# Patient Record
Sex: MALE | Race: WHITE | NOT HISPANIC OR LATINO | Employment: OTHER | URBAN - METROPOLITAN AREA
[De-identification: names, ages, dates, MRNs, and addresses within clinical notes are randomized per-mention and may not be internally consistent; named-entity substitution may affect disease eponyms.]

---

## 2018-08-25 ENCOUNTER — APPOINTMENT (OUTPATIENT)
Dept: GENERAL RADIOLOGY | Facility: CLINIC | Age: 83
DRG: 469 | End: 2018-08-25
Attending: EMERGENCY MEDICINE
Payer: MEDICARE

## 2018-08-25 ENCOUNTER — HOSPITAL ENCOUNTER (INPATIENT)
Facility: CLINIC | Age: 83
LOS: 9 days | Discharge: NURSING, REHAB OR LONG-TERM CARE | DRG: 469 | End: 2018-09-03
Attending: EMERGENCY MEDICINE | Admitting: ORTHOPAEDIC SURGERY
Payer: MEDICARE

## 2018-08-25 DIAGNOSIS — I25.10 CORONARY ARTERY DISEASE INVOLVING NATIVE HEART WITHOUT ANGINA PECTORIS, UNSPECIFIED VESSEL OR LESION TYPE: ICD-10-CM

## 2018-08-25 DIAGNOSIS — K59.03 DRUG-INDUCED CONSTIPATION: ICD-10-CM

## 2018-08-25 DIAGNOSIS — Z79.4 TYPE 2 DIABETES MELLITUS WITH COMPLICATION, WITH LONG-TERM CURRENT USE OF INSULIN (H): ICD-10-CM

## 2018-08-25 DIAGNOSIS — E11.8 TYPE 2 DIABETES MELLITUS WITH COMPLICATION, WITH LONG-TERM CURRENT USE OF INSULIN (H): ICD-10-CM

## 2018-08-25 DIAGNOSIS — S72.001A CLOSED FRACTURE OF NECK OF RIGHT FEMUR, INITIAL ENCOUNTER (H): ICD-10-CM

## 2018-08-25 DIAGNOSIS — J18.9 PNEUMONIA OF RIGHT LOWER LOBE DUE TO INFECTIOUS ORGANISM: ICD-10-CM

## 2018-08-25 DIAGNOSIS — I47.10 PAROXYSMAL SUPRAVENTRICULAR TACHYCARDIA (H): Primary | ICD-10-CM

## 2018-08-25 LAB
ALBUMIN UR-MCNC: 10 MG/DL
ANION GAP SERPL CALCULATED.3IONS-SCNC: 6 MMOL/L (ref 3–14)
APPEARANCE UR: CLEAR
BASOPHILS # BLD AUTO: 0 10E9/L (ref 0–0.2)
BASOPHILS NFR BLD AUTO: 0.1 %
BILIRUB UR QL STRIP: NEGATIVE
BUN SERPL-MCNC: 31 MG/DL (ref 7–30)
CALCIUM SERPL-MCNC: 8.4 MG/DL (ref 8.5–10.1)
CHLORIDE SERPL-SCNC: 111 MMOL/L (ref 94–109)
CO2 SERPL-SCNC: 24 MMOL/L (ref 20–32)
COLOR UR AUTO: ABNORMAL
CREAT SERPL-MCNC: 1.65 MG/DL (ref 0.66–1.25)
DIFFERENTIAL METHOD BLD: ABNORMAL
EOSINOPHIL # BLD AUTO: 0.2 10E9/L (ref 0–0.7)
EOSINOPHIL NFR BLD AUTO: 1.2 %
ERYTHROCYTE [DISTWIDTH] IN BLOOD BY AUTOMATED COUNT: 14.9 % (ref 10–15)
GFR SERPL CREATININE-BSD FRML MDRD: 39 ML/MIN/1.7M2
GLUCOSE BLDC GLUCOMTR-MCNC: 98 MG/DL (ref 70–99)
GLUCOSE SERPL-MCNC: 119 MG/DL (ref 70–99)
GLUCOSE UR STRIP-MCNC: 30 MG/DL
HBA1C MFR BLD: 8.4 % (ref 0–5.6)
HCT VFR BLD AUTO: 35.2 % (ref 40–53)
HGB BLD-MCNC: 11.5 G/DL (ref 13.3–17.7)
HGB UR QL STRIP: NEGATIVE
IMM GRANULOCYTES # BLD: 0.1 10E9/L (ref 0–0.4)
IMM GRANULOCYTES NFR BLD: 0.5 %
INTERPRETATION ECG - MUSE: NORMAL
KETONES UR STRIP-MCNC: NEGATIVE MG/DL
LEUKOCYTE ESTERASE UR QL STRIP: NEGATIVE
LYMPHOCYTES # BLD AUTO: 1.9 10E9/L (ref 0.8–5.3)
LYMPHOCYTES NFR BLD AUTO: 12.3 %
MCH RBC QN AUTO: 29.7 PG (ref 26.5–33)
MCHC RBC AUTO-ENTMCNC: 32.7 G/DL (ref 31.5–36.5)
MCV RBC AUTO: 91 FL (ref 78–100)
MONOCYTES # BLD AUTO: 1.3 10E9/L (ref 0–1.3)
MONOCYTES NFR BLD AUTO: 8.2 %
NEUTROPHILS # BLD AUTO: 12.1 10E9/L (ref 1.6–8.3)
NEUTROPHILS NFR BLD AUTO: 77.7 %
NITRATE UR QL: NEGATIVE
NRBC # BLD AUTO: 0 10*3/UL
NRBC BLD AUTO-RTO: 0 /100
PH UR STRIP: 6.5 PH (ref 5–7)
PLATELET # BLD AUTO: 184 10E9/L (ref 150–450)
POTASSIUM SERPL-SCNC: 4.6 MMOL/L (ref 3.4–5.3)
RBC # BLD AUTO: 3.87 10E12/L (ref 4.4–5.9)
RBC #/AREA URNS AUTO: 1 /HPF (ref 0–2)
SODIUM SERPL-SCNC: 141 MMOL/L (ref 133–144)
SOURCE: ABNORMAL
SP GR UR STRIP: 1.01 (ref 1–1.03)
UROBILINOGEN UR STRIP-MCNC: NORMAL MG/DL (ref 0–2)
WBC # BLD AUTO: 15.6 10E9/L (ref 4–11)
WBC #/AREA URNS AUTO: 0 /HPF (ref 0–5)

## 2018-08-25 PROCEDURE — A9270 NON-COVERED ITEM OR SERVICE: HCPCS | Mod: GY | Performed by: INTERNAL MEDICINE

## 2018-08-25 PROCEDURE — 83036 HEMOGLOBIN GLYCOSYLATED A1C: CPT | Performed by: EMERGENCY MEDICINE

## 2018-08-25 PROCEDURE — 73502 X-RAY EXAM HIP UNI 2-3 VIEWS: CPT

## 2018-08-25 PROCEDURE — 25000128 H RX IP 250 OP 636: Performed by: INTERNAL MEDICINE

## 2018-08-25 PROCEDURE — 00000146 ZZHCL STATISTIC GLUCOSE BY METER IP

## 2018-08-25 PROCEDURE — 80048 BASIC METABOLIC PNL TOTAL CA: CPT | Performed by: EMERGENCY MEDICINE

## 2018-08-25 PROCEDURE — 81001 URINALYSIS AUTO W/SCOPE: CPT | Performed by: INTERNAL MEDICINE

## 2018-08-25 PROCEDURE — 12000007 ZZH R&B INTERMEDIATE

## 2018-08-25 PROCEDURE — 96374 THER/PROPH/DIAG INJ IV PUSH: CPT

## 2018-08-25 PROCEDURE — 0HQ0XZZ REPAIR SCALP SKIN, EXTERNAL APPROACH: ICD-10-PCS | Performed by: INTERNAL MEDICINE

## 2018-08-25 PROCEDURE — 99285 EMERGENCY DEPT VISIT HI MDM: CPT | Mod: 25

## 2018-08-25 PROCEDURE — 25000132 ZZH RX MED GY IP 250 OP 250 PS 637: Mod: GY | Performed by: INTERNAL MEDICINE

## 2018-08-25 PROCEDURE — 71045 X-RAY EXAM CHEST 1 VIEW: CPT

## 2018-08-25 PROCEDURE — 99223 1ST HOSP IP/OBS HIGH 75: CPT | Mod: AI | Performed by: INTERNAL MEDICINE

## 2018-08-25 PROCEDURE — 25000128 H RX IP 250 OP 636: Performed by: EMERGENCY MEDICINE

## 2018-08-25 PROCEDURE — 93005 ELECTROCARDIOGRAM TRACING: CPT

## 2018-08-25 PROCEDURE — 12013 RPR F/E/E/N/L/M 2.6-5.0 CM: CPT

## 2018-08-25 PROCEDURE — 85025 COMPLETE CBC W/AUTO DIFF WBC: CPT | Performed by: EMERGENCY MEDICINE

## 2018-08-25 RX ORDER — HYDROMORPHONE HYDROCHLORIDE 1 MG/ML
.3-.5 INJECTION, SOLUTION INTRAMUSCULAR; INTRAVENOUS; SUBCUTANEOUS
Status: DISCONTINUED | OUTPATIENT
Start: 2018-08-25 | End: 2018-08-26

## 2018-08-25 RX ORDER — LIDOCAINE 40 MG/G
CREAM TOPICAL
Status: DISCONTINUED | OUTPATIENT
Start: 2018-08-25 | End: 2018-08-26

## 2018-08-25 RX ORDER — HYDROMORPHONE HYDROCHLORIDE 1 MG/ML
0.2 INJECTION, SOLUTION INTRAMUSCULAR; INTRAVENOUS; SUBCUTANEOUS ONCE
Status: COMPLETED | OUTPATIENT
Start: 2018-08-25 | End: 2018-08-25

## 2018-08-25 RX ORDER — ACETAMINOPHEN 325 MG/1
650 TABLET ORAL EVERY 4 HOURS PRN
Status: DISCONTINUED | OUTPATIENT
Start: 2018-08-25 | End: 2018-08-26

## 2018-08-25 RX ORDER — AMOXICILLIN 250 MG
2 CAPSULE ORAL 2 TIMES DAILY PRN
Status: DISCONTINUED | OUTPATIENT
Start: 2018-08-25 | End: 2018-08-26

## 2018-08-25 RX ORDER — ACETAMINOPHEN 650 MG/1
650 SUPPOSITORY RECTAL EVERY 4 HOURS PRN
Status: DISCONTINUED | OUTPATIENT
Start: 2018-08-25 | End: 2018-08-27

## 2018-08-25 RX ORDER — SIMVASTATIN 20 MG
20 TABLET ORAL AT BEDTIME
Status: DISCONTINUED | OUTPATIENT
Start: 2018-08-26 | End: 2018-08-30

## 2018-08-25 RX ORDER — ONDANSETRON 4 MG/1
4 TABLET, ORALLY DISINTEGRATING ORAL EVERY 6 HOURS PRN
Status: DISCONTINUED | OUTPATIENT
Start: 2018-08-25 | End: 2018-08-26

## 2018-08-25 RX ORDER — NALOXONE HYDROCHLORIDE 0.4 MG/ML
.1-.4 INJECTION, SOLUTION INTRAMUSCULAR; INTRAVENOUS; SUBCUTANEOUS
Status: DISCONTINUED | OUTPATIENT
Start: 2018-08-25 | End: 2018-08-26

## 2018-08-25 RX ORDER — HYDRALAZINE HYDROCHLORIDE 20 MG/ML
10 INJECTION INTRAMUSCULAR; INTRAVENOUS EVERY 4 HOURS PRN
Status: DISCONTINUED | OUTPATIENT
Start: 2018-08-25 | End: 2018-09-03 | Stop reason: HOSPADM

## 2018-08-25 RX ORDER — GLIPIZIDE 5 MG/1
5 TABLET, FILM COATED, EXTENDED RELEASE ORAL 2 TIMES DAILY
Status: ON HOLD | COMMUNITY
End: 2018-09-03

## 2018-08-25 RX ORDER — SIMVASTATIN 40 MG
40 TABLET ORAL AT BEDTIME
Status: DISCONTINUED | OUTPATIENT
Start: 2018-08-25 | End: 2018-08-25

## 2018-08-25 RX ORDER — NICOTINE POLACRILEX 4 MG
15-30 LOZENGE BUCCAL
Status: DISCONTINUED | OUTPATIENT
Start: 2018-08-25 | End: 2018-08-26

## 2018-08-25 RX ORDER — ATENOLOL 25 MG/1
25 TABLET ORAL DAILY
Status: DISCONTINUED | OUTPATIENT
Start: 2018-08-26 | End: 2018-08-25

## 2018-08-25 RX ORDER — ATENOLOL 50 MG/1
50 TABLET ORAL DAILY
Status: DISCONTINUED | OUTPATIENT
Start: 2018-08-26 | End: 2018-08-27

## 2018-08-25 RX ORDER — SIMVASTATIN 40 MG
TABLET ORAL AT BEDTIME
Status: ON HOLD | COMMUNITY
End: 2018-08-25

## 2018-08-25 RX ORDER — ONDANSETRON 2 MG/ML
4 INJECTION INTRAMUSCULAR; INTRAVENOUS EVERY 6 HOURS PRN
Status: DISCONTINUED | OUTPATIENT
Start: 2018-08-25 | End: 2018-08-26

## 2018-08-25 RX ORDER — AMOXICILLIN 250 MG
1 CAPSULE ORAL 2 TIMES DAILY PRN
Status: DISCONTINUED | OUTPATIENT
Start: 2018-08-25 | End: 2018-08-26

## 2018-08-25 RX ORDER — SIMVASTATIN 20 MG
20 TABLET ORAL AT BEDTIME
Status: ON HOLD | COMMUNITY
End: 2018-09-03

## 2018-08-25 RX ORDER — ATENOLOL 50 MG/1
50 TABLET ORAL DAILY
Status: ON HOLD | COMMUNITY
End: 2018-09-03

## 2018-08-25 RX ORDER — SODIUM CHLORIDE 9 MG/ML
INJECTION, SOLUTION INTRAVENOUS CONTINUOUS
Status: DISCONTINUED | OUTPATIENT
Start: 2018-08-25 | End: 2018-08-27

## 2018-08-25 RX ORDER — GLIPIZIDE 5 MG/1
5 TABLET ORAL
Status: ON HOLD | COMMUNITY
End: 2018-08-25

## 2018-08-25 RX ORDER — OXYCODONE HYDROCHLORIDE 5 MG/1
5-10 TABLET ORAL
Status: DISCONTINUED | OUTPATIENT
Start: 2018-08-25 | End: 2018-08-26

## 2018-08-25 RX ORDER — ATENOLOL 25 MG/1
TABLET ORAL DAILY
Status: ON HOLD | COMMUNITY
End: 2018-08-25

## 2018-08-25 RX ORDER — DEXTROSE MONOHYDRATE 25 G/50ML
25-50 INJECTION, SOLUTION INTRAVENOUS
Status: DISCONTINUED | OUTPATIENT
Start: 2018-08-25 | End: 2018-08-26

## 2018-08-25 RX ADMIN — SODIUM CHLORIDE: 9 INJECTION, SOLUTION INTRAVENOUS at 22:01

## 2018-08-25 RX ADMIN — Medication 0.2 MG: at 21:06

## 2018-08-25 RX ADMIN — Medication 1 MG: at 22:21

## 2018-08-25 RX ADMIN — ACETAMINOPHEN 650 MG: 325 TABLET, FILM COATED ORAL at 22:20

## 2018-08-25 NOTE — IP AVS SNAPSHOT
"` `     [facility]: [phone]                                              INTERAGENCY TRANSFER FORM - NURSING   2018                    Hospital Admission Date: 2018  CHERYL HOLLOWAY   : 1924  Sex: Male        Attending Provider: Bill Sanders MD     Allergies:  No Known Allergies    Infection:  None   Service:  CARDIOLOGY    Ht:  1.727 m (5' 8\")   Wt:  72.6 kg (160 lb)   Admission Wt:  75.8 kg (167 lb 3.2 oz)    BMI:  24.33 kg/m 2   BSA:  1.87 m 2            Patient PCP Information     Provider PCP Type    RAISSA  [facility]      Current Code Status     Date Active Code Status Order ID Comments User Context       2018  9:30 PM DNR/DNI [id number]  Mamie Eubanks MD Inpatient       Code Status History     Date Active Date Inactive Code Status Order ID Comments User Context    2014 12:05 PM 2018  9:30 PM Full Code [id number]  Ryne Justin, PAAnnaC Outpatient      Advance Directives        Scanned docmt in ACP Activity?           No scanned doc        Hospital Problems as of 9/3/2018              Priority Class Noted POA    Closed right hip fracture (H) Medium  2018 Yes    S/P total hip arthroplasty Medium  2018 Yes      Non-Hospital Problems as of 9/3/2018              Priority Class Noted    Injury of globe of eye Medium  2014    Recent retinal detachment, total or subtotal Medium  2014      Immunizations     None         END      ASSESSMENT     Discharge Profile Flowsheet     EXPECTED DISCHARGE     SKIN      Expected Discharge Date  18 (or  TCU) 18 1246   Inspection of bony prominences  Full 18 0753    DISCHARGE NEEDS ASSESSMENT     Full except areas not inspected   -- 18 2249    Equipment Currently Used at Home  none 18 1006   Procedural focused assessment (identify areas inspected)   Cheek, left;Nose;Cheek, right;Hip, left;Knee, left;Knee, right;Ankle, left;Ankle, right;Heel, " "left;Heel, right;Foot, left;Foot, right (forehead, RUE) 08/26/18 1646    Equipment Used at Home  none 04/22/14 0848   Inspection under devices  Full 09/01/18 0331    GASTROINTESTINAL (ADULT,PEDIATRIC,OB)     Skin WDL  ex 09/03/18 0753    GI WDL  WDL 09/03/18 1039   Skin Color/Characteristics  bruised (ecchymotic) 09/03/18 0753    All Quadrants Bowel Sounds  audible and normoactive 09/03/18 0637   Skin Integrity  abrasion(s);bruise(s);incision(s) 09/03/18 0753    Last Bowel Movement  08/31/18 09/02/18 1426   Focused inspection of bony prominences  Hip, right 08/28/18 0958    GI Signs/Symptoms  constipation (refused intervention ) 08/31/18 0351   SAFETY      Passing flatus  yes 09/02/18 1816   Safety WDL  WDL 09/03/18 1039    COMMUNICATION ASSESSMENT     All Alarms  alarm(s) activated and audible 09/03/18 1039    Patient's communication style  spoken language (English or Bilingual) 08/25/18 2140   Safety Equipment  oxygen flowmeter;manual resuscitator/PEEP valve in room;suction regulator;suction equipment 09/03/18 0637                 Assessment WDL (Within Defined Limits) Definitions           Safety WDL     Effective: 09/28/15    Row Information: <b>WDL Definition:</b> Bed in low position, wheels locked; call light in reach; upper side rails up x 2; ID band on<br> <font color=\"gray\"><i>Item=AS safety wdl>>List=AS safety wdl>>Version=F14</i></font>      Skin WDL     Effective: 09/28/15    Row Information: <b>WDL Definition:</b> Warm; dry; intact; elastic; without discoloration; pressure points without redness<br> <font color=\"gray\"><i>Item=AS skin wdl>>List=AS skin wdl>>Version=F14</i></font>      Vitals     Vital Signs Flowsheet     VITAL SIGNS     ANALGESIA SIDE EFFECTS MONITORING      Temp  98.5  F (36.9  C) 09/03/18 1135   Side Effects Monitoring: Respiratory Quality  R 09/03/18 1231    Temp src  Oral 09/03/18 1135   Side Effects Monitoring: Respiratory Depth  N 09/03/18 1231    Resp  20 09/03/18 1230   Side " "Effects Monitoring: Sedation Level  1 09/03/18 1231    Pulse  75 09/01/18 1228   HEIGHT AND WEIGHT      Heart Rate  72 09/03/18 1230   Height  1.727 m (5' 8\") 08/25/18 2138    Pulse/Heart Rate Source  Monitor 09/03/18 1135   Height Method  Stated 08/25/18 2138    BP  141/57 09/03/18 1135   Weight  72.6 kg (160 lb) 09/03/18 0614    BP Location  Right arm 09/03/18 1135   Weight Method  Standing scale 09/03/18 0614    Patient Position  Lying 08/26/18 1526   Bed Scale  Standard (fitted sheet, draw sheet/ pad, cover/flat sheet, blanket, two pillows) 09/01/18 0636    OXYGEN THERAPY     BSA (Calculated - sq m)  1.91 08/25/18 2212    SpO2  96 % 09/03/18 0939   BMI (Calculated)  25.48 08/25/18 2212    O2 Device  None (Room air) 09/03/18 1230   POSITIONING      Oxygen Delivery  2 LPM 08/31/18 0755   Body Position  independently positioning 09/03/18 0920    RESPIRATORY MONITORING     Head of Bed (HOB)  HOB at 90 degrees 09/03/18 1230    Respiratory Monitoring (EtCO2)  19 mmHg 08/27/18 0737   Chair  Shifted weight 09/02/18 1426    Integrated Pulmonary Index (IPI)  5-6 08/27/18 0737   Positioning/Transfer Devices  pillows;in use 09/03/18 0920    PAIN/COMFORT     DAILY CARE      Patient Currently in Pain  denies 09/03/18 1230   Activity Management  up to bedside commode 09/03/18 0936    Preferred Pain Scale  number (Numeric Rating Pain Scale) 09/02/18 1608   Activity Assistance Provided  assistance, 2 people 09/03/18 0936    Patient's Stated Pain Goal  No pain 08/31/18 0639   Assistive Device Utilized  walker 09/03/18 0936    0-10 Pain Scale  6 09/03/18 0839   ECG      Word Pain Scale  2 08/26/18 1600   ECG Rhythm  Atrial fibrillation 09/02/18 1739    Pain Location  Back 09/03/18 0842   Lead Monitored  Lead II 08/26/18 1526    Pain Orientation  Lower 09/03/18 0842   Equipment  electrodes changed;telemetry batteries changed 09/02/18 1022    Pain Descriptors  Aching 09/03/18 0842   POINT OF CARE TESTING      Pain Management " Interventions  analgesia administered 09/03/18 0842   Puncture Site  fingertip 09/03/18 1231    Pain Intervention(s)  Medication (See eMAR) 09/02/18 1739   Bedside Glucose (mg/dl )   235 mg/dl 09/03/18 1231    Response to Interventions  Decrease in pain 09/03/18 0926                 Patient Lines/Drains/Airways Status    Active LINES/DRAINS/AIRWAYS     Name: Placement date: Placement time: Site: Days: Last dressing change:    Peripheral IV 08/28/18 Right Lower forearm 08/28/18   1846   Lower forearm   5     Incision/Surgical Site 08/26/18 Right Hip 08/26/18   1441    7             Patient Lines/Drains/Airways Status    Active PICC/CVC     None            Intake/Output Detail Report     Date Intake     Output   Net    Shift P.O. I.V. IV Piggyback Total Urine Blood Total       Noc 09/01/18 2300 - 09/02/18 0659 240 -- -- 240 225 -- 225 15    Day 09/02/18 0700 - 09/02/18 1459 -- 3 -- 3 275 -- 275 -272    Victoria 09/02/18 1500 - 09/02/18 2259 -- -- -- -- 225 -- 225 -225    Noc 09/02/18 2300 - 09/03/18 0659 400 -- -- 400 400 -- 400 0    Day 09/03/18 0700 - 09/03/18 1459 240 -- -- 240 100 -- 100 140      Last Void/BM       Most Recent Value    Urine Occurrence 1 at 09/02/2018 1301    Stool Occurrence 1 at 09/01/2018 1700      Case Management/Discharge Planning     Case Management/Discharge Planning Flowsheet     REFERRAL INFORMATION     EXPECTED DISCHARGE      Did the Initial Social Work Assessment result in a Social Work Case?  Yes 08/28/18 1222   Expected Discharge Date  09/01/18 (or 9/2 TCU) 08/31/18 1246    Referral Source  physician 08/28/18 1222   DISCHARGE PLANNING      Reason For Consult  discharge planning 08/28/18 1222   Equipment Used at Home  none 04/22/14 0848    Record Reviewed  medical record 08/28/18 1222   FINAL RESOURCES      CTS Assigned to Case  -- (Jabari Yang) 08/28/18 1222   Equipment Currently Used at Home  none 08/27/18 1006    LIVING ENVIRONMENT     ABUSE RISK SCREEN      Lives With  spouse  08/28/18 1222   QUESTION TO PATIENT:  Has a member of your family or a partner(now or in the past) intimidated, hurt, manipulated, or controlled you in any way?  no 08/25/18 1855    Living Arrangements  assisted living 08/28/18 1222   QUESTION TO PATIENT: Do you feel safe going back to the place where you are living?  yes 08/25/18 1855    Quality Of Family Relationships  supportive;involved 08/28/18 1222   OBSERVATION: Is there reason to believe there has been maltreatment of a vulnerable adult (ie. Physical/Sexual/Emotional abuse, self neglect, lack of adequate food, shelter, medical care, or financial exploitation)?  no 08/25/18 1855    ASSESSMENT OF FAMILY/SOCIAL SUPPORT     OTHER      Marital Status   08/28/18 1222   Are you depressed or being treated for depression?  No 08/25/18 2144    Who is your support system?  Children 08/28/18 1222   HOMICIDE RISK      COPING/STRESS     Feels Like Hurting Others  no 08/25/18 1855    Major Change/Loss/Stressor  hospitalization 08/25/18 2144

## 2018-08-25 NOTE — IP AVS SNAPSHOT
[facility]    [address] [place] [address]., [address]    [place] [address]    Phone:  [phone]                                       After Visit Summary   8/25/2018    Abimael Flores    MRN: [id number]           After Visit Summary Signature Page     I have received my discharge instructions, and my questions have been answered. I have discussed any challenges I see with this plan with the nurse or doctor.    ..........................................................................................................................................  Patient/Patient Representative Signature      ..........................................................................................................................................  Patient Representative Print Name and Relationship to Patient    ..................................................               ................................................  Date                                            Time    ..........................................................................................................................................  Reviewed by Signature/Title    ...................................................              ..............................................  Date                                                            Time          22EPIC Rev 08/18

## 2018-08-25 NOTE — IP AVS SNAPSHOT
MRN:[id number]                      After Visit Summary   8/25/2018    Abimael Flores    MRN: [id number]           Thank you!     Thank you for choosing [facility] for your care. Our goal is always to provide you with excellent care. Hearing back from our patients is one way we can continue to improve our services. Please take a few minutes to complete the written survey that you may receive in the mail after you visit with us. Thank you!        Patient Information     Date Of Birth          1/23/1924        Designated Caregiver       Most Recent Value    Caregiver    Will someone help with your care after discharge? yes    Name of designated caregiver raman wife and son    Phone number of caregiver [phone]    Caregiver address [address] [place] [address]      About your hospital stay     You were admitted on:  August 25, 2018 You last received care in the:  [facility]    You were discharged on:  September 3, 2018        Reason for your hospital stay       Following right hip bipolar hemiarthroplasty after displaced femoral neck fracture. You were also seen by Cardiology for episodes of atrial fibrillation/SVT                  Who to Call     For medical emergencies, please call 911.  For non-urgent questions about your medical care, please call your primary care provider or clinic, [phone]  For questions related to your surgery, please call your surgery clinic        Attending Provider     Provider Specialty    Netta Trivedi MD Emergency Medicine    [facility], Mamie Calero MD Internal Medicine    [facility], Bill Ogden MD Orthopaedic Surgery       Primary Care Provider Office Phone # Fax #    Carlitos Bee [phone]      After Care Instructions     Activity - Up with assistive device           Activity - Up with nursing assistance           Additional Discharge Instructions       Please follow these sliding scale instructions for  insulin dosing:    Correction Scale - MEDIUM INSULIN RESISTANCE DOSING     Pre-meal dosing   Do Not give Correction Insulin if Pre-Meal BG less than 140.   For Pre-Meal  - 189 give 1 unit.   For Pre-Meal  - 239 give 2 units.   For Pre-Meal  - 289 give 3 units.   For Pre-Meal  - 339 give 4 units.   For Pre-Meal - 399 give 5 units.   For Pre-Meal -449 give 6 units  For Pre-Meal BG greater than or equal to 450 give 7 units.   To be given with prandial insulin, and based on pre-meal blood glucose.    Notify provider if glucose greater than or equal to 350 mg/dL after administration of correction dose.    MEDIUM INSULIN RESISTANCE DOSING    Bedtime dosing   Do Not give Bedtime Correction Insulin if BG less than  200.   For  - 249 give 1 units.   For  - 299 give 2 units.   For  - 349 give 3 units.   For  -399 give 4 units.   For BG greater than or equal to 400 give 5 units.  Notify provider if glucose greater than or equal to 350 mg/dL after administration of correction dose.            Advance Diet as Tolerated       Follow this diet upon discharge: regular            Advance Diet as Tolerated       Follow this diet upon discharge: Orders Placed This Encounter      Room Service      Advance Diet as Tolerated: Regular Diet Adult      Advance Diet as Tolerated            Encourage PO fluids           Fall precautions           General info for SNF       Length of Stay Estimate: Short Term Care: Estimated # of Days <30  Condition at Discharge: Improving  Level of care:skilled   Rehabilitation Potential: Excellent  Admission H&P remains valid and up-to-date: Yes  Recent Chemotherapy: N/A  Use Nursing Home Standing Orders: Yes            General info for SNF       Length of Stay Estimate: Short Term Care: Estimated # of Days <30  Condition at Discharge: Improving  Level of care:skilled   Rehabilitation Potential: Excellent  Admission H&P remains valid and up-to-date:  Yes  Recent Chemotherapy: N/A  Use Nursing Home Standing Orders: Yes            Glucose monitor nursing POCT       Before meals and at bedtime            Mantoux instructions       Give two-step Mantoux (PPD) Per Facility Policy Yes            Mantoux instructions       Give two-step Mantoux (PPD) Per Facility Policy Yes            Weight bearing status       WBAT            Wound care       Site:   R hip  Instructions:  Leave aquacel dressing in place until post-op follow-up.  If it becomes loose or soiled then remove and replace with daily dry dressings.            Wound care       Daily dry dressing changes.  Staples out 12 days post-op and apply steri-strips.                  Follow-up Appointments     Follow Up and recommended labs and tests       Follow up with Sharri Arellano PA-C or Dr. Sanders within 12-16 days from surgery.  If you do not already have a follow up appointment scheduled, please call our [place] office: [phone].  No follow up labs or test are needed.            Follow Up and recommended labs and tests       Follow-up with dR. Rocky Sanders in ~14 days post-op. [phone]            Follow Up and recommended labs and tests       Follow up with nursing home provider. Repeat BMP and check hemoglobin 9/5                  Additional Services     Occupational Therapy Adult Consult       Evaluate and treat as clinically indicated.    Reason:  S/p R hip bipolar hemiarthroplasty.  No hip precautions.  WBAT.            Occupational Therapy Adult Consult       Evaluate and treat as clinically indicated.    Reason:  Right bipolar hemiarthroplasty for femoral neck fracture.            Physical Therapy Adult Consult       Evaluate and treat as clinically indicated.    Reason:  S/p R hip bipolar hemiarthroplasty.  No hip precautions.  WBAT.            Physical Therapy Adult Consult       bipolar hemiarthroplasty for femoral neck fracture.  rIGHT            Follow-Up with Cardiac [facility]  "Provider                 Pending Results     Date and Time Order Name Status Description    2018 1454 EKG 12-lead, tracing only Preliminary             Statement of Approval     Ordered          18 1250  I have reviewed and agree with all the recommendations and orders detailed in this document.  EFFECTIVE NOW     Approved and electronically signed by:  Meli Arndt PA-C           18 0901  I have reviewed and agree with all the recommendations and orders detailed in this document.  EFFECTIVE NOW     Approved and electronically signed by:  Edgardo Godfrey PA-C             Admission Information     Date & Time Provider Department Dept. Phone    2018 Bill Sanders MD [facility] [phone]      Your Vitals Were     Blood Pressure Pulse Temperature Respirations Height Weight    141/57 (BP Location: Right arm) 75 98.5  F (36.9  C) (Oral) 20 1.727 m (5' 8\") 72.6 kg (160 lb)    Pulse Oximetry BMI (Body Mass Index)                96% 24.33 kg/m2          [employer] Information     [employer] lets you send messages to your doctor, view your test results, renew your prescriptions, schedule appointments and more. To sign up, go to [email or web address] . Click on \"Log in\" on the left side of the screen, which will take you to the Welcome page. Then click on \"Sign up Now\" on the right side of the page.     You will be asked to enter the access code listed below, as well as some personal information. Please follow the directions to create your username and password.     Your access code is: [email or web address]  Expires: 2018 12:59 PM     Your access code will  in 90 days. If you need help or a new code, please call your [facility] clinic or [phone].        Care EveryWhere ID     This is your Care EveryWhere ID. This could be used by other organizations to access your [facility] medical records  [id number]        Equal Access to Services     NICOL HANNON " AH: Hadii haydee bernardo Sororyali, waaxda luqadaha, qaybta kaalmada monalisa, waxminor priya moisészoie plazarobbykerwin pantoja. So [facility] [phone].    ATENCIÓN: Si habla español, tiene a funes disposición servicios gratuitos de asistencia lingüística. Llame al [phone].    We comply with applicable federal civil rights laws and [place] laws. We do not discriminate on the basis of race, color, national origin, age, disability, sex, sexual orientation, or gender identity.               Review of your medicines      START taking        Dose / Directions    acetaminophen 325 MG tablet   Commonly known as:  TYLENOL   Used for:  Closed fracture of neck of right femur, initial encounter (H)        Dose:  650 mg   Take 2 tablets (650 mg) by mouth every 8 hours   Quantity:  100 tablet   Refills:  0       amoxicillin-clavulanate 875-125 MG per tablet   Commonly known as:  AUGMENTIN   Indication:  Pneumonia caused by Inhaling a Substance Into the Lungs   Used for:  Pneumonia of right lower lobe due to infectious organism (H)        Dose:  1 tablet   Start taking on:  9/4/2018   Take 1 tablet by mouth every 24 hours for 1 day   Refills:  0       * aspirin 325 MG EC tablet   Used for:  Closed fracture of neck of right femur, initial encounter (H)   Replaces:  aspirin 81 MG tablet        Take one Aspirin tab twice daily for 5 weeks.   Quantity:  70 tablet   Refills:  0       * aspirin 325 MG EC tablet   Used for:  Closed fracture of neck of right femur, initial encounter (H)        Dose:  325 mg   Take 1 tablet (325 mg) by mouth daily   Quantity:  30 tablet   Refills:  0       atorvastatin 10 MG tablet   Commonly known as:  LIPITOR   Used for:  Coronary artery disease involving native heart without angina pectoris, unspecified vessel or lesion type        Dose:  10 mg   Take 1 tablet (10 mg) by mouth every evening   Refills:  0       diltiazem 180 MG 24 hr capsule   Commonly known as:  DILACOR XR   Used for:  Paroxysmal  supraventricular tachycardia (H)        Dose:  180 mg   Start taking on:  9/4/2018   Take 1 capsule (180 mg) by mouth daily   Refills:  0       * insulin aspart 100 UNIT/ML injection   Commonly known as:  NovoLOG PEN   Used for:  Type 2 diabetes mellitus with complication, with long-term current use of insulin (H)        Dose:  1-7 Units   Inject 1-7 Units Subcutaneous 3 times daily (before meals)   Refills:  0       * insulin aspart 100 UNIT/ML injection   Commonly known as:  NovoLOG PEN   Used for:  Type 2 diabetes mellitus with complication, with long-term current use of insulin (H)        Dose:  1-5 Units   Inject 1-5 Units Subcutaneous At Bedtime   Refills:  0       metoprolol succinate 50 MG 24 hr tablet   Commonly known as:  TOPROL-XL   Used for:  Paroxysmal supraventricular tachycardia (H)        Dose:  50 mg   Start taking on:  9/4/2018   Take 1 tablet (50 mg) by mouth daily   Quantity:  30 tablet   Refills:  0       oxyCODONE IR 5 MG tablet   Commonly known as:  ROXICODONE   Used for:  Closed fracture of neck of right femur, initial encounter (H)        Dose:  5 mg   Take 1 tablet (5 mg) by mouth every 4 hours as needed for pain   Quantity:  60 tablet   Refills:  0       polyethylene glycol Packet   Commonly known as:  MIRALAX/GLYCOLAX   Used for:  Drug-induced constipation        Dose:  17 g   Take 17 g by mouth daily as needed for constipation   Quantity:  7 packet   Refills:  0       senna-docusate 8.6-50 MG per tablet   Commonly known as:  SENOKOT-S;PERICOLACE   Used for:  Closed fracture of neck of right femur, initial encounter (H)        Dose:  1 tablet   Take 1 tablet by mouth 2 times daily   Quantity:  60 tablet   Refills:  0       * Notice:  This list has 4 medication(s) that are the same as other medications prescribed for you. Read the directions carefully, and ask your doctor or other care provider to review them with you.      CONTINUE these medicines which may have CHANGED, or have new  prescriptions. If we are uncertain of the size of tablets/capsules you have at home, strength may be listed as something that might have changed.        Dose / Directions    insulin glargine 100 UNIT/ML injection   Commonly known as:  LANTUS   This may have changed:  how much to take   Used for:  Type 2 diabetes mellitus with complication, with long-term current use of insulin (H)        Dose:  5 Units   Inject 5 Units Subcutaneous At Bedtime   Refills:  0       insulin lispro 100 UNIT/ML injection   Commonly known as:  HumaLOG KWIKpen   This may have changed:  how much to take   Used for:  Type 2 diabetes mellitus with complication, with long-term current use of insulin (H)        Dose:  2 Units   Inject 2 Units Subcutaneous 3 times daily (before meals)   Refills:  0         CONTINUE these medicines which have NOT CHANGED        Dose / Directions    loratadine 10 MG tablet   Commonly known as:  CLARITIN        Dose:  10 mg   Take 10 mg by mouth daily as needed   Refills:  0         STOP taking     aspirin 81 MG tablet   Replaced by:  aspirin 325 MG EC tablet           atenolol 50 MG tablet   Commonly known as:  TENORMIN           glipiZIDE 5 MG 24 hr tablet   Commonly known as:  GLUCOTROL XL           simvastatin 20 MG tablet   Commonly known as:  ZOCOR                Where to get your medicines      These medications were sent to [facility] - [address]  [address] [place] [address], [place] [address]     Phone:  [phone]     acetaminophen 325 MG tablet    aspirin 325 MG EC tablet         Some of these will need a paper prescription and others can be bought over the counter. Ask your nurse if you have questions.     Bring a paper prescription for each of these medications     oxyCODONE IR 5 MG tablet       You don't need a prescription for these medications     amoxicillin-clavulanate 875-125 MG per tablet    aspirin 325 MG EC tablet    atorvastatin 10 MG tablet    diltiazem 180  MG 24 hr capsule    insulin aspart 100 UNIT/ML injection    insulin aspart 100 UNIT/ML injection    insulin glargine 100 UNIT/ML injection    insulin lispro 100 UNIT/ML injection    metoprolol succinate 50 MG 24 hr tablet    polyethylene glycol Packet    senna-docusate 8.6-50 MG per tablet                Protect others around you: Learn how to safely use, store and throw away your medicines at www.disposemymeds.org.        ANTIBIOTIC INSTRUCTION     You've Been Prescribed an Antibiotic - Now What?  Your healthcare team thinks that you or your loved one might have an infection. Some infections can be treated with antibiotics, which are powerful, life-saving drugs. Like all medications, antibiotics have side effects and should only be used when necessary. There are some important things you should know about your antibiotic treatment.      Your healthcare team may run tests before you start taking an antibiotic.    Your team may take samples (e.g., from your blood, urine or other areas) to run tests to look for bacteria. These test can be important to determine if you need an antibiotic at all and, if you do, which antibiotic will work best.      Within a few days, your healthcare team might change or even stop your antibiotic.    Your team may start you on an antibiotic while they are working to find out what is making you sick.    Your team might change your antibiotic because test results show that a different antibiotic would be better to treat your infection.    In some cases, once your team has more information, they learn that you do not need an antibiotic at all. They may find out that you don't have an infection, or that the antibiotic you're taking won't work against your infection. For example, an infection caused by a virus can't be treated with antibiotics. Staying on an antibiotic when you don't need it is more likely to be harmful than helpful.      You may experience side effects from your  antibiotic.    Like all medications, antibiotics have side effects. Some of these can be serious.    Let you healthcare team know if you have any known allergies when you are admitted to the hospital.    One significant side effect of nearly all antibiotics is the risk of severe and sometimes deadly diarrhea caused by Clostridium difficile (C. Difficile). This occurs when a person takes antibiotics because some good germs are destroyed. Antibiotic use allows C. diificile to take over, putting patients at high risk for this serious infection.    As a patient or caregiver, it is important to understand your or your loved one's antibiotic treatment. It is especially important for caregivers to speak up when patients can't speak for themselves. Here are some important questions to ask your healthcare team.    What infection is this antibiotic treating and how do you know I have that infection?    What side effects might occur from this antibiotic?    How long will I need to take this antibiotic?    Is it safe to take this antibiotic with other medications or supplements (e.g., vitamins) that I am taking?     Are there any special directions I need to know about taking this antibiotic? For example, should I take it with food?    How will I be monitored to know whether my infection is responding to the antibiotic?    What tests may help to make sure the right antibiotic is prescribed for me?      Information provided by:  www.cdc.gov/getsmart  U.S. Department of Health and Human Services  Centers for disease Control and Prevention  National Center for Emerging and Zoonotic Infectious Diseases  Division of Healthcare Quality Promotion        Information about OPIOIDS     PRESCRIPTION OPIOIDS: WHAT YOU NEED TO KNOW   We gave you an opioid (narcotic) pain medicine. It is important to manage your pain, but opioids are not always the best choice. You should first try all the other options your care team gave you. Take this  medicine for as short a time (and as few doses) as possible.    Some activities can increase your pain, such as bandage changes or therapy sessions. It may help to take your pain medicine 30 to 60 minutes before these activities. Reduce your stress by getting enough sleep, working on hobbies you enjoy and practicing relaxation or meditation. Talk to your care team about ways to manage your pain beyond prescription opioids.    These medicines have risks:    DO NOT drive when on new or higher doses of pain medicine. These medicines can affect your alertness and reaction times, and you could be arrested for driving under the influence (DUI). If you need to use opioids long-term, talk to your care team about driving.    DO NOT operate heavy machinery    DO NOT do any other dangerous activities while taking these medicines.    DO NOT drink any alcohol while taking these medicines.     If the opioid prescribed includes acetaminophen, DO NOT take with any other medicines that contain acetaminophen. Read all labels carefully. Look for the word  acetaminophen  or  Tylenol.  Ask your pharmacist if you have questions or are unsure.    You can get addicted to pain medicines, especially if you have a history of addiction (chemical, alcohol or substance dependence). Talk to your care team about ways to reduce this risk.    All opioids tend to cause constipation. Drink plenty of water and eat foods that have a lot of fiber, such as fruits, vegetables, prune juice, apple juice and high-fiber cereal. Take a laxative (Miralax, milk of magnesia, Colace, Senna) if you don t move your bowels at least every other day. Other side effects include upset stomach, sleepiness, dizziness, throwing up, tolerance (needing more of the medicine to have the same effect), physical dependence and slowed breathing.    Store your pills in a secure place, locked if possible. We will not replace any lost or stolen medicine. If you don t finish your  medicine, please throw away (dispose) as directed by your pharmacist. The [place] Pollution Control Agency has more information about safe disposal: https://www.pca.state.[place].us/living-green/managing-unwanted-medications             Medication List: This is a list of all your medications and when to take them. Check marks below indicate your daily home schedule. Keep this list as a reference.      Medications           Morning Afternoon Evening Bedtime As Needed    acetaminophen 325 MG tablet   Commonly known as:  TYLENOL   Take 2 tablets (650 mg) by mouth every 8 hours   Last time this was given:  650 mg on 9/3/2018 12:47 AM                                amoxicillin-clavulanate 875-125 MG per tablet   Commonly known as:  AUGMENTIN   Take 1 tablet by mouth every 24 hours for 1 day   Start taking on:  9/4/2018   Last time this was given:  1 tablet on 9/3/2018  8:39 AM   Next Dose Due:  9/4                                     * aspirin 325 MG EC tablet   Take one Aspirin tab twice daily for 5 weeks.   Next Dose Due:  9/4                                        * aspirin 325 MG EC tablet   Take 1 tablet (325 mg) by mouth daily                                atorvastatin 10 MG tablet   Commonly known as:  LIPITOR   Take 1 tablet (10 mg) by mouth every evening   Last time this was given:  10 mg on 9/2/2018 10:00 PM   Next Dose Due:  9/4                                     diltiazem 180 MG 24 hr capsule   Commonly known as:  DILACOR XR   Take 1 capsule (180 mg) by mouth daily   Start taking on:  9/4/2018   Last time this was given:  180 mg on 9/3/2018  8:39 AM   Next Dose Due:  9/4                                     * insulin aspart 100 UNIT/ML injection   Commonly known as:  NovoLOG PEN   Inject 1-7 Units Subcutaneous 3 times daily (before meals)   Last time this was given:  4 Units on 9/3/2018 12:32 PM   Next Dose Due:  Supper                                           * insulin aspart 100 UNIT/ML injection   Commonly  known as:  NovoLOG PEN   Inject 1-5 Units Subcutaneous At Bedtime   Last time this was given:  4 Units on 9/3/2018 12:32 PM   Next Dose Due:  Tonight                                     insulin glargine 100 UNIT/ML injection   Commonly known as:  LANTUS   Inject 5 Units Subcutaneous At Bedtime   Last time this was given:  5 Units on 9/2/2018 10:00 PM   Next Dose Due:  Tonight                                   insulin lispro 100 UNIT/ML injection   Commonly known as:  HumaLOG KWIKpen   Inject 2 Units Subcutaneous 3 times daily (before meals)   Next Dose Due:  Supper                                           loratadine 10 MG tablet   Commonly known as:  CLARITIN   Take 10 mg by mouth daily as needed                                   metoprolol succinate 50 MG 24 hr tablet   Commonly known as:  TOPROL-XL   Take 1 tablet (50 mg) by mouth daily   Start taking on:  9/4/2018   Last time this was given:  50 mg on 9/3/2018  8:39 AM   Next Dose Due:  9/4                                     oxyCODONE IR 5 MG tablet   Commonly known as:  ROXICODONE   Take 1 tablet (5 mg) by mouth every 4 hours as needed for pain   Last time this was given:  5 mg on 9/3/2018  8:39 AM                                   polyethylene glycol Packet   Commonly known as:  MIRALAX/GLYCOLAX   Take 17 g by mouth daily as needed for constipation   Last time this was given:  17 g on 9/3/2018  8:39 AM                                   senna-docusate 8.6-50 MG per tablet   Commonly known as:  SENOKOT-S;PERICOLACE   Take 1 tablet by mouth 2 times daily   Last time this was given:  2 tablets on 9/3/2018  8:39 AM   Next Dose Due:  tonight                                      * Notice:  This list has 4 medication(s) that are the same as other medications prescribed for you. Read the directions carefully, and ask your doctor or other care provider to review them with you.              More Information        Diltiazem extended-release capsules or tablets  Brand  Names: Cardizem CD, Cardizem LA, Cardizem SR, Cartia XT, Dilacor XR, Dilt-CD, Diltia XT, Diltzac, Matzim LA, Taztia XT, Tiazac  What is this medicine?  DILTIAZEM (dil JOSE ANGEL a zem) is a calcium-channel blocker. It affects the amount of calcium found in your heart and muscle cells. This relaxes your blood vessels, which can reduce the amount of work the heart has to do. This medicine is used to treat high blood pressure and chest pain caused by angina.  How should I use this medicine?  Take this medicine by mouth with a glass of water. Follow the directions on the prescription label. Swallow whole, do not crush or chew. Ask your doctor or pharmacist if your should take this medicine with food. Take your doses at regular intervals. Do not take your medicine more often then directed. Do not stop taking except on the advice of your doctor or health care professional. Ask your doctor or health care professional how to gradually reduce the dose.  Talk to your pediatrician regarding the use of this medicine in children. Special care may be needed.  What side effects may I notice from receiving this medicine?  Side effects that you should report to your doctor or health care professional as soon as possible:    allergic reactions like skin rash, itching or hives, swelling of the face, lips, or tongue    confusion, mental depression    feeling faint or lightheaded, falls    redness, blistering, peeling or loosening of the skin, including inside the mouth    slow, irregular heartbeat    swelling of the feet and ankles    unusual bleeding or bruising, pinpoint red spots on the skin  Side effects that usually do not require medical attention (report to your doctor or health care professional if they continue or are bothersome):    constipation or diarrhea    difficulty sleeping    facial flushing    headache    nausea, vomiting    sexual dysfunction    weak or tired  What may interact with this medicine?  Do not take this medicine  with any of the following medications:    cisapride    hawthorn    pimozide    ranolazine    red yeast rice  This medicine may also interact with the following medications:    buspirone    carbamazepine    cimetidine    cyclosporine    digoxin    local anesthetics or general anesthetics    lovastatin    medicines for anxiety or difficulty sleeping like midazolam and triazolam    medicines for high blood pressure or heart problems    quinidine    rifampin, rifabutin, or rifapentine  What if I miss a dose?  If you miss a dose, take it as soon as you can. If it is almost time for your next dose, take only that dose. Do not take double or extra doses.  Where should I keep my medicine?  Keep out of the reach of children.  Store at room temperature between 15 and 30 degrees C (59 and 86 degrees F). Protect from humidity. Throw away any unused medicine after the expiration date.  What should I tell my health care provider before I take this medicine?  They need to know if you have any of these conditions:    heart problems, low blood pressure, irregular heartbeat    liver disease    previous heart attack    an unusual or allergic reaction to diltiazem, other medicines, foods, dyes, or preservatives    pregnant or trying to get pregnant    breast-feeding  What should I watch for while using this medicine?  Check your blood pressure and pulse rate regularly. Ask your doctor or health care professional what your blood pressure and pulse rate should be and when you should contact him or her.  You may feel dizzy or lightheaded. Do not drive, use machinery, or do anything that needs mental alertness until you know how this medicine affects you. To reduce the risk of dizzy or fainting spells, do not sit or stand up quickly, especially if you are an older patient. Alcohol can make you more dizzy or increase flushing and rapid heartbeats. Avoid alcoholic drinks.  NOTE:This sheet is a summary. It may not cover all possible  information. If you have questions about this medicine, talk to your doctor, pharmacist, or health care provider. Copyright  2018 [employer]                Understanding Femur Fracture Open Reduction and Internal Fixation (ORIF)  Open reduction and internal fixation (ORIF) puts the pieces of a broken bone back together so they can heal. Open reduction means the bones are put back in place during a surgery. Internal fixation means that special hardware is used to hold the bone pieces together. This helps the bone heals correctly. The procedure is done by an orthopedic surgeon. This is a doctor with special training in treating bone, joint, and muscle problems.  How does a femur fracture happen?  The femur is the long, thick bone in the upper part of your leg (thigh). Different kinds of injury such as a car accident, sports injury, or fall can cause the femur to break (fracture) into 2 or more pieces. In some cases, the bone may break but the pieces are still lined up correctly. Or, the pieces may not line up correctly. This is called a displaced fracture.   Why is femur fracture ORIF done?  This type of injury needs ORIF to repair. Without ORIF, your broken femur may not heal normally. You are likely to need ORIF if:    You have a displaced fracture    Part of your femur broke through the skin    Your femur broke into several pieces  How is a femur fracture ORIF done?  The surgery is done by an orthopedic surgeon. This is a surgeon who specializes in treating bone, muscle, joint, and tendon problems. The surgery can be done in several ways. The surgeon will make a cut (incision) through the skin and muscles of your thigh. The surgeon puts the pieces of your femur back in place. This is the reduction. Then special screws, plates, wires, or nails are used to hold the bone pieces together. This is the fixation.  What are the risks of femur fracture ORIF?  All surgery has risks. The risks of femur fracture ORIF  include:    Infection    Bleeding    Nerve damage    Bone healing out of line    Blood clots    Fat tissue passes into the bloodstream and blocks a blood vessel (fat embolism)    Skin irritation from the hardware    Problems from anesthesia    Need for more surgery  Your risks vary based on your age and general health. For example, if you are a smoker or if your bones are weak (low bone density), you may have a higher risk of certain problems. People with poorly controlled diabetes may also have a higher risk of problems. Talk with your healthcare provider about which risks apply most to you.  Date Last Reviewed: 4/1/2017 2000-2017 [employer]. [address] [place] [address]. All rights reserved. This information is not intended as a substitute for professional medical care. Always follow your healthcare professional's instructions.

## 2018-08-25 NOTE — IP AVS SNAPSHOT
` `     [facility] CARDIAC SPECIALTY CARE: [phone]            Medication Administration Report for Abimael Flores as of 09/03/18 1350   Legend:    Given Hold Not Given Due Canceled Entry Other Actions    Time Time (Time) Time  Time-Action       Inactive    Active    Linked        Medications 08/28/18 08/29/18 08/30/18 08/31/18 09/01/18 09/02/18 09/03/18    acetaminophen (TYLENOL) tablet 650 mg  Dose: 650 mg  Freq: EVERY 4 HOURS PRN Route: PO  PRN Reason: other  PRN Comment: multimodal surgical pain management along with NSAIDS and opioid medication as indicated based on pain control and physical function.  Start: 08/29/18 1800   Admin Instructions: May give first dose 4 hours after last scheduled dose of acetaminophen.  Maximum acetaminophen dose from all sources = 75 mg/kg/day not to exceed 4 grams/day.    Admin. Amount: 2 tablet (2 × 325 mg tablet)  Last Admin: 09/03/18 0047  Dispense Loc: [facility]  POC: Post-procedure           0047 (650 mg)-Given           amoxicillin-clavulanate (AUGMENTIN) 875-125 MG per tablet 1 tablet  Dose: 1 tablet  Freq: EVERY 24 HOURS SCHEDULED Route: PO  Indications of Use: ASPIRATION PNEUMONIA  Start: 08/31/18 1000   Admin. Amount: 1 tablet  Last Admin: 09/03/18 0839  Dispense Loc: [facility]        1157 (1 tablet)-Given        0835 (1 tablet)-Given        0811 (1 tablet)-Given        0839 (1 tablet)-Given           atorvastatin (LIPITOR) tablet 10 mg  Dose: 10 mg  Freq: EVERY EVENING Route: PO  Start: 08/30/18 2000   Admin. Amount: 1 tablet (1 × 10 mg tablet)  Last Admin: 09/02/18 2200  Dispense Loc: [facility] A        0221 (10 mg)-Given       2158 (10 mg)-Given        2108 (10 mg)-Given        2200 (10 mg)-Given        [ ] 2000           benzocaine-menthol (CHLORASEPTIC) 6-10 MG lozenge 1 lozenge  Dose: 1 lozenge  Freq: EVERY 1 HOUR PRN Route: BU  PRN Reason: sore throat  PRN Comment: dry/sore throat without fever  Start: 08/26/18 1734   Admin. Amount: 1  lozenge  Last Admin: 08/26/18 1742  Dispense Loc: [facility] A               diltiazem (DILACOR XR) 24 hr capsule 180 mg  Dose: 180 mg  Freq: DAILY Route: PO  Start: 08/30/18 1015   Admin Instructions: DO NOT CRUSH.    Admin. Amount: 1 capsule (1 × 180 mg capsule)  Last Admin: 09/03/18 0839  Dispense Loc: [facility] A       1057 (180 mg)-Given        0513 (180 mg)-Given               0835 (180 mg)-Given        0811 (180 mg)-Given        0839 (180 mg)-Given           enoxaparin (LOVENOX) injection 30 mg  Dose: 30 mg  Freq: EVERY 24 HOURS Route: SC  Start: 08/28/18 1200   Admin Instructions: Check to make sure start date/time is 12-24 hours post op unless documented complication, AND no sooner than 22 hours post op if spinal anesthesia used.   Continue until discharge to home. HOLD if platelet count falls below 50% of baseline or less than 100,000/ L and notify provider.    Admin. Amount: 30 mg = 0.3 mL Conc: 30 mg/0.3 mL  Last Admin: 09/03/18 1232  Dispense Loc: [facility]  Volume: 0.3 mL  POC: Post-procedure     1403 (30 mg)-Given        1405 (30 mg)-Given        1226 (30 mg)-Given        1317 (30 mg)-Given        1214 (30 mg)-Given        1259 (30 mg)-Given        1232 (30 mg)-Given           glucose gel 15-30 g  Dose: 15-30 g  Freq: EVERY 15 MIN PRN Route: PO  PRN Reason: low blood sugar  Start: 08/26/18 2143   Admin Instructions: Give 15 g for BG 51 to 69 mg/dL IF patient is conscious and able to swallow. Give 30 g for BG less than or equal to 50 mg/dL IF patient is conscious and able to swallow. Do NOT give glucose gel via enteral tube.  IF patient has enteral tube: give apple juice 120 mL (4 oz or 15 g of CHO) via enteral tube for BG 51 to 69 mg/dL.  Give apple juice 240 mL (8 oz or 30 g of CHO) via enteral tube for BG less than or equal to 50 mg/dL.    ~Oral gel is preferable for conscious and able to swallow patient.   ~IF gel unavailable or patient refuses may provide apple juice 120 mL (4 oz or 15 g of  CHO). Document juice on I and O flowsheet.    Admin. Amount: 15-30 g  Dispense Loc: [facility]  Volume: 93.75 mL              Or  dextrose 50 % injection 25-50 mL  Dose: 25-50 mL  Freq: EVERY 15 MIN PRN Route: IV  PRN Reason: low blood sugar  Start: 08/26/18 2143   Admin Instructions: Use if have IV access, BG less than 70 mg/dL and meet dose criteria below:  Dose if conscious and alert (or disorientated) and NPO = 25 mL  Dose if unconscious / not alert = 50 mL  Vesicant. For ordered doses up to 25 g, give IV Push undiluted. Give each 5g over 1 minute.    Admin. Amount: 25-50 mL  Dispense Loc: [facility]  Infused Over: 1-5 Minutes  Volume: 50 mL              Or  glucagon injection 1 mg  Dose: 1 mg  Freq: EVERY 15 MIN PRN Route: SC  PRN Reason: low blood sugar  PRN Comment: May repeat x 1 only  Start: 08/26/18 2143   Admin Instructions: May give SQ or IM. ONLY use glucagon IF patient has NO IV access AND is UNABLE to swallow AND blood glucose is LESS than or EQUAL to 50 mg/dL.  If ordered IV, give IV Push over 1 minute. Reconstitute with 1mL sterile water.    Admin. Amount: 1 mg  Dispense Loc: [facility]               hydrALAZINE (APRESOLINE) injection 10 mg  Dose: 10 mg  Freq: EVERY 4 HOURS PRN Route: IV  PRN Reason: high blood pressure  PRN Comment: give for SBP > 180  Start: 08/25/18 2129   Admin Instructions: For ordered doses up to 40 mg, give IV Push undiluted over 1 minute.    Admin. Amount: 10 mg = 0.5 mL Conc: 20 mg/mL  Dispense Loc: [facility]  Volume: 0.5 mL               hydrOXYzine (ATARAX) tablet 25 mg  Dose: 25 mg  Freq: 3 TIMES DAILY PRN Route: PO  PRN Reason: other  PRN Comment: pain  Start: 08/26/18 1726   Admin. Amount: 1 tablet (1 × 25 mg tablet)  Dispense Loc: [facility]  POC: Post-procedure               hypromellose-dextran (ARTIFICAL TEARS) 0.1-0.3 % ophthalmic solution 1 drop  Dose: 1 drop  Freq: 4 TIMES DAILY PRN Route: Both Eyes  PRN Reason: dry eyes  Start: 08/29/18 1418   Admin.  Amount: 1 drop  Last Admin: 08/29/18 1458  Dispense Loc:  Main Pharmacy  Volume: 15 mL      1458 (1 drop)-Given                insulin aspart (NovoLOG) inj (RAPID ACTING)  Dose: 2 Units  Freq: 3 TIMES DAILY WITH MEALS Route: SC  Start: 09/02/18 0900   Admin Instructions: Formulary alternate for<br>Humalog  If given at mealtime, must be administered 5 min before meal or immediately after.    Admin. Amount: 2 Units  Last Admin: 09/03/18 1232  Dispense Loc: Contact Rx for dose  Volume: 3 mL          1000 (2 Units)-Given       1414 (2 Units)-Given       1801 (2 Units)-Given [C]        0834 (2 Units)-Given       1232 (2 Units)-Given       [ ] 1800           insulin aspart (NovoLOG) inj (RAPID ACTING)  Dose: 1-5 Units  Freq: AT BEDTIME Route: SC  Start: 08/26/18 2200   Admin Instructions: MEDIUM INSULIN RESISTANCE DOSING    Do Not give Bedtime Correction Insulin if BG less than  200.   For  - 249 give 1 units.   For  - 299 give 2 units.   For  - 349 give 3 units.   For  -399 give 4 units.   For BG greater than or equal to 400 give 5 units.  Notify provider if glucose greater than or equal to 350 mg/dL after administration of correction dose.  If given at mealtime, must be administered 5 min before meal or immediately after.    Admin. Amount: 1-5 Units  Last Admin: 08/27/18 2211  Dispense Loc: Contact Rx for dose  Volume: 3 mL      (0204)-Not Given       (2118)-Not Given        (2239)-Not Given [C]        (2155)-Not Given [C]        (2353)-Not Given        (2206)-Not Given [C]        [ ] 2200           insulin aspart (NovoLOG) inj (RAPID ACTING)  Dose: 1-7 Units  Freq: 3 TIMES DAILY BEFORE MEALS Route: SC  Start: 08/27/18 0730   Admin Instructions: Correction Scale - MEDIUM INSULIN RESISTANCE DOSING     Do Not give Correction Insulin if Pre-Meal BG less than 140.   For Pre-Meal  - 189 give 1 unit.   For Pre-Meal  - 239 give 2 units.   For Pre-Meal  - 289 give 3 units.   For  "Pre-Meal  - 339 give 4 units.   For Pre-Meal - 399 give 5 units.   For Pre-Meal -449 give 6 units  For Pre-Meal BG greater than or equal to 450 give 7 units.   To be given with prandial insulin, and based on pre-meal blood glucose.    Notify provider if glucose greater than or equal to 350 mg/dL after administration of correction dose.  If given at mealtime, must be administered 5 min before meal or immediately after.    Admin. Amount: 1-7 Units  Last Admin: 09/03/18 1232  Dispense Loc: Contact Rx for dose  Volume: 3 mL     (0750)-Not Given       1246 (1 Units)-Given       1756 (1 Units)-Given        (1036)-Not Given       (1418)-Not Given [C]       1859 (1 Units)-Given        (0817)-Not Given       (1208)-Not Given [C]       (1726)-Not Given [C]        (0814)-Not Given       1317 (1 Units)-Given       (1740)-Not Given        (0750)-Not Given       (1215)-Not Given       1734 (1 Units)-Given        0816 (1 Units)-Given       1414 (2 Units)-Given       1802 (3 Units)-Given [C]        (0833)-Not Given [C]       1232 (2 Units)-Given       [ ] 1700           insulin glargine (LANTUS) injection 5 Units  Dose: 5 Units  Freq: AT BEDTIME Route: SC  Start: 08/25/18 2200   Admin. Amount: 5 Units  Last Admin: 09/02/18 2200  Dispense Loc:  Main Pharmacy  Volume: 0.05 mL      (0204)-Not Given       2119 (5 Units)-Given        2243 (5 Units)-Given        2158 (5 Units)-Given        2357 (5 Units)-Given        2200 (5 Units)-Given        [ ] 2200           lidocaine (LMX4) cream  Freq: EVERY 1 HOUR PRN Route: Top  PRN Reason: pain  PRN Comment: with VAD insertion or accessing implanted port.  Start: 08/26/18 1726   Admin Instructions: Do NOT give if patient has a history of allergy to any local anesthetic or any \"forrest\" product.   Apply 30 minutes prior to VAD insertion or port access.  MAX Dose:  2.5 g (  of 5 g tube)    Dispense Loc: Contact Rx for dose  POC: Post-procedure               lidocaine 1 % 1 " "mL  Dose: 1 mL  Freq: EVERY 1 HOUR PRN Route: OTHER  PRN Comment: mild pain with VAD insertion or accessing implanted port  Start: 08/26/18 1726   Admin Instructions: Do NOT give if patient has a history of allergy to any local anesthetic or any \"forrest\" product. MAX dose 1 mL subcutaneous OR intradermal in divided doses.    Admin. Amount: 1 mL  Dispense Loc:  [facility]  Volume: 20 mL  POC: Post-procedure               magnesium hydroxide (MILK OF MAGNESIA) suspension 30 mL  Dose: 30 mL  Freq: DAILY PRN Route: PO  PRN Reason: constipation  Start: 08/25/18 2129   Admin Instructions: Hold for loose stools.  This is the second step of a three step constipation treatment.    Admin. Amount: 30 mL  Last Admin: 08/31/18 0519  Dispense Loc:  [facility]  Volume: 30 mL        0519 (30 mL)-Given              melatonin tablet 1 mg  Dose: 1 mg  Freq: AT BEDTIME PRN Route: PO  PRN Reason: sleep  Start: 08/25/18 2129   Admin Instructions: Do not give unless at least 6 hours of uninterrupted sleep is expected. If the patient has multiple insomnia agents ordered PRN, offer in the following order per policy.  Move to the next available step if the earlier step is ineffective.    Step 1 - melatonin; Step 2 - diphenhydramine; Step 3 - temazepam; Step 4 - zolpidem; Step 5 - trazodone.    Admin. Amount: 1 tablet (1 × 1 mg tablet)  Last Admin: 08/25/18 2221  Dispense Loc: [facility]               metoprolol (LOPRESSOR) injection 2.5-5 mg  Dose: 2.5-5 mg  Freq: EVERY 4 HOURS PRN Route: IV  PRN Reason: other  PRN Comment: HR > 120, hold for SBP < 90  Start: 08/26/18 1408   Admin Instructions: Please check BP before administering.   Hold for HR <60 &/or SBP </=105  For ordered doses up to 15 mg, give IV Push undiluted over 5-10 minutes.    Admin. Amount: 2.5-5 mg = 2.5-5 mL Conc: 1 mg/mL  Last Admin: 08/30/18 0606  Dispense Loc:  [facility]  Volume: 5 mL   Current Line: Peripheral IV 08/28/18 Right Lower forearm     1519 (2.5 mg)-Given " [C]       1955 (2.5 mg)-Given [C]       2153 (5 mg)-Given [C]        0207 (5 mg)-Given [C]       0606 (5 mg)-Given               metoprolol succinate (TOPROL-XL) 24 hr tablet 50 mg  Dose: 50 mg  Freq: DAILY Route: PO  Start: 09/01/18 0900   Admin Instructions: Please check BP before administration.  Hold for HR <60 &/or SBP </=105  DO NOT CRUSH. Tablet may be split in half along score line.    Admin. Amount: 1 tablet (1 × 50 mg tablet)  Last Admin: 09/03/18 0839  Dispense Loc: [facility] A         0835 (50 mg)-Given        0811 (50 mg)-Given        0839 (50 mg)-Given           naloxone (NARCAN) injection 0.1-0.4 mg  Dose: 0.1-0.4 mg  Freq: EVERY 2 MIN PRN Route: IV  PRN Reason: opioid reversal  Start: 08/26/18 1726   Admin Instructions: For respiratory rate LESS than or EQUAL to 8.  Partial reversal dose:  0.1 mg titrated q 2 minutes for Analgesia Side Effects Monitoring Sedation Level of 3 (frequently drowsy, arousable, drifts to sleep during conversation).Full reversal dose:  0.4 mg bolus for Analgesia Side Effects Monitoring Sedation Level of 4 (somnolent, minimal or no response to stimulation).  For ordered doses up to 2mg give IVP. Give each 0.4mg over 15 seconds in emergency situations. For non-emergent situations further dilute in 9mL of NS to facilitate titration of response.    Admin. Amount: 0.1-0.4 mg = 0.25-1 mL Conc: 0.4 mg/mL  Dispense Loc: [facility] A  Volume: 1 mL  POC: Post-procedure               ondansetron (ZOFRAN-ODT) ODT tab 4 mg  Dose: 4 mg  Freq: EVERY 6 HOURS PRN Route: PO  PRN Reasons: nausea,vomiting  Start: 08/26/18 1726   Admin Instructions: This is Step 1 of nausea and vomiting management.  If nausea not resolved in 15 minutes, go to Step 2 prochlorperazine (COMPAZINE). Do not push through foil backing. Peel back foil and gently remove. Place on tongue immediately. Administration with liquid unnecessary  With dry hands, peel back foil backing and gently remove tablet; do not push oral  disintegrating tablet through foil backing; administer immediately on tongue and oral disintegrating tablet dissolves in seconds; then swallow with saliva; liquid not required.    Admin. Amount: 1 tablet (1 × 4 mg tablet)  Dispense Loc:  [facility]  POC: Post-procedure              Or  ondansetron (ZOFRAN) injection 4 mg  Dose: 4 mg  Freq: EVERY 6 HOURS PRN Route: IV  PRN Reasons: nausea,vomiting  Start: 08/26/18 1726   Admin Instructions: This is Step 1 of nausea and vomiting management.  If nausea not resolved in 15 minutes, go to Step 2 prochlorperazine (COMPAZINE).  Irritant. For ordered doses up to 4 mg, give IV Push undiluted over 2-5 minutes.    Admin. Amount: 4 mg = 2 mL Conc: 4 mg/2 mL  Dispense Loc: [facility]  Infused Over: 2-5 Minutes  Volume: 2 mL  POC: Post-procedure               oxyCODONE IR (ROXICODONE) tablet 5 mg  Dose: 5 mg  Freq: EVERY 3 HOURS PRN Route: PO  PRN Reason: other  PRN Comment: pain control or improvement in physical function. Hold dose for analgesic side effects.  Start: 08/26/18 1726   Admin Instructions: Notify provider to assess for uncontrolled pain or analgesic side effects. Hold while on PCA or with regular IV opioid dosing.  Maximum total  is 40 mg in 24 hours.    Admin. Amount: 1 tablet (1 × 5 mg tablet)  Last Admin: 09/03/18 0839  Dispense Loc: [facility] A  POC: Post-procedure     0845 (5 mg)-Given          1037 (5 mg)-Given        0150 (5 mg)-Given       0838 (5 mg)-Given       1706 (5 mg)-Given        0809 (5 mg)-Given       1619 (5 mg)-Given        0047 (5 mg)-Given       0839 (5 mg)-Given           polyethylene glycol (MIRALAX/GLYCOLAX) Packet 17 g  Dose: 17 g  Freq: 2 TIMES DAILY Route: PO  Start: 08/31/18 1000   Admin Instructions: 1 Packet = 17 grams. Mixed prescribed dose in 8 ounces of water. Follow with 8 oz. of water.    Admin. Amount: 17 g  Last Admin: 09/03/18 0839  Dispense Loc: [facility]        1037 (17 g)-Given       (2155)-Not Given        0835 (17  g)-Given       2110 (17 g)-Given        0812 (17 g)-Given       2200 (17 g)-Given        0839 (17 g)-Given       [ ] 2100           ranitidine (ZANTAC) tablet 150 mg  Dose: 150 mg  Freq: AT BEDTIME Route: PO  Start: 08/27/18 2200   Admin Instructions: Dose adjusted per renal dosing policy.  Estimated CrCl = 30-50 mL/min.    Admin. Amount: 1 tablet (1 × 150 mg tablet)  Last Admin: 09/02/18 2200  Dispense Loc: [facility]  POC: Post-procedure     (2115)-Not Given        2116 (150 mg)-Given         0221 (150 mg)-Given       2158 (150 mg)-Given        2356 (150 mg)-Given        2200 (150 mg)-Given        [ ] 2200           senna-docusate (SENOKOT-S;PERICOLACE) 8.6-50 MG per tablet 1 tablet  Dose: 1 tablet  Freq: 2 TIMES DAILY Route: PO  Start: 08/26/18 1726   Admin Instructions: If no bowel movement in 24 hours, increase to 2 tablets PO.  Hold for loose stools.    Admin. Amount: 1 tablet  Last Admin: 09/01/18 0835  Dispense Loc: [facility]  POC: Post-procedure                                  (2059)-Not Given        (0837)-Not Given       (2249)-Not Given        (0815)-Not Given       (2155)-Not Given        0835 (1 tablet)-Given               (1030)-Not Given                      [ ] 2100          Or  senna-docusate (SENOKOT-S;PERICOLACE) 8.6-50 MG per tablet 2 tablet  Dose: 2 tablet  Freq: 2 TIMES DAILY Route: PO  Start: 08/26/18 1726   Admin Instructions: Hold for loose stools.    Admin. Amount: 2 tablet  Last Admin: 09/03/18 0839  Dispense Loc: [facility]  POC: Post-procedure     0845 (2 tablet)-Given       (2116)-Not Given               1101 (2 tablet)-Given                                                    2108 (2 tablet)-Given               2159 (2 tablet)-Given        0839 (2 tablet)-Given       [ ] 2100           sodium chloride (PF) 0.9% PF flush 3 mL  Dose: 3 mL  Freq: EVERY 8 HOURS Route: IK  Start: 08/26/18 1730   Admin Instructions: And Q1H PRN, to lock peripheral IV dormant line.    Admin. Amount: 3  mL  Last Admin: 18 0840  [facility]  Volume: 3 mL  POC: Post-procedure   Current Line: Peripheral IV 18 Right Lower forearm    (0455)-Not Given       0851 (3 mL)-Given       (1844)-Not Given        (0434)-Not Given [C]       1048 (3 mL)-Given       (1657)-Not Given        0041 (3 mL)-Given              (1930)-Not Given        (0234)-Not Given       (0830)-Not Given               (0328)-Not Given       1215 (3 mL)-Given       1858 (3 mL)-Given        0007 (3 mL)-Given              1002 (3 mL)-Given       (1803)-Not Given               0840 (3 mL)-Given       [ ] 1730           sodium chloride (PF) 0.9% PF flush 3 mL  Dose: 3 mL  Freq: EVERY 1 HOUR PRN Route: IK  PRN Reason: line flush  PRN Comment: for peripheral IV flush post IV meds  Start: 18   Admin. Amount: 3 mL  [facility]pense Loc: [facility]  Volume: 3 mL  POC: Post-procedure              Completed Medications  Medications 18         Dose: 25 mg  Freq: ONCE AT 6PM Route: PO  Start: 18 1800   End: 18   Admin Instructions: DO NOT CRUSH. Tablet may be split in half along score line.    Hold for HR<55 or SBP<95    Admin. Amount: 1 tablet (1 × 25 mg tablet)  Last Admin: 18  Dispense Loc: [facility]  Administrations Remainin        173 (25 mg)-Given             Discontinued Medications  Medications 18         Dose: 5 mg  Freq: DAILY WITH BREAKFAST Route: PO  Start: 18 0900   End: 18 0845   Admin Instructions: DO NOT CRUSH. Take before or with meals.    Admin. Amount: 1 tablet (1 × 5 mg tablet)  Dispense Loc: [facility]          0845-Med Discontinued          Dose: 5 mg  Freq: 2 TIMES DAILY BEFORE MEALS Route: PO  Start: 18 1630   End: 18 0735   Admin Instructions: DO NOT CRUSH. Take before or with meals.    Admin. Amount: 1 tablet (1 × 5 mg  tablet)  Last Admin: 09/01/18 1706  Dispense Loc:  [facility]     0649 (5 mg)-Given       1755 (5 mg)-Given        0632 (5 mg)-Given       1718 (5 mg)-Given        0837 (5 mg)-Given       1706 (5 mg)-Given        0818 (5 mg)-Given       1606 (5 mg)-Given        0835 (5 mg)-Given       1706 (5 mg)-Given        0735-Med Discontinued  (0743)-Not Given              Dose: 0.2 mg  Freq: EVERY 2 HOURS PRN Route: IV  PRN Reason: other  PRN Comment: pain control or improvement in physical function.  Hold dose for analgesic side effects.  Start: 08/26/18 1726   End: 09/03/18 0947   Admin Instructions: Notify provider to assess for uncontrolled pain or analgesic side effects. Hold while on IV PCA or with regular IV opioid dosing.  For ordered doses up to 4 mg give IV Push undiluted. Administer each 2mg over 2-5 minutes.    Admin. Amount: 0.2 mg  Last Admin: 09/03/18 0047  Dispense Loc: [facility]  POC: Post-procedure   Current Line: Peripheral IV 08/28/18 Right Lower forearm      0845 (0.2 mg)-Given        (0522)-Not Given [C]          0047 (0.2 mg)-Given       0947-Med Discontinued         Dose: 4 Units  Freq: 3 TIMES DAILY WITH MEALS Route: SC  Start: 08/27/18 1800   End: 09/02/18 0846   Admin Instructions: Formulary alternate for<br>Humalog  If given at mealtime, must be administered 5 min before meal or immediately after.    Admin. Amount: 4 Units  Last Admin: 09/01/18 1734  Dispense Loc: Contact Rx for dose  Volume: 3 mL     0851 (4 Units)-Given       1246 (4 Units)-Given       1756 (4 Units)-Given        (1117)-Not Given [C]       1406 (4 Units)-Given       1859 (4 Units)-Given        (1029)-Not Given [C]       1225 (4 Units)-Given       1725 (4 Units)-Given        0819 (4 Units)-Given       1317 (4 Units)-Given       1739 (4 Units)-Given        0900 (4 Units)-Given [C]       1214 (4 Units)-Given       1734 (4 Units)-Given        0846-Med Discontinued          Rate: 50 mL/hr   Freq: CONTINUOUS Route: IV  Last Dose:  Stopped (09/01/18 0155)  Start: 08/31/18 0945   End: 09/01/18 0100   Last Admin: 08/31/18 1743  Dispense Loc: [facility] Floor Stock  Volume: 1,000 mL   Current Line: Peripheral IV 08/28/18 Right Lower forearm       1016 ( )-New Bag       1743 ( )-Rate/Dose Verify        0100-Med Discontinued  0155-Stopped               Dose: 2.5 mg  Freq: AT BEDTIME PRN Route: PO  PRN Reason: sleep  Start: 08/27/18 2000   End: 09/03/18 0932   Admin Instructions: POD 1.  Do not give unless at least 6 hours of uninterrupted sleep is expected.         Pharmacist change per scope of practice: Dose per Age Adjusted Medications policy If the patient has multiple insomnia agents ordered PRN, offer in the following order per policy.  Move to the next available step if the earlier step is ineffective.    Step 1 - melatonin; Step 2 - diphenhydramine; Step 3 - temazepam; Step 4 - zolpidem; Step 5 - trazodone.    Admin. Amount: 1 half-tab (1 × 2.5 mg half-tab)  Dispense Loc: [facility]  POC: Post-procedure           0932-Med Discontinued    Medications 08/28/18 08/29/18 08/30/18 08/31/18 09/01/18 09/02/18 09/03/18

## 2018-08-25 NOTE — IP AVS SNAPSHOT
` `     [facility] CARDIAC SPECIALTY CARE: [phone]                 INTERAGENCY TRANSFER FORM - NOTES (H&P, Discharge Summary, Consults, Procedures, Therapies)   2018                    Hospital Admission Date: 2018  CHERYL HOLLOWAY   : 1924  Sex: Male        Patient PCP Information     Provider PCP Type    RAISSA BEE General      History & Physicals     No notes of this type exist for this encounter.         Discharge Summaries      Discharge Summaries by Edgardo Godfrey PA-C at 2018  9:02 AM     Author:  Edgardo Godfrey PA-C Service:  Orthopedics Author Type:  Physician Assistant    Filed:  2018  9:05 AM Date of Service:  2018  9:02 AM Creation Time:  2018  9:02 AM    Status:  Cosign Needed :  Edgardo Godfrey PA-C (Physician Assistant)    Cosign Required:  Yes             Discharge Summary    Cheryl Holloway MRN# [id number]   YOB: 1924 Age: 89 year old     Date of Admission:  2018  Date of Discharge:  18  Admitting Physician:  Bill Sanders MD  Discharge Physician:  Edgardo Streeter MD     Primary Provider: Marcell Bee          Admission Diagnoses:    right femoral neck fracture          Discharge Diagnosis:   Same           Surgical Procedure:   Total Hip arthroplasty           Discharge Disposition:   Discharged to rehabilitation facility           Medications Prior to Admission:     Prescriptions Prior to Admission   Medication Sig Dispense Refill Last Dose     atenolol (TENORMIN) 50 MG tablet Take 50 mg by mouth daily   2018 at Unknown time     glipiZIDE (GLUCOTROL XL) 5 MG 24 hr tablet Take 5 mg by mouth 2 times daily   2018 at am     insulin glargine (LANTUS SOLOSTAR) 100 UNIT/ML pen Inject 10 Units Subcutaneous At Bedtime   2018 at Unknown time     insulin lispro (HUMALOG KWIKPEN) 100 UNIT/ML injection Inject 4 Units Subcutaneous 3 times daily (before meals)   2018 at pm      loratadine (CLARITIN) 10 MG tablet Take 10 mg by mouth daily as needed         simvastatin (ZOCOR) 20 MG tablet Take 20 mg by mouth At Bedtime   8/24/2018 at Unknown time     [DISCONTINUED] aspirin 81 MG tablet Take 81 mg by mouth take 81 mg by mouth daily.   8/25/2018 at Unknown time             Discharge Medications:     Current Discharge Medication List      START taking these medications    Details   acetaminophen (TYLENOL) 325 MG tablet Take 2 tablets (650 mg) by mouth every 8 hours  Qty: 100 tablet, Refills: 0    Associated Diagnoses: Closed fracture of neck of right femur, initial encounter (H)      !! aspirin 325 MG EC tablet Take 1 tablet (325 mg) by mouth daily  Qty: 30 tablet    Associated Diagnoses: Closed fracture of neck of right femur, initial encounter (H)      !! aspirin 325 MG EC tablet Take one Aspirin tab twice daily for 5 weeks.  Qty: 70 tablet, Refills: 0    Associated Diagnoses: Closed fracture of neck of right femur, initial encounter (H)      oxyCODONE IR (ROXICODONE) 5 MG tablet Take 1 tablet (5 mg) by mouth every 4 hours as needed for pain  Qty: 60 tablet, Refills: 0    Associated Diagnoses: Closed fracture of neck of right femur, initial encounter (H)      senna-docusate (SENOKOT-S;PERICOLACE) 8.6-50 MG per tablet Take 1 tablet by mouth 2 times daily  Qty: 60 tablet, Refills: 0    Associated Diagnoses: Closed fracture of neck of right femur, initial encounter (H)       !! - Potential duplicate medications found. Please discuss with provider.      CONTINUE these medications which have NOT CHANGED    Details   atenolol (TENORMIN) 50 MG tablet Take 50 mg by mouth daily      glipiZIDE (GLUCOTROL XL) 5 MG 24 hr tablet Take 5 mg by mouth 2 times daily      insulin glargine (LANTUS SOLOSTAR) 100 UNIT/ML pen Inject 10 Units Subcutaneous At Bedtime      insulin lispro (HUMALOG KWIKPEN) 100 UNIT/ML injection Inject 4 Units Subcutaneous 3 times daily (before meals)      loratadine (CLARITIN) 10 MG  tablet Take 10 mg by mouth daily as needed       simvastatin (ZOCOR) 20 MG tablet Take 20 mg by mouth At Bedtime         STOP taking these medications       aspirin 81 MG tablet Comments:   Reason for Stopping:                     Consultations:   No consultations were requested during this admission           Hospital Course:   The patient was admitted from the emergency room.The patient underwent an uneventful Total hip arthroplasty. Postoperatively, anticoagulation  with Lovenox was started nad transitioned to ASA at discharge. Home medications have been reconciled.[DW1.1] Oxycodone[DW1.2] was prescribed for pain[DW1.1], please limit to minimize delirium[DW1.2].             Discharge Instructions and Follow-Up:        Discharge activity: WBAT with a walker   Discharge follow-up: Follow-up with Pedro Sanders in ~14 days post-op. [phone]   Outpatient therapy: Should already be arranged by office.  If not, patient should call to schedule 2x/week x 3 weeks.   Home Care agency: None   Supplies and equipment: None        Wound care: Daily dry dressing changes.  May use adaptic/xeroform directly over incision if available.  Staples out 12 days post-op and apply steri-strips.    Other instructions: Posterior hip precautions.  No hip flexion up past 90deg.  No ADduction of the surgical leg across the midline.     Edgardo Godfrey PA-C[DW1.1]       Revision History        User Key Date/Time User Provider Type Action    > DW1.2 8/29/2018  9:05 AM Edgardo Godfrey PA-C Physician Assistant Sign     DW1.1 8/29/2018  9:03 AM Edgardo Godfrey PA-C Physician Assistant Sign                     Consult Notes      Consults by Jabari Yang at 8/28/2018 12:27 PM     Author:  Jabari Yang Service:  Social Work Author Type:      Filed:  8/28/2018 12:36 PM Date of Service:  8/28/2018 12:27 PM Creation Time:  8/28/2018 12:26 PM    Status:  Signed :  Jabari Yang ()     Consult Orders:    1. Social Work  IP Consult [[id number]] ordered by Bill Sanders MD at 08/28/18 0811                Care Transition Initial Assessment - SW  Reason For Consult: discharge planning  Met with: Patient and called sonAleksandar per patient's agreement[RH1.1]  Active Problems:    Closed right hip fracture (H)    S/P total hip arthroplasty[RH1.2]       DATA  Lives With: spouse  Living Arrangements: assisted living     Who is your support system?: Children  Identified issues/concerns regarding health management: SW consult noted for DC planning. Patient is a 89 year old male anticipated to be ready for DC in 1-2 days. PT recommends a TCU stay. Previously, patient was providing care to his spouse, in their apartment at the [facility]. Meals were provided. Spoke with patient about a TCU stay. He noted his son Aleksandar is involved in caring for his spouse and agreed he should be called to discuss the plan. Reached Aleksandar who states he has placed his mother in memory care. We reviewed the Medicare guidelines for coverage of a TCU stay for patient. His preference is for [facility], in a private room. If another option is needed, he would request [facility]. A referral was sent to [facility] via [facility] [employer].       Quality Of Family Relationships: supportive, involved     ASSESSMENT  Cognitive Status: Oriented with forgetfulness  Concerns to be addressed: SW will follow to coordinate the DC plan.   PLAN  Financial costs for the patient includes: Private room cost reviewed.  Patient given options and choices for discharge: Yes  Patient/family is agreeable to the plan? Yes  Patient Goals and Preferences: TCU  Patient anticipates discharging to: TCU[RH1.1]           Revision History        User Key Date/Time User Provider Type Action    > RH1.2 8/28/2018 12:36 PM Jabari Yang  Sign     RH1.1 8/28/2018 12:26 PM Jabari Yang              Consults by George Laurent MD at 8/26/2018  4:27 PM     Author:  Anastasiia  George Heard MD Service:  Cardiology Author Type:  Physician    Filed:  8/26/2018  4:27 PM Date of Service:  8/26/2018  4:27 PM Creation Time:  8/26/2018  4:21 PM    Status:  Signed :  George Laurent MD (Physician)     Consult Orders:    1. Cardiology IP Consult: Patient to be seen: Routine - within 24 hours; clearance for hip surgery, new Afib; Consultant may enter orders: Yes [[id number]] ordered by Mamie Eubanks MD at 08/25/18 2117                [facility]    Cardiac Electrophysiology Consultation     Date of Admission:  8/25/2018  Date of Consult (When I saw the patient): 08/26/18    Assessment & Plan   Abimael Flores is a 89 year old male who was admitted on 8/25/2018. I was asked to see the patient for ?AF. 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 ECG shows ?AF.  Healthy otherwise on atenolol for htn along with asa but not much else. Was informed by cardiologist several years ago for known irregular rhythm was informed that it was not afib.  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 No sxs     No evidence of AF at this point in time. Sinus exit block common in elderly pt and reflection of sinus node dysfunction. Pt did not report lightheadedness but I would hold atenolol for now and have him on telemetry for now. If no significant bradycardia noted no further test required.    George Hernandez    Code Status    DNR/DNI    Primary Care Physician   RAISSA HILL    History is obtained from the patient    Past Medical History   I have reviewed this patient's medical history and updated it with pertinent information if needed.   Past Medical History:   Diagnosis Date     Coronary artery disease      Nonsenile cataract      Recent retinal detachment, total or subtotal 8/5/2014     Type 2 diabetes mellitus without complications (H)         Past Surgical History   I have reviewed this patient's surgical history and updated it with pertinent information if needed.  Past Surgical History:   Procedure Laterality Date     CARDIAC SURGERY       CATARACT IOL, RT/LT Bilateral      lacrimal caruncle removed BE Bilateral ~1989     REPAIR RUPTURED GLOBE  4/21/2014    Procedure: Exploration and Repair of Ruptured Globe ;  Surgeon: Mercedes Rivera MD;  Location: [facility] OR       Prior to Admission Medications   Prior to Admission Medications   Prescriptions Last Dose Informant Patient Reported? Taking?   aspirin 81 MG tablet 8/25/2018 at Unknown time Self Yes Yes   Sig: Take 81 mg by mouth take 81 mg by mouth daily.   atenolol (TENORMIN) 50 MG tablet 8/25/2018 at Unknown time Son Yes Yes   Sig: Take 50 mg by mouth daily   glipiZIDE (GLUCOTROL XL) 5 MG 24 hr tablet 8/25/2018 at am Son Yes Yes   Sig: Take 5 mg by mouth 2 times daily   insulin glargine (LANTUS SOLOSTAR) 100 UNIT/ML pen 8/24/2018 at Unknown time Self Yes Yes   Sig: Inject 10 Units Subcutaneous At Bedtime   insulin lispro (HUMALOG KWIKPEN) 100 UNIT/ML injection 8/24/2018 at pm Self Yes Yes   Sig: Inject 4 Units Subcutaneous 3 times daily (before meals)   loratadine (CLARITIN) 10 MG tablet  Self Yes Yes   Sig: Take 10 mg by mouth daily as needed    simvastatin (ZOCOR) 20 MG tablet 8/24/2018 at Unknown time Son Yes Yes   Sig: Take 20 mg by mouth At Bedtime      Facility-Administered Medications: None     Allergies   No Known Allergies    Social History   I have reviewed this patient's social history and updated it with pertinent information if needed. Abimael Mark  reports that he has quit smoking. He has never used smokeless tobacco.    Family History   I have reviewed this patient's family history and updated it with pertinent information if needed.   Family History   Problem Relation Age of Onset     Diabetes Mother      Diabetes Father      Cancer No family hx of      Glaucoma No family hx of       Macular Degeneration No family hx of        Review of Systems   Comprehensive review of systems was performed with pertinent positives and negatives listed in assessment and plan section.    Physical Exam   Temp: 97.2  F (36.2  C) Temp src: Temporal BP: 113/49 Pulse: 93 Heart Rate: 73 Resp: 16 SpO2: 100 % O2 Device: Nasal cannula Oxygen Delivery: 2 LPM  Vital Signs with Ranges  Temp:  [96.6  F (35.9  C)-99.2  F (37.3  C)] 97.2  F (36.2  C)  Pulse:  [68-93] 93  Heart Rate:  [70-86] 73  Resp:  [12-21] 16  BP: (112-178)/(49-94) 113/49  SpO2:  [91 %-100 %] 100 %  162 lbs 12.8 oz    Constitutional: awake, alert, cooperative, no apparent distress, and appears stated age  Eyes: Lids and lashes normal, pupils equal, round and reactive to light, extra ocular muscles intact, sclera clear, conjunctiva normal  ENT: Normocephalic, without obvious abnormality, atraumatic, sinuses nontender on palpation, external ears without lesions, oral pharynx with moist mucous membranes, tonsils without erythema or exudates, gums normal and good dentition.  Hematologic / Lymphatic: no cervical lymphadenopathy  Respiratory: No increased work of breathing, good air exchange, clear to auscultation bilaterally, no crackles or wheezing  Cardiovascular: Normal apical impulse, regular rate and rhythm, normal S1 and S2, no S3 or S4, and no murmur noted  GI: No scars, normal bowel sounds, soft, non-distended, non-tender, no masses palpated, no hepatosplenomegally  Skin: no bruising or bleeding  Musculoskeletal: There is no redness, warmth, or swelling of the joints.  Full range of motion noted except RLE  Neurologic: Awake, alert, oriented to name, place and time.    Neuropsychiatric: General: normal, calm and normal eye contact    Data   I personally reviewed all recent ECGs and images.  Results for orders placed or performed during the hospital encounter of 08/25/18 (from the past 24 hour(s))   XR Pelvis w Hip Right 1 View    Narrative    PELVIS  WITH UNILATERAL HIP ONE VIEW RIGHT  8/25/2018 7:16 PM     HISTORY: Pain, fall, can not bear weight.     COMPARISON: None.      Impression    IMPRESSION: Femoral neck fracture.    MODESTO SUTHERLAND MD   XR Chest 1 View    Narrative    CHEST ONE VIEW SUPINE 8/25/2018 7:17 PM     HISTORY: Preop.     COMPARISON: None.      Impression    IMPRESSION: Scattered atelectasis and/or fibrosis bilaterally.    MODESTO SUTHERLAND MD   CBC with platelets differential   Result Value Ref Range    WBC 15.6 (H) 4.0 - 11.0 10e9/L    RBC Count 3.87 (L) 4.4 - 5.9 10e12/L    Hemoglobin 11.5 (L) 13.3 - 17.7 g/dL    Hematocrit 35.2 (L) 40.0 - 53.0 %    MCV 91 78 - 100 fl    MCH 29.7 26.5 - 33.0 pg    MCHC 32.7 31.5 - 36.5 g/dL    RDW 14.9 10.0 - 15.0 %    Platelet Count 184 150 - 450 10e9/L    Diff Method Automated Method     % Neutrophils 77.7 %    % Lymphocytes 12.3 %    % Monocytes 8.2 %    % Eosinophils 1.2 %    % Basophils 0.1 %    % Immature Granulocytes 0.5 %    Nucleated RBCs 0 0 /100    Absolute Neutrophil 12.1 (H) 1.6 - 8.3 10e9/L    Absolute Lymphocytes 1.9 0.8 - 5.3 10e9/L    Absolute Monocytes 1.3 0.0 - 1.3 10e9/L    Absolute Eosinophils 0.2 0.0 - 0.7 10e9/L    Absolute Basophils 0.0 0.0 - 0.2 10e9/L    Abs Immature Granulocytes 0.1 0 - 0.4 10e9/L    Absolute Nucleated RBC 0.0    Basic metabolic panel   Result Value Ref Range    Sodium 141 133 - 144 mmol/L    Potassium 4.6 3.4 - 5.3 mmol/L    Chloride 111 (H) 94 - 109 mmol/L    Carbon Dioxide 24 20 - 32 mmol/L    Anion Gap 6 3 - 14 mmol/L    Glucose 119 (H) 70 - 99 mg/dL    Urea Nitrogen 31 (H) 7 - 30 mg/dL    Creatinine 1.65 (H) 0.66 - 1.25 mg/dL    GFR Estimate 39 (L) >60 mL/min/1.7m2    GFR Estimate If Black 47 (L) >60 mL/min/1.7m2    Calcium 8.4 (L) 8.5 - 10.1 mg/dL   Hemoglobin A1c   Result Value Ref Range    Hemoglobin A1C 8.4 (H) 0 - 5.6 %   EKG 12-lead, tracing only   Result Value Ref Range    Interpretation ECG Click View Image link to view waveform and result    Glucose by  meter   Result Value Ref Range    Glucose 98 70 - 99 mg/dL   UA with Microscopic reflex to Culture   Result Value Ref Range    Color Urine Light Yellow     Appearance Urine Clear     Glucose Urine 30 (A) NEG^Negative mg/dL    Bilirubin Urine Negative NEG^Negative    Ketones Urine Negative NEG^Negative mg/dL    Specific Gravity Urine 1.012 1.003 - 1.035    Blood Urine Negative NEG^Negative    pH Urine 6.5 5.0 - 7.0 pH    Protein Albumin Urine 10 (A) NEG^Negative mg/dL    Urobilinogen mg/dL Normal 0.0 - 2.0 mg/dL    Nitrite Urine Negative NEG^Negative    Leukocyte Esterase Urine Negative NEG^Negative    Source Midstream Urine     WBC Urine 0 0 - 5 /HPF    RBC Urine 1 0 - 2 /HPF   Glucose by meter   Result Value Ref Range    Glucose 98 70 - 99 mg/dL   CBC with platelets   Result Value Ref Range    WBC 12.8 (H) 4.0 - 11.0 10e9/L    RBC Count 3.65 (L) 4.4 - 5.9 10e12/L    Hemoglobin 11.0 (L) 13.3 - 17.7 g/dL    Hematocrit 33.2 (L) 40.0 - 53.0 %    MCV 91 78 - 100 fl    MCH 30.1 26.5 - 33.0 pg    MCHC 33.1 31.5 - 36.5 g/dL    RDW 14.9 10.0 - 15.0 %    Platelet Count 150 150 - 450 10e9/L   Basic metabolic panel   Result Value Ref Range    Sodium 141 133 - 144 mmol/L    Potassium 4.4 3.4 - 5.3 mmol/L    Chloride 111 (H) 94 - 109 mmol/L    Carbon Dioxide 23 20 - 32 mmol/L    Anion Gap 7 3 - 14 mmol/L    Glucose 102 (H) 70 - 99 mg/dL    Urea Nitrogen 28 7 - 30 mg/dL    Creatinine 1.43 (H) 0.66 - 1.25 mg/dL    GFR Estimate 46 (L) >60 mL/min/1.7m2    GFR Estimate If Black 56 (L) >60 mL/min/1.7m2    Calcium 8.1 (L) 8.5 - 10.1 mg/dL   Glucose by meter   Result Value Ref Range    Glucose 157 (H) 70 - 99 mg/dL[QP1.1]              Revision History        User Key Date/Time User Provider Type Action    > QP1.1 8/26/2018  4:27 PM George Laurent MD Physician Sign                     Progress Notes - Physician (Notes from 08/31/18 through 09/03/18)      Progress Notes by Kourtney Shea PA-C at 8/30/2018  9:26 AM     Author:   Kourtney Shea PA-C Service:  Orthopedics Author Type:  Physician Assistant - C    Filed:  8/30/2018  9:34 AM Date of Service:  8/30/2018  9:26 AM Creation Time:  8/30/2018  9:26 AM    Status:  Attested :  Kourtney Shea PA-C (Physician Assistant - ALANNA)    Cosigner:  Prashanth Coronado MD at 9/2/2018 10:44 AM        Attestation signed by Prashanth Coronado MD at 9/2/2018 10:44 AM        Physician Attestation   I agree with the information in this note.    Prashanth Coronado                               [facility]    Orthopedics  Daily Post-Op Note    Assessment & Plan   Procedure(s):  OPEN REDUCTION INTERNAL FIXATION HIP BIPOLAR   -4 Days Post-Op     Doing well from ortho standpoint. Was planning for discharge 8/29/18 but had new onset of atrial tachyarrhythmia for which he was transferred to CCU.  Pain well-controlled.  Tolerating physical therapy and rehabilitation well.    Plan:    -Continue supportive and symptomatic treatment  -Continue physical therapy while inpatient status, WBAT  -Pain control measures- use lowest dosage possible, h/o delirium  -Advance diet as tolerated  -Ok for discharge to TCU when ready from ortho standpoint. Continue Lovenox, transition to ASA upon discharge. Medical management and further discharge per Hospitalist/IM    Kourtney Shea PA-C    Interval History   Stable.  Doing well- no complaints in the right hip, mild discomfort noted at the SI joint, alleviated with change in positioning.  Improving slowly. No fevers, chills, tingling, numbness, n/v, calf pain.    Physical Exam   Temp: 100.4  F (38  C) Temp src: Oral BP: 119/67 Pulse: 90 Heart Rate: 92 Resp: 25 SpO2: 96 % O2 Device: None (Room air) Oxygen Delivery: 2 LPM  Vitals:    08/25/18 2131 08/26/18 0500 08/30/18 0500   Weight: 75.8 kg (167 lb 3.2 oz) 73.8 kg (162 lb 12.8 oz) 77.1 kg (170 lb)     Vital Signs with Ranges  Temp:  [98.1  F (36.7  C)-100.4  F (38  C)]  100.4  F (38  C)  Pulse:  [90] 90  Heart Rate:  [] 92  Resp:  [11-30] 25  BP: ()/(45-92) 119/67  SpO2:  [93 %-100 %] 96 %  I/O last 3 completed shifts:  In: 100 [P.O.:100]  Out: 825 [Urine:825]    Constitutional: NAD, A&O. Appears comfortable sitting up in bed  ENT: NCAT  Respiratory: unlabored  Musculoskeletal: Aquacel dressing is C/D/I to right hip. Good motion bilateral lower extremities, moves toes freely. Leg lengths appear symmetric. Capillary refill brisk, bilateral dorsalis pedis pulses are palpable and symmetric. Bilateral calves soft and nontender  Neurologic: Distal sensation intact to light touch      Medications        enoxaparin  30 mg Subcutaneous Q24H     glipiZIDE  5 mg Oral BID AC     insulin aspart  4 Units Subcutaneous TID w/meals     insulin aspart  1-7 Units Subcutaneous TID AC     insulin aspart  1-5 Units Subcutaneous At Bedtime     insulin glargine  5 Units Subcutaneous At Bedtime     metoprolol succinate  25 mg Oral Daily     piperacillin-tazobactam  3.375 g Intravenous Q6H     ranitidine  150 mg Oral At Bedtime     senna-docusate  1 tablet Oral BID    Or     senna-docusate  2 tablet Oral BID     simvastatin  20 mg Oral At Bedtime     sodium chloride (PF)  3 mL Intracatheter Q8H       Data[KW1.1]   Results for orders placed or performed during the hospital encounter of 08/25/18 (from the past 24 hour(s))   Glucose by meter   Result Value Ref Range    Glucose 212 (H) 70 - 99 mg/dL   Lactic acid level STAT for sepsis protocol   Result Value Ref Range    Lactate for Sepsis Protocol 1.1 0.7 - 2.0 mmol/L   EKG 12-lead, tracing only   Result Value Ref Range    Interpretation ECG Click View Image link to view waveform and result    Glucose by meter   Result Value Ref Range    Glucose 167 (H) 70 - 99 mg/dL   CBC with platelets   Result Value Ref Range    WBC 11.5 (H) 4.0 - 11.0 10e9/L    RBC Count 2.94 (L) 4.4 - 5.9 10e12/L    Hemoglobin 8.6 (L) 13.3 - 17.7 g/dL    Hematocrit 26.2 (L)  40.0 - 53.0 %    MCV 89 78 - 100 fl    MCH 29.3 26.5 - 33.0 pg    MCHC 32.8 31.5 - 36.5 g/dL    RDW 14.4 10.0 - 15.0 %    Platelet Count 109 (L) 150 - 450 10e9/L   NT proBNP inpatient and ED   Result Value Ref Range    N-Terminal Pro BNP Inpatient 8601 (H) 0 - 1800 pg/mL   Troponin I   Result Value Ref Range    Troponin I ES 0.039 0.000 - 0.045 ug/L   XR Chest Port 1 View    Narrative    CHEST PORTABLE ONE VIEW   8/29/2018 4:56 PM     HISTORY: Question of aspiration.     COMPARISON: 8/25/2018.    FINDINGS: Supine portable chest. Sternal wires and mediastinal clips.  No pneumothorax. The heart size is normal. There is probable fibrosis  in the mid and lower lungs bilaterally which is similar to the  previous exam. There is new infiltrate at the right lung base  laterally.      Impression    IMPRESSION: New small right lung base infiltrate.    SHARI HERNANDEZ MD   Basic metabolic panel   Result Value Ref Range    Sodium 141 133 - 144 mmol/L    Potassium 3.8 3.4 - 5.3 mmol/L    Chloride 109 94 - 109 mmol/L    Carbon Dioxide 23 20 - 32 mmol/L    Anion Gap 9 3 - 14 mmol/L    Glucose 183 (H) 70 - 99 mg/dL    Urea Nitrogen 38 (H) 7 - 30 mg/dL    Creatinine 1.87 (H) 0.66 - 1.25 mg/dL    GFR Estimate 34 (L) >60 mL/min/1.7m2    GFR Estimate If Black 41 (L) >60 mL/min/1.7m2    Calcium 7.2 (L) 8.5 - 10.1 mg/dL   Magnesium   Result Value Ref Range    Magnesium 2.6 (H) 1.6 - 2.3 mg/dL   Glucose by meter   Result Value Ref Range    Glucose 190 (H) 70 - 99 mg/dL   EKG 12-lead, tracing only   Result Value Ref Range    Interpretation ECG Click View Image link to view waveform and result    Glucose by meter   Result Value Ref Range    Glucose 145 (H) 70 - 99 mg/dL   Basic metabolic panel   Result Value Ref Range    Sodium 141 133 - 144 mmol/L    Potassium 3.8 3.4 - 5.3 mmol/L    Chloride 111 (H) 94 - 109 mmol/L    Carbon Dioxide 22 20 - 32 mmol/L    Anion Gap 8 3 - 14 mmol/L    Glucose 115 (H) 70 - 99 mg/dL    Urea Nitrogen 38 (H) 7 -  30 mg/dL    Creatinine 1.82 (H) 0.66 - 1.25 mg/dL    GFR Estimate 35 (L) >60 mL/min/1.7m2    GFR Estimate If Black 42 (L) >60 mL/min/1.7m2    Calcium 7.3 (L) 8.5 - 10.1 mg/dL   Glucose by meter   Result Value Ref Range    Glucose 126 (H) 70 - 99 mg/dL   EKG 12-lead, tracing only   Result Value Ref Range    Interpretation ECG Click View Image link to view waveform and result[KW1.2]         Revision History        User Key Date/Time User Provider Type Action    > KW1.2 8/30/2018  9:34 AM Kourtney Shea PA-C Physician Assistant - ALANNA Sign     KW1.1 8/30/2018  9:26 AM Kourtney Shea PA-C Physician Assistant - C             Progress Notes by Yvrose Stack LICSW at 9/2/2018 10:06 AM     Author:  Yvrose Stack LICSW Service:  Social Work Author Type:      Filed:  9/2/2018 10:08 AM Date of Service:  9/2/2018 10:06 AM Creation Time:  9/2/2018 10:06 AM    Status:  Signed :  Yvrose Stack LICSW ()         SW:  D:  Spoke with patient's MD who states that patient is not ready to discharge today.  He is asking if [facility] will accept patient tomorrow.  Call placed to [facility] to inquire as to whether they would accept patient tomorrow.  Per Blanche, they can accept patient tomorrow.  They phone number to the nurses station is [phone] and the fax number to fax the discharge orders is [phone].  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  He was found to have a right femoral neck fracture.  He was found to have fibrillation on his EKG done in the ER.  He has history of sinus arrhythmia, no documented history of atrial fibrillation.       Right femoral neck fracture  Mechanical fall  The patient describes the fall as being mechanical.  He has some balance problems recently, but no frequent falls.  He denies any preceding symptoms, no dizziness, no shortness of breath, no chest pains, no loss of consciousness.  He did hit his head slightly and had a skin laceration that was sutured as the ER.  Ortho on-call was contacted.  The patient seems to have been in a good state of health.  He does have a history of coronary artery disease with coronary artery bypass grafting, but as per the patient and his son, both physicians, he did not have any problems since his surgery.     - Post op management per Orthopedics.    - Pain control - Oxycodone, Tylenol   - PT/OT  - add miralax to the bowel regimen, patient declines further change today     New diagnosis of Atrial fibrillation  Supraventricular tachycardia  Apparently, he was told that he had a sinus arrhythmia in the past.  He does not have formal diagnosis of A-Fib although his son states he is not surprised of this as the patient's pulse had been irregular in the past.  The patient is on a baby aspirin at home, which will be held at this time.  His heart rate seems to be a rate controlled on his prior to admission atenolol, which will be continued.    echocardiogram results noted   Cardiology consulted. His CHADS-VASc score is 4 for age, hypertension, and  diabetes.  This puts him in the high risk group for a stroke.  Anticoagulation management for atrial fibrillation was discussed with them briefly on admission. The patient states he is not interested in starting anticoagulation.  His son does states that he seems to be at high risk of falls.   Cardiology input 8/28 as patient  Had a run of wide complex tachyarrhythmia , atenolol was on hold sicne yesterday ,as per EP recommendation due to bradycardia .  plan to continue on low dose metoprolol , appreciate cardiology input .  Resume PTA Asa once cleared from Ortho.   improving  Plan  - aspirin on discharge  - continues on both metoprolol and diltiazem  - monitoring for bradycardia  - cardiology has signed off   - only 7 beat SVT overnight, unclear significant improvement       Hypertension  His blood pressure was slightly elevated in ER.  He did have a lot of pain. Improved presently.     - Hydralazine IV p.r.n.     Leukocytosis  Possible urinary tract infection    Possible aspiration pneumonia right from episode of dizziness  White blood cells of 15.6 with right lung base infiltrate  Started on ceftriaxone initially for UTI concerns but culture negative  plan  -change to augmentin course     Diabetes mellitus type 2  Hemoglobin A1c 8.4.  At home he had maintained on Lantus 10 units at bedtime, glipizide-XL 5 mg p.o. twice daily and Humalog 4 units subcutaneously 3 times daily with meals.    - stop the glipizide given the hypoglycemia     Acute on Chronic kidney disease, stage III  Baseline creatinine seems to be between 1.5-1.8.  Creatinine on admission is 1.65.    Up to 2.25 post surgery  Urine sodium 55, suggesting not prerenal, did receive some fluids   - creatinine  improving   - encourage oral hydration, recheck bmp in the AM      # Pain Assessment:  As per orthopedics    Called and updated son   DVT Prophylaxis: as per orthopedics    Code Status: DNR/DNI     Disposition: Expected discharge in 1- days. I  discussed with the patient and son in length. Had some hypoglycemia overnight. Minimal tachycardia. Feeling very weak overall and only agreeing for supine exercises. Creatinine improving and not yet at baseline. Would monitor another day, consider discharge to TCU in the AM if creatinine is stable and no other issues with hypoglycemia occur    Lazaro Suggs DO    Interval History   Another small asxs run of SVT, appears regular. BS of 50 this AM. Feels much worse and weaker overall.    -Data reviewed today: I reviewed all new labs and imaging results over the last 24 hours.     Physical Exam   Temp: 99.1  F (37.3  C) Temp src: Oral BP: 130/67 Pulse: 75 Heart Rate: 77 Resp: 16 SpO2: 94 % O2 Device: None (Room air)    Vitals:    08/31/18 0340 09/01/18 0636 09/02/18 0500   Weight: 78.7 kg (173 lb 6.4 oz) 74.5 kg (164 lb 3.2 oz) 73.4 kg (161 lb 14.4 oz)     Vital Signs with Ranges  Temp:  [97.7  F (36.5  C)-99.1  F (37.3  C)] 99.1  F (37.3  C)  Pulse:  [69-75] 75  Heart Rate:  [63-78] 77  Resp:  [14-18] 16  BP: (129-138)/(60-79) 130/67  SpO2:  [94 %-99 %] 94 %  I/O last 3 completed shifts:  In: 840 [P.O.:840]  Out: 1100 [Urine:1100]    Constitutional:alert oriented x 3   Respiratory: Clear to auscultation bilaterally, no crackles or wheezing  Cardiovascular: in svt , normal S1 and S2, and no murmur noted  GI: Normal bowel sounds, soft, non-distended, non-tender  Skin/Integumen:right hip status post surgery   Neuro : moving all 4 extremities, no focal deficit noted     Medications       amoxicillin-clavulanate  1 tablet Oral Q24H EFRA     atorvastatin  10 mg Oral QPM     diltiazem  180 mg Oral Daily     enoxaparin  30 mg Subcutaneous Q24H     insulin aspart  2 Units Subcutaneous TID w/meals     insulin aspart  1-7 Units Subcutaneous TID AC     insulin aspart  1-5 Units Subcutaneous At Bedtime     insulin glargine  5 Units Subcutaneous At Bedtime     metoprolol succinate  50 mg Oral Daily     polyethylene glycol  17  g Oral BID     ranitidine  150 mg Oral At Bedtime     senna-docusate  1 tablet Oral BID    Or     senna-docusate  2 tablet Oral BID     sodium chloride (PF)  3 mL Intracatheter Q8H       Data     Recent Labs  Lab 09/02/18  0530 09/01/18  0535 08/31/18  1546  08/31/18  0535  08/29/18  1625   WBC 10.3 9.0  --   --   --   --  11.5*   HGB 9.0* 8.2*  --   --  9.4*  --  8.6*   MCV 90 90  --   --   --   --  89    201  --   --   --   --  109*    140 140  < > 142  < >  --    POTASSIUM 4.4 4.0 4.0  < > 3.8  < >  --    CHLORIDE 110* 111* 108  < > 110*  < >  --    CO2 21 22 24  < > 21  < >  --    BUN 38* 42* 44*  < > 38*  < >  --    CR 1.93* 2.13* 2.24*  < > 2.15*  < >  --    ANIONGAP 9 7 8  < > 11  < >  --    JOSELINE 7.4* 7.3* 7.3*  < > 7.9*  < >  --    GLC 55* 100* 167*  < > 122*  < >  --    TROPI  --   --   --   --   --   --  0.039   < > = values in this interval not displayed.    Recent Labs  Lab 09/02/18  0838 09/02/18  0708 09/02/18  0530 09/02/18  0140 09/01/18  2112 09/01/18  1702  09/01/18  0535  08/31/18  1546  08/31/18  1000  08/31/18  0535   GLC  --   --  55*  --   --   --   --  100*  --  167*  --  188*  --  122*   * 111*  --  80 112* 140*  < >  --   < >  --   < >  --   < >  --    < > = values in this interval not displayed.    Imaging:   No results found for this or any previous visit (from the past 24 hour(s)).[ES1.1]     Revision History        User Key Date/Time User Provider Type Action    > ES1.1 9/2/2018 10:01 AM Lazaro Suggs,  Physician Sign            Progress Notes by Racheal Cole RN at 9/1/2018  6:59 PM     Author:  Racheal Cole RN Service:  (none) Author Type:  Registered Nurse    Filed:  9/1/2018  7:03 PM Date of Service:  9/1/2018  6:59 PM Creation Time:  9/1/2018  1:44 PM    Status:  Addendum :  Racheal Cole, RN (Registered Nurse)         VSS.  Pt c/o of right hip pain 5/10, given oxycodone[KZ1.1] x2[KZ1.2] with relief of pain.  Tele is SR w/ pacs and 1AVB/BBB.   "Up with assist of 2 to chair for meals.  Right hip dressing is c/d/i.  Skin bruised with multiple skin tears and scabs.  Mepilex on coccyx, blanchable, with frequent repositioning.  Per Hospitalist, ok to remove stiches on forehead abrasion, sutures removed.  BG 93[KZ1.1]/139/140[KZ1.2] today. Encourage PO[KZ1.1] intake[KZ1.2], Cr 2.13 BMP ordered for am.  Plan to continue to monitor, TCU at discharge.[KZ1.1]       Revision History        User Key Date/Time User Provider Type Action    > KZ1.2 9/1/2018  7:03 PM Racheal Cole RN Registered Nurse Addend     KZ1.1 9/1/2018  1:47 PM Racheal Cole RN Registered Nurse Sign            Progress Notes by Cuca Stephenson RD, LD at 9/1/2018  3:43 PM     Author:  Cuca Stephenson RD, LD Service:  Nutrition Author Type:  Registered Dietitian    Filed:  9/1/2018  3:48 PM Date of Service:  9/1/2018  3:43 PM Creation Time:  9/1/2018  3:43 PM    Status:  Signed :  Cuca Stephenson RD, LD (Registered Dietitian)         BRIEF NUTRITION ASSESSMENT      REASON FOR ASSESSMENT:  LOS    NUTRITION HISTORY:  Pt states that he consumes a regular diet, consuming 2 main meals daily. Breakfast at 10am and Dinner at dinning lang at living facility    CURRENT DIET AND INTAKE:  Diet: Regular diet     Intake:  -Pt states appetite is improving over this admission however, he is not happy with a lot of the menu items. He also reports that menu items are missing such as condiments   -Per RN flowsheet, pt consuming 50%-75% of meals ordered   -Wt fairly stable over this admission     ANTHROPOMETRICS:  Height: 5'8\"  Weight: 74.5 kg  BMI: 24.97 kg/m2  IBW: 70kg  Weight Status: Normal BMI  %IBW: 106%  Weight History:[AP1.1]   Wt Readings from Last 10 Encounters:   09/01/18 74.5 kg (164 lb 3.2 oz)   04/21/14 73.5 kg (162 lb)   04/21/14 74.8 kg (165 lb)[AP1.2]       LABS:  Labs noted    MALNUTRITION:  Patient does not meet two of the following criteria necessary for diagnosing malnutrition: " significant weight loss, reduced intake, subcutaneous fat loss, muscle loss or fluid retention    NUTRITION INTERVENTION:  Nutrition Diagnosis:  No nutrition diagnosis at this time.    Implementation:  Nutrition Education: oral nutrition supplements and need for increasing protein. Of note Pt daughter in law is an RD      FOLLOW UP/MONITORING:   Will re-evaluate in 7 - 10 days, or sooner, if re-consulted.      Cuca Stephenson RD, LD[AP1.1]     Revision History        User Key Date/Time User Provider Type Action    > AP1.2 9/1/2018  3:48 PM Cuca Stephenson RD, LD Registered Dietitian Sign     AP1.1 9/1/2018  3:43 PM Cuca Stephenson RD, LD Registered Dietitian             Progress Notes by Yvrose Stack LICSW at 9/1/2018  2:01 PM     Author:  Yvrose Stack LICSW Service:  Social Work Author Type:      Filed:  9/1/2018  2:02 PM Date of Service:  9/1/2018  2:01 PM Creation Time:  9/1/2018  2:01 PM    Status:  Signed :  Yvrose Stack LICSW ()         SW:  D:  Spoke with patient's MD who states that patient is not ready to discharge today.  Call placed to update [facility] as to patient's status.  Per Blanche, they can accept patient tomorrow.  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  He was found to have a right femoral neck fracture.  He was found to have fibrillation on his EKG done in the ER.  He has history of sinus arrhythmia, no documented history of atrial fibrillation.       Right femoral neck fracture  Mechanical fall  The patient describes the fall as being mechanical.  He has some balance problems recently, but no frequent falls.  He denies any preceding symptoms, no dizziness, no shortness of breath, no chest pains, no loss of consciousness.  He did hit his head slightly and had a skin laceration that was sutured as the ER.  Ortho on-call was contacted.  The patient seems to have been in a good state of health.  He does have a history of coronary artery disease with coronary artery bypass grafting, but as per the patient and his son, both physicians, he did not have any problems since his surgery.     - Post op management per Orthopedics.    - Pain control - Oxycodone, Tylenol   - PT/OT  - add miralax to the bowel regimen, patient declines further change today     New diagnosis of Atrial fibrillation  Supraventricular tachycardia  Apparently, he was told that he had a sinus arrhythmia in the past.  He does not have formal diagnosis of A-Fib although his son states he is not surprised of this as the patient's pulse had been irregular in the past.  The patient is on a baby aspirin at home, which will be held at this time.  His heart rate seems to be a rate controlled on his prior to admission atenolol, which will be continued.    echocardiogram results noted   Cardiology consulted. His CHADS-VASc score is 4 for age, hypertension, and diabetes.  This puts him in the high risk group for a stroke.  Anticoagulation management for atrial fibrillation was discussed with them briefly on admission. The patient  states he is not interested in starting anticoagulation.  His son does states that he seems to be at high risk of falls.   Cardiology input 8/28 as patient  Had a run of wide complex tachyarrhythmia , atenolol was on hold sicne yesterday ,as per EP recommendation due to bradycardia .  plan to continue on low dose metoprolol , appreciate cardiology input .  Resume PTA Asa once cleared from Ortho.[ES1.1]   improving[ES1.2]  Plan  - aspirin on discharge  - continues on both metoprolol and diltiazem  -[ES1.1] monitoring for bradycardia[ES1.2]  -[ES1.1] cardiology has signed off   - only 7 beat SVT overnight, unclear significant improvement[ES1.2]       Hypertension  His blood pressure was slightly elevated in ER.  He did have a lot of pain. Improved presently.     - Hydralazine IV p.r.n.     Leukocytosis  Possible urinary tract infection    Possible aspiration pneumonia right from episode of dizziness  White blood cells of 15.6 with right lung base infiltrate  Started on ceftriaxone initially for UTI concerns but culture negative  plan  -change to augmentin course     Diabetes mellitus type 2  Hemoglobin A1c 8.4.  At home he had maintained on Lantus 10 units at bedtime, glipizide-XL 5 mg p.o. twice daily and Humalog 4 units subcutaneously 3 times daily with meals.    - restarted  home medications since his oral intake improved.   [ES1.1]  Acute on[ES1.2] Chronic kidney disease, stage III  Baseline creatinine seems to be between 1.5-1.8.  Creatinine on admission is 1.65.  [ES1.1]  Up to 2.25 post surgery  Urine sodium 55, suggesting not prerenal, did receive some fluids[ES1.2]   - creatinine[ES1.1] stabilized[ES1.2]   -[ES1.1] encourage oral hydration, recheck bmp in the AM  - if stable can likely discharge[ES1.2]    # Pain Assessment:  As per orthopedics    Called and updated son   DVT Prophylaxis: as per orthopedics    Code Status: DNR/DNI     Disposition: Expected discharge in 1-2 days depending on rhythm control or  treatment of his atrial tachyarrhythmia .    Lazaro DIORPreston Suggs DO    Interval History[ES1.1]   Only 7 beat run of SVT while urinating. No other concerns. Pain better controlled. He reports he had a bowel movement yesterday. Discussed with his son over the phone[ES1.2]    -Data reviewed today: I reviewed all new labs and imaging results over the last 24 hours.     Physical Exam   Temp: 99.1  F (37.3  C) Temp src: Oral BP: 134/62 Pulse: 89 Heart Rate: 89 Resp: 18 SpO2: 95 % O2 Device: None (Room air)    Vitals:    08/30/18 0500 08/31/18 0340 09/01/18 0636   Weight: 77.1 kg (170 lb) 78.7 kg (173 lb 6.4 oz) 74.5 kg (164 lb 3.2 oz)     Vital Signs with Ranges  Temp:  [98  F (36.7  C)-99.1  F (37.3  C)] 99.1  F (37.3  C)  Pulse:  [89] 89  Heart Rate:  [73-89] 89  Resp:  [16-18] 18  BP: (114-144)/(31-69) 134/62  SpO2:  [94 %-97 %] 95 %  I/O last 3 completed shifts:  In: 606.67 [P.O.:320; I.V.:286.67]  Out: 700 [Urine:700]    Constitutional:alert oriented x 3   Respiratory: Clear to auscultation bilaterally, no crackles or wheezing  Cardiovascular: in svt , normal S1 and S2, and no murmur noted  GI: Normal bowel sounds, soft, non-distended, non-tender  Skin/Integumen:right hip status post surgery   Neuro : moving all 4 extremities, no focal deficit noted     Medications       amoxicillin-clavulanate  1 tablet Oral Q24H EFRA     atorvastatin  10 mg Oral QPM     diltiazem  180 mg Oral Daily     enoxaparin  30 mg Subcutaneous Q24H     glipiZIDE  5 mg Oral BID AC     insulin aspart  4 Units Subcutaneous TID w/meals     insulin aspart  1-7 Units Subcutaneous TID AC     insulin aspart  1-5 Units Subcutaneous At Bedtime     insulin glargine  5 Units Subcutaneous At Bedtime     metoprolol succinate  50 mg Oral Daily     polyethylene glycol  17 g Oral BID     ranitidine  150 mg Oral At Bedtime     senna-docusate  1 tablet Oral BID    Or     senna-docusate  2 tablet Oral BID     sodium chloride (PF)  3 mL Intracatheter Q8H        Data     Recent Labs  Lab 09/01/18  0535 08/31/18  1546 08/31/18  1000 08/31/18  0535  08/29/18  1625 08/29/18  0650  08/27/18  0715   WBC 9.0  --   --   --   --  11.5*  --   --  10.0   HGB 8.2*  --   --  9.4*  --  8.6*  --   < > 9.2*   MCV 90  --   --   --   --  89  --   --  91     --   --   --   --  109* 118*  < > 108*    140 138 142  < >  --  140  < > 138   POTASSIUM 4.0 4.0 3.9 3.8  < >  --  4.5  < > 4.5   CHLORIDE 111* 108 107 110*  < >  --  109  < > 107   CO2 22 24 22 21  < >  --  23  < > 23   BUN 42* 44* 40* 38*  < >  --  34*  < > 26   CR 2.13* 2.24* 2.01* 2.15*  < >  --  1.86*  < > 1.57*   ANIONGAP 7 8 9 11  < >  --  8  < > 8   JOSELINE 7.3* 7.3* 7.6* 7.9*  < >  --  7.9*  < > 7.0*   * 167* 188* 122*  < >  --  105*  < > 146*   TROPI  --   --   --   --   --  0.039  --   --   --    < > = values in this interval not displayed.    Recent Labs  Lab 09/01/18  0752 09/01/18  0535 09/01/18  0203 08/31/18  2114 08/31/18  1738 08/31/18  1546 08/31/18  1201 08/31/18  1000  08/31/18  0535  08/30/18  0517   GLC  --  100*  --   --   --  167*  --  188*  --  122*  --  115*   BGM 93  --  141* 155* 139*  --  147*  --   < >  --   < >  --    < > = values in this interval not displayed.    Imaging:   No results found for this or any previous visit (from the past 24 hour(s)).[ES1.1]     Revision History        User Key Date/Time User Provider Type Action    > ES1.2 9/1/2018 12:35 PM Lazaro Suggs, DO Physician Sign     ES1.1 9/1/2018 11:14 AM Lazaro Suggs, DO Physician             Progress Notes by Demarcus Tabares MD at 9/1/2018  5:51 AM     Author:  Demarcus Tabares MD Service:  Cardiology Author Type:  Physician    Filed:  9/1/2018 11:43 AM Date of Service:  9/1/2018  5:51 AM Creation Time:  9/1/2018  5:51 AM    Status:  Signed :  Demarcus Tabares MD (Physician)         [facility]    Cardiology Progress Note     Assessment & Plan    Abimael Flores is a 89 year old male who was admitted on 8/25/2018 for symptomatic SVT episodes    1.[AR1.1] As[AR1.2]ymptomatic SVT episodes  2. Atrial fibrillation[AR1.1] with intermittent rate-related bundle.[AR1.2]  3. Normal ejection fraction, EF 55-60%  4. CAD s/p CABG  5. Moderate to severe pulmonary hypertension with moderate TR  6. T2DM  7. CKD III  --Fewer runs of tachycardia; none overnight  >>Continue dilt  mg QD  >> Continue metoprolol XL 50 mg daily; watch for bradycardia  >>If medical rx fails[AR1.1], can[AR1.3] consider[AR1.1] EP study with potential ablation vs antiarrhythmic therapy as an outpatient.[AR1.3]  >>Deferred discussion of anticoagulation[AR1.1] (patient preference)    Medical therapy is appropriately titrated.  We will sign off but do not hesitate to call for questions.[AR1.3]    Active Problems:    Closed right hip fracture (H)    S/P total hip arthroplasty      # Pain Assessment:  Current Pain Score 9/1/2018   Patient currently in pain? yes   Pain score (0-10) 6   Pain location Hip   Pain descriptors Aching       Demarcus Tabares MD    Text Page     Interval History   No further tachycardia overnight.[AR1.1] No issues.  Up in the chair briefly (2 hours at a time yesterday).[AR1.4]    Physical Exam   Temp: 98.4  F (36.9  C) Temp src: Oral BP: 144/69   Heart Rate: 83 Resp: 16 SpO2: 94 % O2 Device: None (Room air) Oxygen Delivery: 2 LPM  Vitals:    08/26/18 0500 08/30/18 0500 08/31/18 0340   Weight: 73.8 kg (162 lb 12.8 oz) 77.1 kg (170 lb) 78.7 kg (173 lb 6.4 oz)     Vital Signs with Ranges  Temp:  [98  F (36.7  C)-98.4  F (36.9  C)] 98.4  F (36.9  C)  Heart Rate:  [] 83  Resp:  [16-18] 16  BP: ()/(43-69) 144/69  SpO2:  [93 %-100 %] 94 %  I/O last 3 completed shifts:  In: 486.67 [P.O.:200; I.V.:286.67]  Out: 1150 [Urine:1150]  Patient Active Problem List   Diagnosis     Injury of globe of eye     Recent retinal detachment, total or subtotal     Closed right hip  fracture (H)     S/P total hip arthroplasty       Constitutional: No apparent distress.   Eyes: No xanthelasma or conjunctivitis  Respiratory: Clear to auscultation bilaterally. No crackles or wheezes.  Cardiovascular:[AR1.1] Irregularly irregular. 2/6 systolic murmur.[AR1.4]  Extremities: No peripheral edema.  Neurologic: Moving all extremities. No facial assymmetry.  Psychiatric: Alert and oriented. Answers questions appropriately.     Medications       amoxicillin-clavulanate  1 tablet Oral Q24H EFRA     atorvastatin  10 mg Oral QPM     diltiazem  180 mg Oral Daily     enoxaparin  30 mg Subcutaneous Q24H     glipiZIDE  5 mg Oral BID AC     insulin aspart  4 Units Subcutaneous TID w/meals     insulin aspart  1-7 Units Subcutaneous TID AC     insulin aspart  1-5 Units Subcutaneous At Bedtime     insulin glargine  5 Units Subcutaneous At Bedtime     metoprolol succinate  50 mg Oral Daily     polyethylene glycol  17 g Oral BID     ranitidine  150 mg Oral At Bedtime     senna-docusate  1 tablet Oral BID    Or     senna-docusate  2 tablet Oral BID     sodium chloride (PF)  3 mL Intracatheter Q8H       Data[AR1.1]   Results for orders placed or performed during the hospital encounter of 08/25/18 (from the past 24 hour(s))   Glucose by meter   Result Value Ref Range    Glucose 123 (H) 70 - 99 mg/dL   Basic metabolic panel   Result Value Ref Range    Sodium 138 133 - 144 mmol/L    Potassium 3.9 3.4 - 5.3 mmol/L    Chloride 107 94 - 109 mmol/L    Carbon Dioxide 22 20 - 32 mmol/L    Anion Gap 9 3 - 14 mmol/L    Glucose 188 (H) 70 - 99 mg/dL    Urea Nitrogen 40 (H) 7 - 30 mg/dL    Creatinine 2.01 (H) 0.66 - 1.25 mg/dL    GFR Estimate 31 (L) >60 mL/min/1.7m2    GFR Estimate If Black 38 (L) >60 mL/min/1.7m2    Calcium 7.6 (L) 8.5 - 10.1 mg/dL   Glucose by meter   Result Value Ref Range    Glucose 147 (H) 70 - 99 mg/dL   Basic metabolic panel   Result Value Ref Range    Sodium 140 133 - 144 mmol/L    Potassium 4.0 3.4 - 5.3  mmol/L    Chloride 108 94 - 109 mmol/L    Carbon Dioxide 24 20 - 32 mmol/L    Anion Gap 8 3 - 14 mmol/L    Glucose 167 (H) 70 - 99 mg/dL    Urea Nitrogen 44 (H) 7 - 30 mg/dL    Creatinine 2.24 (H) 0.66 - 1.25 mg/dL    GFR Estimate 27 (L) >60 mL/min/1.7m2    GFR Estimate If Black 33 (L) >60 mL/min/1.7m2    Calcium 7.3 (L) 8.5 - 10.1 mg/dL   Glucose by meter   Result Value Ref Range    Glucose 139 (H) 70 - 99 mg/dL   Glucose by meter   Result Value Ref Range    Glucose 155 (H) 70 - 99 mg/dL   Sodium random urine   Result Value Ref Range    Sodium Urine mmol/L 55 mmol/L   Creatinine random urine   Result Value Ref Range    Creatinine Urine Random 89 mg/dL   Glucose by meter   Result Value Ref Range    Glucose 141 (H) 70 - 99 mg/dL[AR1.5]          Revision History        User Key Date/Time User Provider Type Action    > AR1.2 9/1/2018 11:43 AM Demarcus Tabares MD Physician Sign     AR1.3 9/1/2018 10:56 AM Demarcus Tabares MD Physician      AR1.4 9/1/2018  6:49 AM Demarcus Tabares MD Physician      AR1.5 9/1/2018  5:54 AM Demarcus Tabares MD Physician      AR1.1 9/1/2018  5:51 AM Demarcus Tabares MD Physician             Progress Notes by Lazaro Suggs DO at 8/31/2018  7:42 AM     Author:  Lazaro Suggs DO Service:  Hospitalist Author Type:  Physician    Filed:  8/31/2018  3:09 PM Date of Service:  8/31/2018  7:42 AM Creation Time:  8/31/2018  7:43 AM    Status:  Signed :  Lazaro Suggs DO (Physician)         [facility]    Hospitalist Progress Note    Assessment & Plan   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  He was found to have a right  femoral neck fracture.  He was found to have fibrillation on his EKG done in the ER.  He has history of sinus arrhythmia, no documented history of atrial fibrillation.       Right femoral neck fracture  Mechanical fall  The patient describes the fall as being mechanical.  He has some balance problems recently, but no frequent falls.  He denies any preceding symptoms, no dizziness, no shortness of breath, no chest pains, no loss of consciousness.  He did hit his head slightly and had a skin laceration that was sutured as the ER.  Ortho on-call was contacted.  The patient seems to have been in a good state of health.  He does have a history of coronary artery disease with coronary artery bypass grafting, but as per the patient and his son, both physicians, he did not have any problems since his surgery.     - Post op management per Orthopedics.    - Pain control - Oxycodone, Tylenol   - PT/OT[ES1.1]  - add miralax to the bowel regimen[ES1.2]     New diagnosis of Atrial fibrillation  Apparently, he was told that he had a sinus arrhythmia in the past.  He does not have formal diagnosis of A-Fib although his son states he is not surprised of this as the patient's pulse had been irregular in the past.  The patient is on a baby aspirin at home, which will be held at this time.  His heart rate seems to be a rate controlled on his prior to admission atenolol, which will be continued.    echocardiogram results noted   Cardiology consulted. His CHADS-VASc score is 4 for age, hypertension, and diabetes.  This puts him in the high risk group for a stroke.  Anticoagulation management for atrial fibrillation was discussed with them briefly on admission. The patient states he is not interested in starting anticoagulation.  His son does states that he seems to be at high risk of falls.   Cardiology input 8/28 as patient  Had a run of wide complex tachyarrhythmia , atenolol was on hold sicne yesterday ,as per EP recommendation due to  bradycardia .  plan to continue on low dose metoprolol , appreciate cardiology input .  Resume PTA Asa once cleared from Ortho.   Still having episodic short runs of svt  Plan  - aspirin on discharge  - continues on both metoprolol and diltiazem  - still having epidsodic SVT with mild hypotension (80 SBP) but reported asymptomatic during the last occurrence early AM on 8/31  - EP following       Hypertension  His blood pressure was slightly elevated in ER.  He did have a lot of pain. Improved presently.     - Hydralazine IV p.r.n.     Leukocytosis  Possible urinary tract infection    Possible aspiration pneumonia right from episode of dizziness  White blood cells of 15.6 with right lung base infiltrate  Started on ceftriaxone initially for UTI concerns but culture negative  plan  -[ES1.1]change to augmentin course[ES1.3]     Diabetes mellitus type 2  Hemoglobin A1c 8.4.  At home he had maintained on Lantus 10 units at bedtime, glipizide-XL 5 mg p.o. twice daily and Humalog 4 units subcutaneously 3 times daily with meals.    - restarted  home medications since his oral intake improved.     Chronic kidney disease, stage III  Baseline creatinine seems to be between 1.5-1.8.  Creatinine on admission is 1.65.     - creatinine up t[ES1.1]bolivar, he reports poor oral intake[ES1.3]   - Follow BMP.[ES1.1]   - fluid challenge[ES1.3]    # Pain Assessment:  As per orthopedics    Called and updated son   DVT Prophylaxis: as per orthopedics    Code Status: DNR/DNI     Disposition: Expected discharge in 1-2 days depending on rhythm control or treatment of his atrial tachyarrhythmia .    Lazaro Suggs DO    Interval History   Another SVT event this AM, self terminated and associated with HR of 140-160s and systolic BP of 70-80 without symptoms per the nursing report    -Data reviewed today: I reviewed all new labs and imaging results over the last 24 hours.     Physical Exam   Temp: 98.3  F (36.8  C) Temp src: Oral BP: 120/59    Heart Rate: 78 Resp: 16 SpO2: 98 % O2 Device: Nasal cannula Oxygen Delivery: 2 LPM  Vitals:    08/26/18 0500 08/30/18 0500 08/31/18 0340   Weight: 73.8 kg (162 lb 12.8 oz) 77.1 kg (170 lb) 78.7 kg (173 lb 6.4 oz)     Vital Signs with Ranges  Temp:  [97.4  F (36.3  C)-99.4  F (37.4  C)] 98.3  F (36.8  C)  Heart Rate:  [] 78  Resp:  [12-25] 16  BP: ()/() 120/59  SpO2:  [92 %-99 %] 98 %  I/O last 3 completed shifts:  In: 220 [P.O.:120; I.V.:100]  Out: 925 [Urine:925]    Constitutional:alert oriented x 3   Respiratory: Clear to auscultation bilaterally, no crackles or wheezing  Cardiovascular: in svt , normal S1 and S2, and no murmur noted  GI: Normal bowel sounds, soft, non-distended, non-tender  Skin/Integumen:right hip status post surgery   Neuro : moving all 4 extremities, no focal deficit noted     Medications       atorvastatin  10 mg Oral QPM     diltiazem  180 mg Oral Daily     enoxaparin  30 mg Subcutaneous Q24H     glipiZIDE  5 mg Oral BID AC     insulin aspart  4 Units Subcutaneous TID w/meals     insulin aspart  1-7 Units Subcutaneous TID AC     insulin aspart  1-5 Units Subcutaneous At Bedtime     insulin glargine  5 Units Subcutaneous At Bedtime     metoprolol succinate  25 mg Oral Daily     piperacillin-tazobactam  3.375 g Intravenous Q6H     ranitidine  150 mg Oral At Bedtime     senna-docusate  1 tablet Oral BID    Or     senna-docusate  2 tablet Oral BID     sodium chloride (PF)  3 mL Intracatheter Q8H       Data     Recent Labs  Lab 08/31/18  0535 08/30/18  0517 08/29/18  2030 08/29/18  1625 08/29/18  0650 08/28/18  0615 08/27/18  0715 08/26/18  0657   WBC  --   --   --  11.5*  --   --  10.0 12.8*   HGB 9.4*  --   --  8.6*  --  9.0* 9.2* 11.0*   MCV  --   --   --  89  --   --  91 91   PLT  --   --   --  109* 118* 111* 108* 150    141 141  --  140 137 138 141   POTASSIUM 3.8 3.8 3.8  --  4.5 4.1 4.5 4.4   CHLORIDE 110* 111* 109  --  109 107 107 111*   CO2 21 22 23  --  23 22 23  23   BUN 38* 38* 38*  --  34* 33* 26 28   CR 2.15* 1.82* 1.87*  --  1.86* 1.90* 1.57* 1.43*   ANIONGAP 11 8 9  --  8 8 8 7   JOSELINE 7.9* 7.3* 7.2*  --  7.9* 7.3* 7.0* 8.1*   * 115* 183*  --  105* 115* 146* 102*   TROPI  --   --   --  0.039  --   --   --   --        Recent Labs  Lab 08/31/18  0535 08/30/18  2057 08/30/18  1709 08/30/18  1258 08/30/18  0734 08/30/18  0517 08/30/18  0201  08/29/18  2030  08/29/18  0650  08/28/18  0615   *  --   --   --   --  115*  --   --  183*  --  105*  --  115*   BGM  --  148* 113* 249* 126*  --  145*  < >  --   < >  --   < >  --    < > = values in this interval not displayed.    Imaging:   No results found for this or any previous visit (from the past 24 hour(s)).[ES1.1]     Revision History        User Key Date/Time User Provider Type Action    > ES1.3 8/31/2018  3:09 PM Lazaro Suggs, DO Physician Sign     ES1.2 8/31/2018  9:48 AM Lazaro Suggs, DO Physician      ES1.1 8/31/2018  7:42 AM Lazaro Suggs, DO Physician             Progress Notes by Yvrose Stack LICSW at 8/31/2018  3:01 PM     Author:  Yvrose Stack LICSW Service:  Social Work Author Type:      Filed:  8/31/2018  3:02 PM Date of Service:  8/31/2018  3:01 PM Creation Time:  8/31/2018  3:01 PM    Status:  Signed :  Yvrose Stack LICSW ()         SW:  D:  Spoke with patient's MD who states that patient is not ready to discharge today, but likely over the weekend.  Call placed to update [facility].  Per Lorie, they can accept patient either day.  [facility] is unable to accept patient over the weekend.  P:  Will continue to follow.[SJ1.1]     Revision History        User Key Date/Time User Provider Type Action    > SJ1.1 8/31/2018  3:02 PM Yvrose Stack, [facility]  Sign            Progress Notes by Lacey You MD at 8/31/2018  9:21 AM     Author:  Lacey You MD Service:   "Electrophysiology Author Type:  Physician    Filed:  8/31/2018  9:32 AM Date of Service:  8/31/2018  9:21 AM Creation Time:  8/31/2018  9:21 AM    Status:  Signed :  Lacey You MD (Physician)         EP Cardiology Progress Note  Rosemarie You MD         Assessment and Plan:      1.  SVT with LBBB.  Dilt XR started yesterday.  Apparently the patient took this PTA.  Pt had another episode of SVT around 5 am today.  It lasted almost 1 hour.  Pt unaware of SVT, but SBP reportedly dropped into the 80s.  There is concern that SVT may have contributed to the recent fall.       Plan:  - continue dilt XR   - gently increase metoprolol XL to 50 mg daily; watch for bradycardia  - do not send to TCU yet  - if medical rx fails consider EPS/ablation (early next week) or a trial of low-dose amiodarone.                     Interval History:   no new complaints          Review of Systems:   CONSTITUTIONAL: NEGATIVE for fever, chills, change in weight  ENT/MOUTH: NEGATIVE for ear, mouth and throat problems  RESP: NEGATIVE for significant cough or SOB  CV: NEGATIVE for chest pain, palpitations or peripheral edema          Physical Exam:[DI1.1]        Blood pressure 121/65, pulse 90, temperature 98.1  F (36.7  C), temperature source Oral, resp. rate 16, height 1.727 m (5' 8\"), weight 78.7 kg (173 lb 6.4 oz), SpO2 100 %.  Vitals:    08/26/18 0500 08/30/18 0500 08/31/18 0340   Weight: 73.8 kg (162 lb 12.8 oz) 77.1 kg (170 lb) 78.7 kg (173 lb 6.4 oz)[DI1.2]     Vital Signs with Ranges[DI1.1]  Temp:  [97.4  F (36.3  C)-99.4  F (37.4  C)] 98.1  F (36.7  C)  Heart Rate:  [] 81  Resp:  [12-20] 16  BP: ()/() 121/65  SpO2:  [92 %-100 %] 100 %[DI1.2]  I/O's Last 24 hours[DI1.1]  I/O last 3 completed shifts:  In: 220 [P.O.:120; I.V.:100]  Out: 925 [Urine:925][DI1.2]    EXAM:  A+O x 3  Lungs: few base crackles  CV: RRR, no S3  ABD: soft, NT  EXTR: no edema         Medications:[DI1.1]          atorvastatin  " 10 mg Oral QPM     diltiazem  180 mg Oral Daily     enoxaparin  30 mg Subcutaneous Q24H     glipiZIDE  5 mg Oral BID AC     insulin aspart  4 Units Subcutaneous TID w/meals     insulin aspart  1-7 Units Subcutaneous TID AC     insulin aspart  1-5 Units Subcutaneous At Bedtime     insulin glargine  5 Units Subcutaneous At Bedtime     metoprolol succinate  25 mg Oral Daily     piperacillin-tazobactam  3.375 g Intravenous Q6H     ranitidine  150 mg Oral At Bedtime     senna-docusate  1 tablet Oral BID    Or     senna-docusate  2 tablet Oral BID     sodium chloride (PF)  3 mL Intracatheter Q8H[DI1.2]            Data:      All new lab and imaging data was reviewed.[DI1.1]   Recent Labs   Lab Test  18   0535  18   1625  18   0650  18   0615  18   0715  18   0657   14   0405   WBC   --   11.5*   --    --   10.0  12.8*   < >  14.7*   HGB  9.4*  8.6*   --   9.0*  9.2*  11.0*   < >  11.2*   MCV   --   89   --    --   91  91   < >  91   PLT   --   109*  118*  111*  108*  150   < >  169   INR   --    --    --    --    --    --    --   0.91    < > = values in this interval not displayed.      Recent Labs   Lab Test  18   0535  18   0517  18   2030   NA  142  141  141   POTASSIUM  3.8  3.8  3.8   CHLORIDE  110*  111*  109   CO2  21  22  23   BUN  38*  38*  38*   CR  2.15*  1.82*  1.87*   ANIONGAP  11  8  9   JOSELINE  7.9*  7.3*  7.2*   GLC  122*  115*  183*     Recent Labs   Lab Test  18   1625  14   1656   TROPI  0.039   --    TROPONIN   --   0.03[DI1.2]         EKG results:  Reviewed      Echo Results:  Recent Results (from the past 4320 hour(s))   Echocardiogram Complete    Narrative    [id number]  ECH19  [id number]  [id number]^YANNA^KARON^[facility]  Echocardiography Laboratory  [address] [place] [address]        Name: CHERYL HOLLOWAY  MRN: [id number]  : 1924  Study Date: 2018 08:55 AM  Age: 89  yrs  Gender: Male  Patient Location: [facility]  Reason For Study: Afib  Ordering Physician: KARON RAINES  Referring Physician: RAISSA HILL  Performed By: Lise Hill RDCS     BSA: 1.9 m2  Height: 68 in  Weight: 162 lb  HR: 84  BP: 178/94 mmHg  _____________________________________________________________________________  __        Procedure  Complete Echo Adult.  _____________________________________________________________________________  __        Interpretation Summary     Sinus rhythm was noted.  Left ventricular systolic function is normal. The visual ejection fraction is  estimated at 55-60%. There is mild concentric left ventricular hypertrophy.  There is trace to mild mitral regurgitation.  Thickened mitral valve anterior leaflet is noted and there is a  prominenet/sclerotic chordal attachment (giving the appearance of an  associated mass). The thickening is most likely degenerative.  There is moderate (2+) tricuspid regurgitation.  Right ventricular systolic pressure is elevated, consistent with moderate to  severe pulmonary hypertension.  There is no comparison study available. The study was technically limited.  _____________________________________________________________________________  __        Left Ventricle  The left ventricle is normal in size. There is mild concentric left  ventricular hypertrophy. Left ventricular systolic function is normal. The  visual ejection fraction is estimated at 55-60%. Grade I or early diastolic  dysfunction. Diastolic Doppler findings (E/E' ratio and/or other parameters)  suggest left ventricular filling pressures are indeterminate. No regional wall  motion abnormalities noted.     Right Ventricle  The right ventricle is normal size. The right ventricular systolic function is  normal. The right ventricle is not well visualized.     Atria  Normal left atrial size. Right atrial size is normal. There is no atrial shunt  seen.     Mitral Valve  Thickened mitral valve  anterior leaflet. There is trace to mild mitral  regurgitation.        Tricuspid Valve  Normal IVC (1.5-2.5cm) with >50% respiratory collapse; right atrial pressure  is estimated at 5-10mmHg. There is moderate (2+) tricuspid regurgitation. The  right ventricular systolic pressure is approximated at 58mmHg plus the right  atrial pressure. Right ventricular systolic pressure is elevated, consistent  with moderate to severe pulmonary hypertension.     Aortic Valve  Thickened aortic valve leaflets. No aortic regurgitation is present. No  hemodynamically significant valvular aortic stenosis.     Pulmonic Valve  There is trace to mild pulmonic valvular regurgitation. There is no pulmonic  valvular stenosis.     Vessels  Normal size aorta. The IVC is normal in size and reactivity with respiration,  suggesting normal central venous pressure.     Pericardium  The pericardium appears normal.        Rhythm  Sinus rhythm was noted.  _____________________________________________________________________________  __  MMode/2D Measurements & Calculations  IVSd: 1.1 cm     LVIDd: 3.6 cm  LVIDs: 2.6 cm  LVPWd: 1.2 cm  FS: 26.9 %  LV mass(C)d: 137.0 grams  LV mass(C)dI: 73.3 grams/m2  Ao root diam: 3.6 cm  LA dimension: 2.7 cm  asc Aorta Diam: 3.3 cm  LA/Ao: 0.75  LVOT diam: 2.2 cm  LVOT area: 3.8 cm2  LA Volume (BP): 31.0 ml  LA Volume Index (BP): 16.6 ml/m2  RWT: 0.68           Doppler Measurements & Calculations  MV E max minnie: 61.6 cm/sec  MV A max minnie: 83.9 cm/sec  MV E/A: 0.73  MV dec time: 0.37 sec  PA acc time: 0.10 sec  TR max minnie: 380.1 cm/sec  TR max P.8 mmHg  E/E' av.5  Lateral E/e': 8.6  Medial E/e': 14.4           _____________________________________________________________________________  __           Report approved by: Yanet Ceron 2018 11:01 AM[DI1.1]                 Revision History        User Key Date/Time User Provider Type Action    > DI1.2 2018  9:32 AM Lacey You,  MD Physician Sign     DI1.1 8/31/2018  9:21 AM Lacey You MD Physician                   Procedure Notes     No notes of this type exist for this encounter.      Progress Notes - Therapies (Notes from 08/31/18 through 09/03/18)     No notes of this type exist for this encounter.

## 2018-08-25 NOTE — IP AVS SNAPSHOT
"    [facility] CARDIAC SPECIALTY CARE: [phone]                                              INTERAGENCY TRANSFER FORM - LAB / IMAGING / EKG / EMG RESULTS   2018                    Hospital Admission Date: 2018  CHERYL HOLLOWAY   : 1924  Sex: Male        Attending Provider: Bill Sanders MD     Allergies:  No Known Allergies    Infection:  None   Service:  CARDIOLOGY    Ht:  1.727 m (5' 8\")   Wt:  72.6 kg (160 lb)   Admission Wt:  75.8 kg (167 lb 3.2 oz)    BMI:  24.33 kg/m 2   BSA:  1.87 m 2            Patient PCP Information     Provider PCP Type    [facility]         Lab Results - 3 Days      Glucose by meter [[id number]] (Abnormal)  Resulted: 18 1144, Result status: Final result    Ordering provider: Bill Sanders MD  18 1132 Resulting lab: POINT OF CARE TEST, GLUCOSE    Specimen Information    Type Source Collected On     18 1132          Components       Value Reference Range Flag Lab   Glucose 235 70 - 99 mg/dL H 170            Glucose by meter [[id number]] (Abnormal)  Resulted: 18 0811, Result status: Final result    Ordering provider: Bill Sanders MD  18 0759 Resulting lab: POINT OF CARE TEST, GLUCOSE    Specimen Information    Type Source Collected On     18 0759          Components       Value Reference Range Flag Lab   Glucose 110 70 - 99 mg/dL H 170            Basic metabolic panel [[id number]] (Abnormal)  Resulted: 18 0613, Result status: Final result    Ordering provider: Lazaro Suggs DO  18 0000 Resulting lab: [facility]    Specimen Information    Type Source Collected On   Blood  18 0545          Components       Value Reference Range Flag Lab   Sodium 141 133 - 144 mmol/L  FrStHsLb   Potassium 4.2 3.4 - 5.3 mmol/L  FrStHsLb   Chloride 110 94 - 109 mmol/L H FrStHsLb   Carbon Dioxide 24 20 - 32 mmol/L  FrStHsLb   Anion Gap 7 3 - 14 mmol/L  " FrStHsLb   Glucose 119 70 - 99 mg/dL H FrStHsLb   Urea Nitrogen 35 7 - 30 mg/dL H FrStHsLb   Creatinine 1.94 0.66 - 1.25 mg/dL H FrStHsLb   GFR Estimate 32 >60 mL/min/1.7m2 L FrStHsLb   Comment:  Non  GFR Calc   GFR Estimate If Black 39 >60 mL/min/1.7m2 L FrStHsLb   Comment:  African American GFR Calc   Calcium 7.3 8.5 - 10.1 mg/dL L FrStHsLb            CBC with platelets [[id number]] (Abnormal)  Resulted: 09/03/18 0601, Result status: Final result    Ordering provider: Lazaro Suggs DO  09/03/18 0000 Resulting lab: [facility]    Specimen Information    Type Source Collected On   Blood  09/03/18 0545          Components       Value Reference Range Flag Lab   WBC 9.6 4.0 - 11.0 10e9/L  FrStHsLb   RBC Count 2.81 4.4 - 5.9 10e12/L L FrStHsLb   Hemoglobin 8.2 13.3 - 17.7 g/dL L FrStHsLb   Hematocrit 25.5 40.0 - 53.0 % L FrStHsLb   MCV 91 78 - 100 fl  FrStHsLb   MCH 29.2 26.5 - 33.0 pg  FrStHsLb   MCHC 32.2 31.5 - 36.5 g/dL  FrStHsLb   RDW 14.8 10.0 - 15.0 %  FrStHsLb   Platelet Count 260 150 - 450 10e9/L  FrStHsLb            Glucose by meter [[id number]] (Abnormal)  Resulted: 09/03/18 0539, Result status: Final result    Ordering provider: Bill Sanders MD  09/03/18 0525 Resulting lab: POINT OF CARE TEST, GLUCOSE    Specimen Information    Type Source Collected On     09/03/18 0526          Components       Value Reference Range Flag Lab   Glucose 118 70 - 99 mg/dL H 170            Glucose by meter [[id number]] (Abnormal)  Resulted: 09/03/18 0209, Result status: Final result    Ordering provider: Bill Sanders MD  09/03/18 0148 Resulting lab: POINT OF CARE TEST, GLUCOSE    Specimen Information    Type Source Collected On     09/03/18 0148          Components       Value Reference Range Flag Lab   Glucose 132 70 - 99 mg/dL H 170            Glucose by meter [[id number]] (Abnormal)  Resulted: 09/02/18 2225, Result status: Final result    Ordering provider:  Bill Sanders MD  09/02/18 2204 Resulting lab: POINT OF CARE TEST, GLUCOSE    Specimen Information    Type Source Collected On     09/02/18 2204          Components       Value Reference Range Flag Lab   Glucose 157 70 - 99 mg/dL H 170            Glucose by meter [[id number]] (Abnormal)  Resulted: 09/02/18 2225, Result status: Final result    Ordering provider: Bill Sanders MD  09/02/18 2135 Resulting lab: POINT OF CARE TEST, GLUCOSE    Specimen Information    Type Source Collected On     09/02/18 2135          Components       Value Reference Range Flag Lab   Glucose 159 70 - 99 mg/dL H 170            Glucose by meter [[id number]] (Abnormal)  Resulted: 09/02/18 1802, Result status: Final result    Ordering provider: Bill Sanders MD  09/02/18 1748 Resulting lab: POINT OF CARE TEST, GLUCOSE    Specimen Information    Type Source Collected On     09/02/18 1748          Components       Value Reference Range Flag Lab   Glucose 266 70 - 99 mg/dL H 170            Glucose by meter [[id number]] (Abnormal)  Resulted: 09/02/18 1329, Result status: Final result    Ordering provider: Bill Sanders MD  09/02/18 1316 Resulting lab: POINT OF CARE TEST, GLUCOSE    Specimen Information    Type Source Collected On     09/02/18 1316          Components       Value Reference Range Flag Lab   Glucose 210 70 - 99 mg/dL H 170            Glucose by meter [[id number]] (Abnormal)  Resulted: 09/02/18 1218, Result status: Final result    Ordering provider: Bill Sanders MD  09/02/18 1204 Resulting lab: POINT OF CARE TEST, GLUCOSE    Specimen Information    Type Source Collected On     09/02/18 1204          Components       Value Reference Range Flag Lab   Glucose 223 70 - 99 mg/dL H 170            Glucose by meter [[id number]] (Abnormal)  Resulted: 09/02/18 0850, Result status: Final result    Ordering provider: Bill Sanders MD  09/02/18 0838 Resulting lab: POINT OF CARE  TEST, GLUCOSE    Specimen Information    Type Source Collected On     09/02/18 0838          Components       Value Reference Range Flag Lab   Glucose 147 70 - 99 mg/dL H 170            Glucose by meter [[id number]] (Abnormal)  Resulted: 09/02/18 0720, Result status: Final result    Ordering provider: Bill Sanders MD  09/02/18 0708 Resulting lab: POINT OF CARE TEST, GLUCOSE    Specimen Information    Type Source Collected On     09/02/18 0708          Components       Value Reference Range Flag Lab   Glucose 111 70 - 99 mg/dL H 170            Basic metabolic panel [[id number]] (Abnormal)  Resulted: 09/02/18 0642, Result status: Final result    Ordering provider: Lazaro Suggs,   09/02/18 0000 Resulting lab: [facility]    Specimen Information    Type Source Collected On   Blood  09/02/18 0530          Components       Value Reference Range Flag Lab   Sodium 140 133 - 144 mmol/L  FrStHsLb   Potassium 4.4 3.4 - 5.3 mmol/L  FrStHsLb   Comment:  Specimen slightly hemolyzed, potassium may be falsely elevated   Chloride 110 94 - 109 mmol/L H FrStHsLb   Carbon Dioxide 21 20 - 32 mmol/L  FrStHsLb   Anion Gap 9 3 - 14 mmol/L  FrStHsLb   Glucose 55 70 - 99 mg/dL L FrStHsLb   Urea Nitrogen 38 7 - 30 mg/dL H FrStHsLb   Creatinine 1.93 0.66 - 1.25 mg/dL H FrStHsLb   GFR Estimate 33 >60 mL/min/1.7m2 L FrStHsLb   Comment:  Non  GFR Calc   GFR Estimate If Black 39 >60 mL/min/1.7m2 L FrStHsLb   Comment:  African American GFR Calc   Calcium 7.4 8.5 - 10.1 mg/dL L FrStHsLb            CBC with platelets [[id number]] (Abnormal)  Resulted: 09/02/18 0617, Result status: Final result    Ordering provider: Lazaro Suggs,   09/02/18 0000 Resulting lab: [facility]    Specimen Information    Type Source Collected On   Blood  09/02/18 0530          Components       Value Reference Range Flag Lab   WBC 10.3 4.0 - 11.0 10e9/L  FrStHsLb   RBC Count 3.08 4.4 - 5.9  10e12/L L FrStHsLb   Hemoglobin 9.0 13.3 - 17.7 g/dL L FrStHsLb   Hematocrit 27.8 40.0 - 53.0 % L FrStHsLb   MCV 90 78 - 100 fl  FrStHsLb   MCH 29.2 26.5 - 33.0 pg  FrStHsLb   MCHC 32.4 31.5 - 36.5 g/dL  FrStHsLb   RDW 15.0 10.0 - 15.0 %  FrStHsLb   Platelet Count 247 150 - 450 10e9/L  FrStHsLb            Glucose by meter [[id number]]  Resulted: 09/02/18 0158, Result status: Final result    Ordering provider: Bill Sanders MD  09/02/18 0140 Resulting lab: POINT OF CARE TEST, GLUCOSE    Specimen Information    Type Source Collected On     09/02/18 0140          Components       Value Reference Range Flag Lab   Glucose 80 70 - 99 mg/dL  170            Glucose by meter [[id number]] (Abnormal)  Resulted: 09/01/18 2130, Result status: Final result    Ordering provider: Blil Sanders MD  09/01/18 2112 Resulting lab: POINT OF CARE TEST, GLUCOSE    Specimen Information    Type Source Collected On     09/01/18 2112          Components       Value Reference Range Flag Lab   Glucose 112 70 - 99 mg/dL H 170            Glucose by meter [[id number]] (Abnormal)  Resulted: 09/01/18 1717, Result status: Final result    Ordering provider: Bill Sanders MD  09/01/18 1702 Resulting lab: POINT OF CARE TEST, GLUCOSE    Specimen Information    Type Source Collected On     09/01/18 1702          Components       Value Reference Range Flag Lab   Glucose 140 70 - 99 mg/dL H 170            Glucose by meter [[id number]] (Abnormal)  Resulted: 09/01/18 1205, Result status: Final result    Ordering provider: Bill Sanders MD  09/01/18 1153 Resulting lab: POINT OF CARE TEST, GLUCOSE    Specimen Information    Type Source Collected On     09/01/18 1153          Components       Value Reference Range Flag Lab   Glucose 139 70 - 99 mg/dL H 170            Glucose by meter [[id number]]  Resulted: 09/01/18 0805, Result status: Final result    Ordering provider: Bill Sanders MD  09/01/18 4373  Resulting lab: POINT OF CARE TEST, GLUCOSE    Specimen Information    Type Source Collected On     09/01/18 0752          Components       Value Reference Range Flag Lab   Glucose 93 70 - 99 mg/dL  170            Basic metabolic panel [[id number]] (Abnormal)  Resulted: 09/01/18 0627, Result status: Final result    Ordering provider: Lazaro Suggs, DO  09/01/18 0000 Resulting lab: [facility]    Specimen Information    Type Source Collected On   Blood  09/01/18 0535          Components       Value Reference Range Flag Lab   Sodium 140 133 - 144 mmol/L  FrStHsLb   Potassium 4.0 3.4 - 5.3 mmol/L  FrStHsLb   Chloride 111 94 - 109 mmol/L H FrStHsLb   Carbon Dioxide 22 20 - 32 mmol/L  FrStHsLb   Anion Gap 7 3 - 14 mmol/L  FrStHsLb   Glucose 100 70 - 99 mg/dL H FrStHsLb   Urea Nitrogen 42 7 - 30 mg/dL H FrStHsLb   Creatinine 2.13 0.66 - 1.25 mg/dL H FrStHsLb   GFR Estimate 29 >60 mL/min/1.7m2 L FrStHsLb   Comment:  Non  GFR Calc   GFR Estimate If Black 35 >60 mL/min/1.7m2 L FrStHsLb   Comment:  African American GFR Calc   Calcium 7.3 8.5 - 10.1 mg/dL L FrStHsLb            CBC with platelets [[id number]] (Abnormal)  Resulted: 09/01/18 0556, Result status: Final result    Ordering provider: Lazaro Suggs, DO  09/01/18 0000 Resulting lab: [facility]    Specimen Information    Type Source Collected On   Blood  09/01/18 0535          Components       Value Reference Range Flag Lab   WBC 9.0 4.0 - 11.0 10e9/L  FrStHsLb   RBC Count 2.78 4.4 - 5.9 10e12/L L FrStHsLb   Hemoglobin 8.2 13.3 - 17.7 g/dL L FrStHsLb   Hematocrit 24.9 40.0 - 53.0 % L FrStHsLb   MCV 90 78 - 100 fl  FrStHsLb   MCH 29.5 26.5 - 33.0 pg  FrStHsLb   MCHC 32.9 31.5 - 36.5 g/dL  FrStHsLb   RDW 14.5 10.0 - 15.0 %  FrStHsLb   Platelet Count 201 150 - 450 10e9/L  FrStHsLb            Glucose by meter [[id number]] (Abnormal)  Resulted: 09/01/18 0224, Result status: Final result    Ordering provider:  Bill Sanders MD  09/01/18 0203 Resulting lab: POINT OF CARE TEST, GLUCOSE    Specimen Information    Type Source Collected On     09/01/18 0203          Components       Value Reference Range Flag Lab   Glucose 141 70 - 99 mg/dL H 170            Sodium random urine [[id number]]  Resulted: 09/01/18 0125, Result status: Final result    Ordering provider: Lazaro Suggs,   08/31/18 1758 Resulting lab: [facility]    Specimen Information    Type Source Collected On   Urine Urine clean catch 09/01/18 0030          Components       Value Reference Range Flag Lab   Sodium Urine mmol/L 55 mmol/L  FrStHsLb            Creatinine random urine [[id number]]  Resulted: 09/01/18 0125, Result status: Final result    Ordering provider: Lazaro Suggs,   08/31/18 1758 Resulting lab: [facility]    Specimen Information    Type Source Collected On   Urine Urine clean catch 09/01/18 0030          Components       Value Reference Range Flag Lab   Creatinine Urine Random 89 mg/dL  FrStHsLb            Glucose by meter [[id number]] (Abnormal)  Resulted: 08/31/18 2133, Result status: Final result    Ordering provider: Bill Sanders MD  08/31/18 2114 Resulting lab: POINT OF CARE TEST, GLUCOSE    Specimen Information    Type Source Collected On     08/31/18 2114          Components       Value Reference Range Flag Lab   Glucose 155 70 - 99 mg/dL H 170            Glucose by meter [[id number]] (Abnormal)  Resulted: 08/31/18 1750, Result status: Final result    Ordering provider: Bill Sanders MD  08/31/18 1738 Resulting lab: POINT OF CARE TEST, GLUCOSE    Specimen Information    Type Source Collected On     08/31/18 1738          Components       Value Reference Range Flag Lab   Glucose 139 70 - 99 mg/dL H 170            Basic metabolic panel [[id number]] (Abnormal)  Resulted: 08/31/18 1620, Result status: Final result    Ordering provider: Lazaro Suggs  DO Prashanth  08/31/18 1000 Resulting lab: [facility]    Specimen Information    Type Source Collected On   Blood  08/31/18 1546          Components       Value Reference Range Flag Lab   Sodium 140 133 - 144 mmol/L  FrStHsLb   Potassium 4.0 3.4 - 5.3 mmol/L  FrStHsLb   Chloride 108 94 - 109 mmol/L  FrStHsLb   Carbon Dioxide 24 20 - 32 mmol/L  FrStHsLb   Anion Gap 8 3 - 14 mmol/L  FrStHsLb   Glucose 167 70 - 99 mg/dL H FrStHsLb   Urea Nitrogen 44 7 - 30 mg/dL H FrStHsLb   Creatinine 2.24 0.66 - 1.25 mg/dL H FrStHsLb   GFR Estimate 27 >60 mL/min/1.7m2 L FrStHsLb   Comment:  Non  GFR Calc   GFR Estimate If Black 33 >60 mL/min/1.7m2 L FrStHsLb   Comment:  African American GFR Calc   Calcium 7.3 8.5 - 10.1 mg/dL L FrStHsLb            Glucose by meter [[id number]] (Abnormal)  Resulted: 08/31/18 1213, Result status: Final result    Ordering provider: Bill Sanders MD  08/31/18 1201 Resulting lab: POINT OF CARE TEST, GLUCOSE    Specimen Information    Type Source Collected On     08/31/18 1201          Components       Value Reference Range Flag Lab   Glucose 147 70 - 99 mg/dL H 170            Basic metabolic panel [[id number]] (Abnormal)  Resulted: 08/31/18 1031, Result status: Final result    Ordering provider: Lazaro Suggs DO  08/31/18 1000 Resulting lab: [facility]    Specimen Information    Type Source Collected On     08/31/18 1000          Components       Value Reference Range Flag Lab   Sodium 138 133 - 144 mmol/L  FrStHsLb   Potassium 3.9 3.4 - 5.3 mmol/L  FrStHsLb   Chloride 107 94 - 109 mmol/L  FrStHsLb   Carbon Dioxide 22 20 - 32 mmol/L  FrStHsLb   Anion Gap 9 3 - 14 mmol/L  FrStHsLb   Glucose 188 70 - 99 mg/dL H FrStHsLb   Urea Nitrogen 40 7 - 30 mg/dL H FrStHsLb   Creatinine 2.01 0.66 - 1.25 mg/dL H FrStHsLb   GFR Estimate 31 >60 mL/min/1.7m2 L FrStHsLb   Comment:  Non  GFR Calc   GFR Estimate If Black 38 >60 mL/min/1.7m2  L FrStHsLb   Comment:  African American GFR Calc   Calcium 7.6 8.5 - 10.1 mg/dL L FrStHsLb            Glucose by meter [[id number]] (Abnormal)  Resulted: 08/31/18 0814, Result status: Final result    Ordering provider: Bill Sanders MD  08/31/18 0802 Resulting lab: POINT OF CARE TEST, GLUCOSE    Specimen Information    Type Source Collected On     08/31/18 0802          Components       Value Reference Range Flag Lab   Glucose 123 70 - 99 mg/dL H 170            Basic metabolic panel [[id number]] (Abnormal)  Resulted: 08/31/18 0607, Result status: Final result    Ordering provider: Betty Arzola MD  08/31/18 0000 Resulting lab: [facility]    Specimen Information    Type Source Collected On   Blood  08/31/18 0535          Components       Value Reference Range Flag Lab   Sodium 142 133 - 144 mmol/L  FrStHsLb   Potassium 3.8 3.4 - 5.3 mmol/L  FrStHsLb   Chloride 110 94 - 109 mmol/L H FrStHsLb   Carbon Dioxide 21 20 - 32 mmol/L  FrStHsLb   Anion Gap 11 3 - 14 mmol/L  FrStHsLb   Glucose 122 70 - 99 mg/dL H FrStHsLb   Urea Nitrogen 38 7 - 30 mg/dL H FrStHsLb   Creatinine 2.15 0.66 - 1.25 mg/dL H FrStHsLb   GFR Estimate 29 >60 mL/min/1.7m2 L FrStHsLb   Comment:  Non  GFR Calc   GFR Estimate If Black 35 >60 mL/min/1.7m2 L FrStHsLb   Comment:  African American GFR Calc   Calcium 7.9 8.5 - 10.1 mg/dL L FrStHsLb            Hemoglobin [[id number]] (Abnormal)  Resulted: 08/31/18 0551, Result status: Final result    Ordering provider: Betty Arzola MD  08/31/18 0000 Resulting lab: [facility]    Specimen Information    Type Source Collected On   Blood  08/31/18 0535          Components       Value Reference Range Flag Lab   Hemoglobin 9.4 13.3 - 17.7 g/dL L FrStHsLb            Glucose by meter [[id number]] (Abnormal)  Resulted: 08/31/18 0142, Result status: Final result    Ordering provider: Bill Sanders MD  08/30/18 9603 Resulting lab: POINT OF CARE  TEST, GLUCOSE    Specimen Information    Type Source Collected On     18 1258          Components       Value Reference Range Flag Lab   Glucose 249 70 - 99 mg/dL H 170            Testing Performed By     Lab - Abbreviation Name Director Address Valid Date Range    14 -  [facility] Unknown [address] [place] [address] 05/08/15 1057 - Present    170 - Unknown POINT OF CARE TEST, GLUCOSE Unknown Unknown 10/31/11 1114 - Present            Unresulted Labs (24h ago through future)    Start       Ordered    18 0600  CBC with platelets  AM DRAW,   Routine     Comments:  Last Lab Result: Hemoglobin (g/dL)       Date                     Value                 2018               9.4 (L)          ----------    18 0923    18 0600  Basic metabolic panel  AM DRAW,   Routine      18 0923    18 0600  Platelet count  (enoxaparin (LOVENOX) (Weight  kg with CrCl greater than 30 mL/min is prechecked))  EVERY THREE DAYS,   Routine     Comments:  Repeat every 3 days while on VTE prophylaxis. If no result is listed, this lab has not been done the past 365 days. LATEST LAB RESULT: Platelet Count (10e9/L)       Date                     Value                 2018               150              ----------      18 1726         ECG/EMG Results      Echocardiogram Complete [[id number]]  Resulted: 18, Result status: Edited Result - FINAL    Ordering provider: Mamie Eubanks MD  18 Resulted by: Oriana Madden MD    Performed: 18 - 18 Resulting lab: RADIOLOGY RESULTS    Narrative:       [id number]  [id number]  [id number]  [id number]^YANNA^KARON^ISIS           [facility]  Echocardiography Laboratory  [address] [place] [address]        Name: CHERYL HOLLOWAY  MRN: [id number]  : 1924  Study Date: 2018 08:55 AM  Age: 89 yrs  Gender: Male  Patient Location: [facility]  Reason  For Study: Afib  Ordering Physician: KARON RAINES  Referring Physician: RAISSA HILL  Performed By: Lise Hill JULES     BSA: 1.9 m2  Height: 68 in  Weight: 162 lb  HR: 84  BP: 178/94 mmHg  _____________________________________________________________________________  __        Procedure  Complete Echo Adult.  _____________________________________________________________________________  __        Interpretation Summary     Sinus rhythm was noted.  Left ventricular systolic function is normal. The visual ejection fraction is  estimated at 55-60%. There is mild concentric left ventricular hypertrophy.  There is trace to mild mitral regurgitation.  Thickened mitral valve anterior leaflet is noted and there is a  prominenet/sclerotic chordal attachment (giving the appearance of an  associated mass). The thickening is most likely degenerative.  There is moderate (2+) tricuspid regurgitation.  Right ventricular systolic pressure is elevated, consistent with moderate to  severe pulmonary hypertension.  There is no comparison study available. The study was technically limited.  _____________________________________________________________________________  __        Left Ventricle  The left ventricle is normal in size. There is mild concentric left  ventricular hypertrophy. Left ventricular systolic function is normal. The  visual ejection fraction is estimated at 55-60%. Grade I or early diastolic  dysfunction. Diastolic Doppler findings (E/E' ratio and/or other parameters)  suggest left ventricular filling pressures are indeterminate. No regional wall  motion abnormalities noted.     Right Ventricle  The right ventricle is normal size. The right ventricular systolic function is  normal. The right ventricle is not well visualized.     Atria  Normal left atrial size. Right atrial size is normal. There is no atrial shunt  seen.     Mitral Valve  Thickened mitral valve anterior leaflet. There is trace to mild  mitral  regurgitation.        Tricuspid Valve  Normal IVC (1.5-2.5cm) with >50% respiratory collapse; right atrial pressure  is estimated at 5-10mmHg. There is moderate (2+) tricuspid regurgitation. The  right ventricular systolic pressure is approximated at 58mmHg plus the right  atrial pressure. Right ventricular systolic pressure is elevated, consistent  with moderate to severe pulmonary hypertension.     Aortic Valve  Thickened aortic valve leaflets. No aortic regurgitation is present. No  hemodynamically significant valvular aortic stenosis.     Pulmonic Valve  There is trace to mild pulmonic valvular regurgitation. There is no pulmonic  valvular stenosis.     Vessels  Normal size aorta. The IVC is normal in size and reactivity with respiration,  suggesting normal central venous pressure.     Pericardium  The pericardium appears normal.        Rhythm  Sinus rhythm was noted.  _____________________________________________________________________________  __  MMode/2D Measurements & Calculations  IVSd: 1.1 cm     LVIDd: 3.6 cm  LVIDs: 2.6 cm  LVPWd: 1.2 cm  FS: 26.9 %  LV mass(C)d: 137.0 grams  LV mass(C)dI: 73.3 grams/m2  Ao root diam: 3.6 cm  LA dimension: 2.7 cm  asc Aorta Diam: 3.3 cm  LA/Ao: 0.75  LVOT diam: 2.2 cm  LVOT area: 3.8 cm2  LA Volume (BP): 31.0 ml  LA Volume Index (BP): 16.6 ml/m2  RWT: 0.68           Doppler Measurements & Calculations  MV E max minnie: 61.6 cm/sec  MV A max minnie: 83.9 cm/sec  MV E/A: 0.73  MV dec time: 0.37 sec  PA acc time: 0.10 sec  TR max minnie: 380.1 cm/sec  TR max P.8 mmHg  E/E' av.5  Lateral E/e': 8.6  Medial E/e': 14.4           _____________________________________________________________________________  __           Report approved by: Yanet Ceron 2018 11:01 AM       1    Type Source Collected On     18 0855          View Image (below)              Encounter-Level Documents:     There are no encounter-level documents.      Order-Level Documents:      There are no order-level documents.

## 2018-08-25 NOTE — IP AVS SNAPSHOT
"[facility] CARDIAC SPECIALTY CARE: [phone]                                              INTERAGENCY TRANSFER FORM - PHYSICIAN ORDERS   2018                    Hospital Admission Date: 2018  CHERYL HOLLOWAY   : 1924  Sex: Male        Attending Provider: Bill Sanders MD     Allergies:  No Known Allergies    Infection:  None   Service:  CARDIOLOGY    Ht:  1.727 m (5' 8\")   Wt:  72.6 kg (160 lb)   Admission Wt:  75.8 kg (167 lb 3.2 oz)    BMI:  24.33 kg/m 2   BSA:  1.87 m 2            Patient PCP Information     Provider PCP Type    [facility]      ED Clinical Impression     Diagnosis Description Comment Added By Time Added    Closed fracture of neck of right femur, initial encounter (H) [S72.001A] Closed fracture of neck of right femur, initial encounter (H) [S72.001A]  Netta Trivedi MD 2018  7:55 PM      Hospital Problems as of 9/3/2018              Priority Class Noted POA    Closed right hip fracture (H) Medium  2018 Yes    S/P total hip arthroplasty Medium  2018 Yes      Non-Hospital Problems as of 9/3/2018              Priority Class Noted    Injury of globe of eye Medium  2014    Recent retinal detachment, total or subtotal Medium  2014      Code Status History     Date Active Date Inactive Code Status Order ID Comments User Context    2014 12:05 PM 2018  9:30 PM Full Code [id number]  Ryne Justin PA-C Outpatient         Medication Review      START taking        Dose / Directions Comments    acetaminophen 325 MG tablet   Commonly known as:  TYLENOL   Used for:  Closed fracture of neck of right femur, initial encounter (H)        Dose:  650 mg   Take 2 tablets (650 mg) by mouth every 8 hours   Quantity:  100 tablet   Refills:  0        amoxicillin-clavulanate 875-125 MG per tablet   Commonly known as:  AUGMENTIN   Indication:  Pneumonia caused by Inhaling a Substance Into the Lungs   Used for:  Pneumonia of right " lower lobe due to infectious organism (H)        Dose:  1 tablet   Start taking on:  9/4/2018   Take 1 tablet by mouth every 24 hours for 1 day   Refills:  0        * aspirin 325 MG EC tablet   Used for:  Closed fracture of neck of right femur, initial encounter (H)   Replaces:  aspirin 81 MG tablet        Take one Aspirin tab twice daily for 5 weeks.   Quantity:  70 tablet   Refills:  0        * aspirin 325 MG EC tablet   Used for:  Closed fracture of neck of right femur, initial encounter (H)        Dose:  325 mg   Take 1 tablet (325 mg) by mouth daily   Quantity:  30 tablet   Refills:  0        atorvastatin 10 MG tablet   Commonly known as:  LIPITOR   Used for:  Coronary artery disease involving native heart without angina pectoris, unspecified vessel or lesion type        Dose:  10 mg   Take 1 tablet (10 mg) by mouth every evening   Refills:  0        diltiazem 180 MG 24 hr capsule   Commonly known as:  DILACOR XR   Used for:  Paroxysmal supraventricular tachycardia (H)        Dose:  180 mg   Start taking on:  9/4/2018   Take 1 capsule (180 mg) by mouth daily   Refills:  0        * insulin aspart 100 UNIT/ML injection   Commonly known as:  NovoLOG PEN   Used for:  Type 2 diabetes mellitus with complication, with long-term current use of insulin (H)        Dose:  1-7 Units   Inject 1-7 Units Subcutaneous 3 times daily (before meals)   Refills:  0        * insulin aspart 100 UNIT/ML injection   Commonly known as:  NovoLOG PEN   Used for:  Type 2 diabetes mellitus with complication, with long-term current use of insulin (H)        Dose:  1-5 Units   Inject 1-5 Units Subcutaneous At Bedtime   Refills:  0        metoprolol succinate 50 MG 24 hr tablet   Commonly known as:  TOPROL-XL   Used for:  Paroxysmal supraventricular tachycardia (H)        Dose:  50 mg   Start taking on:  9/4/2018   Take 1 tablet (50 mg) by mouth daily   Quantity:  30 tablet   Refills:  0        oxyCODONE IR 5 MG tablet   Commonly known as:   ROXICODONE   Used for:  Closed fracture of neck of right femur, initial encounter (H)        Dose:  5 mg   Take 1 tablet (5 mg) by mouth every 4 hours as needed for pain   Quantity:  60 tablet   Refills:  0        polyethylene glycol Packet   Commonly known as:  MIRALAX/GLYCOLAX   Used for:  Drug-induced constipation        Dose:  17 g   Take 17 g by mouth daily as needed for constipation   Quantity:  7 packet   Refills:  0        senna-docusate 8.6-50 MG per tablet   Commonly known as:  SENOKOT-S;PERICOLACE   Used for:  Closed fracture of neck of right femur, initial encounter (H)        Dose:  1 tablet   Take 1 tablet by mouth 2 times daily   Quantity:  60 tablet   Refills:  0        * Notice:  This list has 4 medication(s) that are the same as other medications prescribed for you. Read the directions carefully, and ask your doctor or other care provider to review them with you.      CONTINUE these medications which may have CHANGED, or have new prescriptions. If we are uncertain of the size of tablets/capsules you have at home, strength may be listed as something that might have changed.        Dose / Directions Comments    insulin glargine 100 UNIT/ML injection   Commonly known as:  LANTUS   This may have changed:  how much to take   Used for:  Type 2 diabetes mellitus with complication, with long-term current use of insulin (H)        Dose:  5 Units   Inject 5 Units Subcutaneous At Bedtime   Refills:  0        insulin lispro 100 UNIT/ML injection   Commonly known as:  HumaLOG KWIKpen   This may have changed:  how much to take   Used for:  Type 2 diabetes mellitus with complication, with long-term current use of insulin (H)        Dose:  2 Units   Inject 2 Units Subcutaneous 3 times daily (before meals)   Refills:  0          CONTINUE these medications which have NOT CHANGED        Dose / Directions Comments    loratadine 10 MG tablet   Commonly known as:  CLARITIN        Dose:  10 mg   Take 10 mg by mouth daily  as needed   Refills:  0          STOP taking     aspirin 81 MG tablet   Replaced by:  aspirin 325 MG EC tablet           atenolol 50 MG tablet   Commonly known as:  TENORMIN           glipiZIDE 5 MG 24 hr tablet   Commonly known as:  GLUCOTROL XL           simvastatin 20 MG tablet   Commonly known as:  ZOCOR                   Summary of Visit     Reason for your hospital stay       Following right hip bipolar hemiarthroplasty after displaced femoral neck fracture. You were also seen by Cardiology for episodes of atrial fibrillation/SVT             After Care     Activity - Up with assistive device           Activity - Up with nursing assistance           Additional Discharge Instructions       Please follow these sliding scale instructions for insulin dosing:    Correction Scale - MEDIUM INSULIN RESISTANCE DOSING     Pre-meal dosing   Do Not give Correction Insulin if Pre-Meal BG less than 140.   For Pre-Meal  - 189 give 1 unit.   For Pre-Meal  - 239 give 2 units.   For Pre-Meal  - 289 give 3 units.   For Pre-Meal  - 339 give 4 units.   For Pre-Meal - 399 give 5 units.   For Pre-Meal -449 give 6 units  For Pre-Meal BG greater than or equal to 450 give 7 units.   To be given with prandial insulin, and based on pre-meal blood glucose.    Notify provider if glucose greater than or equal to 350 mg/dL after administration of correction dose.    MEDIUM INSULIN RESISTANCE DOSING    Bedtime dosing   Do Not give Bedtime Correction Insulin if BG less than  200.   For  - 249 give 1 units.   For  - 299 give 2 units.   For  - 349 give 3 units.   For  -399 give 4 units.   For BG greater than or equal to 400 give 5 units.  Notify provider if glucose greater than or equal to 350 mg/dL after administration of correction dose.       Advance Diet as Tolerated       Follow this diet upon discharge: regular       Advance Diet as Tolerated       Follow this diet upon discharge:  Orders Placed This Encounter      Room Service      Advance Diet as Tolerated: Regular Diet Adult      Advance Diet as Tolerated       Encourage PO fluids           Fall precautions           General info for SNF       Length of Stay Estimate: Short Term Care: Estimated # of Days <30  Condition at Discharge: Improving  Level of care:skilled   Rehabilitation Potential: Excellent  Admission H&P remains valid and up-to-date: Yes  Recent Chemotherapy: N/A  Use Nursing Home Standing Orders: Yes       General info for SNF       Length of Stay Estimate: Short Term Care: Estimated # of Days <30  Condition at Discharge: Improving  Level of care:skilled   Rehabilitation Potential: Excellent  Admission H&P remains valid and up-to-date: Yes  Recent Chemotherapy: N/A  Use Nursing Home Standing Orders: Yes       Glucose monitor nursing POCT       Before meals and at bedtime       Mantoux instructions       Give two-step Mantoux (PPD) Per Facility Policy Yes       Mantoux instructions       Give two-step Mantoux (PPD) Per Facility Policy Yes       Weight bearing status       WBAT       Wound care       Site:   R hip  Instructions:  Leave aquacel dressing in place until post-op follow-up.  If it becomes loose or soiled then remove and replace with daily dry dressings.       Wound care       Daily dry dressing changes.  Staples out 12 days post-op and apply steri-strips.             Referrals     Occupational Therapy Adult Consult       Evaluate and treat as clinically indicated.    Reason:  S/p R hip bipolar hemiarthroplasty.  No hip precautions.  WBAT.       Occupational Therapy Adult Consult       Evaluate and treat as clinically indicated.    Reason:  Right bipolar hemiarthroplasty for femoral neck fracture.       Physical Therapy Adult Consult       Evaluate and treat as clinically indicated.    Reason:  S/p R hip bipolar hemiarthroplasty.  No hip precautions.  WBAT.       Physical Therapy Adult Consult       bipolar  hemiarthroplasty for femoral neck fracture.  rIGHT       Follow-Up with Cardiac Advanced Practice Provider                 Follow-Up Appointment Instructions     Future Labs/Procedures    Follow Up and recommended labs and tests     Comments:    Follow up with Sharri Arellano PA-C or Dr. Pollack within 12-16 days from surgery.  If you do not already have a follow up appointment scheduled, please call our [place] office: [phone].  No follow up labs or test are needed.    Follow Up and recommended labs and tests     Comments:    Follow-up with dR. Rocky Pollack in ~14 days post-op. [phone]    Follow Up and recommended labs and tests     Comments:    Follow up with nursing home provider. Repeat BMP and check hemoglobin 9/5      Follow-Up Appointment Instructions     Follow Up and recommended labs and tests       Follow up with Sharri Arellano PA-C or Dr. Pollack within 12-16 days from surgery.  If you do not already have a follow up appointment scheduled, please call our [place] office: [phone].  No follow up labs or test are needed.       Follow Up and recommended labs and tests       Follow-up with dR. Rocky Pollack in ~14 days post-op. [phone]       Follow Up and recommended labs and tests       Follow up with nursing home provider. Repeat BMP and check hemoglobin 9/5             Statement of Approval     Ordered          09/03/18 1250  I have reviewed and agree with all the recommendations and orders detailed in this document.  EFFECTIVE NOW     Approved and electronically signed by:  Meil Arndt PA-C           08/29/18 0901  I have reviewed and agree with all the recommendations and orders detailed in this document.  EFFECTIVE NOW     Approved and electronically signed by:  Edgardo Godfrey PA-C

## 2018-08-26 ENCOUNTER — APPOINTMENT (OUTPATIENT)
Dept: GENERAL RADIOLOGY | Facility: CLINIC | Age: 83
DRG: 469 | End: 2018-08-26
Attending: ORTHOPAEDIC SURGERY
Payer: MEDICARE

## 2018-08-26 ENCOUNTER — ANESTHESIA EVENT (OUTPATIENT)
Dept: SURGERY | Facility: CLINIC | Age: 83
DRG: 469 | End: 2018-08-26
Payer: MEDICARE

## 2018-08-26 ENCOUNTER — ANESTHESIA (OUTPATIENT)
Dept: SURGERY | Facility: CLINIC | Age: 83
DRG: 469 | End: 2018-08-26
Payer: MEDICARE

## 2018-08-26 PROBLEM — Z96.649 S/P TOTAL HIP ARTHROPLASTY: Status: ACTIVE | Noted: 2018-08-26

## 2018-08-26 LAB
ANION GAP SERPL CALCULATED.3IONS-SCNC: 7 MMOL/L (ref 3–14)
BUN SERPL-MCNC: 28 MG/DL (ref 7–30)
CALCIUM SERPL-MCNC: 8.1 MG/DL (ref 8.5–10.1)
CHLORIDE SERPL-SCNC: 111 MMOL/L (ref 94–109)
CO2 SERPL-SCNC: 23 MMOL/L (ref 20–32)
CREAT SERPL-MCNC: 1.43 MG/DL (ref 0.66–1.25)
ERYTHROCYTE [DISTWIDTH] IN BLOOD BY AUTOMATED COUNT: 14.9 % (ref 10–15)
GFR SERPL CREATININE-BSD FRML MDRD: 46 ML/MIN/1.7M2
GLUCOSE BLDC GLUCOMTR-MCNC: 157 MG/DL (ref 70–99)
GLUCOSE BLDC GLUCOMTR-MCNC: 157 MG/DL (ref 70–99)
GLUCOSE BLDC GLUCOMTR-MCNC: 167 MG/DL (ref 70–99)
GLUCOSE BLDC GLUCOMTR-MCNC: 98 MG/DL (ref 70–99)
GLUCOSE SERPL-MCNC: 102 MG/DL (ref 70–99)
HCT VFR BLD AUTO: 33.2 % (ref 40–53)
HGB BLD-MCNC: 11 G/DL (ref 13.3–17.7)
MCH RBC QN AUTO: 30.1 PG (ref 26.5–33)
MCHC RBC AUTO-ENTMCNC: 33.1 G/DL (ref 31.5–36.5)
MCV RBC AUTO: 91 FL (ref 78–100)
PLATELET # BLD AUTO: 150 10E9/L (ref 150–450)
POTASSIUM SERPL-SCNC: 4.4 MMOL/L (ref 3.4–5.3)
RBC # BLD AUTO: 3.65 10E12/L (ref 4.4–5.9)
SODIUM SERPL-SCNC: 141 MMOL/L (ref 133–144)
WBC # BLD AUTO: 12.8 10E9/L (ref 4–11)

## 2018-08-26 PROCEDURE — 25000131 ZZH RX MED GY IP 250 OP 636 PS 637: Mod: GY | Performed by: INTERNAL MEDICINE

## 2018-08-26 PROCEDURE — 80048 BASIC METABOLIC PNL TOTAL CA: CPT | Performed by: INTERNAL MEDICINE

## 2018-08-26 PROCEDURE — 36000093 ZZH SURGERY LEVEL 4 1ST 30 MIN: Performed by: ORTHOPAEDIC SURGERY

## 2018-08-26 PROCEDURE — 27210794 ZZH OR GENERAL SUPPLY STERILE: Performed by: ORTHOPAEDIC SURGERY

## 2018-08-26 PROCEDURE — 25000128 H RX IP 250 OP 636: Performed by: ORTHOPAEDIC SURGERY

## 2018-08-26 PROCEDURE — 40000169 ZZH STATISTIC PRE-PROCEDURE ASSESSMENT I: Performed by: ORTHOPAEDIC SURGERY

## 2018-08-26 PROCEDURE — 85027 COMPLETE CBC AUTOMATED: CPT | Performed by: INTERNAL MEDICINE

## 2018-08-26 PROCEDURE — 12000007 ZZH R&B INTERMEDIATE

## 2018-08-26 PROCEDURE — 27810169 ZZH OR IMPLANT GENERAL: Performed by: ORTHOPAEDIC SURGERY

## 2018-08-26 PROCEDURE — 37000009 ZZH ANESTHESIA TECHNICAL FEE, EACH ADDTL 15 MIN: Performed by: ORTHOPAEDIC SURGERY

## 2018-08-26 PROCEDURE — 25000125 ZZHC RX 250: Performed by: ORTHOPAEDIC SURGERY

## 2018-08-26 PROCEDURE — 0SRR019 REPLACEMENT OF RIGHT HIP JOINT, FEMORAL SURFACE WITH METAL SYNTHETIC SUBSTITUTE, CEMENTED, OPEN APPROACH: ICD-10-PCS | Performed by: ORTHOPAEDIC SURGERY

## 2018-08-26 PROCEDURE — 82947 ASSAY GLUCOSE BLOOD QUANT: CPT | Performed by: ANESTHESIOLOGY

## 2018-08-26 PROCEDURE — 71000013 ZZH RECOVERY PHASE 1 LEVEL 1 EA ADDTL HR: Performed by: ORTHOPAEDIC SURGERY

## 2018-08-26 PROCEDURE — 25000566 ZZH SEVOFLURANE, EA 15 MIN: Performed by: ORTHOPAEDIC SURGERY

## 2018-08-26 PROCEDURE — 25000128 H RX IP 250 OP 636: Performed by: INTERNAL MEDICINE

## 2018-08-26 PROCEDURE — A9270 NON-COVERED ITEM OR SERVICE: HCPCS | Mod: GY | Performed by: INTERNAL MEDICINE

## 2018-08-26 PROCEDURE — 25000128 H RX IP 250 OP 636: Performed by: ANESTHESIOLOGY

## 2018-08-26 PROCEDURE — 25000132 ZZH RX MED GY IP 250 OP 250 PS 637: Mod: GY | Performed by: ORTHOPAEDIC SURGERY

## 2018-08-26 PROCEDURE — 25000128 H RX IP 250 OP 636: Performed by: NURSE ANESTHETIST, CERTIFIED REGISTERED

## 2018-08-26 PROCEDURE — 37000008 ZZH ANESTHESIA TECHNICAL FEE, 1ST 30 MIN: Performed by: ORTHOPAEDIC SURGERY

## 2018-08-26 PROCEDURE — 25800025 ZZH RX 258: Performed by: ORTHOPAEDIC SURGERY

## 2018-08-26 PROCEDURE — 27210995 ZZH RX 272: Performed by: ORTHOPAEDIC SURGERY

## 2018-08-26 PROCEDURE — 99207 ZZC NON-BILLABLE SERV PER CHARTING: CPT | Performed by: INTERNAL MEDICINE

## 2018-08-26 PROCEDURE — 40000986 XR PELVIS AD HIP PORTABLE RIGHT 1 VIEW

## 2018-08-26 PROCEDURE — 36000063 ZZH SURGERY LEVEL 4 EA 15 ADDTL MIN: Performed by: ORTHOPAEDIC SURGERY

## 2018-08-26 PROCEDURE — 36415 COLL VENOUS BLD VENIPUNCTURE: CPT | Performed by: INTERNAL MEDICINE

## 2018-08-26 PROCEDURE — 00000146 ZZHCL STATISTIC GLUCOSE BY METER IP

## 2018-08-26 PROCEDURE — 99222 1ST HOSP IP/OBS MODERATE 55: CPT | Performed by: INTERNAL MEDICINE

## 2018-08-26 PROCEDURE — 25000125 ZZHC RX 250: Performed by: NURSE ANESTHETIST, CERTIFIED REGISTERED

## 2018-08-26 PROCEDURE — 71000012 ZZH RECOVERY PHASE 1 LEVEL 1 FIRST HR: Performed by: ORTHOPAEDIC SURGERY

## 2018-08-26 PROCEDURE — 25000132 ZZH RX MED GY IP 250 OP 250 PS 637: Mod: GY | Performed by: INTERNAL MEDICINE

## 2018-08-26 PROCEDURE — C1776 JOINT DEVICE (IMPLANTABLE): HCPCS | Performed by: ORTHOPAEDIC SURGERY

## 2018-08-26 DEVICE — IMPLANTABLE DEVICE: Type: IMPLANTABLE DEVICE | Site: HIP | Status: FUNCTIONAL

## 2018-08-26 DEVICE — BONE CEMENT SIMPLEX FULL DOSE 6191-1-001: Type: IMPLANTABLE DEVICE | Site: HIP | Status: FUNCTIONAL

## 2018-08-26 DEVICE — BONE CEMENT RESTRICTOR BUCK FEMORAL 18.5MM 129418: Type: IMPLANTABLE DEVICE | Site: HIP | Status: FUNCTIONAL

## 2018-08-26 DEVICE — BONE CEMENT SIMPLEX W/TOBRAMYCIN 6197-9-001: Type: IMPLANTABLE DEVICE | Site: HIP | Status: FUNCTIONAL

## 2018-08-26 DEVICE — IMP STEM FEMORAL BIOM ECHO FX 11MM STD 12-151311: Type: IMPLANTABLE DEVICE | Site: HIP | Status: FUNCTIONAL

## 2018-08-26 RX ORDER — ONDANSETRON 2 MG/ML
INJECTION INTRAMUSCULAR; INTRAVENOUS PRN
Status: DISCONTINUED | OUTPATIENT
Start: 2018-08-26 | End: 2018-08-26

## 2018-08-26 RX ORDER — CEFAZOLIN SODIUM 1 G/3ML
1 INJECTION, POWDER, FOR SOLUTION INTRAMUSCULAR; INTRAVENOUS SEE ADMIN INSTRUCTIONS
Status: DISCONTINUED | OUTPATIENT
Start: 2018-08-26 | End: 2018-08-26 | Stop reason: HOSPADM

## 2018-08-26 RX ORDER — HYDROMORPHONE HYDROCHLORIDE 1 MG/ML
0.2 INJECTION, SOLUTION INTRAMUSCULAR; INTRAVENOUS; SUBCUTANEOUS
Status: DISCONTINUED | OUTPATIENT
Start: 2018-08-26 | End: 2018-09-03

## 2018-08-26 RX ORDER — NALOXONE HYDROCHLORIDE 0.4 MG/ML
.1-.4 INJECTION, SOLUTION INTRAMUSCULAR; INTRAVENOUS; SUBCUTANEOUS
Status: DISCONTINUED | OUTPATIENT
Start: 2018-08-26 | End: 2018-09-03 | Stop reason: HOSPADM

## 2018-08-26 RX ORDER — NICOTINE POLACRILEX 4 MG
15-30 LOZENGE BUCCAL
Status: DISCONTINUED | OUTPATIENT
Start: 2018-08-26 | End: 2018-09-03 | Stop reason: HOSPADM

## 2018-08-26 RX ORDER — CEFAZOLIN SODIUM 2 G/100ML
2 INJECTION, SOLUTION INTRAVENOUS
Status: DISCONTINUED | OUTPATIENT
Start: 2018-08-26 | End: 2018-08-26 | Stop reason: HOSPADM

## 2018-08-26 RX ORDER — NALOXONE HYDROCHLORIDE 0.4 MG/ML
.1-.4 INJECTION, SOLUTION INTRAMUSCULAR; INTRAVENOUS; SUBCUTANEOUS
Status: DISCONTINUED | OUTPATIENT
Start: 2018-08-26 | End: 2018-08-27

## 2018-08-26 RX ORDER — ONDANSETRON 4 MG/1
4 TABLET, ORALLY DISINTEGRATING ORAL EVERY 30 MIN PRN
Status: DISCONTINUED | OUTPATIENT
Start: 2018-08-26 | End: 2018-08-26 | Stop reason: HOSPADM

## 2018-08-26 RX ORDER — NEOSTIGMINE METHYLSULFATE 1 MG/ML
VIAL (ML) INJECTION PRN
Status: DISCONTINUED | OUTPATIENT
Start: 2018-08-26 | End: 2018-08-26

## 2018-08-26 RX ORDER — SODIUM CHLORIDE, SODIUM LACTATE, POTASSIUM CHLORIDE, CALCIUM CHLORIDE 600; 310; 30; 20 MG/100ML; MG/100ML; MG/100ML; MG/100ML
INJECTION, SOLUTION INTRAVENOUS CONTINUOUS
Status: DISCONTINUED | OUTPATIENT
Start: 2018-08-26 | End: 2018-08-26 | Stop reason: HOSPADM

## 2018-08-26 RX ORDER — EPHEDRINE SULFATE 50 MG/ML
INJECTION, SOLUTION INTRAMUSCULAR; INTRAVENOUS; SUBCUTANEOUS PRN
Status: DISCONTINUED | OUTPATIENT
Start: 2018-08-26 | End: 2018-08-26

## 2018-08-26 RX ORDER — ONDANSETRON 2 MG/ML
4 INJECTION INTRAMUSCULAR; INTRAVENOUS EVERY 6 HOURS PRN
Status: DISCONTINUED | OUTPATIENT
Start: 2018-08-26 | End: 2018-09-03 | Stop reason: HOSPADM

## 2018-08-26 RX ORDER — MAGNESIUM HYDROXIDE 1200 MG/15ML
LIQUID ORAL PRN
Status: DISCONTINUED | OUTPATIENT
Start: 2018-08-26 | End: 2018-08-26 | Stop reason: HOSPADM

## 2018-08-26 RX ORDER — AMOXICILLIN 250 MG
1 CAPSULE ORAL 2 TIMES DAILY
Status: DISCONTINUED | OUTPATIENT
Start: 2018-08-26 | End: 2018-09-03 | Stop reason: HOSPADM

## 2018-08-26 RX ORDER — ACETAMINOPHEN 325 MG/1
650 TABLET ORAL EVERY 4 HOURS PRN
Status: DISCONTINUED | OUTPATIENT
Start: 2018-08-29 | End: 2018-09-03 | Stop reason: HOSPADM

## 2018-08-26 RX ORDER — LIDOCAINE 40 MG/G
CREAM TOPICAL
Status: DISCONTINUED | OUTPATIENT
Start: 2018-08-26 | End: 2018-08-26 | Stop reason: HOSPADM

## 2018-08-26 RX ORDER — HYDROXYZINE HYDROCHLORIDE 25 MG/1
25 TABLET, FILM COATED ORAL 3 TIMES DAILY PRN
Status: DISCONTINUED | OUTPATIENT
Start: 2018-08-26 | End: 2018-09-03 | Stop reason: HOSPADM

## 2018-08-26 RX ORDER — FENTANYL CITRATE 50 UG/ML
INJECTION, SOLUTION INTRAMUSCULAR; INTRAVENOUS PRN
Status: DISCONTINUED | OUTPATIENT
Start: 2018-08-26 | End: 2018-08-26

## 2018-08-26 RX ORDER — FENTANYL CITRATE 50 UG/ML
25-50 INJECTION, SOLUTION INTRAMUSCULAR; INTRAVENOUS
Status: DISCONTINUED | OUTPATIENT
Start: 2018-08-26 | End: 2018-08-26 | Stop reason: HOSPADM

## 2018-08-26 RX ORDER — AMOXICILLIN 250 MG
2 CAPSULE ORAL 2 TIMES DAILY
Status: DISCONTINUED | OUTPATIENT
Start: 2018-08-26 | End: 2018-09-03 | Stop reason: HOSPADM

## 2018-08-26 RX ORDER — CEFAZOLIN SODIUM 2 G/100ML
2 INJECTION, SOLUTION INTRAVENOUS
Status: COMPLETED | OUTPATIENT
Start: 2018-08-26 | End: 2018-08-26

## 2018-08-26 RX ORDER — ONDANSETRON 2 MG/ML
4 INJECTION INTRAMUSCULAR; INTRAVENOUS EVERY 30 MIN PRN
Status: DISCONTINUED | OUTPATIENT
Start: 2018-08-26 | End: 2018-08-26 | Stop reason: HOSPADM

## 2018-08-26 RX ORDER — DEXTROSE MONOHYDRATE 25 G/50ML
25-50 INJECTION, SOLUTION INTRAVENOUS
Status: DISCONTINUED | OUTPATIENT
Start: 2018-08-26 | End: 2018-09-03 | Stop reason: HOSPADM

## 2018-08-26 RX ORDER — ONDANSETRON 4 MG/1
4 TABLET, ORALLY DISINTEGRATING ORAL EVERY 6 HOURS PRN
Status: DISCONTINUED | OUTPATIENT
Start: 2018-08-26 | End: 2018-09-03 | Stop reason: HOSPADM

## 2018-08-26 RX ORDER — LIDOCAINE HYDROCHLORIDE 20 MG/ML
INJECTION, SOLUTION INFILTRATION; PERINEURAL PRN
Status: DISCONTINUED | OUTPATIENT
Start: 2018-08-26 | End: 2018-08-26

## 2018-08-26 RX ORDER — ACETAMINOPHEN 325 MG/1
975 TABLET ORAL EVERY 8 HOURS
Status: DISPENSED | OUTPATIENT
Start: 2018-08-26 | End: 2018-08-29

## 2018-08-26 RX ORDER — GLYCOPYRROLATE 0.2 MG/ML
INJECTION, SOLUTION INTRAMUSCULAR; INTRAVENOUS PRN
Status: DISCONTINUED | OUTPATIENT
Start: 2018-08-26 | End: 2018-08-26

## 2018-08-26 RX ORDER — HYDROMORPHONE HYDROCHLORIDE 1 MG/ML
.3-.5 INJECTION, SOLUTION INTRAMUSCULAR; INTRAVENOUS; SUBCUTANEOUS EVERY 5 MIN PRN
Status: DISCONTINUED | OUTPATIENT
Start: 2018-08-26 | End: 2018-08-26 | Stop reason: HOSPADM

## 2018-08-26 RX ORDER — ONDANSETRON 2 MG/ML
4 INJECTION INTRAMUSCULAR; INTRAVENOUS EVERY 6 HOURS
Status: DISCONTINUED | OUTPATIENT
Start: 2018-08-26 | End: 2018-08-26

## 2018-08-26 RX ORDER — SODIUM CHLORIDE 9 MG/ML
INJECTION, SOLUTION INTRAVENOUS CONTINUOUS
Status: DISCONTINUED | OUTPATIENT
Start: 2018-08-26 | End: 2018-08-28

## 2018-08-26 RX ORDER — CEFAZOLIN SODIUM 1 G/3ML
1 INJECTION, POWDER, FOR SOLUTION INTRAMUSCULAR; INTRAVENOUS EVERY 8 HOURS
Status: COMPLETED | OUTPATIENT
Start: 2018-08-26 | End: 2018-08-27

## 2018-08-26 RX ORDER — LIDOCAINE 40 MG/G
CREAM TOPICAL
Status: DISCONTINUED | OUTPATIENT
Start: 2018-08-26 | End: 2018-09-03 | Stop reason: HOSPADM

## 2018-08-26 RX ORDER — OXYCODONE HYDROCHLORIDE 5 MG/1
5 TABLET ORAL
Status: DISCONTINUED | OUTPATIENT
Start: 2018-08-26 | End: 2018-09-03 | Stop reason: HOSPADM

## 2018-08-26 RX ORDER — PROPOFOL 10 MG/ML
INJECTION, EMULSION INTRAVENOUS PRN
Status: DISCONTINUED | OUTPATIENT
Start: 2018-08-26 | End: 2018-08-26

## 2018-08-26 RX ORDER — METOPROLOL TARTRATE 1 MG/ML
2.5-5 INJECTION, SOLUTION INTRAVENOUS EVERY 4 HOURS PRN
Status: DISCONTINUED | OUTPATIENT
Start: 2018-08-26 | End: 2018-09-03 | Stop reason: HOSPADM

## 2018-08-26 RX ADMIN — CEFAZOLIN SODIUM 2 G: 2 INJECTION, SOLUTION INTRAVENOUS at 13:15

## 2018-08-26 RX ADMIN — PHENYLEPHRINE HYDROCHLORIDE 100 MCG: 10 INJECTION, SOLUTION INTRAMUSCULAR; INTRAVENOUS; SUBCUTANEOUS at 13:11

## 2018-08-26 RX ADMIN — ROCURONIUM BROMIDE 10 MG: 10 INJECTION INTRAVENOUS at 14:31

## 2018-08-26 RX ADMIN — ONDANSETRON 4 MG: 2 INJECTION INTRAMUSCULAR; INTRAVENOUS at 14:09

## 2018-08-26 RX ADMIN — Medication 10 MG: at 13:14

## 2018-08-26 RX ADMIN — PROPOFOL 150 MG: 10 INJECTION, EMULSION INTRAVENOUS at 13:07

## 2018-08-26 RX ADMIN — SODIUM CHLORIDE, POTASSIUM CHLORIDE, SODIUM LACTATE AND CALCIUM CHLORIDE: 600; 310; 30; 20 INJECTION, SOLUTION INTRAVENOUS at 10:32

## 2018-08-26 RX ADMIN — SODIUM CHLORIDE: 9 INJECTION, SOLUTION INTRAVENOUS at 19:18

## 2018-08-26 RX ADMIN — HYDROMORPHONE HYDROCHLORIDE 0.2 MG: 1 INJECTION, SOLUTION INTRAMUSCULAR; INTRAVENOUS; SUBCUTANEOUS at 20:32

## 2018-08-26 RX ADMIN — PHENYLEPHRINE HYDROCHLORIDE 50 MCG: 10 INJECTION, SOLUTION INTRAMUSCULAR; INTRAVENOUS; SUBCUTANEOUS at 14:42

## 2018-08-26 RX ADMIN — FENTANYL CITRATE 50 MCG: 50 INJECTION, SOLUTION INTRAMUSCULAR; INTRAVENOUS at 13:50

## 2018-08-26 RX ADMIN — GLYCOPYRROLATE 0.6 MG: 0.2 INJECTION, SOLUTION INTRAMUSCULAR; INTRAVENOUS at 14:57

## 2018-08-26 RX ADMIN — PHENYLEPHRINE HYDROCHLORIDE 100 MCG: 10 INJECTION, SOLUTION INTRAMUSCULAR; INTRAVENOUS; SUBCUTANEOUS at 13:14

## 2018-08-26 RX ADMIN — HYDROMORPHONE HYDROCHLORIDE 0.2 MG: 1 INJECTION, SOLUTION INTRAMUSCULAR; INTRAVENOUS; SUBCUTANEOUS at 14:05

## 2018-08-26 RX ADMIN — LIDOCAINE HYDROCHLORIDE 100 MG: 20 INJECTION, SOLUTION INFILTRATION; PERINEURAL at 13:07

## 2018-08-26 RX ADMIN — CEFAZOLIN SODIUM 1 G: 1 INJECTION, POWDER, FOR SOLUTION INTRAMUSCULAR; INTRAVENOUS at 20:12

## 2018-08-26 RX ADMIN — Medication 0.3 MG: at 01:08

## 2018-08-26 RX ADMIN — SODIUM CHLORIDE, POTASSIUM CHLORIDE, SODIUM LACTATE AND CALCIUM CHLORIDE: 600; 310; 30; 20 INJECTION, SOLUTION INTRAVENOUS at 14:09

## 2018-08-26 RX ADMIN — ATENOLOL 50 MG: 50 TABLET ORAL at 08:09

## 2018-08-26 RX ADMIN — PHENYLEPHRINE HYDROCHLORIDE 50 MCG: 10 INJECTION, SOLUTION INTRAMUSCULAR; INTRAVENOUS; SUBCUTANEOUS at 14:26

## 2018-08-26 RX ADMIN — Medication 1 LOZENGE: at 17:42

## 2018-08-26 RX ADMIN — ROCURONIUM BROMIDE 10 MG: 10 INJECTION INTRAVENOUS at 14:06

## 2018-08-26 RX ADMIN — PROPOFOL 20 MG: 10 INJECTION, EMULSION INTRAVENOUS at 14:59

## 2018-08-26 RX ADMIN — HYDROMORPHONE HYDROCHLORIDE 0.3 MG: 1 INJECTION, SOLUTION INTRAMUSCULAR; INTRAVENOUS; SUBCUTANEOUS at 15:01

## 2018-08-26 RX ADMIN — FENTANYL CITRATE 50 MCG: 50 INJECTION, SOLUTION INTRAMUSCULAR; INTRAVENOUS at 13:01

## 2018-08-26 RX ADMIN — SODIUM CHLORIDE 1 G: 9 INJECTION, SOLUTION INTRAVENOUS at 13:26

## 2018-08-26 RX ADMIN — NEOSTIGMINE METHYLSULFATE 4 MG: 1 INJECTION, SOLUTION INTRAVENOUS at 14:57

## 2018-08-26 RX ADMIN — ROCURONIUM BROMIDE 50 MG: 10 INJECTION INTRAVENOUS at 13:07

## 2018-08-26 RX ADMIN — INSULIN GLARGINE 5 UNITS: 100 INJECTION, SOLUTION SUBCUTANEOUS at 22:52

## 2018-08-26 ASSESSMENT — LIFESTYLE VARIABLES: TOBACCO_USE: 1

## 2018-08-26 ASSESSMENT — ACTIVITIES OF DAILY LIVING (ADL)
ADLS_ACUITY_SCORE: 9

## 2018-08-26 NOTE — ANESTHESIA POSTPROCEDURE EVALUATION
Patient: Abimael Flores    Procedure(s):  Right Hip Bipolar Hemiarthroplasty (Bi[facility]) - Wound Class: I-Clean    Diagnosis:unknown  Diagnosis Additional Information: No value filed.    Anesthesia Type:  General, LMA    Note:  Anesthesia Post Evaluation    Patient location during evaluation: PACU  Patient participation: Able to fully participate in evaluation  Level of consciousness: awake and alert  Pain management: adequate  Airway patency: patent  Cardiovascular status: acceptable  Respiratory status: acceptable  Hydration status: acceptable  PONV: none and controlled     Anesthetic complications: None          Last vitals:  Vitals:    08/26/18 1516 08/26/18 1520 08/26/18 1530   BP: 141/73 134/87 133/71   Pulse:      Resp: 12 18 21   Temp: 36  C (96.8  F) 35.9  C (96.6  F) 36.1  C (97  F)   SpO2: 100% 100% 100%         Electronically Signed By: Cheo Davison MD  August 26, 2018  3:49 PM

## 2018-08-26 NOTE — CONSULTS
[facility]    Cardiac Electrophysiology Consultation     Date of Admission:  8/25/2018  Date of Consult (When I saw the patient): 08/26/18    Assessment & Plan   Abimael Flores is a 89 year old male who was admitted on 8/25/2018. I was asked to see the patient for ?AF. 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 ECG shows ?AF.  Healthy otherwise on atenolol for htn along with asa but not much else. Was informed by cardiologist several years ago for known irregular rhythm was informed that it was not afib.  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 No sxs     No evidence of AF at this point in time. Sinus exit block common in elderly pt and reflection of sinus node dysfunction. Pt did not report lightheadedness but I would hold atenolol for now and have him on telemetry for now. If no significant bradycardia noted no further test required.    George Félix Laurent    Code Status    DNR/DNI    Primary Care Physician   RAISSA HILL    History is obtained from the patient    Past Medical History   I have reviewed this patient's medical history and updated it with pertinent information if needed.   Past Medical History:   Diagnosis Date     Coronary artery disease      Nonsenile cataract      Recent retinal detachment, total or subtotal 8/5/2014     Type 2 diabetes mellitus without complications (H)        Past Surgical History   I have reviewed this patient's surgical history and updated it with pertinent information if needed.  Past Surgical History:   Procedure Laterality Date     CARDIAC SURGERY       CATARACT IOL, RT/LT Bilateral      lacrimal caruncle removed BE Bilateral ~1989     REPAIR RUPTURED GLOBE  4/21/2014    Procedure: Exploration and Repair of Ruptured Globe ;  Surgeon: Mercedes Rivera MD;  Location: [facility] OR       Prior to Admission Medications   Prior  to Admission Medications   Prescriptions Last Dose Informant Patient Reported? Taking?   aspirin 81 MG tablet 8/25/2018 at Unknown time Self Yes Yes   Sig: Take 81 mg by mouth take 81 mg by mouth daily.   atenolol (TENORMIN) 50 MG tablet 8/25/2018 at Unknown time Son Yes Yes   Sig: Take 50 mg by mouth daily   glipiZIDE (GLUCOTROL XL) 5 MG 24 hr tablet 8/25/2018 at am Son Yes Yes   Sig: Take 5 mg by mouth 2 times daily   insulin glargine (LANTUS SOLOSTAR) 100 UNIT/ML pen 8/24/2018 at Unknown time Self Yes Yes   Sig: Inject 10 Units Subcutaneous At Bedtime   insulin lispro (HUMALOG KWIKPEN) 100 UNIT/ML injection 8/24/2018 at pm Self Yes Yes   Sig: Inject 4 Units Subcutaneous 3 times daily (before meals)   loratadine (CLARITIN) 10 MG tablet  Self Yes Yes   Sig: Take 10 mg by mouth daily as needed    simvastatin (ZOCOR) 20 MG tablet 8/24/2018 at Unknown time Son Yes Yes   Sig: Take 20 mg by mouth At Bedtime      Facility-Administered Medications: None     Allergies   No Known Allergies    Social History   I have reviewed this patient's social history and updated it with pertinent information if needed. Abimael Flores  reports that he has quit smoking. He has never used smokeless tobacco.    Family History   I have reviewed this patient's family history and updated it with pertinent information if needed.   Family History   Problem Relation Age of Onset     Diabetes Mother      Diabetes Father      Cancer No family hx of      Glaucoma No family hx of      Macular Degeneration No family hx of        Review of Systems   Comprehensive review of systems was performed with pertinent positives and negatives listed in assessment and plan section.    Physical Exam   Temp: 97.2  F (36.2  C) Temp src: Temporal BP: 113/49 Pulse: 93 Heart Rate: 73 Resp: 16 SpO2: 100 % O2 Device: Nasal cannula Oxygen Delivery: 2 LPM  Vital Signs with Ranges  Temp:  [96.6  F (35.9  C)-99.2  F (37.3  C)] 97.2  F (36.2  C)  Pulse:  [68-93] 93  Heart  Rate:  [70-86] 73  Resp:  [12-21] 16  BP: (112-178)/(49-94) 113/49  SpO2:  [91 %-100 %] 100 %  162 lbs 12.8 oz    Constitutional: awake, alert, cooperative, no apparent distress, and appears stated age  Eyes: Lids and lashes normal, pupils equal, round and reactive to light, extra ocular muscles intact, sclera clear, conjunctiva normal  ENT: Normocephalic, without obvious abnormality, atraumatic, sinuses nontender on palpation, external ears without lesions, oral pharynx with moist mucous membranes, tonsils without erythema or exudates, gums normal and good dentition.  Hematologic / Lymphatic: no cervical lymphadenopathy  Respiratory: No increased work of breathing, good air exchange, clear to auscultation bilaterally, no crackles or wheezing  Cardiovascular: Normal apical impulse, regular rate and rhythm, normal S1 and S2, no S3 or S4, and no murmur noted  GI: No scars, normal bowel sounds, soft, non-distended, non-tender, no masses palpated, no hepatosplenomegally  Skin: no bruising or bleeding  Musculoskeletal: There is no redness, warmth, or swelling of the joints.  Full range of motion noted except RLE  Neurologic: Awake, alert, oriented to name, place and time.    Neuropsychiatric: General: normal, calm and normal eye contact    Data   I personally reviewed all recent ECGs and images.  Results for orders placed or performed during the hospital encounter of 08/25/18 (from the past 24 hour(s))   XR Pelvis w Hip Right 1 View    Narrative    PELVIS WITH UNILATERAL HIP ONE VIEW RIGHT  8/25/2018 7:16 PM     HISTORY: Pain, fall, can not bear weight.     COMPARISON: None.      Impression    IMPRESSION: Femoral neck fracture.    MODESTO SUTHERLAND MD   XR Chest 1 View    Narrative    CHEST ONE VIEW SUPINE 8/25/2018 7:17 PM     HISTORY: Preop.     COMPARISON: None.      Impression    IMPRESSION: Scattered atelectasis and/or fibrosis bilaterally.    MODESTO SUTHERLAND MD   CBC with platelets differential   Result Value Ref Range     WBC 15.6 (H) 4.0 - 11.0 10e9/L    RBC Count 3.87 (L) 4.4 - 5.9 10e12/L    Hemoglobin 11.5 (L) 13.3 - 17.7 g/dL    Hematocrit 35.2 (L) 40.0 - 53.0 %    MCV 91 78 - 100 fl    MCH 29.7 26.5 - 33.0 pg    MCHC 32.7 31.5 - 36.5 g/dL    RDW 14.9 10.0 - 15.0 %    Platelet Count 184 150 - 450 10e9/L    Diff Method Automated Method     % Neutrophils 77.7 %    % Lymphocytes 12.3 %    % Monocytes 8.2 %    % Eosinophils 1.2 %    % Basophils 0.1 %    % Immature Granulocytes 0.5 %    Nucleated RBCs 0 0 /100    Absolute Neutrophil 12.1 (H) 1.6 - 8.3 10e9/L    Absolute Lymphocytes 1.9 0.8 - 5.3 10e9/L    Absolute Monocytes 1.3 0.0 - 1.3 10e9/L    Absolute Eosinophils 0.2 0.0 - 0.7 10e9/L    Absolute Basophils 0.0 0.0 - 0.2 10e9/L    Abs Immature Granulocytes 0.1 0 - 0.4 10e9/L    Absolute Nucleated RBC 0.0    Basic metabolic panel   Result Value Ref Range    Sodium 141 133 - 144 mmol/L    Potassium 4.6 3.4 - 5.3 mmol/L    Chloride 111 (H) 94 - 109 mmol/L    Carbon Dioxide 24 20 - 32 mmol/L    Anion Gap 6 3 - 14 mmol/L    Glucose 119 (H) 70 - 99 mg/dL    Urea Nitrogen 31 (H) 7 - 30 mg/dL    Creatinine 1.65 (H) 0.66 - 1.25 mg/dL    GFR Estimate 39 (L) >60 mL/min/1.7m2    GFR Estimate If Black 47 (L) >60 mL/min/1.7m2    Calcium 8.4 (L) 8.5 - 10.1 mg/dL   Hemoglobin A1c   Result Value Ref Range    Hemoglobin A1C 8.4 (H) 0 - 5.6 %   EKG 12-lead, tracing only   Result Value Ref Range    Interpretation ECG Click View Image link to view waveform and result    Glucose by meter   Result Value Ref Range    Glucose 98 70 - 99 mg/dL   UA with Microscopic reflex to Culture   Result Value Ref Range    Color Urine Light Yellow     Appearance Urine Clear     Glucose Urine 30 (A) NEG^Negative mg/dL    Bilirubin Urine Negative NEG^Negative    Ketones Urine Negative NEG^Negative mg/dL    Specific Gravity Urine 1.012 1.003 - 1.035    Blood Urine Negative NEG^Negative    pH Urine 6.5 5.0 - 7.0 pH    Protein Albumin Urine 10 (A) NEG^Negative mg/dL     Urobilinogen mg/dL Normal 0.0 - 2.0 mg/dL    Nitrite Urine Negative NEG^Negative    Leukocyte Esterase Urine Negative NEG^Negative    Source Midstream Urine     WBC Urine 0 0 - 5 /HPF    RBC Urine 1 0 - 2 /HPF   Glucose by meter   Result Value Ref Range    Glucose 98 70 - 99 mg/dL   CBC with platelets   Result Value Ref Range    WBC 12.8 (H) 4.0 - 11.0 10e9/L    RBC Count 3.65 (L) 4.4 - 5.9 10e12/L    Hemoglobin 11.0 (L) 13.3 - 17.7 g/dL    Hematocrit 33.2 (L) 40.0 - 53.0 %    MCV 91 78 - 100 fl    MCH 30.1 26.5 - 33.0 pg    MCHC 33.1 31.5 - 36.5 g/dL    RDW 14.9 10.0 - 15.0 %    Platelet Count 150 150 - 450 10e9/L   Basic metabolic panel   Result Value Ref Range    Sodium 141 133 - 144 mmol/L    Potassium 4.4 3.4 - 5.3 mmol/L    Chloride 111 (H) 94 - 109 mmol/L    Carbon Dioxide 23 20 - 32 mmol/L    Anion Gap 7 3 - 14 mmol/L    Glucose 102 (H) 70 - 99 mg/dL    Urea Nitrogen 28 7 - 30 mg/dL    Creatinine 1.43 (H) 0.66 - 1.25 mg/dL    GFR Estimate 46 (L) >60 mL/min/1.7m2    GFR Estimate If Black 56 (L) >60 mL/min/1.7m2    Calcium 8.1 (L) 8.5 - 10.1 mg/dL   Glucose by meter   Result Value Ref Range    Glucose 157 (H) 70 - 99 mg/dL

## 2018-08-26 NOTE — PROGRESS NOTES
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  He was found to have a right femoral neck fracture.  He was found to have fibrillation on his EKG done in the ER.  He has history of sinus arrhythmia, no documented history of atrial fibrillation.       Right femoral neck fracture  Mechanical fall  The patient describes the fall as being mechanical.  He has some balance problems recently, but no frequent falls.  He denies any preceding symptoms, no dizziness, no shortness of breath, no chest pains, no loss of consciousness.  He did hit his head slightly and had a skin laceration that was sutured as the ER.  Ortho on-call was contacted.  The patient seems to have been in a good state of health.  He does have a history of coronary artery disease with coronary artery bypass grafting, but as per the patient and his son, both physicians, he did not have any problems since his surgery.     - Post op management per Orthopedics.    - IVF, Pain control - Oxycodone, Tylenol   - PT/OT    New diagnosis of Atrial fibrillation  Apparently, he was told that he had a sinus arrhythmia in the past.  He does not have formal diagnosis of A-Fib although his son states he is not surprised of this as the patient's pulse had been irregular in the past.  The patient is on a baby aspirin at home, which will be held at this time.  His heart rate seems to be a rate controlled on his prior to admission atenolol, which will be continued.     - An echocardiogram is pending.     - Cardiology consult was requested. His CHADS-VASc score is 4 for  age, hypertension, and diabetes.  This puts him in the high risk group for a stroke.  Anticoagulation management for atrial fibrillation was discussed with them briefly on admission. The patient states he is not interested in starting anticoagulation.  His son does states that he seems to be at high risk of falls.    - Will defer further discussion regarding anticoagulation for long-term cva risk management of A-fib to Cardiology and his primary care physician.    - Resume PTA Asa once cleared from Ortho.    - Prn Lopressor available.    - Tele.     Hypertension  His blood pressure was slightly elevated in ER.  He did have a lot of pain. Improved presently.     - Resume his prior to admission atenolol    - Hydralazine IV p.r.n.    Leukocytosis  White blood cells of 15.6, likely related to stress.  His chest x-ray does not show any infiltrates.  UA is negative.  He does not have fevers.     - Wbc down trending.     - Monitor for fever or worsening leukocytosis. No suspicion for infection at this point.     Diabetes mellitus type 2  Hemoglobin A1c 8.4.  At home he had maintained on Lantus 10 units at bedtime, glipizide-XL 5 mg p.o. twice daily and Humalog 4 units subcutaneously 3 times daily with meals.    - Hold his prior to admission glipizide and lispro with meals while npo.    - Continue with a lower dose of Lantus 5 units at bedtime until po intake resumed.     - Insulin sliding scale every 4 hours has been ordered for now.      Chronic kidney disease, stage III  Baseline creatinine seems to be between 1.5-1.8.  Creatinine on admission is 1.65.     - C/w IVF. Cr has already improved to 1.4.     - We will avoid nephrotoxic drugs.     - Follow BMP.      DVT Prophylaxis: Pneumatic Compression Devices  Code Status: DNR/DNI    # Pain Assessment:  Current Pain Score 8/26/2018   Patient currently in pain? denies   Pain score (0-10) -   Pain location -   Pain descriptors -   As above.       Disposition: Echo and  Cardiology consult pending. Post operative management per Ortho.  Will follow in am.       Nas Varner       Interval History   No acute overnight events. Vitals stable. Rates controlled. Has been down in the OR most of the afternoon and I have been unable to see him. Chart reviewed, am labs ordered and prn lopressor made available.     -Data reviewed today: I reviewed all new labs and imaging results over the last 24 hours. I personally reviewed no images or EKG's today.    Physical Exam   Temp: 99.2  F (37.3  C) Temp src: Oral BP: 148/67 Pulse: 93 Heart Rate: 84 Resp: 18 SpO2: 100 % O2 Device: Nasal cannula Oxygen Delivery: 2 LPM  Vitals:    08/25/18 2131 08/26/18 0500   Weight: 75.8 kg (167 lb 3.2 oz) 73.8 kg (162 lb 12.8 oz)     Vital Signs with Ranges  Temp:  [98.3  F (36.8  C)-99.2  F (37.3  C)] 99.2  F (37.3  C)  Pulse:  [68-93] 93  Heart Rate:  [82-84] 84  Resp:  [16-18] 18  BP: (128-178)/(65-94) 148/67  SpO2:  [91 %-100 %] 100 %  I/O last 3 completed shifts:  In: 120 [P.O.:120]  Out: 600 [Urine:600]      Medications     lactated ringers 25 mL/hr at 08/26/18 1032     sodium chloride 75 mL/hr at 08/25/18 2201       [Auto Hold] atenolol  50 mg Oral Daily     ceFAZolin  1 g Intravenous See Admin Instructions     ceFAZolin  1 g Intravenous See Admin Instructions     ceFAZolin  2 g Intravenous Pre-Op/Pre-procedure x 1 dose     ceFAZolin  2 g Intravenous Pre-Op/Pre-procedure x 1 dose     [Auto Hold] insulin aspart  1-6 Units Subcutaneous Q4H     [Auto Hold] insulin glargine  5 Units Subcutaneous At Bedtime     [Auto Hold] simvastatin  20 mg Oral At Bedtime     [Auto Hold] sodium chloride (PF)  3 mL Intracatheter Q8H     Tranexamic Acid  1 g Intravenous Once     Tranexamic Acid  1 g Intravenous Once       Data     Recent Labs  Lab 08/26/18  0657 08/25/18 1954   WBC 12.8* 15.6*   HGB 11.0* 11.5*   MCV 91 91    184    141   POTASSIUM 4.4 4.6   CHLORIDE 111* 111*   CO2 23 24   BUN 28 31*   CR  1.43* 1.65*   ANIONGAP 7 6   JOSELINE 8.1* 8.4*   * 119*       Recent Results (from the past 24 hour(s))   XR Pelvis w Hip Right 1 View    Narrative    PELVIS WITH UNILATERAL HIP ONE VIEW RIGHT  8/25/2018 7:16 PM     HISTORY: Pain, fall, can not bear weight.     COMPARISON: None.      Impression    IMPRESSION: Femoral neck fracture.    MODESTO SUTHERLAND MD   XR Chest 1 View    Narrative    CHEST ONE VIEW SUPINE 8/25/2018 7:17 PM     HISTORY: Preop.     COMPARISON: None.      Impression    IMPRESSION: Scattered atelectasis and/or fibrosis bilaterally.    MODESTO SUTHERLAND MD

## 2018-08-26 NOTE — CONSULTS
Consult Date:  08/26/2018      REQUESTING PHYSICIAN:  Mamie Eubanks MD      DIAGNOSIS:  Right displaced femoral neck fracture.      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  He was walking into a restaurant and lost his balance and he fell on his right side but was unable to stand up immediately.  He was unable to bear weight on it and was taken to [facility] where he was admitted where he was found to have a right femoral neck fracture and was admitted for definitive care.      PAST MEDICAL HISTORY:  Significant for coronary disease, nonsenile cataracts, recent retinal detachment, type 2 diabetes without complications.      PAST SURGICAL HISTORY:  Significant for cardiac surgery, cataract and a ruptured globe repair.      MEDICATIONS ON ADMISSION:  Aspirin, Glucotrol, metformin, Claritin and Zocor.      ALLERGIES:  HE HAS NO KNOWN DRUG ALLERGIES.      SOCIAL HISTORY:  He is , lives with his wife.  His family history in an assisted living.      FAMILY HISTORY:  Noncontributory.      REVIEW OF SYSTEMS:  A 10-point review of systems is positive for some skin tearing and head abrasion and then the right hip pain.      PHYSICAL EXAMINATION:   GENERAL:  He is very alert, oriented and conversive for his age.  He has an abrasion on his scalp, but otherwise his cranial nerve exam is nonfocal.   EXTREMITIES:  Right and left upper extremities are within normal limits with the exception of some abrasions on his right upper extremity.  He has full function of the radial, ulnar and median nerves bilaterally.  Radial pulses intact bilaterally.  No tenderness to palpation or crepitus.  Right and left lower extremities:  Right lower extremity is shortened and externally rotated.  He has full function of EHL, FHL, tibialis anterior and gastroc soleus.  He has  palpable dorsalis pedis pulses.  He has full sensation in superficial, deep peroneus, saphenous, tibial and sural nerves.  He has tenderness around his right hip.  He has no tenderness about his left lower extremity.      IMAGING:  X-rays, AP pelvis and right lateral hip shows a displaced fracture of the right femoral neck.      LABORATORY DATA:  Sodium is 141, potassium 4.4, creatinine 1.43, white blood cell count 12.8, hemoglobin 11.0, UA is negative.      IMPRESSION, REPORT AND PLAN:  I had a long discussion with Dr. Flores regarding his right femoral neck fracture.  Given this is a displaced femoral neck fracture, it would best be treated with a bipolar hemiarthroplasty. He understands the risks, benefits, and alternatives and wishes to proceed with surgery.  We will make arrangements for surgery later today.         KARON RAINES MD             D: 2018   T: 2018   MT: [id number]      Name:     AMIEAGNESLUNA   MRN:      [id number]        Account:       [id number]   :      1924           Consult Date:  2018      Document: [id number]

## 2018-08-26 NOTE — PHARMACY-ADMISSION MEDICATION HISTORY
Admission medication history interview status for the 8/25/2018  admission is complete. See EPIC admission navigator for prior to admission medications     Medication history source reliability:Good    Actions taken by pharmacist (provider contacted, etc): Had pt's son call from home     Additional medication history information not noted on PTA med list :None    Medication reconciliation/reorder completed by provider prior to medication history? Yes    Time spent in this activity: 15 min    Prior to Admission medications    Medication Sig Last Dose Taking? Auth Provider   aspirin 81 MG tablet Take 81 mg by mouth take 81 mg by mouth daily. 8/25/2018 at Unknown time Yes Reported, Patient   atenolol (TENORMIN) 50 MG tablet Take 50 mg by mouth daily 8/25/2018 at Unknown time Yes Unknown, Entered By History   glipiZIDE (GLUCOTROL XL) 5 MG 24 hr tablet Take 5 mg by mouth 2 times daily 8/25/2018 at am Yes Unknown, Entered By History   insulin glargine (LANTUS SOLOSTAR) 100 UNIT/ML pen Inject 10 Units Subcutaneous At Bedtime 8/24/2018 at Unknown time Yes Unknown, Entered By History   insulin lispro (HUMALOG KWIKPEN) 100 UNIT/ML injection Inject 4 Units Subcutaneous 3 times daily (before meals) 8/24/2018 at pm Yes Unknown, Entered By History   loratadine (CLARITIN) 10 MG tablet Take 10 mg by mouth daily as needed   Yes Reported, Patient   simvastatin (ZOCOR) 20 MG tablet Take 20 mg by mouth At Bedtime 8/24/2018 at Unknown time Yes Unknown, Entered By History

## 2018-08-26 NOTE — PLAN OF CARE
Problem: Patient Care Overview  Goal: Plan of Care/Patient Progress Review  Outcome: No Change  Pt arrived from ED @ 2130. A&O. Bedrest. On tele. Denies chest pain or SOB. Refused to turn, provided education and weight shift assistance this shift to prevent pressure injury. Voiding adequately in urinal. NPO since midnight. Pain managed w/ IV dilaudid and tylenol. IVF infusing. Possibly surgery today.

## 2018-08-26 NOTE — ED NOTES
[facility]  ED Nurse Handoff Report    ED Chief complaint: Fall (pt lost balance. c/o right groin pain, skin tears to right arm and right forehead)      ED Diagnosis:   Final diagnoses:   Closed fracture of neck of right femur, initial encounter (H)       Code Status: Full Code    Allergies: No Known Allergies    Activity level - Baseline/Home:  Independent    Activity Level - Current:   Total Care     Needed?: No    Isolation: No  Infection: Not Applicable  Bariatric?: No    Vital Signs:   Vitals:    08/25/18 1855 08/25/18 1859   BP: (!) 178/94    Pulse: 68    Resp: 18    Temp:  98.4  F (36.9  C)   TempSrc:  Oral   SpO2: 96%        Cardiac Rhythm: ,        Pain level:      Is this patient confused?: No   [place] - Suicide Severity Rating Scale Completed?  Yes  If yes, what color did the patient score?  White    Patient Report: Initial Complaint: fall  Focused Assessment: pt tripped and fell at [facility]; pt lost balance and fell landing on the curb hitting right side of head, arm and hip. Pt has femoral head Fx. 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   Tests Performed: XRs  Abnormal Results:   Results for orders placed or performed during the hospital encounter of 08/25/18   XR Pelvis w Hip Right 1 View    Narrative    PELVIS WITH UNILATERAL HIP ONE VIEW RIGHT  8/25/2018 7:16 PM     HISTORY: Pain, fall, can not bear weight.     COMPARISON: None.      Impression    IMPRESSION: Femoral neck fracture.    MODESTO SUTHERLAND MD   XR Chest 1 View    Narrative    CHEST ONE VIEW SUPINE 8/25/2018 7:17 PM     HISTORY: Preop.     COMPARISON: None.      Impression    IMPRESSION: Scattered atelectasis and/or fibrosis bilaterally.    MODESTO SUTHERLAND MD       Treatments provided: wound cleaning of head lac and skin tears    Family Comments: son at bedside    OBS brochure/video discussed/provided to patient: N/A    ED Medications: Medications - No data to display    Drips  infusing?:  No    For the majority of the shift this patient was Green.   Interventions performed were none.    Severe Sepsis OR Septic Shock Diagnosis Present: No      ED NURSE PHONE NUMBER: [phone]

## 2018-08-26 NOTE — OR NURSING
"Dr. Laurent at bedside for cardiac consult.    1620  Assessed patient and reviewed his chart.  States \"I don't think he is in A-fib.  Looks like sinus with exit block.\"  1625  Patient's son Aleksandar updated on recovery progress.  1630  Per Dr. Laurent's note he recommended tele post procedure but order not entered.  Called Dr. Diaz ([facility]) and updated.  He states he will put the order in.  1645  Left hearing aid put back in the patient's ear.  Patient states \"it needs a new battery\"  Son said he would go to the gift shop to see if they have any batteries for sale there.  Sign out given.  Waiting for NA from 55 to help transport the patient back to the 5th floor.  "

## 2018-08-26 NOTE — ANESTHESIA CARE TRANSFER NOTE
Patient: Abimael Flores    Procedure(s):  Right Hip Bipolar Hemiarthroplasty ([employer]) - Wound Class: I-Clean    Diagnosis: unknown  Diagnosis Additional Information: No value filed.    Anesthesia Type:   General, LMA     Note:  Airway :Face Mask  Patient transferred to:PACU  Comments: Extubation / Transfer of Care Note:    Abimael Flores    Spontaneous respirations. Strong and purposeful movement. Suctioned. Extubated awake. O2 via FM.    In PACU. Monitors on. VSS. Report to RN.    8/26/2018    Sue Rajput CRNA    Handoff Report: Identifed the Patient, Identified the Reponsible Provider, Reviewed the pertinent medical history, Discussed the surgical course, Reviewed Intra-OP anesthesia mangement and issues during anesthesia, Set expectations for post-procedure period and Allowed opportunity for questions and acknowledgement of understanding      Vitals: (Last set prior to Anesthesia Care Transfer)    CRNA VITALS  8/26/2018 1443 - 8/26/2018 1516      8/26/2018             NIBP: 129/66    NIBP Mean: 80    Resp Rate (set): 10                Electronically Signed By: WILLIAM Wall CRNA  August 26, 2018  3:16 PM

## 2018-08-26 NOTE — ANESTHESIA PREPROCEDURE EVALUATION
Procedure: Procedure(s):  OPEN REDUCTION INTERNAL FIXATION HIP BIPOLAR  Preop diagnosis: unknown    No Known Allergies  Past Medical History:   Diagnosis Date     Coronary artery disease      Nonsenile cataract      Recent retinal detachment, total or subtotal 8/5/2014     Type 2 diabetes mellitus without complications (H)      Past Surgical History:   Procedure Laterality Date     CARDIAC SURGERY       CATARACT IOL, RT/LT Bilateral      lacrimal caruncle removed BE Bilateral ~1989     REPAIR RUPTURED GLOBE  4/21/2014    Procedure: Exploration and Repair of Ruptured Globe ;  Surgeon: Mercedes Rivera MD;  Location:  OR     Social History   Substance Use Topics     Smoking status: Former Smoker     Smokeless tobacco: Never Used     Alcohol use Not on file     Prior to Admission medications    Medication Sig Start Date End Date Taking? Authorizing Provider   aspirin 81 MG tablet Take 81 mg by mouth take 81 mg by mouth daily.   Yes Reported, Patient   atenolol (TENORMIN) 50 MG tablet Take 50 mg by mouth daily   Yes Unknown, Entered By History   glipiZIDE (GLUCOTROL XL) 5 MG 24 hr tablet Take 5 mg by mouth 2 times daily   Yes Unknown, Entered By History   insulin glargine (LANTUS SOLOSTAR) 100 UNIT/ML pen Inject 10 Units Subcutaneous At Bedtime   Yes Unknown, Entered By History   insulin lispro (HUMALOG KWIKPEN) 100 UNIT/ML injection Inject 4 Units Subcutaneous 3 times daily (before meals)   Yes Unknown, Entered By History   loratadine (CLARITIN) 10 MG tablet Take 10 mg by mouth daily as needed  6/9/11  Yes Reported, Patient   simvastatin (ZOCOR) 20 MG tablet Take 20 mg by mouth At Bedtime   Yes Unknown, Entered By History     Current Facility-Administered Medications Ordered in Epic   Medication Dose Route Frequency Last Rate Last Dose     [Auto Hold] acetaminophen (TYLENOL) Suppository 650 mg  650 mg Rectal Q4H PRN         [Auto Hold] acetaminophen (TYLENOL) tablet 650 mg  650 mg Oral Q4H PRN   650 mg at 08/25/18 2220      [Auto Hold] atenolol (TENORMIN) tablet 50 mg  50 mg Oral Daily   50 mg at 08/26/18 0809     ceFAZolin (ANCEF) 1 g vial to attach to  ml bag for ADULT or 50 ml bag for PEDS  1 g Intravenous See Admin Instructions         ceFAZolin (ANCEF) 1 g vial to attach to  ml bag for ADULT or 50 ml bag for PEDS  1 g Intravenous See Admin Instructions         ceFAZolin (ANCEF) intermittent infusion 2 g in 100 mL dextrose PRE-MIX  2 g Intravenous Pre-Op/Pre-procedure x 1 dose         ceFAZolin (ANCEF) intermittent infusion 2 g in 100 mL dextrose PRE-MIX  2 g Intravenous Pre-Op/Pre-procedure x 1 dose         [Auto Hold] glucose gel 15-30 g  15-30 g Oral Q15 Min PRN        Or     [Auto Hold] dextrose 50 % injection 25-50 mL  25-50 mL Intravenous Q15 Min PRN        Or     [Auto Hold] glucagon injection 1 mg  1 mg Subcutaneous Q15 Min PRN         [Auto Hold] hydrALAZINE (APRESOLINE) injection 10 mg  10 mg Intravenous Q4H PRN         [Auto Hold] HYDROmorphone (PF) (DILAUDID) injection 0.3-0.5 mg  0.3-0.5 mg Intravenous Q2H PRN   0.3 mg at 08/26/18 0108     [Auto Hold] insulin aspart (NovoLOG) inj (RAPID ACTING)  1-6 Units Subcutaneous Q4H         [Auto Hold] insulin glargine (LANTUS) injection 5 Units  5 Units Subcutaneous At Bedtime         lactated ringers infusion   Intravenous Continuous 25 mL/hr at 08/26/18 1032       [Auto Hold] lidocaine (LMX4) cream   Topical Q1H PRN         lidocaine 1 % 1 mL  1 mL Other Q1H PRN         [Auto Hold] lidocaine 1 % 1 mL  1 mL Other Q1H PRN         [Auto Hold] magnesium hydroxide (MILK OF MAGNESIA) suspension 30 mL  30 mL Oral Daily PRN         [Auto Hold] melatonin tablet 1 mg  1 mg Oral At Bedtime PRN   1 mg at 08/25/18 2221     [Auto Hold] naloxone (NARCAN) injection 0.1-0.4 mg  0.1-0.4 mg Intravenous Q2 Min PRN         [Auto Hold] ondansetron (ZOFRAN-ODT) ODT tab 4 mg  4 mg Oral Q6H PRN        Or     [Auto Hold] ondansetron (ZOFRAN) injection 4 mg  4 mg Intravenous Q6H PRN          [Auto Hold] oxyCODONE IR (ROXICODONE) tablet 5-10 mg  5-10 mg Oral Q3H PRN         [Auto Hold] senna-docusate (SENOKOT-S;PERICOLACE) 8.6-50 MG per tablet 1 tablet  1 tablet Oral BID PRN        Or     [Auto Hold] senna-docusate (SENOKOT-S;PERICOLACE) 8.6-50 MG per tablet 2 tablet  2 tablet Oral BID PRN         [Auto Hold] simvastatin (ZOCOR) tablet 20 mg  20 mg Oral At Bedtime         [Auto Hold] sodium chloride (PF) 0.9% PF flush 3 mL  3 mL Intracatheter Q1H PRN         [Auto Hold] sodium chloride (PF) 0.9% PF flush 3 mL  3 mL Intracatheter Q8H   3 mL at 08/25/18 2201     sodium chloride 0.9% infusion   Intravenous Continuous 75 mL/hr at 08/25/18 2201       tranexamic acid (CYKLOKAPRON) 1 g in sodium chloride 0.9 % 60 mL bolus  1 g Intravenous Once         tranexamic acid (CYKLOKAPRON) 1 g in sodium chloride 0.9 % 60 mL bolus  1 g Intravenous Once         No current [facility]-ordered outpatient prescriptions on file.       lactated ringers 25 mL/hr at 08/26/18 1032     sodium chloride 75 mL/hr at 08/25/18 2201     Wt Readings from Last 1 Encounters:   08/26/18 73.8 kg (162 lb 12.8 oz)     Temp Readings from Last 1 Encounters:   08/26/18 37.3  C (99.2  F) (Oral)     BP Readings from Last 6 Encounters:   08/26/18 136/82   04/22/14 140/82   04/21/14 (!) 195/102     Pulse Readings from Last 4 Encounters:   08/26/18 93   04/21/14 80     Resp Readings from Last 1 Encounters:   08/26/18 16     SpO2 Readings from Last 1 Encounters:   08/26/18 91%     Recent Labs   Lab Test  08/26/18   0657  08/25/18   1954   NA  141  141   POTASSIUM  4.4  4.6   CHLORIDE  111*  111*   CO2  23  24   ANIONGAP  7  6   GLC  102*  119*   BUN  28  31*   CR  1.43*  1.65*   JOSELINE  8.1*  8.4*     No results for input(s): AST, ALT, ALKPHOS, BILITOTAL, LIPASE in the last 86507 hours.  Recent Labs   Lab Test  08/26/18   0657  08/25/18 1954   WBC  12.8*  15.6*   HGB  11.0*  11.5*   PLT  150  184     No results for input(s): ABO, RH in the last 65975  hours.  Recent Labs   Lab Test  04/21/14   0405   INR  0.91      No results for input(s): TROPI in the last 58128 hours.  No results for input(s): PH, PCO2, PO2, HCO3 in the last 31018 hours.  No results for input(s): HCG in the last 89129 hours.  Recent Results (from the past 744 hour(s))   XR Pelvis w Hip Right 1 View    Narrative    PELVIS WITH UNILATERAL HIP ONE VIEW RIGHT  8/25/2018 7:16 PM     HISTORY: Pain, fall, can not bear weight.     COMPARISON: None.      Impression    IMPRESSION: Femoral neck fracture.    MODESTO SUTHERLAND MD   XR Chest 1 View    Narrative    CHEST ONE VIEW SUPINE 8/25/2018 7:17 PM     HISTORY: Preop.     COMPARISON: None.      Impression    IMPRESSION: Scattered atelectasis and/or fibrosis bilaterally.    MODESTO SUTHERLAND MD       RECENT LABS:   ECG:   ECHO:     Anesthesia Evaluation     . Pt has had prior anesthetic.     No history of anesthetic complications          ROS/MED HX    ENT/Pulmonary:     (+)tobacco use, Past use , . .    Neurologic:       Cardiovascular:     (+) --CAD, --. : . . . :. a-fib, .       METS/Exercise Tolerance:     Hematologic:     (+) Anemia, History of Transfusion Other Hematologic Disorder-Myelodysplastic syndrome      Musculoskeletal:         GI/Hepatic:        (-) GERD   Renal/Genitourinary:         Endo:     (+) type II DM .      Psychiatric:         Infectious Disease:         Malignancy:         Other:                     Physical Exam  Normal systems: cardiovascular and pulmonary    Airway   Mallampati: I  Neck ROM: full    Dental   (+) caps and missing    Cardiovascular       Pulmonary                     Anesthesia Plan      History & Physical Review  History and physical reviewed and following examination; no interval change.    ASA Status:  3 .    NPO Status:  > 8 hours    Plan for General and LMA with Intravenous induction. Maintenance will be Balanced.    PONV prophylaxis:  Ondansetron (or other 5HT-3)       Postoperative Care  Postoperative pain  management:  IV analgesics.      Consents  Anesthetic plan, risks, benefits and alternatives discussed with:  Patient..                          .

## 2018-08-26 NOTE — PROVIDER NOTIFICATION
RECEIVING UNIT ED HANDOFF REVIEW    ED Nurse Handoff Report was reviewed by: Anni Corcoran on August 25, 2018 at 8:51 PM

## 2018-08-26 NOTE — H&P
Admitted:     08/25/2018      PRIMARY CARE PHYSICIAN:  Carlitos Bee M.D.      CHIEF COMPLAINT:  Right hip pain, status post fall.      HISTORY OF PRESENT ILLNESS:  Had been obtained from the patient who is a good historian.  He is a retired surgeon, his son who is also a physician, was present at the time of my examination.  I did discuss with Dr. Trivedi, ER attending as well.      Mr. Abimael Florse is an extremely pleasant 89 years old gentleman with past medical history of hypertension, dyslipidemia, coronary artery disease, status post coronary artery bypass grafting of 4 vessels; diabetes mellitus type 2, insulin-dependent; chronic kidney disease, stage III, and history of sinus arrhythmia who was brought in for evaluation of right hip pain.  The patient lives at home with his wife.  He reports having some balance problems recently.  He occasionally uses a cane for ambulation.  It seems that earlier today around, 7:00 p.m., while he was outside the going to a restaurant he might have turned his head and he tripped on the concrete and fell to right side.  He states that he had hit his head slightly, but he denies any loss of consciousness.  Prior to the fall, the patient denies having any chest pain, no palpitation, no shortness of breath.  He denies any headaches, no numbness, weakness in his arms or legs.  He denies any recent fevers, no cold symptoms.  He denies any exertional chest pain or shortness of breath.  He denies any abdominal pain, no diarrhea, no constipation, no dysuria, and no leg swelling.      Because of right hip pain, 911 was called and the patient was brought to the ER.  In the ER, he was seen by Dr. Trivedi.  His initial vitals showed a blood pressure of 178/94, later on blood pressure was 144/78, respiratory rate 18, oxygen saturation 96% on room air and heart rate 68 and temperature 98.4.  He was noted to have a laceration over his right parietal side of the head that was sutured in  the ER.  He also had a few skin lacerations over his right arm.      In the ER, he had basic blood work, which showed a BMP with creatinine 1.65.  BUN was 31, normal electrolytes.  Calcium is 8.6.  Glucose was 119.  CBC with white blood cells of 15.6, hemoglobin 11.5, hematocrit 35.2, normal platelet count.  His chest x-ray shows some scattered atelectasis and/or fibrosis, bilateral.  X-ray of the hip showed a femoral neck fracture.  EKG done in ER showed an atrial fibrillation at the rate of 75 beats per minute with left bundle branch block, both A-fib and a left bundle branch block seems to be new.  As mentioned above, the patient states that he was told he had a sinus arrhythmia in the past.  His son, who is a physician, states that he noticed his father to have irregular rate and he thought that he might have A-fib, although never documented.      In ER, the patient received 1 dose of Dilaudid 0.2 mg IV.  Hospitalist services regarding the admission.  Dr. Sadners of [facility] also was informed about this patient.      PAST MEDICAL HISTORY:   1.  Hypertension.   2.  Dyslipidemia.   3.  History of coronary artery disease, status post coronary artery bypass grafting of 4 vessels in 1996.   4.  Diabetes mellitus type 2, insulin-dependent, his last hemoglobin A1c was around 8, as per the patient.   5.  Chronic kidney disease, stage III.      PAST SURGICAL HISTORY:    Past Surgical History:   Procedure Laterality Date     CARDIAC SURGERY       CATARACT IOL, RT/LT Bilateral      lacrimal caruncle removed BE Bilateral ~1989     OPEN REDUCTION INTERNAL FIXATION HIP BIPOLAR Right 8/26/2018    Procedure: OPEN REDUCTION INTERNAL FIXATION HIP BIPOLAR;  Right Hip Bipolar Hemiarthroplasty (Biomet);  Surgeon: Bill Sanders MD;  Location:  OR     REPAIR RUPTURED GLOBE  4/21/2014    Procedure: Exploration and Repair of Ruptured Globe ;  Surgeon: Mercedes Rivera MD;  Location:  OR         SOCIAL HISTORY:   The patient used to smoke cigars, but he did not smoke for many years.  He denies alcohol drinking.  He denies illicit drug abuse.      FAMILY HISTORY:  Family History     Problem (# of Occurrences) Relation (Name,Age of Onset)    Diabetes (2) Mother, Father       Negative family history of: Cancer, Glaucoma, Macular Degeneration            PRIOR TO ADMISSION MEDICATIONS:   1.  Aspirin 81 mg p.o. daily.   2.  Atenolol 50 mg p.o. daily.   3.  Glipizide 5 mg p.o. twice daily.   4.  Lantus 10 units subcutaneously at bedtime.   5.  Insulin lispro 4 units subcutaneously 3 times daily with meals.   6.  Zocor 20 mg p.o. at bedtime.   7.  Claritin 10 mg p.o. daily p.r.n.      ALLERGIES:  NO KNOWN DRUG ALLERGIES.      REVIEW OF SYSTEMS:  A 10-point review of systems was conducted and it was negative except for pertinent positives mentioned in history of present illness.      PHYSICAL EXAMINATION:   VITAL SIGNS:  Blood pressure 144/78, heart rate 70, respiratory rate 18, oxygen saturation 92-96% on room air, temperature 98.6.   GENERAL:  The patient is awake, alert, no acute distress at the time of my examination.   HEENT:  Normocephalic.  He had a skin laceration over his right parietal area that was hot sutured in ER.  Pupils are equally round and reactive to light.  Oral mucosa is moist.   NECK:  Supple.  No cervical lymphadenopathy, no thyromegaly.   CHEST:  There is bilateral air entry, no wheezing, no rales, no crackles.   CARDIOVASCULAR:  There is irregularly irregular heart rate, no murmurs, no rubs.   ABDOMEN:  Soft, nontender, nondistended.  Bowel sounds are present.   EXTREMITIES:  There is no leg swelling.  His right lower extremity is slightly shorter and externally rotated.  There is no calf tenderness, 2+ peripheral pulses are palpable.   SKIN:  He has diffuse skin laceration over his right arm and above-mentioned scalp of laceration, otherwise no rashes, no cyanosis.   NEUROLOGIC:  The patient is awake, alert,  oriented to self, place, and time.  There are no focal neurological deficits.   PSYCHIATRIC:  Normal mood, normal affect.   MUSCULOSKELETAL:  His right lower extremity is slightly shortened and externally rotated.  There is pain with range of motion of the right hip.      LABORATORY DATA:  BMP with sodium 141, potassium 4.6, chloride 111, bicarbonate 24, BUN 31, creatinine 1.65, calcium is 8.4, anion gap of 6, hemoglobin A1c 8.4, glucose 115.  White blood cells 15.6, hemoglobin 11.5, hematocrit 35.2, platelet count 184.  UA is negative.      IMAGING:  Chest x-ray with scattered atelectasis or fibrosis bilaterally.  His x-ray of the pelvis and right hip showed a femoral neck fracture.  His EKG showed atrial fibrillation at the rate of 75 beats per minute with left bundle branch block.      ASSESSMENT:  Mr. 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  He was found to have a right femoral neck fracture.  He was found to have fibrillation on his EKG done in the ER.  He has history of sinus arrhythmia, no documented history of atrial fibrillation.      1. [facility] femoral neck fracture status post mechanical fall.   The patient describes the fall as being mechanical.  He has some balance problems recently, but no frequent falls.  He denies any preceding symptoms, no dizziness, no shortness of breath, no chest pains, no loss of consciousness.  He did hit his head slightly and had a skin laceration that was sutured as the ER.  Ortho on-call was contacted.  The patient seems to have been in a good state of health.  He does have a history of coronary artery disease with coronary artery bypass grafting, but as per the patient and his son, both physicians,  he did not have any problems since his surgery.  He apparently was diagnosed with a sinus arrhythmia in the past, but now EKG shows atrial fibrillation.  We are going to ask Cardiology to assess the patient before going for surgery.  Meanwhile, we will hold his prior to admission aspirin will keep him n.p.o.  A Rosales catheter order has been placed.  We will keep him on bed rest.  He will receive mild IV hydration while n.p.o.  Pain management and antiemetics p.r.n. had been ordered.  A formal ortho consult was requested.   2.  New diagnosis of atrial fibrillation.  Apparently, he was told that he had a sinus arrhythmia in the past.  He does not have formal diagnosis of A-Fib although his son states he is not surprised of this as the patient's pulse had been irregular in the past.  The patient is on a baby aspirin at home, which will be held at this time.  His heart rate seems to be a rate controlled on his prior to admission atenolol, which will be continued.  He will be monitored on telemetry.  An echocardiogram had been ordered for the morning.  Cardiology consult was requested to see if there is any other workup to be done prior to orthopedic surgery.  His CHADS-VASc score is 4 for age, hypertension, and diabetes.  This puts him in the high risk group for a stroke.  I did discuss briefly anticoagulation management for atrial fibrillation.  The patient states he is not interested in starting anticoagulation.  His son does states that he seems to be at high risk of falls.  Because he is supposed to have surgery tomorrow, I am holding his prior to admission aspirin anyway and we will defer further discussion regarding anticoagulation for a long-term management of A-fib to Cardiology and his primary care physician.   3.  Hypertension.  His blood pressure was slightly elevated in ER.  He does have a lot of pain.  The plan is to resume his prior to admission atenolol and hydralazine IV p.r.n. was ordered for elevated  blood pressures.   4.  Dyslipidemia.  I am going to resume his prior to admission statin.   5.  Leukocytosis.  White blood cells of 15.6, likely related to stress.  His chest x-ray does not show any infiltrates.  UA is negative.  He does not have fevers.  Plan to repeat a CBC in the morning.   6.  Diabetes mellitus type 2.  Hemoglobin A1c 8.4.  At home he had maintained on Lantus 10 units at bedtime, glipizide 5 mg p.o. twice daily and Lispro 4 units subcutaneously 3 times daily with meals; I am planning to hold his prior to admission glipizide and lispro with meals.  We will continue with a lower dose of Lantus 5 units at bedtime.  Insulin sliding scale every 4 hours has been ordered for now.  We will adjust his insulin doses as needed.   7.  Chronic kidney disease, stage III.  Baseline creatinine seems to be between 1.5-1.8.  Creatinine today is 1.65.  He will receive mild IV hydration.  We will avoid nephrotoxic drugs.  We will repeat a BMP in the morning.   8.  CODE STATUS:  Discussed with the patient, his son was present, at the time of my examination in ER, the patient states he wants to be DNR/DNI.      DISPOSITION:  Admit inpatient.  Anticipate at least 3 days of hospitalization pending postop course.         PRIMO BURT MD             D: 2018   T: 2018   MT: KONG      Name:     CHERYL HOLLOWAY   MRN:      [id number]        Account:      [id number]   :      1924        Admitted:     2018                   Document: [id number]

## 2018-08-26 NOTE — PROGRESS NOTES
Hospitalist Progress/Cross-cover Note:    Received Hospitalist consultation for periop orders. Noted Full Hospitalist progress note completed earlier today by Dr. Varner.     - Change Q 4 glucose checks to 4x/day - TID with meals and HS.   - Continue lower dose of Lantus 5 units tonight and sliding scale insulin medium dose.  - Atenolol in AM with hold parameters  - Hospitalist rounder to see tomorrow  - D/w RN, no further concerns/questions.    Gema Martinez PA-C  Hospitalist Physician Assistant  Pager: [phone]

## 2018-08-27 ENCOUNTER — APPOINTMENT (OUTPATIENT)
Dept: PHYSICAL THERAPY | Facility: CLINIC | Age: 83
DRG: 469 | End: 2018-08-27
Attending: ORTHOPAEDIC SURGERY
Payer: MEDICARE

## 2018-08-27 ENCOUNTER — APPOINTMENT (OUTPATIENT)
Dept: CARDIOLOGY | Facility: CLINIC | Age: 83
DRG: 469 | End: 2018-08-27
Attending: ORTHOPAEDIC SURGERY
Payer: MEDICARE

## 2018-08-27 LAB
ANION GAP SERPL CALCULATED.3IONS-SCNC: 8 MMOL/L (ref 3–14)
BUN SERPL-MCNC: 26 MG/DL (ref 7–30)
CALCIUM SERPL-MCNC: 7 MG/DL (ref 8.5–10.1)
CHLORIDE SERPL-SCNC: 107 MMOL/L (ref 94–109)
CO2 SERPL-SCNC: 23 MMOL/L (ref 20–32)
CREAT SERPL-MCNC: 1.57 MG/DL (ref 0.66–1.25)
ERYTHROCYTE [DISTWIDTH] IN BLOOD BY AUTOMATED COUNT: 15 % (ref 10–15)
GFR SERPL CREATININE-BSD FRML MDRD: 41 ML/MIN/1.7M2
GLUCOSE BLDC GLUCOMTR-MCNC: 145 MG/DL (ref 70–99)
GLUCOSE BLDC GLUCOMTR-MCNC: 165 MG/DL (ref 70–99)
GLUCOSE BLDC GLUCOMTR-MCNC: 172 MG/DL (ref 70–99)
GLUCOSE BLDC GLUCOMTR-MCNC: 198 MG/DL (ref 70–99)
GLUCOSE BLDC GLUCOMTR-MCNC: 238 MG/DL (ref 70–99)
GLUCOSE SERPL-MCNC: 146 MG/DL (ref 70–99)
HCT VFR BLD AUTO: 28.1 % (ref 40–53)
HGB BLD-MCNC: 9.2 G/DL (ref 13.3–17.7)
MCH RBC QN AUTO: 29.9 PG (ref 26.5–33)
MCHC RBC AUTO-ENTMCNC: 32.7 G/DL (ref 31.5–36.5)
MCV RBC AUTO: 91 FL (ref 78–100)
PLATELET # BLD AUTO: 108 10E9/L (ref 150–450)
POTASSIUM SERPL-SCNC: 4.5 MMOL/L (ref 3.4–5.3)
RBC # BLD AUTO: 3.08 10E12/L (ref 4.4–5.9)
SODIUM SERPL-SCNC: 138 MMOL/L (ref 133–144)
WBC # BLD AUTO: 10 10E9/L (ref 4–11)

## 2018-08-27 PROCEDURE — 81001 URINALYSIS AUTO W/SCOPE: CPT | Performed by: ORTHOPAEDIC SURGERY

## 2018-08-27 PROCEDURE — 93306 TTE W/DOPPLER COMPLETE: CPT | Mod: 26 | Performed by: INTERNAL MEDICINE

## 2018-08-27 PROCEDURE — 36415 COLL VENOUS BLD VENIPUNCTURE: CPT | Performed by: INTERNAL MEDICINE

## 2018-08-27 PROCEDURE — 25000131 ZZH RX MED GY IP 250 OP 636 PS 637: Mod: GY | Performed by: INTERNAL MEDICINE

## 2018-08-27 PROCEDURE — 85018 HEMOGLOBIN: CPT | Performed by: INTERNAL MEDICINE

## 2018-08-27 PROCEDURE — 99232 SBSQ HOSP IP/OBS MODERATE 35: CPT | Performed by: INTERNAL MEDICINE

## 2018-08-27 PROCEDURE — 40000193 ZZH STATISTIC PT WARD VISIT

## 2018-08-27 PROCEDURE — A9270 NON-COVERED ITEM OR SERVICE: HCPCS | Mod: GY | Performed by: ORTHOPAEDIC SURGERY

## 2018-08-27 PROCEDURE — 00000146 ZZHCL STATISTIC GLUCOSE BY METER IP

## 2018-08-27 PROCEDURE — 25000132 ZZH RX MED GY IP 250 OP 250 PS 637: Mod: GY | Performed by: INTERNAL MEDICINE

## 2018-08-27 PROCEDURE — A9270 NON-COVERED ITEM OR SERVICE: HCPCS | Mod: GY | Performed by: INTERNAL MEDICINE

## 2018-08-27 PROCEDURE — 93306 TTE W/DOPPLER COMPLETE: CPT

## 2018-08-27 PROCEDURE — 97110 THERAPEUTIC EXERCISES: CPT | Mod: GP

## 2018-08-27 PROCEDURE — 97161 PT EVAL LOW COMPLEX 20 MIN: CPT | Mod: GP

## 2018-08-27 PROCEDURE — 25000132 ZZH RX MED GY IP 250 OP 250 PS 637: Mod: GY | Performed by: ORTHOPAEDIC SURGERY

## 2018-08-27 PROCEDURE — 12000007 ZZH R&B INTERMEDIATE

## 2018-08-27 PROCEDURE — 97530 THERAPEUTIC ACTIVITIES: CPT | Mod: GP

## 2018-08-27 PROCEDURE — 87086 URINE CULTURE/COLONY COUNT: CPT | Performed by: HOSPITALIST

## 2018-08-27 PROCEDURE — 85027 COMPLETE CBC AUTOMATED: CPT | Performed by: INTERNAL MEDICINE

## 2018-08-27 PROCEDURE — 80048 BASIC METABOLIC PNL TOTAL CA: CPT | Performed by: INTERNAL MEDICINE

## 2018-08-27 PROCEDURE — 25000128 H RX IP 250 OP 636: Performed by: ORTHOPAEDIC SURGERY

## 2018-08-27 RX ORDER — GLIPIZIDE 5 MG/1
5 TABLET, FILM COATED, EXTENDED RELEASE ORAL
Status: DISCONTINUED | OUTPATIENT
Start: 2018-08-27 | End: 2018-09-02

## 2018-08-27 RX ORDER — OXYCODONE HYDROCHLORIDE 5 MG/1
5 TABLET ORAL EVERY 4 HOURS PRN
Qty: 60 TABLET | Refills: 0 | Status: ON HOLD | OUTPATIENT
Start: 2018-08-27 | End: 2018-10-08

## 2018-08-27 RX ORDER — ACETAMINOPHEN 325 MG/1
650 TABLET ORAL EVERY 8 HOURS
Qty: 100 TABLET | Refills: 0 | Status: SHIPPED | OUTPATIENT
Start: 2018-08-27 | End: 2018-09-12

## 2018-08-27 RX ORDER — AMOXICILLIN 250 MG
1 CAPSULE ORAL 2 TIMES DAILY
Qty: 60 TABLET | Refills: 0 | Status: SHIPPED | OUTPATIENT
Start: 2018-08-27 | End: 2018-09-03

## 2018-08-27 RX ADMIN — SENNOSIDES AND DOCUSATE SODIUM 1 TABLET: 8.6; 5 TABLET ORAL at 22:10

## 2018-08-27 RX ADMIN — INSULIN ASPART 4 UNITS: 100 INJECTION, SOLUTION INTRAVENOUS; SUBCUTANEOUS at 17:45

## 2018-08-27 RX ADMIN — INSULIN GLARGINE 5 UNITS: 100 INJECTION, SOLUTION SUBCUTANEOUS at 22:10

## 2018-08-27 RX ADMIN — OXYCODONE HYDROCHLORIDE 5 MG: 5 TABLET ORAL at 08:07

## 2018-08-27 RX ADMIN — GLIPIZIDE 5 MG: 5 TABLET, FILM COATED, EXTENDED RELEASE ORAL at 17:45

## 2018-08-27 RX ADMIN — OXYCODONE HYDROCHLORIDE 5 MG: 5 TABLET ORAL at 13:50

## 2018-08-27 RX ADMIN — CEFAZOLIN SODIUM 1 G: 1 INJECTION, POWDER, FOR SOLUTION INTRAMUSCULAR; INTRAVENOUS at 04:38

## 2018-08-27 RX ADMIN — RANITIDINE 150 MG: 150 TABLET ORAL at 08:08

## 2018-08-27 RX ADMIN — ACETAMINOPHEN 975 MG: 325 TABLET, FILM COATED ORAL at 06:08

## 2018-08-27 RX ADMIN — SIMVASTATIN 20 MG: 20 TABLET, FILM COATED ORAL at 22:10

## 2018-08-27 RX ADMIN — ACETAMINOPHEN 975 MG: 325 TABLET, FILM COATED ORAL at 22:10

## 2018-08-27 RX ADMIN — OXYCODONE HYDROCHLORIDE 5 MG: 5 TABLET ORAL at 04:50

## 2018-08-27 RX ADMIN — RANITIDINE 150 MG: 150 TABLET ORAL at 22:10

## 2018-08-27 RX ADMIN — SENNOSIDES AND DOCUSATE SODIUM 2 TABLET: 8.6; 5 TABLET ORAL at 08:07

## 2018-08-27 RX ADMIN — ENOXAPARIN SODIUM 40 MG: 40 INJECTION SUBCUTANEOUS at 13:50

## 2018-08-27 RX ADMIN — ACETAMINOPHEN 975 MG: 325 TABLET, FILM COATED ORAL at 13:50

## 2018-08-27 ASSESSMENT — ACTIVITIES OF DAILY LIVING (ADL)
ADLS_ACUITY_SCORE: 9

## 2018-08-27 NOTE — OP NOTE
Procedure Date: 08/26/2018      PREOPERATIVE DIAGNOSIS:  Right displaced femoral neck fracture.      POSTOPERATIVE DIAGNOSIS:  Right displaced femoral neck fracture.      PROCEDURE:  Right bipolar hemiarthroplasty using a Biomet Echo fracture stem.      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  He was unable to ambulate.  He was taken to [facility] where he was admitted for definitive care.  We had a long discussion of treatment options.  Given his age, activity level and nature of this injury, I think he would benefit from bipolar hemiarthroplasty.  He understands the risks, benefits, alternatives and wished to proceed with surgery.      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  After appropriate surgical pause to confirm the patient's extremity to be operated, 30 minutes after patient received 2 grams of Ancef, his right hip was approached through a lateral incision centered over the greater trochanter.  The skin and soft tissue sharply dissected down to the tensor fascia.  The fascia was then split in line with the fibers in line with the femur, taken down to the trochanteric bursa.  The bursal tissue was removed.  The anterior one-third gluteus medius removed off the greater trochanter in a modified Hardinge fashion.  This took us down to the joint capsule which was T'd.  Femoral neck was identified as was the fracture.  We then made a femoral neck osteotomy 12 mm proximal to the lesser trochanter just distal to the femoral  neck fracture.  The proximal bone was removed.  We then removed the femoral head from the acetabulum.  We then exposed the acetabulum and sized it, sized to fit a size 51 Biomet bipolar component.  We then paid attention to the femur.  Here we removed the bone from the piriformis fossa using the entry reamer lateral and sequentially broached up to 13 to fit a size 11 Biomet Echo femoral stem  we followed with a -3 neck length and a 51 mm bipolar.  This gave us good stability, full range of motion, appropriate soft tissue tension and leg length.  So at this point, we prepared cement in our final components, a size 11 Biomet Echo femoral stem.  Using 2 batches of Simplex cement, one with antibiotics and using second generation cement techniques, we cemented in a size 11 Biomet Echo femoral stem with a 13 mm distal centralizer.  Once the cement had cured, we removed the excess cement from the hip.  We retrialed the femoral heads, found that a -3 neck length on a 28 mm femoral head with a 51 bipolar gave us good stability, full range of motion, so we impacted a 28 mm femoral head with a -3 neck length down to our trunnion and placed a 51 bipolar component on this.  We thoroughly irrigated the hip wound with both saline and dilute Betadine solution.  We reduced the hip.  We closed the joint capsule with interrupted 0 Vicryl sutures.  We closed the abductors with #5 Ethibond sutures to bone tunnels, closed the tensor fascia with interrupted and running 0 Vicryl suture, and closed the skin and soft tissue with absorbable sutures and staples in the skin.  The patient was placed in a well-padded postoperative dressing, taken to the recovery room in stable condition.  He tolerated the procedure without difficulty.         KARON RAINES MD             D: 2018   T: 2018   MT: KONG      Name:     CHERYL HOLLOWAY   MRN:      [id number]        Account:        [id number]   :      1924            Procedure Date: 08/26/2018      Document: [id number]

## 2018-08-27 NOTE — PLAN OF CARE
Problem: Patient Care Overview  Goal: Plan of Care/Patient Progress Review  PT:  Patient admitted on 8/25/18 for evaluation of R hip pain following a fall. Patient found to have right displaced femoral neck fracture and is now POD-1 from right bipolar hemiarthroplasty using a Biomet Echo fracture stem. Patient is WBAT on R LE with no hip precautions. Patient previously resided in an assisted living apartment with his wife; no steps to contend with and patient states he does not use any type of AD. 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     Discharge Planner PT   Patient plan for discharge: Not stated  Current status: Patient educated and instructed in supine DELL exercises. Patient with increased pain completing heel slides due to increased pain with any hip flexion. Supine<>sit completed with mod A x 2 and assistance guiding R LE off EOB and boosting trunk into full sitting. Patient with increased posterior and L lean noted upon sitting. Patient states that sitting is painful due to hip flexion on R, noted to lean back. Patient able to sit at EOB for approximately 4 minutes with Min to Mod A x 2 for balance. No further mobility OOB completed this date secondary to decreased sitting balance and increased pain with movement/sitting. Patient supine in bed at end of session with all needs in reach. Bed alarm not on secondary to alarm going off every time it is set. Nursing aware.   Barriers to return to prior living situation: pain, weakness, current level of assistance   Recommendations for discharge: TCU  Rationale for recommendations: Patient would benefit from continued skilled therapy to further improve strength and independence with mobility and ambulation prior to safe discharge back to prior living situation.        Entered by: Ana Campbell 08/27/2018 10:51 AM

## 2018-08-27 NOTE — PLAN OF CARE
Problem: Patient Care Overview  Goal: Plan of Care/Patient Progress Review  VSS, on 2 L oxygen. Dressing cdi, cms intact. Alert and oriented x 4. Dangled on the edge of the bed. IV dilaudid for pain. Rosales with adequate output. Tele SD with 1st degree block and BBB. LS clear.

## 2018-08-27 NOTE — PROGRESS NOTES
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  He was found to have a right femoral neck fracture.  He was found to have fibrillation on his EKG done in the ER.  He has history of sinus arrhythmia, no documented history of atrial fibrillation.       Right femoral neck fracture  Mechanical fall  The patient describes the fall as being mechanical.  He has some balance problems recently, but no frequent falls.  He denies any preceding symptoms, no dizziness, no shortness of breath, no chest pains, no loss of consciousness.  He did hit his head slightly and had a skin laceration that was sutured as the ER.  Ortho on-call was contacted.  The patient seems to have been in a good state of health.  He does have a history of coronary artery disease with coronary artery bypass grafting, but as per the patient and his son, both physicians, he did not have any problems since his surgery.     - Post op management per Orthopedics.    - IVF, Pain control - Oxycodone, Tylenol   - PT/OT     New diagnosis of Atrial fibrillation  Apparently, he was told that he had a sinus arrhythmia in the past.  He does not have formal diagnosis of A-Fib although his son states he is not surprised of this as the patient's pulse had been irregular in the past.  The patient is on a baby aspirin at home, which will be held at this time.  His heart rate seems to be a rate controlled on his prior to admission atenolol, which will be continued.     - echocardiogram results pending     - Cardiology consulted. His CHADS-VASc score is 4 for age, hypertension, and diabetes.  This puts him in the high risk  group for a stroke.  Anticoagulation management for atrial fibrillation was discussed with them briefly on admission. The patient states he is not interested in starting anticoagulation.  His son does states that he seems to be at high risk of falls.    - Will defer further discussion regarding anticoagulation for long-term cva risk management of A-fib to Cardiology and his primary care physician.    - Resume PTA Asa once cleared from Ortho.    - Prn Lopressor available.    - atenolol stopped to avoid bradycardia as per EP recommendation      Hypertension  His blood pressure was slightly elevated in ER.  He did have a lot of pain. Improved presently.     - Hydralazine IV p.r.n.     Leukocytosis  White blood cells of 15.6, likely related to stress.  His chest x-ray does not show any infiltrates.  UA is negative.  He does not have fevers.     - Wbc back to normal      Diabetes mellitus type 2  Hemoglobin A1c 8.4.  At home he had maintained on Lantus 10 units at bedtime, glipizide-XL 5 mg p.o. twice daily and Humalog 4 units subcutaneously 3 times daily with meals.    - restart home medications since his oral intake improved.     Chronic kidney disease, stage III  Baseline creatinine seems to be between 1.5-1.8.  Creatinine on admission is 1.65.     - C/w IVF. Cr 1.57    - We will avoid nephrotoxic drugs.     - Follow BMP.   # Pain Assessment:  As per orthopedics      DVT Prophylaxis: as per orthopedics    Code Status: DNR/DNI     Disposition: Expected discharge in 2-3 days     Betty Arzola MD  Text Page   (7am to 6pm)    Interval History   He is feeling at his best today morning, needs hearing aid, pain well controlled, getting echocardiogram done.    -Data reviewed today: I reviewed all new labs and imaging results over the last 24 hours.     Physical Exam   Temp: 99.9  F (37.7  C) Temp src: Oral BP: 92/44   Heart Rate: 83 Resp: 16 SpO2: 97 % O2 Device: Nasal cannula Oxygen Delivery: 3 LPM  Vitals:    08/25/18  2131 08/26/18 0500   Weight: 75.8 kg (167 lb 3.2 oz) 73.8 kg (162 lb 12.8 oz)     Vital Signs with Ranges  Temp:  [96.6  F (35.9  C)-99.9  F (37.7  C)] 99.9  F (37.7  C)  Heart Rate:  [64-86] 83  Resp:  [11-21] 16  BP: ()/(44-87) 92/44  SpO2:  [92 %-100 %] 97 %  I/O last 3 completed shifts:  In: 2725 [I.V.:2725]  Out: 1505 [Urine:1355; Blood:150]    Constitutional:Awake, alert, cooperative, no apparent distress  Respiratory: Clear to auscultation bilaterally, no crackles or wheezing  Cardiovascular: Regular rate and rhythm, normal S1 and S2, and no murmur noted  GI: Normal bowel sounds, soft, non-distended, non-tender  Skin/Integumen:right hip status post surgery   Neuro : moving all 4 extremities, no focal deficit noted     Medications     sodium chloride 100 mL/hr at 08/26/18 1918     sodium chloride 75 mL/hr at 08/25/18 2201       acetaminophen  975 mg Oral Q8H     enoxaparin  40 mg Subcutaneous Q24H     insulin aspart  1-7 Units Subcutaneous TID AC     insulin aspart  1-5 Units Subcutaneous At Bedtime     insulin glargine  5 Units Subcutaneous At Bedtime     ranitidine  150 mg Oral BID     senna-docusate  1 tablet Oral BID    Or     senna-docusate  2 tablet Oral BID     simvastatin  20 mg Oral At Bedtime     sodium chloride (PF)  3 mL Intracatheter Q8H       Data     Recent Labs  Lab 08/27/18  0715 08/26/18  0657 08/25/18 1954   WBC 10.0 12.8* 15.6*   HGB 9.2* 11.0* 11.5*   MCV 91 91 91   * 150 184    141 141   POTASSIUM 4.5 4.4 4.6   CHLORIDE 107 111* 111*   CO2 23 23 24   BUN 26 28 31*   CR 1.57* 1.43* 1.65*   ANIONGAP 8 7 6   JOSELINE 7.0* 8.1* 8.4*   * 102* 119*       Recent Labs  Lab 08/27/18  0715 08/27/18  0649 08/27/18  0100 08/26/18  2251 08/26/18  1823 08/26/18  1533 08/26/18  0657  08/25/18  1954   *  --   --   --   --   --  102*  --  119*   BGM  --  145* 165* 157* 167* 157*  --   < >  --    < > = values in this interval not displayed.    Imaging:   Recent Results (from  the past 24 hour(s))   XR Pelvis w Hip Port Right 1 View    Narrative    PELVIS WITH UNILATERAL HIP ONE VIEW RIGHT  8/26/2018 4:36 PM     HISTORY: Hip arthroplasty    COMPARISON: August 25, 2018     FINDINGS:    There is a hip arthroplasty in anatomic alignment with  well-seated components.      Impression    IMPRESSION:   Hip arthroplasty in anatomic alignment.    MODESTO SUTHERLAND MD

## 2018-08-27 NOTE — PROGRESS NOTES
"Orthopedic Surgery  8/27/2018  POD#: 1    S: Patient voices no complaints today.     O: Blood pressure 115/55, pulse 93, temperature 98.5  F (36.9  C), temperature source Oral, resp. rate 18, height 1.727 m (5' 8\"), weight 73.8 kg (162 lb 12.8 oz), SpO2 94 %.  Lab Results   Component Value Date    HGB 9.2 08/27/2018     Lab Results   Component Value Date    INR 0.91 04/21/2014        I/O last 3 completed shifts:  In: 2725 [I.V.:2725]  Out: 1505 [Urine:1355; Blood:150]    Neurovascularly intact.  Calves are negative bilaterally, both soft and nontender.  The wound is C/D/I.  The wound looks good with minimal erythema of the surrounding skin.    A: Mr. Flores is doing well status post Procedure(s):  R hip bipolar hemiarthroplasty    P:   1. Mobilize and continue physical therapy. WBAT and no hip precautions.  2. Anticoagulation - dc on ASA  3. Pain management - controlled  4. Anticipate discharge to TCU when medically cleared and mobile, likely 2 days.      Sharri Arellano PA-C  O: [phone]  "

## 2018-08-27 NOTE — PLAN OF CARE
Problem: Goal/Outcome  Goal: Goal Outcome Summary  Outcome: Improving  PT A&O VSS, on 2 L oxygen capno  CMS intact tolerated clears. Dressing CDI cms intact. Dangled on the edge of the bed at on previous shift collins out due to voide. Gave 1 oxy for pain. BS hypo active LS clear /hr . Tele SD with 1st degree block and BBB. Skin has multiple abrasion caused by mechanical fall. Nursing continue to monitor

## 2018-08-27 NOTE — PROGRESS NOTES
08/27/18 1000   Quick Adds   Type of Visit Initial PT Evaluation   Living Environment   Lives With spouse   Living Arrangements assisted living   Home Accessibility no concerns   Number of Stairs to Enter Home 0   Number of Stairs Within Home 0   Stair Railings at Home none   Self-Care   Usual Activity Tolerance good   Current Activity Tolerance fair   Equipment Currently Used at Home none   Functional Level Prior   Ambulation 0-->independent   Transferring 0-->independent   Toileting 0-->independent   Bathing 0-->independent   Dressing 0-->independent   Eating 0-->independent   Communication 0-->understands/communicates without difficulty   Swallowing 0-->swallows foods/liquids without difficulty   Cognition 0 - no cognition issues reported   Fall history within last six months yes   Number of times patient has fallen within last six months 1   Which of the above functional risks had a recent onset or change? ambulation;transferring;toileting;bathing;dressing   General Information   Onset of Illness/Injury or Date of Surgery - Date 08/25/18   Referring Physician Bill Sanders MD   Patient/Family Goals Statement Not stated this session   Pertinent History of Current Problem (include personal factors and/or comorbidities that impact the POC) Patient admitted on 8/25/18 for R hip pain after a fall. Patient found to have Right displaced femoral neck fracture. Patient is now POD-1 from R bipolar hemiarthroplasty using Biomet Echo stem. Patient with PMH of HTN, dyslipidemia, CAD s/p CABG of 4 vessels, DM II insulin dependent, and CKD III   Precautions/Limitations fall precautions   Weight-Bearing Status - LUE full weight-bearing   Weight-Bearing Status - RUE full weight-bearing   Weight-Bearing Status - LLE weight-bearing as tolerated   Weight-Bearing Status - RLE full weight-bearing   General Observations Patient supine in bed, sleepy, but answers to therapist voice; agreeable to working with therapy.   "  Cognitive Status Examination   Orientation orientation to person, place and time   Level of Consciousness alert   Follows Commands and Answers Questions 100% of the time;able to follow multistep instructions   Pain Assessment   Patient Currently in Pain Yes, see Vital Sign flowsheet  (states \"yes when I move\", does not rate on scale of 0-10. )   Integumentary/Edema   Integumentary/Edema Comments Multiple abrasions from mechanical fall, dressing on R hip CDI   Posture    Posture Forward head position;Kyphosis   Range of Motion (ROM)   ROM Comment R LE ROM WNL, L LE ROM limited secondary to pain   Strength   Strength Comments Not formally tested; not functionally tested this session - patient only able to dangle at EOB   Bed Mobility   Bed Mobility Comments Supine>sit completed with mod A x 2, patient with posterior lean secondary to increased pain with any hip flexion   Transfer Skills   Transfer Comments Not assessed, only able to sit EOB   Gait   Gait Comments Not assessed, only able to sit EOB   Balance   Balance Comments Sitting balance at EOB requiring Min to Mod A x 2 for sitting balance due to increased posterior and L lean. Patient leaning posterior secondary to increase in hip pain with any flexion. Noted to shift weight in sitting towards L to offload R hip.    General Therapy Interventions   Planned Therapy Interventions balance training;bed mobility training;gait training;strengthening;transfer training   Clinical Impression   Criteria for Skilled Therapeutic Intervention yes, treatment indicated   PT Diagnosis Impaired mobility and gait   Influenced by the following impairments pain, decreased ROM, weakness   Functional limitations due to impairments bed mobility, transfers, ambulation   Clinical Presentation Stable/Uncomplicated   Clinical Presentation Rationale Based on PMH, current presentation, and social support   Clinical Decision Making (Complexity) Low complexity   Therapy Frequency` daily " "  Predicted Duration of Therapy Intervention (days/wks) 4 days   Anticipated Equipment Needs at Discharge front wheeled walker   Anticipated Discharge Disposition Transitional Care Facility   Risk & Benefits of therapy have been explained Yes   Patient, Family & other staff in agreement with plan of care Yes   [facility] TM \"6 Clicks\"   2016, Trustees of [facility], under license to [employer].  All rights reserved.   6 Clicks Short Forms Basic Mobility Inpatient Short Form   [facility]  \"6 Clicks\" V.2 Basic Mobility Inpatient Short Form   1. Turning from your back to your side while in a flat bed without using bedrails? 3 - A Little   2. Moving from lying on your back to sitting on the side of a flat bed without using bedrails? 2 - A Lot   3. Moving to and from a bed to a chair (including a wheelchair)? 2 - A Lot   4. Standing up from a chair using your arms (e.g., wheelchair, or bedside chair)? 2 - A Lot   5. To walk in hospital room? 1 - Total   6. Climbing 3-5 steps with a railing? 1 - Total   Basic Mobility Raw Score (Score out of 24.Lower scores equate to lower levels of function) 11   Total Evaluation Time   Total Evaluation Time (Minutes) 8     "

## 2018-08-27 NOTE — PLAN OF CARE
Problem: Fractured Hip (Adult)  Goal: Signs and Symptoms of Listed Potential Problems Will be Absent, Minimized or Managed (Fractured Hip)  Signs and symptoms of listed potential problems will be absent, minimized or managed by discharge/transition of care (reference Fractured Hip (Adult) CPG).   Outcome: No Change  Pt A/Ox4. VSS, O2 2L. Up 2 assist w/ walker. Reports pain 5/10 using oxycodone with relief. Regular diet tolerated, , 172 and 198. Bladder scanned at 1530 for 242cc, continue to monitor. Abrasion to right forehead, FLORINA.

## 2018-08-28 ENCOUNTER — APPOINTMENT (OUTPATIENT)
Dept: PHYSICAL THERAPY | Facility: CLINIC | Age: 83
DRG: 469 | End: 2018-08-28
Payer: MEDICARE

## 2018-08-28 LAB
ALBUMIN UR-MCNC: 30 MG/DL
ANION GAP SERPL CALCULATED.3IONS-SCNC: 8 MMOL/L (ref 3–14)
APPEARANCE UR: ABNORMAL
BILIRUB UR QL STRIP: NEGATIVE
BUN SERPL-MCNC: 33 MG/DL (ref 7–30)
CALCIUM SERPL-MCNC: 7.3 MG/DL (ref 8.5–10.1)
CHLORIDE SERPL-SCNC: 107 MMOL/L (ref 94–109)
CO2 SERPL-SCNC: 22 MMOL/L (ref 20–32)
COLOR UR AUTO: YELLOW
CREAT SERPL-MCNC: 1.9 MG/DL (ref 0.66–1.25)
GFR SERPL CREATININE-BSD FRML MDRD: 33 ML/MIN/1.7M2
GLUCOSE BLDC GLUCOMTR-MCNC: 154 MG/DL (ref 70–99)
GLUCOSE BLDC GLUCOMTR-MCNC: 174 MG/DL (ref 70–99)
GLUCOSE BLDC GLUCOMTR-MCNC: 184 MG/DL (ref 70–99)
GLUCOSE SERPL-MCNC: 115 MG/DL (ref 70–99)
GLUCOSE UR STRIP-MCNC: 30 MG/DL
HGB BLD-MCNC: 9 G/DL (ref 13.3–17.7)
HGB UR QL STRIP: ABNORMAL
KETONES UR STRIP-MCNC: 5 MG/DL
LEUKOCYTE ESTERASE UR QL STRIP: ABNORMAL
MUCOUS THREADS #/AREA URNS LPF: PRESENT /LPF
NITRATE UR QL: NEGATIVE
PH UR STRIP: 5 PH (ref 5–7)
PLATELET # BLD AUTO: 111 10E9/L (ref 150–450)
POTASSIUM SERPL-SCNC: 4.1 MMOL/L (ref 3.4–5.3)
RBC #/AREA URNS AUTO: 9 /HPF (ref 0–2)
SODIUM SERPL-SCNC: 137 MMOL/L (ref 133–144)
SOURCE: ABNORMAL
SP GR UR STRIP: 1.02 (ref 1–1.03)
UROBILINOGEN UR STRIP-MCNC: NORMAL MG/DL (ref 0–2)
WBC #/AREA URNS AUTO: 12 /HPF (ref 0–5)

## 2018-08-28 PROCEDURE — 85049 AUTOMATED PLATELET COUNT: CPT | Performed by: ORTHOPAEDIC SURGERY

## 2018-08-28 PROCEDURE — 36415 COLL VENOUS BLD VENIPUNCTURE: CPT | Performed by: ORTHOPAEDIC SURGERY

## 2018-08-28 PROCEDURE — 25000128 H RX IP 250 OP 636: Performed by: HOSPITALIST

## 2018-08-28 PROCEDURE — 25000132 ZZH RX MED GY IP 250 OP 250 PS 637: Mod: GY | Performed by: ORTHOPAEDIC SURGERY

## 2018-08-28 PROCEDURE — 97530 THERAPEUTIC ACTIVITIES: CPT | Mod: GP

## 2018-08-28 PROCEDURE — 12000007 ZZH R&B INTERMEDIATE

## 2018-08-28 PROCEDURE — A9270 NON-COVERED ITEM OR SERVICE: HCPCS | Mod: GY | Performed by: ORTHOPAEDIC SURGERY

## 2018-08-28 PROCEDURE — A9270 NON-COVERED ITEM OR SERVICE: HCPCS | Mod: GY | Performed by: INTERNAL MEDICINE

## 2018-08-28 PROCEDURE — 00000146 ZZHCL STATISTIC GLUCOSE BY METER IP

## 2018-08-28 PROCEDURE — 25000132 ZZH RX MED GY IP 250 OP 250 PS 637: Mod: GY | Performed by: INTERNAL MEDICINE

## 2018-08-28 PROCEDURE — 93005 ELECTROCARDIOGRAM TRACING: CPT

## 2018-08-28 PROCEDURE — 93010 ELECTROCARDIOGRAM REPORT: CPT | Performed by: INTERNAL MEDICINE

## 2018-08-28 PROCEDURE — 25000128 H RX IP 250 OP 636: Performed by: INTERNAL MEDICINE

## 2018-08-28 PROCEDURE — 99233 SBSQ HOSP IP/OBS HIGH 50: CPT | Performed by: INTERNAL MEDICINE

## 2018-08-28 PROCEDURE — 85018 HEMOGLOBIN: CPT | Performed by: ORTHOPAEDIC SURGERY

## 2018-08-28 PROCEDURE — 40000193 ZZH STATISTIC PT WARD VISIT

## 2018-08-28 PROCEDURE — 97110 THERAPEUTIC EXERCISES: CPT | Mod: GP

## 2018-08-28 PROCEDURE — 25000128 H RX IP 250 OP 636: Performed by: ORTHOPAEDIC SURGERY

## 2018-08-28 PROCEDURE — 99232 SBSQ HOSP IP/OBS MODERATE 35: CPT | Performed by: INTERNAL MEDICINE

## 2018-08-28 PROCEDURE — 80048 BASIC METABOLIC PNL TOTAL CA: CPT | Performed by: ORTHOPAEDIC SURGERY

## 2018-08-28 RX ORDER — METOPROLOL SUCCINATE 25 MG/1
25 TABLET, EXTENDED RELEASE ORAL DAILY
Status: DISCONTINUED | OUTPATIENT
Start: 2018-08-28 | End: 2018-08-31

## 2018-08-28 RX ORDER — CEFTRIAXONE 1 G/1
1 INJECTION, POWDER, FOR SOLUTION INTRAMUSCULAR; INTRAVENOUS EVERY 24 HOURS
Status: DISCONTINUED | OUTPATIENT
Start: 2018-08-28 | End: 2018-08-29

## 2018-08-28 RX ORDER — SODIUM CHLORIDE 9 MG/ML
INJECTION, SOLUTION INTRAVENOUS CONTINUOUS
Status: ACTIVE | OUTPATIENT
Start: 2018-08-28 | End: 2018-08-28

## 2018-08-28 RX ADMIN — ACETAMINOPHEN 975 MG: 325 TABLET, FILM COATED ORAL at 06:49

## 2018-08-28 RX ADMIN — INSULIN ASPART 4 UNITS: 100 INJECTION, SOLUTION INTRAVENOUS; SUBCUTANEOUS at 17:56

## 2018-08-28 RX ADMIN — GLIPIZIDE 5 MG: 5 TABLET, FILM COATED, EXTENDED RELEASE ORAL at 17:55

## 2018-08-28 RX ADMIN — INSULIN ASPART 4 UNITS: 100 INJECTION, SOLUTION INTRAVENOUS; SUBCUTANEOUS at 12:46

## 2018-08-28 RX ADMIN — SODIUM CHLORIDE: 9 INJECTION, SOLUTION INTRAVENOUS at 08:51

## 2018-08-28 RX ADMIN — OXYCODONE HYDROCHLORIDE 5 MG: 5 TABLET ORAL at 08:45

## 2018-08-28 RX ADMIN — GLIPIZIDE 5 MG: 5 TABLET, FILM COATED, EXTENDED RELEASE ORAL at 06:49

## 2018-08-28 RX ADMIN — INSULIN ASPART 4 UNITS: 100 INJECTION, SOLUTION INTRAVENOUS; SUBCUTANEOUS at 08:51

## 2018-08-28 RX ADMIN — CEFTRIAXONE SODIUM 1 G: 1 INJECTION, POWDER, FOR SOLUTION INTRAMUSCULAR; INTRAVENOUS at 06:49

## 2018-08-28 RX ADMIN — ENOXAPARIN SODIUM 30 MG: 30 INJECTION SUBCUTANEOUS at 14:03

## 2018-08-28 RX ADMIN — SENNOSIDES AND DOCUSATE SODIUM 2 TABLET: 8.6; 5 TABLET ORAL at 08:45

## 2018-08-28 RX ADMIN — METOPROLOL SUCCINATE 25 MG: 25 TABLET, EXTENDED RELEASE ORAL at 11:05

## 2018-08-28 ASSESSMENT — ACTIVITIES OF DAILY LIVING (ADL)
ADLS_ACUITY_SCORE: 8
ADLS_ACUITY_SCORE: 9
ADLS_ACUITY_SCORE: 8
ADLS_ACUITY_SCORE: 9

## 2018-08-28 NOTE — PLAN OF CARE
Problem: Patient Care Overview  Goal: Plan of Care/Patient Progress Review  Outcome: No Change  Pt alert to self and time. Up with strong assist of 2, needs cues and reassurance when ambulating. Denies pain, poor appetite. Cardiology consult this am for high heart rate and new A-fib, pt is on Tele. Pt Calls out often for someone to take him home, wife and son here this evening, attentive and supportive to pt. Plan to discharge to TCU on Wed or Thurs.

## 2018-08-28 NOTE — PLAN OF CARE
Problem: Patient Care Overview  Goal: Plan of Care/Patient Progress Review  Outcome: Improving  Pt is A&Ox4, VSS on 2L of oxygen NC, afebrile. Up with assist of 2, repo every 2 hours. CMS intact and Aquacel drsg C/D/I. Voiding adequately per urinal. Pain managed by oxycodone and scheduled tylenol. Progressing well per POC.

## 2018-08-28 NOTE — PROVIDER NOTIFICATION
MD Notification    Notified Person: MD Dr. Mueller    Notified Person Name:    Notification Date/Time:8/28/18, 8:46 am    Notification Interaction:     Purpose of Notification: tele reading SVT.    Orders Received: No new order.     Comments:

## 2018-08-28 NOTE — PLAN OF CARE
Problem: Surgery Nonspecified (Adult)  Goal: Signs and Symptoms of Listed Potential Problems Will be Absent, Minimized or Managed (Surgery Nonspecified)  Signs and symptoms of listed potential problems will be absent, minimized or managed by discharge/transition of care (reference Surgery Nonspecified (Adult) CPG).   Outcome: No Change  Pt A/OX4, forgetful at times. VSS, O2 2L. Up 2 assist w/ lift. Reports pain 2/10 using oxycodone with relief. Regular diet tolerated. CMS intact. Dressing c/d/i, aquacel in place. Voiding adequately, frequency Dr. Arzola notified. Episode of VT at 0717 and 0740, Dr. Arzola and cardiologist Dr. Wen notified.

## 2018-08-28 NOTE — PROGRESS NOTES
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  He was found to have a right femoral neck fracture.  He was found to have fibrillation on his EKG done in the ER.  He has history of sinus arrhythmia, no documented history of atrial fibrillation.       Right femoral neck fracture  Mechanical fall  The patient describes the fall as being mechanical.  He has some balance problems recently, but no frequent falls.  He denies any preceding symptoms, no dizziness, no shortness of breath, no chest pains, no loss of consciousness.  He did hit his head slightly and had a skin laceration that was sutured as the ER.  Ortho on-call was contacted.  The patient seems to have been in a good state of health.  He does have a history of coronary artery disease with coronary artery bypass grafting, but as per the patient and his son, both physicians, he did not have any problems since his surgery.     - Post op management per Orthopedics.    - IVF, Pain control - Oxycodone, Tylenol   - PT/OT     New diagnosis of Atrial fibrillation  Apparently, he was told that he had a sinus arrhythmia in the past.  He does not have formal diagnosis of A-Fib although his son states he is not surprised of this as the patient's pulse had been irregular in the past.  The patient is on a baby aspirin at home, which will be held at this time.  His heart rate seems to be a rate controlled on his prior to admission atenolol, which will be continued.     - echocardiogram results noted    - Cardiology consulted. His CHADS-VASc score is 4 for age, hypertension, and diabetes.  This puts him in the high risk group  for a stroke.  Anticoagulation management for atrial fibrillation was discussed with them briefly on admission. The patient states he is not interested in starting anticoagulation.  His son does states that he seems to be at high risk of falls.    - requested cardiology input today as patient  Had a run of wide complex tachyarrhythmia , atenolol was on hold sicne yesterday ,as per EP recommendation due to bradycardia .   - Resume PTA Asa once cleared from Ortho.        Hypertension  His blood pressure was slightly elevated in ER.  He did have a lot of pain. Improved presently.     - Hydralazine IV p.r.n.     Leukocytosis  White blood cells of 15.6, likely related to stress.  His chest x-ray does not show any infiltrates.  UA is negative.  He does not have fevers.     - Wbc back to normal      Diabetes mellitus type 2  Hemoglobin A1c 8.4.  At home he had maintained on Lantus 10 units at bedtime, glipizide-XL 5 mg p.o. twice daily and Humalog 4 units subcutaneously 3 times daily with meals.    - restarted  home medications since his oral intake improved.     Chronic kidney disease, stage III  Baseline creatinine seems to be between 1.5-1.8.  Creatinine on admission is 1.65.     - creatinine up to 1.9, family updated    -start on iv fluids    - We will avoid nephrotoxic drugs.     - Follow BMP.   # Pain Assessment:  As per orthopedics    Called and updated son   DVT Prophylaxis: as per orthopedics    Code Status: DNR/DNI     Disposition: Expected discharge in 1-2 days     Betty Arzola MD  Text Page   (7am to 6pm)    Interval History   Not feeling well, had a run of tachyarrhythmia today am, appears wide complex , cardiology input requested     -Data reviewed today: I reviewed all new labs and imaging results over the last 24 hours.     Physical Exam   Temp: 98.3  F (36.8  C) Temp src: Oral BP: 138/68 Pulse: 100 Heart Rate: 100 Resp: 16 SpO2: 92 % O2 Device: Nasal cannula Oxygen Delivery: 2 LPM  Vitals:    08/25/18  2131 08/26/18 0500   Weight: 75.8 kg (167 lb 3.2 oz) 73.8 kg (162 lb 12.8 oz)     Vital Signs with Ranges  Temp:  [98.3  F (36.8  C)-100.9  F (38.3  C)] 98.3  F (36.8  C)  Pulse:  [] 100  Heart Rate:  [] 100  Resp:  [16-18] 16  BP: ()/(38-68) 138/68  SpO2:  [86 %-98 %] 92 %  I/O last 3 completed shifts:  In: 120 [P.O.:120]  Out: 400 [Urine:400]    Constitutional:Awake, alert, cooperative, no apparent distress  Respiratory: Clear to auscultation bilaterally, no crackles or wheezing  Cardiovascular: Regular rate and rhythm, normal S1 and S2, and no murmur noted  GI: Normal bowel sounds, soft, non-distended, non-tender  Skin/Integumen:right hip status post surgery   Neuro : moving all 4 extremities, no focal deficit noted     Medications     sodium chloride 75 mL/hr at 08/28/18 0851       acetaminophen  975 mg Oral Q8H     cefTRIAXone  1 g Intravenous Q24H     enoxaparin  30 mg Subcutaneous Q24H     glipiZIDE  5 mg Oral BID AC     insulin aspart  4 Units Subcutaneous TID w/meals     insulin aspart  1-7 Units Subcutaneous TID AC     insulin aspart  1-5 Units Subcutaneous At Bedtime     insulin glargine  5 Units Subcutaneous At Bedtime     metoprolol succinate  25 mg Oral Daily     ranitidine  150 mg Oral At Bedtime     senna-docusate  1 tablet Oral BID    Or     senna-docusate  2 tablet Oral BID     simvastatin  20 mg Oral At Bedtime     sodium chloride (PF)  3 mL Intracatheter Q8H       Data     Recent Labs  Lab 08/28/18  0615 08/27/18  0715 08/26/18  0657 08/25/18  1954   WBC  --  10.0 12.8* 15.6*   HGB 9.0* 9.2* 11.0* 11.5*   MCV  --  91 91 91   * 108* 150 184    138 141 141   POTASSIUM 4.1 4.5 4.4 4.6   CHLORIDE 107 107 111* 111*   CO2 22 23 23 24   BUN 33* 26 28 31*   CR 1.90* 1.57* 1.43* 1.65*   ANIONGAP 8 8 7 6   JOSELINE 7.3* 7.0* 8.1* 8.4*   * 146* 102* 119*       Recent Labs  Lab 08/28/18  1117 08/28/18  0615 08/28/18  0207 08/27/18  2115 08/27/18  1737 08/27/18  1353  08/27/18  0715  08/26/18  0657  08/25/18  1954   GLC  --  115*  --   --   --   --  146*  --  102*  --  119*   *  --  154* 238* 198* 172*  --   < >  --   < >  --    < > = values in this interval not displayed.    Imaging:   No results found for this or any previous visit (from the past 24 hour(s)).

## 2018-08-28 NOTE — CONSULTS
Care Transition Initial Assessment - SW  Reason For Consult: discharge planning  Met with: Patient and called son, Aleksandar per patient's agreement  Active Problems:    Closed right hip fracture (H)    S/P total hip arthroplasty       DATA  Lives With: spouse  Living Arrangements: assisted living     Who is your support system?: Children  Identified issues/concerns regarding health management: SW consult noted for DC planning. Patient is a 89 year old male anticipated to be ready for DC in 1-2 days. PT recommends a TCU stay. Previously, patient was providing care to his spouse, in their apartment at the [facility]. Meals were provided. Spoke with patient about a TCU stay. He noted his son Aleksandar is involved in caring for his spouse and agreed he should be called to discuss the plan. Reached Aleksandar who states he has placed his mother in memory care. We reviewed the Medicare guidelines for coverage of a TCU stay for patient. His preference is for [facility], in a private room. If another option is needed, he would request [facility]. A referral was sent to [facility] via [facility].       Quality Of Family Relationships: supportive, involved     ASSESSMENT  Cognitive Status: Oriented with forgetfulness  Concerns to be addressed: SW will follow to coordinate the DC plan.   PLAN  Financial costs for the patient includes: Private room cost reviewed.  Patient given options and choices for discharge: Yes  Patient/family is agreeable to the plan? Yes  Patient Goals and Preferences: TCU  Patient anticipates discharging to: TCU

## 2018-08-28 NOTE — PROGRESS NOTES
"Abimael Reynosoharvey  2018  Patient s/p Right hip hemiarthroplasty  Admit Date:  2018      Doing well.  Objective: no complaint today in regards to the hip. Patient states he hasn't eaten this morning and is upset about that but nursing says his breakfast has been ordered it just hasn't been brought to him yet. Patient claims that the UTI he believes he had pre operatively because he had some \"urgency\" before he fractured his hip. He is willing to work with therapy but is doing so very slowly. Nursing also reports that patient is going in and out of SVT periodically and that they have notified medical team about this.   Blood pressure 116/57, pulse 86, temperature 99.6  F (37.6  C), temperature source Oral, resp. rate 16, height 1.727 m (5' 8\"), weight 73.8 kg (162 lb 12.8 oz), SpO2 97 %.    Temperatures:  Current - Temp: 99.6  F (37.6  C); Max - Temp  Av.3  F (37.4  C)  Min: 98.5  F (36.9  C)  Max: 100.9  F (38.3  C)  Pulse range: Pulse  Av.5  Min: 85  Max: 86  Blood pressure range: Systolic (24hrs), Av , Min:95 , Max:116   ; Diastolic (24hrs), Av, Min:38, Max:57    Exam:  CMS: intact  alert, stable, wound ok  aquacil dressing c/d/i  Calf s/nt  DF/PF   Patient communicates clearly with examiner    Labs:  Recent Labs   Lab Test  18   0615  18   0715  18   0657   POTASSIUM  4.1  4.5  4.4     Recent Labs   Lab Test  18   0615  18   0715  18   0657   HGB  9.0*  9.2*  11.0*     Recent Labs   Lab Test  14   0405   INR  0.91     Recent Labs   Lab Test  18   0615  18   0715  18   0657   PLT  111*  108*  150       PLAN: continue current therapy regimen. Medical team managing medical issues.   Dressing intact. Does not need to be changed at this time. WBAT in the RLE with no hip precautions.   Use ambulatory assistance at all times.   Lovenox for DVT ppx now, but will discharge on aspirin.   "

## 2018-08-28 NOTE — PROGRESS NOTES
"  Cardiology Progress Note          Assessment and Plan:   Admitted for right hip fracture fall accidental fall. Post surgical repair.  There was a report of atrial fib in the past but there is no ECG documentation.  Dr. Laurent was consulted over the weekend for evaluation of atrial fib. No evidence.  Pt had 2 runs of relatively wide QRS tachycardia this morning. Tracings were reviewed. They look more like nonsustained VT at rate of 150 bpm. No apparent symptoms.  Normal EF.   LBBB.  \"CAD\", no angiography record.  Type II diabetes, retinal detachment.  DNR/DNI.    Will try low dose Toprol XL 25 mg po daily. Monitor for possible side effects. May increase dose to 50 mg daily if he continues to have tachycardia.  Jacob Stockton MD  Cardiology   [phone]                Diagnosis:     Patient Active Problem List   Diagnosis     Injury of globe of eye     Recent retinal detachment, total or subtotal     Closed right hip fracture (H)     S/P total hip arthroplasty                Physical Exam:   Blood pressure 116/57, pulse 86, temperature 99.6  F (37.6  C), temperature source Oral, resp. rate 16, height 1.727 m (5' 8\"), weight 73.8 kg (162 lb 12.8 oz), SpO2 97 %.  Wt Readings from Last 4 Encounters:   08/26/18 73.8 kg (162 lb 12.8 oz)   04/21/14 73.5 kg (162 lb)   04/21/14 74.8 kg (165 lb)      I/O last 3 completed shifts:  In: 120 [P.O.:120]  Out: 400 [Urine:400]    Constitutional: Alert, no apparent distress,    Lungs: No crackles or wheezing,    Cardiovascular: Regular rate and rhythm, normal S1 and S2, and no murmur,    Lymphm node  Neck  ENT  Neurologic  Abdomen: No enlargement  No jugular vein extension or carotid bruit  No apparent abnormality  No focal deficit  Normal bowel sounds, soft, no distension, no tender   Skin: No rashes, no cyanosis   Extremity: No edema          Medications:     Current Facility-Administered Medications:      [START ON 8/29/2018] acetaminophen (TYLENOL) tablet 650 mg, 650 mg, Oral, Q4H " PRN, Bill Sanders MD     acetaminophen (TYLENOL) tablet 975 mg, 975 mg, Oral, Q8H, Bill Sanders MD, 975 mg at 08/28/18 0649     benzocaine-menthol (CHLORASEPTIC) 6-10 MG lozenge 1 lozenge, 1 lozenge, Buccal, Q1H PRN, Bill Sanders MD, 1 lozenge at 08/26/18 1742     cefTRIAXone (ROCEPHIN) 1 g vial to attach to  mL bag for ADULTS or NS 50 mL bag for PEDS, 1 g, Intravenous, Q24H, Cj Calle MD, 1 g at 08/28/18 0649     glucose gel 15-30 g, 15-30 g, Oral, Q15 Min PRN **OR** dextrose 50 % injection 25-50 mL, 25-50 mL, Intravenous, Q15 Min PRN **OR** glucagon injection 1 mg, 1 mg, Subcutaneous, Q15 Min PRN, eGma Martinez PA-C     enoxaparin (LOVENOX) injection 30 mg, 30 mg, Subcutaneous, Q24H, Bill Sanders MD     glipiZIDE (GLUCOTROL XL) 24 hr tablet 5 mg, 5 mg, Oral, BID Dariusz LAWRENCE Claudia, MD, 5 mg at 08/28/18 0649     hydrALAZINE (APRESOLINE) injection 10 mg, 10 mg, Intravenous, Q4H PRN, Mamie Eubanks MD     HYDROmorphone (PF) (DILAUDID) injection 0.2 mg, 0.2 mg, Intravenous, Q2H PRN, Bill Sanders MD, 0.2 mg at 08/26/18 2032     hydrOXYzine (ATARAX) tablet 25 mg, 25 mg, Oral, TID PRN, Bill Sanders MD     insulin aspart (NovoLOG) inj (RAPID ACTING), 4 Units, Subcutaneous, TID w/mealsDariusz Claudia, MD, 4 Units at 08/28/18 0851     insulin aspart (NovoLOG) inj (RAPID ACTING), 1-7 Units, Subcutaneous, TID Juan LAWRENCE Stephanie Linnea, PA-C, 2 Units at 08/27/18 1745     insulin aspart (NovoLOG) inj (RAPID ACTING), 1-5 Units, Subcutaneous, At Bedtime, Gema Martinez PA-C, 1 Units at 08/27/18 2211     insulin glargine (LANTUS) injection 5 Units, 5 Units, Subcutaneous, At Bedtime, Mamie Eubanks MD, 5 Units at 08/27/18 2210     lidocaine (LMX4) cream, , Topical, Q1H PRN, Bill Sanders MD     lidocaine 1 % 1 mL, 1 mL, Other, Q1H PRN, Bill Sanders MD     magnesium hydroxide  (MILK OF MAGNESIA) suspension 30 mL, 30 mL, Oral, Daily PRN, Mamie Eubanks MD     melatonin tablet 1 mg, 1 mg, Oral, At Bedtime PRN, Mamie Eubanks MD, 1 mg at 08/25/18 2221     metoprolol (LOPRESSOR) injection 2.5-5 mg, 2.5-5 mg, Intravenous, Q4H PRN, Nas Varner MD     metoprolol succinate (TOPROL-XL) 24 hr tablet 25 mg, 25 mg, Oral, Daily, Jacob Stockton MD     naloxone (NARCAN) injection 0.1-0.4 mg, 0.1-0.4 mg, Intravenous, Q2 Min PRN, Bill Sanders MD     ondansetron (ZOFRAN-ODT) ODT tab 4 mg, 4 mg, Oral, Q6H PRN **OR** ondansetron (ZOFRAN) injection 4 mg, 4 mg, Intravenous, Q6H PRN, Bill Sanders MD     oxyCODONE IR (ROXICODONE) tablet 5 mg, 5 mg, Oral, Q3H PRN, Bill Sanders MD, 5 mg at 08/28/18 0845     ranitidine (ZANTAC) tablet 150 mg, 150 mg, Oral, At Bedtime, Bill Sanders MD, 150 mg at 08/27/18 2210     senna-docusate (SENOKOT-S;PERICOLACE) 8.6-50 MG per tablet 1 tablet, 1 tablet, Oral, BID, 1 tablet at 08/27/18 2210 **OR** senna-docusate (SENOKOT-S;PERICOLACE) 8.6-50 MG per tablet 2 tablet, 2 tablet, Oral, BID, Bill Sanders MD, 2 tablet at 08/28/18 0845     simvastatin (ZOCOR) tablet 20 mg, 20 mg, Oral, At Bedtime, Mmaie Eubanks MD, 20 mg at 08/27/18 2210     sodium chloride (PF) 0.9% PF flush 3 mL, 3 mL, Intracatheter, Q1H PRN, Bill Sanders MD     sodium chloride (PF) 0.9% PF flush 3 mL, 3 mL, Intracatheter, Q8H, Bill Sanders MD, 3 mL at 08/28/18 0851     sodium chloride 0.9% infusion, , Intravenous, Continuous, Betty Arzola MD, Last Rate: 75 mL/hr at 08/28/18 0851     zolpidem (AMBIEN) half-tab 2.5 mg, 2.5 mg, Oral, At Bedtime PRN, Bill Sanders MD           Lab results:        Recent Labs   Lab Test  08/28/18   0615  08/27/18   0715  08/26/18   0657   NA  137  138  141   POTASSIUM  4.1  4.5  4.4   CHLORIDE  107  107  111*   JOSELINE  7.3*  7.0*  8.1*   CO2  22  23  23    BUN  33*  26  28   CR  1.90*  1.57*  1.43*   GLC  115*  146*  102*     Recent Labs   Lab Test  08/28/18   0615  08/27/18   0715  08/26/18   0657  08/25/18 1954   HGB  9.0*  9.2*  11.0*  11.5*   WBC   --   10.0  12.8*  15.6*   PLT  111*  108*  150  184     Recent Labs   Lab Test  04/21/14   0405   INR  0.91     Recent Labs   Lab Test  04/21/14   1656   TROPONIN  0.03

## 2018-08-28 NOTE — PLAN OF CARE
Problem: Patient Care Overview  Goal: Plan of Care/Patient Progress Review  Discharge Planner OT   Patient plan for discharge: TCU  Current status: OT orders received, chart reviewed and noted dc plan is TCU. Therefore, skilled OT intervention will be deferred to that next level of care when pt stronger and better able to tolerate out of bed ADL retraining and PT will continue to following during inpatient stay to advance mobility. OT to sign off and complete order.   Barriers to return to prior living situation: see PT note  Recommendations for discharge: see PT note  Rationale for recommendations: see PT note       Entered by: Power Howard 08/28/2018 12:59 PM

## 2018-08-28 NOTE — PLAN OF CARE
Problem: Patient Care Overview  Goal: Plan of Care/Patient Progress Review  PT:  Discharge Planner PT   Patient plan for discharge: Not stated  Current status: Patient completed supine DELL exercises x 10 reps with AAROM needed for heel slides and SAQs. Supine<>sit completed with mod A x 2. Varied assistance needed for sitting balance at EOB, patient intermittently needing Mod A to min A for sitting balance but then able to correct with cues and sit at EOB with CGA. Patient completed sit<>stand x 3 from EOB with FWW and Mod A x 2. Patient needing assistance and cues to correct posterior lean. Attempted to complete steps to chair during second standing attempt, but patient sitting down abruptly. Patient able to take a few steps from bed to chair with use of FWW and max A x 2; cues needed for step progression. W/c positioned on R side of patient for transfer due to greater ease turning to R compared to L. Patient up in chair at end of session with chair alarm on and all needs within reach.   Barriers to return to prior living situation: current level of assistance, weakness, pain  Recommendations for discharge: TCU  Rationale for recommendations: Patient would benefit from continued skilled therapy to further improve strength and independence with mobility and ambulation.        Entered by: Ana Campbell 08/28/2018 11:41 AM

## 2018-08-28 NOTE — PROVIDER NOTIFICATION
Called hospitalist in regards to patient's complaint of urgency and painful urination. New orders given.

## 2018-08-28 NOTE — PLAN OF CARE
Problem: Patient Care Overview  Goal: Plan of Care/Patient Progress Review  Outcome: No Change  Patient refused scheduled 1400 tylenol, denies pain. He had 10 mg oxycodone earlier and now is experiencing some confusion, pulled IV out and stripped off gown, c/o he wants to leave.  CMS+, dressing C/D/I.  VSS on 2 L O2.

## 2018-08-29 ENCOUNTER — APPOINTMENT (OUTPATIENT)
Dept: GENERAL RADIOLOGY | Facility: CLINIC | Age: 83
DRG: 469 | End: 2018-08-29
Attending: NURSE PRACTITIONER
Payer: MEDICARE

## 2018-08-29 ENCOUNTER — APPOINTMENT (OUTPATIENT)
Dept: PHYSICAL THERAPY | Facility: CLINIC | Age: 83
DRG: 469 | End: 2018-08-29
Payer: MEDICARE

## 2018-08-29 LAB
ANION GAP SERPL CALCULATED.3IONS-SCNC: 8 MMOL/L (ref 3–14)
ANION GAP SERPL CALCULATED.3IONS-SCNC: 9 MMOL/L (ref 3–14)
BACTERIA SPEC CULT: NO GROWTH
BUN SERPL-MCNC: 34 MG/DL (ref 7–30)
BUN SERPL-MCNC: 38 MG/DL (ref 7–30)
CALCIUM SERPL-MCNC: 7.2 MG/DL (ref 8.5–10.1)
CALCIUM SERPL-MCNC: 7.9 MG/DL (ref 8.5–10.1)
CHLORIDE SERPL-SCNC: 109 MMOL/L (ref 94–109)
CHLORIDE SERPL-SCNC: 109 MMOL/L (ref 94–109)
CO2 SERPL-SCNC: 23 MMOL/L (ref 20–32)
CO2 SERPL-SCNC: 23 MMOL/L (ref 20–32)
CREAT SERPL-MCNC: 1.86 MG/DL (ref 0.66–1.25)
CREAT SERPL-MCNC: 1.87 MG/DL (ref 0.66–1.25)
ERYTHROCYTE [DISTWIDTH] IN BLOOD BY AUTOMATED COUNT: 14.4 % (ref 10–15)
GFR SERPL CREATININE-BSD FRML MDRD: 34 ML/MIN/1.7M2
GFR SERPL CREATININE-BSD FRML MDRD: 34 ML/MIN/1.7M2
GLUCOSE BLDC GLUCOMTR-MCNC: 161 MG/DL (ref 70–99)
GLUCOSE BLDC GLUCOMTR-MCNC: 167 MG/DL (ref 70–99)
GLUCOSE BLDC GLUCOMTR-MCNC: 190 MG/DL (ref 70–99)
GLUCOSE BLDC GLUCOMTR-MCNC: 212 MG/DL (ref 70–99)
GLUCOSE BLDC GLUCOMTR-MCNC: 98 MG/DL (ref 70–99)
GLUCOSE SERPL-MCNC: 105 MG/DL (ref 70–99)
GLUCOSE SERPL-MCNC: 183 MG/DL (ref 70–99)
HCT VFR BLD AUTO: 26.2 % (ref 40–53)
HGB BLD-MCNC: 8.6 G/DL (ref 13.3–17.7)
INTERPRETATION ECG - MUSE: NORMAL
LACTATE BLD-SCNC: 1.1 MMOL/L (ref 0.7–2)
Lab: NORMAL
MAGNESIUM SERPL-MCNC: 2.6 MG/DL (ref 1.6–2.3)
MCH RBC QN AUTO: 29.3 PG (ref 26.5–33)
MCHC RBC AUTO-ENTMCNC: 32.8 G/DL (ref 31.5–36.5)
MCV RBC AUTO: 89 FL (ref 78–100)
NT-PROBNP SERPL-MCNC: 8601 PG/ML (ref 0–1800)
PLATELET # BLD AUTO: 109 10E9/L (ref 150–450)
PLATELET # BLD AUTO: 118 10E9/L (ref 150–450)
POTASSIUM SERPL-SCNC: 3.8 MMOL/L (ref 3.4–5.3)
POTASSIUM SERPL-SCNC: 4.5 MMOL/L (ref 3.4–5.3)
RBC # BLD AUTO: 2.94 10E12/L (ref 4.4–5.9)
SODIUM SERPL-SCNC: 140 MMOL/L (ref 133–144)
SODIUM SERPL-SCNC: 141 MMOL/L (ref 133–144)
SPECIMEN SOURCE: NORMAL
TROPONIN I SERPL-MCNC: 0.04 UG/L (ref 0–0.04)
WBC # BLD AUTO: 11.5 10E9/L (ref 4–11)

## 2018-08-29 PROCEDURE — 80048 BASIC METABOLIC PNL TOTAL CA: CPT | Performed by: ORTHOPAEDIC SURGERY

## 2018-08-29 PROCEDURE — 93010 ELECTROCARDIOGRAM REPORT: CPT | Mod: 76 | Performed by: INTERNAL MEDICINE

## 2018-08-29 PROCEDURE — 25000128 H RX IP 250 OP 636: Performed by: ORTHOPAEDIC SURGERY

## 2018-08-29 PROCEDURE — 97110 THERAPEUTIC EXERCISES: CPT | Mod: GP

## 2018-08-29 PROCEDURE — 25000132 ZZH RX MED GY IP 250 OP 250 PS 637: Mod: GY | Performed by: INTERNAL MEDICINE

## 2018-08-29 PROCEDURE — 36415 COLL VENOUS BLD VENIPUNCTURE: CPT | Performed by: NURSE PRACTITIONER

## 2018-08-29 PROCEDURE — A9270 NON-COVERED ITEM OR SERVICE: HCPCS | Mod: GY | Performed by: INTERNAL MEDICINE

## 2018-08-29 PROCEDURE — 99207 ZZC APP CREDIT; MD BILLING SHARED VISIT: CPT | Performed by: NURSE PRACTITIONER

## 2018-08-29 PROCEDURE — 36415 COLL VENOUS BLD VENIPUNCTURE: CPT | Performed by: ORTHOPAEDIC SURGERY

## 2018-08-29 PROCEDURE — 84484 ASSAY OF TROPONIN QUANT: CPT | Performed by: NURSE PRACTITIONER

## 2018-08-29 PROCEDURE — 40000193 ZZH STATISTIC PT WARD VISIT

## 2018-08-29 PROCEDURE — 85049 AUTOMATED PLATELET COUNT: CPT | Performed by: ORTHOPAEDIC SURGERY

## 2018-08-29 PROCEDURE — 99232 SBSQ HOSP IP/OBS MODERATE 35: CPT | Performed by: INTERNAL MEDICINE

## 2018-08-29 PROCEDURE — 21000000 ZZH R&B IMCU HEART CARE

## 2018-08-29 PROCEDURE — 25000128 H RX IP 250 OP 636: Performed by: INTERNAL MEDICINE

## 2018-08-29 PROCEDURE — 36415 COLL VENOUS BLD VENIPUNCTURE: CPT | Performed by: INTERNAL MEDICINE

## 2018-08-29 PROCEDURE — 25000128 H RX IP 250 OP 636: Performed by: NURSE PRACTITIONER

## 2018-08-29 PROCEDURE — 25000125 ZZHC RX 250: Performed by: INTERNAL MEDICINE

## 2018-08-29 PROCEDURE — 83605 ASSAY OF LACTIC ACID: CPT | Performed by: INTERNAL MEDICINE

## 2018-08-29 PROCEDURE — 25000132 ZZH RX MED GY IP 250 OP 250 PS 637: Mod: GY | Performed by: ORTHOPAEDIC SURGERY

## 2018-08-29 PROCEDURE — 25000125 ZZHC RX 250: Performed by: NURSE PRACTITIONER

## 2018-08-29 PROCEDURE — 25000128 H RX IP 250 OP 636: Performed by: HOSPITALIST

## 2018-08-29 PROCEDURE — 80048 BASIC METABOLIC PNL TOTAL CA: CPT | Performed by: NURSE PRACTITIONER

## 2018-08-29 PROCEDURE — 83735 ASSAY OF MAGNESIUM: CPT | Performed by: NURSE PRACTITIONER

## 2018-08-29 PROCEDURE — 83880 ASSAY OF NATRIURETIC PEPTIDE: CPT | Performed by: NURSE PRACTITIONER

## 2018-08-29 PROCEDURE — 25000131 ZZH RX MED GY IP 250 OP 636 PS 637: Mod: GY | Performed by: INTERNAL MEDICINE

## 2018-08-29 PROCEDURE — 93005 ELECTROCARDIOGRAM TRACING: CPT

## 2018-08-29 PROCEDURE — 40000275 ZZH STATISTIC RCP TIME EA 10 MIN

## 2018-08-29 PROCEDURE — A9270 NON-COVERED ITEM OR SERVICE: HCPCS | Mod: GY | Performed by: ORTHOPAEDIC SURGERY

## 2018-08-29 PROCEDURE — 71045 X-RAY EXAM CHEST 1 VIEW: CPT

## 2018-08-29 PROCEDURE — 00000146 ZZHCL STATISTIC GLUCOSE BY METER IP

## 2018-08-29 PROCEDURE — 85027 COMPLETE CBC AUTOMATED: CPT | Performed by: INTERNAL MEDICINE

## 2018-08-29 RX ORDER — SODIUM CHLORIDE 9 MG/ML
INJECTION, SOLUTION INTRAVENOUS CONTINUOUS
Status: DISCONTINUED | OUTPATIENT
Start: 2018-08-29 | End: 2018-08-29

## 2018-08-29 RX ORDER — ASPIRIN 325 MG
325 TABLET, DELAYED RELEASE (ENTERIC COATED) ORAL DAILY
Qty: 30 TABLET | DISCHARGE
Start: 2018-08-29 | End: 2018-09-04

## 2018-08-29 RX ORDER — PIPERACILLIN SODIUM, TAZOBACTAM SODIUM 3; .375 G/15ML; G/15ML
3.38 INJECTION, POWDER, LYOPHILIZED, FOR SOLUTION INTRAVENOUS EVERY 6 HOURS
Status: DISCONTINUED | OUTPATIENT
Start: 2018-08-29 | End: 2018-08-31

## 2018-08-29 RX ADMIN — SENNOSIDES AND DOCUSATE SODIUM 2 TABLET: 8.6; 5 TABLET ORAL at 11:01

## 2018-08-29 RX ADMIN — CEFTRIAXONE SODIUM 1 G: 1 INJECTION, POWDER, FOR SOLUTION INTRAMUSCULAR; INTRAVENOUS at 06:32

## 2018-08-29 RX ADMIN — METOPROLOL TARTRATE 2.5 MG: 5 INJECTION, SOLUTION INTRAVENOUS at 19:55

## 2018-08-29 RX ADMIN — GLIPIZIDE 5 MG: 5 TABLET, FILM COATED, EXTENDED RELEASE ORAL at 17:18

## 2018-08-29 RX ADMIN — ACETAMINOPHEN 975 MG: 325 TABLET, FILM COATED ORAL at 06:32

## 2018-08-29 RX ADMIN — PIPERACILLIN SODIUM,TAZOBACTAM SODIUM 3.38 G: 3; .375 INJECTION, POWDER, FOR SOLUTION INTRAVENOUS at 21:15

## 2018-08-29 RX ADMIN — ENOXAPARIN SODIUM 30 MG: 30 INJECTION SUBCUTANEOUS at 14:05

## 2018-08-29 RX ADMIN — SODIUM CHLORIDE: 9 INJECTION, SOLUTION INTRAVENOUS at 14:05

## 2018-08-29 RX ADMIN — METOPROLOL SUCCINATE 25 MG: 25 TABLET, EXTENDED RELEASE ORAL at 10:43

## 2018-08-29 RX ADMIN — ACETAMINOPHEN 975 MG: 325 TABLET, FILM COATED ORAL at 14:05

## 2018-08-29 RX ADMIN — SODIUM CHLORIDE, PRESERVATIVE FREE 500 ML: 5 INJECTION INTRAVENOUS at 16:15

## 2018-08-29 RX ADMIN — SIMVASTATIN 20 MG: 20 TABLET, FILM COATED ORAL at 21:16

## 2018-08-29 RX ADMIN — METOPROLOL TARTRATE 2.5 MG: 5 INJECTION, SOLUTION INTRAVENOUS at 15:19

## 2018-08-29 RX ADMIN — DEXTRAN 70 AND HYPROMELLOSE 2910 1 DROP: 1; 3 SOLUTION/ DROPS OPHTHALMIC at 14:58

## 2018-08-29 RX ADMIN — METOPROLOL TARTRATE 5 MG: 5 INJECTION, SOLUTION INTRAVENOUS at 21:53

## 2018-08-29 RX ADMIN — INSULIN ASPART 4 UNITS: 100 INJECTION, SOLUTION INTRAVENOUS; SUBCUTANEOUS at 14:06

## 2018-08-29 RX ADMIN — RANITIDINE 150 MG: 150 TABLET ORAL at 21:16

## 2018-08-29 RX ADMIN — INSULIN GLARGINE 5 UNITS: 100 INJECTION, SOLUTION SUBCUTANEOUS at 21:19

## 2018-08-29 RX ADMIN — GLIPIZIDE 5 MG: 5 TABLET, FILM COATED, EXTENDED RELEASE ORAL at 06:32

## 2018-08-29 RX ADMIN — INSULIN ASPART 4 UNITS: 100 INJECTION, SOLUTION INTRAVENOUS; SUBCUTANEOUS at 18:59

## 2018-08-29 RX ADMIN — MAGNESIUM SULFATE HEPTAHYDRATE 1 G: 500 INJECTION, SOLUTION INTRAMUSCULAR; INTRAVENOUS at 16:42

## 2018-08-29 ASSESSMENT — ACTIVITIES OF DAILY LIVING (ADL)
ADLS_ACUITY_SCORE: 13
ADLS_ACUITY_SCORE: 9
ADLS_ACUITY_SCORE: 9
ADLS_ACUITY_SCORE: 13
ADLS_ACUITY_SCORE: 9
ADLS_ACUITY_SCORE: 9

## 2018-08-29 NOTE — PROGRESS NOTES
Respiratory responded to RRT call, pt found on room air, placed on 2 lpm n/c, SpO2 93%, RT dismissed by MD.  Christ Chamberlain

## 2018-08-29 NOTE — CODE/RAPID RESPONSE
"[facility]    RRT Note  8/29/2018   Time Called: 1557    RRT called for: tachyarrhythmia 140s, hypotension 76/53.  ~40 minutes prior to RRT 2.5mg metoprolol was given, BP at time of that dose is not known.     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  Consideration was given to DCCV but not pursued out of uncertainty re: duration pt in rhythm (nebulous history +/- afib) and rate controlled noted above  -vagal maneuver pt to slower rate in 90s, likely afib wavy isoelectric line, but P wave visibile  -1g empiric mag sulfate  -stat p CXR to r/o aspiration  -check hgb, troponin and proBNP  -discontinue IVF w/ mod-severe pulm HTN and adequate po intake  -at end of RRT, controlled HR is sustained in 90s afib and BP is WNL (back to baseline 110s),   -pt may remain on current unit but would transfer to [facility] if tachyarrhythmia recurs  -pt does not desire anticoagulation  -continue same dose of metoprolol succinate  -hold parameters added to po & IV metoprolol    Discussed with and defer further cares to rounding hospitalist and cardiologist who will see him again tomorrow  Voice mail left for pt's son    Interval History     Abimael Flores is a 89 year old male who was admitted on 8/25/2018 for fall, femoral neck fracture, c/b UTI, delirium    Medical history significant for:   Past Medical History:   Diagnosis Date     Coronary artery disease      Nonsenile cataract      Recent retinal detachment, total or subtotal 8/5/2014     Type 2 diabetes mellitus without complications (H)      Code Status: DNR/DNI    Allergies   No Known Allergies    Physical Exam "   Vital Signs with Ranges:  Temp:  [98.6  F (37  C)-99.5  F (37.5  C)] 98.6  F (37  C)  Pulse:  [] 98  Heart Rate:  [] 138  Resp:  [16-18] 18  BP: ()/(50-68) 83/50  SpO2:  [90 %-93 %] 93 %  I/O last 3 completed shifts:  In: 160 [P.O.:160]  Out: 725 [Urine:725]      Constitutional:no acute distress  ENT: tacky oral mucosa  Neck: supple  Pulmonary: CTAB upper & lower lobes   Cardiovascular: tachycardic, narrow complex rhythm on tele, hypotensive  GI: NABS/s/NT/ND  Skin/Integumen: no edema, no mottling  Neuro: +follows commands wiggle toes bilat, using walk man  Psych:  Oriented x3  Extremities: see above, R hip dressing w/o any obvious bleeding    Data     EKG:  Interpreted by Kelly Rodriguez  Time reviewed: 1617  Symptoms at time of EKG: hypotension   Rhythm: atrial fibrillation - rapid and atrial flutter  Rate: 140-150  Axis: Left Axis Deviation  Ectopy: none  Conduction: normal  ST Segments/ T Waves: T wave inversion I and aVL  Q Waves: v1 and v6  Comparison to prior: narrow complex tachyarrhythmia is new compared to prior, different morphology if V6      Troponin:    Recent Labs   Lab Test  04/21/14   1656   TROPONIN  0.03       IMAGING: (X-ray/CT/MRI)   No results found for this or any previous visit (from the past 24 hour(s)).    CBC with Diff:  Recent Labs   Lab Test  08/29/18   1625   04/21/14   0405   WBC  11.5*   < >  14.7*   HGB  8.6*   < >  11.2*   MCV  89   < >  91   PLT  109*   < >  169   INR   --    --   0.91    < > = values in this interval not displayed.        Comprehensive Metabolic Panel:    Recent Labs  Lab 08/29/18  0650      POTASSIUM 4.5   CHLORIDE 109   CO2 23   ANIONGAP 8   *   BUN 34*   CR 1.86*   GFRESTIMATED 34*   GFRESTBLACK 41*   JOSELINE 7.9*       INR:    Recent Labs   Lab Test  04/21/14   0405   INR  0.91       BNP:  No results found for: BNP    UA:    Recent Labs  Lab 08/27/18  2330   COLOR Yellow   APPEARANCE Slightly Cloudy   URINEGLC 30*   URINEBILI  Negative   URINEKETONE 5*   SG 1.019   UBLD Moderate*   URINEPH 5.0   PROTEIN 30*   NITRITE Negative   LEUKEST Trace*   RBCU 9*   WBCU 12*       Time Spent on this Encounter   I spent 45 minutes critical care time 415-5pm on the unit/floor managing the care of Abimael Flores. Over 50% of my time was spent counseling the patient and/or coordinating care regarding services listed in this note.

## 2018-08-29 NOTE — PLAN OF CARE
Problem: Patient Care Overview  Goal: Plan of Care/Patient Progress Review  PT:  Discharge Planner PT   Patient plan for discharge: TCU  Current status: Pt just moved bed to chair prior to PT entering room. Pt completed seated exercises with assist for R LE with marching and LAQ.   Barriers to return to prior living situation: falls risk, level of assist  Recommendations for discharge: TCU  Rationale for recommendations: Pt will continue to benefit from skilled therapy to increase his strength, activity tolerance, and independence with transfers and ambulation.        Entered by: Alana Rogers 08/29/2018 11:20 AM

## 2018-08-29 NOTE — PROGRESS NOTES
"  Cardiology Progress Note          Assessment and Plan:   No arrhythmia over night. No complaints. Vitals stable.    Admitted for right hip fracture fall accidental fall. Post surgical repair.  There was a report of atrial fib in the past but there is no ECG documentation.  Dr. Laurent was consulted over the weekend for evaluation of atrial fib. No evidence.  Pt had 2 runs of relatively wide QRS tachycardia this morning with rate around 150 bpm. Tracings were reviewed. They look more like nonsustained VT at rate of 150 bpm. No apparent symptoms.  Normal EF.   LBBB.  \"CAD\", no angiography record.  Type II diabetes, retinal detachment.  DNR/DNI.     He seems to tolerate low dose metoprolol well.  No other intervention is needed.  Ok for discharge.  Sign off  Jacob Stockton MD  Cardiology   [phone]                Diagnosis:     Patient Active Problem List   Diagnosis     Injury of globe of eye     Recent retinal detachment, total or subtotal     Closed right hip fracture (H)     S/P total hip arthroplasty                Physical Exam:   Blood pressure 108/57, pulse 98, temperature 98.6  F (37  C), resp. rate 18, height 1.727 m (5' 8\"), weight 73.8 kg (162 lb 12.8 oz), SpO2 90 %.  Wt Readings from Last 4 Encounters:   08/26/18 73.8 kg (162 lb 12.8 oz)   04/21/14 73.5 kg (162 lb)   04/21/14 74.8 kg (165 lb)      I/O last 3 completed shifts:  In: 480 [P.O.:480]  Out: 685 [Urine:685]    Constitutional: Alert, no apparent distress,    Lungs: No crackles or wheezing,    Cardiovascular: Regular rate and rhythm, normal S1 and S2, and no murmur,    Lymphm node  Neck  ENT  Neurologic  Abdomen: No enlargement  No jugular vein extension or carotid bruit  No apparent abnormality  No focal deficit  Normal bowel sounds, soft, no distension, no tender   Skin: No rashes, no cyanosis   Extremity: No edema          Medications:     Current Facility-Administered Medications:      acetaminophen (TYLENOL) tablet 650 mg, 650 mg, Oral, Q4H PRN, " Bill Sanders MD     acetaminophen (TYLENOL) tablet 975 mg, 975 mg, Oral, Q8H, Bill Sanders MD, 975 mg at 08/29/18 0632     benzocaine-menthol (CHLORASEPTIC) 6-10 MG lozenge 1 lozenge, 1 lozenge, Buccal, Q1H PRN, Bill Sanders MD, 1 lozenge at 08/26/18 1742     cefTRIAXone (ROCEPHIN) 1 g vial to attach to  mL bag for ADULTS or NS 50 mL bag for PEDS, 1 g, Intravenous, Q24H, Cj Calle MD, 1 g at 08/29/18 0632     glucose gel 15-30 g, 15-30 g, Oral, Q15 Min PRN **OR** dextrose 50 % injection 25-50 mL, 25-50 mL, Intravenous, Q15 Min PRN **OR** glucagon injection 1 mg, 1 mg, Subcutaneous, Q15 Min PRN, Gema Martinez PA-C     enoxaparin (LOVENOX) injection 30 mg, 30 mg, Subcutaneous, Q24H, Bill Sanders MD, 30 mg at 08/28/18 1403     glipiZIDE (GLUCOTROL XL) 24 hr tablet 5 mg, 5 mg, Oral, BID Dariusz LAWRENCE Claudia, MD, 5 mg at 08/29/18 0632     hydrALAZINE (APRESOLINE) injection 10 mg, 10 mg, Intravenous, Q4H PRN, Mamie Eubanks MD     HYDROmorphone (PF) (DILAUDID) injection 0.2 mg, 0.2 mg, Intravenous, Q2H PRN, Bill Sanders MD, 0.2 mg at 08/26/18 2032     hydrOXYzine (ATARAX) tablet 25 mg, 25 mg, Oral, TID PRN, Bill Sanders MD     insulin aspart (NovoLOG) inj (RAPID ACTING), 4 Units, Subcutaneous, TID w/meals, Betty Arzola MD, 4 Units at 08/28/18 1756     insulin aspart (NovoLOG) inj (RAPID ACTING), 1-7 Units, Subcutaneous, TID AC, Gema Martinez PA-C, 1 Units at 08/28/18 1756     insulin aspart (NovoLOG) inj (RAPID ACTING), 1-5 Units, Subcutaneous, At Bedtime, Gema Martinez PA-C, 1 Units at 08/27/18 2211     insulin glargine (LANTUS) injection 5 Units, 5 Units, Subcutaneous, At Bedtime, Mamie Eubanks MD, 5 Units at 08/27/18 2210     lidocaine (LMX4) cream, , Topical, Q1H PRN, Bill Sanders MD     lidocaine 1 % 1 mL, 1 mL, Other, Q1H PRN, Bill Sanders MD      magnesium hydroxide (MILK OF MAGNESIA) suspension 30 mL, 30 mL, Oral, Daily PRN, Mamie Eubanks MD     melatonin tablet 1 mg, 1 mg, Oral, At Bedtime PRN, Mamie Eubanks MD, 1 mg at 08/25/18 2221     metoprolol (LOPRESSOR) injection 2.5-5 mg, 2.5-5 mg, Intravenous, Q4H PRN, Nas Varner MD     metoprolol succinate (TOPROL-XL) 24 hr tablet 25 mg, 25 mg, Oral, Daily, Jacob Stockton MD, 25 mg at 08/28/18 1105     naloxone (NARCAN) injection 0.1-0.4 mg, 0.1-0.4 mg, Intravenous, Q2 Min PRN, Bill Sanders MD     ondansetron (ZOFRAN-ODT) ODT tab 4 mg, 4 mg, Oral, Q6H PRN **OR** ondansetron (ZOFRAN) injection 4 mg, 4 mg, Intravenous, Q6H PRN, Bill Sanders MD     oxyCODONE IR (ROXICODONE) tablet 5 mg, 5 mg, Oral, Q3H PRN, Bill Sanders MD, 5 mg at 08/28/18 0845     ranitidine (ZANTAC) tablet 150 mg, 150 mg, Oral, At Bedtime, Bill Sanders MD, 150 mg at 08/27/18 2210     senna-docusate (SENOKOT-S;PERICOLACE) 8.6-50 MG per tablet 1 tablet, 1 tablet, Oral, BID, 1 tablet at 08/27/18 2210 **OR** senna-docusate (SENOKOT-S;PERICOLACE) 8.6-50 MG per tablet 2 tablet, 2 tablet, Oral, BID, Bill Sanders MD, 2 tablet at 08/28/18 0845     simvastatin (ZOCOR) tablet 20 mg, 20 mg, Oral, At Bedtime, Mamie Eubanks MD, 20 mg at 08/27/18 2210     sodium chloride (PF) 0.9% PF flush 3 mL, 3 mL, Intracatheter, Q1H PRN, Bill Sanders MD     sodium chloride (PF) 0.9% PF flush 3 mL, 3 mL, Intracatheter, Q8H, Bill Sanders MD, 3 mL at 08/28/18 0851     zolpidem (AMBIEN) half-tab 2.5 mg, 2.5 mg, Oral, At Bedtime PRN, Bill Sanders MD           Lab results:        Recent Labs   Lab Test  08/29/18   0650  08/28/18   0615  08/27/18   0715   NA  140  137  138   POTASSIUM  4.5  4.1  4.5   CHLORIDE  109  107  107   JOSELINE  7.9*  7.3*  7.0*   CO2  23  22  23   BUN  34*  33*  26   CR  1.86*  1.90*  1.57*   GLC  105*  115*  146*      Recent Labs   Lab Test  08/29/18   0650  08/28/18   0615  08/27/18   0715  08/26/18   0657  08/25/18   1954   HGB   --   9.0*  9.2*  11.0*  11.5*   WBC   --    --   10.0  12.8*  15.6*   PLT  118*  111*  108*  150  184     Recent Labs   Lab Test  04/21/14   0405   INR  0.91     Recent Labs   Lab Test  04/21/14   1656   TROPONIN  0.03

## 2018-08-29 NOTE — PROGRESS NOTES
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  He was found to have a right femoral neck fracture.  He was found to have fibrillation on his EKG done in the ER.  He has history of sinus arrhythmia, no documented history of atrial fibrillation.       Right femoral neck fracture  Mechanical fall  The patient describes the fall as being mechanical.  He has some balance problems recently, but no frequent falls.  He denies any preceding symptoms, no dizziness, no shortness of breath, no chest pains, no loss of consciousness.  He did hit his head slightly and had a skin laceration that was sutured as the ER.  Ortho on-call was contacted.  The patient seems to have been in a good state of health.  He does have a history of coronary artery disease with coronary artery bypass grafting, but as per the patient and his son, both physicians, he did not have any problems since his surgery.     - Post op management per Orthopedics.    - IVF, Pain control - Oxycodone, Tylenol   - PT/OT     New diagnosis of Atrial fibrillation  Apparently, he was told that he had a sinus arrhythmia in the past.  He does not have formal diagnosis of A-Fib although his son states he is not surprised of this as the patient's pulse had been irregular in the past.  The patient is on a baby aspirin at home, which will be held at this time.  His heart rate seems to be a rate controlled on his prior to admission atenolol, which will be continued.     - echocardiogram results noted    - Cardiology consulted. His CHADS-VASc score is 4 for age, hypertension, and diabetes.  This puts him in the high risk group  for a stroke.  Anticoagulation management for atrial fibrillation was discussed with them briefly on admission. The patient states he is not interested in starting anticoagulation.  His son does states that he seems to be at high risk of falls.    - requested cardiology input 8/28 as patient  Had a run of wide complex tachyarrhythmia , atenolol was on hold sicne yesterday ,as per EP recommendation due to bradycardia .  -plan to continue on low dose metoprolol , appreciate cardiology input .   - Resume PTA Asa once cleared from Ortho.        Hypertension  His blood pressure was slightly elevated in ER.  He did have a lot of pain. Improved presently.     - Hydralazine IV p.r.n.     Leukocytosis  Possible urinary tract infection    White blood cells of 15.6, likely related to stress.  His chest x-ray does not show any infiltrates. .  He does not have fevers.     - Wbc back to normal   -continue rocephin till discharge, stop on discharge to tcu, urine culture negative.     Diabetes mellitus type 2  Hemoglobin A1c 8.4.  At home he had maintained on Lantus 10 units at bedtime, glipizide-XL 5 mg p.o. twice daily and Humalog 4 units subcutaneously 3 times daily with meals.    - restarted  home medications since his oral intake improved.     Chronic kidney disease, stage III  Baseline creatinine seems to be between 1.5-1.8.  Creatinine on admission is 1.65.     - creatinine up to 1.9-->1.8 after fluids    -will try more fluids today    - Follow BMP.   # Pain Assessment:  As per orthopedics    Called and updated son   DVT Prophylaxis: as per orthopedics    Code Status: DNR/DNI     Disposition: Expected discharge 8/30 to tcu     Betty Arzola MD  Text Page   (7am to 6pm)    Interval History   No svt/nsvt  Noted on tele, on metoprolol scheduled, he was confused yesterday , sleeping now , urine culture negative .    -Data reviewed today: I reviewed all new labs and imaging results over the last 24 hours.     Physical Exam    Temp: 98.6  F (37  C) Temp src: Oral BP: 112/58 Pulse: 98 Heart Rate: 93 Resp: 18 SpO2: 90 % O2 Device: None (Room air) Oxygen Delivery: 2 LPM  Vitals:    08/25/18 2131 08/26/18 0500   Weight: 75.8 kg (167 lb 3.2 oz) 73.8 kg (162 lb 12.8 oz)     Vital Signs with Ranges  Temp:  [98.6  F (37  C)-99.5  F (37.5  C)] 98.6  F (37  C)  Pulse:  [] 98  Heart Rate:  [] 93  Resp:  [16-18] 18  BP: (108-139)/(57-69) 112/58  SpO2:  [86 %-92 %] 90 %  I/O last 3 completed shifts:  In: 480 [P.O.:480]  Out: 685 [Urine:685]    Constitutional:sleepy was confused overnight   Respiratory: Clear to auscultation bilaterally, no crackles or wheezing  Cardiovascular: Regular rate and rhythm, normal S1 and S2, and no murmur noted  GI: Normal bowel sounds, soft, non-distended, non-tender  Skin/Integumen:right hip status post surgery   Neuro : moving all 4 extremities, no focal deficit noted     Medications     sodium chloride         acetaminophen  975 mg Oral Q8H     cefTRIAXone  1 g Intravenous Q24H     enoxaparin  30 mg Subcutaneous Q24H     glipiZIDE  5 mg Oral BID AC     insulin aspart  4 Units Subcutaneous TID w/meals     insulin aspart  1-7 Units Subcutaneous TID AC     insulin aspart  1-5 Units Subcutaneous At Bedtime     insulin glargine  5 Units Subcutaneous At Bedtime     metoprolol succinate  25 mg Oral Daily     ranitidine  150 mg Oral At Bedtime     senna-docusate  1 tablet Oral BID    Or     senna-docusate  2 tablet Oral BID     simvastatin  20 mg Oral At Bedtime     sodium chloride (PF)  3 mL Intracatheter Q8H       Data     Recent Labs  Lab 08/29/18  0650 08/28/18  0615 08/27/18  0715 08/26/18  0657 08/25/18  1954   WBC  --   --  10.0 12.8* 15.6*   HGB  --  9.0* 9.2* 11.0* 11.5*   MCV  --   --  91 91 91   * 111* 108* 150 184    137 138 141 141   POTASSIUM 4.5 4.1 4.5 4.4 4.6   CHLORIDE 109 107 107 111* 111*   CO2 23 22 23 23 24   BUN 34* 33* 26 28 31*   CR 1.86* 1.90* 1.57* 1.43* 1.65*   ANIONGAP 8 8  8 7 6   JOSELINE 7.9* 7.3* 7.0* 8.1* 8.4*   * 115* 146* 102* 119*       Recent Labs  Lab 08/29/18  0807 08/29/18  0650 08/29/18  0200 08/28/18  1717 08/28/18  1117 08/28/18  0615 08/28/18  0207  08/27/18  0715  08/26/18  0657  08/25/18  1954   GLC  --  105*  --   --   --  115*  --   --  146*  --  102*  --  119*   BGM 98  --  161* 174* 184*  --  154*  < >  --   < >  --   < >  --    < > = values in this interval not displayed.    Imaging:   No results found for this or any previous visit (from the past 24 hour(s)).

## 2018-08-29 NOTE — PLAN OF CARE
Problem: Patient Care Overview  Goal: Plan of Care/Patient Progress Review  Outcome: No Change  Pt is alert to self and to situation at times. Up with strong assist of 2 using a lift. Pt uncooperative and combative during this shift. He declined to take evening medications and vital signs taken. He denies pain. He pulled out and declined to have the tele on. IV SL, incontinent of urine incontinency products in place. Son was here earlier on during the shift. Plan to discharge to TCU in 1-2 days. Will continue to monitor

## 2018-08-29 NOTE — PROGRESS NOTES
[facility] Orthopedic Progress Note  Abimael Flores is a 89 year old male    Today's Date:8/29/2018  Admission Date: 8/25/2018  Closed fracture of neck of right femur, initial encounter (H) [S72.001A]  POD # 3 Right bipolar hemiarthroplasty for femoral neck fracture.     Patient Seen.  Doing well.  Delirium resolved.  Dressings are clean, dry, and intact.  Circulation, motor and sensory function, intact distally.        PLAN:  Deep venous thrombosis prophylaxis - DC on ASA 325mg daily.  WBAT on RLE.  Pain control medication: Limit narcotics to minimize delirium.  Discharge plan: TCU Today      Temp:  [98.3  F (36.8  C)-99.5  F (37.5  C)] 98.6  F (37  C)  Pulse:  [] 98  Heart Rate:  [] 109  Resp:  [16-18] 18  BP: (106-139)/(53-69) 108/57  SpO2:  [86 %-92 %] 90 %      Results for orders placed or performed during the hospital encounter of 08/25/18 (from the past 24 hour(s))   Glucose by meter   Result Value Ref Range    Glucose 184 (H) 70 - 99 mg/dL   Glucose by meter   Result Value Ref Range    Glucose 174 (H) 70 - 99 mg/dL   Glucose by meter   Result Value Ref Range    Glucose 161 (H) 70 - 99 mg/dL   Platelet count   Result Value Ref Range    Platelet Count 118 (L) 150 - 450 10e9/L   Basic metabolic panel   Result Value Ref Range    Sodium 140 133 - 144 mmol/L    Potassium 4.5 3.4 - 5.3 mmol/L    Chloride 109 94 - 109 mmol/L    Carbon Dioxide 23 20 - 32 mmol/L    Anion Gap 8 3 - 14 mmol/L    Glucose 105 (H) 70 - 99 mg/dL    Urea Nitrogen 34 (H) 7 - 30 mg/dL    Creatinine 1.86 (H) 0.66 - 1.25 mg/dL    GFR Estimate 34 (L) >60 mL/min/1.7m2    GFR Estimate If Black 41 (L) >60 mL/min/1.7m2    Calcium 7.9 (L) 8.5 - 10.1 mg/dL         Edgardo Godfrey

## 2018-08-29 NOTE — PLAN OF CARE
Problem: Patient Care Overview  Goal: Plan of Care/Patient Progress Review  Outcome: Declining  RRT running at this time due to high hr and low bp, chest pressure.

## 2018-08-29 NOTE — PROGRESS NOTES
SW  Patient is anticipated to be ready for DC to TCU today.    Spoke with Lorie at [facility] [address]. She is declining patient at this time. She noted last evening patient was uncooperative and attempted to pull out his IV. Lorie states that before she could accept him, patient would need to be more clear cognitively for 24 hours.  Spoke with Lorie again and asked her to reconsider, as patient is not exhibiting these behaviors today. Lorie asked their Director of Nursing to assess, but she then said their decision stands.  Sent a referral to [facility]. They have no beds available    Addendum  Spoke with son Aleksandar to update him, noting new referrals could be made.     Aleksandar states that at baseline patient is oriented, so this confusion is new for him. Aleksandar states that since patient has been determined to be ineligible for TCU placement due to his current confusion, the discharge should not occur today.     Asked Lorie to keep the referral and reassess for Thursday.

## 2018-08-29 NOTE — DISCHARGE SUMMARY
Discharge Summary    Abimael Flores MRN# [id number]   YOB: 1924 Age: 89 year old     Date of Admission:  8/25/2018  Date of Discharge:  8/30/18  Admitting Physician:  Bill Sanders MD  Discharge Physician:  Edgardo Streeter MD     Primary Provider: Marcell Bee          Admission Diagnoses:    right femoral neck fracture          Discharge Diagnosis:   Same           Surgical Procedure:   Total Hip arthroplasty           Discharge Disposition:   Discharged to rehabilitation facility           Medications Prior to Admission:     Prescriptions Prior to Admission   Medication Sig Dispense Refill Last Dose     atenolol (TENORMIN) 50 MG tablet Take 50 mg by mouth daily   8/25/2018 at Unknown time     glipiZIDE (GLUCOTROL XL) 5 MG 24 hr tablet Take 5 mg by mouth 2 times daily   8/25/2018 at am     insulin glargine (LANTUS SOLOSTAR) 100 UNIT/ML pen Inject 10 Units Subcutaneous At Bedtime   8/24/2018 at Unknown time     insulin lispro (HUMALOG KWIKPEN) 100 UNIT/ML injection Inject 4 Units Subcutaneous 3 times daily (before meals)   8/24/2018 at pm     loratadine (CLARITIN) 10 MG tablet Take 10 mg by mouth daily as needed         simvastatin (ZOCOR) 20 MG tablet Take 20 mg by mouth At Bedtime   8/24/2018 at Unknown time     [DISCONTINUED] aspirin 81 MG tablet Take 81 mg by mouth take 81 mg by mouth daily.   8/25/2018 at Unknown time             Discharge Medications:     Current Discharge Medication List      START taking these medications    Details   acetaminophen (TYLENOL) 325 MG tablet Take 2 tablets (650 mg) by mouth every 8 hours  Qty: 100 tablet, Refills: 0    Associated Diagnoses: Closed fracture of neck of right femur, initial encounter (H)      !! aspirin 325 MG EC tablet Take 1 tablet (325 mg) by mouth daily  Qty: 30 tablet    Associated Diagnoses: Closed fracture of neck of right femur, initial encounter (H)      !! aspirin 325 MG EC tablet Take one Aspirin tab twice daily for 5  weeks.  Qty: 70 tablet, Refills: 0    Associated Diagnoses: Closed fracture of neck of right femur, initial encounter (H)      oxyCODONE IR (ROXICODONE) 5 MG tablet Take 1 tablet (5 mg) by mouth every 4 hours as needed for pain  Qty: 60 tablet, Refills: 0    Associated Diagnoses: Closed fracture of neck of right femur, initial encounter (H)      senna-docusate (SENOKOT-S;PERICOLACE) 8.6-50 MG per tablet Take 1 tablet by mouth 2 times daily  Qty: 60 tablet, Refills: 0    Associated Diagnoses: Closed fracture of neck of right femur, initial encounter (H)       !! - Potential duplicate medications found. Please discuss with provider.      CONTINUE these medications which have NOT CHANGED    Details   atenolol (TENORMIN) 50 MG tablet Take 50 mg by mouth daily      glipiZIDE (GLUCOTROL XL) 5 MG 24 hr tablet Take 5 mg by mouth 2 times daily      insulin glargine (LANTUS SOLOSTAR) 100 UNIT/ML pen Inject 10 Units Subcutaneous At Bedtime      insulin lispro (HUMALOG KWIKPEN) 100 UNIT/ML injection Inject 4 Units Subcutaneous 3 times daily (before meals)      loratadine (CLARITIN) 10 MG tablet Take 10 mg by mouth daily as needed       simvastatin (ZOCOR) 20 MG tablet Take 20 mg by mouth At Bedtime         STOP taking these medications       aspirin 81 MG tablet Comments:   Reason for Stopping:                     Consultations:   No consultations were requested during this admission           Hospital Course:   The patient was admitted from the emergency room.The patient underwent an uneventful Total hip arthroplasty. Postoperatively, anticoagulation  with Lovenox was started nad transitioned to ASA at discharge. Home medications have been reconciled. Oxycodone was prescribed for pain, please limit to minimize delirium.             Discharge Instructions and Follow-Up:        Discharge activity: WBAT with a walker   Discharge follow-up: Follow-up with Pedro Sanders in ~14 days post-op. [phone]   Outpatient therapy: Should  already be arranged by office.  If not, patient should call to schedule 2x/week x 3 weeks.   Home Care agency: None   Supplies and equipment: None        Wound care: Daily dry dressing changes.  May use adaptic/xeroform directly over incision if available.  Staples out 12 days post-op and apply steri-strips.    Other instructions: Posterior hip precautions.  No hip flexion up past 90deg.  No ADduction of the surgical leg across the midline.     Edgardo Godfrey PA-C

## 2018-08-30 ENCOUNTER — APPOINTMENT (OUTPATIENT)
Dept: PHYSICAL THERAPY | Facility: CLINIC | Age: 83
DRG: 469 | End: 2018-08-30
Payer: MEDICARE

## 2018-08-30 LAB
ANION GAP SERPL CALCULATED.3IONS-SCNC: 8 MMOL/L (ref 3–14)
BUN SERPL-MCNC: 38 MG/DL (ref 7–30)
CALCIUM SERPL-MCNC: 7.3 MG/DL (ref 8.5–10.1)
CHLORIDE SERPL-SCNC: 111 MMOL/L (ref 94–109)
CO2 SERPL-SCNC: 22 MMOL/L (ref 20–32)
CREAT SERPL-MCNC: 1.82 MG/DL (ref 0.66–1.25)
GFR SERPL CREATININE-BSD FRML MDRD: 35 ML/MIN/1.7M2
GLUCOSE BLDC GLUCOMTR-MCNC: 113 MG/DL (ref 70–99)
GLUCOSE BLDC GLUCOMTR-MCNC: 126 MG/DL (ref 70–99)
GLUCOSE BLDC GLUCOMTR-MCNC: 145 MG/DL (ref 70–99)
GLUCOSE BLDC GLUCOMTR-MCNC: 148 MG/DL (ref 70–99)
GLUCOSE SERPL-MCNC: 115 MG/DL (ref 70–99)
POTASSIUM SERPL-SCNC: 3.8 MMOL/L (ref 3.4–5.3)
SODIUM SERPL-SCNC: 141 MMOL/L (ref 133–144)

## 2018-08-30 PROCEDURE — 40000193 ZZH STATISTIC PT WARD VISIT

## 2018-08-30 PROCEDURE — 97110 THERAPEUTIC EXERCISES: CPT | Mod: GP

## 2018-08-30 PROCEDURE — 99233 SBSQ HOSP IP/OBS HIGH 50: CPT | Performed by: INTERNAL MEDICINE

## 2018-08-30 PROCEDURE — 80048 BASIC METABOLIC PNL TOTAL CA: CPT | Performed by: INTERNAL MEDICINE

## 2018-08-30 PROCEDURE — 93005 ELECTROCARDIOGRAM TRACING: CPT

## 2018-08-30 PROCEDURE — 93010 ELECTROCARDIOGRAM REPORT: CPT | Performed by: INTERNAL MEDICINE

## 2018-08-30 PROCEDURE — A9270 NON-COVERED ITEM OR SERVICE: HCPCS | Mod: GY | Performed by: INTERNAL MEDICINE

## 2018-08-30 PROCEDURE — 25000125 ZZHC RX 250: Performed by: NURSE PRACTITIONER

## 2018-08-30 PROCEDURE — 25000132 ZZH RX MED GY IP 250 OP 250 PS 637: Mod: GY | Performed by: INTERNAL MEDICINE

## 2018-08-30 PROCEDURE — 97530 THERAPEUTIC ACTIVITIES: CPT | Mod: GP

## 2018-08-30 PROCEDURE — 36415 COLL VENOUS BLD VENIPUNCTURE: CPT | Performed by: INTERNAL MEDICINE

## 2018-08-30 PROCEDURE — 25000131 ZZH RX MED GY IP 250 OP 636 PS 637: Mod: GY | Performed by: INTERNAL MEDICINE

## 2018-08-30 PROCEDURE — A9270 NON-COVERED ITEM OR SERVICE: HCPCS | Mod: GY | Performed by: NURSE PRACTITIONER

## 2018-08-30 PROCEDURE — 00000146 ZZHCL STATISTIC GLUCOSE BY METER IP

## 2018-08-30 PROCEDURE — 21000001 ZZH R&B HEART CARE

## 2018-08-30 PROCEDURE — 25000128 H RX IP 250 OP 636: Performed by: ORTHOPAEDIC SURGERY

## 2018-08-30 PROCEDURE — 25000128 H RX IP 250 OP 636: Performed by: NURSE PRACTITIONER

## 2018-08-30 PROCEDURE — 25000125 ZZHC RX 250: Performed by: INTERNAL MEDICINE

## 2018-08-30 PROCEDURE — 25000132 ZZH RX MED GY IP 250 OP 250 PS 637: Mod: GY | Performed by: NURSE PRACTITIONER

## 2018-08-30 RX ORDER — DILTIAZEM HYDROCHLORIDE 5 MG/ML
10 INJECTION INTRAVENOUS ONCE
Status: COMPLETED | OUTPATIENT
Start: 2018-08-30 | End: 2018-08-30

## 2018-08-30 RX ORDER — DILTIAZEM HYDROCHLORIDE 180 MG/1
180 CAPSULE, EXTENDED RELEASE ORAL DAILY
Status: DISCONTINUED | OUTPATIENT
Start: 2018-08-30 | End: 2018-09-03 | Stop reason: HOSPADM

## 2018-08-30 RX ORDER — ATORVASTATIN CALCIUM 10 MG/1
10 TABLET, FILM COATED ORAL EVERY EVENING
Status: DISCONTINUED | OUTPATIENT
Start: 2018-08-30 | End: 2018-09-03 | Stop reason: HOSPADM

## 2018-08-30 RX ADMIN — PIPERACILLIN SODIUM,TAZOBACTAM SODIUM 3.38 G: 3; .375 INJECTION, POWDER, FOR SOLUTION INTRAVENOUS at 14:38

## 2018-08-30 RX ADMIN — PIPERACILLIN SODIUM,TAZOBACTAM SODIUM 3.38 G: 3; .375 INJECTION, POWDER, FOR SOLUTION INTRAVENOUS at 21:31

## 2018-08-30 RX ADMIN — DILTIAZEM HYDROCHLORIDE 180 MG: 180 CAPSULE, EXTENDED RELEASE ORAL at 10:57

## 2018-08-30 RX ADMIN — METOPROLOL SUCCINATE 25 MG: 25 TABLET, EXTENDED RELEASE ORAL at 08:37

## 2018-08-30 RX ADMIN — GLIPIZIDE 5 MG: 5 TABLET, FILM COATED, EXTENDED RELEASE ORAL at 17:06

## 2018-08-30 RX ADMIN — PIPERACILLIN SODIUM,TAZOBACTAM SODIUM 3.38 G: 3; .375 INJECTION, POWDER, FOR SOLUTION INTRAVENOUS at 08:31

## 2018-08-30 RX ADMIN — METOPROLOL TARTRATE 5 MG: 5 INJECTION, SOLUTION INTRAVENOUS at 06:06

## 2018-08-30 RX ADMIN — CALCIUM GLUCONATE 1 G: 98 INJECTION, SOLUTION INTRAVENOUS at 06:45

## 2018-08-30 RX ADMIN — INSULIN ASPART 4 UNITS: 100 INJECTION, SOLUTION INTRAVENOUS; SUBCUTANEOUS at 17:25

## 2018-08-30 RX ADMIN — PIPERACILLIN SODIUM,TAZOBACTAM SODIUM 3.38 G: 3; .375 INJECTION, POWDER, FOR SOLUTION INTRAVENOUS at 02:12

## 2018-08-30 RX ADMIN — INSULIN GLARGINE 5 UNITS: 100 INJECTION, SOLUTION SUBCUTANEOUS at 22:43

## 2018-08-30 RX ADMIN — INSULIN ASPART 4 UNITS: 100 INJECTION, SOLUTION INTRAVENOUS; SUBCUTANEOUS at 12:25

## 2018-08-30 RX ADMIN — GLIPIZIDE 5 MG: 5 TABLET, FILM COATED, EXTENDED RELEASE ORAL at 08:37

## 2018-08-30 RX ADMIN — ENOXAPARIN SODIUM 30 MG: 30 INJECTION SUBCUTANEOUS at 12:26

## 2018-08-30 RX ADMIN — HYDROMORPHONE HYDROCHLORIDE 0.2 MG: 1 INJECTION, SOLUTION INTRAMUSCULAR; INTRAVENOUS; SUBCUTANEOUS at 08:45

## 2018-08-30 RX ADMIN — DILTIAZEM HYDROCHLORIDE 10 MG: 5 INJECTION INTRAVENOUS at 08:55

## 2018-08-30 RX ADMIN — METOPROLOL TARTRATE 5 MG: 5 INJECTION, SOLUTION INTRAVENOUS at 02:07

## 2018-08-30 ASSESSMENT — ACTIVITIES OF DAILY LIVING (ADL)
ADLS_ACUITY_SCORE: 8
ADLS_ACUITY_SCORE: 9

## 2018-08-30 ASSESSMENT — PAIN DESCRIPTION - DESCRIPTORS: DESCRIPTORS: SHARP

## 2018-08-30 NOTE — PLAN OF CARE
Problem: Patient Care Overview  Goal: Plan of Care/Patient Progress Review  PT:  Discharge Planner PT   Patient plan for discharge: TCU  Current status: Pt completed some supine exercises and seated exercises. Pt required modA for bed mobility moving from supine <> EOB. Pt required Joshua of 2 x2, and modA x1 for sit <> stand transfers. Pt able to stand for a couple minutes, with cuing for FWW use and cues for standing posture, and pt reports increased pain.   Barriers to return to prior living situation: falls risk, level of assist  Recommendations for discharge: TCU  Rationale for recommendations: Pt will continue to benefit from skilled physical therapy services to increase his strength and activity tolerance, and for increased independence with transfers and ambulation.        Entered by: Alana Rogers 08/30/2018 12:04 PM

## 2018-08-30 NOTE — PLAN OF CARE
Problem: Patient Care Overview  Goal: Plan of Care/Patient Progress Review  Outcome: No Change  VSS. Tele: SD 1 AVB and BBB. One episode of SVT, one time dose of 10 mg IV diltiazem given with no change in HR. Pt converted on his own. PO Diltiazem started per EP. Continue to monitor HR for one more day and then plan to discharge to TCU. Report given to Yuliana RODRIGUEZ and pt transferred to [facility] with all of his belongings. Family updated on plan of care.

## 2018-08-30 NOTE — PROGRESS NOTES
[facility]    Orthopedics  Daily Post-Op Note    Assessment & Plan   Procedure(s):  OPEN REDUCTION INTERNAL FIXATION HIP BIPOLAR   -4 Days Post-Op     Doing well from ortho standpoint. Was planning for discharge 8/29/18 but had new onset of atrial tachyarrhythmia for which he was transferred to CCU.  Pain well-controlled.  Tolerating physical therapy and rehabilitation well.    Plan:    -Continue supportive and symptomatic treatment  -Continue physical therapy while inpatient status, WBAT  -Pain control measures- use lowest dosage possible, h/o delirium  -Advance diet as tolerated  -Ok for discharge to TCU when ready from ortho standpoint. Continue Lovenox, transition to ASA upon discharge. Medical management and further discharge per Hospitalist/IM    Kourtney Shea PA-C    Interval History   Stable.  Doing well- no complaints in the right hip, mild discomfort noted at the SI joint, alleviated with change in positioning.  Improving slowly. No fevers, chills, tingling, numbness, n/v, calf pain.    Physical Exam   Temp: 100.4  F (38  C) Temp src: Oral BP: 119/67 Pulse: 90 Heart Rate: 92 Resp: 25 SpO2: 96 % O2 Device: None (Room air) Oxygen Delivery: 2 LPM  Vitals:    08/25/18 2131 08/26/18 0500 08/30/18 0500   Weight: 75.8 kg (167 lb 3.2 oz) 73.8 kg (162 lb 12.8 oz) 77.1 kg (170 lb)     Vital Signs with Ranges  Temp:  [98.1  F (36.7  C)-100.4  F (38  C)] 100.4  F (38  C)  Pulse:  [90] 90  Heart Rate:  [] 92  Resp:  [11-30] 25  BP: ()/(45-92) 119/67  SpO2:  [93 %-100 %] 96 %  I/O last 3 completed shifts:  In: 100 [P.O.:100]  Out: 825 [Urine:825]    Constitutional: NAD, A&O. Appears comfortable sitting up in bed  ENT: NCAT  Respiratory: unlabored  Musculoskeletal: Aquacel dressing is C/D/I to right hip. Good motion bilateral lower extremities, moves toes freely. Leg lengths appear symmetric. Capillary refill brisk, bilateral dorsalis pedis pulses are palpable and symmetric. Bilateral  calves soft and nontender  Neurologic: Distal sensation intact to light touch      Medications        enoxaparin  30 mg Subcutaneous Q24H     glipiZIDE  5 mg Oral BID AC     insulin aspart  4 Units Subcutaneous TID w/meals     insulin aspart  1-7 Units Subcutaneous TID AC     insulin aspart  1-5 Units Subcutaneous At Bedtime     insulin glargine  5 Units Subcutaneous At Bedtime     metoprolol succinate  25 mg Oral Daily     piperacillin-tazobactam  3.375 g Intravenous Q6H     ranitidine  150 mg Oral At Bedtime     senna-docusate  1 tablet Oral BID    Or     senna-docusate  2 tablet Oral BID     simvastatin  20 mg Oral At Bedtime     sodium chloride (PF)  3 mL Intracatheter Q8H       Data   Results for orders placed or performed during the hospital encounter of 08/25/18 (from the past 24 hour(s))   Glucose by meter   Result Value Ref Range    Glucose 212 (H) 70 - 99 mg/dL   Lactic acid level STAT for sepsis protocol   Result Value Ref Range    Lactate for Sepsis Protocol 1.1 0.7 - 2.0 mmol/L   EKG 12-lead, tracing only   Result Value Ref Range    Interpretation ECG Click View Image link to view waveform and result    Glucose by meter   Result Value Ref Range    Glucose 167 (H) 70 - 99 mg/dL   CBC with platelets   Result Value Ref Range    WBC 11.5 (H) 4.0 - 11.0 10e9/L    RBC Count 2.94 (L) 4.4 - 5.9 10e12/L    Hemoglobin 8.6 (L) 13.3 - 17.7 g/dL    Hematocrit 26.2 (L) 40.0 - 53.0 %    MCV 89 78 - 100 fl    MCH 29.3 26.5 - 33.0 pg    MCHC 32.8 31.5 - 36.5 g/dL    RDW 14.4 10.0 - 15.0 %    Platelet Count 109 (L) 150 - 450 10e9/L   NT proBNP inpatient and ED   Result Value Ref Range    N-Terminal Pro BNP Inpatient 8601 (H) 0 - 1800 pg/mL   Troponin I   Result Value Ref Range    Troponin I ES 0.039 0.000 - 0.045 ug/L   XR Chest Port 1 View    Narrative    CHEST PORTABLE ONE VIEW   8/29/2018 4:56 PM     HISTORY: Question of aspiration.     COMPARISON: 8/25/2018.    FINDINGS: Supine portable chest. Sternal wires and  mediastinal clips.  No pneumothorax. The heart size is normal. There is probable fibrosis  in the mid and lower lungs bilaterally which is similar to the  previous exam. There is new infiltrate at the right lung base  laterally.      Impression    IMPRESSION: New small right lung base infiltrate.    SHARI HERNANDEZ MD   Basic metabolic panel   Result Value Ref Range    Sodium 141 133 - 144 mmol/L    Potassium 3.8 3.4 - 5.3 mmol/L    Chloride 109 94 - 109 mmol/L    Carbon Dioxide 23 20 - 32 mmol/L    Anion Gap 9 3 - 14 mmol/L    Glucose 183 (H) 70 - 99 mg/dL    Urea Nitrogen 38 (H) 7 - 30 mg/dL    Creatinine 1.87 (H) 0.66 - 1.25 mg/dL    GFR Estimate 34 (L) >60 mL/min/1.7m2    GFR Estimate If Black 41 (L) >60 mL/min/1.7m2    Calcium 7.2 (L) 8.5 - 10.1 mg/dL   Magnesium   Result Value Ref Range    Magnesium 2.6 (H) 1.6 - 2.3 mg/dL   Glucose by meter   Result Value Ref Range    Glucose 190 (H) 70 - 99 mg/dL   EKG 12-lead, tracing only   Result Value Ref Range    Interpretation ECG Click View Image link to view waveform and result    Glucose by meter   Result Value Ref Range    Glucose 145 (H) 70 - 99 mg/dL   Basic metabolic panel   Result Value Ref Range    Sodium 141 133 - 144 mmol/L    Potassium 3.8 3.4 - 5.3 mmol/L    Chloride 111 (H) 94 - 109 mmol/L    Carbon Dioxide 22 20 - 32 mmol/L    Anion Gap 8 3 - 14 mmol/L    Glucose 115 (H) 70 - 99 mg/dL    Urea Nitrogen 38 (H) 7 - 30 mg/dL    Creatinine 1.82 (H) 0.66 - 1.25 mg/dL    GFR Estimate 35 (L) >60 mL/min/1.7m2    GFR Estimate If Black 42 (L) >60 mL/min/1.7m2    Calcium 7.3 (L) 8.5 - 10.1 mg/dL   Glucose by meter   Result Value Ref Range    Glucose 126 (H) 70 - 99 mg/dL   EKG 12-lead, tracing only   Result Value Ref Range    Interpretation ECG Click View Image link to view waveform and result

## 2018-08-30 NOTE — PLAN OF CARE
Problem: Patient Care Overview  Goal: Plan of Care/Patient Progress Review  Outcome: No Change  VSS. Tele: SD 1 AVB and BBB. One episode of SVT this am, one time dose of 10 mg IV diltiazem given with no change in HR. Pt converted on his own. PO Diltiazem started per EP. Continue to monitor HR for one more day and then plan to discharge to TCU. Up with 2 to stand - lift due to pain. BM last 8/25 passing gas declined stool softeners/intervention.

## 2018-08-30 NOTE — PROGRESS NOTES
SP[facility] Progress Note  [facility] CCU    Initial visit per LOS. Pt declined offer of a SH visit and states that he doesn't expect God can/will do anything to help him. Pt said his two sons are very helpful, but pt adds that his spouse has advancing dementia and his current infirmity limits his ability to provide care for her. Pt voices hope that his health can improve so he can help care for or provide emotional support for his spouse (who is now in an AL facility). SH affirmed pt's stated goals and commitment to his spouse.                                                                                                                                           Edgardo Neely M.Div., [facility]  Staff   Pager [phone]

## 2018-08-30 NOTE — PROGRESS NOTES
"EP Cardiology Progress Note  Rosemarie You MD         Assessment and Plan:      1.  SVT.  EP has been asked to reevaluate Dr. Flores d/t sustained episode of SVT around 4 pm yesterday.  Well documented on a 12-lead ECG at 4:11 pm.  LBBB morphology, identical to baseline QRS.  .  Reported SBP low 80s mmHg.  RRT called.  Spontaneous termination.      Plan:  - pharmacologic rx to prevent SVT recurrence.    I see 2 reasonable drug options: (a) add diltiazem XR (the patient said he took 180 mg PTA but I do not see this on his PTA med list), the other (b) is to add low-dose amiodarone to metoprolol XL.    The pt wanted me to speak to his son Aleksandar, a physician, but I have been unable to reach him so far.    - will add dilt  mg daily and watch for bradycardia  - switch simva- to atorva- (d/t simva/dilt interaction)       Total Time: 35 min;   20 minutes spent in direct communication with patient / family and care coordination.               Interval History:   Lower back/hip discomfort.          Review of Systems:   CONSTITUTIONAL: NEGATIVE for fever, chills, change in weight  ENT/MOUTH: NEGATIVE for ear, mouth and throat problems  RESP: NEGATIVE for significant cough or SOB  CV: NEGATIVE for chest pain, palpitations or peripheral edema          Physical Exam:        Blood pressure 129/68, pulse 90, temperature 100.4  F (38  C), temperature source Oral, resp. rate 19, height 1.727 m (5' 8\"), weight 77.1 kg (170 lb), SpO2 97 %.  Vitals:    08/25/18 2131 08/26/18 0500 08/30/18 0500   Weight: 75.8 kg (167 lb 3.2 oz) 73.8 kg (162 lb 12.8 oz) 77.1 kg (170 lb)     Vital Signs with Ranges  Temp:  [98.1  F (36.7  C)-100.4  F (38  C)] 100.4  F (38  C)  Pulse:  [90] 90  Heart Rate:  [] 89  Resp:  [11-30] 19  BP: ()/(45-92) 129/68  SpO2:  [93 %-100 %] 97 %  I/O's Last 24 hours  I/O last 3 completed shifts:  In: 100 [P.O.:100]  Out: 825 [Urine:825]    EXAM:  A+O x 3  JVP nl  2+ carotids  RRR, no g/m/r  ABD: " soft  EXTR: no edema         Medications:          enoxaparin  30 mg Subcutaneous Q24H     glipiZIDE  5 mg Oral BID AC     insulin aspart  4 Units Subcutaneous TID w/meals     insulin aspart  1-7 Units Subcutaneous TID AC     insulin aspart  1-5 Units Subcutaneous At Bedtime     insulin glargine  5 Units Subcutaneous At Bedtime     metoprolol succinate  25 mg Oral Daily     piperacillin-tazobactam  3.375 g Intravenous Q6H     ranitidine  150 mg Oral At Bedtime     senna-docusate  1 tablet Oral BID    Or     senna-docusate  2 tablet Oral BID     simvastatin  20 mg Oral At Bedtime     sodium chloride (PF)  3 mL Intracatheter Q8H            Data:      All new lab and imaging data was reviewed.   Recent Labs   Lab Test  18   1625  18   0650  18   0615  18   0715  18   0657   14   0405   WBC  11.5*   --    --   10.0  12.8*   < >  14.7*   HGB  8.6*   --   9.0*  9.2*  11.0*   < >  11.2*   MCV  89   --    --   91  91   < >  91   PLT  109*  118*  111*  108*  150   < >  169   INR   --    --    --    --    --    --   0.91    < > = values in this interval not displayed.      Recent Labs   Lab Test  18   0517  18   2030  18   0650   NA  141  141  140   POTASSIUM  3.8  3.8  4.5   CHLORIDE  111*  109  109   CO2  22  23  23   BUN  38*  38*  34*   CR  1.82*  1.87*  1.86*   ANIONGAP  8  9  8   JOSELINE  7.3*  7.2*  7.9*   GLC  115*  183*  105*     Recent Labs   Lab Test  18   1625  14   1656   TROPI  0.039   --    TROPONIN   --   0.03         EKG results:  Reviewed      Echo Results:  Recent Results (from the past 4320 hour(s))   Echocardiogram Complete    Narrative    [id number]  ECH19  [id number]  [id number]^YANNA^KARON^ISIS           [facility]  Echocardiography Laboratory  [address] [place] [address]        Name: CHERYL HOLLOWAY  MRN: [id number]  : 1924  Study Date: 2018 08:55 AM  Age: 89 yrs  Gender: Male  Patient  Location: [facility]  Reason For Study: Afib  Ordering Physician: KARON RAINES  Referring Physician: RAISSA HILL  Performed By: Lise Hill RDCS     BSA: 1.9 m2  Height: 68 in  Weight: 162 lb  HR: 84  BP: 178/94 mmHg  _____________________________________________________________________________  __        Procedure  Complete Echo Adult.  _____________________________________________________________________________  __        Interpretation Summary     Sinus rhythm was noted.  Left ventricular systolic function is normal. The visual ejection fraction is  estimated at 55-60%. There is mild concentric left ventricular hypertrophy.  There is trace to mild mitral regurgitation.  Thickened mitral valve anterior leaflet is noted and there is a  prominenet/sclerotic chordal attachment (giving the appearance of an  associated mass). The thickening is most likely degenerative.  There is moderate (2+) tricuspid regurgitation.  Right ventricular systolic pressure is elevated, consistent with moderate to  severe pulmonary hypertension.  There is no comparison study available. The study was technically limited.  _____________________________________________________________________________  __        Left Ventricle  The left ventricle is normal in size. There is mild concentric left  ventricular hypertrophy. Left ventricular systolic function is normal. The  visual ejection fraction is estimated at 55-60%. Grade I or early diastolic  dysfunction. Diastolic Doppler findings (E/E' ratio and/or other parameters)  suggest left ventricular filling pressures are indeterminate. No regional wall  motion abnormalities noted.     Right Ventricle  The right ventricle is normal size. The right ventricular systolic function is  normal. The right ventricle is not well visualized.     Atria  Normal left atrial size. Right atrial size is normal. There is no atrial shunt  seen.     Mitral Valve  Thickened mitral valve anterior leaflet. There is  trace to mild mitral  regurgitation.        Tricuspid Valve  Normal IVC (1.5-2.5cm) with >50% respiratory collapse; right atrial pressure  is estimated at 5-10mmHg. There is moderate (2+) tricuspid regurgitation. The  right ventricular systolic pressure is approximated at 58mmHg plus the right  atrial pressure. Right ventricular systolic pressure is elevated, consistent  with moderate to severe pulmonary hypertension.     Aortic Valve  Thickened aortic valve leaflets. No aortic regurgitation is present. No  hemodynamically significant valvular aortic stenosis.     Pulmonic Valve  There is trace to mild pulmonic valvular regurgitation. There is no pulmonic  valvular stenosis.     Vessels  Normal size aorta. The IVC is normal in size and reactivity with respiration,  suggesting normal central venous pressure.     Pericardium  The pericardium appears normal.        Rhythm  Sinus rhythm was noted.  _____________________________________________________________________________  __  MMode/2D Measurements & Calculations  IVSd: 1.1 cm     LVIDd: 3.6 cm  LVIDs: 2.6 cm  LVPWd: 1.2 cm  FS: 26.9 %  LV mass(C)d: 137.0 grams  LV mass(C)dI: 73.3 grams/m2  Ao root diam: 3.6 cm  LA dimension: 2.7 cm  asc Aorta Diam: 3.3 cm  LA/Ao: 0.75  LVOT diam: 2.2 cm  LVOT area: 3.8 cm2  LA Volume (BP): 31.0 ml  LA Volume Index (BP): 16.6 ml/m2  RWT: 0.68           Doppler Measurements & Calculations  MV E max minnie: 61.6 cm/sec  MV A max minnie: 83.9 cm/sec  MV E/A: 0.73  MV dec time: 0.37 sec  PA acc time: 0.10 sec  TR max minnie: 380.1 cm/sec  TR max P.8 mmHg  E/E' av.5  Lateral E/e': 8.6  Medial E/e': 14.4           _____________________________________________________________________________  __           Report approved by: Yanet Ceron 2018 11:01 AM          Imaging:   Recent Results (from the past 24 hour(s))   XR Chest Port 1 View    Narrative    CHEST PORTABLE ONE VIEW   2018 4:56 PM     HISTORY: Question of  aspiration.     COMPARISON: 8/25/2018.    FINDINGS: Supine portable chest. Sternal wires and mediastinal clips.  No pneumothorax. The heart size is normal. There is probable fibrosis  in the mid and lower lungs bilaterally which is similar to the  previous exam. There is new infiltrate at the right lung base  laterally.      Impression    IMPRESSION: New small right lung base infiltrate.    SHARI HERNANDEZ MD

## 2018-08-30 NOTE — PROGRESS NOTES
([id number]) Pt was tachy, paged hospitalist by Day RN. Given PRN metoprolol per NP. Pt is A&O, BP soft and HR WNL.  Handed off report to RN. Transferred to heart center.

## 2018-08-30 NOTE — PROGRESS NOTES
IM cross cover.    HR into 140s and blood pressure low 80s. Occurred earlier in day and improved with vagal maneuver. Vagal maneuver not effective this time, but while discussing with nursing staff HR returned low 100s. Repeat blood pressure pending.    ADDENDUM:  Asymptomatic during episode above. -108 after using incentive spirometer and recheck BP 110s. No LE edema, but fine crackles present in bilat lower lobes. No further intervention at this time.    Appreciate house officer assistance.

## 2018-08-30 NOTE — PROGRESS NOTES
SW:  D:  Calls placed to follow up on the referrals to [facility] and [facility].  Both can accept patient tomorrow.   P:  Will continue to follow.

## 2018-08-30 NOTE — CODE/RAPID RESPONSE
Brief house [facility] note:    Contacted by hospitalist regarding elevated HR and low BP. Appears to be a repeat of what happened earlier in the day; my colleague Kelly Rodriguez managed this patient, refer to her note. Patient was asymptomatic on my arrival. He appears grossly euvolemic on exam with the exception of crackles on lung exam. Upon reviewing prior ECG, I feel that the patient may have been in SVT rather than a-flutter (or VT, precordial leads have a biphasic pattern) and subsequent conversion with vagal maneuvers. Given tenuous hemodynamics, we will move Mr. Abimael Flores to CCU for further hemodynamic monitoring and potential interventions. I had the bedside nurse hold crystalloid bolus and give 2.5mg IV metoprolol. An additional ECG was ordered. Cardiology is following along for evaluation of potential afib and signed off today, may need to be re-consulted if he continues to have symptomatic tachycardic episodes.     Interventions:  -- 12 lead  -- 2.5 mg IV metop  -- Transfer to CCU  -- Son and patient were updated on plan, all are in agreement  -- EP consult placed by Dr. Reyes  -- Rocephin d/c'd, zosyn started for pneumonia on cxr  -- will consider blood products given drop in hemoglobin + arrhythmias     Addendum   2137 - RRT paged for hemodynamically stable wide complex tachycardia. Patient was stable and in sinus when I arrived. 12 lead ECG performed which showed a first degree AVB along with LBBB. We gave an additional dose of metoprolol. Will initiate a cardizem infusion if patient persists with tachycardic episodes. Per the patient's request I ordered a collins to be inserted. Patient has complaints of urinary retention, frequency and urgency. Patient endorses that flomax has not helped in the past.     Physical Exam   Constitutional: He is oriented to person, place, and time and well-developed, well-nourished, and in no distress.   HENT:   Head: Normocephalic and atraumatic.   Eyes: Pupils are equal,  round, and reactive to light.   Neck: Normal range of motion.   Cardiovascular: Normal rate and intact distal pulses.  An irregularly irregular rhythm present. Exam reveals no gallop and no friction rub.    No murmur heard.  Pulmonary/Chest: Effort normal and breath sounds normal.   Abdominal: Soft. Bowel sounds are normal. He exhibits no distension.   Musculoskeletal: Normal range of motion.   Neurological: He is alert and oriented to person, place, and time. GCS score is 15.   Skin: Skin is warm and dry.   Psychiatric: Mood normal.          EKG Interpretation:      Interpreted by Milton Hammer  Time reviewed: 2157   Symptoms at time of EKG: None   Rhythm: Normal sinus   Rate: Normal  Axis: Normal  Ectopy: Premature ventricular contractions (unifocal)  Conduction: Left bundle branch block (complete) and 1st degree AV block  ST Segments/ T Waves: No ST-T wave changes and No acute ischemic changes  Q Waves: None  Comparison to prior: sinus replaces tachycardia (svt?)  Clinical Impression: no acute changes      Time Spent on this Encounter   I spent 60 minutes (2000 - 2030; 2137 - 2207) of critical care time on the unit/floor managing the care of Abimael Flores. Upon evaluation, this patient had a high probability of imminent or life-threatening deterioration due to arrythmias, which required my direct attention, intervention, and personal management. 100% of my time was spent at the bedside counseling the patient and/or coordinating care regarding services listed in this note.    WILLIAM Taylor, CNP  Hospitalist - House [facility]  Text Page  [phone])

## 2018-08-30 NOTE — PROGRESS NOTES
"Orthopedic Surgery  8/30/2018    S: Patient voices no complaints today.     O: Blood pressure (!) 148/105, pulse 90, temperature 99.4  F (37.4  C), temperature source Oral, resp. rate 12, height 1.727 m (5' 8\"), weight 77.1 kg (170 lb), SpO2 97 %.  Lab Results   Component Value Date    HGB 8.6 08/29/2018     Lab Results   Component Value Date    INR 0.91 04/21/2014        Neurovascularly intact.  Calves are negative bilaterally, both soft and nontender.  The wound is C/D/I.  The wound looks good with minimal erythema of the surrounding skin.    A: Mr. Flores is doing well status post Procedure(s):  OPEN REDUCTION INTERNAL FIXATION HIP BIPOLAR.    P: Continue physical therapy.  WBAT   Anticoagulation, discharge on ASA  Pain management  Discharge planning, when medically stable    Bill Sanders  [phone]    "

## 2018-08-30 NOTE — PROGRESS NOTES
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  He was found to have a right femoral neck fracture.  He was found to have fibrillation on his EKG done in the ER.  He has history of sinus arrhythmia, no documented history of atrial fibrillation.       Right femoral neck fracture  Mechanical fall  The patient describes the fall as being mechanical.  He has some balance problems recently, but no frequent falls.  He denies any preceding symptoms, no dizziness, no shortness of breath, no chest pains, no loss of consciousness.  He did hit his head slightly and had a skin laceration that was sutured as the ER.  Ortho on-call was contacted.  The patient seems to have been in a good state of health.  He does have a history of coronary artery disease with coronary artery bypass grafting, but as per the patient and his son, both physicians, he did not have any problems since his surgery.     - Post op management per Orthopedics.    - IVF, Pain control - Oxycodone, Tylenol   - PT/OT     New diagnosis of Atrial fibrillation  Apparently, he was told that he had a sinus arrhythmia in the past.  He does not have formal diagnosis of A-Fib although his son states he is not surprised of this as the patient's pulse had been irregular in the past.  The patient is on a baby aspirin at home, which will be held at this time.  His heart rate seems to be a rate controlled on his prior to admission atenolol, which will be continued.     - echocardiogram results noted    - Cardiology consulted. His CHADS-VASc score is 4 for age, hypertension, and diabetes.  This puts him in the high risk group  for a stroke.  Anticoagulation management for atrial fibrillation was discussed with them briefly on admission. The patient states he is not interested in starting anticoagulation.  His son does states that he seems to be at high risk of falls.    - requested cardiology input 8/28 as patient  Had a run of wide complex tachyarrhythmia , atenolol was on hold sicne yesterday ,as per EP recommendation due to bradycardia .  -plan to continue on low dose metoprolol , appreciate cardiology input .   - Resume PTA Asa once cleared from Ortho.   -on 8/29 patient  Went into symptomatic svt, improved with vagal maneuvers and again at night was in svt , currently on metoprolol prn, cardiology notified on 8/29.  -beto try a one time diltiazem iv  dose and monitor         Hypertension  His blood pressure was slightly elevated in ER.  He did have a lot of pain. Improved presently.     - Hydralazine IV p.r.n.     Leukocytosis  Possible urinary tract infection    Possible aspiration pneumonia right from episode of dizziness  White blood cells of 15.6, likely related to stress.  His chest x-ray does not show any infiltrates. .  He does not have fevers.     - Wbc slightly high   -on zosyn now, rocephin stopped 8/29  -rocephin was for presumed urinary tract infection  But culture negative on 8/29       Diabetes mellitus type 2  Hemoglobin A1c 8.4.  At home he had maintained on Lantus 10 units at bedtime, glipizide-XL 5 mg p.o. twice daily and Humalog 4 units subcutaneously 3 times daily with meals.    - restarted  home medications since his oral intake improved.     Chronic kidney disease, stage III  Baseline creatinine seems to be between 1.5-1.8.  Creatinine on admission is 1.65.     - creatinine up to 1.9-->1.8 after fluids    - Follow BMP.     # Pain Assessment:  As per orthopedics    Called and updated son   DVT Prophylaxis: as per orthopedics    Code Status: DNR/DNI     Disposition: Expected discharge in 1-2 days depending on  rhythm control or treatment of his atrial tachyarrhythmia .    Betty Arzola MD  Text Page   (7am to 6pm)    Interval History   Overnight events noted, he had episodes of svt and occasionally wide complex tachyarrhythmia overnight, currently admitted to cardiac icu , he had refused anticoagulation in the past and family is on board with that decision, his son is a family practise physician . Patient  Was symptomatic with hypotension yesterday but currently off and on in atrial tachyarrhythmia with no symptoms, he is worried about his pulse and heart rhythm but pain is only on his right hip which is controlled.    -Data reviewed today: I reviewed all new labs and imaging results over the last 24 hours.     Physical Exam   Temp: 100.4  F (38  C) Temp src: Oral BP: 119/67 Pulse: 90 Heart Rate: 92 Resp: 25 SpO2: 96 % O2 Device: None (Room air) Oxygen Delivery: 2 LPM  Vitals:    08/25/18 2131 08/26/18 0500 08/30/18 0500   Weight: 75.8 kg (167 lb 3.2 oz) 73.8 kg (162 lb 12.8 oz) 77.1 kg (170 lb)     Vital Signs with Ranges  Temp:  [98.1  F (36.7  C)-100.4  F (38  C)] 100.4  F (38  C)  Pulse:  [90] 90  Heart Rate:  [] 92  Resp:  [11-30] 25  BP: ()/(45-92) 119/67  SpO2:  [93 %-100 %] 96 %  I/O last 3 completed shifts:  In: 100 [P.O.:100]  Out: 825 [Urine:825]    Constitutional:alert oriented x 3   Respiratory: Clear to auscultation bilaterally, no crackles or wheezing  Cardiovascular: in svt , normal S1 and S2, and no murmur noted  GI: Normal bowel sounds, soft, non-distended, non-tender  Skin/Integumen:right hip status post surgery   Neuro : moving all 4 extremities, no focal deficit noted     Medications       diltiazem  10 mg Intravenous Once     enoxaparin  30 mg Subcutaneous Q24H     glipiZIDE  5 mg Oral BID AC     insulin aspart  4 Units Subcutaneous TID w/meals     insulin aspart  1-7 Units Subcutaneous TID AC     insulin aspart  1-5 Units Subcutaneous At Bedtime     insulin glargine  5 Units  Subcutaneous At Bedtime     metoprolol succinate  25 mg Oral Daily     piperacillin-tazobactam  3.375 g Intravenous Q6H     ranitidine  150 mg Oral At Bedtime     senna-docusate  1 tablet Oral BID    Or     senna-docusate  2 tablet Oral BID     simvastatin  20 mg Oral At Bedtime     sodium chloride (PF)  3 mL Intracatheter Q8H       Data     Recent Labs  Lab 08/30/18  0517 08/29/18  2030 08/29/18  1625 08/29/18  0650 08/28/18  0615 08/27/18  0715 08/26/18  0657   WBC  --   --  11.5*  --   --  10.0 12.8*   HGB  --   --  8.6*  --  9.0* 9.2* 11.0*   MCV  --   --  89  --   --  91 91   PLT  --   --  109* 118* 111* 108* 150    141  --  140 137 138 141   POTASSIUM 3.8 3.8  --  4.5 4.1 4.5 4.4   CHLORIDE 111* 109  --  109 107 107 111*   CO2 22 23  --  23 22 23 23   BUN 38* 38*  --  34* 33* 26 28   CR 1.82* 1.87*  --  1.86* 1.90* 1.57* 1.43*   ANIONGAP 8 9  --  8 8 8 7   JOSELINE 7.3* 7.2*  --  7.9* 7.3* 7.0* 8.1*   * 183*  --  105* 115* 146* 102*   TROPI  --   --  0.039  --   --   --   --        Recent Labs  Lab 08/30/18  0734 08/30/18  0517 08/30/18  0201 08/29/18  2108 08/29/18  2030 08/29/18  1618 08/29/18  1403  08/29/18  0650  08/28/18  0615  08/27/18  0715   GLC  --  115*  --   --  183*  --   --   --  105*  --  115*  --  146*   *  --  145* 190*  --  167* 212*  < >  --   < >  --   < >  --    < > = values in this interval not displayed.    Imaging:   Recent Results (from the past 24 hour(s))   XR Chest Port 1 View    Narrative    CHEST PORTABLE ONE VIEW   8/29/2018 4:56 PM     HISTORY: Question of aspiration.     COMPARISON: 8/25/2018.    FINDINGS: Supine portable chest. Sternal wires and mediastinal clips.  No pneumothorax. The heart size is normal. There is probable fibrosis  in the mid and lower lungs bilaterally which is similar to the  previous exam. There is new infiltrate at the right lung base  laterally.      Impression    IMPRESSION: New small right lung base infiltrate.    SHARI HERNANDEZ MD

## 2018-08-31 ENCOUNTER — APPOINTMENT (OUTPATIENT)
Dept: PHYSICAL THERAPY | Facility: CLINIC | Age: 83
DRG: 469 | End: 2018-08-31
Payer: MEDICARE

## 2018-08-31 LAB
ANION GAP SERPL CALCULATED.3IONS-SCNC: 11 MMOL/L (ref 3–14)
ANION GAP SERPL CALCULATED.3IONS-SCNC: 8 MMOL/L (ref 3–14)
ANION GAP SERPL CALCULATED.3IONS-SCNC: 9 MMOL/L (ref 3–14)
BUN SERPL-MCNC: 38 MG/DL (ref 7–30)
BUN SERPL-MCNC: 40 MG/DL (ref 7–30)
BUN SERPL-MCNC: 44 MG/DL (ref 7–30)
CALCIUM SERPL-MCNC: 7.3 MG/DL (ref 8.5–10.1)
CALCIUM SERPL-MCNC: 7.6 MG/DL (ref 8.5–10.1)
CALCIUM SERPL-MCNC: 7.9 MG/DL (ref 8.5–10.1)
CHLORIDE SERPL-SCNC: 107 MMOL/L (ref 94–109)
CHLORIDE SERPL-SCNC: 108 MMOL/L (ref 94–109)
CHLORIDE SERPL-SCNC: 110 MMOL/L (ref 94–109)
CO2 SERPL-SCNC: 21 MMOL/L (ref 20–32)
CO2 SERPL-SCNC: 22 MMOL/L (ref 20–32)
CO2 SERPL-SCNC: 24 MMOL/L (ref 20–32)
CREAT SERPL-MCNC: 2.01 MG/DL (ref 0.66–1.25)
CREAT SERPL-MCNC: 2.15 MG/DL (ref 0.66–1.25)
CREAT SERPL-MCNC: 2.24 MG/DL (ref 0.66–1.25)
GFR SERPL CREATININE-BSD FRML MDRD: 27 ML/MIN/1.7M2
GFR SERPL CREATININE-BSD FRML MDRD: 29 ML/MIN/1.7M2
GFR SERPL CREATININE-BSD FRML MDRD: 31 ML/MIN/1.7M2
GLUCOSE BLDC GLUCOMTR-MCNC: 123 MG/DL (ref 70–99)
GLUCOSE BLDC GLUCOMTR-MCNC: 139 MG/DL (ref 70–99)
GLUCOSE BLDC GLUCOMTR-MCNC: 147 MG/DL (ref 70–99)
GLUCOSE BLDC GLUCOMTR-MCNC: 155 MG/DL (ref 70–99)
GLUCOSE BLDC GLUCOMTR-MCNC: 249 MG/DL (ref 70–99)
GLUCOSE SERPL-MCNC: 122 MG/DL (ref 70–99)
GLUCOSE SERPL-MCNC: 167 MG/DL (ref 70–99)
GLUCOSE SERPL-MCNC: 188 MG/DL (ref 70–99)
HGB BLD-MCNC: 9.4 G/DL (ref 13.3–17.7)
INTERPRETATION ECG - MUSE: NORMAL
POTASSIUM SERPL-SCNC: 3.8 MMOL/L (ref 3.4–5.3)
POTASSIUM SERPL-SCNC: 3.9 MMOL/L (ref 3.4–5.3)
POTASSIUM SERPL-SCNC: 4 MMOL/L (ref 3.4–5.3)
SODIUM SERPL-SCNC: 138 MMOL/L (ref 133–144)
SODIUM SERPL-SCNC: 140 MMOL/L (ref 133–144)
SODIUM SERPL-SCNC: 142 MMOL/L (ref 133–144)

## 2018-08-31 PROCEDURE — A9270 NON-COVERED ITEM OR SERVICE: HCPCS | Mod: GY | Performed by: ORTHOPAEDIC SURGERY

## 2018-08-31 PROCEDURE — 25000132 ZZH RX MED GY IP 250 OP 250 PS 637: Mod: GY | Performed by: INTERNAL MEDICINE

## 2018-08-31 PROCEDURE — A9270 NON-COVERED ITEM OR SERVICE: HCPCS | Mod: GY | Performed by: HOSPITALIST

## 2018-08-31 PROCEDURE — 25000132 ZZH RX MED GY IP 250 OP 250 PS 637: Mod: GY | Performed by: ORTHOPAEDIC SURGERY

## 2018-08-31 PROCEDURE — 25000128 H RX IP 250 OP 636: Performed by: ORTHOPAEDIC SURGERY

## 2018-08-31 PROCEDURE — 00000146 ZZHCL STATISTIC GLUCOSE BY METER IP

## 2018-08-31 PROCEDURE — 25000128 H RX IP 250 OP 636: Performed by: HOSPITALIST

## 2018-08-31 PROCEDURE — 36415 COLL VENOUS BLD VENIPUNCTURE: CPT | Performed by: HOSPITALIST

## 2018-08-31 PROCEDURE — 25000132 ZZH RX MED GY IP 250 OP 250 PS 637: Mod: GY | Performed by: HOSPITALIST

## 2018-08-31 PROCEDURE — 97530 THERAPEUTIC ACTIVITIES: CPT | Mod: GP

## 2018-08-31 PROCEDURE — A9270 NON-COVERED ITEM OR SERVICE: HCPCS | Mod: GY | Performed by: INTERNAL MEDICINE

## 2018-08-31 PROCEDURE — A9270 NON-COVERED ITEM OR SERVICE: HCPCS | Mod: GY | Performed by: NURSE PRACTITIONER

## 2018-08-31 PROCEDURE — 97110 THERAPEUTIC EXERCISES: CPT | Mod: GP

## 2018-08-31 PROCEDURE — 85018 HEMOGLOBIN: CPT | Performed by: INTERNAL MEDICINE

## 2018-08-31 PROCEDURE — 80048 BASIC METABOLIC PNL TOTAL CA: CPT | Performed by: INTERNAL MEDICINE

## 2018-08-31 PROCEDURE — 25000128 H RX IP 250 OP 636: Performed by: NURSE PRACTITIONER

## 2018-08-31 PROCEDURE — 40000193 ZZH STATISTIC PT WARD VISIT

## 2018-08-31 PROCEDURE — 99233 SBSQ HOSP IP/OBS HIGH 50: CPT | Performed by: HOSPITALIST

## 2018-08-31 PROCEDURE — 25000131 ZZH RX MED GY IP 250 OP 636 PS 637: Mod: GY | Performed by: INTERNAL MEDICINE

## 2018-08-31 PROCEDURE — 25000132 ZZH RX MED GY IP 250 OP 250 PS 637: Mod: GY | Performed by: NURSE PRACTITIONER

## 2018-08-31 PROCEDURE — 99232 SBSQ HOSP IP/OBS MODERATE 35: CPT | Performed by: INTERNAL MEDICINE

## 2018-08-31 PROCEDURE — 80048 BASIC METABOLIC PNL TOTAL CA: CPT | Performed by: HOSPITALIST

## 2018-08-31 PROCEDURE — 21000001 ZZH R&B HEART CARE

## 2018-08-31 PROCEDURE — 36415 COLL VENOUS BLD VENIPUNCTURE: CPT | Performed by: INTERNAL MEDICINE

## 2018-08-31 RX ORDER — METOPROLOL SUCCINATE 25 MG/1
25 TABLET, EXTENDED RELEASE ORAL
Status: COMPLETED | OUTPATIENT
Start: 2018-08-31 | End: 2018-08-31

## 2018-08-31 RX ORDER — POLYETHYLENE GLYCOL 3350 17 G/17G
17 POWDER, FOR SOLUTION ORAL 2 TIMES DAILY
Status: DISCONTINUED | OUTPATIENT
Start: 2018-08-31 | End: 2018-09-03 | Stop reason: HOSPADM

## 2018-08-31 RX ORDER — METOPROLOL SUCCINATE 50 MG/1
50 TABLET, EXTENDED RELEASE ORAL DAILY
Status: DISCONTINUED | OUTPATIENT
Start: 2018-09-01 | End: 2018-09-03 | Stop reason: HOSPADM

## 2018-08-31 RX ORDER — SODIUM CHLORIDE 9 MG/ML
INJECTION, SOLUTION INTRAVENOUS CONTINUOUS
Status: ACTIVE | OUTPATIENT
Start: 2018-08-31 | End: 2018-09-01

## 2018-08-31 RX ADMIN — POLYETHYLENE GLYCOL 3350 17 G: 17 POWDER, FOR SOLUTION ORAL at 10:37

## 2018-08-31 RX ADMIN — PIPERACILLIN SODIUM,TAZOBACTAM SODIUM 3.38 G: 3; .375 INJECTION, POWDER, FOR SOLUTION INTRAVENOUS at 02:21

## 2018-08-31 RX ADMIN — INSULIN GLARGINE 5 UNITS: 100 INJECTION, SOLUTION SUBCUTANEOUS at 21:58

## 2018-08-31 RX ADMIN — AMOXICILLIN AND CLAVULANATE POTASSIUM 1 TABLET: 875; 125 TABLET, FILM COATED ORAL at 11:57

## 2018-08-31 RX ADMIN — PIPERACILLIN SODIUM,TAZOBACTAM SODIUM 3.38 G: 3; .375 INJECTION, POWDER, FOR SOLUTION INTRAVENOUS at 08:19

## 2018-08-31 RX ADMIN — METOPROLOL SUCCINATE 25 MG: 25 TABLET, EXTENDED RELEASE ORAL at 08:18

## 2018-08-31 RX ADMIN — RANITIDINE 150 MG: 150 TABLET ORAL at 21:58

## 2018-08-31 RX ADMIN — SODIUM CHLORIDE: 9 INJECTION, SOLUTION INTRAVENOUS at 10:16

## 2018-08-31 RX ADMIN — INSULIN ASPART 4 UNITS: 100 INJECTION, SOLUTION INTRAVENOUS; SUBCUTANEOUS at 08:19

## 2018-08-31 RX ADMIN — ATORVASTATIN CALCIUM 10 MG: 10 TABLET, FILM COATED ORAL at 21:58

## 2018-08-31 RX ADMIN — RANITIDINE 150 MG: 150 TABLET ORAL at 02:21

## 2018-08-31 RX ADMIN — GLIPIZIDE 5 MG: 5 TABLET, FILM COATED, EXTENDED RELEASE ORAL at 16:06

## 2018-08-31 RX ADMIN — ATORVASTATIN CALCIUM 10 MG: 10 TABLET, FILM COATED ORAL at 02:21

## 2018-08-31 RX ADMIN — METOPROLOL SUCCINATE 25 MG: 25 TABLET, EXTENDED RELEASE ORAL at 17:39

## 2018-08-31 RX ADMIN — MAGNESIUM HYDROXIDE 30 ML: 400 SUSPENSION ORAL at 05:19

## 2018-08-31 RX ADMIN — OXYCODONE HYDROCHLORIDE 5 MG: 5 TABLET ORAL at 10:37

## 2018-08-31 RX ADMIN — INSULIN ASPART 4 UNITS: 100 INJECTION, SOLUTION INTRAVENOUS; SUBCUTANEOUS at 13:17

## 2018-08-31 RX ADMIN — DILTIAZEM HYDROCHLORIDE 180 MG: 180 CAPSULE, EXTENDED RELEASE ORAL at 05:13

## 2018-08-31 RX ADMIN — ENOXAPARIN SODIUM 30 MG: 30 INJECTION SUBCUTANEOUS at 13:17

## 2018-08-31 RX ADMIN — INSULIN ASPART 4 UNITS: 100 INJECTION, SOLUTION INTRAVENOUS; SUBCUTANEOUS at 17:39

## 2018-08-31 RX ADMIN — GLIPIZIDE 5 MG: 5 TABLET, FILM COATED, EXTENDED RELEASE ORAL at 08:18

## 2018-08-31 ASSESSMENT — ACTIVITIES OF DAILY LIVING (ADL)
ADLS_ACUITY_SCORE: 15
ADLS_ACUITY_SCORE: 9

## 2018-08-31 NOTE — PLAN OF CARE
Problem: Arrhythmia/Dysrhythmia (Symptomatic) (Adult)  Goal: Signs and Symptoms of Listed Potential Problems Will be Absent, Minimized or Managed (Arrhythmia/Dysrhythmia)  Signs and symptoms of listed potential problems will be absent, minimized or managed by discharge/transition of care (reference Arrhythmia/Dysrhythmia (Symptomatic) (Adult) CPG).   Outcome: No Change  Pt reported hip pain with transfer to [facility], denied PRN pain med. 's-160's systolic BP 70-80, pt asymptomatic. Once converted BP stabilized now in 100-110's. Up w/ assist of 2 and walker. SD w/ 1 degree AVB. Discharge to TCU today pending stable HR and BP, Family requested vinny. Continue to monitor.

## 2018-08-31 NOTE — PROGRESS NOTES
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  He was found to have a right femoral neck fracture.  He was found to have fibrillation on his EKG done in the ER.  He has history of sinus arrhythmia, no documented history of atrial fibrillation.       Right femoral neck fracture  Mechanical fall  The patient describes the fall as being mechanical.  He has some balance problems recently, but no frequent falls.  He denies any preceding symptoms, no dizziness, no shortness of breath, no chest pains, no loss of consciousness.  He did hit his head slightly and had a skin laceration that was sutured as the ER.  Ortho on-call was contacted.  The patient seems to have been in a good state of health.  He does have a history of coronary artery disease with coronary artery bypass grafting, but as per the patient and his son, both physicians, he did not have any problems since his surgery.     - Post op management per Orthopedics.    - Pain control - Oxycodone, Tylenol   - PT/OT  - add miralax to the bowel regimen     New diagnosis of Atrial fibrillation  Apparently, he was told that he had a sinus arrhythmia in the past.  He does not have formal diagnosis of A-Fib although his son states he is not surprised of this as the patient's pulse had been irregular in the past.  The patient is on a baby aspirin at home, which will be held at this time.  His heart rate seems to be a rate controlled on his prior to admission atenolol, which will be continued.    echocardiogram results noted   Cardiology consulted. His CHADS-VASc score is 4 for age, hypertension, and diabetes.  This puts  him in the high risk group for a stroke.  Anticoagulation management for atrial fibrillation was discussed with them briefly on admission. The patient states he is not interested in starting anticoagulation.  His son does states that he seems to be at high risk of falls.   Cardiology input 8/28 as patient  Had a run of wide complex tachyarrhythmia , atenolol was on hold sicne yesterday ,as per EP recommendation due to bradycardia .  plan to continue on low dose metoprolol , appreciate cardiology input .  Resume PTA Asa once cleared from Ortho.   Still having episodic short runs of svt  Plan  - aspirin on discharge  - continues on both metoprolol and diltiazem  - still having epidsodic SVT with mild hypotension (80 SBP) but reported asymptomatic during the last occurrence early AM on 8/31  - EP following       Hypertension  His blood pressure was slightly elevated in ER.  He did have a lot of pain. Improved presently.     - Hydralazine IV p.r.n.     Leukocytosis  Possible urinary tract infection    Possible aspiration pneumonia right from episode of dizziness  White blood cells of 15.6 with right lung base infiltrate  Started on ceftriaxone initially for UTI concerns but culture negative  plan  -change to augmentin course     Diabetes mellitus type 2  Hemoglobin A1c 8.4.  At home he had maintained on Lantus 10 units at bedtime, glipizide-XL 5 mg p.o. twice daily and Humalog 4 units subcutaneously 3 times daily with meals.    - restarted  home medications since his oral intake improved.     Chronic kidney disease, stage III  Baseline creatinine seems to be between 1.5-1.8.  Creatinine on admission is 1.65.     - creatinine up today, he reports poor oral intake   - Follow BMP.   - fluid challenge    # Pain Assessment:  As per orthopedics    Called and updated son   DVT Prophylaxis: as per orthopedics    Code Status: DNR/DNI     Disposition: Expected discharge in 1-2 days depending on rhythm control or treatment of his  atrial tachyarrhythmia .    Lazaro Suggs DO    Interval History   Another SVT event this AM, self terminated and associated with HR of 140-160s and systolic BP of 70-80 without symptoms per the nursing report    -Data reviewed today: I reviewed all new labs and imaging results over the last 24 hours.     Physical Exam   Temp: 98.3  F (36.8  C) Temp src: Oral BP: 120/59   Heart Rate: 78 Resp: 16 SpO2: 98 % O2 Device: Nasal cannula Oxygen Delivery: 2 LPM  Vitals:    08/26/18 0500 08/30/18 0500 08/31/18 0340   Weight: 73.8 kg (162 lb 12.8 oz) 77.1 kg (170 lb) 78.7 kg (173 lb 6.4 oz)     Vital Signs with Ranges  Temp:  [97.4  F (36.3  C)-99.4  F (37.4  C)] 98.3  F (36.8  C)  Heart Rate:  [] 78  Resp:  [12-25] 16  BP: ()/() 120/59  SpO2:  [92 %-99 %] 98 %  I/O last 3 completed shifts:  In: 220 [P.O.:120; I.V.:100]  Out: 925 [Urine:925]    Constitutional:alert oriented x 3   Respiratory: Clear to auscultation bilaterally, no crackles or wheezing  Cardiovascular: in svt , normal S1 and S2, and no murmur noted  GI: Normal bowel sounds, soft, non-distended, non-tender  Skin/Integumen:right hip status post surgery   Neuro : moving all 4 extremities, no focal deficit noted     Medications       atorvastatin  10 mg Oral QPM     diltiazem  180 mg Oral Daily     enoxaparin  30 mg Subcutaneous Q24H     glipiZIDE  5 mg Oral BID AC     insulin aspart  4 Units Subcutaneous TID w/meals     insulin aspart  1-7 Units Subcutaneous TID AC     insulin aspart  1-5 Units Subcutaneous At Bedtime     insulin glargine  5 Units Subcutaneous At Bedtime     metoprolol succinate  25 mg Oral Daily     piperacillin-tazobactam  3.375 g Intravenous Q6H     ranitidine  150 mg Oral At Bedtime     senna-docusate  1 tablet Oral BID    Or     senna-docusate  2 tablet Oral BID     sodium chloride (PF)  3 mL Intracatheter Q8H       Data     Recent Labs  Lab 08/31/18  0535 08/30/18  0517 08/29/18  2030 08/29/18  1625 08/29/18  0650  08/28/18  0615 08/27/18  0715 08/26/18  0657   WBC  --   --   --  11.5*  --   --  10.0 12.8*   HGB 9.4*  --   --  8.6*  --  9.0* 9.2* 11.0*   MCV  --   --   --  89  --   --  91 91   PLT  --   --   --  109* 118* 111* 108* 150    141 141  --  140 137 138 141   POTASSIUM 3.8 3.8 3.8  --  4.5 4.1 4.5 4.4   CHLORIDE 110* 111* 109  --  109 107 107 111*   CO2 21 22 23  --  23 22 23 23   BUN 38* 38* 38*  --  34* 33* 26 28   CR 2.15* 1.82* 1.87*  --  1.86* 1.90* 1.57* 1.43*   ANIONGAP 11 8 9  --  8 8 8 7   JOSELINE 7.9* 7.3* 7.2*  --  7.9* 7.3* 7.0* 8.1*   * 115* 183*  --  105* 115* 146* 102*   TROPI  --   --   --  0.039  --   --   --   --        Recent Labs  Lab 08/31/18  0535 08/30/18  2057 08/30/18  1709 08/30/18  1258 08/30/18  0734 08/30/18  0517 08/30/18  0201  08/29/18  2030  08/29/18  0650  08/28/18  0615   *  --   --   --   --  115*  --   --  183*  --  105*  --  115*   BGM  --  148* 113* 249* 126*  --  145*  < >  --   < >  --   < >  --    < > = values in this interval not displayed.    Imaging:   No results found for this or any previous visit (from the past 24 hour(s)).

## 2018-08-31 NOTE — SIGNIFICANT EVENT
Slightly increased creatinine this afternoon  Noted period of some hypotension while in SVT this AM  Will extend fluids very cautiously given age for another few hours, obtain urine creatinine and urine sodium, assess with bladder scan for retention.  Encourage oral hydration  Follow-up BMP in the AM

## 2018-08-31 NOTE — PLAN OF CARE
Problem: Arrhythmia/Dysrhythmia (Symptomatic) (Adult)  Goal: Signs and Symptoms of Listed Potential Problems Will be Absent, Minimized or Managed (Arrhythmia/Dysrhythmia)  Signs and symptoms of listed potential problems will be absent, minimized or managed by discharge/transition of care (reference Arrhythmia/Dysrhythmia (Symptomatic) (Adult) CPG).   Outcome: Improving  No arrhythmias/SVT noted, stable vitals. Right hip dressing CDI, CMS intact. Possible dc to TCU if no rhythm issues.

## 2018-08-31 NOTE — PLAN OF CARE
Problem: Patient Care Overview  Goal: Plan of Care/Patient Progress Review  VSS.  Pt denies any c/o of pain.  Up with assist of 2 and walker to chair for dinner.  Right hip dressing is c/d/i.  Tele is SR w/ 1AVB/BBB and occ pac's.  LS dim, with few crackles on RLL, gentle hydration until 9-1 0100 for elevated Cr 2.24 today.  Metoprolol additional 25mg given tonight, will increase to 50mg daily tomorrow. UA ordered, will bladder scan as needed.  1 episode of incontinence this evening, scanned x1 for 230ml at 1830, no urge to void, will continue to monitor for retention.  Plan for TCU at discharge will continue to monitor.

## 2018-08-31 NOTE — PROGRESS NOTES
SW:  D:  Spoke with patient's MD who states that patient is not ready to discharge today, but likely over the weekend.  Call placed to update Natalee.  Per Lorie, they can accept patient either day.  [facility] is unable to accept patient over the weekend.  P:  Will continue to follow.

## 2018-08-31 NOTE — PLAN OF CARE
Problem: Patient Care Overview  Goal: Plan of Care/Patient Progress Review  PT:  Discharge Planner PT   Patient plan for discharge: TCU  Current status: Pt completed seated exercises with assist for terminal extension on LAQ for R LE. Pt completed 3 sit <> stand trials, with modA of 2 x1, and Joshua of 2 x2. Pt required cuing for scooting in chair, hand placement on walker, standing posture, and leaning forward in standing.   Barriers to return to prior living situation: falls risk, level of assist  Recommendations for discharge: TCU  Rationale for recommendations: Pt will continue to benefit from skilled therapy services to increase his strength, activity tolerance, and independence with bed mobility, transfers, and gait.        Entered by: Alana Rogers 08/31/2018 10:15 AM            Awake/Patient baseline mental status

## 2018-08-31 NOTE — PROGRESS NOTES
"EP Cardiology Progress Note  Rosemarie You MD         Assessment and Plan:      1.  SVT with LBBB.  Dilt XR started yesterday.  Apparently the patient took this PTA.  Pt had another episode of SVT around 5 am today.  It lasted almost 1 hour.  Pt unaware of SVT, but SBP reportedly dropped into the 80s.  There is concern that SVT may have contributed to the recent fall.       Plan:  - continue dilt XR   - gently increase metoprolol XL to 50 mg daily; watch for bradycardia  - do not send to TCU yet  - if medical rx fails consider EPS/ablation (early next week) or a trial of low-dose amiodarone.                     Interval History:   no new complaints          Review of Systems:   CONSTITUTIONAL: NEGATIVE for fever, chills, change in weight  ENT/MOUTH: NEGATIVE for ear, mouth and throat problems  RESP: NEGATIVE for significant cough or SOB  CV: NEGATIVE for chest pain, palpitations or peripheral edema          Physical Exam:        Blood pressure 121/65, pulse 90, temperature 98.1  F (36.7  C), temperature source Oral, resp. rate 16, height 1.727 m (5' 8\"), weight 78.7 kg (173 lb 6.4 oz), SpO2 100 %.  Vitals:    08/26/18 0500 08/30/18 0500 08/31/18 0340   Weight: 73.8 kg (162 lb 12.8 oz) 77.1 kg (170 lb) 78.7 kg (173 lb 6.4 oz)     Vital Signs with Ranges  Temp:  [97.4  F (36.3  C)-99.4  F (37.4  C)] 98.1  F (36.7  C)  Heart Rate:  [] 81  Resp:  [12-20] 16  BP: ()/() 121/65  SpO2:  [92 %-100 %] 100 %  I/O's Last 24 hours  I/O last 3 completed shifts:  In: 220 [P.O.:120; I.V.:100]  Out: 925 [Urine:925]    EXAM:  A+O x 3  Lungs: few base crackles  CV: RRR, no S3  ABD: soft, NT  EXTR: no edema         Medications:          atorvastatin  10 mg Oral QPM     diltiazem  180 mg Oral Daily     enoxaparin  30 mg Subcutaneous Q24H     glipiZIDE  5 mg Oral BID AC     insulin aspart  4 Units Subcutaneous TID w/meals     insulin aspart  1-7 Units Subcutaneous TID AC     insulin aspart  1-5 Units Subcutaneous At " Bedtime     insulin glargine  5 Units Subcutaneous At Bedtime     metoprolol succinate  25 mg Oral Daily     piperacillin-tazobactam  3.375 g Intravenous Q6H     ranitidine  150 mg Oral At Bedtime     senna-docusate  1 tablet Oral BID    Or     senna-docusate  2 tablet Oral BID     sodium chloride (PF)  3 mL Intracatheter Q8H            Data:      All new lab and imaging data was reviewed.   Recent Labs   Lab Test  18   0535  18   1625  18   0650  18   0615  18   0715  18   0657   14   0405   WBC   --   11.5*   --    --   10.0  12.8*   < >  14.7*   HGB  9.4*  8.6*   --   9.0*  9.2*  11.0*   < >  11.2*   MCV   --   89   --    --   91  91   < >  91   PLT   --   109*  118*  111*  108*  150   < >  169   INR   --    --    --    --    --    --    --   0.91    < > = values in this interval not displayed.      Recent Labs   Lab Test  18   0535  18   0517  18   2030   NA  142  141  141   POTASSIUM  3.8  3.8  3.8   CHLORIDE  110*  111*  109   CO2  21  22  23   BUN  38*  38*  38*   CR  2.15*  1.82*  1.87*   ANIONGAP  11  8  9   JOSELINE  7.9*  7.3*  7.2*   GLC  122*  115*  183*     Recent Labs   Lab Test  18   1625  14   1656   TROPI  0.039   --    TROPONIN   --   0.03         EKG results:  Reviewed      Echo Results:  Recent Results (from the past 4320 hour(s))   Echocardiogram Complete    Narrative    [id number]  ECH19  [id number]  [id number]^YANNA^KARON^[facility]  Echocardiography Laboratory  [address] [place] [address]        Name: CHERYL HOLLOWAY  MRN: [id number]  : 1924  Study Date: 2018 08:55 AM  Age: 89 yrs  Gender: Male  Patient Location: [facility]  Reason For Study: Afib  Ordering Physician: KARON RAINES  Referring Physician: RAISSA HILL  Performed By: Lise Hill RDCS     BSA: 1.9 m2  Height: 68 in  Weight: 162 lb  HR: 84  BP: 178/94  mmHg  _____________________________________________________________________________  __        Procedure  Complete Echo Adult.  _____________________________________________________________________________  __        Interpretation Summary     Sinus rhythm was noted.  Left ventricular systolic function is normal. The visual ejection fraction is  estimated at 55-60%. There is mild concentric left ventricular hypertrophy.  There is trace to mild mitral regurgitation.  Thickened mitral valve anterior leaflet is noted and there is a  prominenet/sclerotic chordal attachment (giving the appearance of an  associated mass). The thickening is most likely degenerative.  There is moderate (2+) tricuspid regurgitation.  Right ventricular systolic pressure is elevated, consistent with moderate to  severe pulmonary hypertension.  There is no comparison study available. The study was technically limited.  _____________________________________________________________________________  __        Left Ventricle  The left ventricle is normal in size. There is mild concentric left  ventricular hypertrophy. Left ventricular systolic function is normal. The  visual ejection fraction is estimated at 55-60%. Grade I or early diastolic  dysfunction. Diastolic Doppler findings (E/E' ratio and/or other parameters)  suggest left ventricular filling pressures are indeterminate. No regional wall  motion abnormalities noted.     Right Ventricle  The right ventricle is normal size. The right ventricular systolic function is  normal. The right ventricle is not well visualized.     Atria  Normal left atrial size. Right atrial size is normal. There is no atrial shunt  seen.     Mitral Valve  Thickened mitral valve anterior leaflet. There is trace to mild mitral  regurgitation.        Tricuspid Valve  Normal IVC (1.5-2.5cm) with >50% respiratory collapse; right atrial pressure  is estimated at 5-10mmHg. There is moderate (2+) tricuspid regurgitation.  The  right ventricular systolic pressure is approximated at 58mmHg plus the right  atrial pressure. Right ventricular systolic pressure is elevated, consistent  with moderate to severe pulmonary hypertension.     Aortic Valve  Thickened aortic valve leaflets. No aortic regurgitation is present. No  hemodynamically significant valvular aortic stenosis.     Pulmonic Valve  There is trace to mild pulmonic valvular regurgitation. There is no pulmonic  valvular stenosis.     Vessels  Normal size aorta. The IVC is normal in size and reactivity with respiration,  suggesting normal central venous pressure.     Pericardium  The pericardium appears normal.        Rhythm  Sinus rhythm was noted.  _____________________________________________________________________________  __  MMode/2D Measurements & Calculations  IVSd: 1.1 cm     LVIDd: 3.6 cm  LVIDs: 2.6 cm  LVPWd: 1.2 cm  FS: 26.9 %  LV mass(C)d: 137.0 grams  LV mass(C)dI: 73.3 grams/m2  Ao root diam: 3.6 cm  LA dimension: 2.7 cm  asc Aorta Diam: 3.3 cm  LA/Ao: 0.75  LVOT diam: 2.2 cm  LVOT area: 3.8 cm2  LA Volume (BP): 31.0 ml  LA Volume Index (BP): 16.6 ml/m2  RWT: 0.68           Doppler Measurements & Calculations  MV E max minnie: 61.6 cm/sec  MV A max minnie: 83.9 cm/sec  MV E/A: 0.73  MV dec time: 0.37 sec  PA acc time: 0.10 sec  TR max minnie: 380.1 cm/sec  TR max P.8 mmHg  E/E' av.5  Lateral E/e': 8.6  Medial E/e': 14.4           _____________________________________________________________________________  __           Report approved by: Yanet Ceron 2018 11:01 AM

## 2018-09-01 ENCOUNTER — APPOINTMENT (OUTPATIENT)
Dept: PHYSICAL THERAPY | Facility: CLINIC | Age: 83
DRG: 469 | End: 2018-09-01
Payer: MEDICARE

## 2018-09-01 LAB
ANION GAP SERPL CALCULATED.3IONS-SCNC: 7 MMOL/L (ref 3–14)
BUN SERPL-MCNC: 42 MG/DL (ref 7–30)
CALCIUM SERPL-MCNC: 7.3 MG/DL (ref 8.5–10.1)
CHLORIDE SERPL-SCNC: 111 MMOL/L (ref 94–109)
CO2 SERPL-SCNC: 22 MMOL/L (ref 20–32)
CREAT SERPL-MCNC: 2.13 MG/DL (ref 0.66–1.25)
CREAT UR-MCNC: 89 MG/DL
ERYTHROCYTE [DISTWIDTH] IN BLOOD BY AUTOMATED COUNT: 14.5 % (ref 10–15)
GFR SERPL CREATININE-BSD FRML MDRD: 29 ML/MIN/1.7M2
GLUCOSE BLDC GLUCOMTR-MCNC: 112 MG/DL (ref 70–99)
GLUCOSE BLDC GLUCOMTR-MCNC: 139 MG/DL (ref 70–99)
GLUCOSE BLDC GLUCOMTR-MCNC: 140 MG/DL (ref 70–99)
GLUCOSE BLDC GLUCOMTR-MCNC: 141 MG/DL (ref 70–99)
GLUCOSE BLDC GLUCOMTR-MCNC: 93 MG/DL (ref 70–99)
GLUCOSE SERPL-MCNC: 100 MG/DL (ref 70–99)
HCT VFR BLD AUTO: 24.9 % (ref 40–53)
HGB BLD-MCNC: 8.2 G/DL (ref 13.3–17.7)
MCH RBC QN AUTO: 29.5 PG (ref 26.5–33)
MCHC RBC AUTO-ENTMCNC: 32.9 G/DL (ref 31.5–36.5)
MCV RBC AUTO: 90 FL (ref 78–100)
PLATELET # BLD AUTO: 201 10E9/L (ref 150–450)
POTASSIUM SERPL-SCNC: 4 MMOL/L (ref 3.4–5.3)
RBC # BLD AUTO: 2.78 10E12/L (ref 4.4–5.9)
SODIUM SERPL-SCNC: 140 MMOL/L (ref 133–144)
SODIUM UR-SCNC: 55 MMOL/L
WBC # BLD AUTO: 9 10E9/L (ref 4–11)

## 2018-09-01 PROCEDURE — 93005 ELECTROCARDIOGRAM TRACING: CPT

## 2018-09-01 PROCEDURE — 21000001 ZZH R&B HEART CARE

## 2018-09-01 PROCEDURE — 97110 THERAPEUTIC EXERCISES: CPT | Mod: GP

## 2018-09-01 PROCEDURE — 99232 SBSQ HOSP IP/OBS MODERATE 35: CPT | Performed by: HOSPITALIST

## 2018-09-01 PROCEDURE — 25000132 ZZH RX MED GY IP 250 OP 250 PS 637: Mod: GY | Performed by: HOSPITALIST

## 2018-09-01 PROCEDURE — A9270 NON-COVERED ITEM OR SERVICE: HCPCS | Mod: GY | Performed by: INTERNAL MEDICINE

## 2018-09-01 PROCEDURE — 99232 SBSQ HOSP IP/OBS MODERATE 35: CPT | Performed by: INTERNAL MEDICINE

## 2018-09-01 PROCEDURE — 25000132 ZZH RX MED GY IP 250 OP 250 PS 637: Mod: GY | Performed by: INTERNAL MEDICINE

## 2018-09-01 PROCEDURE — A9270 NON-COVERED ITEM OR SERVICE: HCPCS | Mod: GY | Performed by: HOSPITALIST

## 2018-09-01 PROCEDURE — 84300 ASSAY OF URINE SODIUM: CPT | Performed by: HOSPITALIST

## 2018-09-01 PROCEDURE — 25000132 ZZH RX MED GY IP 250 OP 250 PS 637: Mod: GY | Performed by: ORTHOPAEDIC SURGERY

## 2018-09-01 PROCEDURE — 82570 ASSAY OF URINE CREATININE: CPT | Performed by: HOSPITALIST

## 2018-09-01 PROCEDURE — 36415 COLL VENOUS BLD VENIPUNCTURE: CPT | Performed by: HOSPITALIST

## 2018-09-01 PROCEDURE — 00000146 ZZHCL STATISTIC GLUCOSE BY METER IP

## 2018-09-01 PROCEDURE — 25000128 H RX IP 250 OP 636: Performed by: ORTHOPAEDIC SURGERY

## 2018-09-01 PROCEDURE — 85027 COMPLETE CBC AUTOMATED: CPT | Performed by: HOSPITALIST

## 2018-09-01 PROCEDURE — 80048 BASIC METABOLIC PNL TOTAL CA: CPT | Performed by: HOSPITALIST

## 2018-09-01 PROCEDURE — A9270 NON-COVERED ITEM OR SERVICE: HCPCS | Mod: GY | Performed by: ORTHOPAEDIC SURGERY

## 2018-09-01 PROCEDURE — 25000131 ZZH RX MED GY IP 250 OP 636 PS 637: Mod: GY | Performed by: INTERNAL MEDICINE

## 2018-09-01 PROCEDURE — 40000193 ZZH STATISTIC PT WARD VISIT

## 2018-09-01 PROCEDURE — 93010 ELECTROCARDIOGRAM REPORT: CPT | Performed by: INTERNAL MEDICINE

## 2018-09-01 RX ADMIN — DILTIAZEM HYDROCHLORIDE 180 MG: 180 CAPSULE, EXTENDED RELEASE ORAL at 08:35

## 2018-09-01 RX ADMIN — ENOXAPARIN SODIUM 30 MG: 30 INJECTION SUBCUTANEOUS at 12:14

## 2018-09-01 RX ADMIN — SENNOSIDES AND DOCUSATE SODIUM 1 TABLET: 8.6; 5 TABLET ORAL at 08:35

## 2018-09-01 RX ADMIN — METOPROLOL SUCCINATE 50 MG: 50 TABLET, EXTENDED RELEASE ORAL at 08:35

## 2018-09-01 RX ADMIN — AMOXICILLIN AND CLAVULANATE POTASSIUM 1 TABLET: 875; 125 TABLET, FILM COATED ORAL at 08:35

## 2018-09-01 RX ADMIN — OXYCODONE HYDROCHLORIDE 5 MG: 5 TABLET ORAL at 08:38

## 2018-09-01 RX ADMIN — ATORVASTATIN CALCIUM 10 MG: 10 TABLET, FILM COATED ORAL at 21:08

## 2018-09-01 RX ADMIN — OXYCODONE HYDROCHLORIDE 5 MG: 5 TABLET ORAL at 01:50

## 2018-09-01 RX ADMIN — GLIPIZIDE 5 MG: 5 TABLET, FILM COATED, EXTENDED RELEASE ORAL at 17:06

## 2018-09-01 RX ADMIN — POLYETHYLENE GLYCOL 3350 17 G: 17 POWDER, FOR SOLUTION ORAL at 21:10

## 2018-09-01 RX ADMIN — INSULIN ASPART 4 UNITS: 100 INJECTION, SOLUTION INTRAVENOUS; SUBCUTANEOUS at 17:34

## 2018-09-01 RX ADMIN — OXYCODONE HYDROCHLORIDE 5 MG: 5 TABLET ORAL at 17:06

## 2018-09-01 RX ADMIN — SENNOSIDES AND DOCUSATE SODIUM 2 TABLET: 8.6; 5 TABLET ORAL at 21:08

## 2018-09-01 RX ADMIN — RANITIDINE 150 MG: 150 TABLET ORAL at 23:56

## 2018-09-01 RX ADMIN — INSULIN ASPART 4 UNITS: 100 INJECTION, SOLUTION INTRAVENOUS; SUBCUTANEOUS at 12:14

## 2018-09-01 RX ADMIN — POLYETHYLENE GLYCOL 3350 17 G: 17 POWDER, FOR SOLUTION ORAL at 08:35

## 2018-09-01 RX ADMIN — INSULIN ASPART 4 UNITS: 100 INJECTION, SOLUTION INTRAVENOUS; SUBCUTANEOUS at 09:00

## 2018-09-01 RX ADMIN — GLIPIZIDE 5 MG: 5 TABLET, FILM COATED, EXTENDED RELEASE ORAL at 08:35

## 2018-09-01 RX ADMIN — INSULIN GLARGINE 5 UNITS: 100 INJECTION, SOLUTION SUBCUTANEOUS at 23:57

## 2018-09-01 ASSESSMENT — PAIN DESCRIPTION - DESCRIPTORS
DESCRIPTORS: ACHING
DESCRIPTORS: ACHING

## 2018-09-01 ASSESSMENT — ACTIVITIES OF DAILY LIVING (ADL)
ADLS_ACUITY_SCORE: 11
ADLS_ACUITY_SCORE: 11
ADLS_ACUITY_SCORE: 15
ADLS_ACUITY_SCORE: 11

## 2018-09-01 NOTE — PLAN OF CARE
Problem: Patient Care Overview  Goal: Plan of Care/Patient Progress Review  Outcome: Improving  Alert and oriented, forgetful. VSS on RA. Tele SR with BBB. [place].  and 141. Pt was bladder scanned for 253 at 0015, then was able to void twice for 75mL - incontinent/missed the urinal twice. UA sent down to the lab. Up with assist of 2. Pt refused repositioning. Coccyx redness blanchable, mepilex applied. Plan for TCU at discharge. Will continue to monitor.

## 2018-09-01 NOTE — PROGRESS NOTES
SW:  D:  Spoke with patient's MD who states that patient is not ready to discharge today.  Call placed to update Natalee as to patient's status.  Per Blanche, they can accept patient tomorrow.  P:  Will continue to follow.

## 2018-09-01 NOTE — PROGRESS NOTES
[facility]    Cardiology Progress Note     Assessment & Plan   Abimael Flores is a 89 year old male who was admitted on 8/25/2018 for symptomatic SVT episodes    1. Asymptomatic SVT episodes  2. Atrial fibrillation with intermittent rate-related bundle.  3. Normal ejection fraction, EF 55-60%  4. CAD s/p CABG  5. Moderate to severe pulmonary hypertension with moderate TR  6. T2DM  7. CKD III  --Fewer runs of tachycardia; none overnight  >>Continue dilt  mg QD  >> Continue metoprolol XL 50 mg daily; watch for bradycardia  >>If medical rx fails, can consider EP study with potential ablation vs antiarrhythmic therapy as an outpatient.  >>Deferred discussion of anticoagulation (patient preference)    Medical therapy is appropriately titrated.  We will sign off but do not hesitate to call for questions.    Active Problems:    Closed right hip fracture (H)    S/P total hip arthroplasty      # Pain Assessment:  Current Pain Score 9/1/2018   Patient currently in pain? yes   Pain score (0-10) 6   Pain location Hip   Pain descriptors Aching       Demarcus Tabares MD    Text Page     Interval History   No further tachycardia overnight. No issues.  Up in the chair briefly (2 hours at a time yesterday).    Physical Exam   Temp: 98.4  F (36.9  C) Temp src: Oral BP: 144/69   Heart Rate: 83 Resp: 16 SpO2: 94 % O2 Device: None (Room air) Oxygen Delivery: 2 LPM  Vitals:    08/26/18 0500 08/30/18 0500 08/31/18 0340   Weight: 73.8 kg (162 lb 12.8 oz) 77.1 kg (170 lb) 78.7 kg (173 lb 6.4 oz)     Vital Signs with Ranges  Temp:  [98  F (36.7  C)-98.4  F (36.9  C)] 98.4  F (36.9  C)  Heart Rate:  [] 83  Resp:  [16-18] 16  BP: ()/(43-69) 144/69  SpO2:  [93 %-100 %] 94 %  I/O last 3 completed shifts:  In: 486.67 [P.O.:200; I.V.:286.67]  Out: 1150 [Urine:1150]  Patient Active Problem List   Diagnosis     Injury of globe of eye     Recent retinal detachment, total or subtotal     Closed right hip  fracture (H)     S/P total hip arthroplasty       Constitutional: No apparent distress.   Eyes: No xanthelasma or conjunctivitis  Respiratory: Clear to auscultation bilaterally. No crackles or wheezes.  Cardiovascular: Irregularly irregular. 2/6 systolic murmur.  Extremities: No peripheral edema.  Neurologic: Moving all extremities. No facial assymmetry.  Psychiatric: Alert and oriented. Answers questions appropriately.     Medications       amoxicillin-clavulanate  1 tablet Oral Q24H EFRA     atorvastatin  10 mg Oral QPM     diltiazem  180 mg Oral Daily     enoxaparin  30 mg Subcutaneous Q24H     glipiZIDE  5 mg Oral BID AC     insulin aspart  4 Units Subcutaneous TID w/meals     insulin aspart  1-7 Units Subcutaneous TID AC     insulin aspart  1-5 Units Subcutaneous At Bedtime     insulin glargine  5 Units Subcutaneous At Bedtime     metoprolol succinate  50 mg Oral Daily     polyethylene glycol  17 g Oral BID     ranitidine  150 mg Oral At Bedtime     senna-docusate  1 tablet Oral BID    Or     senna-docusate  2 tablet Oral BID     sodium chloride (PF)  3 mL Intracatheter Q8H       Data   Results for orders placed or performed during the hospital encounter of 08/25/18 (from the past 24 hour(s))   Glucose by meter   Result Value Ref Range    Glucose 123 (H) 70 - 99 mg/dL   Basic metabolic panel   Result Value Ref Range    Sodium 138 133 - 144 mmol/L    Potassium 3.9 3.4 - 5.3 mmol/L    Chloride 107 94 - 109 mmol/L    Carbon Dioxide 22 20 - 32 mmol/L    Anion Gap 9 3 - 14 mmol/L    Glucose 188 (H) 70 - 99 mg/dL    Urea Nitrogen 40 (H) 7 - 30 mg/dL    Creatinine 2.01 (H) 0.66 - 1.25 mg/dL    GFR Estimate 31 (L) >60 mL/min/1.7m2    GFR Estimate If Black 38 (L) >60 mL/min/1.7m2    Calcium 7.6 (L) 8.5 - 10.1 mg/dL   Glucose by meter   Result Value Ref Range    Glucose 147 (H) 70 - 99 mg/dL   Basic metabolic panel   Result Value Ref Range    Sodium 140 133 - 144 mmol/L    Potassium 4.0 3.4 - 5.3 mmol/L    Chloride 108 94  - 109 mmol/L    Carbon Dioxide 24 20 - 32 mmol/L    Anion Gap 8 3 - 14 mmol/L    Glucose 167 (H) 70 - 99 mg/dL    Urea Nitrogen 44 (H) 7 - 30 mg/dL    Creatinine 2.24 (H) 0.66 - 1.25 mg/dL    GFR Estimate 27 (L) >60 mL/min/1.7m2    GFR Estimate If Black 33 (L) >60 mL/min/1.7m2    Calcium 7.3 (L) 8.5 - 10.1 mg/dL   Glucose by meter   Result Value Ref Range    Glucose 139 (H) 70 - 99 mg/dL   Glucose by meter   Result Value Ref Range    Glucose 155 (H) 70 - 99 mg/dL   Sodium random urine   Result Value Ref Range    Sodium Urine mmol/L 55 mmol/L   Creatinine random urine   Result Value Ref Range    Creatinine Urine Random 89 mg/dL   Glucose by meter   Result Value Ref Range    Glucose 141 (H) 70 - 99 mg/dL

## 2018-09-01 NOTE — PROGRESS NOTES
"BRIEF NUTRITION ASSESSMENT      REASON FOR ASSESSMENT:  LOS    NUTRITION HISTORY:  Pt states that he consumes a regular diet, consuming 2 main meals daily. Breakfast at 10am and Dinner at dinning lang at living facility    CURRENT DIET AND INTAKE:  Diet: Regular diet     Intake:  -Pt states appetite is improving over this admission however, he is not happy with a lot of the menu items. He also reports that menu items are missing such as condiments   -Per RN flowsheet, pt consuming 50%-75% of meals ordered   -Wt fairly stable over this admission     ANTHROPOMETRICS:  Height: 5'8\"  Weight: 74.5 kg  BMI: 24.97 kg/m2  IBW: 70kg  Weight Status: Normal BMI  %IBW: 106%  Weight History:   Wt Readings from Last 10 Encounters:   09/01/18 74.5 kg (164 lb 3.2 oz)   04/21/14 73.5 kg (162 lb)   04/21/14 74.8 kg (165 lb)       LABS:  Labs noted    MALNUTRITION:  Patient does not meet two of the following criteria necessary for diagnosing malnutrition: significant weight loss, reduced intake, subcutaneous fat loss, muscle loss or fluid retention    NUTRITION INTERVENTION:  Nutrition Diagnosis:  No nutrition diagnosis at this time.    Implementation:  Nutrition Education: oral nutrition supplements and need for increasing protein. Of note Pt daughter in law is an RD      FOLLOW UP/MONITORING:   Will re-evaluate in 7 - 10 days, or sooner, if re-consulted.      Cuca Stephenson, RD, LD  "

## 2018-09-01 NOTE — PLAN OF CARE
Problem: Patient Care Overview  Goal: Plan of Care/Patient Progress Review  Discharge Planner PT   Patient plan for discharge: TCU  Current status: Pt reports that he has just returned to bed and is agreeable to supine exercises only this date. Pt participated in exercises with assist and without complaints. Will continue to progress mobility as tolerated.  Barriers to return to prior living situation: Decreased activity as tolerated, level of assist, fall risk  Recommendations for discharge: TCU  Rationale for recommendations: Pt will benefit from daily continued therapy to address impairments and increase mobility and functional independence prior to returning home.        Entered by: Albania Hunt 09/01/2018 12:15 PM

## 2018-09-01 NOTE — PROGRESS NOTES
VSS.  Pt c/o of right hip pain 5/10, given oxycodone x2 with relief of pain.  Tele is SR w/ pacs and 1AVB/BBB.  Up with assist of 2 to chair for meals.  Right hip dressing is c/d/i.  Skin bruised with multiple skin tears and scabs.  Mepilex on coccyx, blanchable, with frequent repositioning.  Per Hospitalist, ok to remove stiches on forehead abrasion, sutures removed.  BG 93/139/140 today. Encourage PO intake, Cr 2.13 BMP ordered for am.  Plan to continue to monitor, TCU at discharge.

## 2018-09-02 LAB
ANION GAP SERPL CALCULATED.3IONS-SCNC: 9 MMOL/L (ref 3–14)
BUN SERPL-MCNC: 38 MG/DL (ref 7–30)
CALCIUM SERPL-MCNC: 7.4 MG/DL (ref 8.5–10.1)
CHLORIDE SERPL-SCNC: 110 MMOL/L (ref 94–109)
CO2 SERPL-SCNC: 21 MMOL/L (ref 20–32)
CREAT SERPL-MCNC: 1.93 MG/DL (ref 0.66–1.25)
ERYTHROCYTE [DISTWIDTH] IN BLOOD BY AUTOMATED COUNT: 15 % (ref 10–15)
GFR SERPL CREATININE-BSD FRML MDRD: 33 ML/MIN/1.7M2
GLUCOSE BLDC GLUCOMTR-MCNC: 111 MG/DL (ref 70–99)
GLUCOSE BLDC GLUCOMTR-MCNC: 147 MG/DL (ref 70–99)
GLUCOSE BLDC GLUCOMTR-MCNC: 157 MG/DL (ref 70–99)
GLUCOSE BLDC GLUCOMTR-MCNC: 159 MG/DL (ref 70–99)
GLUCOSE BLDC GLUCOMTR-MCNC: 210 MG/DL (ref 70–99)
GLUCOSE BLDC GLUCOMTR-MCNC: 223 MG/DL (ref 70–99)
GLUCOSE BLDC GLUCOMTR-MCNC: 266 MG/DL (ref 70–99)
GLUCOSE BLDC GLUCOMTR-MCNC: 80 MG/DL (ref 70–99)
GLUCOSE SERPL-MCNC: 55 MG/DL (ref 70–99)
HCT VFR BLD AUTO: 27.8 % (ref 40–53)
HGB BLD-MCNC: 9 G/DL (ref 13.3–17.7)
MCH RBC QN AUTO: 29.2 PG (ref 26.5–33)
MCHC RBC AUTO-ENTMCNC: 32.4 G/DL (ref 31.5–36.5)
MCV RBC AUTO: 90 FL (ref 78–100)
PLATELET # BLD AUTO: 247 10E9/L (ref 150–450)
POTASSIUM SERPL-SCNC: 4.4 MMOL/L (ref 3.4–5.3)
RBC # BLD AUTO: 3.08 10E12/L (ref 4.4–5.9)
SODIUM SERPL-SCNC: 140 MMOL/L (ref 133–144)
WBC # BLD AUTO: 10.3 10E9/L (ref 4–11)

## 2018-09-02 PROCEDURE — 25000132 ZZH RX MED GY IP 250 OP 250 PS 637: Mod: GY | Performed by: INTERNAL MEDICINE

## 2018-09-02 PROCEDURE — 80048 BASIC METABOLIC PNL TOTAL CA: CPT | Performed by: HOSPITALIST

## 2018-09-02 PROCEDURE — 25000132 ZZH RX MED GY IP 250 OP 250 PS 637: Mod: GY | Performed by: ORTHOPAEDIC SURGERY

## 2018-09-02 PROCEDURE — 25000131 ZZH RX MED GY IP 250 OP 636 PS 637: Mod: GY | Performed by: INTERNAL MEDICINE

## 2018-09-02 PROCEDURE — A9270 NON-COVERED ITEM OR SERVICE: HCPCS | Mod: GY | Performed by: INTERNAL MEDICINE

## 2018-09-02 PROCEDURE — 99232 SBSQ HOSP IP/OBS MODERATE 35: CPT | Performed by: HOSPITALIST

## 2018-09-02 PROCEDURE — 36415 COLL VENOUS BLD VENIPUNCTURE: CPT | Performed by: HOSPITALIST

## 2018-09-02 PROCEDURE — 21000001 ZZH R&B HEART CARE

## 2018-09-02 PROCEDURE — A9270 NON-COVERED ITEM OR SERVICE: HCPCS | Mod: GY | Performed by: ORTHOPAEDIC SURGERY

## 2018-09-02 PROCEDURE — 25000128 H RX IP 250 OP 636: Performed by: ORTHOPAEDIC SURGERY

## 2018-09-02 PROCEDURE — 85027 COMPLETE CBC AUTOMATED: CPT | Performed by: HOSPITALIST

## 2018-09-02 PROCEDURE — 25000132 ZZH RX MED GY IP 250 OP 250 PS 637: Mod: GY | Performed by: HOSPITALIST

## 2018-09-02 PROCEDURE — A9270 NON-COVERED ITEM OR SERVICE: HCPCS | Mod: GY | Performed by: HOSPITALIST

## 2018-09-02 PROCEDURE — 00000146 ZZHCL STATISTIC GLUCOSE BY METER IP

## 2018-09-02 RX ORDER — GLIPIZIDE 5 MG/1
5 TABLET, FILM COATED, EXTENDED RELEASE ORAL
Status: DISCONTINUED | OUTPATIENT
Start: 2018-09-03 | End: 2018-09-02

## 2018-09-02 RX ADMIN — POLYETHYLENE GLYCOL 3350 17 G: 17 POWDER, FOR SOLUTION ORAL at 22:00

## 2018-09-02 RX ADMIN — INSULIN GLARGINE 5 UNITS: 100 INJECTION, SOLUTION SUBCUTANEOUS at 22:00

## 2018-09-02 RX ADMIN — OXYCODONE HYDROCHLORIDE 5 MG: 5 TABLET ORAL at 16:19

## 2018-09-02 RX ADMIN — METOPROLOL SUCCINATE 50 MG: 50 TABLET, EXTENDED RELEASE ORAL at 08:11

## 2018-09-02 RX ADMIN — RANITIDINE 150 MG: 150 TABLET ORAL at 22:00

## 2018-09-02 RX ADMIN — DILTIAZEM HYDROCHLORIDE 180 MG: 180 CAPSULE, EXTENDED RELEASE ORAL at 08:11

## 2018-09-02 RX ADMIN — POLYETHYLENE GLYCOL 3350 17 G: 17 POWDER, FOR SOLUTION ORAL at 08:12

## 2018-09-02 RX ADMIN — SENNOSIDES AND DOCUSATE SODIUM 2 TABLET: 8.6; 5 TABLET ORAL at 21:59

## 2018-09-02 RX ADMIN — AMOXICILLIN AND CLAVULANATE POTASSIUM 1 TABLET: 875; 125 TABLET, FILM COATED ORAL at 08:11

## 2018-09-02 RX ADMIN — ENOXAPARIN SODIUM 30 MG: 30 INJECTION SUBCUTANEOUS at 12:59

## 2018-09-02 RX ADMIN — ATORVASTATIN CALCIUM 10 MG: 10 TABLET, FILM COATED ORAL at 22:00

## 2018-09-02 RX ADMIN — OXYCODONE HYDROCHLORIDE 5 MG: 5 TABLET ORAL at 08:09

## 2018-09-02 ASSESSMENT — ACTIVITIES OF DAILY LIVING (ADL)
ADLS_ACUITY_SCORE: 10
ADLS_ACUITY_SCORE: 12

## 2018-09-02 ASSESSMENT — PAIN DESCRIPTION - DESCRIPTORS: DESCRIPTORS: DISCOMFORT

## 2018-09-02 NOTE — PROGRESS NOTES
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  He was found to have a right femoral neck fracture.  He was found to have fibrillation on his EKG done in the ER.  He has history of sinus arrhythmia, no documented history of atrial fibrillation.       Right femoral neck fracture  Mechanical fall  The patient describes the fall as being mechanical.  He has some balance problems recently, but no frequent falls.  He denies any preceding symptoms, no dizziness, no shortness of breath, no chest pains, no loss of consciousness.  He did hit his head slightly and had a skin laceration that was sutured as the ER.  Ortho on-call was contacted.  The patient seems to have been in a good state of health.  He does have a history of coronary artery disease with coronary artery bypass grafting, but as per the patient and his son, both physicians, he did not have any problems since his surgery.     - Post op management per Orthopedics.    - Pain control - Oxycodone, Tylenol   - PT/OT  - add miralax to the bowel regimen, patient declines further change today     New diagnosis of Atrial fibrillation  Supraventricular tachycardia  Apparently, he was told that he had a sinus arrhythmia in the past.  He does not have formal diagnosis of A-Fib although his son states he is not surprised of this as the patient's pulse had been irregular in the past.  The patient is on a baby aspirin at home, which will be held at this time.  His heart rate seems to be a rate controlled on his prior to admission atenolol, which will be continued.    echocardiogram results noted   Cardiology consulted. His  CHADS-VASc score is 4 for age, hypertension, and diabetes.  This puts him in the high risk group for a stroke.  Anticoagulation management for atrial fibrillation was discussed with them briefly on admission. The patient states he is not interested in starting anticoagulation.  His son does states that he seems to be at high risk of falls.   Cardiology input 8/28 as patient  Had a run of wide complex tachyarrhythmia , atenolol was on hold sicne yesterday ,as per EP recommendation due to bradycardia .  plan to continue on low dose metoprolol , appreciate cardiology input .  Resume PTA Asa once cleared from Ortho.   improving  Plan  - aspirin on discharge  - continues on both metoprolol and diltiazem  - monitoring for bradycardia  - cardiology has signed off   - only 7 beat SVT overnight, unclear significant improvement       Hypertension  His blood pressure was slightly elevated in ER.  He did have a lot of pain. Improved presently.     - Hydralazine IV p.r.n.     Leukocytosis  Possible urinary tract infection    Possible aspiration pneumonia right from episode of dizziness  White blood cells of 15.6 with right lung base infiltrate  Started on ceftriaxone initially for UTI concerns but culture negative  plan  -change to augmentin course     Diabetes mellitus type 2  Hemoglobin A1c 8.4.  At home he had maintained on Lantus 10 units at bedtime, glipizide-XL 5 mg p.o. twice daily and Humalog 4 units subcutaneously 3 times daily with meals.    - stop the glipizide given the hypoglycemia     Acute on Chronic kidney disease, stage III  Baseline creatinine seems to be between 1.5-1.8.  Creatinine on admission is 1.65.    Up to 2.25 post surgery  Urine sodium 55, suggesting not prerenal, did receive some fluids   - creatinine  improving   - encourage oral hydration, recheck bmp in the AM      # Pain Assessment:  As per orthopedics    Called and updated son   DVT Prophylaxis: as per orthopedics    Code Status: DNR/DNI      Disposition: Expected discharge in 1- days. I discussed with the patient and son in length. Had some hypoglycemia overnight. Minimal tachycardia. Feeling very weak overall and only agreeing for supine exercises. Creatinine improving and not yet at baseline. Would monitor another day, consider discharge to TCU in the AM if creatinine is stable and no other issues with hypoglycemia occur    Lazaro Suggs DO    Interval History   Another small asxs run of SVT, appears regular. BS of 50 this AM. Feels much worse and weaker overall.    -Data reviewed today: I reviewed all new labs and imaging results over the last 24 hours.     Physical Exam   Temp: 99.1  F (37.3  C) Temp src: Oral BP: 130/67 Pulse: 75 Heart Rate: 77 Resp: 16 SpO2: 94 % O2 Device: None (Room air)    Vitals:    08/31/18 0340 09/01/18 0636 09/02/18 0500   Weight: 78.7 kg (173 lb 6.4 oz) 74.5 kg (164 lb 3.2 oz) 73.4 kg (161 lb 14.4 oz)     Vital Signs with Ranges  Temp:  [97.7  F (36.5  C)-99.1  F (37.3  C)] 99.1  F (37.3  C)  Pulse:  [69-75] 75  Heart Rate:  [63-78] 77  Resp:  [14-18] 16  BP: (129-138)/(60-79) 130/67  SpO2:  [94 %-99 %] 94 %  I/O last 3 completed shifts:  In: 840 [P.O.:840]  Out: 1100 [Urine:1100]    Constitutional:alert oriented x 3   Respiratory: Clear to auscultation bilaterally, no crackles or wheezing  Cardiovascular: in svt , normal S1 and S2, and no murmur noted  GI: Normal bowel sounds, soft, non-distended, non-tender  Skin/Integumen:right hip status post surgery   Neuro : moving all 4 extremities, no focal deficit noted     Medications       amoxicillin-clavulanate  1 tablet Oral Q24H EFRA     atorvastatin  10 mg Oral QPM     diltiazem  180 mg Oral Daily     enoxaparin  30 mg Subcutaneous Q24H     insulin aspart  2 Units Subcutaneous TID w/meals     insulin aspart  1-7 Units Subcutaneous TID AC     insulin aspart  1-5 Units Subcutaneous At Bedtime     insulin glargine  5 Units Subcutaneous At Bedtime     metoprolol  succinate  50 mg Oral Daily     polyethylene glycol  17 g Oral BID     ranitidine  150 mg Oral At Bedtime     senna-docusate  1 tablet Oral BID    Or     senna-docusate  2 tablet Oral BID     sodium chloride (PF)  3 mL Intracatheter Q8H       Data     Recent Labs  Lab 09/02/18  0530 09/01/18  0535 08/31/18  1546  08/31/18  0535  08/29/18  1625   WBC 10.3 9.0  --   --   --   --  11.5*   HGB 9.0* 8.2*  --   --  9.4*  --  8.6*   MCV 90 90  --   --   --   --  89    201  --   --   --   --  109*    140 140  < > 142  < >  --    POTASSIUM 4.4 4.0 4.0  < > 3.8  < >  --    CHLORIDE 110* 111* 108  < > 110*  < >  --    CO2 21 22 24  < > 21  < >  --    BUN 38* 42* 44*  < > 38*  < >  --    CR 1.93* 2.13* 2.24*  < > 2.15*  < >  --    ANIONGAP 9 7 8  < > 11  < >  --    JOSELINE 7.4* 7.3* 7.3*  < > 7.9*  < >  --    GLC 55* 100* 167*  < > 122*  < >  --    TROPI  --   --   --   --   --   --  0.039   < > = values in this interval not displayed.    Recent Labs  Lab 09/02/18  0838 09/02/18  0708 09/02/18  0530 09/02/18  0140 09/01/18  2112 09/01/18  1702  09/01/18  0535  08/31/18  1546  08/31/18  1000  08/31/18  0535   GLC  --   --  55*  --   --   --   --  100*  --  167*  --  188*  --  122*   * 111*  --  80 112* 140*  < >  --   < >  --   < >  --   < >  --    < > = values in this interval not displayed.    Imaging:   No results found for this or any previous visit (from the past 24 hour(s)).

## 2018-09-02 NOTE — PROGRESS NOTES
SW:  D:  Spoke with patient's MD who states that patient is not ready to discharge today.  He is asking if [facility] will accept patient tomorrow.  Call placed to [facility] to inquire as to whether they would accept patient tomorrow.  Per Blanche, they can accept patient tomorrow.  They phone number to the nurses station is [phone] and the fax number to fax the discharge orders is [phone].  P:  Will continue to follow.

## 2018-09-02 NOTE — PLAN OF CARE
Problem: Arrhythmia/Dysrhythmia (Symptomatic) (Adult)  Goal: Signs and Symptoms of Listed Potential Problems Will be Absent, Minimized or Managed (Arrhythmia/Dysrhythmia)  Signs and symptoms of listed potential problems will be absent, minimized or managed by discharge/transition of care (reference Arrhythmia/Dysrhythmia (Symptomatic) (Adult) CPG).   Outcome: No Change  A&Ox4, VSS on RA. Incision dressing to R hip CDI. Skin tears and bruises on arms and head, mepilex on coccyx. BG 80 at 0200, given apple juice in am. Voiding in urinal.

## 2018-09-02 NOTE — PLAN OF CARE
A&Ox4, forgetful. POD 7 from ORIF. Tele Sinus dysrhythmia.  VSS on RA. C/o pain managed by PRN oxy.  Strong Ax2 with walker. Diabetic. Mod carb diet, voiding adequately via urinal at bedside.  IV SL. May possibly go to [facility]. Will continue to monitor.

## 2018-09-03 VITALS
SYSTOLIC BLOOD PRESSURE: 141 MMHG | OXYGEN SATURATION: 96 % | RESPIRATION RATE: 20 BRPM | DIASTOLIC BLOOD PRESSURE: 57 MMHG | TEMPERATURE: 98.5 F | BODY MASS INDEX: 24.25 KG/M2 | HEIGHT: 68 IN | HEART RATE: 75 BPM | WEIGHT: 160 LBS

## 2018-09-03 LAB
ANION GAP SERPL CALCULATED.3IONS-SCNC: 7 MMOL/L (ref 3–14)
BUN SERPL-MCNC: 35 MG/DL (ref 7–30)
CALCIUM SERPL-MCNC: 7.3 MG/DL (ref 8.5–10.1)
CHLORIDE SERPL-SCNC: 110 MMOL/L (ref 94–109)
CO2 SERPL-SCNC: 24 MMOL/L (ref 20–32)
CREAT SERPL-MCNC: 1.94 MG/DL (ref 0.66–1.25)
ERYTHROCYTE [DISTWIDTH] IN BLOOD BY AUTOMATED COUNT: 14.8 % (ref 10–15)
GFR SERPL CREATININE-BSD FRML MDRD: 32 ML/MIN/1.7M2
GLUCOSE BLDC GLUCOMTR-MCNC: 110 MG/DL (ref 70–99)
GLUCOSE BLDC GLUCOMTR-MCNC: 118 MG/DL (ref 70–99)
GLUCOSE BLDC GLUCOMTR-MCNC: 132 MG/DL (ref 70–99)
GLUCOSE BLDC GLUCOMTR-MCNC: 235 MG/DL (ref 70–99)
GLUCOSE SERPL-MCNC: 119 MG/DL (ref 70–99)
HCT VFR BLD AUTO: 25.5 % (ref 40–53)
HGB BLD-MCNC: 8.2 G/DL (ref 13.3–17.7)
MCH RBC QN AUTO: 29.2 PG (ref 26.5–33)
MCHC RBC AUTO-ENTMCNC: 32.2 G/DL (ref 31.5–36.5)
MCV RBC AUTO: 91 FL (ref 78–100)
PLATELET # BLD AUTO: 260 10E9/L (ref 150–450)
POTASSIUM SERPL-SCNC: 4.2 MMOL/L (ref 3.4–5.3)
RBC # BLD AUTO: 2.81 10E12/L (ref 4.4–5.9)
SODIUM SERPL-SCNC: 141 MMOL/L (ref 133–144)
WBC # BLD AUTO: 9.6 10E9/L (ref 4–11)

## 2018-09-03 PROCEDURE — 25000132 ZZH RX MED GY IP 250 OP 250 PS 637: Mod: GY | Performed by: INTERNAL MEDICINE

## 2018-09-03 PROCEDURE — 99239 HOSP IP/OBS DSCHRG MGMT >30: CPT | Performed by: PHYSICIAN ASSISTANT

## 2018-09-03 PROCEDURE — 36415 COLL VENOUS BLD VENIPUNCTURE: CPT | Performed by: HOSPITALIST

## 2018-09-03 PROCEDURE — 85027 COMPLETE CBC AUTOMATED: CPT | Performed by: HOSPITALIST

## 2018-09-03 PROCEDURE — A9270 NON-COVERED ITEM OR SERVICE: HCPCS | Mod: GY | Performed by: HOSPITALIST

## 2018-09-03 PROCEDURE — A9270 NON-COVERED ITEM OR SERVICE: HCPCS | Mod: GY | Performed by: INTERNAL MEDICINE

## 2018-09-03 PROCEDURE — 25000128 H RX IP 250 OP 636: Performed by: ORTHOPAEDIC SURGERY

## 2018-09-03 PROCEDURE — A9270 NON-COVERED ITEM OR SERVICE: HCPCS | Mod: GY | Performed by: ORTHOPAEDIC SURGERY

## 2018-09-03 PROCEDURE — 25000132 ZZH RX MED GY IP 250 OP 250 PS 637: Mod: GY | Performed by: HOSPITALIST

## 2018-09-03 PROCEDURE — 80048 BASIC METABOLIC PNL TOTAL CA: CPT | Performed by: HOSPITALIST

## 2018-09-03 PROCEDURE — 25000132 ZZH RX MED GY IP 250 OP 250 PS 637: Mod: GY | Performed by: ORTHOPAEDIC SURGERY

## 2018-09-03 PROCEDURE — 00000146 ZZHCL STATISTIC GLUCOSE BY METER IP

## 2018-09-03 RX ORDER — METOPROLOL SUCCINATE 50 MG/1
50 TABLET, EXTENDED RELEASE ORAL DAILY
Qty: 30 TABLET | Status: ON HOLD | DISCHARGE
Start: 2018-09-04 | End: 2019-01-05

## 2018-09-03 RX ORDER — ATORVASTATIN CALCIUM 10 MG/1
10 TABLET, FILM COATED ORAL EVERY EVENING
DISCHARGE
Start: 2018-09-03 | End: 2019-01-05

## 2018-09-03 RX ORDER — POLYETHYLENE GLYCOL 3350 17 G/17G
17 POWDER, FOR SOLUTION ORAL DAILY PRN
Qty: 7 PACKET | DISCHARGE
Start: 2018-09-03 | End: 2018-09-12

## 2018-09-03 RX ORDER — DILTIAZEM HYDROCHLORIDE 180 MG/1
180 CAPSULE, EXTENDED RELEASE ORAL DAILY
DISCHARGE
Start: 2018-09-04 | End: 2018-09-12

## 2018-09-03 RX ORDER — AMOXICILLIN 250 MG
1 CAPSULE ORAL 2 TIMES DAILY
Qty: 60 TABLET | Refills: 0 | DISCHARGE
Start: 2018-09-03 | End: 2018-09-12

## 2018-09-03 RX ADMIN — HYDROMORPHONE HYDROCHLORIDE 0.2 MG: 1 INJECTION, SOLUTION INTRAMUSCULAR; INTRAVENOUS; SUBCUTANEOUS at 00:47

## 2018-09-03 RX ADMIN — ACETAMINOPHEN 650 MG: 325 TABLET, FILM COATED ORAL at 00:47

## 2018-09-03 RX ADMIN — ENOXAPARIN SODIUM 30 MG: 30 INJECTION SUBCUTANEOUS at 12:32

## 2018-09-03 RX ADMIN — OXYCODONE HYDROCHLORIDE 5 MG: 5 TABLET ORAL at 08:39

## 2018-09-03 RX ADMIN — OXYCODONE HYDROCHLORIDE 5 MG: 5 TABLET ORAL at 00:47

## 2018-09-03 RX ADMIN — POLYETHYLENE GLYCOL 3350 17 G: 17 POWDER, FOR SOLUTION ORAL at 08:39

## 2018-09-03 RX ADMIN — DILTIAZEM HYDROCHLORIDE 180 MG: 180 CAPSULE, EXTENDED RELEASE ORAL at 08:39

## 2018-09-03 RX ADMIN — SENNOSIDES AND DOCUSATE SODIUM 2 TABLET: 8.6; 5 TABLET ORAL at 08:39

## 2018-09-03 RX ADMIN — METOPROLOL SUCCINATE 50 MG: 50 TABLET, EXTENDED RELEASE ORAL at 08:39

## 2018-09-03 RX ADMIN — AMOXICILLIN AND CLAVULANATE POTASSIUM 1 TABLET: 875; 125 TABLET, FILM COATED ORAL at 08:39

## 2018-09-03 ASSESSMENT — PAIN DESCRIPTION - DESCRIPTORS: DESCRIPTORS: ACHING

## 2018-09-03 ASSESSMENT — ACTIVITIES OF DAILY LIVING (ADL)
ADLS_ACUITY_SCORE: 10

## 2018-09-03 NOTE — PROGRESS NOTES
SW:  D:  Received discharge orders for patient.  Bed available at [facility] for today.  Spoke with patient and son regarding discharge plans.  Patient's son is in agreement with transporting patient via the skyway.  Patient and son state that patient will finish eating lunch and patient will discharge between 13:30 and 14:00 today.  Patient and son informed of the plan and in agreement to the plan.  Call placed to update [facility] and faxed the orders and the PAS.    PAS-RR    D: Per DHS regulation, SW completed and submitted PAS-RR to [place] [employer] via the [employer] Line.  PAS-RR confirmation # is : [id number].    I: SW spoke with patient and son and they are aware a PAS-RR has been submitted.  SW reviewed with patient and son that they may be contacted for a follow up appointment within 10 days of hospital discharge if their SNF stay is < 30 days.  Contact information for [facility] was also provided.    A: Patient and son verbalized understanding.    P: Further questions may be directed to [facility] Line at #[phone], option #4 for PAS-RR staff.

## 2018-09-03 NOTE — DISCHARGE INSTRUCTIONS
Patient will discharge to [facility] in [facility] today, via the son and the skyway, around 14:00.  [facility]'s phone number is [phone].

## 2018-09-03 NOTE — PLAN OF CARE
Problem: Patient Care Overview  Goal: Plan of Care/Patient Progress Review  Outcome: Therapy, progress toward functional goals as expected  Pt alert and oriented, articulates needs well. Pain controlled with tylenol, oxycodone and dilaudid. Moves self up in bed and turns self. Surgical dressing R hip CDI. Heart monitoring sinus arrhythmia 1 AVB and BBB. Voids to urinal. Preparing for discharge to TCU/rehab

## 2018-09-03 NOTE — PLAN OF CARE
Problem: Patient Care Overview  Goal: Plan of Care/Patient Progress Review  Outcome: No Change  Pt c/o Rt hip discomfort and had decrease in pain ( fell asleep) after 5 mg of Oxycodone was given at 16:19. Pt was able to sit in the chair for dinner with a assist of 2, gait belt and walker. Hip dressing intact. Remains in Sinus dysrhythmia with 1st A-V Block and BBB pattern Remains on Lovenox every 24 hours.. Plans are to TCU for further rehab.

## 2018-09-03 NOTE — PLAN OF CARE
Problem: Diabetes Comorbidity  Goal: Diabetes  Patient comorbidity will be monitored for signs and symptoms of hyperglycemia or hypoglycemia. Problems will be absent, minimized or managed by discharge/transition of care.   Outcome: Adequate for Discharge Date Met: 09/03/18  Pt A/O x4, occas forgetful. Discharged to TCU accompanied by family in w/c. Heavy transfer assist 2 and walker. Pt and son appreciative of cares. Packet sent with Family.

## 2018-09-03 NOTE — DISCHARGE SUMMARY
[facility]    Discharge Summary  Hospitalist    Date of Admission:  8/25/2018  Date of Discharge:  9/3/2018  2:19 PM  Discharging Provider: Meli Arndt PA-C    Discharge Diagnoses   Right femoral neck fracture s/p ORIF  Mechanical fall  New diagnosis of atrial fibrillation  SVT  Type 2 diabetes  Pneumonia  Chronic kidney disease with CHAD  Hypertension    History of Present Illness   Abimael Flores is an 89 year old male with a past medical history significant for hypertension, hld, coronary artery disease, type 2 diabetes, and chronic kidney diseasewho presented to the Emergency Department on 8/25/18 for evaluation after a mechanical fall with subsequent right hip pain. The patient was out at a restaurant with his wife when he tripped on concrete and fell on his right side. He did hit his head but denied LOC. He denied any preceding symptoms before his fall. He immediately noted right hip pain and EMS was called. Please see admission history of physical by Dr. Eubanks for additional information.     On day of discharge patient is doing well. He denies any chest pain, shortness of breath or palpitations. His cough has nearly resolved. He denies fevers. He is tolerating po. Requiring assist of 2 to ambulate. Pain improved with oxycodone.     Hospital Course   Abimael Flores was admitted on 8/25/2018.  The following problems were addressed during his hospitalization:    Right femoral neck fracture s/p ORIF 8/26/18  Mechanical fall  The patient describes the fall as being mechanical.  He has some balance problems recently, but no frequent falls.  He denies any preceding symptoms, no dizziness, no shortness of breath, no chest pains, no loss of consciousness.  He did hit his head slightly and had a skin laceration that was sutured as the ER.  Ortho on-call was contacted.  The patient seems to have been in a good state of health.  He does have a history of coronary artery disease with coronary  artery bypass grafting, but as per the patient and his son, both physicians, he did not have any problems since his surgery.     - Post op management per Orthopedics. He will follow up with Dr. Sanders 12-16 days post-op. Phone number provided.    - Pain control - Oxycodone, Tylenol at discharge. Tolerating both    - PT recommending TCU, patient discharged to [place]   - continue bowel regimen at TCU  --clarified post-op asa orders with on call Orthopedics- he will take asa 325bid x 5 weeks then return to PTA dosing   --WBAT      New diagnosis of Atrial fibrillation with intermittent rate-related bundle  Supraventricular tachycardia, asymptomatic   Noted post op. Apparently, he was told that he had a sinus arrhythmia in the past.  He does not have formal diagnosis of A-Fib although his son states he is not surprised of this as the patient's pulse had been irregular in the past.  The patient is on a baby aspirin at home. Cardiology consulted. His CHADS-VASc score is 4 for age, hypertension, and diabetes.  This puts him in the high risk group for a stroke.  Anticoagulation management for atrial fibrillation was discussed with them on admission. The patient states he is not interested in starting anticoagulation.  His son does states that he seems to be at high risk of falls. Cardiology input 8/28 as patient  Had a run of wide complex tachyarrhythmia , atenolol was discontinued and patient started on low dose metoprolol and diltiazem. Tolerated these medications with adequate rates.  --discharge on metoprolol XL 50mg daily and diltiazem 180mg daily  --aspirin 325bid post-op per Ortho x 5 weeks then resume previous. Patient declines additional anticoagulation      Hypertension  Adequate control at time of discharge. Continue above medications       Possible aspiration pneumonia right from episode of dizziness  White blood cells of 15.6 with right lung base infiltrate. WBC normalized prior to discharge. Questionable UTI  as well but culture negative.   Started on ceftriaxone initially for UTI concerns (2 days) then transitioned to Augmentin. He completed 6 days with one additional dose prescribed at discharge. He is afebrile with improving cough at time of discharge.   plan      Diabetes mellitus type 2  Hemoglobin A1c 8.4.  At home he had maintained on Lantus 10 units at bedtime, glipizide-XL 5 mg p.o. twice daily and Humalog 4 units subcutaneously 3 times daily with meals. Lantus reduced to 5 units on admission and continued at this dose throughout admission. Glipizide resumed but subsequently stopped due to episode of hypoglycemia. Discussed with patient and his son at discharge; will continue Lantus at TCU at reduced dose of 5 units- can titrate back up at TCU if indicated. Will continue to hold glipizide at TCU for now and again can add this back if sugars poorly controlled. Will continue prandial short acting insulin 2 units with each meal as well.     Acute on Chronic kidney disease, stage III  Baseline creatinine seems to be between 1.5-1.8.  Creatinine on admission is 1.65.    Up to 2.25 post surgery. Down to 1.9 and stable at time of discharge.    - encourage oral hydration, recheck bmp 9/5     This patient was seen and examined with Dr. Collins who agrees with the above plan.    Meli Arndt PA-C    Significant Results and Procedures   See below     Code Status   DNR / DNI       Primary Care Physician   RAISSA HILL    Physical Exam   Temp: 98.5  F (36.9  C) Temp src: Oral BP: 141/57   Heart Rate: 72 Resp: 20 SpO2: 96 % O2 Device: None (Room air)    Vitals:    09/01/18 0636 09/02/18 0500 09/03/18 0500   Weight: 74.5 kg (164 lb 3.2 oz) 73.4 kg (161 lb 14.4 oz) 72.6 kg (160 lb)     Vital Signs with Ranges  Temp:  [97.9  F (36.6  C)-98.5  F (36.9  C)] 98.5  F (36.9  C)  Heart Rate:  [66-78] 72  Resp:  [16-20] 20  BP: (123-141)/(57-71) 141/57  SpO2:  [95 %-96 %] 96 %  I/O last 3 completed shifts:  In: 880  [P.O.:880]  Out: 725 [Urine:725]    Constitutional: Alert and oriented, sitting up in chair. Appears comfortable and is appropriately conversant. A little hard of hearing   ENT:  moist mucous membranes, laceration right parietal area covered in bandaid  Respiratory: Lungs clear to auscultation bilaterally, no increased work of breathing or wheezing  Cardiovascular: irregularly irregular rhythm with regular rate, systolic murmur, no significant LE  edema, distal pulses +2/4  GI: positive bowel sounds, abdomen soft, non-tender  Skin/Integumen: warm, dry  Neuro:  Cranial nerves 2-12 grossly intact. No focal deficits. Speech is clear.   MSK:  Moves all four extremities.      Discharge Disposition   Discharged to rehabilitation facility  Condition at discharge: Stable    Consultations This Hospital Stay   ORTHOPEDIC SURGERY IP CONSULT  CARDIOLOGY IP CONSULT  HOSPITALIST IP CONSULT  OCCUPATIONAL THERAPY ADULT IP CONSULT  PHYSICAL THERAPY ADULT IP CONSULT  PHYSICAL THERAPY ADULT IP CONSULT  OCCUPATIONAL THERAPY ADULT IP CONSULT  SOCIAL WORK IP CONSULT  PHYSICAL THERAPY ADULT IP CONSULT  OCCUPATIONAL THERAPY ADULT IP CONSULT  ELECTROPHYSIOLOGY IP CONSULT    Time Spent on this Encounter   I, Meli Arndt, personally saw the patient today and spent greater than 30 minutes discharging this patient.    Discharge Orders     Follow-Up with Cardiac Advanced Practice Provider     General info for SNF   Length of Stay Estimate: Short Term Care: Estimated # of Days <30  Condition at Discharge: Improving  Level of care:skilled   Rehabilitation Potential: Excellent  Admission H&P remains valid and up-to-date: Yes  Recent Chemotherapy: N/A  Use Nursing Home Standing Orders: Yes     Mantoux instructions   Give two-step Mantoux (PPD) Per Facility Policy Yes     Wound care   Site:   R hip  Instructions:  Leave aquacel dressing in place until post-op follow-up.  If it becomes loose or soiled then remove and replace with daily dry  dressings.     Follow Up and recommended labs and tests   Follow up with Sharri Arellano PA-C or Dr. Sanders within 12-16 days from surgery.  If you do not already have a follow up appointment scheduled, please call our [place] office: [phone].  No follow up labs or test are needed.     Activity - Up with assistive device     General info for SNF   Length of Stay Estimate: Short Term Care: Estimated # of Days <30  Condition at Discharge: Improving  Level of care:skilled   Rehabilitation Potential: Excellent  Admission H&P remains valid and up-to-date: Yes  Recent Chemotherapy: N/A  Use Nursing Home Standing Orders: Yes     Mantoux instructions   Give two-step Mantoux (PPD) Per Facility Policy Yes     Follow Up and recommended labs and tests   Follow-up with dR. Rocky Sanders in ~14 days post-op. [phone]     Wound care   Daily dry dressing changes.  Staples out 12 days post-op and apply steri-strips.     Activity - Up with nursing assistance     Weight bearing status   WBAT     Reason for your hospital stay   Following right hip bipolar hemiarthroplasty after displaced femoral neck fracture. You were also seen by Cardiology for episodes of atrial fibrillation/SVT     Glucose monitor nursing POCT   Before meals and at bedtime     Follow Up and recommended labs and tests   Follow up with nursing home provider. Repeat BMP and check hemoglobin 9/5     Encourage PO fluids     Additional Discharge Instructions   Please follow these sliding scale instructions for insulin dosing:    Correction Scale - MEDIUM INSULIN RESISTANCE DOSING     Pre-meal dosing   Do Not give Correction Insulin if Pre-Meal BG less than 140.   For Pre-Meal  - 189 give 1 unit.   For Pre-Meal  - 239 give 2 units.   For Pre-Meal  - 289 give 3 units.   For Pre-Meal  - 339 give 4 units.   For Pre-Meal - 399 give 5 units.   For Pre-Meal -449 give 6 units  For Pre-Meal BG greater than or equal to 450 give 7  units.   To be given with prandial insulin, and based on pre-meal blood glucose.    Notify provider if glucose greater than or equal to 350 mg/dL after administration of correction dose.    MEDIUM INSULIN RESISTANCE DOSING    Bedtime dosing   Do Not give Bedtime Correction Insulin if BG less than  200.   For  - 249 give 1 units.   For  - 299 give 2 units.   For  - 349 give 3 units.   For  -399 give 4 units.   For BG greater than or equal to 400 give 5 units.  Notify provider if glucose greater than or equal to 350 mg/dL after administration of correction dose.     Physical Therapy Adult Consult   Evaluate and treat as clinically indicated.    Reason:  S/p R hip bipolar hemiarthroplasty.  No hip precautions.  WBAT.     Occupational Therapy Adult Consult   Evaluate and treat as clinically indicated.    Reason:  S/p R hip bipolar hemiarthroplasty.  No hip precautions.  WBAT.     Physical Therapy Adult Consult   bipolar hemiarthroplasty for femoral neck fracture.  rIGHT     Occupational Therapy Adult Consult   Evaluate and treat as clinically indicated.    Reason:  Right bipolar hemiarthroplasty for femoral neck fracture.     Fall precautions     Advance Diet as Tolerated   Follow this diet upon discharge: regular     Advance Diet as Tolerated   Follow this diet upon discharge: Orders Placed This Encounter     Room Service     Advance Diet as Tolerated: Regular Diet Adult     Advance Diet as Tolerated       Discharge Medications   Discharge Medication List as of 9/3/2018  2:01 PM      START taking these medications    Details   acetaminophen (TYLENOL) 325 MG tablet Take 2 tablets (650 mg) by mouth every 8 hours, Disp-100 tablet, R-0, E-Prescribe      amoxicillin-clavulanate (AUGMENTIN) 875-125 MG per tablet Take 1 tablet by mouth every 24 hours for 1 day, R-0, Transitional      !! aspirin 325 MG EC tablet Take 1 tablet (325 mg) by mouth daily, Disp-30 tablet, Transitional      !! aspirin 325 MG  EC tablet Take one Aspirin tab twice daily for 5 weeks., Disp-70 tablet, R-0, E-Prescribe      atorvastatin (LIPITOR) 10 MG tablet Take 1 tablet (10 mg) by mouth every evening, Transitional      diltiazem (DILACOR XR) 180 MG 24 hr capsule Take 1 capsule (180 mg) by mouth daily, Transitional      !! insulin aspart (NOVOLOG PEN) 100 UNIT/ML injection Inject 1-7 Units Subcutaneous 3 times daily (before meals), Transitional      !! insulin aspart (NOVOLOG PEN) 100 UNIT/ML injection Inject 1-5 Units Subcutaneous At Bedtime, Transitional      metoprolol succinate (TOPROL-XL) 50 MG 24 hr tablet Take 1 tablet (50 mg) by mouth daily, Disp-30 tablet, Transitional      oxyCODONE IR (ROXICODONE) 5 MG tablet Take 1 tablet (5 mg) by mouth every 4 hours as needed for pain, Disp-60 tablet, R-0, Local Print      polyethylene glycol (MIRALAX/GLYCOLAX) Packet Take 17 g by mouth daily as needed for constipation, Disp-7 packet, Transitional       !! - Potential duplicate medications found. Please discuss with provider.      CONTINUE these medications which have CHANGED    Details   insulin glargine (LANTUS SOLOSTAR) 100 UNIT/ML pen Inject 5 Units Subcutaneous At Bedtime, Transitional      insulin lispro (HUMALOG KWIKPEN) 100 UNIT/ML injection Inject 2 Units Subcutaneous 3 times daily (before meals), Transitional      senna-docusate (SENOKOT-S;PERICOLACE) 8.6-50 MG per tablet Take 1 tablet by mouth 2 times daily, Disp-60 tablet, R-0, Transitional         CONTINUE these medications which have NOT CHANGED    Details   loratadine (CLARITIN) 10 MG tablet Take 10 mg by mouth daily as needed , Historical         STOP taking these medications       aspirin 81 MG tablet Comments:   Reason for Stopping:         atenolol (TENORMIN) 50 MG tablet Comments:   Reason for Stopping:         glipiZIDE (GLUCOTROL XL) 5 MG 24 hr tablet Comments:   Reason for Stopping:         simvastatin (ZOCOR) 20 MG tablet Comments:   Reason for Stopping:              Allergies   No Known Allergies  Data   Most Recent 3 CBC's:  Recent Labs   Lab Test  09/03/18   0545  09/02/18   0530  09/01/18   0535   WBC  9.6  10.3  9.0   HGB  8.2*  9.0*  8.2*   MCV  91  90  90   PLT  260  247  201      Most Recent 3 BMP's:  Recent Labs   Lab Test  09/03/18   0545  09/02/18   0530  09/01/18   0535   NA  141  140  140   POTASSIUM  4.2  4.4  4.0   CHLORIDE  110*  110*  111*   CO2  24  21  22   BUN  35*  38*  42*   CR  1.94*  1.93*  2.13*   ANIONGAP  7  9  7   JOSELINE  7.3*  7.4*  7.3*   GLC  119*  55*  100*     Most Recent 2 LFT's:No lab results found.  Most Recent INR's and Anticoagulation Dosing History:  Anticoagulation Dose History     Recent Dosing and Labs Latest Ref Rng & Units 4/21/2014    INR 0.86 - 1.14 0.91        Most Recent 3 Troponin's:  Recent Labs   Lab Test  08/29/18   1625  04/21/14   1656   TROPI  0.039   --    TROPONIN   --   0.03     Most Recent Cholesterol Panel:No lab results found.  Most Recent 6 Bacteria Isolates From Any Culture (See EPIC Reports for Culture Details):  Recent Labs   Lab Test  08/27/18   2330   CULT  No growth     Most Recent TSH, T4 and A1c Labs:  Recent Labs   Lab Test  08/25/18   1954   A1C  8.4*     Results for orders placed or performed during the hospital encounter of 08/25/18   XR Pelvis w Hip Right 1 View    Narrative    PELVIS WITH UNILATERAL HIP ONE VIEW RIGHT  8/25/2018 7:16 PM     HISTORY: Pain, fall, can not bear weight.     COMPARISON: None.      Impression    IMPRESSION: Femoral neck fracture.    MODESTO SUTHERLAND MD   XR Chest 1 View    Narrative    CHEST ONE VIEW SUPINE 8/25/2018 7:17 PM     HISTORY: Preop.     COMPARISON: None.      Impression    IMPRESSION: Scattered atelectasis and/or fibrosis bilaterally.    MODESTO SUTHERLAND MD   XR Pelvis w Hip Port Right 1 View    Narrative    PELVIS WITH UNILATERAL HIP ONE VIEW RIGHT  8/26/2018 4:36 PM     HISTORY: Hip arthroplasty    COMPARISON: August 25, 2018     FINDINGS:    There is a hip  arthroplasty in anatomic alignment with  well-seated components.      Impression    IMPRESSION:   Hip arthroplasty in anatomic alignment.    MODESTO SUTHERLAND MD   XR Chest Port 1 View    Narrative    CHEST PORTABLE ONE VIEW   8/29/2018 4:56 PM     HISTORY: Question of aspiration.     COMPARISON: 8/25/2018.    FINDINGS: Supine portable chest. Sternal wires and mediastinal clips.  No pneumothorax. The heart size is normal. There is probable fibrosis  in the mid and lower lungs bilaterally which is similar to the  previous exam. There is new infiltrate at the right lung base  laterally.      Impression    IMPRESSION: New small right lung base infiltrate.    SHARI HERNANDEZ MD

## 2018-09-04 ENCOUNTER — CARE COORDINATION (OUTPATIENT)
Dept: CARDIOLOGY | Facility: CLINIC | Age: 83
End: 2018-09-04

## 2018-09-04 ENCOUNTER — NURSING HOME VISIT (OUTPATIENT)
Dept: GERIATRICS | Facility: CLINIC | Age: 83
End: 2018-09-04
Payer: MEDICARE

## 2018-09-04 VITALS
BODY MASS INDEX: 24.25 KG/M2 | DIASTOLIC BLOOD PRESSURE: 68 MMHG | HEIGHT: 68 IN | OXYGEN SATURATION: 97 % | RESPIRATION RATE: 17 BRPM | TEMPERATURE: 97.2 F | WEIGHT: 160 LBS | HEART RATE: 78 BPM | SYSTOLIC BLOOD PRESSURE: 150 MMHG

## 2018-09-04 DIAGNOSIS — J18.9 PNEUMONIA OF RIGHT LOWER LOBE DUE TO INFECTIOUS ORGANISM: ICD-10-CM

## 2018-09-04 DIAGNOSIS — S72.001D CLOSED FRACTURE OF RIGHT HIP WITH ROUTINE HEALING, SUBSEQUENT ENCOUNTER: Primary | ICD-10-CM

## 2018-09-04 DIAGNOSIS — R53.81 PHYSICAL DECONDITIONING: ICD-10-CM

## 2018-09-04 DIAGNOSIS — N18.30 CKD (CHRONIC KIDNEY DISEASE) STAGE 3, GFR 30-59 ML/MIN (H): ICD-10-CM

## 2018-09-04 DIAGNOSIS — N18.30 TYPE 2 DIABETES MELLITUS WITH STAGE 3 CHRONIC KIDNEY DISEASE, WITH LONG-TERM CURRENT USE OF INSULIN (H): ICD-10-CM

## 2018-09-04 DIAGNOSIS — I10 BENIGN ESSENTIAL HYPERTENSION: ICD-10-CM

## 2018-09-04 DIAGNOSIS — Z79.4 TYPE 2 DIABETES MELLITUS WITH STAGE 3 CHRONIC KIDNEY DISEASE, WITH LONG-TERM CURRENT USE OF INSULIN (H): ICD-10-CM

## 2018-09-04 DIAGNOSIS — E11.22 TYPE 2 DIABETES MELLITUS WITH STAGE 3 CHRONIC KIDNEY DISEASE, WITH LONG-TERM CURRENT USE OF INSULIN (H): ICD-10-CM

## 2018-09-04 DIAGNOSIS — I49.9 CARDIAC ARRHYTHMIA, UNSPECIFIED CARDIAC ARRHYTHMIA TYPE: ICD-10-CM

## 2018-09-04 DIAGNOSIS — I25.810 CORONARY ARTERY DISEASE INVOLVING CORONARY BYPASS GRAFT OF NATIVE HEART WITHOUT ANGINA PECTORIS: ICD-10-CM

## 2018-09-04 PROBLEM — E11.29 TYPE 2 DIABETES MELLITUS WITH RENAL COMPLICATION (H): Status: ACTIVE | Noted: 2018-09-04

## 2018-09-04 PROCEDURE — 99309 SBSQ NF CARE MODERATE MDM 30: CPT | Performed by: NURSE PRACTITIONER

## 2018-09-04 NOTE — PROGRESS NOTES
Patient was evaluated by cardiology while inpatient for possible A-Fib. RN called Natalee palacio [facility] to confirm the follow up plan for patient with Care Coordinator. RN confirmed with Care Coordinator that patient needs to schedule a cardiology F/U appt. They will call to schedule the appt.

## 2018-09-04 NOTE — PLAN OF CARE
Problem: Patient Care Overview  Goal: Plan of Care/Patient Progress Review  Physical Therapy Discharge Summary    Reason for therapy discharge:    Discharged to transitional care facility.    Progress towards therapy goal(s). See goals on Care Plan in [facility] electronic health record for goal details.  Goals not met.  Barriers to achieving goals:   discharge from facility.    Therapy recommendation(s):    Continued therapy is recommended.  Rationale/Recommendations:  Patient would benefit from continued skilled therapy to further improve strength, balance, and independence with mobility and ambulation. .

## 2018-09-04 NOTE — PROGRESS NOTES
[place] [facility]  PRIMARY CARE PROVIDER AND CLINIC:  [facility] [address]  / [place] *  Chief Complaint   Patient presents with     Hospital F/U     [place] Medical Record Number:  [id number]    HPI:    Abimael Flores is a 89 year old  (1/23/1924),admitted to the [facility]  from [facility].  Hospital stay 8/25/2018 through 9/3/2018.  Admitted to this facility for  rehab, medical management and nursing care.  HPI information obtained from: facility chart records, facility staff, patient report and [facility] chart review.  Current issues are:      Closed fracture of right hip with routine healing, subsequent encounter  Patient transferred to TCU from hospital after treatment for right hip pain suffered from a fall in the community. He was found to have right femoral neck fracture. He underwent a bipolar hemiarthroplasty on 8/27. Surgery was uneventful. Activity is WBAT. Post op complications include tachy arrhythmia, aspiration pneumonia, hypoglycemia.   Today he reports no hip pain. He requires an EZ stand to transfer from bed to chair.     Cardiac arrhythmia, unspecified cardiac arrhythmia type  Patient was evaluated by cardiology pre operatively due to  Possible AF on ECG. He was taking atenolol PTA.. Cardiology  Thought telemetry showed sinus exit block with LBBB. CHADS-VASc score is 4.   Post operatively patient did have episodes of tachycardia (RRT called twice due to low BP and tachycardia). He was started on metoprolol (replacing atenolol) and diltiazem. He was asymptomatic at time of tachycardia. Simvastatin was changed to atorvastatin as well, due to drug interactions.   HR today is around 80. No reports of chest pain or light headedness.     Coronary artery disease involving coronary bypass graft of native heart without angina pectoris  S/p 4v CABG in 1996. He states he does not have a cardiologist at this  time.     Pneumonia of right lower lobe due to infectious organism (H)  Patient found to have right lung base infiltrate and started on augmentin. He does endorse a productive cough. He is afebrile.     Type 2 diabetes mellitus with stage 3 chronic kidney disease, with long-term current use of insulin (H)  Patient did have episodes of hypoglycemia. Glipizide was stopped. He continues on PTA lantus and mealtime aspart.   Last BG Levels:    Dinner: 307    HS: 91  CKD (chronic kidney disease) stage 3, GFR 30-59 ml/min  Baseline creat 1.5-1.8. Creat did go up to 1.9 during hospital stay and remains elevated at time of transfer to TCU.     Benign essential hypertension  BP's in TCU: 150/68, 146/74, 132/55  New meds metoprolol and diltiazem as stated above.   Physical deconditioning  Lives with wife in assisted. Wife is in process of being moved to memory care. At baseline he walks with 4WW.            CODE STATUS/ADVANCE DIRECTIVES DISCUSSION:   DNR / DNI  Patient's living condition: lives in an assisted living facility    ALLERGIES:Review of patient's allergies indicates no known allergies.  PAST MEDICAL HISTORY:  has a past medical history of Benign essential hypertension (9/4/2018); Coronary artery disease; Nonsenile cataract; Recent retinal detachment, total or subtotal (8/5/2014); and Type 2 diabetes mellitus without complications (H). He also has no past medical history of Diabetic retinopathy (H); Glaucoma; or Macular degeneration.  PAST SURGICAL HISTORY:  has a past surgical history that includes Cardiac surgery; Repair ruptured globe (4/21/2014); cataract iol, rt/lt (Bilateral); lacrimal caruncle removed BE (Bilateral, ~1989); and Open reduction internal fixation hip bipolar (Right, 8/26/2018).  FAMILY HISTORY: family history includes Diabetes in his father and mother. There is no history of Cancer, Glaucoma, or Macular Degeneration.  SOCIAL HISTORY:  reports that he has quit smoking. He has never used smokeless  "tobacco.    Post Discharge Medication Reconciliation Status: discharge medications reconciled, continue medications without change.  Current Outpatient Prescriptions   Medication Sig Dispense Refill     acetaminophen (TYLENOL) 325 MG tablet Take 2 tablets (650 mg) by mouth every 8 hours 100 tablet 0     amoxicillin-clavulanate (AUGMENTIN) 875-125 MG per tablet Take 1 tablet by mouth every 24 hours for 1 day  0     aspirin 325 MG EC tablet Take one Aspirin tab twice daily for 5 weeks. 70 tablet 0     atorvastatin (LIPITOR) 10 MG tablet Take 1 tablet (10 mg) by mouth every evening       diltiazem (DILACOR XR) 180 MG 24 hr capsule Take 1 capsule (180 mg) by mouth daily       insulin aspart (NOVOLOG PEN) 100 UNIT/ML injection Inject 1-7 Units Subcutaneous 3 times daily (before meals)       insulin aspart (NOVOLOG PEN) 100 UNIT/ML injection Inject 1-5 Units Subcutaneous At Bedtime       insulin glargine (LANTUS SOLOSTAR) 100 UNIT/ML pen Inject 5 Units Subcutaneous At Bedtime       insulin lispro (HUMALOG KWIKPEN) 100 UNIT/ML injection Inject 2 Units Subcutaneous 3 times daily (before meals)       loratadine (CLARITIN) 10 MG tablet Take 10 mg by mouth daily        metoprolol succinate (TOPROL-XL) 50 MG 24 hr tablet Take 1 tablet (50 mg) by mouth daily 30 tablet      oxyCODONE IR (ROXICODONE) 5 MG tablet Take 1 tablet (5 mg) by mouth every 4 hours as needed for pain 60 tablet 0     polyethylene glycol (MIRALAX/GLYCOLAX) Packet Take 17 g by mouth daily as needed for constipation 7 packet      senna-docusate (SENOKOT-S;PERICOLACE) 8.6-50 MG per tablet Take 1 tablet by mouth 2 times daily 60 tablet 0       ROS:  4 point ROS including Respiratory, CV, GI and , other than that noted in the HPI,  is negative    Exam:  /68  Pulse 78  Temp 97.2  F (36.2  C)  Resp 17  Ht 5' 8\" (1.727 m)  Wt 160 lb (72.6 kg)  SpO2 97%  BMI 24.33 kg/m2  GENERAL APPEARANCE:  Alert, in no distress  ENT:  Mouth and posterior oropharynx " normal, moist mucous membranes, hearing acuity very [place]  EYES:  EOM, conjunctivae, lids, pupils and irises normal    RESP:  respiratory effort and palpation of chest normal, no respiratory distress, Lung sounds faint rales in bases  CV:  Palpation and auscultation of heart done , rate and rhythm irreg, no murmur, no rub or gallop, Edema trace  ABDOMEN:  normal bowel sounds, soft, nontender, no hepatosplenomegaly or other masses  M/S:   Gait and station not observed, Digits and nails normal   SKIN:  Inspection/Palpation of skin and subcutaneous tissue right hip incision is covered.   NEURO: 2-12 in normal limits and at patient's baseline  PSYCH:  insight and judgement, memory suspect impaired.  , affect and mood normal      Lab/Diagnostic data:  CBC RESULTS:   Recent Labs   Lab Test  09/03/18   0545  09/02/18   0530   WBC  9.6  10.3   RBC  2.81*  3.08*   HGB  8.2*  9.0*   HCT  25.5*  27.8*   MCV  91  90   MCH  29.2  29.2   MCHC  32.2  32.4   RDW  14.8  15.0   PLT  260  247       Last Basic Metabolic Panel:  Recent Labs   Lab Test  09/03/18   0545  09/02/18   0530   NA  141  140   POTASSIUM  4.2  4.4   CHLORIDE  110*  110*   JOSELINE  7.3*  7.4*   CO2  24  21   BUN  35*  38*   CR  1.94*  1.93*   GLC  119*  55*       TSH   Date Value Ref Range Status   04/02/2003 2.08 0.4 - 5.0 mU/L Final     Lab Results   Component Value Date    A1C 8.4 08/25/2018    A1C 7.8 04/22/2014       ASSESSMENT/PLAN:  (S72.001D) Closed fracture of right hip with routine healing, subsequent encounter  (primary encounter diagnosis)  Comment: transferred to TCU following repair of right hip fracture. Surgery was uneventful. Activity is WBAT. He is not walking and in fact requires a mechanical lift to transfer. He has no pain.   Plan: physical therapy and OCCUPATIONAL THERAPY . Monitor incision for redness. Follow up with ortho in 2 weeks.     (I49.9) Cardiac arrhythmia, unspecified cardiac arrhythmia type  Comment: patient was evaluated by cardiology  due to several episodes of tachycardia. His meds were changed from atenolol to metoprolol and diltiazem. HR is now WNL  Plan: monitor HR. [facility] 9/6    (I25.810) Coronary artery disease involving coronary bypass graft of native heart without angina pectoris  Comment: s/p 4V CABG in 1996.   Plan: monitor VS    (J18.1) Pneumonia of right lower lobe due to infectious organism (H)  Comment: started on augmentin. He does have productive cough. He is afebrile and O2 sats are WNL on RA  Plan: monitor respiratory status    (E11.22,  N18.3,  Z79.4) Type 2 diabetes mellitus with stage 3 chronic kidney disease, with long-term current use of insulin (H)  Comment: PTA glipizide was stopped during hospitalization due to hypoglycemia.   Plan: FBG QID. Continue lantus and aspart.     (N18.3) CKD (chronic kidney disease) stage 3, GFR 30-59 ml/min  Comment: creat is elevated 1.93  Plan: avoid nephrotoxins. [facility] 9/6    (I10) Benign essential hypertension  Comment: BPs ok now. New meds: metop and diltiazem  Plan: monitor BPs    (R53.81) Physical deconditioning  Comment: due to fall with hip fracture. Lives in [place]. Suspect slow recovery as he is not walking yet  Plan: physical therapy and OCCUPATIONAL THERAPY         Electronically signed by:  WILLIAM Cota CNP

## 2018-09-04 NOTE — LETTER
9/4/2018        RE: Abimael Flores  [address]  [facility] [address]        [place] [facility]  PRIMARY CARE PROVIDER AND CLINIC:  [facility] [address]  / [address] *  Chief Complaint   Patient presents with     Hospital F/U     [place] Medical Record Number:  [id number]    HPI:    Abimael Flores is a 89 year old  (1/23/1924),admitted to the [facility]  from [facility].  Hospital stay 8/25/2018 through 9/3/2018.  Admitted to this facility for  rehab, medical management and nursing care.  HPI information obtained from: facility chart records, facility staff, patient report and [facility] chart review.  Current issues are:      Closed fracture of right hip with routine healing, subsequent encounter  Patient transferred to TCU from hospital after treatment for right hip pain suffered from a fall in the community. He was found to have right femoral neck fracture. He underwent a bipolar hemiarthroplasty on 8/27. Surgery was uneventful. Activity is WBAT. Post op complications include tachy arrhythmia, aspiration pneumonia, hypoglycemia.   Today he reports no hip pain. He requires an EZ stand to transfer from bed to chair.     Cardiac arrhythmia, unspecified cardiac arrhythmia type  Patient was evaluated by cardiology pre operatively due to  Possible AF on ECG. He was taking atenolol PTA.. Cardiology  Thought telemetry showed sinus exit block with LBBB. CHADS-VASc score is 4.   Post operatively patient did have episodes of tachycardia (RRT called twice due to low BP and tachycardia). He was started on metoprolol (replacing atenolol) and diltiazem. He was asymptomatic at time of tachycardia. Simvastatin was changed to atorvastatin as well, due to drug interactions.   HR today is around 80. No reports of chest pain or light headedness.     Coronary artery disease involving coronary bypass graft of native heart without  angina pectoris  S/p 4v CABG in 1996. He states he does not have a cardiologist at this time.     Pneumonia of right lower lobe due to infectious organism (H)  Patient found to have right lung base infiltrate and started on augmentin. He does endorse a productive cough. He is afebrile.     Type 2 diabetes mellitus with stage 3 chronic kidney disease, with long-term current use of insulin (H)  Patient did have episodes of hypoglycemia. Glipizide was stopped. He continues on PTA lantus and mealtime aspart.   Last BG Levels:    Dinner: 307    HS: 91  CKD (chronic kidney disease) stage 3, GFR 30-59 ml/min  Baseline creat 1.5-1.8. Creat did go up to 1.9 during hospital stay and remains elevated at time of transfer to TCU.     Benign essential hypertension  BP's in TCU: 150/68, 146/74, 132/55  New meds metoprolol and diltiazem as stated above.   Physical deconditioning  Lives with wife in [place]. Wife is in process of being moved to memory care. At baseline he walks with 4WW.            CODE STATUS/ADVANCE DIRECTIVES DISCUSSION:   DNR / DNI  Patient's living condition: lives in an assisted living facility    ALLERGIES:Review of patient's allergies indicates no known allergies.  PAST MEDICAL HISTORY:  has a past medical history of Benign essential hypertension (9/4/2018); Coronary artery disease; Nonsenile cataract; Recent retinal detachment, total or subtotal (8/5/2014); and Type 2 diabetes mellitus without complications (H). He also has no past medical history of Diabetic retinopathy (H); Glaucoma; or Macular degeneration.  PAST SURGICAL HISTORY:  has a past surgical history that includes Cardiac surgery; Repair ruptured globe (4/21/2014); cataract iol, rt/lt (Bilateral); lacrimal caruncle removed BE (Bilateral, ~1989); and Open reduction internal fixation hip bipolar (Right, 8/26/2018).  FAMILY HISTORY: family history includes Diabetes in his father and mother. There is no history of Cancer, Glaucoma, or Macular  "Degeneration.  SOCIAL HISTORY:  reports that he has quit smoking. He has never used smokeless tobacco.    Post Discharge Medication Reconciliation Status: discharge medications reconciled, continue medications without change.  Current Outpatient Prescriptions   Medication Sig Dispense Refill     acetaminophen (TYLENOL) 325 MG tablet Take 2 tablets (650 mg) by mouth every 8 hours 100 tablet 0     amoxicillin-clavulanate (AUGMENTIN) 875-125 MG per tablet Take 1 tablet by mouth every 24 hours for 1 day  0     aspirin 325 MG EC tablet Take one Aspirin tab twice daily for 5 weeks. 70 tablet 0     atorvastatin (LIPITOR) 10 MG tablet Take 1 tablet (10 mg) by mouth every evening       diltiazem (DILACOR XR) 180 MG 24 hr capsule Take 1 capsule (180 mg) by mouth daily       insulin aspart (NOVOLOG PEN) 100 UNIT/ML injection Inject 1-7 Units Subcutaneous 3 times daily (before meals)       insulin aspart (NOVOLOG PEN) 100 UNIT/ML injection Inject 1-5 Units Subcutaneous At Bedtime       insulin glargine (LANTUS SOLOSTAR) 100 UNIT/ML pen Inject 5 Units Subcutaneous At Bedtime       insulin lispro (HUMALOG KWIKPEN) 100 UNIT/ML injection Inject 2 Units Subcutaneous 3 times daily (before meals)       loratadine (CLARITIN) 10 MG tablet Take 10 mg by mouth daily        metoprolol succinate (TOPROL-XL) 50 MG 24 hr tablet Take 1 tablet (50 mg) by mouth daily 30 tablet      oxyCODONE IR (ROXICODONE) 5 MG tablet Take 1 tablet (5 mg) by mouth every 4 hours as needed for pain 60 tablet 0     polyethylene glycol (MIRALAX/GLYCOLAX) Packet Take 17 g by mouth daily as needed for constipation 7 packet      senna-docusate (SENOKOT-S;PERICOLACE) 8.6-50 MG per tablet Take 1 tablet by mouth 2 times daily 60 tablet 0       ROS:  4 point ROS including Respiratory, CV, GI and , other than that noted in the HPI,  is negative    Exam:  /68  Pulse 78  Temp 97.2  F (36.2  C)  Resp 17  Ht 5' 8\" (1.727 m)  Wt 160 lb (72.6 kg)  SpO2 97%  BMI " 24.33 kg/m2  GENERAL APPEARANCE:  Alert, in no distress  ENT:  Mouth and posterior oropharynx normal, moist mucous membranes, hearing acuity very [place]  EYES:  EOM, conjunctivae, lids, pupils and irises normal    RESP:  respiratory effort and palpation of chest normal, no respiratory distress, Lung sounds faint rales in bases  CV:  Palpation and auscultation of heart done , rate and rhythm irreg, no murmur, no rub or gallop, Edema trace  ABDOMEN:  normal bowel sounds, soft, nontender, no hepatosplenomegaly or other masses  M/S:   Gait and station not observed, Digits and nails normal   SKIN:  Inspection/Palpation of skin and subcutaneous tissue right hip incision is covered.   NEURO: 2-12 in normal limits and at patient's baseline  PSYCH:  insight and judgement, memory suspect impaired.  , affect and mood normal      Lab/Diagnostic data:  CBC RESULTS:   Recent Labs   Lab Test  09/03/18   0545  09/02/18   0530   WBC  9.6  10.3   RBC  2.81*  3.08*   HGB  8.2*  9.0*   HCT  25.5*  27.8*   MCV  91  90   MCH  29.2  29.2   MCHC  32.2  32.4   RDW  14.8  15.0   PLT  260  247       Last Basic Metabolic Panel:  Recent Labs   Lab Test  09/03/18   0545  09/02/18   0530   NA  141  140   POTASSIUM  4.2  4.4   CHLORIDE  110*  110*   JOSELINE  7.3*  7.4*   CO2  24  21   BUN  35*  38*   CR  1.94*  1.93*   GLC  119*  55*       TSH   Date Value Ref Range Status   04/02/2003 2.08 0.4 - 5.0 mU/L Final     Lab Results   Component Value Date    A1C 8.4 08/25/2018    A1C 7.8 04/22/2014       ASSESSMENT/PLAN:  (S72.001D) Closed fracture of right hip with routine healing, subsequent encounter  (primary encounter diagnosis)  Comment: transferred to TCU following repair of right hip fracture. Surgery was uneventful. Activity is WBAT. He is not walking and in fact requires a mechanical lift to transfer. He has no pain.   Plan: physical therapy and OCCUPATIONAL THERAPY . Monitor incision for redness. Follow up with ortho in 2 weeks.     (I49.9) Cardiac  arrhythmia, unspecified cardiac arrhythmia type  Comment: patient was evaluated by cardiology due to several episodes of tachycardia. His meds were changed from atenolol to metoprolol and diltiazem. HR is now WNL  Plan: monitor HR. [facility] 9/6    (I25.810) Coronary artery disease involving coronary bypass graft of native heart without angina pectoris  Comment: s/p 4V CABG in 1996.   Plan: monitor VS    (J18.1) Pneumonia of right lower lobe due to infectious organism (H)  Comment: started on augmentin. He does have productive cough. He is afebrile and O2 sats are WNL on RA  Plan: monitor respiratory status    (E11.22,  N18.3,  Z79.4) Type 2 diabetes mellitus with stage 3 chronic kidney disease, with long-term current use of insulin (H)  Comment: PTA glipizide was stopped during hospitalization due to hypoglycemia.   Plan: FBG QID. Continue lantus and aspart.     (N18.3) CKD (chronic kidney disease) stage 3, GFR 30-59 ml/min  Comment: creat is elevated 1.93  Plan: avoid nephrotoxins. [facility] 9/6    (I10) Benign essential hypertension  Comment: BPs ok now. New meds: metop and diltiazem  Plan: monitor BPs    (R53.81) Physical deconditioning  Comment: due to fall with hip fracture. Lives in [place]. Suspect slow recovery as he is not walking yet  Plan: physical therapy and OCCUPATIONAL THERAPY         Electronically signed by:  WILLIAM Cota CNP                    Sincerely,        WILLIAM Cota CNP

## 2018-09-06 ENCOUNTER — HOSPITAL LABORATORY (OUTPATIENT)
Dept: OTHER | Facility: CLINIC | Age: 83
End: 2018-09-06

## 2018-09-06 VITALS
BODY MASS INDEX: 24.71 KG/M2 | WEIGHT: 163 LBS | TEMPERATURE: 97 F | SYSTOLIC BLOOD PRESSURE: 164 MMHG | OXYGEN SATURATION: 99 % | HEART RATE: 88 BPM | DIASTOLIC BLOOD PRESSURE: 78 MMHG | HEIGHT: 68 IN | RESPIRATION RATE: 16 BRPM

## 2018-09-06 LAB
ALBUMIN UR-MCNC: 10 MG/DL
ANION GAP SERPL CALCULATED.3IONS-SCNC: 10 MMOL/L (ref 3–14)
APPEARANCE UR: CLEAR
BILIRUB UR QL STRIP: NEGATIVE
BUN SERPL-MCNC: 23 MG/DL (ref 7–30)
CALCIUM SERPL-MCNC: 7.9 MG/DL (ref 8.5–10.1)
CHLORIDE SERPL-SCNC: 105 MMOL/L (ref 94–109)
CO2 SERPL-SCNC: 23 MMOL/L (ref 20–32)
COLOR UR AUTO: YELLOW
CREAT SERPL-MCNC: 1.57 MG/DL (ref 0.66–1.25)
ERYTHROCYTE [DISTWIDTH] IN BLOOD BY AUTOMATED COUNT: 14.9 % (ref 10–15)
GFR SERPL CREATININE-BSD FRML MDRD: 41 ML/MIN/1.7M2
GLUCOSE SERPL-MCNC: 151 MG/DL (ref 70–99)
GLUCOSE UR STRIP-MCNC: NEGATIVE MG/DL
HCT VFR BLD AUTO: 28.4 % (ref 40–53)
HGB BLD-MCNC: 9.2 G/DL (ref 13.3–17.7)
HGB UR QL STRIP: NEGATIVE
KETONES UR STRIP-MCNC: NEGATIVE MG/DL
LEUKOCYTE ESTERASE UR QL STRIP: NEGATIVE
MCH RBC QN AUTO: 29.2 PG (ref 26.5–33)
MCHC RBC AUTO-ENTMCNC: 32.4 G/DL (ref 31.5–36.5)
MCV RBC AUTO: 90 FL (ref 78–100)
MUCOUS THREADS #/AREA URNS LPF: PRESENT /LPF
NITRATE UR QL: NEGATIVE
PH UR STRIP: 5 PH (ref 5–7)
PLATELET # BLD AUTO: 388 10E9/L (ref 150–450)
POTASSIUM SERPL-SCNC: 4.1 MMOL/L (ref 3.4–5.3)
RBC # BLD AUTO: 3.15 10E12/L (ref 4.4–5.9)
RBC #/AREA URNS AUTO: 1 /HPF (ref 0–2)
SODIUM SERPL-SCNC: 138 MMOL/L (ref 133–144)
SOURCE: ABNORMAL
SP GR UR STRIP: 1.01 (ref 1–1.03)
SQUAMOUS #/AREA URNS AUTO: <1 /HPF (ref 0–1)
URATE CRY #/AREA URNS HPF: ABNORMAL /HPF
UROBILINOGEN UR STRIP-MCNC: NORMAL MG/DL (ref 0–2)
WBC # BLD AUTO: 10.1 10E9/L (ref 4–11)
WBC #/AREA URNS AUTO: 2 /HPF (ref 0–5)

## 2018-09-07 ENCOUNTER — NURSING HOME VISIT (OUTPATIENT)
Dept: GERIATRICS | Facility: CLINIC | Age: 83
End: 2018-09-07
Payer: MEDICARE

## 2018-09-07 DIAGNOSIS — Z96.641 STATUS POST TOTAL REPLACEMENT OF RIGHT HIP: ICD-10-CM

## 2018-09-07 DIAGNOSIS — R53.81 PHYSICAL DECONDITIONING: Primary | ICD-10-CM

## 2018-09-07 DIAGNOSIS — L29.9 ITCHING: ICD-10-CM

## 2018-09-07 DIAGNOSIS — I10 ESSENTIAL HYPERTENSION: ICD-10-CM

## 2018-09-07 DIAGNOSIS — S72.001D CLOSED FRACTURE OF RIGHT HIP WITH ROUTINE HEALING, SUBSEQUENT ENCOUNTER: ICD-10-CM

## 2018-09-07 DIAGNOSIS — I47.10 SVT (SUPRAVENTRICULAR TACHYCARDIA) (H): ICD-10-CM

## 2018-09-07 DIAGNOSIS — I48.0 PAROXYSMAL ATRIAL FIBRILLATION (H): ICD-10-CM

## 2018-09-07 DIAGNOSIS — J69.0 ASPIRATION PNEUMONITIS (H): ICD-10-CM

## 2018-09-07 DIAGNOSIS — I25.810 CORONARY ARTERY DISEASE INVOLVING CORONARY BYPASS GRAFT OF NATIVE HEART WITHOUT ANGINA PECTORIS: ICD-10-CM

## 2018-09-07 LAB
BACTERIA SPEC CULT: NO GROWTH
INTERPRETATION ECG - MUSE: NORMAL
Lab: NORMAL
SPECIMEN SOURCE: NORMAL

## 2018-09-07 PROCEDURE — 99306 1ST NF CARE HIGH MDM 50: CPT | Performed by: INTERNAL MEDICINE

## 2018-09-07 NOTE — LETTER
9/7/2018        RE: Abimael Flores  [address]  [facility] [address]        PRIMARY CARE PROVIDER AND CLINIC RESPONSIBLE:  [place], [place], [facility] [address] / [address] *        ADMISSION HISTORY AND PHYSICAL EXAMINATION     Chief Complaint   Patient presents with     Fatigue     Musculoskeletal Problem         HISTORY OF PRESENT ILLNESS:  89 year old male, (1/23/1924), admitted [facility] the [facility] for continuation of medical care and rehab.  HPI information obtained from: facility chart records, facility staff, patient report and [facility] chart review.    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 He has some balance problems recently, but no frequent falls.  He denies any preceding symptoms, no dizziness, no shortness of breath, no chest pains, no loss of consciousness.  He did hit his head slightly and had a skin laceration that was sutured as the ER. He was found to have right femoral neck fracture. orth was consulted. He underwent right hip ORIF hemiarthroplasty. Post-op period was significant with blood loss anemia, new atrial fibrillation and SVT with LBBB, he was seen by EP cardiologist, treated with rate control with metoprolol and Diltiazem, declined anticoagulation as per notes. Recommended EPS/ablation or trial of Amiodarone if fails medical tx. ECHO showed EF 55-60%. He was suspected aspiration pneumonia treated with IV antibiotic and discharged with Augmentin. He was initially suspected UTI.  He has no pain at his right leg. He feels weak and tired. Slept poorly, appetite poor and had BM. He has itching his lower back, but no open area or rash. Patient denies chest pain, dyspnea, abdominal pain, n&v, fever, chills, dysuria, leg pain or swelling. The  rest of ROS is unremarkable. Patient is seen and examined by Donna Spicer NP. Please see BERTRAM Spicer's admit noted dated 9/4/18 for details of admission, past medical history, family history, allergies, medication list, social history and other details pertinent with this admission. Hospital admission and dc summary reviewed.    Past Medical History:   Diagnosis Date     Benign essential hypertension 9/4/2018     Coronary artery disease      Nonsenile cataract      Recent retinal detachment, total or subtotal 8/5/2014     Type 2 diabetes mellitus without complications (H)        Past Surgical History:   Procedure Laterality Date     CARDIAC SURGERY       CATARACT IOL, RT/LT Bilateral      lacrimal caruncle removed BE Bilateral ~1989     OPEN REDUCTION INTERNAL FIXATION HIP BIPOLAR Right 8/26/2018    Procedure: OPEN REDUCTION INTERNAL FIXATION HIP BIPOLAR;  Right Hip Bipolar Hemiarthroplasty (Biomet);  Surgeon: Bill Sanders MD;  Location:  OR     REPAIR RUPTURED GLOBE  4/21/2014    Procedure: Exploration and Repair of Ruptured Globe ;  Surgeon: Mercedes Rivera MD;  Location: [facility] OR       Current Outpatient Prescriptions   Medication Sig     acetaminophen (TYLENOL) 325 MG tablet Take 2 tablets (650 mg) by mouth every 8 hours     aspirin 325 MG EC tablet Take one Aspirin tab twice daily for 5 weeks.     atorvastatin (LIPITOR) 10 MG tablet Take 1 tablet (10 mg) by mouth every evening     diltiazem (DILACOR XR) 180 MG 24 hr capsule Take 1 capsule (180 mg) by mouth daily     insulin aspart (NOVOLOG PEN) 100 UNIT/ML injection Inject 1-7 Units Subcutaneous 3 times daily (before meals)     insulin aspart (NOVOLOG PEN) 100 UNIT/ML injection Inject 1-5 Units Subcutaneous At Bedtime     insulin glargine (LANTUS SOLOSTAR) 100 UNIT/ML pen Inject 5 Units Subcutaneous At Bedtime     insulin lispro (HUMALOG KWIKPEN) 100 UNIT/ML injection Inject 2 Units Subcutaneous 3 times daily (before meals)     loratadine (CLARITIN) 10  "MG tablet Take 10 mg by mouth daily      metoprolol succinate (TOPROL-XL) 50 MG 24 hr tablet Take 1 tablet (50 mg) by mouth daily     oxyCODONE IR (ROXICODONE) 5 MG tablet Take 1 tablet (5 mg) by mouth every 4 hours as needed for pain     polyethylene glycol (MIRALAX/GLYCOLAX) Packet Take 17 g by mouth daily as needed for constipation     senna-docusate (SENOKOT-S;PERICOLACE) 8.6-50 MG per tablet Take 1 tablet by mouth 2 times daily     Skin Protectants, Misc. (ANIBAL PROTECT EX) Apply topically 2 times daily     No current facility-administered medications for this visit.        No Known Allergies    Social History     Social History     Marital status:      Spouse name: N/A     Number of children: N/A     Years of education: N/A     Occupational History     Not on file.     Social History Main Topics     Smoking status: Former Smoker     Smokeless tobacco: Never Used     Alcohol use Not on file     Drug use: Not on file     Sexual activity: Not on file     Other Topics Concern     Not on file     Social History Narrative          Information reviewed:  Medications, vital signs, orders, nursing notes, problem list, hospital information.     ROS: All 10 point review of system completed, those pertinent positive, please see H&P, the remaining ROS is negative.    /78  Pulse 88  Temp 97  F (36.1  C)  Resp 16  Ht 5' 8\" (1.727 m)  Wt 163 lb (73.9 kg)  SpO2 99%  BMI 24.78 kg/m2    PHYSICAL EXAMINATION:   GENERAL:  No acute distress.  SKIN:  Dry and warm.  There is no rash at area of skin examined. Lower back examined no rash or open areas.  HEENT:  Head without trauma except small bruise at right forehead.  Pupils round, reactive. Exam of conjunctiva and lids are normal. Sclera without icterus. There is no oral thrush.  NECK:  Supple. No jugular venous distension.  CHEST: No reproducible chest tenderness.   LUNGS:  Normal respiratory effort. Lungs reveal decreased breath sounds at bases. No rales or " wheezes.  HEART:  Regular rate and rhythm.  No murmur, gallops or rubs auscultated.  ABDOMEN:  Soft, bowel sounds positive.  There is no tenderness or guarding.   EXTREMITIES: No edema. Right hip surgical site with acquacel dressing, mild tenderness.   NEUROLOGIC:  Alert and oriented x3. Moving all ext. Gait not tested.  PSYCH: Recent and remote memory is normal. Mood and affect are normal.    Lab/Diagnostic data:  Reviewed    CBC Lab Results (Last 5 results in 365 days)       WBC RBC Hgb Hct MCV MCH MCHC RDW Plt Neut # Lymph # Eos # Baso # Immat. Gran #   09/06/18 0625 10.1 3.15 9.2 28.4 90 29.2 32.4 14.9 388 -- -- -- -- --   09/03/18 0545 9.6 2.81 8.2 25.5 91 29.2 32.2 14.8 260 -- -- -- -- --   09/02/18 0530 10.3 3.08 9.0 27.8 90 29.2 32.4 15.0 247 -- -- -- -- --   09/01/18 0535 9.0 2.78 8.2 24.9 90 29.5 32.9 14.5 201 -- -- -- -- --   08/31/18 0535 -- -- 9.4 -- -- -- -- -- -- -- -- -- -- --   CMP Labs (Last 5 results in 365 days)       Na+ K+ Cl CO2 ANION GAP Glc BUN Creat GFR GFR-Black Calcium Mg ALT AST A1C TSH LDL HIV   09/06/18 0625 138 4.1 105 23 10 151 23 1.57 41 50 7.9 -- -- -- -- -- -- --   09/03/18 1132 -- -- -- -- -- 235 -- -- -- -- -- -- -- -- -- -- -- --   09/03/18 0759 -- -- -- -- -- 110 -- -- -- -- -- -- -- -- -- -- -- --   09/03/18 0545 141 4.2 110 24 7 119 35 1.94 32 39 7.3 -- -- -- -- -- -- --   09/03/18 0526 -- -- -- -- -- 118 -- -- -- -- -- -- -- -- --        Results for orders placed or performed during the hospital encounter of 08/25/18   XR Pelvis w Hip Right 1 View    Narrative    PELVIS WITH UNILATERAL HIP ONE VIEW RIGHT  8/25/2018 7:16 PM     HISTORY: Pain, fall, can not bear weight.     COMPARISON: None.      Impression    IMPRESSION: Femoral neck fracture.    MODESTO SUTHERLAND MD   XR Chest 1 View    Narrative    CHEST ONE VIEW SUPINE 8/25/2018 7:17 PM     HISTORY: Preop.     COMPARISON: None.      Impression    IMPRESSION: Scattered atelectasis and/or fibrosis bilaterally.    MODESTO SUTHERLAND  MD   XR Pelvis w Hip Port Right 1 View    Narrative    PELVIS WITH UNILATERAL HIP ONE VIEW RIGHT  8/26/2018 4:36 PM     HISTORY: Hip arthroplasty    COMPARISON: August 25, 2018     FINDINGS:    There is a hip arthroplasty in anatomic alignment with  well-seated components.      Impression    IMPRESSION:   Hip arthroplasty in anatomic alignment.    MODESTO SUTHERLAND MD   XR Chest Port 1 View    Addendum: 9/4/2018    CHERYL CLARK. [facility]  Accession # [id number]    The original report on this patient was dictated by Jay Thomas M.D..      There is no evidence of active tuberculosis.    JAY THOMAS MD (Date of Addendum: 9/4/2018 8:41 AM)        JAY THOMAS MD      Narrative    CHEST PORTABLE ONE VIEW   8/29/2018 4:56 PM     HISTORY: Question of aspiration.     COMPARISON: 8/25/2018.    FINDINGS: Supine portable chest. Sternal wires and mediastinal clips.  No pneumothorax. The heart size is normal. There is probable fibrosis  in the mid and lower lungs bilaterally which is similar to the  previous exam. There is new infiltrate at the right lung base  laterally.      Impression    IMPRESSION: New small right lung base infiltrate.    JAY THOMAS MD         ASSESSMENT / PLAN:     Physical deconditioning  Plan: PT/OT with increase independence, self-care, strength and endurance and other cares that help return to home/facility of origin, fall precautions. Care conference with patient and family for the progress of rehab and disposition issues will be discussed as planned. Rehab evaluation and other evaluations including CPT are at rehab logs, to be reviewed separately.  Fall risk assessment as well as cognitive evaluation will be formed during rehab stay if indicated.    Closed fracture of right hip with routine healing, subsequent encounter Status post total replacement of right hip  Plan: PT/OT, pain medication, DVT prophylaxis with ASA as per orthopedic team. Follow up orthopedic clinic as scheduled. Staples  removed as instructed. Incentive spirometry prn.    Paroxysmal atrial fibrillation (H) and SVT (supraventricular tachycardia) with LBBB (H)  Plan: Continue Metoprolol and Diltiazem. . Follow up cardiologist as scheduled. Recommended EPS/ablation or trial of Amiodarone if fails medical tx.    Essential hypertension and Coronary artery disease involving coronary bypass graft of native heart without angina pectoris  Plan: continue current medications ASA and Metoprolol, monitor I&O and daily weighs and labs if indicated. Follow up cardiologist and PCP as scheduled.    Aspiration pneumonitis (H)  Plan: Continue Augmentin to complete the course.    Itching at lower back, no rash  Plan: may be related to sheets. No rash, monitor. Benadryl 1% cream prn for itching.    Other problems with same care. Primary care doctor and other specialists to address those chronic problems in next clinic appointment to be scheduled upon discharge from the TCU.      Total time spent with patient visit was 36 min including patient visit, review of past records, patients counseling and coordinating care.      Electronically signed by:  Olaf Lee MD              Sincerely,        Olaf Lee MD

## 2018-09-07 NOTE — PROGRESS NOTES
PRIMARY CARE PROVIDER AND CLINIC RESPONSIBLE:  Carlitos Bee [facility] [address] / [place] *        ADMISSION HISTORY AND PHYSICAL EXAMINATION     Chief Complaint   Patient presents with     Fatigue     Musculoskeletal Problem         HISTORY OF PRESENT ILLNESS:  89 year old male, (1/23/1924), admitted to the [facility] for continuation of medical care and rehab.  HPI information obtained from: facility chart records, facility staff, patient report and [facility] chart review.    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 from 8/27 to 9/3/18 due to mechanical fall. He has some balance problems recently, but no frequent falls.  He denies any preceding symptoms, no dizziness, no shortness of breath, no chest pains, no loss of consciousness.  He did hit his head slightly and had a skin laceration that was sutured as the ER. He was found to have right femoral neck fracture. orth was consulted. He underwent right hip ORIF hemiarthroplasty. Post-op period was significant with blood loss anemia, new atrial fibrillation and SVT with LBBB, he was seen by EP cardiologist, treated with rate control with metoprolol and Diltiazem, declined anticoagulation as per notes. Recommended EPS/ablation or trial of Amiodarone if fails medical tx. ECHO showed EF 55-60%. He was suspected aspiration pneumonia treated with IV antibiotic and discharged with Augmentin. He was initially suspected UTI.  He has no pain at his right leg. He feels weak and tired. Slept poorly, appetite poor and had BM. He has itching his lower back, but no open area or rash. Patient denies chest pain, dyspnea, abdominal pain, n&v, fever, chills, dysuria, leg pain or swelling. The rest of ROS is unremarkable. Patient is seen and examined by Donna Spicer NP. Please see JPreston  Nayan's admit noted dated 9/4/18 for details of admission, past medical history, family history, allergies, medication list, social history and other details pertinent with this admission. Hospital admission and dc summary reviewed.    Past Medical History:   Diagnosis Date     Benign essential hypertension 9/4/2018     Coronary artery disease      Nonsenile cataract      Recent retinal detachment, total or subtotal 8/5/2014     Type 2 diabetes mellitus without complications (H)        Past Surgical History:   Procedure Laterality Date     CARDIAC SURGERY       CATARACT IOL, RT/LT Bilateral      lacrimal caruncle removed BE Bilateral ~1989     OPEN REDUCTION INTERNAL FIXATION HIP BIPOLAR Right 8/26/2018    Procedure: OPEN REDUCTION INTERNAL FIXATION HIP BIPOLAR;  Right Hip Bipolar Hemiarthroplasty (Biomet);  Surgeon: Bill Sanders MD;  Location: [facility] OR     REPAIR RUPTURED GLOBE  4/21/2014    Procedure: Exploration and Repair of Ruptured Globe ;  Surgeon: Mercedes Rivera MD;  Location: [facility] OR       Current Outpatient Prescriptions   Medication Sig     acetaminophen (TYLENOL) 325 MG tablet Take 2 tablets (650 mg) by mouth every 8 hours     aspirin 325 MG EC tablet Take one Aspirin tab twice daily for 5 weeks.     atorvastatin (LIPITOR) 10 MG tablet Take 1 tablet (10 mg) by mouth every evening     diltiazem (DILACOR XR) 180 MG 24 hr capsule Take 1 capsule (180 mg) by mouth daily     insulin aspart (NOVOLOG PEN) 100 UNIT/ML injection Inject 1-7 Units Subcutaneous 3 times daily (before meals)     insulin aspart (NOVOLOG PEN) 100 UNIT/ML injection Inject 1-5 Units Subcutaneous At Bedtime     insulin glargine (LANTUS SOLOSTAR) 100 UNIT/ML pen Inject 5 Units Subcutaneous At Bedtime     insulin lispro (HUMALOG KWIKPEN) 100 UNIT/ML injection Inject 2 Units Subcutaneous 3 times daily (before meals)     loratadine (CLARITIN) 10 MG tablet Take 10 mg by mouth daily      metoprolol succinate (TOPROL-XL) 50 MG 24 hr tablet  "Take 1 tablet (50 mg) by mouth daily     oxyCODONE IR (ROXICODONE) 5 MG tablet Take 1 tablet (5 mg) by mouth every 4 hours as needed for pain     polyethylene glycol (MIRALAX/GLYCOLAX) Packet Take 17 g by mouth daily as needed for constipation     senna-docusate (SENOKOT-S;PERICOLACE) 8.6-50 MG per tablet Take 1 tablet by mouth 2 times daily     Skin Protectants, Misc. (ANIBAL PROTECT EX) Apply topically 2 times daily     No current facility-administered medications for this visit.        No Known Allergies    Social History     Social History     Marital status:      Spouse name: N/A     Number of children: N/A     Years of education: N/A     Occupational History     Not on file.     Social History Main Topics     Smoking status: Former Smoker     Smokeless tobacco: Never Used     Alcohol use Not on file     Drug use: Not on file     Sexual activity: Not on file     Other Topics Concern     Not on file     Social History Narrative          Information reviewed:  Medications, vital signs, orders, nursing notes, problem list, hospital information.     ROS: All 10 point review of system completed, those pertinent positive, please see H&P, the remaining ROS is negative.    /78  Pulse 88  Temp 97  F (36.1  C)  Resp 16  Ht 5' 8\" (1.727 m)  Wt 163 lb (73.9 kg)  SpO2 99%  BMI 24.78 kg/m2    PHYSICAL EXAMINATION:   GENERAL:  No acute distress.  SKIN:  Dry and warm.  There is no rash at area of skin examined. Lower back examined no rash or open areas.  HEENT:  Head without trauma except small bruise at right forehead.  Pupils round, reactive. Exam of conjunctiva and lids are normal. Sclera without icterus. There is no oral thrush.  NECK:  Supple. No jugular venous distension.  CHEST: No reproducible chest tenderness.   LUNGS:  Normal respiratory effort. Lungs reveal decreased breath sounds at bases. No rales or wheezes.  HEART:  Regular rate and rhythm.  No murmur, gallops or rubs auscultated.  ABDOMEN:  " Soft, bowel sounds positive.  There is no tenderness or guarding.   EXTREMITIES: No edema. Right hip surgical site with acquacel dressing, mild tenderness.   NEUROLOGIC:  Alert and oriented x3. Moving all ext. Gait not tested.  PSYCH: Recent and remote memory is normal. Mood and affect are normal.    Lab/Diagnostic data:  Reviewed    CBC Lab Results (Last 5 results in 365 days)       WBC RBC Hgb Hct MCV MCH MCHC RDW Plt Neut # Lymph # Eos # Baso # Immat. Gran #   09/06/18 0625 10.1 3.15 9.2 28.4 90 29.2 32.4 14.9 388 -- -- -- -- --   09/03/18 0545 9.6 2.81 8.2 25.5 91 29.2 32.2 14.8 260 -- -- -- -- --   09/02/18 0530 10.3 3.08 9.0 27.8 90 29.2 32.4 15.0 247 -- -- -- -- --   09/01/18 0535 9.0 2.78 8.2 24.9 90 29.5 32.9 14.5 201 -- -- -- -- --   08/31/18 0535 -- -- 9.4 -- -- -- -- -- -- -- -- -- -- --   CMP Labs (Last 5 results in 365 days)       Na+ K+ Cl CO2 ANION GAP Glc BUN Creat GFR GFR-Black Calcium Mg ALT AST A1C TSH LDL HIV   09/06/18 0625 138 4.1 105 23 10 151 23 1.57 41 50 7.9 -- -- -- -- -- -- --   09/03/18 1132 -- -- -- -- -- 235 -- -- -- -- -- -- -- -- -- -- -- --   09/03/18 0759 -- -- -- -- -- 110 -- -- -- -- -- -- -- -- -- -- -- --   09/03/18 0545 141 4.2 110 24 7 119 35 1.94 32 39 7.3 -- -- -- -- -- -- --   09/03/18 0526 -- -- -- -- -- 118 -- -- -- -- -- -- -- -- --        Results for orders placed or performed during the hospital encounter of 08/25/18   XR Pelvis w Hip Right 1 View    Narrative    PELVIS WITH UNILATERAL HIP ONE VIEW RIGHT  8/25/2018 7:16 PM     HISTORY: Pain, fall, can not bear weight.     COMPARISON: None.      Impression    IMPRESSION: Femoral neck fracture.    MODESTO SUTHERLAND MD   XR Chest 1 View    Narrative    CHEST ONE VIEW SUPINE 8/25/2018 7:17 PM     HISTORY: Preop.     COMPARISON: None.      Impression    IMPRESSION: Scattered atelectasis and/or fibrosis bilaterally.    MODESTO SUTHERLAND MD   XR Pelvis w Hip Port Right 1 View    Narrative    PELVIS WITH UNILATERAL HIP ONE VIEW  RIGHT  8/26/2018 4:36 PM     HISTORY: Hip arthroplasty    COMPARISON: August 25, 2018     FINDINGS:    There is a hip arthroplasty in anatomic alignment with  well-seated components.      Impression    IMPRESSION:   Hip arthroplasty in anatomic alignment.    MODESTO SUTHERLAND MD   XR Chest Port 1 View    Addendum: 9/4/2018    CH[facility]L CLARK. [facility]  Accession # [id number]    The original report on this patient was dictated by Jay Thomas M.D..      There is no evidence of active tuberculosis.    JAY THOMAS MD (Date of Addendum: 9/4/2018 8:41 AM)        JAY THOMAS MD      Narrative    CHEST PORTABLE ONE VIEW   8/29/2018 4:56 PM     HISTORY: Question of aspiration.     COMPARISON: 8/25/2018.    FINDINGS: Supine portable chest. Sternal wires and mediastinal clips.  No pneumothorax. The heart size is normal. There is probable fibrosis  in the mid and lower lungs bilaterally which is similar to the  previous exam. There is new infiltrate at the right lung base  laterally.      Impression    IMPRESSION: New small right lung base infiltrate.    JAY THOMAS MD         ASSESSMENT / PLAN:     Physical deconditioning  Plan: PT/OT with increase independence, self-care, strength and endurance and other cares that help return to home/facility of origin, fall precautions. Care conference with patient and family for the progress of rehab and disposition issues will be discussed as planned. Rehab evaluation and other evaluations including CPT are at rehab logs, to be reviewed separately.  Fall risk assessment as well as cognitive evaluation will be formed during rehab stay if indicated.    Closed fracture of right hip with routine healing, subsequent encounter Status post total replacement of right hip  Plan: PT/OT, pain medication, DVT prophylaxis with ASA as per orthopedic team. Follow up orthopedic clinic as scheduled. Staples removed as instructed. Incentive spirometry prn.    Paroxysmal atrial fibrillation (H) and  SVT (supraventricular tachycardia) with LBBB (H)  Plan: Continue Metoprolol and Diltiazem. . Follow up cardiologist as scheduled. Recommended EPS/ablation or trial of Amiodarone if fails medical tx.    Essential hypertension and Coronary artery disease involving coronary bypass graft of native heart without angina pectoris  Plan: continue current medications ASA and Metoprolol, monitor I&O and daily weighs and labs if indicated. Follow up cardiologist and PCP as scheduled.    Aspiration pneumonitis (H)  Plan: Continue Augmentin to complete the course.    Itching at lower back, no rash  Plan: may be related to sheets. No rash, monitor. Benadryl 1% cream prn for itching.    Other problems with same care. Primary care doctor and other specialists to address those chronic problems in next clinic appointment to be scheduled upon discharge from the TCU.      Total time spent with patient visit was 36 min including patient visit, review of past records, patients counseling and coordinating care.      Electronically signed by:  Olaf Lee MD

## 2018-09-12 ENCOUNTER — NURSING HOME VISIT (OUTPATIENT)
Dept: GERIATRICS | Facility: CLINIC | Age: 83
End: 2018-09-12
Payer: MEDICARE

## 2018-09-12 VITALS
DIASTOLIC BLOOD PRESSURE: 71 MMHG | SYSTOLIC BLOOD PRESSURE: 148 MMHG | BODY MASS INDEX: 24.25 KG/M2 | HEART RATE: 72 BPM | HEIGHT: 68 IN | RESPIRATION RATE: 16 BRPM | TEMPERATURE: 98.7 F | OXYGEN SATURATION: 100 % | WEIGHT: 160 LBS

## 2018-09-12 DIAGNOSIS — R53.81 PHYSICAL DECONDITIONING: ICD-10-CM

## 2018-09-12 DIAGNOSIS — I25.810 CORONARY ARTERY DISEASE INVOLVING CORONARY BYPASS GRAFT OF NATIVE HEART WITHOUT ANGINA PECTORIS: ICD-10-CM

## 2018-09-12 DIAGNOSIS — Z79.4 TYPE 2 DIABETES MELLITUS WITH STAGE 3 CHRONIC KIDNEY DISEASE, WITH LONG-TERM CURRENT USE OF INSULIN (H): ICD-10-CM

## 2018-09-12 DIAGNOSIS — N18.30 TYPE 2 DIABETES MELLITUS WITH STAGE 3 CHRONIC KIDNEY DISEASE, WITH LONG-TERM CURRENT USE OF INSULIN (H): ICD-10-CM

## 2018-09-12 DIAGNOSIS — Z71.89 ADVANCED DIRECTIVES, COUNSELING/DISCUSSION: ICD-10-CM

## 2018-09-12 DIAGNOSIS — J18.9 PNEUMONIA OF RIGHT LOWER LOBE DUE TO INFECTIOUS ORGANISM: ICD-10-CM

## 2018-09-12 DIAGNOSIS — I49.9 CARDIAC ARRHYTHMIA, UNSPECIFIED CARDIAC ARRHYTHMIA TYPE: ICD-10-CM

## 2018-09-12 DIAGNOSIS — S72.001D CLOSED FRACTURE OF RIGHT HIP WITH ROUTINE HEALING, SUBSEQUENT ENCOUNTER: Primary | ICD-10-CM

## 2018-09-12 DIAGNOSIS — N18.30 CKD (CHRONIC KIDNEY DISEASE) STAGE 3, GFR 30-59 ML/MIN (H): ICD-10-CM

## 2018-09-12 DIAGNOSIS — E11.22 TYPE 2 DIABETES MELLITUS WITH STAGE 3 CHRONIC KIDNEY DISEASE, WITH LONG-TERM CURRENT USE OF INSULIN (H): ICD-10-CM

## 2018-09-12 DIAGNOSIS — I10 BENIGN ESSENTIAL HYPERTENSION: ICD-10-CM

## 2018-09-12 PROCEDURE — 99310 SBSQ NF CARE HIGH MDM 45: CPT | Performed by: NURSE PRACTITIONER

## 2018-09-12 RX ORDER — DILTIAZEM HYDROCHLORIDE 120 MG/1
120 CAPSULE, EXTENDED RELEASE ORAL DAILY
Status: ON HOLD | COMMUNITY
Start: 2018-09-13 | End: 2018-10-22

## 2018-09-12 NOTE — PROGRESS NOTES
[facility]    Chief Complaint   Patient presents with     JESUS       [place] Medical Record Number:  [id number]    HPI:    Abimael Flores is a 89 year old  (1/23/1924), who is being seen today for an episodic care visit at [facility] [place][note 2] .  HPI information obtained from: facility chart records, facility staff, patient report and [facility] chart review.    Today's concern is:  Closed fracture of right hip with routine healing, subsequent encounter  Physical deconditioning  Patient transferred to TCU from hospital after treatment for right hip pain suffered from a fall in the community. He was found to have right femoral neck fracture. He underwent a bipolar hemiarthroplasty on 8/27. Surgery was uneventful. Activity is WBAT. Post op complications include tachy arrhythmia, aspiration pneumonia, hypoglycemia.   --patient doing well with therapies. He is in wheelchair and occasionally up with walker. To see ortho f/u in one month. Patient upset with number of new medications started during hospitalization. Went through med list and made changes with his input. Will stop scheduled tylenol per patient's request, continue PRN oxycodone.     Cardiac arrhythmia, unspecified cardiac arrhythmia type  Coronary artery disease involving coronary bypass graft of native heart without angina pectoris  Benign essential hypertension  Denies chest pain or dyspnea. Recent BP Range: 106-176/55-74 mmHg, HR Range: 67-98 bpm, Wt Range: 160lbs 9/3 - 160lbs 9/11. Typically -148. Continue Metoprolol but will decrease Diltiazem to 120 mg PO daily. F/U with BP and HR next week. Continue Lipitor and ASA.     Pneumonia of right lower lobe due to infectious organism (H)  Symptoms resolving. Denies fever, cough or dyspnea. Some crackled RLL remain and sats RA are 100%.   Lab Results   Component Value Date    WBC 10.1 09/06/2018    WBC 9.6 09/03/2018       Type 2 diabetes mellitus with stage 3 chronic kidney  disease, with long-term current use of insulin (H)  Managed with Lantus and sliding scale insulin AC and HS. Reports he is on this at home as well. Occasionally elevated BG - states wasn't eating diabetic diet.   Blood Sugar Range:   AM: 127-248  Noon: 193-441  PM:   HS: 159-237    CKD (chronic kidney disease) stage 3, GFR 30-59 ml/min  Stable/improved on recent check.   Lab Results   Component Value Date    CR 1.57 09/06/2018    CR 1.94 09/03/2018     Lab Results   Component Value Date    POTASSIUM 4.1 09/06/2018    POTASSIUM 4.2 09/03/2018       Advanced directives, counseling/discussion  Reviewed POLST patient desired to be DNR/DNI.       ALLERGIES: Review of patient's allergies indicates no known allergies.  Past Medical, Surgical, Family and Social History reviewed and updated in [employer].    Current Outpatient Prescriptions   Medication Sig Dispense Refill     aspirin 325 MG EC tablet Take one Aspirin tab twice daily for 5 weeks. 70 tablet 0     atorvastatin (LIPITOR) 10 MG tablet Take 1 tablet (10 mg) by mouth every evening       [START ON 9/13/2018] diltiazem (TIAZAC) 120 MG 24 hr ER beaded capsule Take 120 mg by mouth daily       insulin aspart (NOVOLOG PEN) 100 UNIT/ML injection Inject 1-7 Units Subcutaneous 3 times daily (before meals)       insulin aspart (NOVOLOG PEN) 100 UNIT/ML injection Inject 1-5 Units Subcutaneous At Bedtime       insulin glargine (LANTUS SOLOSTAR) 100 UNIT/ML pen Inject 5 Units Subcutaneous At Bedtime       insulin lispro (HUMALOG KWIKPEN) 100 UNIT/ML injection Inject 2 Units Subcutaneous 3 times daily (before meals)       metoprolol succinate (TOPROL-XL) 50 MG 24 hr tablet Take 1 tablet (50 mg) by mouth daily 30 tablet      oxyCODONE IR (ROXICODONE) 5 MG tablet Take 1 tablet (5 mg) by mouth every 4 hours as needed for pain 60 tablet 0     Skin Protectants, Misc. (ANIBAL PROTECT EX) Apply topically 2 times daily       Medications reviewed:  Medications reconciled to facility chart  "and changes were made to reflect current medications as identified as above med list. Below are the changes that were made:   Medications stopped since last EPIC medication reconciliation:   There are no discontinued medications.    Medications started since last [facility] medication reconciliation:  Orders Placed This Encounter   Medications     diphenhydrAMINE-zinc acetate (BENADRYL) cream     Sig: Apply topically 3 times daily as needed for itching       REVIEW OF SYSTEMS:  10 point ROS of systems including Constitutional, Eyes, Respiratory, Cardiovascular, Gastroenterology, Genitourinary, Integumentary, Musculoskeletal, Psychiatric were all negative except for pertinent positives noted in my HPI.    Physical Exam:  /71  Pulse 72  Temp 98.7  F (37.1  C)  Resp 16  Ht 5' 8\" (1.727 m)  Wt 160 lb (72.6 kg)  SpO2 100%  BMI 24.33 kg/m2  GENERAL APPEARANCE:  Alert, in no distress, pleasant, cooperative, oriented x 4  EYES:  EOM, lids, pupils and irises normal, sclera clear and conjunctiva normal, no discharge or mattering on lids or lashes noted  ENT:  Mouth normal, moist mucous membranes, nose normal without drainage or crusting, external ears without lesions, hearing acuity intact  RESP:  respiratory effort and palpation of chest normal, no chest wall tenderness, no respiratory distress, Lung sounds clear left lower lobe and crackles right base, patient is on room air  CV:  Palpation and auscultation of heart done, rate and rhythm controlled and regular today, no murmur, no rub or gallop. Edema none bilateral lower extremities.   ABDOMEN:  normal bowel sounds, soft, nontender.  M/S:   Gait and station wheelchair bound and unsafe without assistance, no tenderness or swelling of the joints; able to move all extremities   NEURO: cranial nerves 2-12 grossly intact, no facial asymmetry, no speech deficits and able to follow directions, moves all extremities symmetrically  PSYCH:  insight and judgement at baseline, " memory appears intact, affect and mood normal     Recent Labs:  CBC RESULTS:   Recent Labs   Lab Test  09/06/18   0625  09/03/18   0545   WBC  10.1  9.6   RBC  3.15*  2.81*   HGB  9.2*  8.2*   HCT  28.4*  25.5*   MCV  90  91   MCH  29.2  29.2   MCHC  32.4  32.2   RDW  14.9  14.8   PLT  388  260       Last Basic Metabolic Panel:  Recent Labs   Lab Test  09/06/18   0625  09/03/18   0545   NA  138  141   POTASSIUM  4.1  4.2   CHLORIDE  105  110*   JOSELINE  7.9*  7.3*   CO2  23  24   BUN  23  35*   CR  1.57*  1.94*   GLC  151*  119*     TSH   Date Value Ref Range Status   04/02/2003 2.08 0.4 - 5.0 mU/L Final     Lab Results   Component Value Date    A1C 8.4 08/25/2018    A1C 7.8 04/22/2014         Assessment/Plan:  Closed fracture of right hip with routine healing, subsequent encounter  Physical deconditioning  Ongoing issues, therapies and pain meds as above. Ortho f/u as recommended. Stop scheduled tylenol.     Cardiac arrhythmia, unspecified cardiac arrhythmia type  Coronary artery disease involving coronary bypass graft of native heart without angina pectoris  Benign essential hypertension  Newly noted arrhythmia at hospital, added Metoprolol and Diltiazem. BPs and HR fair control since admission to TCU. Will attempt to decrease diltiazem dose and monitor, f/u next week.     Pneumonia of right lower lobe due to infectious organism (H)  Resolved. Monitor.     Type 2 diabetes mellitus with stage 3 chronic kidney disease, with long-term current use of insulin (H)  Chronic, elevated bg but patient has been eating regular diet. Just this week switched to diabetic diet - continue sliding scale and long acting insulins as ordered.     CKD (chronic kidney disease) stage 3, GFR 30-59 ml/min  Chronic, improved last check. BMP repeat PRN.     Advanced directives, counseling/discussion  Reviewed POLST - patient to be DNR/DNI.     Orders:  1. DC Tylenol  2. Decrease Diltiazem ER to 120 mg PO daily  3. DC Benadryl cream  4. Make sure  patient is on a diabetic diet  5. DC Claritin  6. DC Miralax  7. DC Senna S    Total time spent with patient visit was 35 min including patient visit and review of past records. Greater than 50% of total time spent with counseling and coordinating care due to review of history, medication review with patient, conversation regarding status and concerns and POC to address.      Electronically signed by  WILLIAM Mckee CNP

## 2018-09-12 NOTE — LETTER
9/12/2018        RE: Abimael Flores  [address]  [facility] [address]        [place] [facility]    Chief Complaint   Patient presents with     RECHECK       [place] Medical Record Number:  [id number]    HPI:    Abimael Flores is a 89 year old  (1/23/1924), who is being seen today for an episodic care visit at [place] [facility] .  HPI information obtained from: facility chart records, facility staff, patient report and [facility] chart review.    Today's concern is:  Closed fracture of right hip with routine healing, subsequent encounter  Physical deconditioning  Patient transferred to TCU from hospital after treatment for right hip pain suffered from a fall in the community. He was found to have right femoral neck fracture. He underwent a bipolar hemiarthroplasty on 8/27. Surgery was uneventful. Activity is WBAT. Post op complications include tachy arrhythmia, aspiration pneumonia, hypoglycemia.   --patient doing well with therapies. He is in wheelchair and occasionally up with walker. To see ortho f/u in one month. Patient upset with number of new medications started during hospitalization. Went through med list and made changes with his input. Will stop scheduled tylenol per patient's request, continue PRN oxycodone.     Cardiac arrhythmia, unspecified cardiac arrhythmia type  Coronary artery disease involving coronary bypass graft of native heart without angina pectoris  Benign essential hypertension  Denies chest pain or dyspnea. Recent BP Range: 106-176/55-74 mmHg, HR Range: 67-98 bpm, Wt Range: 160lbs 9/3 - 160lbs 9/11. Typically -148. Continue Metoprolol but will decrease Diltiazem to 120 mg PO daily. F/U with BP and HR next week. Continue Lipitor and ASA.     Pneumonia of right lower lobe due to infectious organism (H)  Symptoms resolving. Denies fever, cough or dyspnea. Some crackled RLL remain and sats RA are 100%.   Lab Results   Component Value Date    WBC 10.1  09/06/2018    WBC 9.6 09/03/2018       Type 2 diabetes mellitus with stage 3 chronic kidney disease, with long-term current use of insulin (H)  Managed with Lantus and sliding scale insulin AC and HS. Reports he is on this at home as well. Occasionally elevated BG - states wasn't eating diabetic diet.   Blood Sugar Range:   AM: 127-248  Noon: 193-441  PM:   HS: 159-237    CKD (chronic kidney disease) stage 3, GFR 30-59 ml/min  Stable/improved on recent check.   Lab Results   Component Value Date    CR 1.57 09/06/2018    CR 1.94 09/03/2018     Lab Results   Component Value Date    POTASSIUM 4.1 09/06/2018    POTASSIUM 4.2 09/03/2018       Advanced directives, counseling/discussion  Reviewed POLST patient desired to be DNR/DNI.       ALLERGIES: Review of patient's allergies indicates no known allergies.  Past Medical, Surgical, Family and Social History reviewed and updated in [place].    Current Outpatient Prescriptions   Medication Sig Dispense Refill     aspirin 325 MG EC tablet Take one Aspirin tab twice daily for 5 weeks. 70 tablet 0     atorvastatin (LIPITOR) 10 MG tablet Take 1 tablet (10 mg) by mouth every evening       [START ON 9/13/2018] diltiazem (TIAZAC) 120 MG 24 hr ER beaded capsule Take 120 mg by mouth daily       insulin aspart (NOVOLOG PEN) 100 UNIT/ML injection Inject 1-7 Units Subcutaneous 3 times daily (before meals)       insulin aspart (NOVOLOG PEN) 100 UNIT/ML injection Inject 1-5 Units Subcutaneous At Bedtime       insulin glargine (LANTUS SOLOSTAR) 100 UNIT/ML pen Inject 5 Units Subcutaneous At Bedtime       insulin lispro (HUMALOG KWIKPEN) 100 UNIT/ML injection Inject 2 Units Subcutaneous 3 times daily (before meals)       metoprolol succinate (TOPROL-XL) 50 MG 24 hr tablet Take 1 tablet (50 mg) by mouth daily 30 tablet      oxyCODONE IR (ROXICODONE) 5 MG tablet Take 1 tablet (5 mg) by mouth every 4 hours as needed for pain 60 tablet 0     Skin Protectants, Misc. (ANIBAL PROTECT EX) Apply  "topically 2 times daily       Medications reviewed:  Medications reconciled to facility chart and changes were made to reflect current medications as identified as above med list. Below are the changes that were made:   Medications stopped since last EPIC medication reconciliation:   There are no discontinued medications.    Medications started since last [facility] medication reconciliation:  Orders Placed This Encounter   Medications     diphenhydrAMINE-zinc acetate (BENADRYL) cream     Sig: Apply topically 3 times daily as needed for itching       REVIEW OF SYSTEMS:  10 point ROS of systems including Constitutional, Eyes, Respiratory, Cardiovascular, Gastroenterology, Genitourinary, Integumentary, Musculoskeletal, Psychiatric were all negative except for pertinent positives noted in my HPI.    Physical Exam:  /71  Pulse 72  Temp 98.7  F (37.1  C)  Resp 16  Ht 5' 8\" (1.727 m)  Wt 160 lb (72.6 kg)  SpO2 100%  BMI 24.33 kg/m2  GENERAL APPEARANCE:  Alert, in no distress, pleasant, cooperative, oriented x 4  EYES:  EOM, lids, pupils and irises normal, sclera clear and conjunctiva normal, no discharge or mattering on lids or lashes noted  ENT:  Mouth normal, moist mucous membranes, nose normal without drainage or crusting, external ears without lesions, hearing acuity intact  RESP:  respiratory effort and palpation of chest normal, no chest wall tenderness, no respiratory distress, Lung sounds clear left lower lobe and crackles right base, patient is on room air  CV:  Palpation and auscultation of heart done, rate and rhythm controlled and regular today, no murmur, no rub or gallop. Edema none bilateral lower extremities.   ABDOMEN:  normal bowel sounds, soft, nontender.  M/S:   Gait and station wheelchair bound and unsafe without assistance, no tenderness or swelling of the joints; able to move all extremities   NEURO: cranial nerves 2-12 grossly intact, no facial asymmetry, no speech deficits and able to " follow directions, moves all extremities symmetrically  PSYCH:  insight and judgement at baseline, memory appears intact, affect and mood normal     Recent Labs:  CBC RESULTS:   Recent Labs   Lab Test  09/06/18 0625  09/03/18   0545   WBC  10.1  9.6   RBC  3.15*  2.81*   HGB  9.2*  8.2*   HCT  28.4*  25.5*   MCV  90  91   MCH  29.2  29.2   MCHC  32.4  32.2   RDW  14.9  14.8   PLT  388  260       Last Basic Metabolic Panel:  Recent Labs   Lab Test  09/06/18 0625  09/03/18   0545   NA  138  141   POTASSIUM  4.1  4.2   CHLORIDE  105  110*   JOSELINE  7.9*  7.3*   CO2  23  24   BUN  23  35*   CR  1.57*  1.94*   GLC  151*  119*     TSH   Date Value Ref Range Status   04/02/2003 2.08 0.4 - 5.0 mU/L Final     Lab Results   Component Value Date    A1C 8.4 08/25/2018    A1C 7.8 04/22/2014         Assessment/Plan:  Closed fracture of right hip with routine healing, subsequent encounter  Physical deconditioning  Ongoing issues, therapies and pain meds as above. Ortho f/u as recommended. Stop scheduled tylenol.     Cardiac arrhythmia, unspecified cardiac arrhythmia type  Coronary artery disease involving coronary bypass graft of native heart without angina pectoris  Benign essential hypertension  Newly noted arrhythmia at hospital, added Metoprolol and Diltiazem. BPs and HR fair control since admission to TCU. Will attempt to decrease diltiazem dose and monitor, f/u next week.     Pneumonia of right lower lobe due to infectious organism (H)  Resolved. Monitor.     Type 2 diabetes mellitus with stage 3 chronic kidney disease, with long-term current use of insulin (H)  Chronic, elevated bg but patient has been eating regular diet. Just this week switched to diabetic diet - continue sliding scale and long acting insulins as ordered.     CKD (chronic kidney disease) stage 3, GFR 30-59 ml/min  Chronic, improved last check. BMP repeat PRN.     Advanced directives, counseling/discussion  Reviewed POLST - patient to be DNR/DNI.      Orders:  1. DC Tylenol  2. Decrease Diltiazem ER to 120 mg PO daily  3. DC Benadryl cream  4. Make sure patient is on a diabetic diet  5. DC Claritin  6. DC Miralax  7. DC Senna S    Total time spent with patient visit was 35 min including patient visit and review of past records. Greater than 50% of total time spent with counseling and coordinating care due to review of history, medication review with patient, conversation regarding status and concerns and POC to address.      Electronically signed by  WILLIAM Mckee CNP                      Sincerely,        WILLIAM Mckee CNP

## 2018-09-17 ENCOUNTER — NURSING HOME VISIT (OUTPATIENT)
Dept: GERIATRICS | Facility: CLINIC | Age: 83
End: 2018-09-17
Payer: MEDICARE

## 2018-09-17 VITALS
SYSTOLIC BLOOD PRESSURE: 127 MMHG | RESPIRATION RATE: 17 BRPM | OXYGEN SATURATION: 96 % | WEIGHT: 142 LBS | TEMPERATURE: 97.3 F | DIASTOLIC BLOOD PRESSURE: 77 MMHG | HEIGHT: 68 IN | HEART RATE: 61 BPM | BODY MASS INDEX: 21.52 KG/M2

## 2018-09-17 DIAGNOSIS — I10 BENIGN ESSENTIAL HYPERTENSION: ICD-10-CM

## 2018-09-17 DIAGNOSIS — I49.9 CARDIAC ARRHYTHMIA, UNSPECIFIED CARDIAC ARRHYTHMIA TYPE: ICD-10-CM

## 2018-09-17 DIAGNOSIS — N32.89 BLADDER SPASMS: ICD-10-CM

## 2018-09-17 DIAGNOSIS — R53.81 PHYSICAL DECONDITIONING: ICD-10-CM

## 2018-09-17 DIAGNOSIS — I25.810 CORONARY ARTERY DISEASE INVOLVING CORONARY BYPASS GRAFT OF NATIVE HEART WITHOUT ANGINA PECTORIS: ICD-10-CM

## 2018-09-17 DIAGNOSIS — S72.001D CLOSED FRACTURE OF RIGHT HIP WITH ROUTINE HEALING, SUBSEQUENT ENCOUNTER: Primary | ICD-10-CM

## 2018-09-17 DIAGNOSIS — E11.22 TYPE 2 DIABETES MELLITUS WITH STAGE 3 CHRONIC KIDNEY DISEASE, WITH LONG-TERM CURRENT USE OF INSULIN (H): ICD-10-CM

## 2018-09-17 DIAGNOSIS — N18.30 TYPE 2 DIABETES MELLITUS WITH STAGE 3 CHRONIC KIDNEY DISEASE, WITH LONG-TERM CURRENT USE OF INSULIN (H): ICD-10-CM

## 2018-09-17 DIAGNOSIS — J18.9 PNEUMONIA OF RIGHT LOWER LOBE DUE TO INFECTIOUS ORGANISM: ICD-10-CM

## 2018-09-17 DIAGNOSIS — G47.09 OTHER INSOMNIA: ICD-10-CM

## 2018-09-17 DIAGNOSIS — Z79.4 TYPE 2 DIABETES MELLITUS WITH STAGE 3 CHRONIC KIDNEY DISEASE, WITH LONG-TERM CURRENT USE OF INSULIN (H): ICD-10-CM

## 2018-09-17 DIAGNOSIS — N18.30 CKD (CHRONIC KIDNEY DISEASE) STAGE 3, GFR 30-59 ML/MIN (H): ICD-10-CM

## 2018-09-17 PROCEDURE — 99309 SBSQ NF CARE MODERATE MDM 30: CPT | Performed by: NURSE PRACTITIONER

## 2018-09-17 NOTE — LETTER
9/17/2018        RE: Abimael Flores  [address]  [facility] [address]        [place] [facility]    Chief Complaint   Patient presents with     RECHECK       [place] Medical Record Number:  [id number]    HPI:    Abimael Flores is a 89 year old  (1/23/1924), who is being seen today for an episodic care visit at [place] [facility] . HPI information obtained from: facility chart records, facility staff, patient report and [facility] chart review.  Current issues are:      Closed fracture of right hip with routine healing, subsequent encounter  Patient transferred to TCU from hospital after treatment for right hip pain suffered from a fall in the community. He was found to have right femoral neck fracture. He underwent a bipolar hemiarthroplasty on 8/27. Surgery was uneventful. Activity is WBAT. Post op complications include tachy arrhythmia, aspiration pneumonia, hypoglycemia.   Today he reports some hip pain. He is taking small amounts of oxycodone. He is working with therapy and can now transfer with assist of one. He is not walking very far   Cardiac arrhythmia, unspecified cardiac arrhythmia type  Patient was evaluated by cardiology pre operatively due to  Possible AF on ECG. He was taking atenolol PTA.. Cardiology  Thought telemetry showed sinus exit block with LBBB. CHADS-VASc score is 4.   Post operatively patient did have episodes of tachycardia (RRT called twice due to low BP and tachycardia). He was started on metoprolol (replacing atenolol) and diltiazem. He was asymptomatic at time of tachycardia. Simvastatin was changed to atorvastatin as well, due to drug interactions.   HR today is around 80. No reports of chest pain or light headedness.     Coronary artery disease involving coronary bypass graft of native heart without angina pectoris  S/p 4v CABG in 1996. He states he does not have a cardiologist at this time.     Pneumonia of right lower lobe due to infectious organism  (H)  Patient found to have right lung base infiltrate and started on augmentin. He does endorse a productive cough. He is afebrile.     Type 2 diabetes mellitus with stage 3 chronic kidney disease, with long-term current use of insulin (H)  Patient did have episodes of hypoglycemia. Glipizide was stopped. He continues on PTA lantus and mealtime aspart.   Last BG Levels:  AM: 337, 109, 291  Noon: 246, 291, 434  Dinner: 387, 206, 238  HS: 112, 213, 128  CKD (chronic kidney disease) stage 3, GFR 30-59 ml/min  Baseline creat 1.5-1.8. Creat did go up to 1.9 during hospital stay and remains elevated at time of transfer to TCU.   Creatinine   Date Value Ref Range Status   09/06/2018 1.57 (H) 0.66 - 1.25 mg/dL Final     Benign essential hypertension  BP's in TCU: 127/77, 123/76, 125/70  New meds metoprolol and diltiazem as stated above.   Physical deconditioning  Lives with wife in [facility]. Wife is in process of being moved to memory [facility]. At baseline he walks with 4WW.      Insomnia- he is having a hard time sleeping at night. He is awake every hour to urinate.     Bladder spasms- patient reports has to urinate at night every hour and that he has bladder spasms. He report he does not have enlarged prostate. He does not follow with a urologist. He does not have trouble starting urine stream but has to urinate often and goes about 30cc.         ALLERGIES: Review of patient's allergies indicates no known allergies.  Past Medical, Surgical, Family and Social History reviewed and updated in [facility].    Current Outpatient Prescriptions   Medication Sig Dispense Refill     aspirin 325 MG EC tablet Take one Aspirin tab twice daily for 5 weeks. 70 tablet 0     atorvastatin (LIPITOR) 10 MG tablet Take 1 tablet (10 mg) by mouth every evening       diltiazem (TIAZAC) 120 MG 24 hr ER beaded capsule Take 120 mg by mouth daily       insulin aspart (NOVOLOG PEN) 100 UNIT/ML injection Inject 1-7 Units Subcutaneous 3 times daily (before meals)    "    insulin aspart (NOVOLOG PEN) 100 UNIT/ML injection Inject 1-5 Units Subcutaneous At Bedtime       insulin glargine (LANTUS SOLOSTAR) 100 UNIT/ML pen Inject 5 Units Subcutaneous At Bedtime       insulin lispro (HUMALOG KWIKPEN) 100 UNIT/ML injection Inject 2 Units Subcutaneous 3 times daily (before meals)       metoprolol succinate (TOPROL-XL) 50 MG 24 hr tablet Take 1 tablet (50 mg) by mouth daily 30 tablet      oxyCODONE IR (ROXICODONE) 5 MG tablet Take 1 tablet (5 mg) by mouth every 4 hours as needed for pain 60 tablet 0     Skin Protectants, Misc. (ANIBAL PROTECT EX) Apply topically 2 times daily       Medications reviewed:  Medications reconciled to facility chart and changes were made to reflect current medications as identified as above med list. Below are the changes that were made:   Medications stopped since last EPIC medication reconciliation:   There are no discontinued medications.    Medications started since last [facility] medication reconciliation:  No orders of the defined types were placed in this encounter.        REVIEW OF SYSTEMS:  4 point ROS including Respiratory, CV, GI and , other than that noted in the HPI,  is negative    Physical Exam:  /77  Pulse 61  Temp 97.3  F (36.3  C)  Resp 17  Ht 5' 8\" (1.727 m)  Wt 142 lb (64.4 kg)  SpO2 96%  BMI 21.59 kg/m2  GENERAL APPEARANCE:  Alert, in no distress  ENT:  Mouth and posterior oropharynx normal, moist mucous membranes, hearing acuity very [place]  EYES:  EOM, conjunctivae, lids, pupils and irises normal  RESP:  respiratory effort and palpation of chest normal, no respiratory distress, Lung sounds faint rales in bases  CV:  Palpation and auscultation of heart done , rate and rhythm irreg, no murmur, no rub or gallop, Edema trace  ABDOMEN:  normal bowel sounds, soft, nontender, no hepatosplenomegaly or other masses  M/S:   Gait and station not observed, Digits and nails normal   SKIN:  Inspection/Palpation of skin and subcutaneous tissue " right hip incision is covered.   NEURO: 2-12 in normal limits and at patient's baseline  PSYCH:  insight and judgement, memory suspect impaired.  , affect and mood normal    Recent Labs:  CBC RESULTS:   Recent Labs   Lab Test  09/06/18   0625  09/03/18   0545   WBC  10.1  9.6   RBC  3.15*  2.81*   HGB  9.2*  8.2*   HCT  28.4*  25.5*   MCV  90  91   MCH  29.2  29.2   MCHC  32.4  32.2   RDW  14.9  14.8   PLT  388  260       Last Basic Metabolic Panel:  Recent Labs   Lab Test  09/06/18   0625  09/03/18   0545   NA  138  141   POTASSIUM  4.1  4.2   CHLORIDE  105  110*   JOSELINE  7.9*  7.3*   CO2  23  24   BUN  23  35*   CR  1.57*  1.94*   GLC  151*  119*       TSH   Date Value Ref Range Status   04/02/2003 2.08 0.4 - 5.0 mU/L Final     Lab Results   Component Value Date    A1C 8.4 08/25/2018    A1C 7.8 04/22/2014         Assessment/Plan:  (S72.001D) Closed fracture of right hip with routine healing, subsequent encounter  (primary encounter diagnosis)  Comment: slow progress with therapy. Therapy is concerned that he will not regain independence with mobility.   Plan: physical therapy     (I49.9) Cardiac arrhythmia, unspecified cardiac arrhythmia type  Comment: patient was evaluated by cardiology due to several episodes of tachycardia. His meds were changed from atenolol to metoprolol and diltiazem. HR is now WNL  Plan: monitor HR.    (I25.810) Coronary artery disease involving coronary bypass graft of native heart without angina pectoris  Comment: s/p 4V CABG in 1996.   Plan: monitor VS    (J18.1) Pneumonia of right lower lobe due to infectious organism (H)  Comment: he did complete course of augmentin. No cough.   Plan: monitor    (E11.22,  N18.3,  Z79.4) Type 2 diabetes mellitus with stage 3 chronic kidney disease, with long-term current use of insulin (H)  Comment: some elevated BGs but due to age will continue current plan  Plan: no change    (N18.3) CKD (chronic kidney disease) stage 3, GFR 30-59 ml/min  Comment: creat  back to baseline. Creat cl 27.   Plan: avoid nephrotoxins.     (I10) Benign essential hypertension  Comment: adequate control  Plan: no change    (R53.81) Physical deconditioning  Comment: due to hip fracture. Making slow progess with therapy. High fall risk  Plan: physical therapy. Will need more care upon discharge    (G47.09) Other insomnia  Comment: has not slept in several night.  Plan: ambein 5mg q hs prn    (N32.89) Bladder spasms  Comment: ? OAB. He has not had BM in few day.   Plan: try detrol 2mg q  Day. Add miralax 17g q day.       Electronically signed by  WILLIAM Cota CNP                      Sincerely,        WILLIAM Cota CNP

## 2018-09-17 NOTE — PROGRESS NOTES
[place] [facility]    Chief Complaint   Patient presents with     JESUS       [place] Medical Record Number:  [id number]    HPI:    Abimael Flores is a 89 year old  (1/23/1924), who is being seen today for an episodic care visit at [facility] . HPI information obtained from: facility chart records, facility staff, patient report and [facility] chart review.  Current issues are:      Closed fracture of right hip with routine healing, subsequent encounter  Patient transferred to TCU from hospital after treatment for right hip pain suffered from a fall in the community. He was found to have right femoral neck fracture. He underwent a bipolar hemiarthroplasty on 8/27. Surgery was uneventful. Activity is WBAT. Post op complications include tachy arrhythmia, aspiration pneumonia, hypoglycemia.   Today he reports some hip pain. He is taking small amounts of oxycodone. He is working with therapy and can now transfer with assist of one. He is not walking very far   Cardiac arrhythmia, unspecified cardiac arrhythmia type  Patient was evaluated by cardiology pre operatively due to  Possible AF on ECG. He was taking atenolol PTA.. Cardiology  Thought telemetry showed sinus exit block with LBBB. CHADS-VASc score is 4.   Post operatively patient did have episodes of tachycardia (RRT called twice due to low BP and tachycardia). He was started on metoprolol (replacing atenolol) and diltiazem. He was asymptomatic at time of tachycardia. Simvastatin was changed to atorvastatin as well, due to drug interactions.   HR today is around 80. No reports of chest pain or light headedness.     Coronary artery disease involving coronary bypass graft of native heart without angina pectoris  S/p 4v CABG in 1996. He states he does not have a cardiologist at this time.     Pneumonia of right lower lobe due to infectious organism (H)  Patient found to have right lung base infiltrate and started on augmentin. He does  endorse a productive cough. He is afebrile.     Type 2 diabetes mellitus with stage 3 chronic kidney disease, with long-term current use of insulin (H)  Patient did have episodes of hypoglycemia. Glipizide was stopped. He continues on PTA lantus and mealtime aspart.   Last BG Levels:  AM: 337, 109, 291  Noon: 246, 291, 434  Dinner: 387, 206, 238  HS: 112, 213, 128  CKD (chronic kidney disease) stage 3, GFR 30-59 ml/min  Baseline creat 1.5-1.8. Creat did go up to 1.9 during hospital stay and remains elevated at time of transfer to TCU.   Creatinine   Date Value Ref Range Status   09/06/2018 1.57 (H) 0.66 - 1.25 mg/dL Final     Benign essential hypertension  BP's in TCU: 127/77, 123/76, 125/70  New meds metoprolol and diltiazem as stated above.   Physical deconditioning  Lives with wife in [facility]. Wife is in process of being moved to memory care. At baseline he walks with 4WW.      Insomnia- he is having a hard time sleeping at night. He is awake every hour to urinate.     Bladder spasms- patient reports has to urinate at night every hour and that he has bladder spasms. He report he does not have enlarged prostate. He does not follow with a urologist. He does not have trouble starting urine stream but has to urinate often and goes about 30cc.         ALLERGIES: Review of patient's allergies indicates no known allergies.  Past Medical, Surgical, Family and Social History reviewed and updated in [facility].    Current Outpatient Prescriptions   Medication Sig Dispense Refill     aspirin 325 MG EC tablet Take one Aspirin tab twice daily for 5 weeks. 70 tablet 0     atorvastatin (LIPITOR) 10 MG tablet Take 1 tablet (10 mg) by mouth every evening       diltiazem (TIAZAC) 120 MG 24 hr ER beaded capsule Take 120 mg by mouth daily       insulin aspart (NOVOLOG PEN) 100 UNIT/ML injection Inject 1-7 Units Subcutaneous 3 times daily (before meals)       insulin aspart (NOVOLOG PEN) 100 UNIT/ML injection Inject 1-5 Units Subcutaneous At  "Bedtime       insulin glargine (LANTUS SOLOSTAR) 100 UNIT/ML pen Inject 5 Units Subcutaneous At Bedtime       insulin lispro (HUMALOG KWIKPEN) 100 UNIT/ML injection Inject 2 Units Subcutaneous 3 times daily (before meals)       metoprolol succinate (TOPROL-XL) 50 MG 24 hr tablet Take 1 tablet (50 mg) by mouth daily 30 tablet      oxyCODONE IR (ROXICODONE) 5 MG tablet Take 1 tablet (5 mg) by mouth every 4 hours as needed for pain 60 tablet 0     Skin Protectants, Misc. (ANIBAL PROTECT EX) Apply topically 2 times daily       Medications reviewed:  Medications reconciled to facility chart and changes were made to reflect current medications as identified as above med list. Below are the changes that were made:   Medications stopped since last EPIC medication reconciliation:   There are no discontinued medications.    Medications started since last [facility] medication reconciliation:  No orders of the defined types were placed in this encounter.        REVIEW OF SYSTEMS:  4 point ROS including Respiratory, CV, GI and , other than that noted in the HPI,  is negative    Physical Exam:  /77  Pulse 61  Temp 97.3  F (36.3  C)  Resp 17  Ht 5' 8\" (1.727 m)  Wt 142 lb (64.4 kg)  SpO2 96%  BMI 21.59 kg/m2  GENERAL APPEARANCE:  Alert, in no distress  ENT:  Mouth and posterior oropharynx normal, moist mucous membranes, hearing acuity very [place]  EYES:  EOM, conjunctivae, lids, pupils and irises normal  RESP:  respiratory effort and palpation of chest normal, no respiratory distress, Lung sounds faint rales in bases  CV:  Palpation and auscultation of heart done , rate and rhythm irreg, no murmur, no rub or gallop, Edema trace  ABDOMEN:  normal bowel sounds, soft, nontender, no hepatosplenomegaly or other masses  M/S:   Gait and station not observed, Digits and nails normal   SKIN:  Inspection/Palpation of skin and subcutaneous tissue right hip incision is covered.   NEURO: 2-12 in normal limits and at patient's " baseline  PSYCH:  insight and judgement, memory suspect impaired.  , affect and mood normal    Recent Labs:  CBC RESULTS:   Recent Labs   Lab Test  09/06/18   0625  09/03/18   0545   WBC  10.1  9.6   RBC  3.15*  2.81*   HGB  9.2*  8.2*   HCT  28.4*  25.5*   MCV  90  91   MCH  29.2  29.2   MCHC  32.4  32.2   RDW  14.9  14.8   PLT  388  260       Last Basic Metabolic Panel:  Recent Labs   Lab Test  09/06/18   0625  09/03/18   0545   NA  138  141   POTASSIUM  4.1  4.2   CHLORIDE  105  110*   JOSELINE  7.9*  7.3*   CO2  23  24   BUN  23  35*   CR  1.57*  1.94*   GLC  151*  119*       TSH   Date Value Ref Range Status   04/02/2003 2.08 0.4 - 5.0 mU/L Final     Lab Results   Component Value Date    A1C 8.4 08/25/2018    A1C 7.8 04/22/2014         Assessment/Plan:  (S72.001D) Closed fracture of right hip with routine healing, subsequent encounter  (primary encounter diagnosis)  Comment: slow progress with therapy. Therapy is concerned that he will not regain independence with mobility.   Plan: physical therapy     (I49.9) Cardiac arrhythmia, unspecified cardiac arrhythmia type  Comment: patient was evaluated by cardiology due to several episodes of tachycardia. His meds were changed from atenolol to metoprolol and diltiazem. HR is now WNL  Plan: monitor HR.    (I25.810) Coronary artery disease involving coronary bypass graft of native heart without angina pectoris  Comment: s/p 4V CABG in 1996.   Plan: monitor VS    (J18.1) Pneumonia of right lower lobe due to infectious organism (H)  Comment: he did complete course of augmentin. No cough.   Plan: monitor    (E11.22,  N18.3,  Z79.4) Type 2 diabetes mellitus with stage 3 chronic kidney disease, with long-term current use of insulin (H)  Comment: some elevated BGs but due to age will continue current plan  Plan: no change    (N18.3) CKD (chronic kidney disease) stage 3, GFR 30-59 ml/min  Comment: creat back to baseline. Creat cl 27.   Plan: avoid nephrotoxins.     (I10) Benign  essential hypertension  Comment: adequate control  Plan: no change    (R53.81) Physical deconditioning  Comment: due to hip fracture. Making slow progess with therapy. High fall risk  Plan: physical therapy. Will need more care upon discharge    (G47.09) Other insomnia  Comment: has not slept in several night.  Plan: ambein 5mg q hs prn    (N32.89) Bladder spasms  Comment: ? OAB. He has not had BM in few day.   Plan: try detrol 2mg q  Day. Add miralax 17g q day.       Electronically signed by  WILLIAM Cota CNP

## 2018-09-20 ASSESSMENT — ENCOUNTER SYMPTOMS
DIZZINESS: 0
LIGHT-HEADEDNESS: 0
CHEST TIGHTNESS: 0
PHOTOPHOBIA: 0
SPEECH DIFFICULTY: 0
WOUND: 1
VOMITING: 0
SHORTNESS OF BREATH: 0
SEIZURES: 0
HEADACHES: 0
FEVER: 0
NAUSEA: 0
WEAKNESS: 0
CONFUSION: 0
COUGH: 0
NUMBNESS: 0

## 2018-09-20 NOTE — ED PROVIDER NOTES
History     Chief Complaint:  Fall (pt lost balance. c/o right groin pain, skin tears to right arm and right forehead)      PAULINE Flores is a 89 year old male who presents with hip pain following a fall.  He is a retired surgeon and reports his only real concern is his hip because he could not stand on it.  He is a reliable historian regarding the events of today.       Earlier today around, 7:00 p.m., he was walking into a restaurant and he tripped on the concrete and fell to right side.  He states that he had hit his head slightly, but he denies any loss of consciousness.  Prior to the fall, the patient denies having any chest pain, no palpitation, no shortness of breath.  He denies any headaches, no numbness, weakness in his arms or legs.  He denies any recent fevers, no cold symptoms.  He denies any exertional chest pain or shortness of breath.  He denies any abdominal pain, no diarrhea, no constipation, no dysuria, and no leg swelling. He insists that the fall was mechanical and that he did not feel weak or lose conciousness.    He would not have sought care but for hte hip pain and being unable to stand.  He does have a facial laceration and skin tears on the arm as well.        Allergies:    No Known Allergies     Medications:    ASA  Lipitor  Insulin      Past Medical History:      1.  Hypertension.   2.  Dyslipidemia.   3.  History of coronary artery disease, status post coronary artery bypass grafting of 4 vessels in 1996.   4.  Diabetes mellitus type 2, insulin-dependent, his last hemoglobin A1c was around 8, as per the patient.   5.  Chronic kidney disease, stage III.       [facility]  Cardiac surgery  Ocular surgery      Family History:    Noncontributory    Social History:  Retired physician    Review of Systems   Constitutional: Negative for fever.   HENT: Negative for nosebleeds.    Eyes: Negative for photophobia and visual disturbance.   Respiratory: Negative for  "cough, chest tightness and shortness of breath.    Cardiovascular: Negative for chest pain.   Gastrointestinal: Negative for nausea and vomiting.   Musculoskeletal:        Leg pain   Skin: Positive for wound.   Neurological: Negative for dizziness, seizures, syncope, speech difficulty, weakness, light-headedness, numbness and headaches.   Psychiatric/Behavioral: Negative for confusion.   All other systems reviewed and are negative.        Physical Exam   First Vitals:  BP: (!) 178/94  Pulse: 68  Heart Rate: 82  Temp: 98.4  F (36.9  C)  Resp: 18  Height: 172.7 cm (5' 8\")  Weight: 75.8 kg (167 lb 3.2 oz)  SpO2: 96 %      Physical Exam  General: Resting on the gurney, appears uncomfortable  Head:  Laceration right scalp, otherwise, the scalp, face, and head appear normal  Mouth/Throat: Mucus membranes are moist  CV:  Irregularly irregular rate    Normal S1 and S2  No pathological murmur   Resp:  Breath sounds clear and equal bilaterally    Non-labored, no retractions or accessory muscle use    No coarseness    No wheezing   GI:  Abdomen is soft, no rigidity    No tenderness to palpation  MS:  Right lower extremity slightly shorter and externally rotated.  There is no calf tenderness, 2+ peripheral pulses are palpable.    Skin:  He has diffuse two skin tears over his right arm.  Neuro:  Speech is normal and fluent. No apparent deficit.  Psych:  Awake. Alert.  Normal affect.      Appropriate interactions.    Emergency Department Course   ECG:  New atrial fibrillation at the rate of 75 beats per minute with new left bundle branch block.    Imaging:  CXR: Scattered atelectasis and/or fibrosis bilaterally  XR Pelvis: Femoral Neck fracture      Laboratory:  BMP with creatinine 1.65.  BUN was 31, normal electrolytes.  Calcium is 8.6.  Glucose was 119.  CBC with white blood cells of 15.6, hemoglobin 11.5, hematocrit 35.2, normal platelet count.     EKG done in ER showed an       Procedures:    Narrative: Procedure: Laceration " Repair        LACERATION:  A simple clean 2 cm laceration.      LOCATION:  scalp      ANESTHESIA:  Local using 1% lidocaine without epinephrine total of 2 mLs      PREPARATION:  Irrigation and Scrubbing with Normal Saline and [place]      DEBRIDEMENT:  no debridement      CLOSURE:  Wound was closed with One Layer.  Skin closed with 4.0 Ethylon using interrupted sutures.          Interventions:  Dilaudid: 0.2 mg        Impression & Plan        Medical Decision Making:  Abimael Flores is a 89 year old male who presents for evaluation following a fall.  The fall was clearly mechanical in nature based on description, and no workup was undertaken for etiology, based on the patient's history.   Full examination revealed parietal laceration, skin tears of the right forearm and right hip pain.  X-Ray imaging showed a right femoral neck fracture. I contacted Dr. Sanders who will repair the hip tomorrow.   In terms of head injury, the patient had direct injury to the head, but is not on any blood thinners, has no headache, and has a non dangerous mechanism, therefore no advanced imaging was undertaken per his request.      Wounds were treated as above.    Pt to be admitted to the hospitalist service for further management and care.      Diagnosis:  (S72.001A) Closed fracture of neck of right femur, initial encounter (H)    Disposition:  Admit    Netta Trivedi  8/25/2018   [facility]       Netta Trivedi MD  09/20/18 1420

## 2018-09-28 ENCOUNTER — NURSING HOME VISIT (OUTPATIENT)
Dept: GERIATRICS | Facility: CLINIC | Age: 83
End: 2018-09-28
Payer: MEDICARE

## 2018-09-28 VITALS
WEIGHT: 168.2 LBS | RESPIRATION RATE: 16 BRPM | HEART RATE: 86 BPM | HEIGHT: 68 IN | TEMPERATURE: 96.3 F | BODY MASS INDEX: 25.49 KG/M2 | OXYGEN SATURATION: 97 % | DIASTOLIC BLOOD PRESSURE: 54 MMHG | SYSTOLIC BLOOD PRESSURE: 107 MMHG

## 2018-09-28 DIAGNOSIS — E11.22 TYPE 2 DIABETES MELLITUS WITH STAGE 3 CHRONIC KIDNEY DISEASE, WITH LONG-TERM CURRENT USE OF INSULIN (H): ICD-10-CM

## 2018-09-28 DIAGNOSIS — N18.30 TYPE 2 DIABETES MELLITUS WITH STAGE 3 CHRONIC KIDNEY DISEASE, WITH LONG-TERM CURRENT USE OF INSULIN (H): ICD-10-CM

## 2018-09-28 DIAGNOSIS — R53.81 PHYSICAL DECONDITIONING: ICD-10-CM

## 2018-09-28 DIAGNOSIS — S72.001D CLOSED FRACTURE OF RIGHT HIP WITH ROUTINE HEALING, SUBSEQUENT ENCOUNTER: Primary | ICD-10-CM

## 2018-09-28 DIAGNOSIS — I10 BENIGN ESSENTIAL HYPERTENSION: ICD-10-CM

## 2018-09-28 DIAGNOSIS — I25.810 CORONARY ARTERY DISEASE INVOLVING CORONARY BYPASS GRAFT OF NATIVE HEART WITHOUT ANGINA PECTORIS: ICD-10-CM

## 2018-09-28 DIAGNOSIS — Z79.4 TYPE 2 DIABETES MELLITUS WITH STAGE 3 CHRONIC KIDNEY DISEASE, WITH LONG-TERM CURRENT USE OF INSULIN (H): ICD-10-CM

## 2018-09-28 DIAGNOSIS — I49.9 CARDIAC ARRHYTHMIA, UNSPECIFIED CARDIAC ARRHYTHMIA TYPE: ICD-10-CM

## 2018-09-28 DIAGNOSIS — J18.9 PNEUMONIA OF RIGHT LOWER LOBE DUE TO INFECTIOUS ORGANISM: ICD-10-CM

## 2018-09-28 DIAGNOSIS — N18.30 CKD (CHRONIC KIDNEY DISEASE) STAGE 3, GFR 30-59 ML/MIN (H): ICD-10-CM

## 2018-09-28 PROCEDURE — 99309 SBSQ NF CARE MODERATE MDM 30: CPT | Performed by: NURSE PRACTITIONER

## 2018-09-28 RX ORDER — POLYETHYLENE GLYCOL 3350 17 G/17G
17 POWDER, FOR SOLUTION ORAL DAILY
COMMUNITY
End: 2019-01-05

## 2018-09-28 RX ORDER — TAMSULOSIN HYDROCHLORIDE 0.4 MG/1
0.8 CAPSULE ORAL EVERY EVENING
Status: ON HOLD | COMMUNITY
End: 2019-01-05

## 2018-09-28 NOTE — LETTER
9/28/2018        RE: Abimael Flores  [address]  [facility] [address]        [facility]    Chief Complaint   Patient presents with     RECHECK       [place] Medical Record Number:  [id number]    HPI:    Abimael Flores is a 89 year old  (1/23/1924), who is being seen today for an episodic care visit at [facility] .  HPI information obtained from: facility chart records, facility staff, patient report and [facility] chart review.    Today's concern is:  Closed fracture of right hip with routine healing, subsequent encounter  Physical deconditioning  Patient transferred to TCU from hospital after treatment for right hip pain suffered from a fall in the community. He was found to have right femoral neck fracture. He underwent a bipolar hemiarthroplasty on 8/27. Surgery was uneventful. Activity is WBAT. Post op complications include tachy arrhythmia, aspiration pneumonia, hypoglycemia.   --patient doing well with therapies but feels progress is slow. Able to walk to therapies room with walker and SBA and admits to walking alone to the bathroom a few times. To see ortho on 10/11 for follow up. Pain managed well with ibuprofen PRN, oxycodone PRN.    Cardiac arrhythmia, unspecified cardiac arrhythmia type  Coronary artery disease involving coronary bypass graft of native heart without angina pectoris  Benign essential hypertension  Denies chest pain or dyspnea. Recent SBP . Continues Metoprolol 50 mg daily and Diltiazem 120 mg PO daily. Also on Lipitor and ASA. In general since admission BP Range: /52-87, HR Range: 61-94 bpm, Wt Range: 160lbs 9/3 - 168.2lbs 9/27    Pneumonia of right lower lobe due to infectious organism (H)  Symptoms resolved. Sats 97% on room air. No cough.       Type 2 diabetes mellitus with stage 3 chronic kidney disease, with long-term current use of insulin (H)  Managed with Lantus and sliding scale insulin AC and HS. BG elevated in the  afternoons. Will switch Lantus to AM administration time and increase to 7 units subcutaneous daily.   Blood Sugar Range:    AM:     Noon: 113-393    Dinner: 102-252    HS: 109-322    CKD (chronic kidney disease) stage 3, GFR 30-59 ml/min  Stable/improved on recent check.   Results for CHERYL HOLLOWAY (MRN [id number]) as of 9/28/2018 10:32   Ref. Range 9/2/2018 05:30 9/3/2018 05:45 9/6/2018 06:25   Sodium Latest Ref Range: 133 - 144 mmol/L 140 141 138   Potassium Latest Ref Range: 3.4 - 5.3 mmol/L 4.4 4.2 4.1   Chloride Latest Ref Range: 94 - 109 mmol/L 110 (H) 110 (H) 105   Carbon Dioxide Latest Ref Range: 20 - 32 mmol/L 21 24 23   Urea Nitrogen Latest Ref Range: 7 - 30 mg/dL 38 (H) 35 (H) 23   Creatinine Latest Ref Range: 0.66 - 1.25 mg/dL 1.93 (H) 1.94 (H) 1.57 (H)   GFR Estimate Latest Ref Range: >60 mL/min/1.7m2 33 (L) 32 (L) 41 (L)         ALLERGIES: Review of patient's allergies indicates no known allergies.  Past Medical, Surgical, Family and Social History reviewed and updated in [facility].    Current Outpatient Prescriptions   Medication Sig Dispense Refill     aspirin 325 MG EC tablet Take one Aspirin tab twice daily for 5 weeks. 70 tablet 0     atorvastatin (LIPITOR) 10 MG tablet Take 1 tablet (10 mg) by mouth every evening       diltiazem (TIAZAC) 120 MG 24 hr ER beaded capsule Take 120 mg by mouth daily       IBUPROFEN PO Take 200 mg by mouth every 6 hours as needed for moderate pain       insulin aspart (NOVOLOG PEN) 100 UNIT/ML injection Inject 1-7 Units Subcutaneous 3 times daily (before meals)       insulin aspart (NOVOLOG PEN) 100 UNIT/ML injection Inject 1-5 Units Subcutaneous At Bedtime       insulin glargine (LANTUS SOLOSTAR) 100 UNIT/ML pen Inject 5 Units Subcutaneous At Bedtime (Patient taking differently: Inject 7 Units Subcutaneous every morning )       insulin lispro (HUMALOG KWIKPEN) 100 UNIT/ML injection Inject 2 Units Subcutaneous 3 times daily (before meals)       metoprolol  "succinate (TOPROL-XL) 50 MG 24 hr tablet Take 1 tablet (50 mg) by mouth daily 30 tablet      oxyCODONE IR (ROXICODONE) 5 MG tablet Take 1 tablet (5 mg) by mouth every 4 hours as needed for pain 60 tablet 0     polyethylene glycol (MIRALAX/GLYCOLAX) Packet Take 17 g by mouth daily       Skin Protectants, Misc. (ANIBAL PROTECT EX) Apply topically 2 times daily       tamsulosin (FLOMAX) 0.4 MG capsule Take 0.4 mg by mouth daily       Zolpidem Tartrate (AMBIEN PO) Take 5 mg by mouth daily as needed for sleep       Medications reviewed:  Medications reconciled to facility chart and changes were made to reflect current medications as identified as above med list. Below are the changes that were made:   Medications stopped since last EPIC medication reconciliation:   There are no discontinued medications.    Medications started since last [facility] medication reconciliation:  Orders Placed This Encounter   Medications     Zolpidem Tartrate (AMBIEN PO)     Sig: Take 5 mg by mouth daily as needed for sleep     IBUPROFEN PO     Sig: Take 200 mg by mouth every 6 hours as needed for moderate pain     polyethylene glycol (MIRALAX/GLYCOLAX) Packet     Sig: Take 17 g by mouth daily     tamsulosin (FLOMAX) 0.4 MG capsule     Sig: Take 0.4 mg by mouth daily       REVIEW OF SYSTEMS:  10 point ROS of systems including Constitutional, Eyes, Respiratory, Cardiovascular, Gastroenterology, Genitourinary, Integumentary, Musculoskeletal, Psychiatric were all negative except for pertinent positives noted in my HPI.    Physical Exam:  /54  Pulse 86  Temp 96.3  F (35.7  C)  Resp 16  Ht 5' 8\" (1.727 m)  Wt 168 lb 3.2 oz (76.3 kg)  SpO2 97%  BMI 25.57 kg/m2  GENERAL APPEARANCE:  Alert, in no distress, pleasant, cooperative, oriented x 4  EYES:  EOM, lids, pupils and irises normal, sclera clear and conjunctiva normal, no discharge or mattering on lids or lashes noted  ENT:  Mouth normal, moist mucous membranes, nose normal without drainage " or crusting, external ears without lesions, hearing acuity intact  NECK: supple, symmetrical  RESP:  respiratory effort and palpation of chest normal, no chest wall tenderness, no respiratory distress, Lung sounds clear, patient is on room air  CV:  Palpation and auscultation of heart done, rate and rhythm controlled and somewhat irregular, no murmur, no rub or gallop. Edema none bilateral lower extremities.   ABDOMEN:  normal bowel sounds, soft, nontender.  M/S:   Gait and station walks with assist , no tenderness or swelling of the joints; able to move all extremities   NEURO: cranial nerves 2-12 grossly intact, no facial asymmetry, no speech deficits and able to follow directions, moves all extremities symmetrically  PSYCH:  insight and judgement intact, memory at baseline, affect and mood normal     Recent Labs:  CBC RESULTS:   Recent Labs   Lab Test  09/06/18   0625  09/03/18   0545   WBC  10.1  9.6   RBC  3.15*  2.81*   HGB  9.2*  8.2*   HCT  28.4*  25.5*   MCV  90  91   MCH  29.2  29.2   MCHC  32.4  32.2   RDW  14.9  14.8   PLT  388  260       Last Basic Metabolic Panel:  Recent Labs   Lab Test  09/06/18   0625  09/03/18   0545   NA  138  141   POTASSIUM  4.1  4.2   CHLORIDE  105  110*   JOSELINE  7.9*  7.3*   CO2  23  24   BUN  23  35*   CR  1.57*  1.94*   GLC  151*  119*     TSH   Date Value Ref Range Status   04/02/2003 2.08 0.4 - 5.0 mU/L Final     Lab Results   Component Value Date    A1C 8.4 08/25/2018    A1C 7.8 04/22/2014       Assessment/Plan:  Closed fracture of right hip with routine healing, subsequent encounter  Physical deconditioning  Ongoing issues recent surgery, now therapies. Progress slower than patient hoped for. Walking with walker. F/U with progress next week and ortho 10/11 as scheduled. Patient will be moving to SNF with wife at discharge per self repot.     Cardiac arrhythmia, unspecified cardiac arrhythmia type  Coronary artery disease involving coronary bypass graft of native heart  without angina pectoris  Benign essential hypertension  Chronic issues, stable, well controlled and asymptomatic. Meds as above, VS and WT per routine. Check BMP next week.     Pneumonia of right lower lobe due to infectious organism (H)  Resolved. F/U PRN.     Type 2 diabetes mellitus with stage 3 chronic kidney disease, with long-term current use of insulin (H)  Chronic, elevated BG in the PM.     CKD (chronic kidney disease) stage 3, GFR 30-59 ml/min  Chronic, stable last check - will repeat BMP for follow up next week (recently started on PRN ibuprofen and would like to make sure renal function stable).     Orders:  1. Hold Lantus tonight. Starting 9/29 change Lantus to 7 units subcutaneous daily in AM diagnosis DM  2. Check BMP on 10/3 diagnosis CKD, HTN    Electronically signed by  WILLIAM Mckee CNP                      Sincerely,        WILLIAM Mckee CNP

## 2018-09-28 NOTE — PROGRESS NOTES
[facility]    Chief Complaint   Patient presents with     JESUS       [place] Medical Record Number:  [id number]    HPI:    Abimael Flores is a 89 year old  (1/23/1924), who is being seen today for an episodic care visit at [facility] .  HPI information obtained from: facility chart records, facility staff, patient report and [facility] chart review.    Today's concern is:  Closed fracture of right hip with routine healing, subsequent encounter  Physical deconditioning  Patient transferred to TCU from hospital after treatment for right hip pain suffered from a fall in the community. He was found to have right femoral neck fracture. He underwent a bipolar hemiarthroplasty on 8/27. Surgery was uneventful. Activity is WBAT. Post op complications include tachy arrhythmia, aspiration pneumonia, hypoglycemia.   --patient doing well with therapies but feels progress is slow. Able to walk to therapies room with walker and SBA and admits to walking alone to the bathroom a few times. To see ortho on 10/11 for follow up. Pain managed well with ibuprofen PRN, oxycodone PRN.    Cardiac arrhythmia, unspecified cardiac arrhythmia type  Coronary artery disease involving coronary bypass graft of native heart without angina pectoris  Benign essential hypertension  Denies chest pain or dyspnea. Recent SBP . Continues Metoprolol 50 mg daily and Diltiazem 120 mg PO daily. Also on Lipitor and ASA. In general since admission BP Range: /52-87, HR Range: 61-94 bpm, Wt Range: 160lbs 9/3 - 168.2lbs 9/27    Pneumonia of right lower lobe due to infectious organism (H)  Symptoms resolved. Sats 97% on room air. No cough.       Type 2 diabetes mellitus with stage 3 chronic kidney disease, with long-term current use of insulin (H)  Managed with Lantus and sliding scale insulin AC and HS. BG elevated in the afternoons. Will switch Lantus to AM administration time and increase to 7 units subcutaneous  daily.   Blood Sugar Range:    AM:     Noon: 113-393    Dinner: 102-252    HS: 109-322    CKD (chronic kidney disease) stage 3, GFR 30-59 ml/min  Stable/improved on recent check.   Results for CHERYL HOLLOWAY (MRN [id number]) as of 9/28/2018 10:32   Ref. Range 9/2/2018 05:30 9/3/2018 05:45 9/6/2018 06:25   Sodium Latest Ref Range: 133 - 144 mmol/L 140 141 138   Potassium Latest Ref Range: 3.4 - 5.3 mmol/L 4.4 4.2 4.1   Chloride Latest Ref Range: 94 - 109 mmol/L 110 (H) 110 (H) 105   Carbon Dioxide Latest Ref Range: 20 - 32 mmol/L 21 24 23   Urea Nitrogen Latest Ref Range: 7 - 30 mg/dL 38 (H) 35 (H) 23   Creatinine Latest Ref Range: 0.66 - 1.25 mg/dL 1.93 (H) 1.94 (H) 1.57 (H)   GFR Estimate Latest Ref Range: >60 mL/min/1.7m2 33 (L) 32 (L) 41 (L)         ALLERGIES: Review of patient's allergies indicates no known allergies.  Past Medical, Surgical, Family and Social History reviewed and updated in [facility].    Current Outpatient Prescriptions   Medication Sig Dispense Refill     aspirin 325 MG EC tablet Take one Aspirin tab twice daily for 5 weeks. 70 tablet 0     atorvastatin (LIPITOR) 10 MG tablet Take 1 tablet (10 mg) by mouth every evening       diltiazem (TIAZAC) 120 MG 24 hr ER beaded capsule Take 120 mg by mouth daily       IBUPROFEN PO Take 200 mg by mouth every 6 hours as needed for moderate pain       insulin aspart (NOVOLOG PEN) 100 UNIT/ML injection Inject 1-7 Units Subcutaneous 3 times daily (before meals)       insulin aspart (NOVOLOG PEN) 100 UNIT/ML injection Inject 1-5 Units Subcutaneous At Bedtime       insulin glargine (LANTUS SOLOSTAR) 100 UNIT/ML pen Inject 5 Units Subcutaneous At Bedtime (Patient taking differently: Inject 7 Units Subcutaneous every morning )       insulin lispro (HUMALOG KWIKPEN) 100 UNIT/ML injection Inject 2 Units Subcutaneous 3 times daily (before meals)       metoprolol succinate (TOPROL-XL) 50 MG 24 hr tablet Take 1 tablet (50 mg) by mouth daily 30 tablet       "oxyCODONE IR (ROXICODONE) 5 MG tablet Take 1 tablet (5 mg) by mouth every 4 hours as needed for pain 60 tablet 0     polyethylene glycol (MIRALAX/GLYCOLAX) Packet Take 17 g by mouth daily       Skin Protectants, Misc. (ANIBAL PROTECT EX) Apply topically 2 times daily       tamsulosin (FLOMAX) 0.4 MG capsule Take 0.4 mg by mouth daily       Zolpidem Tartrate (AMBIEN PO) Take 5 mg by mouth daily as needed for sleep       Medications reviewed:  Medications reconciled to facility chart and changes were made to reflect current medications as identified as above med list. Below are the changes that were made:   Medications stopped since last EPIC medication reconciliation:   There are no discontinued medications.    Medications started since last [facility] medication reconciliation:  Orders Placed This Encounter   Medications     Zolpidem Tartrate (AMBIEN PO)     Sig: Take 5 mg by mouth daily as needed for sleep     IBUPROFEN PO     Sig: Take 200 mg by mouth every 6 hours as needed for moderate pain     polyethylene glycol (MIRALAX/GLYCOLAX) Packet     Sig: Take 17 g by mouth daily     tamsulosin (FLOMAX) 0.4 MG capsule     Sig: Take 0.4 mg by mouth daily       REVIEW OF SYSTEMS:  10 point ROS of systems including Constitutional, Eyes, Respiratory, Cardiovascular, Gastroenterology, Genitourinary, Integumentary, Musculoskeletal, Psychiatric were all negative except for pertinent positives noted in my HPI.    Physical Exam:  /54  Pulse 86  Temp 96.3  F (35.7  C)  Resp 16  Ht 5' 8\" (1.727 m)  Wt 168 lb 3.2 oz (76.3 kg)  SpO2 97%  BMI 25.57 kg/m2  GENERAL APPEARANCE:  Alert, in no distress, pleasant, cooperative, oriented x 4  EYES:  EOM, lids, pupils and irises normal, sclera clear and conjunctiva normal, no discharge or mattering on lids or lashes noted  ENT:  Mouth normal, moist mucous membranes, nose normal without drainage or crusting, external ears without lesions, hearing acuity intact  NECK: supple, " symmetrical  RESP:  respiratory effort and palpation of chest normal, no chest wall tenderness, no respiratory distress, Lung sounds clear, patient is on room air  CV:  Palpation and auscultation of heart done, rate and rhythm controlled and somewhat irregular, no murmur, no rub or gallop. Edema none bilateral lower extremities.   ABDOMEN:  normal bowel sounds, soft, nontender.  M/S:   Gait and station walks with assist , no tenderness or swelling of the joints; able to move all extremities   NEURO: cranial nerves 2-12 grossly intact, no facial asymmetry, no speech deficits and able to follow directions, moves all extremities symmetrically  PSYCH:  insight and judgement intact, memory at baseline, affect and mood normal     Recent Labs:  CBC RESULTS:   Recent Labs   Lab Test  09/06/18   0625  09/03/18   0545   WBC  10.1  9.6   RBC  3.15*  2.81*   HGB  9.2*  8.2*   HCT  28.4*  25.5*   MCV  90  91   MCH  29.2  29.2   MCHC  32.4  32.2   RDW  14.9  14.8   PLT  388  260       Last Basic Metabolic Panel:  Recent Labs   Lab Test  09/06/18 0625  09/03/18   0545   NA  138  141   POTASSIUM  4.1  4.2   CHLORIDE  105  110*   JOSELINE  7.9*  7.3*   CO2  23  24   BUN  23  35*   CR  1.57*  1.94*   GLC  151*  119*     TSH   Date Value Ref Range Status   04/02/2003 2.08 0.4 - 5.0 mU/L Final     Lab Results   Component Value Date    A1C 8.4 08/25/2018    A1C 7.8 04/22/2014       Assessment/Plan:  Closed fracture of right hip with routine healing, subsequent encounter  Physical deconditioning  Ongoing issues recent surgery, now therapies. Progress slower than patient hoped for. Walking with walker. F/U with progress next week and ortho 10/11 as scheduled. Patient will be moving to SNF with wife at discharge per self repot.     Cardiac arrhythmia, unspecified cardiac arrhythmia type  Coronary artery disease involving coronary bypass graft of native heart without angina pectoris  Benign essential hypertension  Chronic issues, stable, well  controlled and asymptomatic. Meds as above, VS and WT per routine. Check BMP next week.     Pneumonia of right lower lobe due to infectious organism (H)  Resolved. F/U PRN.     Type 2 diabetes mellitus with stage 3 chronic kidney disease, with long-term current use of insulin (H)  Chronic, elevated BG in the PM.     CKD (chronic kidney disease) stage 3, GFR 30-59 ml/min  Chronic, stable last check - will repeat BMP for follow up next week (recently started on PRN ibuprofen and would like to make sure renal function stable).     Orders:  1. Hold Lantus tonight. Starting 9/29 change Lantus to 7 units subcutaneous daily in AM diagnosis DM  2. Check BMP on 10/3 diagnosis CKD, HTN    Electronically signed by  WILLIAM Mckee CNP

## 2018-10-03 ENCOUNTER — HOSPITAL LABORATORY (OUTPATIENT)
Dept: OTHER | Facility: CLINIC | Age: 83
End: 2018-10-03

## 2018-10-03 ENCOUNTER — NURSING HOME VISIT (OUTPATIENT)
Dept: GERIATRICS | Facility: CLINIC | Age: 83
End: 2018-10-03
Payer: MEDICARE

## 2018-10-03 VITALS
HEIGHT: 68 IN | BODY MASS INDEX: 25.49 KG/M2 | TEMPERATURE: 97.4 F | HEART RATE: 101 BPM | OXYGEN SATURATION: 100 % | RESPIRATION RATE: 16 BRPM | WEIGHT: 168.2 LBS | SYSTOLIC BLOOD PRESSURE: 104 MMHG | DIASTOLIC BLOOD PRESSURE: 57 MMHG

## 2018-10-03 DIAGNOSIS — I25.810 CORONARY ARTERY DISEASE INVOLVING CORONARY BYPASS GRAFT OF NATIVE HEART WITHOUT ANGINA PECTORIS: ICD-10-CM

## 2018-10-03 DIAGNOSIS — R53.81 PHYSICAL DECONDITIONING: ICD-10-CM

## 2018-10-03 DIAGNOSIS — I49.9 CARDIAC ARRHYTHMIA, UNSPECIFIED CARDIAC ARRHYTHMIA TYPE: ICD-10-CM

## 2018-10-03 DIAGNOSIS — Z79.4 TYPE 2 DIABETES MELLITUS WITH STAGE 3 CHRONIC KIDNEY DISEASE, WITH LONG-TERM CURRENT USE OF INSULIN (H): ICD-10-CM

## 2018-10-03 DIAGNOSIS — I10 BENIGN ESSENTIAL HYPERTENSION: ICD-10-CM

## 2018-10-03 DIAGNOSIS — N18.30 TYPE 2 DIABETES MELLITUS WITH STAGE 3 CHRONIC KIDNEY DISEASE, WITH LONG-TERM CURRENT USE OF INSULIN (H): ICD-10-CM

## 2018-10-03 DIAGNOSIS — E11.22 TYPE 2 DIABETES MELLITUS WITH STAGE 3 CHRONIC KIDNEY DISEASE, WITH LONG-TERM CURRENT USE OF INSULIN (H): ICD-10-CM

## 2018-10-03 DIAGNOSIS — F51.01 PRIMARY INSOMNIA: ICD-10-CM

## 2018-10-03 DIAGNOSIS — J18.9 PNEUMONIA OF RIGHT LOWER LOBE DUE TO INFECTIOUS ORGANISM: ICD-10-CM

## 2018-10-03 DIAGNOSIS — N18.30 CKD (CHRONIC KIDNEY DISEASE) STAGE 3, GFR 30-59 ML/MIN (H): ICD-10-CM

## 2018-10-03 DIAGNOSIS — S72.001D CLOSED FRACTURE OF RIGHT HIP WITH ROUTINE HEALING, SUBSEQUENT ENCOUNTER: Primary | ICD-10-CM

## 2018-10-03 LAB
ANION GAP SERPL CALCULATED.3IONS-SCNC: 10 MMOL/L (ref 3–14)
BUN SERPL-MCNC: 41 MG/DL (ref 7–30)
CALCIUM SERPL-MCNC: 7.8 MG/DL (ref 8.5–10.1)
CHLORIDE SERPL-SCNC: 106 MMOL/L (ref 94–109)
CO2 SERPL-SCNC: 23 MMOL/L (ref 20–32)
CREAT SERPL-MCNC: 1.99 MG/DL (ref 0.66–1.25)
GFR SERPL CREATININE-BSD FRML MDRD: 31 ML/MIN/1.7M2
GLUCOSE SERPL-MCNC: 90 MG/DL (ref 70–99)
POTASSIUM SERPL-SCNC: 4.1 MMOL/L (ref 3.4–5.3)
SODIUM SERPL-SCNC: 139 MMOL/L (ref 133–144)

## 2018-10-03 PROCEDURE — 99309 SBSQ NF CARE MODERATE MDM 30: CPT | Performed by: NURSE PRACTITIONER

## 2018-10-03 RX ORDER — INSULIN GLARGINE 100 [IU]/ML
8 INJECTION, SOLUTION SUBCUTANEOUS AT BEDTIME
Status: ON HOLD | COMMUNITY
End: 2019-01-05

## 2018-10-03 NOTE — LETTER
10/3/2018        RE: Abimael Flores  [address]  [facility] [address]        [place] [facility]    Chief Complaint   Patient presents with     RECHECK       [place] Medical Record Number:  [id number]    HPI:    Abimael Flores is a 89 year old  (1/23/1924), who is being seen today for an episodic care visit at [place] on [facility] .  HPI information obtained from: facility chart records, facility staff, patient report and [facility] chart review.Today's concern is:  Closed fracture of right hip with routine healing, subsequent encounter  Physical deconditioning  Patient transferred to TCU from hospital after treatment for right hip pain suffered from a fall in the community. He was found to have right femoral neck fracture. He underwent a bipolar hemiarthroplasty on 8/27. Surgery was uneventful. Activity is WBAT. Post op complications include tachy arrhythmia, aspiration pneumonia, hypoglycemia.   --patient doing well with therapies but feels progress is slow. Able to walk to therapies room with walker and SBA and admits to walking alone to the bathroom a few times. To see ortho on 10/11 for follow up. Pain managed well with ibuprofen PRN, oxycodone PRN.  No new complaints today.    Cardiac arrhythmia, unspecified cardiac arrhythmia type  Coronary artery disease involving coronary bypass graft of native heart without angina pectoris  Benign essential hypertension  Denies chest pain or dyspnea. Recent SBP . Continues Metoprolol 50 mg daily and Diltiazem 120 mg PO daily. Also on Lipitor and ASA. In general since admission BP's: 104/57, 100/54, 127/67, 116/51. HR: 101, 78, 73, 78.  Denies chest pain/palpitations today.    Pneumonia of right lower lobe due to infectious organism (H)  Symptoms resolved. Sats 97% on room air. No cough.     Type 2 diabetes mellitus with stage 3 chronic kidney disease, with long-term current use of insulin (H)  Managed with Lantus and sliding scale insulin  AC and HS. BG elevated in the afternoons. Lantus changed to AM administration time and increase to 7 units subcutaneous daily last week.   Last BG Levels:    Noon: 383, 373, 308    Dinner: 136, 174, 300    HS: 317, 267, 138    CKD (chronic kidney disease) stage 3, GFR 30-59 ml/min  Noted decrease in kidney function with today's recheck   Ref. Range 9/3/2018 05:45 9/6/2018 06:25 10/3/2018 06:25   Sodium Latest Ref Range: 133 - 144 mmol/L 141 138 139   Potassium Latest Ref Range: 3.4 - 5.3 mmol/L 4.2 4.1 4.1   Chloride Latest Ref Range: 94 - 109 mmol/L 110 (H) 105 106   Carbon Dioxide Latest Ref Range: 20 - 32 mmol/L 24 23 23   Urea Nitrogen Latest Ref Range: 7 - 30 mg/dL 35 (H) 23 41 (H)   Creatinine Latest Ref Range: 0.66 - 1.25 mg/dL 1.94 (H) 1.57 (H) 1.99 (H)   GFR Estimate Latest Ref Range: >60 mL/min/1.7m2 32 (L) 41 (L) 31 (L)   GFR Estimate If Black Latest Ref Range: >60 mL/min/1.7m2 39 (L) 50 (L) 38 (L)   Calcium Latest Ref Range: 8.5 - 10.1 mg/dL 7.3 (L) 7.9 (L) 7.8 (L)   Anion Gap Latest Ref Range: 3 - 14 mmol/L 7 10 10     Insomnia  Patient noting ongoing difficulty sleeping, currently ordered Ambien which she states is not effective.  Stated he did use melatonin previously worked, however he notes this was very low dose.  Is willing to try it again at a higher dose.      ALLERGIES: Review of patient's allergies indicates no known allergies.  Past Medical, Surgical, Family and Social History reviewed and updated in EPIC.    Current Outpatient Prescriptions   Medication Sig Dispense Refill     aspirin 325 MG EC tablet Take one Aspirin tab twice daily for 5 weeks. 70 tablet 0     atorvastatin (LIPITOR) 10 MG tablet Take 1 tablet (10 mg) by mouth every evening       diltiazem (TIAZAC) 120 MG 24 hr ER beaded capsule Take 120 mg by mouth daily       IBUPROFEN PO Take 200 mg by mouth every 6 hours as needed for moderate pain       insulin aspart (NOVOLOG PEN) 100 UNIT/ML injection Inject 1-7 Units Subcutaneous 3  "times daily (before meals)       insulin aspart (NOVOLOG PEN) 100 UNIT/ML injection Inject 1-5 Units Subcutaneous At Bedtime       insulin glargine (LANTUS) 100 UNIT/ML injection Inject 7 Units Subcutaneous every morning       insulin lispro (HUMALOG KWIKPEN) 100 UNIT/ML injection Inject 2 Units Subcutaneous 3 times daily (before meals)       insulin lispro (HUMALOG) 100 UNIT/ML injection Inject Subcutaneous 3 times daily (before meals)       metoprolol succinate (TOPROL-XL) 50 MG 24 hr tablet Take 1 tablet (50 mg) by mouth daily 30 tablet      oxyCODONE IR (ROXICODONE) 5 MG tablet Take 1 tablet (5 mg) by mouth every 4 hours as needed for pain 60 tablet 0     polyethylene glycol (MIRALAX/GLYCOLAX) Packet Take 17 g by mouth daily       Skin Protectants, Misc. (ANIBAL PROTECT EX) Apply topically 2 times daily       tamsulosin (FLOMAX) 0.4 MG capsule Take 0.4 mg by mouth daily       Zolpidem Tartrate (AMBIEN PO) Take 5 mg by mouth daily as needed for sleep       Medications reviewed:  Medications reconciled to facility chart and changes were made to reflect current medications as identified as above med list. Below are the changes that were made:   Medications stopped since last EPIC medication reconciliation:   Medications Discontinued During This Encounter   Medication Reason     insulin glargine (LANTUS SOLOSTAR) 100 UNIT/ML pen        Medications started since last [facility] medication reconciliation:  Orders Placed This Encounter   Medications     insulin lispro (HUMALOG) 100 UNIT/ML injection     Sig: Inject Subcutaneous 3 times daily (before meals)     insulin glargine (LANTUS) 100 UNIT/ML injection     Sig: Inject 7 Units Subcutaneous every morning     REVIEW OF SYSTEMS:  4 point ROS including Respiratory, CV, GI and , other than that noted in the HPI,  is negative    Physical Exam:  /57  Pulse 101  Temp 97.4  F (36.3  C)  Resp 16  Ht 5' 8\" (1.727 m)  Wt 168 lb 3.2 oz (76.3 kg)  SpO2 100%  BMI 25.57 " kg/m2  GENERAL APPEARANCE:  Alert, in no distress, pleasant, cooperative, oriented x 4, elderly male appearing younger than stated age resting in bed as he states he did not sleep well last night  EYES:  EOM, lids, pupils and irises normal, sclera clear and conjunctiva normal, no discharge or mattering on lids or lashes noted  ENT:  Mouth normal, moist mucous membranes, nose normal without drainage or crusting, external ears without lesions, hearing acuity intact  NECK: supple, symmetrical  RESP:  respiratory effort and palpation of chest normal, no chest wall tenderness, no respiratory distress, Lung sounds clear, patient is on room air  CV:  Palpation and auscultation of heart done, rate and rhythm controlled and somewhat irregular, no murmur, no rub or gallop. Edema none bilateral lower extremities.   ABDOMEN:  normal bowel sounds, soft, nontender.  M/S:   Gait and station walks with assist , no tenderness or swelling of the joints; able to move all extremities   NEURO: cranial nerves 2-12 grossly intact, no facial asymmetry, no speech deficits and able to follow directions, moves all extremities symmetrically  PSYCH:  insight and judgement intact, memory at baseline, affect and mood normal    Recent Labs:  CBC RESULTS:   Recent Labs   Lab Test  09/06/18   0625  09/03/18   0545   WBC  10.1  9.6   RBC  3.15*  2.81*   HGB  9.2*  8.2*   HCT  28.4*  25.5*   MCV  90  91   MCH  29.2  29.2   MCHC  32.4  32.2   RDW  14.9  14.8   PLT  388  260       Last Basic Metabolic Panel:  Recent Labs   Lab Test  10/03/18   0625  09/06/18   0625   NA  139  138   POTASSIUM  4.1  4.1   CHLORIDE  106  105   JOSELINE  7.8*  7.9*   CO2  23  23   BUN  41*  23   CR  1.99*  1.57*   GLC  90  151*     TSH   Date Value Ref Range Status   04/02/2003 2.08 0.4 - 5.0 mU/L Final     Lab Results   Component Value Date    A1C 8.4 08/25/2018    A1C 7.8 04/22/2014         Assessment/Plan:  Closed fracture of right hip with routine healing, subsequent  encounter  Physical deconditioning  Ongoing issues recent surgery, now therapies. Progress slower than patient hoped for. Walking with walker. F/U with progress next week and ortho 10/11 as scheduled. Patient will be moving to SNF with wife at discharge per self repot.     PT/OT ongoing-advanced per their recommendations.    Cardiac arrhythmia, unspecified cardiac arrhythmia type  Coronary artery disease involving coronary bypass graft of native heart without angina pectoris  Benign essential hypertension  Chronic issues, stable, well controlled and asymptomatic. Meds as above, VS and WT per routine.      Continue medications as ordered    Pneumonia of right lower lobe due to infectious organism (H)  Resolved. F/U PRN.     Type 2 diabetes mellitus with stage 3 chronic kidney disease, with long-term current use of insulin (H)  Chronic, blood glucose levels previously elevated only in the evenings.  Medications adjusted late last week.  Elevations appear to be occurring throughout the entire day now.  Will allow for a little more time for her body to adjust to change regimen.    Follow-up regarding blood glucose levels next week    CKD (chronic kidney disease) stage 3, GFR 30-59 ml/min  Chronic, stable last check - Today's BMP showed decreased function of kidneys.    Recheck BMP 10/8    Insomnia  Patient with inability to sleep as noted above on current regimen of Ambien    Discontinue Ambien    Start melatonin 5 mg nightly, may repeat x1.    Electronically signed by  WILLIAM Carrasco CNP                      Sincerely,        WILLIAM Carrasco CNP

## 2018-10-03 NOTE — PROGRESS NOTES
[place] [facility]    Chief Complaint   Patient presents with     DAVEMOSES       [place] Medical Record Number:  [id number]    HPI:    Abimael Flores is a 89 year old  (1/23/1924), who is being seen today for an episodic care visit at [place] on [facility] .  HPI information obtained from: facility chart records, facility staff, patient report and [facility] chart review.Today's concern is:  Closed fracture of right hip with routine healing, subsequent encounter  Physical deconditioning  Patient transferred to TCU from hospital after treatment for right hip pain suffered from a fall in the community. He was found to have right femoral neck fracture. He underwent a bipolar hemiarthroplasty on 8/27. Surgery was uneventful. Activity is WBAT. Post op complications include tachy arrhythmia, aspiration pneumonia, hypoglycemia.   --patient doing well with therapies but feels progress is slow. Able to walk to therapies room with walker and SBA and admits to walking alone to the bathroom a few times. To see ortho on 10/11 for follow up. Pain managed well with ibuprofen PRN, oxycodone PRN.  No new complaints today.    Cardiac arrhythmia, unspecified cardiac arrhythmia type  Coronary artery disease involving coronary bypass graft of native heart without angina pectoris  Benign essential hypertension  Denies chest pain or dyspnea. Recent SBP . Continues Metoprolol 50 mg daily and Diltiazem 120 mg PO daily. Also on Lipitor and ASA. In general since admission BP's: 104/57, 100/54, 127/67, 116/51. HR: 101, 78, 73, 78.  Denies chest pain/palpitations today.    Pneumonia of right lower lobe due to infectious organism (H)  Symptoms resolved. Sats 97% on room air. No cough.     Type 2 diabetes mellitus with stage 3 chronic kidney disease, with long-term current use of insulin (H)  Managed with Lantus and sliding scale insulin AC and HS. BG elevated in the afternoons. Lantus changed to AM administration time and  increase to 7 units subcutaneous daily last week.   Last BG Levels:    Noon: 383, 373, 308    Dinner: 136, 174, 300    HS: 317, 267, 138    CKD (chronic kidney disease) stage 3, GFR 30-59 ml/min  Noted decrease in kidney function with today's recheck   Ref. Range 9/3/2018 05:45 9/6/2018 06:25 10/3/2018 06:25   Sodium Latest Ref Range: 133 - 144 mmol/L 141 138 139   Potassium Latest Ref Range: 3.4 - 5.3 mmol/L 4.2 4.1 4.1   Chloride Latest Ref Range: 94 - 109 mmol/L 110 (H) 105 106   Carbon Dioxide Latest Ref Range: 20 - 32 mmol/L 24 23 23   Urea Nitrogen Latest Ref Range: 7 - 30 mg/dL 35 (H) 23 41 (H)   Creatinine Latest Ref Range: 0.66 - 1.25 mg/dL 1.94 (H) 1.57 (H) 1.99 (H)   GFR Estimate Latest Ref Range: >60 mL/min/1.7m2 32 (L) 41 (L) 31 (L)   GFR Estimate If Black Latest Ref Range: >60 mL/min/1.7m2 39 (L) 50 (L) 38 (L)   Calcium Latest Ref Range: 8.5 - 10.1 mg/dL 7.3 (L) 7.9 (L) 7.8 (L)   Anion Gap Latest Ref Range: 3 - 14 mmol/L 7 10 10     Insomnia  Patient noting ongoing difficulty sleeping, currently ordered Ambien which she states is not effective.  Stated he did use melatonin previously worked, however he notes this was very low dose.  Is willing to try it again at a higher dose.      ALLERGIES: Review of patient's allergies indicates no known allergies.  Past Medical, Surgical, Family and Social History reviewed and updated in [employer].    Current Outpatient Prescriptions   Medication Sig Dispense Refill     aspirin 325 MG EC tablet Take one Aspirin tab twice daily for 5 weeks. 70 tablet 0     atorvastatin (LIPITOR) 10 MG tablet Take 1 tablet (10 mg) by mouth every evening       diltiazem (TIAZAC) 120 MG 24 hr ER beaded capsule Take 120 mg by mouth daily       IBUPROFEN PO Take 200 mg by mouth every 6 hours as needed for moderate pain       insulin aspart (NOVOLOG PEN) 100 UNIT/ML injection Inject 1-7 Units Subcutaneous 3 times daily (before meals)       insulin aspart (NOVOLOG PEN) 100 UNIT/ML injection  "Inject 1-5 Units Subcutaneous At Bedtime       insulin glargine (LANTUS) 100 UNIT/ML injection Inject 7 Units Subcutaneous every morning       insulin lispro (HUMALOG KWIKPEN) 100 UNIT/ML injection Inject 2 Units Subcutaneous 3 times daily (before meals)       insulin lispro (HUMALOG) 100 UNIT/ML injection Inject Subcutaneous 3 times daily (before meals)       metoprolol succinate (TOPROL-XL) 50 MG 24 hr tablet Take 1 tablet (50 mg) by mouth daily 30 tablet      oxyCODONE IR (ROXICODONE) 5 MG tablet Take 1 tablet (5 mg) by mouth every 4 hours as needed for pain 60 tablet 0     polyethylene glycol (MIRALAX/GLYCOLAX) Packet Take 17 g by mouth daily       Skin Protectants, Misc. (ANIBAL PROTECT EX) Apply topically 2 times daily       tamsulosin (FLOMAX) 0.4 MG capsule Take 0.4 mg by mouth daily       Zolpidem Tartrate (AMBIEN PO) Take 5 mg by mouth daily as needed for sleep       Medications reviewed:  Medications reconciled to facility chart and changes were made to reflect current medications as identified as above med list. Below are the changes that were made:   Medications stopped since last EPIC medication reconciliation:   Medications Discontinued During This Encounter   Medication Reason     insulin glargine (LANTUS SOLOSTAR) 100 UNIT/ML pen        Medications started since last [facility] medication reconciliation:  Orders Placed This Encounter   Medications     insulin lispro (HUMALOG) 100 UNIT/ML injection     Sig: Inject Subcutaneous 3 times daily (before meals)     insulin glargine (LANTUS) 100 UNIT/ML injection     Sig: Inject 7 Units Subcutaneous every morning     REVIEW OF SYSTEMS:  4 point ROS including Respiratory, CV, GI and , other than that noted in the HPI,  is negative    Physical Exam:  /57  Pulse 101  Temp 97.4  F (36.3  C)  Resp 16  Ht 5' 8\" (1.727 m)  Wt 168 lb 3.2 oz (76.3 kg)  SpO2 100%  BMI 25.57 kg/m2  GENERAL APPEARANCE:  Alert, in no distress, pleasant, cooperative, oriented x 4, " elderly male appearing younger than stated age resting in bed as he states he did not sleep well last night  EYES:  EOM, lids, pupils and irises normal, sclera clear and conjunctiva normal, no discharge or mattering on lids or lashes noted  ENT:  Mouth normal, moist mucous membranes, nose normal without drainage or crusting, external ears without lesions, hearing acuity intact  NECK: supple, symmetrical  RESP:  respiratory effort and palpation of chest normal, no chest wall tenderness, no respiratory distress, Lung sounds clear, patient is on room air  CV:  Palpation and auscultation of heart done, rate and rhythm controlled and somewhat irregular, no murmur, no rub or gallop. Edema none bilateral lower extremities.   ABDOMEN:  normal bowel sounds, soft, nontender.  M/S:   Gait and station walks with assist , no tenderness or swelling of the joints; able to move all extremities   NEURO: cranial nerves 2-12 grossly intact, no facial asymmetry, no speech deficits and able to follow directions, moves all extremities symmetrically  PSYCH:  insight and judgement intact, memory at baseline, affect and mood normal    Recent Labs:  CBC RESULTS:   Recent Labs   Lab Test  09/06/18   0625  09/03/18   0545   WBC  10.1  9.6   RBC  3.15*  2.81*   HGB  9.2*  8.2*   HCT  28.4*  25.5*   MCV  90  91   MCH  29.2  29.2   MCHC  32.4  32.2   RDW  14.9  14.8   PLT  388  260       Last Basic Metabolic Panel:  Recent Labs   Lab Test  10/03/18   0625  09/06/18   0625   NA  139  138   POTASSIUM  4.1  4.1   CHLORIDE  106  105   JOSELINE  7.8*  7.9*   CO2  23  23   BUN  41*  23   CR  1.99*  1.57*   GLC  90  151*     TSH   Date Value Ref Range Status   04/02/2003 2.08 0.4 - 5.0 mU/L Final     Lab Results   Component Value Date    A1C 8.4 08/25/2018    A1C 7.8 04/22/2014         Assessment/Plan:  Closed fracture of right hip with routine healing, subsequent encounter  Physical deconditioning  Ongoing issues recent surgery, now therapies. Progress  slower than patient hoped for. Walking with walker. F/U with progress next week and ortho 10/11 as scheduled. Patient will be moving to SNF with wife at discharge per self repot.     PT/OT ongoing-advanced per their recommendations.    Cardiac arrhythmia, unspecified cardiac arrhythmia type  Coronary artery disease involving coronary bypass graft of native heart without angina pectoris  Benign essential hypertension  Chronic issues, stable, well controlled and asymptomatic. Meds as above, VS and WT per routine.      Continue medications as ordered    Pneumonia of right lower lobe due to infectious organism (H)  Resolved. F/U PRN.     Type 2 diabetes mellitus with stage 3 chronic kidney disease, with long-term current use of insulin (H)  Chronic, blood glucose levels previously elevated only in the evenings.  Medications adjusted late last week.  Elevations appear to be occurring throughout the entire day now.  Will allow for a little more time for her body to adjust to change regimen.    Follow-up regarding blood glucose levels next week    CKD (chronic kidney disease) stage 3, GFR 30-59 ml/min  Chronic, stable last check - Today's BMP showed decreased function of kidneys.    Recheck BMP 10/8    Insomnia  Patient with inability to sleep as noted above on current regimen of Ambien    Discontinue Ambien    Start melatonin 5 mg nightly, may repeat x1.    Electronically signed by  WILLIAM Carrasco CNP

## 2018-10-04 ENCOUNTER — APPOINTMENT (OUTPATIENT)
Dept: GENERAL RADIOLOGY | Facility: CLINIC | Age: 83
DRG: 470 | End: 2018-10-04
Attending: EMERGENCY MEDICINE
Payer: MEDICARE

## 2018-10-04 ENCOUNTER — HOSPITAL ENCOUNTER (INPATIENT)
Facility: CLINIC | Age: 83
LOS: 6 days | Discharge: NURSING, REHAB OR LONG-TERM CARE | DRG: 470 | End: 2018-10-10
Attending: EMERGENCY MEDICINE | Admitting: INTERNAL MEDICINE
Payer: MEDICARE

## 2018-10-04 ENCOUNTER — ANESTHESIA (OUTPATIENT)
Dept: SURGERY | Facility: CLINIC | Age: 83
DRG: 470 | End: 2018-10-04
Payer: MEDICARE

## 2018-10-04 ENCOUNTER — ANESTHESIA EVENT (OUTPATIENT)
Dept: SURGERY | Facility: CLINIC | Age: 83
DRG: 470 | End: 2018-10-04
Payer: MEDICARE

## 2018-10-04 ENCOUNTER — APPOINTMENT (OUTPATIENT)
Dept: GENERAL RADIOLOGY | Facility: CLINIC | Age: 83
DRG: 470 | End: 2018-10-04
Attending: PHYSICIAN ASSISTANT
Payer: MEDICARE

## 2018-10-04 DIAGNOSIS — S72.002A FRACTURE OF HIP, LEFT, CLOSED, INITIAL ENCOUNTER (H): ICD-10-CM

## 2018-10-04 LAB
GLUCOSE BLDC GLUCOMTR-MCNC: 148 MG/DL (ref 70–99)
GLUCOSE BLDC GLUCOMTR-MCNC: 151 MG/DL (ref 70–99)
GLUCOSE BLDC GLUCOMTR-MCNC: 163 MG/DL (ref 70–99)
GLUCOSE BLDC GLUCOMTR-MCNC: 169 MG/DL (ref 70–99)
GLUCOSE BLDC GLUCOMTR-MCNC: 206 MG/DL (ref 70–99)

## 2018-10-04 PROCEDURE — 0SRS03A REPLACEMENT OF LEFT HIP JOINT, FEMORAL SURFACE WITH CERAMIC SYNTHETIC SUBSTITUTE, UNCEMENTED, OPEN APPROACH: ICD-10-PCS | Performed by: ORTHOPAEDIC SURGERY

## 2018-10-04 PROCEDURE — 99285 EMERGENCY DEPT VISIT HI MDM: CPT | Mod: 25

## 2018-10-04 PROCEDURE — 25800025 ZZH RX 258: Performed by: ORTHOPAEDIC SURGERY

## 2018-10-04 PROCEDURE — 99223 1ST HOSP IP/OBS HIGH 75: CPT | Mod: AI | Performed by: INTERNAL MEDICINE

## 2018-10-04 PROCEDURE — 37000008 ZZH ANESTHESIA TECHNICAL FEE, 1ST 30 MIN: Performed by: ORTHOPAEDIC SURGERY

## 2018-10-04 PROCEDURE — 86901 BLOOD TYPING SEROLOGIC RH(D): CPT | Performed by: INTERNAL MEDICINE

## 2018-10-04 PROCEDURE — 25000128 H RX IP 250 OP 636: Performed by: INTERNAL MEDICINE

## 2018-10-04 PROCEDURE — 25000132 ZZH RX MED GY IP 250 OP 250 PS 637: Mod: GY | Performed by: PHYSICIAN ASSISTANT

## 2018-10-04 PROCEDURE — 25000128 H RX IP 250 OP 636: Performed by: SURGERY

## 2018-10-04 PROCEDURE — A9270 NON-COVERED ITEM OR SERVICE: HCPCS | Mod: GY | Performed by: PHYSICIAN ASSISTANT

## 2018-10-04 PROCEDURE — 86900 BLOOD TYPING SEROLOGIC ABO: CPT | Performed by: INTERNAL MEDICINE

## 2018-10-04 PROCEDURE — 25000566 ZZH SEVOFLURANE, EA 15 MIN: Performed by: ORTHOPAEDIC SURGERY

## 2018-10-04 PROCEDURE — 25000131 ZZH RX MED GY IP 250 OP 636 PS 637: Mod: GY | Performed by: INTERNAL MEDICINE

## 2018-10-04 PROCEDURE — 71045 X-RAY EXAM CHEST 1 VIEW: CPT

## 2018-10-04 PROCEDURE — 93010 ELECTROCARDIOGRAM REPORT: CPT | Performed by: INTERNAL MEDICINE

## 2018-10-04 PROCEDURE — 37000009 ZZH ANESTHESIA TECHNICAL FEE, EACH ADDTL 15 MIN: Performed by: ORTHOPAEDIC SURGERY

## 2018-10-04 PROCEDURE — 25000128 H RX IP 250 OP 636: Performed by: ORTHOPAEDIC SURGERY

## 2018-10-04 PROCEDURE — C1776 JOINT DEVICE (IMPLANTABLE): HCPCS | Performed by: ORTHOPAEDIC SURGERY

## 2018-10-04 PROCEDURE — 40000986 XR PELVIS AND HIP PORTABLE LEFT 1 VIEW

## 2018-10-04 PROCEDURE — 00000146 ZZHCL STATISTIC GLUCOSE BY METER IP

## 2018-10-04 PROCEDURE — 27810169 ZZH OR IMPLANT GENERAL: Performed by: ORTHOPAEDIC SURGERY

## 2018-10-04 PROCEDURE — 40000169 ZZH STATISTIC PRE-PROCEDURE ASSESSMENT I: Performed by: ORTHOPAEDIC SURGERY

## 2018-10-04 PROCEDURE — 25000125 ZZHC RX 250: Performed by: ORTHOPAEDIC SURGERY

## 2018-10-04 PROCEDURE — 96374 THER/PROPH/DIAG INJ IV PUSH: CPT

## 2018-10-04 PROCEDURE — P9041 ALBUMIN (HUMAN),5%, 50ML: HCPCS | Performed by: NURSE ANESTHETIST, CERTIFIED REGISTERED

## 2018-10-04 PROCEDURE — 25000132 ZZH RX MED GY IP 250 OP 250 PS 637: Mod: GY | Performed by: INTERNAL MEDICINE

## 2018-10-04 PROCEDURE — 36000063 ZZH SURGERY LEVEL 4 EA 15 ADDTL MIN: Performed by: ORTHOPAEDIC SURGERY

## 2018-10-04 PROCEDURE — 71000012 ZZH RECOVERY PHASE 1 LEVEL 1 FIRST HR: Performed by: ORTHOPAEDIC SURGERY

## 2018-10-04 PROCEDURE — 73502 X-RAY EXAM HIP UNI 2-3 VIEWS: CPT

## 2018-10-04 PROCEDURE — 36000093 ZZH SURGERY LEVEL 4 1ST 30 MIN: Performed by: ORTHOPAEDIC SURGERY

## 2018-10-04 PROCEDURE — 12000007 ZZH R&B INTERMEDIATE

## 2018-10-04 PROCEDURE — A9270 NON-COVERED ITEM OR SERVICE: HCPCS | Mod: GY | Performed by: INTERNAL MEDICINE

## 2018-10-04 PROCEDURE — 25000125 ZZHC RX 250: Performed by: NURSE ANESTHETIST, CERTIFIED REGISTERED

## 2018-10-04 PROCEDURE — A9270 NON-COVERED ITEM OR SERVICE: HCPCS | Mod: GY | Performed by: EMERGENCY MEDICINE

## 2018-10-04 PROCEDURE — 25000128 H RX IP 250 OP 636: Performed by: PHYSICIAN ASSISTANT

## 2018-10-04 PROCEDURE — 25000132 ZZH RX MED GY IP 250 OP 250 PS 637: Mod: GY | Performed by: EMERGENCY MEDICINE

## 2018-10-04 PROCEDURE — 25000128 H RX IP 250 OP 636: Performed by: NURSE ANESTHETIST, CERTIFIED REGISTERED

## 2018-10-04 PROCEDURE — 25000128 H RX IP 250 OP 636: Performed by: EMERGENCY MEDICINE

## 2018-10-04 PROCEDURE — 86923 COMPATIBILITY TEST ELECTRIC: CPT | Performed by: INTERNAL MEDICINE

## 2018-10-04 PROCEDURE — 93005 ELECTROCARDIOGRAM TRACING: CPT

## 2018-10-04 PROCEDURE — 27210794 ZZH OR GENERAL SUPPLY STERILE: Performed by: ORTHOPAEDIC SURGERY

## 2018-10-04 PROCEDURE — 86850 RBC ANTIBODY SCREEN: CPT | Performed by: INTERNAL MEDICINE

## 2018-10-04 DEVICE — IMP CENTRALIZER DISTAL ZIM VERSYS 10: Type: IMPLANTABLE DEVICE | Site: HIP | Status: FUNCTIONAL

## 2018-10-04 DEVICE — BONE CEMENT RESTRICTOR BUCK FEMORAL 18.5MM 129418: Type: IMPLANTABLE DEVICE | Site: HIP | Status: FUNCTIONAL

## 2018-10-04 DEVICE — IMP STEM FEMORAL BIOM ECHO FX 11MM STD 12-151311: Type: IMPLANTABLE DEVICE | Site: HIP | Status: FUNCTIONAL

## 2018-10-04 DEVICE — IMPLANTABLE DEVICE
Type: IMPLANTABLE DEVICE | Site: HIP | Status: NON-FUNCTIONAL
Removed: 2018-10-22

## 2018-10-04 DEVICE — BONE CEMENT SIMPLEX FULL DOSE 6191-1-001: Type: IMPLANTABLE DEVICE | Site: HIP | Status: FUNCTIONAL

## 2018-10-04 DEVICE — IMP HEAD MOD HIP BIOM 28MM STD 163662
Type: IMPLANTABLE DEVICE | Site: HIP | Status: NON-FUNCTIONAL
Removed: 2018-10-22

## 2018-10-04 RX ORDER — OXYCODONE HYDROCHLORIDE 5 MG/1
5 TABLET ORAL
Status: DISCONTINUED | OUTPATIENT
Start: 2018-10-04 | End: 2018-10-10 | Stop reason: HOSPADM

## 2018-10-04 RX ORDER — SODIUM CHLORIDE 9 MG/ML
INJECTION, SOLUTION INTRAVENOUS CONTINUOUS
Status: DISCONTINUED | OUTPATIENT
Start: 2018-10-04 | End: 2018-10-04

## 2018-10-04 RX ORDER — ONDANSETRON 4 MG/1
4 TABLET, ORALLY DISINTEGRATING ORAL EVERY 6 HOURS PRN
Status: DISCONTINUED | OUTPATIENT
Start: 2018-10-04 | End: 2018-10-10 | Stop reason: HOSPADM

## 2018-10-04 RX ORDER — FENTANYL CITRATE 50 UG/ML
INJECTION, SOLUTION INTRAMUSCULAR; INTRAVENOUS PRN
Status: DISCONTINUED | OUTPATIENT
Start: 2018-10-04 | End: 2018-10-04

## 2018-10-04 RX ORDER — OXYCODONE HYDROCHLORIDE 5 MG/1
5-10 TABLET ORAL
Status: DISCONTINUED | OUTPATIENT
Start: 2018-10-04 | End: 2018-10-04

## 2018-10-04 RX ORDER — SODIUM CHLORIDE, SODIUM LACTATE, POTASSIUM CHLORIDE, CALCIUM CHLORIDE 600; 310; 30; 20 MG/100ML; MG/100ML; MG/100ML; MG/100ML
INJECTION, SOLUTION INTRAVENOUS CONTINUOUS
Status: DISCONTINUED | OUTPATIENT
Start: 2018-10-04 | End: 2018-10-04 | Stop reason: HOSPADM

## 2018-10-04 RX ORDER — NICOTINE POLACRILEX 4 MG
15-30 LOZENGE BUCCAL
Status: DISCONTINUED | OUTPATIENT
Start: 2018-10-04 | End: 2018-10-10 | Stop reason: HOSPADM

## 2018-10-04 RX ORDER — CEFAZOLIN SODIUM 1 G/3ML
1 INJECTION, POWDER, FOR SOLUTION INTRAMUSCULAR; INTRAVENOUS EVERY 8 HOURS
Status: COMPLETED | OUTPATIENT
Start: 2018-10-04 | End: 2018-10-05

## 2018-10-04 RX ORDER — HYDROMORPHONE HYDROCHLORIDE 1 MG/ML
0.2 INJECTION, SOLUTION INTRAMUSCULAR; INTRAVENOUS; SUBCUTANEOUS
Status: DISCONTINUED | OUTPATIENT
Start: 2018-10-04 | End: 2018-10-04

## 2018-10-04 RX ORDER — ZOLPIDEM TARTRATE 5 MG/1
5 TABLET ORAL DAILY PRN
Status: DISCONTINUED | OUTPATIENT
Start: 2018-10-04 | End: 2018-10-09 | Stop reason: DRUGHIGH

## 2018-10-04 RX ORDER — ATORVASTATIN CALCIUM 10 MG/1
10 TABLET, FILM COATED ORAL EVERY EVENING
Status: DISCONTINUED | OUTPATIENT
Start: 2018-10-04 | End: 2018-10-10 | Stop reason: HOSPADM

## 2018-10-04 RX ORDER — FENTANYL CITRATE 50 UG/ML
25-50 INJECTION, SOLUTION INTRAMUSCULAR; INTRAVENOUS
Status: DISCONTINUED | OUTPATIENT
Start: 2018-10-04 | End: 2018-10-04 | Stop reason: HOSPADM

## 2018-10-04 RX ORDER — LIDOCAINE 40 MG/G
CREAM TOPICAL
Status: DISCONTINUED | OUTPATIENT
Start: 2018-10-04 | End: 2018-10-10 | Stop reason: HOSPADM

## 2018-10-04 RX ORDER — GLYCOPYRROLATE 0.2 MG/ML
INJECTION, SOLUTION INTRAMUSCULAR; INTRAVENOUS PRN
Status: DISCONTINUED | OUTPATIENT
Start: 2018-10-04 | End: 2018-10-04

## 2018-10-04 RX ORDER — CEFAZOLIN SODIUM 2 G/100ML
2 INJECTION, SOLUTION INTRAVENOUS
Status: COMPLETED | OUTPATIENT
Start: 2018-10-04 | End: 2018-10-04

## 2018-10-04 RX ORDER — NALOXONE HYDROCHLORIDE 0.4 MG/ML
.1-.4 INJECTION, SOLUTION INTRAMUSCULAR; INTRAVENOUS; SUBCUTANEOUS
Status: DISCONTINUED | OUTPATIENT
Start: 2018-10-04 | End: 2018-10-10 | Stop reason: HOSPADM

## 2018-10-04 RX ORDER — GABAPENTIN 100 MG/1
100 CAPSULE ORAL DAILY
Status: COMPLETED | OUTPATIENT
Start: 2018-10-04 | End: 2018-10-06

## 2018-10-04 RX ORDER — DILTIAZEM HYDROCHLORIDE 120 MG/1
120 CAPSULE, EXTENDED RELEASE ORAL DAILY
Status: DISCONTINUED | OUTPATIENT
Start: 2018-10-04 | End: 2018-10-10 | Stop reason: HOSPADM

## 2018-10-04 RX ORDER — ONDANSETRON 2 MG/ML
INJECTION INTRAMUSCULAR; INTRAVENOUS PRN
Status: DISCONTINUED | OUTPATIENT
Start: 2018-10-04 | End: 2018-10-04

## 2018-10-04 RX ORDER — HYDROMORPHONE HYDROCHLORIDE 1 MG/ML
0.5 INJECTION, SOLUTION INTRAMUSCULAR; INTRAVENOUS; SUBCUTANEOUS
Status: DISCONTINUED | OUTPATIENT
Start: 2018-10-04 | End: 2018-10-04

## 2018-10-04 RX ORDER — BISACODYL 10 MG
10 SUPPOSITORY, RECTAL RECTAL DAILY PRN
Status: DISCONTINUED | OUTPATIENT
Start: 2018-10-04 | End: 2018-10-10 | Stop reason: HOSPADM

## 2018-10-04 RX ORDER — PROCHLORPERAZINE 25 MG
12.5 SUPPOSITORY, RECTAL RECTAL EVERY 12 HOURS PRN
Status: DISCONTINUED | OUTPATIENT
Start: 2018-10-04 | End: 2018-10-10 | Stop reason: HOSPADM

## 2018-10-04 RX ORDER — AMOXICILLIN 250 MG
2 CAPSULE ORAL 2 TIMES DAILY
Status: DISCONTINUED | OUTPATIENT
Start: 2018-10-04 | End: 2018-10-10 | Stop reason: HOSPADM

## 2018-10-04 RX ORDER — NALOXONE HYDROCHLORIDE 0.4 MG/ML
.1-.4 INJECTION, SOLUTION INTRAMUSCULAR; INTRAVENOUS; SUBCUTANEOUS
Status: DISCONTINUED | OUTPATIENT
Start: 2018-10-04 | End: 2018-10-04

## 2018-10-04 RX ORDER — ONDANSETRON 2 MG/ML
4 INJECTION INTRAMUSCULAR; INTRAVENOUS EVERY 30 MIN PRN
Status: DISCONTINUED | OUTPATIENT
Start: 2018-10-04 | End: 2018-10-04 | Stop reason: HOSPADM

## 2018-10-04 RX ORDER — ACETAMINOPHEN 325 MG/1
650 TABLET ORAL EVERY 4 HOURS PRN
Status: DISCONTINUED | OUTPATIENT
Start: 2018-10-07 | End: 2018-10-10 | Stop reason: HOSPADM

## 2018-10-04 RX ORDER — OXYCODONE HYDROCHLORIDE 5 MG/1
5 TABLET ORAL ONCE
Status: COMPLETED | OUTPATIENT
Start: 2018-10-04 | End: 2018-10-04

## 2018-10-04 RX ORDER — SODIUM CHLORIDE, SODIUM LACTATE, POTASSIUM CHLORIDE, CALCIUM CHLORIDE 600; 310; 30; 20 MG/100ML; MG/100ML; MG/100ML; MG/100ML
INJECTION, SOLUTION INTRAVENOUS CONTINUOUS PRN
Status: DISCONTINUED | OUTPATIENT
Start: 2018-10-04 | End: 2018-10-04

## 2018-10-04 RX ORDER — DEXTROSE MONOHYDRATE 25 G/50ML
25-50 INJECTION, SOLUTION INTRAVENOUS
Status: DISCONTINUED | OUTPATIENT
Start: 2018-10-04 | End: 2018-10-10 | Stop reason: HOSPADM

## 2018-10-04 RX ORDER — NEOSTIGMINE METHYLSULFATE 1 MG/ML
VIAL (ML) INJECTION PRN
Status: DISCONTINUED | OUTPATIENT
Start: 2018-10-04 | End: 2018-10-04

## 2018-10-04 RX ORDER — ONDANSETRON 4 MG/1
4 TABLET, ORALLY DISINTEGRATING ORAL EVERY 30 MIN PRN
Status: DISCONTINUED | OUTPATIENT
Start: 2018-10-04 | End: 2018-10-04 | Stop reason: HOSPADM

## 2018-10-04 RX ORDER — HYDROXYZINE HYDROCHLORIDE 10 MG/1
10 TABLET, FILM COATED ORAL EVERY 6 HOURS PRN
Status: DISCONTINUED | OUTPATIENT
Start: 2018-10-04 | End: 2018-10-10 | Stop reason: HOSPADM

## 2018-10-04 RX ORDER — ALBUMIN, HUMAN INJ 5% 5 %
SOLUTION INTRAVENOUS CONTINUOUS PRN
Status: DISCONTINUED | OUTPATIENT
Start: 2018-10-04 | End: 2018-10-04

## 2018-10-04 RX ORDER — POLYETHYLENE GLYCOL 3350 17 G/17G
17 POWDER, FOR SOLUTION ORAL DAILY PRN
Status: DISCONTINUED | OUTPATIENT
Start: 2018-10-04 | End: 2018-10-10 | Stop reason: HOSPADM

## 2018-10-04 RX ORDER — PROCHLORPERAZINE MALEATE 5 MG
5 TABLET ORAL EVERY 6 HOURS PRN
Status: DISCONTINUED | OUTPATIENT
Start: 2018-10-04 | End: 2018-10-04

## 2018-10-04 RX ORDER — HYDROMORPHONE HYDROCHLORIDE 1 MG/ML
0.2 INJECTION, SOLUTION INTRAMUSCULAR; INTRAVENOUS; SUBCUTANEOUS
Status: DISCONTINUED | OUTPATIENT
Start: 2018-10-04 | End: 2018-10-10 | Stop reason: HOSPADM

## 2018-10-04 RX ORDER — TAMSULOSIN HYDROCHLORIDE 0.4 MG/1
0.4 CAPSULE ORAL DAILY
Status: DISCONTINUED | OUTPATIENT
Start: 2018-10-04 | End: 2018-10-10 | Stop reason: HOSPADM

## 2018-10-04 RX ORDER — SODIUM CHLORIDE 9 MG/ML
INJECTION, SOLUTION INTRAVENOUS CONTINUOUS
Status: DISCONTINUED | OUTPATIENT
Start: 2018-10-04 | End: 2018-10-09

## 2018-10-04 RX ORDER — ACETAMINOPHEN 325 MG/1
650 TABLET ORAL EVERY 4 HOURS PRN
Status: DISCONTINUED | OUTPATIENT
Start: 2018-10-04 | End: 2018-10-04

## 2018-10-04 RX ORDER — CEFAZOLIN SODIUM 1 G/3ML
1 INJECTION, POWDER, FOR SOLUTION INTRAMUSCULAR; INTRAVENOUS SEE ADMIN INSTRUCTIONS
Status: DISCONTINUED | OUTPATIENT
Start: 2018-10-04 | End: 2018-10-04 | Stop reason: HOSPADM

## 2018-10-04 RX ORDER — METOPROLOL SUCCINATE 50 MG/1
50 TABLET, EXTENDED RELEASE ORAL DAILY
Status: DISCONTINUED | OUTPATIENT
Start: 2018-10-04 | End: 2018-10-10 | Stop reason: HOSPADM

## 2018-10-04 RX ORDER — BISACODYL 5 MG
15 TABLET, DELAYED RELEASE (ENTERIC COATED) ORAL DAILY PRN
Status: DISCONTINUED | OUTPATIENT
Start: 2018-10-04 | End: 2018-10-10 | Stop reason: HOSPADM

## 2018-10-04 RX ORDER — PROCHLORPERAZINE MALEATE 5 MG
5 TABLET ORAL EVERY 6 HOURS PRN
Status: DISCONTINUED | OUTPATIENT
Start: 2018-10-04 | End: 2018-10-10 | Stop reason: HOSPADM

## 2018-10-04 RX ORDER — ONDANSETRON 2 MG/ML
4 INJECTION INTRAMUSCULAR; INTRAVENOUS EVERY 6 HOURS PRN
Status: DISCONTINUED | OUTPATIENT
Start: 2018-10-04 | End: 2018-10-10 | Stop reason: HOSPADM

## 2018-10-04 RX ORDER — LABETALOL HYDROCHLORIDE 5 MG/ML
10 INJECTION, SOLUTION INTRAVENOUS
Status: DISCONTINUED | OUTPATIENT
Start: 2018-10-04 | End: 2018-10-10 | Stop reason: HOSPADM

## 2018-10-04 RX ORDER — MAGNESIUM HYDROXIDE 1200 MG/15ML
LIQUID ORAL PRN
Status: DISCONTINUED | OUTPATIENT
Start: 2018-10-04 | End: 2018-10-04 | Stop reason: HOSPADM

## 2018-10-04 RX ORDER — BISACODYL 5 MG
10 TABLET, DELAYED RELEASE (ENTERIC COATED) ORAL DAILY PRN
Status: DISCONTINUED | OUTPATIENT
Start: 2018-10-04 | End: 2018-10-10 | Stop reason: HOSPADM

## 2018-10-04 RX ORDER — VECURONIUM BROMIDE 1 MG/ML
INJECTION, POWDER, LYOPHILIZED, FOR SOLUTION INTRAVENOUS PRN
Status: DISCONTINUED | OUTPATIENT
Start: 2018-10-04 | End: 2018-10-04

## 2018-10-04 RX ORDER — HYDROMORPHONE HYDROCHLORIDE 1 MG/ML
.3-.5 INJECTION, SOLUTION INTRAMUSCULAR; INTRAVENOUS; SUBCUTANEOUS EVERY 5 MIN PRN
Status: DISCONTINUED | OUTPATIENT
Start: 2018-10-04 | End: 2018-10-04 | Stop reason: HOSPADM

## 2018-10-04 RX ORDER — BISACODYL 5 MG
5 TABLET, DELAYED RELEASE (ENTERIC COATED) ORAL DAILY PRN
Status: DISCONTINUED | OUTPATIENT
Start: 2018-10-04 | End: 2018-10-10 | Stop reason: HOSPADM

## 2018-10-04 RX ORDER — LIDOCAINE HYDROCHLORIDE 20 MG/ML
INJECTION, SOLUTION INFILTRATION; PERINEURAL PRN
Status: DISCONTINUED | OUTPATIENT
Start: 2018-10-04 | End: 2018-10-04

## 2018-10-04 RX ORDER — ACETAMINOPHEN 325 MG/1
975 TABLET ORAL EVERY 8 HOURS
Status: DISPENSED | OUTPATIENT
Start: 2018-10-04 | End: 2018-10-07

## 2018-10-04 RX ORDER — AMOXICILLIN 250 MG
1 CAPSULE ORAL 2 TIMES DAILY
Status: DISCONTINUED | OUTPATIENT
Start: 2018-10-04 | End: 2018-10-10 | Stop reason: HOSPADM

## 2018-10-04 RX ADMIN — ONDANSETRON 4 MG: 2 INJECTION INTRAMUSCULAR; INTRAVENOUS at 14:06

## 2018-10-04 RX ADMIN — PHENYLEPHRINE HYDROCHLORIDE 100 MCG: 10 INJECTION, SOLUTION INTRAMUSCULAR; INTRAVENOUS; SUBCUTANEOUS at 12:56

## 2018-10-04 RX ADMIN — PHENYLEPHRINE HYDROCHLORIDE 100 MCG: 10 INJECTION, SOLUTION INTRAMUSCULAR; INTRAVENOUS; SUBCUTANEOUS at 13:12

## 2018-10-04 RX ADMIN — PHENYLEPHRINE HYDROCHLORIDE 100 MCG: 10 INJECTION, SOLUTION INTRAMUSCULAR; INTRAVENOUS; SUBCUTANEOUS at 12:58

## 2018-10-04 RX ADMIN — PHENYLEPHRINE HYDROCHLORIDE 100 MCG: 10 INJECTION, SOLUTION INTRAMUSCULAR; INTRAVENOUS; SUBCUTANEOUS at 13:02

## 2018-10-04 RX ADMIN — SODIUM CHLORIDE, POTASSIUM CHLORIDE, SODIUM LACTATE AND CALCIUM CHLORIDE: 600; 310; 30; 20 INJECTION, SOLUTION INTRAVENOUS at 12:29

## 2018-10-04 RX ADMIN — Medication 0.2 MG: at 07:46

## 2018-10-04 RX ADMIN — ASPIRIN 325 MG: 325 TABLET, DELAYED RELEASE ORAL at 17:51

## 2018-10-04 RX ADMIN — SODIUM CHLORIDE: 9 INJECTION, SOLUTION INTRAVENOUS at 17:50

## 2018-10-04 RX ADMIN — NEOSTIGMINE METHYLSULFATE 3.5 MG: 1 INJECTION, SOLUTION INTRAVENOUS at 14:24

## 2018-10-04 RX ADMIN — INSULIN ASPART 1 UNITS: 100 INJECTION, SOLUTION INTRAVENOUS; SUBCUTANEOUS at 05:31

## 2018-10-04 RX ADMIN — PHENYLEPHRINE HYDROCHLORIDE 100 MCG: 10 INJECTION, SOLUTION INTRAMUSCULAR; INTRAVENOUS; SUBCUTANEOUS at 12:44

## 2018-10-04 RX ADMIN — FENTANYL CITRATE 100 MCG: 50 INJECTION, SOLUTION INTRAMUSCULAR; INTRAVENOUS at 12:42

## 2018-10-04 RX ADMIN — PHENYLEPHRINE HYDROCHLORIDE 100 MCG: 10 INJECTION, SOLUTION INTRAMUSCULAR; INTRAVENOUS; SUBCUTANEOUS at 12:48

## 2018-10-04 RX ADMIN — OXYCODONE HYDROCHLORIDE 5 MG: 5 TABLET ORAL at 02:12

## 2018-10-04 RX ADMIN — VECURONIUM BROMIDE 3 MG: 1 INJECTION, POWDER, LYOPHILIZED, FOR SOLUTION INTRAVENOUS at 13:33

## 2018-10-04 RX ADMIN — TAMSULOSIN HYDROCHLORIDE 0.4 MG: 0.4 CAPSULE ORAL at 08:13

## 2018-10-04 RX ADMIN — DILTIAZEM HYDROCHLORIDE 120 MG: 120 CAPSULE, EXTENDED RELEASE ORAL at 08:13

## 2018-10-04 RX ADMIN — CEFAZOLIN 1 G: 1 INJECTION, POWDER, FOR SOLUTION INTRAMUSCULAR; INTRAVENOUS at 20:39

## 2018-10-04 RX ADMIN — PHENYLEPHRINE HYDROCHLORIDE 0.25 MCG/KG/MIN: 10 INJECTION, SOLUTION INTRAMUSCULAR; INTRAVENOUS; SUBCUTANEOUS at 13:11

## 2018-10-04 RX ADMIN — ALBUMIN (HUMAN): 12.5 SOLUTION INTRAVENOUS at 12:46

## 2018-10-04 RX ADMIN — GLYCOPYRROLATE 0.7 MG: 0.2 INJECTION, SOLUTION INTRAMUSCULAR; INTRAVENOUS at 14:24

## 2018-10-04 RX ADMIN — Medication 0.5 MG: at 03:19

## 2018-10-04 RX ADMIN — OXYCODONE HYDROCHLORIDE 5 MG: 5 TABLET ORAL at 19:18

## 2018-10-04 RX ADMIN — PHENYLEPHRINE HYDROCHLORIDE 100 MCG: 10 INJECTION, SOLUTION INTRAMUSCULAR; INTRAVENOUS; SUBCUTANEOUS at 13:17

## 2018-10-04 RX ADMIN — CEFAZOLIN SODIUM 2 G: 2 INJECTION, SOLUTION INTRAVENOUS at 13:11

## 2018-10-04 RX ADMIN — METOPROLOL SUCCINATE 50 MG: 50 TABLET, EXTENDED RELEASE ORAL at 08:13

## 2018-10-04 RX ADMIN — INSULIN ASPART 1 UNITS: 100 INJECTION, SOLUTION INTRAVENOUS; SUBCUTANEOUS at 09:29

## 2018-10-04 RX ADMIN — ROCURONIUM BROMIDE 50 MG: 10 INJECTION INTRAVENOUS at 12:42

## 2018-10-04 RX ADMIN — LIDOCAINE HYDROCHLORIDE 80 MG: 20 INJECTION, SOLUTION INFILTRATION; PERINEURAL at 12:42

## 2018-10-04 RX ADMIN — SODIUM CHLORIDE, POTASSIUM CHLORIDE, SODIUM LACTATE AND CALCIUM CHLORIDE: 600; 310; 30; 20 INJECTION, SOLUTION INTRAVENOUS at 12:12

## 2018-10-04 RX ADMIN — PHENYLEPHRINE HYDROCHLORIDE 100 MCG: 10 INJECTION, SOLUTION INTRAMUSCULAR; INTRAVENOUS; SUBCUTANEOUS at 13:05

## 2018-10-04 RX ADMIN — SENNOSIDES AND DOCUSATE SODIUM 2 TABLET: 8.6; 5 TABLET ORAL at 20:39

## 2018-10-04 RX ADMIN — ACETAMINOPHEN 975 MG: 325 TABLET, FILM COATED ORAL at 17:51

## 2018-10-04 RX ADMIN — OXYCODONE HYDROCHLORIDE 5 MG: 5 TABLET ORAL at 06:48

## 2018-10-04 RX ADMIN — PHENYLEPHRINE HYDROCHLORIDE 100 MCG: 10 INJECTION, SOLUTION INTRAMUSCULAR; INTRAVENOUS; SUBCUTANEOUS at 12:46

## 2018-10-04 RX ADMIN — Medication 0.2 MG: at 05:13

## 2018-10-04 RX ADMIN — GABAPENTIN 100 MG: 100 CAPSULE ORAL at 17:51

## 2018-10-04 RX ADMIN — SODIUM CHLORIDE, POTASSIUM CHLORIDE, SODIUM LACTATE AND CALCIUM CHLORIDE: 600; 310; 30; 20 INJECTION, SOLUTION INTRAVENOUS at 13:57

## 2018-10-04 RX ADMIN — PHENYLEPHRINE HYDROCHLORIDE 100 MCG: 10 INJECTION, SOLUTION INTRAMUSCULAR; INTRAVENOUS; SUBCUTANEOUS at 12:53

## 2018-10-04 RX ADMIN — SODIUM CHLORIDE: 9 INJECTION, SOLUTION INTRAVENOUS at 05:13

## 2018-10-04 ASSESSMENT — PAIN DESCRIPTION - DESCRIPTORS
DESCRIPTORS: ACHING
DESCRIPTORS: ACHING

## 2018-10-04 ASSESSMENT — ACTIVITIES OF DAILY LIVING (ADL)
ADLS_ACUITY_SCORE: 14
ADLS_ACUITY_SCORE: 14

## 2018-10-04 ASSESSMENT — ENCOUNTER SYMPTOMS
ARTHRALGIAS: 1
DYSRHYTHMIAS: 1
HEADACHES: 0

## 2018-10-04 ASSESSMENT — LIFESTYLE VARIABLES: TOBACCO_USE: 1

## 2018-10-04 NOTE — PLAN OF CARE
Problem: Patient Care Overview  Goal: Plan of Care/Patient Progress Review  Outcome: No Change  A&Ox4. VSS on RA (can be tachycardic low 100's). Pain managed with IV Dilaudid. CMS intact, decreased strength in BLE 3/5. Bedrest. NPO with meds. Ortho to see patient today. Unable to do full skin assessment, pt refuses to turn due to pain, pt reports previous coccyx wound healed, nurse unable to assess. Nurse educated pt on pressure injury and weight shifting. IV NS infusing 100/hr. Voiding in urinal. BG monitoring, Q4H checks. Discharge pending ortho workup. Will continue to monitor.

## 2018-10-04 NOTE — ANESTHESIA PREPROCEDURE EVALUATION
Procedure: Procedure(s):  OPEN REDUCTION INTERNAL FIXATION HIP BIPOLAR  Preop diagnosis: HIP FRACTURE.    No Known Allergies  Past Medical History:   Diagnosis Date     Benign essential hypertension 9/4/2018     Coronary artery disease      Nonsenile cataract      Recent retinal detachment, total or subtotal 8/5/2014     Type 2 diabetes mellitus without complications (H)      Past Surgical History:   Procedure Laterality Date     CARDIAC SURGERY       CATARACT IOL, RT/LT Bilateral      lacrimal caruncle removed BE Bilateral ~1989     OPEN REDUCTION INTERNAL FIXATION HIP BIPOLAR Right 8/26/2018    Procedure: OPEN REDUCTION INTERNAL FIXATION HIP BIPOLAR;  Right Hip Bipolar Hemiarthroplasty (Biomet);  Surgeon: Bill Sanders MD;  Location:  OR     REPAIR RUPTURED GLOBE  4/21/2014    Procedure: Exploration and Repair of Ruptured Globe ;  Surgeon: Mercedes Rivera MD;  Location:  OR     Social History   Substance Use Topics     Smoking status: Former Smoker     Smokeless tobacco: Never Used     Alcohol use Not on file     Prior to Admission medications    Medication Sig Start Date End Date Taking? Authorizing Provider   aspirin 325 MG EC tablet Take one Aspirin tab twice daily for 5 weeks. 8/27/18  Yes Bill Sanders MD   atorvastatin (LIPITOR) 10 MG tablet Take 1 tablet (10 mg) by mouth every evening 9/3/18  Yes Meli Arndt PA-C   diltiazem (TIAZAC) 120 MG 24 hr ER beaded capsule Take 120 mg by mouth daily 9/13/18  Yes Reported, Patient   IBUPROFEN PO Take 200 mg by mouth every 6 hours as needed for moderate pain   Yes Reported, Patient   insulin glargine (LANTUS) 100 UNIT/ML injection Inject 7 Units Subcutaneous every morning   Yes Reported, Patient   insulin lispro (HUMALOG KWIKPEN) 100 UNIT/ML injection Inject Subcutaneous At Bedtime Sliding scale:  -249=1 unit  -299=2 units  -349=3 units  -399=4 units  +=5 units   Yes Unknown, Entered By History   insulin  lispro (HUMALOG KWIKPEN) 100 UNIT/ML injection Inject 2 Units Subcutaneous 3 times daily (before meals) 9/3/18  Yes Meli Arndt PA-C   insulin lispro (HUMALOG) 100 UNIT/ML injection Inject Subcutaneous 3 times daily (before meals) Sliding scale:   -189=1 unit  -239=2 units  -289=3 units  -339=4 units  -399=5 units  -499=6 units  +=7 units   Yes Reported, Patient   melatonin 5 MG tablet Take 5 mg by mouth At Bedtime   Yes Unknown, Entered By History   melatonin 5 MG tablet Take 5 mg by mouth nightly as needed for sleep (MR x 1 PRN if scheduled dose is insufficient)   Yes Unknown, Entered By History   metoprolol succinate (TOPROL-XL) 50 MG 24 hr tablet Take 1 tablet (50 mg) by mouth daily 9/4/18  Yes Meli Arndt PA-C   oxyCODONE IR (ROXICODONE) 5 MG tablet Take 1 tablet (5 mg) by mouth every 4 hours as needed for pain 8/27/18  Yes Bill Sanders MD   polyethylene glycol (MIRALAX/GLYCOLAX) Packet Take 17 g by mouth daily   Yes Reported, Patient   tamsulosin (FLOMAX) 0.4 MG capsule Take 0.4 mg by mouth every evening    Yes Reported, Patient   Zolpidem Tartrate (AMBIEN PO) Take 5 mg by mouth daily as needed for sleep   Yes Reported, Patient   aspirin 81 MG tablet Take 81 mg by mouth daily To start on 10/9/18 after 325mg BID dosing is done.    Unknown, Entered By History   Skin Protectants, Misc. (ANIBAL PROTECT EX) Apply topically 2 times daily    Reported, Patient     Current Facility-Administered Medications Ordered in Epic   Medication Dose Route Frequency Last Rate Last Dose     [Auto Hold] acetaminophen (TYLENOL) tablet 650 mg  650 mg Oral Q4H PRN         [Auto Hold] atorvastatin (LIPITOR) tablet 10 mg  10 mg Oral QPM         [Auto Hold] bisacodyl (DULCOLAX) EC tablet 5 mg  5 mg Oral Daily PRN        Or     [Auto Hold] bisacodyl (DULCOLAX) EC tablet 10 mg  10 mg Oral Daily PRN        Or     [Auto Hold] bisacodyl (DULCOLAX) EC tablet 15 mg  15 mg  Oral Daily PRN         [Auto Hold] bisacodyl (DULCOLAX) Suppository 10 mg  10 mg Rectal Daily PRN         ceFAZolin (ANCEF) 1 g vial to attach to  ml bag for ADULT or 50 ml bag for PEDS  1 g Intravenous See Admin Instructions         ceFAZolin (ANCEF) intermittent infusion 2 g in 100 mL dextrose PRE-MIX  2 g Intravenous Pre-Op/Pre-procedure x 1 dose         [Auto Hold] glucose gel 15-30 g  15-30 g Oral Q15 Min PRN        Or     [Auto Hold] dextrose 50 % injection 25-50 mL  25-50 mL Intravenous Q15 Min PRN        Or     [Auto Hold] glucagon injection 1 mg  1 mg Subcutaneous Q15 Min PRN         [Auto Hold] diltiazem (DILACOR XR) 24 hr capsule 120 mg  120 mg Oral Daily   120 mg at 10/04/18 0813     [Auto Hold] HYDROmorphone (PF) (DILAUDID) injection 0.2 mg  0.2 mg Intravenous Q2H PRN   0.2 mg at 10/04/18 0746     [START ON 10/5/2018] influenza Vac Split High-Dose (FLUZONE) injection 0.5 mL  0.5 mL Intramuscular Prior to discharge         [Auto Hold] insulin aspart (NovoLOG) inj (RAPID ACTING)  1-6 Units Subcutaneous Q4H   1 Units at 10/04/18 0929     [Auto Hold] insulin glargine (LANTUS) injection 3 Units  3 Units Subcutaneous QAM AC         [Auto Hold] labetalol (NORMODYNE/TRANDATE) injection 10 mg  10 mg Intravenous Q2H PRN         lactated ringers infusion   Intravenous Continuous 25 mL/hr at 10/04/18 1212       [Auto Hold] melatonin tablet 1 mg  1 mg Oral At Bedtime PRN         [Auto Hold] metoprolol succinate (TOPROL-XL) 24 hr tablet 50 mg  50 mg Oral Daily   50 mg at 10/04/18 0813     [Auto Hold] naloxone (NARCAN) injection 0.1-0.4 mg  0.1-0.4 mg Intravenous Q2 Min PRN         [Auto Hold] ondansetron (ZOFRAN-ODT) ODT tab 4 mg  4 mg Oral Q6H PRN        Or     [Auto Hold] ondansetron (ZOFRAN) injection 4 mg  4 mg Intravenous Q6H PRN         [Auto Hold] oxyCODONE IR (ROXICODONE) tablet 5-10 mg  5-10 mg Oral Q3H PRN   5 mg at 10/04/18 0648     [Auto Hold] polyethylene glycol (MIRALAX/GLYCOLAX) Packet 17 g  17 g  Oral Daily PRN         [Auto Hold] prochlorperazine (COMPAZINE) injection 5 mg  5 mg Intravenous Q6H PRN        Or     [Auto Hold] prochlorperazine (COMPAZINE) tablet 5 mg  5 mg Oral Q6H PRN        Or     [Auto Hold] prochlorperazine (COMPAZINE) Suppository 12.5 mg  12.5 mg Rectal Q12H PRN         sodium chloride 0.9% infusion   Intravenous Continuous 100 mL/hr at 10/04/18 0513       [Auto Hold] tamsulosin (FLOMAX) capsule 0.4 mg  0.4 mg Oral Daily   0.4 mg at 10/04/18 0813     [Auto Hold] zolpidem (AMBIEN) tablet 5 mg  5 mg Oral Daily PRN         No current [facility]-ordered outpatient prescriptions on file.       lactated ringers 25 mL/hr at 10/04/18 1212     sodium chloride 100 mL/hr at 10/04/18 0513     Wt Readings from Last 1 Encounters:   10/04/18 72.6 kg (160 lb)     Temp Readings from Last 1 Encounters:   10/04/18 36.8  C (98.3  F) (Oral)     BP Readings from Last 6 Encounters:   10/04/18 131/72   10/03/18 104/57   09/28/18 107/54   09/17/18 127/77   09/12/18 148/71   09/06/18 164/78     Pulse Readings from Last 4 Encounters:   10/03/18 101   09/28/18 86   09/17/18 61   09/12/18 72     Resp Readings from Last 1 Encounters:   10/04/18 16     SpO2 Readings from Last 1 Encounters:   10/04/18 90%     Recent Labs   Lab Test  10/03/18   0625  09/06/18   0625   NA  139  138   POTASSIUM  4.1  4.1   CHLORIDE  106  105   CO2  23  23   ANIONGAP  10  10   GLC  90  151*   BUN  41*  23   CR  1.99*  1.57*   JOSELINE  7.8*  7.9*     No results for input(s): AST, ALT, ALKPHOS, BILITOTAL, LIPASE in the last 09692 hours.  Recent Labs   Lab Test  09/06/18   0625  09/03/18   0545   WBC  10.1  9.6   HGB  9.2*  8.2*   PLT  388  260     No results for input(s): ABO, RH in the last 79058 hours.  Recent Labs   Lab Test  04/21/14   0405   INR  0.91      Recent Labs   Lab Test  08/29/18   1625   TROPI  0.039     No results for input(s): PH, PCO2, PO2, HCO3 in the last 69597 hours.  No results for input(s): HCG in the last 47132 hours.  Recent  Results (from the past 744 hour(s))   XR Chest 1 View    Narrative    XR CHEST 1 VW  10/4/2018 3:03 AM     HISTORY: Hip fracture.    COMPARISON: 8/29/2018.    FINDINGS: Supine chest. Sternal wires and mediastinal clips. Mild  probable scarring at the right lung base and in the left lung  laterally. The lungs are otherwise clear. No pneumothorax.      Impression    IMPRESSION: No acute abnormality.    SHARI HERNANDEZ MD   XR Pelvis w Hip Left 1 View    Narrative    XR PELVIS AND HIP LEFT 1 VIEW  10/4/2018 3:04 AM     HISTORY: Pain, fall, left hip pain.    COMPARISON: None.       Impression    IMPRESSION: Angulated left femoral neck fracture.    SHARI HERNANDEZ MD       RECENT LABS:   ECG:   ECHO:       RECENT LABS:   ECG:   ECHO:     Anesthesia Evaluation     . Pt has had prior anesthetic.     No history of anesthetic complications          ROS/MED HX    ENT/Pulmonary:     (+)tobacco use, Past use , . .    Neurologic:       Cardiovascular: Comment: L BBB, Non-sustained v tach during 8/2018 hospitalization for hip fracture    (+) hypertension--CAD, -CABG-. : . . . :. dysrhythmias Other and a-fib, .       METS/Exercise Tolerance:     Hematologic:     (+) Anemia, History of Transfusion Other Hematologic Disorder-Myelodysplastic syndrome      Musculoskeletal:   (+) arthritis, , fracture lower extremity: Femoral, other musculoskeletal- same fracture on right side 2 months ago      GI/Hepatic:        (-) GERD   Renal/Genitourinary:     (+) chronic renal disease, type: CRI,       Endo:     (+) type II DM Last HgA1c: 8.4 Using insulin - using insulin pump .      Psychiatric:         Infectious Disease:         Malignancy:         Other:                     Physical Exam  Normal systems: cardiovascular and pulmonary    Airway   Mallampati: I  TM distance: >3 FB  Neck ROM: full    Dental   (+) caps and missing    Cardiovascular       Pulmonary                         Anesthesia Plan      History & Physical Review  History and  physical reviewed and following examination; no interval change.    ASA Status:  3 .    NPO Status:  > 8 hours    Plan for General and ETT with Intravenous and Propofol induction. Maintenance will be Balanced.    PONV prophylaxis:  Ondansetron (or other 5HT-3)  Additional equipment: Videolaryngoscope      Postoperative Care  Postoperative pain management:  IV analgesics.      Consents  Anesthetic plan, risks, benefits and alternatives discussed with:  Patient..                          .

## 2018-10-04 NOTE — IP AVS SNAPSHOT
` ` Patient Information     Patient Name Sex     Abmiael Flores ([id number]) Male 1924       Room Bed    [facility] 5512-02      Patient Demographics     Address Phone    [address]  [address] [place] [id number] [phone] (Home) *Preferred*  NONE (Work)  [phone] (Mobile)      Patient Ethnicity & Race     Ethnic Group Patient Race    American Choose not to answer      Emergency Contact(s)     Name Relation Home Work Mobile    Jaye Flores Spouse [phone] NONE     Aleksandar Flores [phone] NONE [phone]      Documents on File        Status Date Received Description       Documents for the Patient    Privacy Notice - [place] Received 14     Face Sheet  04/15/03     Insurance Card  04/15/03     Consent for Services - Hospital/Clinic Received () 14     Consent for EHR Access Received 14     External Medication Information Consent       Patient ID Received 18     [facility] Specified Other       HIM JUDD Authorization - File Only  14 [facility] AUTHORIZATION FOR RELEASE OF PROTECTED HEALTH INFORMATION    HIM JUDD Authorization - File Only  14 AUTHORIZATION FOR RELEASE OF PROTECTED HEALTH INFORMATION    Business/Insurance/Care Coordination/Health Form - Patient  14 CARE COORDINATION LETTER    HIM JUDD Authorization  07/03/15 [facility]    Consent for Services - Hospital and Clinic Received 18     HIE Auth Received 18     Insurance Card Received 18 new medicare    Insurance Card Received 18     Consent for Services - Geriatrics Received 09/10/18     Advance Directives and Living Will Not Received (Deleted)  invalid; to be deleted       Documents for the Encounter    CMS IM for Patient Signature Received 10/04/18       Admission Information     Attending Provider Admitting Provider Admission Type Admission Date/Time    Radha Collins MD Arbabi, Mohammad H, MD Emergency 10/04/18  0151    Discharge Date Hospital  Service Auth/Cert Status Service Area     Hospitalist Incomplete [facility] SERVICES    Unit Room/Bed Admission Status        55 ORTHO SPEC UNIT 5512/5512-02 Admission (Confirmed)       Admission     Complaint    Fracture of femoral neck, left (H), HIP FRACTURE., Hip fracture, left (H)      Hospital Account     Name Acct ID Class Status Primary Coverage    Abimael Flores [id number] Inpatient Open MEDICARE - MEDICARE            Guarantor Account (for Hospital Account #[id number])     Name Relation to Pt Service Area Active? Acct Type    Fabiánaxel Varghese  FCS Yes Personal/Family    Address Phone          [address]  [place] [address] [phone](H)  NONE(O)              Coverage Information (for Hospital Account #[id number])     1. MEDICARE/MEDICARE     F/O Payor/Plan Precert #    MEDICARE/MEDICARE     Subscriber Subscriber #    FabiánaddisonAbimael gabriel [id number]    Address Phone    [employer]  [address]  [place] [address] [phone]          2. [place]     F/O Payor/Plan Precert #    [place]     Subscriber Subscriber #    Abimael Flroes [id number]    Address Phone    [address] [address]  [place] [address] [phone]

## 2018-10-04 NOTE — IP AVS SNAPSHOT
"[place] [address] [facility]: [phone]                                              INTERAGENCY TRANSFER FORM - PHYSICIAN ORDERS   10/4/2018                    Hospital Admission Date: 10/4/2018  CHERYL HOLLOWAY   : 1924  Sex: Male        Attending Provider: Radha Collins MD     Allergies:  No Known Allergies    Infection:  None   Service:  HOSPITALIST    Ht:  1.753 m (5' 9\")   Wt:  73.2 kg (161 lb 6 oz)   Admission Wt:  72.6 kg (160 lb)    BMI:  23.83 kg/m 2   BSA:  1.89 m 2            Patient PCP Information     Provider PCP Type    [facility]      ED Clinical Impression     Diagnosis Description Comment Added By Time Added    Fracture of hip, left, closed, initial encounter (H) [S72.002A] Fracture of hip, left, closed, initial encounter (H) [S72.002A]  Netta Trivedi MD 10/4/2018  3:14 AM      Hospital Problems as of 10/10/2018              Priority Class Noted POA    Fracture of femoral neck, left (H) Medium  10/4/2018 Yes    Hip fracture, left (H) Medium  10/4/2018 Yes      Non-Hospital Problems as of 10/10/2018              Priority Class Noted    Injury of globe of eye Medium  2014    Recent retinal detachment, total or subtotal Medium  2014    Closed right hip fracture (H) Medium  2018    S/P total hip arthroplasty Medium  2018    Type 2 diabetes mellitus with renal complication (H) Medium  2018    Cardiac arrhythmia, unspecified cardiac arrhythmia type Medium  2018    CKD (chronic kidney disease) stage 3, GFR 30-59 ml/min (H) Medium  2018    Benign essential hypertension Medium  2018    Coronary artery disease involving coronary bypass graft of native heart without angina pectoris Medium  2018    Pneumonia due to infectious organism Medium  2018    Physical deconditioning Medium  2018    Other insomnia Medium  2018    Bladder spasms Medium  2018      Code Status History     Date Active Date Inactive " Code Status Order ID Comments User Context    10/8/2018 12:50 PM  DNR/DNI [id number]  Ferdinand Earlsg Bell,  Outpatient    10/4/2018  4:36 AM 10/8/2018 12:50 PM DNR/DNI [id number]  Radha Collins MD Inpatient    8/25/2018  9:30 PM 9/3/2018  4:24 PM DNR/DNI [id number]  Mmaie Eubanks MD Inpatient    4/22/2014 12:05 PM 8/25/2018  9:30 PM Full Code [id number]  Ryne Justin PA-C Outpatient         Medication Review      START taking        Dose / Directions Comments    acetaminophen 325 MG tablet   Commonly known as:  TYLENOL   Used for:  Fracture of hip, left, closed, initial encounter (H)        Dose:  650 mg   Take 2 tablets (650 mg) by mouth every 4 hours as needed for other (multimodal surgical pain management along with NSAIDS and opioid medication as indicated based on pain control and physical function.)   Quantity:  100 tablet   Refills:  0        DRISDOL 90381 units Caps   Used for:  Fracture of hip, left, closed, initial encounter (H)        Dose:  40861 Units   Take 50,000 Units by mouth every 7 days for 5 doses   Quantity:  5 capsule   Refills:  0          CONTINUE these medications which may have CHANGED, or have new prescriptions. If we are uncertain of the size of tablets/capsules you have at home, strength may be listed as something that might have changed.        Dose / Directions Comments    aspirin 325 MG EC tablet   This may have changed:  Another medication with the same name was removed. Continue taking this medication, and follow the directions you see here.   Used for:  Closed fracture of neck of right femur, initial encounter (H)        Take one Aspirin tab twice daily for 5 weeks.   Quantity:  70 tablet   Refills:  0        oxyCODONE IR 5 MG tablet   Commonly known as:  ROXICODONE   This may have changed:    - when to take this  - reasons to take this   Used for:  Fracture of hip, left, closed, initial encounter (H)        Dose:  5 mg   Take 1 tablet (5 mg) by mouth every 3 hours  as needed for severe pain   Quantity:  30 tablet   Refills:  0    Limit for severe pain due to confusion.         CONTINUE these medications which have NOT CHANGED        Dose / Directions Comments    atorvastatin 10 MG tablet   Commonly known as:  LIPITOR   Used for:  Coronary artery disease involving native heart without angina pectoris, unspecified vessel or lesion type        Dose:  10 mg   Take 1 tablet (10 mg) by mouth every evening   Refills:  0        ANIBAL PROTECT EX        Apply topically 2 times daily   Refills:  0        diltiazem 120 MG 24 hr ER beaded capsule   Commonly known as:  TIAZAC   Indication:  High Blood Pressure Disorder        Dose:  120 mg   Take 120 mg by mouth daily   Refills:  0        insulin glargine 100 UNIT/ML injection   Commonly known as:  LANTUS        Dose:  7 Units   Inject 7 Units Subcutaneous every morning   Refills:  0        * insulin lispro 100 UNIT/ML injection   Commonly known as:  HumaLOG        Inject Subcutaneous 3 times daily (before meals) Sliding scale:  -189=1 unit -239=2 units -289=3 units -339=4 units -399=5 units -499=6 units +=7 units   Refills:  0        * HumaLOG KWIKpen 100 UNIT/ML injection   Generic drug:  insulin lispro        Inject Subcutaneous At Bedtime Sliding scale: -249=1 unit -299=2 units -349=3 units -399=4 units +=5 units   Refills:  0        * insulin lispro 100 UNIT/ML injection   Commonly known as:  HumaLOG KWIKpen   Used for:  Type 2 diabetes mellitus with complication, with long-term current use of insulin (H)        Dose:  2 Units   Inject 2 Units Subcutaneous 3 times daily (before meals)   Refills:  0        metoprolol succinate 50 MG 24 hr tablet   Commonly known as:  TOPROL-XL   Used for:  Paroxysmal supraventricular tachycardia (H)        Dose:  50 mg   Take 1 tablet (50 mg) by mouth daily   Quantity:  30 tablet   Refills:  0        polyethylene glycol Packet    Commonly known as:  MIRALAX/GLYCOLAX        Dose:  17 g   Take 17 g by mouth daily   Refills:  0        tamsulosin 0.4 MG capsule   Commonly known as:  FLOMAX        Dose:  0.4 mg   Take 0.4 mg by mouth every evening   Refills:  0        * Notice:  This list has 3 medication(s) that are the same as other medications prescribed for you. Read the directions carefully, and ask your doctor or other care provider to review them with you.      STOP taking     AMBIEN PO           IBUPROFEN PO           melatonin 5 MG tablet                   Summary of Visit     Reason for your hospital stay       Fall.             After Care     Activity - Up with nursing assistance           Additional Discharge Instructions       Aggressive incentive spirometry.  Avoid nephrotoxic drugs.  Discussed with patient's family and would hold off pursuing any further cardiac workup at this time.  Discontinued Ambien as concern patient at risk for delirium while on narcotics.       Advance Diet as Tolerated       Follow this diet upon discharge: Orders Placed This Encounter      Room Service      Advance Diet as Tolerated: Regular Diet Adult       Fall precautions           Fall precautions           General info for SNF       Length of Stay Estimate: Short Term Care: Estimated # of Days <30  Condition at Discharge: Improving  Level of care:skilled   Rehabilitation Potential: Good  Admission H&P remains valid and up-to-date: Yes  Recent Chemotherapy: N/A  Use Nursing Home Standing Orders: Yes       Hip precautions           Mantoux instructions       Give two-step Mantoux (PPD) Per Facility Policy Yes       Weight bearing status       WBAT       Wound care       Daily dry dressing changes.  Staples out 12 days post-op and apply steri-strips.             Referrals     Occupational Therapy Adult Consult       Evaluate and treat as clinically indicated.    Reason:  WBAT.  Posterior hip precautions.       Occupational Therapy Adult Consult        Evaluate and treat as clinically indicated.    Reason:  Physical deconditioning.       Physical Therapy Adult Consult       Evaluate and treat as clinically indicated.    Reason:  WBAT.  Posterior hip precautions.       Physical Therapy Adult Consult       Evaluate and treat as clinically indicated.    Reason:  Physical deconditioning.             Follow-Up Appointment Instructions     Future Labs/Procedures    Follow Up and recommended labs and tests     Comments:    Follow-up with your orthopedic MD at [facility] in 10-14 days.  Call [phone] to schedule ASAP.  Also, please call with any questions that come up prior to your appointment.    Follow Up and recommended labs and tests     Comments:    Follow up with half-way physician.  The following labs/tests are recommended: hemoglobin and creatinine in 1 week   Follow up with primary care provider.   Follow up with Orthopedics as directed.      Follow-Up Appointment Instructions     Follow Up and recommended labs and tests       Follow-up with your orthopedic MD at [facility] in 10-14 days.  Call [phone] to schedule ASAP.  Also, please call with any questions that come up prior to your appointment.       Follow Up and recommended labs and tests       Follow up with half-way physician.  The following labs/tests are recommended: hemoglobin and creatinine in 1 week   Follow up with primary care provider.   Follow up with Orthopedics as directed.             Statement of Approval     Ordered          10/10/18 1247  I have reviewed and agree with all the recommendations and orders detailed in this document.  EFFECTIVE NOW     Approved and electronically signed by:  Isaiah Vasquez MD           10/10/18 1111  I have reviewed and agree with all the recommendations and orders detailed in this document.     Approved and electronically signed by:  Isaiah Vasquez MD           10/08/18 1251  I have reviewed and agree with all the  recommendations and orders detailed in this document.  EFFECTIVE NOW     Approved and electronically signed by:  Earl Streeter DO

## 2018-10-04 NOTE — IP AVS SNAPSHOT
MRN:[id number]                      After Visit Summary   10/4/2018    Abimael Flores    MRN: [id number]           Thank you!     Thank you for choosing [facility] for your care. Our goal is always to provide you with excellent care. Hearing back from our patients is one way we can continue to improve our services. Please take a few minutes to complete the written survey that you may receive in the mail after you visit with us. Thank you!        Patient Information     Date Of Birth          1/23/1924        Designated Caregiver       Most Recent Value    Caregiver    Will someone help with your care after discharge? yes    Name of designated caregiver Aleksandar     Phone number of caregiver pt doesn't know    Caregiver address pt doesn't know      About your hospital stay     You were admitted on:  October 4, 2018 You last received care in the:  [address] [facility]    You were discharged on:  October 10, 2018        Reason for your hospital stay       Fall.                  Who to Call     For medical emergencies, please call 911.  For non-urgent questions about your medical care, please call your primary care provider or clinic, [phone]  For questions related to your surgery, please call your surgery clinic        Attending Provider     Provider Specialty    Netta Trivedi MD Emergency Medicine    Radha Collins MD Internal Medicine       Primary Care Provider Office Phone # Fax #    Carlitos Bee [phone]      After Care Instructions     Activity - Up with nursing assistance           Additional Discharge Instructions       Aggressive incentive spirometry.  Avoid nephrotoxic drugs.  Discussed with patient's family and would hold off pursuing any further cardiac workup at this time.  Discontinued Ambien as concern patient at risk for delirium while on narcotics.            Advance Diet as Tolerated       Follow this diet upon discharge: Orders  Placed This Encounter      Room Service      Advance Diet as Tolerated: Regular Diet Adult            Fall precautions           Fall precautions           General info for SNF       Length of Stay Estimate: Short Term Care: Estimated # of Days <30  Condition at Discharge: Improving  Level of care:skilled   Rehabilitation Potential: Good  Admission H&P remains valid and up-to-date: Yes  Recent Chemotherapy: N/A  Use Nursing Home Standing Orders: Yes            Hip precautions           Mantoux instructions       Give two-step Mantoux (PPD) Per Facility Policy Yes            Weight bearing status       WBAT            Wound care       Daily dry dressing changes.  Staples out 12 days post-op and apply steri-strips.                  Follow-up Appointments     Follow Up and recommended labs and tests       Follow-up with your orthopedic MD at [facility] in 10-14 days.  Call [phone] to schedule ASAP.  Also, please call with any questions that come up prior to your appointment.            Follow Up and recommended labs and tests       Follow up with correction physician.  The following labs/tests are recommended: hemoglobin and creatinine in 1 week   Follow up with primary care provider.   Follow up with Orthopedics as directed.                  Additional Services     Occupational Therapy Adult Consult       Evaluate and treat as clinically indicated.    Reason:  WBAT.  Posterior hip precautions.            Occupational Therapy Adult Consult       Evaluate and treat as clinically indicated.    Reason:  Physical deconditioning.            Physical Therapy Adult Consult       Evaluate and treat as clinically indicated.    Reason:  WBAT.  Posterior hip precautions.            Physical Therapy Adult Consult       Evaluate and treat as clinically indicated.    Reason:  Physical deconditioning.                  Pending Results     Date and Time Order Name Status Description    10/8/2018 0535 T4 free In  "process             Statement of Approval     Ordered          10/10/18 1247  I have reviewed and agree with all the recommendations and orders detailed in this document.  EFFECTIVE NOW     Approved and electronically signed by:  Isaiah Vasquez MD           10/10/18 1111  I have reviewed and agree with all the recommendations and orders detailed in this document.     Approved and electronically signed by:  Isaiah Vasquez MD           10/08/18 1251  I have reviewed and agree with all the recommendations and orders detailed in this document.  EFFECTIVE NOW     Approved and electronically signed by:  Earl Streeter DO             Admission Information     Date & Time Provider Department Dept. Phone    10/4/2018 Radha Collins MD [place] [address] [facility] [phone]      Your Vitals Were     Blood Pressure Pulse Temperature Respirations Height Weight    135/73 (BP Location: Left arm) 89 97.8  F (36.6  C) (Oral) 18 1.753 m (5' 9\") 73.2 kg (161 lb 6 oz)    Pulse Oximetry BMI (Body Mass Index)                98% 23.83 kg/m2          [employer] Information     [employer] lets you send messages to your doctor, view your test results, renew your prescriptions, schedule appointments and more. To sign up, go to [email or web address] . Click on \"Log in\" on the left side of the screen, which will take you to the Welcome page. Then click on \"Sign up Now\" on the right side of the page.     You will be asked to enter the access code listed below, as well as some personal information. Please follow the directions to create your username and password.     Your access code is: [email or web address]  Expires: 2018 12:59 PM     Your access code will  in 90 days. If you need help or a new code, please call your [facility] clinic or [phone].        Care EveryWhere ID     This is your Care EveryWhere ID. This could be used by other organizations to access your [facility] medical records  [id number]   "      Equal Access to Services     [facility]: Hadii aad ku hadmaylinafshin Luisachirs, watammyda luqadaha, qaybta kaamilcardani sheldon, kylah pantoja. So [facility] [phone].    ATENCIÓN: Si habla español, tiene a funes disposición servicios gratuitos de asistencia lingüística. Llame al [phone].    We comply with applicable federal civil rights laws and [place] laws. We do not discriminate on the basis of race, color, national origin, age, disability, sex, sexual orientation, or gender identity.               Review of your medicines      START taking        Dose / Directions    acetaminophen 325 MG tablet   Commonly known as:  TYLENOL   Used for:  Fracture of hip, left, closed, initial encounter (H)        Dose:  650 mg   Take 2 tablets (650 mg) by mouth every 4 hours as needed for other (multimodal surgical pain management along with NSAIDS and opioid medication as indicated based on pain control and physical function.)   Quantity:  100 tablet   Refills:  0       DRISDOL 28560 units Caps   Used for:  Fracture of hip, left, closed, initial encounter (H)        Dose:  02469 Units   Take 50,000 Units by mouth every 7 days for 5 doses   Quantity:  5 capsule   Refills:  0         CONTINUE these medicines which may have CHANGED, or have new prescriptions. If we are uncertain of the size of tablets/capsules you have at home, strength may be listed as something that might have changed.        Dose / Directions    aspirin 325 MG EC tablet   This may have changed:  Another medication with the same name was removed. Continue taking this medication, and follow the directions you see here.   Used for:  Closed fracture of neck of right femur, initial encounter (H)        Take one Aspirin tab twice daily for 5 weeks.   Quantity:  70 tablet   Refills:  0       oxyCODONE IR 5 MG tablet   Commonly known as:  ROXICODONE   This may have changed:    - when to take this  - reasons to take this   Used for:  Fracture of  hip, left, closed, initial encounter (H)        Dose:  5 mg   Take 1 tablet (5 mg) by mouth every 3 hours as needed for severe pain   Quantity:  30 tablet   Refills:  0         CONTINUE these medicines which have NOT CHANGED        Dose / Directions    atorvastatin 10 MG tablet   Commonly known as:  LIPITOR   Used for:  Coronary artery disease involving native heart without angina pectoris, unspecified vessel or lesion type        Dose:  10 mg   Take 1 tablet (10 mg) by mouth every evening   Refills:  0       ANIBAL PROTECT EX        Apply topically 2 times daily   Refills:  0       diltiazem 120 MG 24 hr ER beaded capsule   Commonly known as:  TIAZAC   Indication:  High Blood Pressure Disorder        Dose:  120 mg   Take 120 mg by mouth daily   Refills:  0       insulin glargine 100 UNIT/ML injection   Commonly known as:  LANTUS        Dose:  7 Units   Inject 7 Units Subcutaneous every morning   Refills:  0       * insulin lispro 100 UNIT/ML injection   Commonly known as:  HumaLOG        Inject Subcutaneous 3 times daily (before meals) Sliding scale:  -189=1 unit -239=2 units -289=3 units -339=4 units -399=5 units -499=6 units +=7 units   Refills:  0       * HumaLOG KWIKpen 100 UNIT/ML injection   Generic drug:  insulin lispro        Inject Subcutaneous At Bedtime Sliding scale: -249=1 unit -299=2 units -349=3 units -399=4 units +=5 units   Refills:  0       * insulin lispro 100 UNIT/ML injection   Commonly known as:  HumaLOG KWIKpen   Used for:  Type 2 diabetes mellitus with complication, with long-term current use of insulin (H)        Dose:  2 Units   Inject 2 Units Subcutaneous 3 times daily (before meals)   Refills:  0       metoprolol succinate 50 MG 24 hr tablet   Commonly known as:  TOPROL-XL   Used for:  Paroxysmal supraventricular tachycardia (H)        Dose:  50 mg   Take 1 tablet (50 mg) by mouth daily   Quantity:  30 tablet    Refills:  0       polyethylene glycol Packet   Commonly known as:  MIRALAX/GLYCOLAX        Dose:  17 g   Take 17 g by mouth daily   Refills:  0       tamsulosin 0.4 MG capsule   Commonly known as:  FLOMAX        Dose:  0.4 mg   Take 0.4 mg by mouth every evening   Refills:  0       * Notice:  This list has 3 medication(s) that are the same as other medications prescribed for you. Read the directions carefully, and ask your doctor or other care provider to review them with you.      STOP taking     AMBIEN PO           IBUPROFEN PO           melatonin 5 MG tablet                Where to get your medicines      These medications were sent to [facility] [place], [place] - [address]  [address], [place] [address]     Phone:  [phone]     DRISDOL 41511 units Caps         Some of these will need a paper prescription and others can be bought over the counter. Ask your nurse if you have questions.     Bring a paper prescription for each of these medications     acetaminophen 325 MG tablet    oxyCODONE IR 5 MG tablet                Protect others around you: Learn how to safely use, store and throw away your medicines at www.disposemymeds.org.        Information about OPIOIDS     PRESCRIPTION OPIOIDS: WHAT YOU NEED TO KNOW   We gave you an opioid (narcotic) pain medicine. It is important to manage your pain, but opioids are not always the best choice. You should first try all the other options your care team gave you. Take this medicine for as short a time (and as few doses) as possible.    Some activities can increase your pain, such as bandage changes or therapy sessions. It may help to take your pain medicine 30 to 60 minutes before these activities. Reduce your stress by getting enough sleep, working on hobbies you enjoy and practicing relaxation or meditation. Talk to your care team about ways to manage your pain beyond prescription opioids.    These medicines have risks:    DO NOT  drive when on new or higher doses of pain medicine. These medicines can affect your alertness and reaction times, and you could be arrested for driving under the influence (DUI). If you need to use opioids long-term, talk to your care team about driving.    DO NOT operate heavy machinery    DO NOT do any other dangerous activities while taking these medicines.    DO NOT drink any alcohol while taking these medicines.     If the opioid prescribed includes acetaminophen, DO NOT take with any other medicines that contain acetaminophen. Read all labels carefully. Look for the word  acetaminophen  or  Tylenol.  Ask your pharmacist if you have questions or are unsure.    You can get addicted to pain medicines, especially if you have a history of addiction (chemical, alcohol or substance dependence). Talk to your care team about ways to reduce this risk.    All opioids tend to cause constipation. Drink plenty of water and eat foods that have a lot of fiber, such as fruits, vegetables, prune juice, apple juice and high-fiber cereal. Take a laxative (Miralax, milk of magnesia, Colace, Senna) if you don t move your bowels at least every other day. Other side effects include upset stomach, sleepiness, dizziness, throwing up, tolerance (needing more of the medicine to have the same effect), physical dependence and slowed breathing.    Store your pills in a secure place, locked if possible. We will not replace any lost or stolen medicine. If you don t finish your medicine, please throw away (dispose) as directed by your pharmacist. The [place] [employer]ution [employer] has more information about safe disposal: https://www.pca.[email or web address].[place].us/living-green/managing-unwanted-medications             Medication List: This is a list of all your medications and when to take them. Check marks below indicate your daily home schedule. Keep this list as a reference.      Medications           Morning Afternoon Evening Bedtime As Needed     acetaminophen 325 MG tablet   Commonly known as:  TYLENOL   Take 2 tablets (650 mg) by mouth every 4 hours as needed for other (multimodal surgical pain management along with NSAIDS and opioid medication as indicated based on pain control and physical function.)   Last time this was given:  650 mg on 10/10/2018  1:35 PM                                aspirin 325 MG EC tablet   Take one Aspirin tab twice daily for 5 weeks.   Last time this was given:  325 mg on 10/10/2018  9:21 AM                                atorvastatin 10 MG tablet   Commonly known as:  LIPITOR   Take 1 tablet (10 mg) by mouth every evening   Last time this was given:  10 mg on 10/9/2018  7:55 PM                                ANIBAL PROTECT EX   Apply topically 2 times daily                                diltiazem 120 MG 24 hr ER beaded capsule   Commonly known as:  TIAZAC   Take 120 mg by mouth daily                                DRISDOL 10638 units Caps   Take 50,000 Units by mouth every 7 days for 5 doses   Last time this was given:  50,000 Units on 10/8/2018  3:24 PM                                insulin glargine 100 UNIT/ML injection   Commonly known as:  LANTUS   Inject 7 Units Subcutaneous every morning   Last time this was given:  7 Units on 10/10/2018  9:21 AM                                * insulin lispro 100 UNIT/ML injection   Commonly known as:  HumaLOG   Inject Subcutaneous 3 times daily (before meals) Sliding scale:  -189=1 unit -239=2 units -289=3 units -339=4 units -399=5 units -499=6 units +=7 units                                * HumaLOG KWIKpen 100 UNIT/ML injection   Inject Subcutaneous At Bedtime Sliding scale: -249=1 unit -299=2 units -349=3 units -399=4 units +=5 units   Generic drug:  insulin lispro                                * insulin lispro 100 UNIT/ML injection   Commonly known as:  HumaLOG KWIKpen   Inject 2 Units Subcutaneous 3 times  daily (before meals)                                metoprolol succinate 50 MG 24 hr tablet   Commonly known as:  TOPROL-XL   Take 1 tablet (50 mg) by mouth daily   Last time this was given:  50 mg on 10/10/2018  9:21 AM                                oxyCODONE IR 5 MG tablet   Commonly known as:  ROXICODONE   Take 1 tablet (5 mg) by mouth every 3 hours as needed for severe pain   Last time this was given:  5 mg on 10/7/2018  1:16 PM                                polyethylene glycol Packet   Commonly known as:  MIRALAX/GLYCOLAX   Take 17 g by mouth daily                                tamsulosin 0.4 MG capsule   Commonly known as:  FLOMAX   Take 0.4 mg by mouth every evening   Last time this was given:  0.4 mg on 10/10/2018  9:21 AM                                * Notice:  This list has 3 medication(s) that are the same as other medications prescribed for you. Read the directions carefully, and ask your doctor or other care provider to review them with you.

## 2018-10-04 NOTE — CONSULTS
[facility]  Orthopaedics/Foot and Ankle Surgery Consultation         Bruno Stewart MD    Abimael Flores MRN# [id number]   YOB: 1924 Age: 89 year old      Date of Admission:  10/4/2018  Date of Consult: 10/04/2018           Assessment and Plan:   89-year-old gentleman status post mechanical fall with a left displaced femoral neck fracture.  Operative intervention is recommended to allow for early weightbearing activity, assist with pain relief, and limit musculoskeletal and pulmonary deconditioning with prolonged bedrest.  The patient will proceed to the operating room this afternoon for a left hip hemiarthroplasty.  Appreciate hospitalist comanagement.  Further postoperative recommendations will follow OR today.  Anticipate a few days stay in the hospital for pain control and rehabilitation efforts prior to discharge to a TCU.            Code Status:   DNR / DNI         Primary Care Physician:   Carlitos Bee [phone]         Requesting Physician:      Dr. Collins         Chief Complaint:   L hip fracture.    History is obtained from the patient and medical chart.         History of Present Illness:   Abimael Flores is a 89 year old male with past medical history significant for chronic renal disease, coronary artery disease, bundle branch block, and diabetes mellitus who presented to the emergency department after sustaining a mechanical fall.  The patient was recently hospitalized for a right femoral neck fracture which was addressed with a hemiarthroplasty just over 1 month ago.  The patient has been recuperating at his rehabilitation center and was reaching down to grab an object when he lost his balance and fell onto the left hip.  The patient denies any other significant musculoskeletal injury associated with the fall.  The patient was unable to bear weight on the left hip and was brought to the emergency department where radiographs demonstrated a displaced  left femoral neck fracture.  The patient was admitted to the hospitalist service.  Pain has been under reasonable control with bedrest and pain medication.  Orthopedics consultation was requested for definitive evaluation and treatment of this fracture.  The patient reports a very smooth recovery following his right hip hemiarthroplasty at the end of August.  The patient denies motor loss or numbness or tingling distally in the left leg since the fall.  The patient typically utilizes a walker to assist with mobilization, particularly since his right hip surgery.           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)     Cardiac arrhythmia, unspecified cardiac arrhythmia type     CKD (chronic kidney disease) stage 3, GFR 30-59 ml/min (H)     Benign essential hypertension     Coronary artery disease involving coronary bypass graft of native heart without angina pectoris     Pneumonia due to infectious organism     Physical deconditioning     Other insomnia     Bladder spasms     Fracture of femoral neck, left (H)      Past Medical History:   Diagnosis Date     Benign essential hypertension 9/4/2018     Coronary artery disease      Nonsenile cataract      Recent retinal detachment, total or subtotal 8/5/2014     Type 2 diabetes mellitus without complications (H)              Past Surgical History:     Past Surgical History:   Procedure Laterality Date     CARDIAC SURGERY       CATARACT IOL, RT/LT Bilateral      lacrimal caruncle removed BE Bilateral ~1989     OPEN REDUCTION INTERNAL FIXATION HIP BIPOLAR Right 8/26/2018    Procedure: OPEN REDUCTION INTERNAL FIXATION HIP BIPOLAR;  Right Hip Bipolar Hemiarthroplasty ([employer]);  Surgeon: Bill Sanders MD;  Location: [facility] OR     REPAIR RUPTURED GLOBE   4/21/2014    Procedure: Exploration and Repair of Ruptured Globe ;  Surgeon: Mercedes Rivera MD;  Location: [facility] OR            Home Medications:     Prior to Admission medications    Medication Sig Last Dose Taking? Auth Provider   aspirin 325 MG EC tablet Take one Aspirin tab twice daily for 5 weeks. 10/3/2018 at Unknown time Yes Bill Sanders MD   atorvastatin (LIPITOR) 10 MG tablet Take 1 tablet (10 mg) by mouth every evening 10/3/2018 at Unknown time Yes Meli Arndt PA-C   diltiazem (TIAZAC) 120 MG 24 hr ER beaded capsule Take 120 mg by mouth daily 10/3/2018 at Unknown time Yes Reported, Patient   IBUPROFEN PO Take 200 mg by mouth every 6 hours as needed for moderate pain prn Yes Reported, Patient   insulin glargine (LANTUS) 100 UNIT/ML injection Inject 7 Units Subcutaneous every morning 10/3/2018 at Unknown time Yes Reported, Patient   insulin lispro (HUMALOG KWIKPEN) 100 UNIT/ML injection Inject Subcutaneous At Bedtime Sliding scale:  -249=1 unit  -299=2 units  -349=3 units  -399=4 units  +=5 units Past Week at Unknown time Yes Unknown, Entered By History   insulin lispro (HUMALOG KWIKPEN) 100 UNIT/ML injection Inject 2 Units Subcutaneous 3 times daily (before meals) 10/3/2018 at Unknown time Yes Meli Arndt PA-C   insulin lispro (HUMALOG) 100 UNIT/ML injection Inject Subcutaneous 3 times daily (before meals) Sliding scale:   -189=1 unit  -239=2 units  -289=3 units  -339=4 units  -399=5 units  -499=6 units  +=7 units 10/3/2018 at Unknown time Yes Reported, Patient   melatonin 5 MG tablet Take 5 mg by mouth At Bedtime 10/3/2018 at Unknown time Yes Unknown, Entered By History   melatonin 5 MG tablet Take 5 mg by mouth nightly as needed for sleep (MR x 1 PRN if scheduled dose is insufficient) Past Week at Unknown time Yes Unknown, Entered By History   metoprolol succinate (TOPROL-XL) 50 MG 24 hr tablet Take 1 tablet  (50 mg) by mouth daily 10/3/2018 at Unknown time Yes Meli Arndt PA-C   oxyCODONE IR (ROXICODONE) 5 MG tablet Take 1 tablet (5 mg) by mouth every 4 hours as needed for pain 10/4/2018 at 0020 Yes Bill Sanders MD   polyethylene glycol (MIRALAX/GLYCOLAX) Packet Take 17 g by mouth daily 10/3/2018 at Unknown time Yes Reported, Patient   tamsulosin (FLOMAX) 0.4 MG capsule Take 0.4 mg by mouth every evening  10/3/2018 at Unknown time Yes Reported, Patient   Zolpidem Tartrate (AMBIEN PO) Take 5 mg by mouth daily as needed for sleep Past Week at Unknown time Yes Reported, Patient   aspirin 81 MG tablet Take 81 mg by mouth daily To start on 10/9/18 after 325mg BID dosing is done.   Unknown, Entered By History   Skin Protectants, Misc. (ANIBAL PROTECT EX) Apply topically 2 times daily   Reported, Patient            Current Medications:           [Auto Hold] atorvastatin  10 mg Oral QPM     ceFAZolin  1 g Intravenous See Admin Instructions     ceFAZolin  2 g Intravenous Pre-Op/Pre-procedure x 1 dose     [Auto Hold] diltiazem  120 mg Oral Daily     [START ON 10/5/2018] influenza Vac Split High-Dose  0.5 mL Intramuscular Prior to discharge     [Auto Hold] insulin aspart  1-6 Units Subcutaneous Q4H     [Auto Hold] insulin glargine  3 Units Subcutaneous QAM AC     [Auto Hold] metoprolol succinate  50 mg Oral Daily     [Auto Hold] tamsulosin  0.4 mg Oral Daily     [Auto Hold] acetaminophen, [Auto Hold] bisacodyl **OR** [Auto Hold] bisacodyl **OR** [Auto Hold] bisacodyl, [Auto Hold] bisacodyl, [Auto Hold] glucose **OR** [Auto Hold] dextrose **OR** [Auto Hold] glucagon, [Auto Hold] HYDROmorphone, [Auto Hold] labetalol, [Auto Hold] melatonin, [Auto Hold] naloxone, [Auto Hold] ondansetron **OR** [Auto Hold] ondansetron, [Auto Hold] oxyCODONE IR, [Auto Hold] polyethylene glycol, [Auto Hold] prochlorperazine **OR** [Auto Hold] prochlorperazine **OR** [Auto Hold] prochlorperazine, [Auto Hold] zolpidem (AMBIEN) tablet 5  "mg         Allergies:   No Known Allergies         Social History:     Social History   Substance Use Topics     Smoking status: Former Smoker     Smokeless tobacco: Never Used     Alcohol use Not on file             Family History:     Family History   Problem Relation Age of Onset     Diabetes Mother      Diabetes Father      Cancer No family hx of      Glaucoma No family hx of      Macular Degeneration No family hx of               Review of Systems:   The 10 point Review of Systems is negative other than noted in the HPI            Physical Exam:   Blood pressure 131/72, temperature 98.3  F (36.8  C), temperature source Oral, resp. rate 16, height 1.753 m (5' 9\"), weight 72.6 kg (160 lb), SpO2 90 %.  160 lbs 0 oz    Constitutional:   Awake, alert, cooperative, no apparent distress, and appears stated age.     Lungs:   No increased work of breathing, good air exchange.     Musculoskeletal:   Left hip with minimal shortening or rotational deformity at this time.  There is no overlying skin injury or abrasion over the left hip fracture site.  Logroll is deferred due to the patient's known fracture.  There is no evidence of injury distally in the leg.  AT, GSC, EHL 5/5.  SILT sp/dp/plantar nn.  Toes wwp w/ brisk cap refill and 2+ DP pulse.              Data:   All new lab and imaging data was reviewed.  Radiographs of the left hip obtained in the emergency department were personally reviewed without limitation and compared to previous studies.  Radiographs demonstrate stable alignment of a right hip hemiarthroplasty.  There is evidence of an acute left femoral neck fracture.  Results for orders placed or performed during the hospital encounter of 10/04/18 (from the past 24 hour(s))   XR Chest 1 View    Narrative    XR CHEST 1 VW  10/4/2018 3:03 AM     HISTORY: Hip fracture.    COMPARISON: 8/29/2018.    FINDINGS: Supine chest. Sternal wires and mediastinal clips. Mild  probable scarring at the right lung base and in " the left lung  laterally. The lungs are otherwise clear. No pneumothorax.      Impression    IMPRESSION: No acute abnormality.    SHARI HERNANDEZ MD   XR Pelvis w Hip Left 1 View    Narrative    XR PELVIS AND HIP LEFT 1 VIEW  10/4/2018 3:04 AM     HISTORY: Pain, fall, left hip pain.    COMPARISON: None.       Impression    IMPRESSION: Angulated left femoral neck fracture.    SHARI HERNANDEZ MD   Glucose by meter   Result Value Ref Range    Glucose 148 (H) 70 - 99 mg/dL   EKG 12-lead, tracing only   Result Value Ref Range    Interpretation ECG Click View Image link to view waveform and result    Glucose by meter   Result Value Ref Range    Glucose 163 (H) 70 - 99 mg/dL

## 2018-10-04 NOTE — IP AVS SNAPSHOT
[address] [facility]    [address] [place] [address]    Phone:  [phone]                                       After Visit Summary   10/4/2018    Abimael Flores    MRN: [id number]           After Visit Summary Signature Page     I have received my discharge instructions, and my questions have been answered. I have discussed any challenges I see with this plan with the nurse or doctor.    ..........................................................................................................................................  Patient/Patient Representative Signature      ..........................................................................................................................................  Patient Representative Print Name and Relationship to Patient    ..................................................               ................................................  Date                                   Time    ..........................................................................................................................................  Reviewed by Signature/Title    ...................................................              ..............................................  Date                                               Time          22EPIC Rev 08/18

## 2018-10-04 NOTE — ANESTHESIA CARE TRANSFER NOTE
Patient: Abimael Flores    Procedure(s):  LEFT HIP TONYA ARTHROPLASTY - Wound Class: I-Clean    Diagnosis: HIP FRACTURE.  Diagnosis Additional Information: No value filed.    Anesthesia Type:   General, ETT     Note:  Airway :Blow-by  Patient transferred to:PACU  Comments: Anesthesia Care Note    Patient: Abimael Flores    Transferred to: PACU    Patient vital signs: Stable    Airway: None    Monitors placed. VSS. PIV patent. No change in dentition. Report given to UMAIR Ambriz CRNA   10/4/2018  Handoff Report: Identifed the Patient, Identified the Reponsible Provider, Reviewed the pertinent medical history, Discussed the surgical course, Reviewed Intra-OP anesthesia mangement and issues during anesthesia, Set expectations for post-procedure period and Allowed opportunity for questions and acknowledgement of understanding      Vitals: (Last set prior to Anesthesia Care Transfer)    CRNA VITALS  10/4/2018 1410 - 10/4/2018 1447      10/4/2018             Pulse: 98    SpO2: 100 %    Resp Rate (set): 10                Electronically Signed By: WILLIAM Rebollar CRNA  October 4, 2018  2:47 PM

## 2018-10-04 NOTE — ED NOTES
Bed: ED23  Expected date: 10/4/18  Expected time: 1:45 AM  Means of arrival:   Comments:  Kate 2  89M  Fall Hip Pain

## 2018-10-04 NOTE — ED PROVIDER NOTES
"  History     Chief Complaint:  Hip pain    HPI   Abimael Flores is a 89 year old male who presents to the emergency department for evaluation after fall. The patient reports that one hour ago he ambulated to his bathroom with his walker, and noticed that the door was ajar. He attempted to stretch and close the door when he slipped and fell \"very softly.\" He denies any head trauma during the fall. He denies all other concerns here in the ER tonight.    Allergies:  NKDA    Medications:    Humalog  Lantus  Roxicodone   Metoprolol  Insulin  Tiazac  Lipitor  Aspirin    Past Medical History:    Insomnia  CKD  Type 2 DM  Retinal detachment  Nonsenile cataract  CAD  HTN    Past Surgical History:    Repair ruptured globe  Open reduction internal fixation hip,  Right  Cataract  Cardiac surgery    Family History:    Diabetes    Social History:  Marital Status:   [2]  Former smoker  Negative for alcohol use.      Review of Systems   Musculoskeletal: Positive for arthralgias.   Neurological: Negative for headaches.     10 point review of systems performed and is negative except as above and in HPI.      Physical Exam     Patient Vitals for the past 24 hrs:   BP Temp Temp src Heart Rate Resp SpO2 Height Weight   10/04/18 0317 139/68 - - 91 14 93 % - -   10/04/18 0311 135/79 - - 92 16 91 % - -   10/04/18 0230 115/63 - - 87 9 93 % - -   10/04/18 0215 128/60 - - 90 15 93 % - -   10/04/18 0153 143/70 97.7  F (36.5  C) Oral 95 16 96 % 1.753 m (5' 9\") 72.6 kg (160 lb)         Physical Exam    General: Resting on the gurney, appears moderately uncomfortable  Head:  The scalp, face, and head appear normal  Mouth/Throat: Mucus membranes are moist  CV:  Regular rate    Normal S1 and S2  No pathological murmur   Resp:  Breath sounds clear and equal bilaterally    Non-labored, no retractions or accessory muscle use    No coarseness    No wheezing   GI:  Abdomen is soft, no rigidity    No tenderness to palpation  MS:  Normal motor " assessment of all extremities.    Good capillary refill noted.    Left hip tender to palpation. Brisk capillary refill in the toes. Normal sensation in DOMINIC Lower extremities  Skin:   No rash or lesions noted.  Neuro:   Speech is normal and fluent. No apparent deficit.  Psych: Awake. Alert.  Normal affect.      Appropriate interactions.    Emergency Department Course     Imaging:  Radiographic findings were communicated with the patient who voiced understanding of the findings.    XR Pelvis w Hip Left 1 view:   Angulated left femoral neck fracture as per radiology.     XR Chest 1 View:  No acute abnormality as per radiology    Interventions:    The patient's symptoms were improved with parenteral narcotics.  0212 Roxicodone 5 mg Oral  0319 Dilaudid 0.5 mg IV      Emergency Department Course:  Nursing notes and vitals reviewed. (0205) I performed an exam of the patient as documented above.     IV inserted. Medicine administered as documented above. Blood drawn. This was sent to the lab for further testing, results above.    The patient was sent for a Pelvis, chest X-ray while in the emergency department, findings above.     (0314) I rechecked the patient and discussed the results of his workup thus far.     (0318)  I consulted with Dr. Collins of the hospitalist services. They are in agreement to accept the patient for admission.    Findings and plan explained to the Patient who consents to admission. Discussed the patient with Dr. Collins, who will admit the patient to a medical bed for further monitoring, evaluation, and treatment.        Impression & Plan      Medical Decision Making:    Abimael Flores is a 89 year old male who presents for evaluation of hip pain pain after fall. CMS is intact distally in the extremity.  Xrays reveal a fracture of the hip that will need orthopedic consultation and likely surgery.  The patients head to toe trauma exam is otherwise normal at this time and no further trauma workup is  needed as I believe there is no signs of serious head, neck, chest, spinal, extremity or abdominal injuries.  Fall was mechanical by description and the patient is reliable.  Will admit to medicine for further cares and ortho consultation.  Pain control achieved.      Diagnosis:    ICD-10-CM    1. Fracture of hip, left, closed, initial encounter (H) S72.002A        Disposition:  Admitted to medical bed under care of Dr. Collins.    Scribe Disclosure:  I, Brandno Henriquez, am serving as a scribe on 10/4/2018 at 2:00 AM to personally document services performed by Netta Trivedi MD based on my observations and the provider's statements to me.       Brandon Henriquez  10/4/2018    EMERGENCY DEPARTMENT       Netta Trivedi MD  10/07/18 [id number]

## 2018-10-04 NOTE — PROGRESS NOTES
RECEIVING UNIT ED HANDOFF REVIEW    ED Nurse Handoff Report was reviewed by: SUZI GRIFFITH on October 4, 2018 at 3:50 AM

## 2018-10-04 NOTE — ED NOTES
"[facility]  ED Nurse Handoff Report    ED Chief complaint: Hip Pain and Fall      ED Diagnosis:   Final diagnoses:   Fracture of hip, left, closed, initial encounter (H)       Code Status: DNR    Allergies: No Known Allergies    Activity level - Baseline/Home:  Stand with Assist walker    Activity Level - Current:   Total Care     Needed?: No    Isolation: No  Infection: Not Applicable  Bariatric?: No    Vital Signs:   Vitals:    10/04/18 0215 10/04/18 0230 10/04/18 0311 10/04/18 0317   BP: 128/60 115/63 135/79 139/68   Resp: 15 9 16 14   Temp:       TempSrc:       SpO2: 93% 93% 91% 93%   Weight:       Height:           Cardiac Rhythm: ,   Cardiac  Cardiac Rhythm: Sinus tachycardia    Pain level: 0-10 Pain Scale: 9    Is this patient confused?: No   [place] - Suicide Severity Rating Scale Completed?  Yes  If yes, what color did the patient score?  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  He has been independent walking until he fell and broke his right hip in August.  He was at a rehab facility and fell in the bathroom. He is oriented x4 but has told a couple of differently stories about how this happened. He was found in his bathroom on the floor is the report from the nursing staff.  MD at bedside to show pt his hip Xray.  Pt states his son will make medical decisions and a copy of the DNR is at bedside. Pt also states he is a DNR/DNI.  ER MD called pt's son to update on injury. He has ask for pain medications several times with the best result from the IV meds.  He had labs completed 10/3 at 0600.  No further orders for additional labs at this time. He is requesting the surgeon and hoping he can \"fit him in for surgery today\" as pt states he has been NPO almost 12 hours now.    Tests Performed: CXR  Xray of hip  Abnormal Results:   Results for orders placed or performed " during the hospital encounter of 10/04/18   XR Chest 1 View    Narrative    XR CHEST 1 VW  10/4/2018 3:03 AM     HISTORY: Hip fracture.    COMPARISON: 8/29/2018.    FINDINGS: Supine chest. Sternal wires and mediastinal clips. Mild  probable scarring at the right lung base and in the left lung  laterally. The lungs are otherwise clear. No pneumothorax.      Impression    IMPRESSION: No acute abnormality.   XR Pelvis w Hip Left 1 View    Narrative    XR PELVIS AND HIP LEFT 1 VIEW  10/4/2018 3:04 AM     HISTORY: Pain, fall, left hip pain.    COMPARISON: None.       Impression    IMPRESSION: Angulated left femoral neck fracture.       Treatments provided: pain meds and education     Family Comments: see above comments    OBS brochure/video discussed/provided to patient/family: N/A              Name of person given brochure if not patient:                           Relationship to patient:     ED Medications:   Medications   HYDROmorphone (PF) (DILAUDID) injection 0.5 mg (0.5 mg Intravenous Given 10/4/18 0319)   oxyCODONE IR (ROXICODONE) tablet 5 mg (5 mg Oral Given 10/4/18 0212)       Drips infusing?:  No    For the majority of the shift this patient was Green.   Interventions performed were None needed.    Severe Sepsis OR Septic Shock Diagnosis Present: No    To be done/followed up on inpatient unit:  pain meds    ED NURSE PHONE NUMBER: [phone]

## 2018-10-04 NOTE — H&P
Admitted:     10/04/2018      DATE OF ADMISSION:  10/04/2018      PRIMARY CARE PHYSICIAN:  Dr. Carlitos Bee.      CHIEF COMPLAINT:  Left hip pain.      HISTORY OF PRESENT ILLNESS:  Mr. 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  Notably, the patient was hospitalized recently at [facility] from 08/25 through 09/03/2018 for a right femoral neck fracture for which he underwent hemiarthroplasty on 08/26/2018.  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  He was eventually discharged to a skilled nursing facility.  Around 0020 a.m., reportedly, he was trying to  something from the floor when he lost his balance, fell and landed on his left hip.  Following the fall, he had severe pain and not able to ambulate.  Therefore, he was transferred to [facility] for further management.  The patient reports no prodromal symptoms such as lightheadedness, chest pain, palpitation, dizziness or lightheadedness.  He denies chest pain, cough, sputum, fever, nausea, vomiting, abdominal pain or other complaints.  In the Emergency Department, the patient has been evaluated with Dr. Trivedi, the ER attending physician.  Chemistry profile is significant for creatinine of 1.99.  Blood glucose is 90.  X-ray of the chest shows no acute abnormality.  X-ray of the hip reveals an angulated left femoral neck fracture.  He is being admitted to this hospital under inpatient status.      PAST MEDICAL HISTORY:   1.  Hypertension.   2.   Dyslipidemia.   3.  Coronary artery disease status post CABG x 4 in 1996.   4.  Diabetes mellitus type 2 with the most recent hemoglobin A1c of 8.4% on 08/25/2018.   5.  Chronic kidney disease stage III-IV with the most recent creatinine around 1.8-1.9.   6.  History of nonsustained V-tach during the recent hospitalization in 08/2018.   7.  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   8.  Benign prostatic hypertrophy.   9.  Chronic anemia with baseline hemoglobin around 9.      PAST SURGICAL HISTORY:   Past Surgical History:   Procedure Laterality Date     CARDIAC SURGERY       CATARACT IOL, RT/LT Bilateral      lacrimal caruncle removed BE Bilateral ~1989     OPEN REDUCTION INTERNAL FIXATION HIP BIPOLAR Right 8/26/2018    Procedure: OPEN REDUCTION INTERNAL FIXATION HIP BIPOLAR;  Right Hip Bipolar Hemiarthroplasty (Biomet);  Surgeon: Bill Sanders MD;  Location:  OR     REPAIR RUPTURED GLOBE  4/21/2014    Procedure: Exploration and Repair of Ruptured Globe ;  Surgeon: Mercedes Rivera MD;  Location:  OR        FAMILY HISTORY:  Significant for diabetes in his parents.      SOCIAL HISTORY:  The patient is a retired surgeon.  He used to smoke cigars but quit many years ago.  He denies drinking alcohol.  He does not use illicit drugs.  He does use a walker for ambulation.      MEDICATIONS:    Prior to Admission Medications   Prescriptions Last Dose Informant Patient Reported? Taking?   IBUPROFEN PO   Yes No   Sig: Take 200 mg by mouth every 6 hours as needed for moderate pain   Skin Protectants, Misc. (ANIBAL PROTECT EX)   Yes No   Sig: Apply topically 2 times daily   Zolpidem Tartrate (AMBIEN PO) Past Week at Unknown time  Yes Yes   Sig: Take 5 mg by mouth daily as needed for sleep   aspirin 325 MG EC tablet 10/3/2018 at Unknown time  No Yes   Sig: Take one Aspirin tab twice daily for 5 weeks.   atorvastatin (LIPITOR) 10 MG tablet 10/3/2018 at Unknown time  No Yes   Sig: Take 1  tablet (10 mg) by mouth every evening   diltiazem (TIAZAC) 120 MG 24 hr ER beaded capsule 10/3/2018 at Unknown time  Yes Yes   Sig: Take 120 mg by mouth daily   insulin aspart (NOVOLOG PEN) 100 UNIT/ML injection 10/3/2018 at Unknown time  No Yes   Sig: Inject 1-7 Units Subcutaneous 3 times daily (before meals)   insulin aspart (NOVOLOG PEN) 100 UNIT/ML injection 10/3/2018 at Unknown time  No Yes   Sig: Inject 1-5 Units Subcutaneous At Bedtime   insulin glargine (LANTUS) 100 UNIT/ML injection   Yes No   Sig: Inject 7 Units Subcutaneous every morning   insulin lispro (HUMALOG KWIKPEN) 100 UNIT/ML injection 10/3/2018 at Unknown time  No Yes   Sig: Inject 2 Units Subcutaneous 3 times daily (before meals)   insulin lispro (HUMALOG) 100 UNIT/ML injection   Yes No   Sig: Inject Subcutaneous 3 times daily (before meals)   metoprolol succinate (TOPROL-XL) 50 MG 24 hr tablet 10/3/2018 at Unknown time  No Yes   Sig: Take 1 tablet (50 mg) by mouth daily   oxyCODONE IR (ROXICODONE) 5 MG tablet 10/4/2018 at 0020  No Yes   Sig: Take 1 tablet (5 mg) by mouth every 4 hours as needed for pain   polyethylene glycol (MIRALAX/GLYCOLAX) Packet 10/3/2018 at Unknown time  Yes Yes   Sig: Take 17 g by mouth daily   tamsulosin (FLOMAX) 0.4 MG capsule 10/3/2018 at Unknown time  Yes Yes   Sig: Take 0.4 mg by mouth daily      Facility-Administered Medications: None        ALLERGIES AND INTOLERANCES:  NONE.      REVIEW OF SYSTEMS:  A 10-point review of system was performed thoroughly and it was negative except as stated in history of present illness.      PHYSICAL EXAMINATION:   GENERAL:  This patient is a very pleasant but fair-appearing elderly man who is in no acute distress.   VITAL SIGNS:  Blood pressure 139/68, heart rate 91, respiratory rate 14, temperature 97.7 degrees Fahrenheit, oxygen saturation 93% on room air.   HEENT:  The pupils are round, equal, reactive to light bilaterally.  There is mild conjunctival pallor.  The sclerae are  anicteric.  The oral mucosa appears dry.   NECK:  Supple.  No elevated JVP.   LUNGS:  Clear to auscultation bilaterally.   CARDIOVASCULAR:  There is normal S1 and S2 with regular rate and rhythm.   ABDOMEN:  Soft, nontender and nondistended.  Bowel sounds are present.   EXTREMITIES:  There is no calf tenderness or lower extremity edema.   SKIN:  There is no jaundice, cyanosis or acute rash.   NEUROLOGIC:  He is awake, alert and appears to be oriented almost x 3.  Speech is normal.  There is no focal weakness on gross examination.      LABORATORY STUDIES:  Chemistry profile:  Sodium 139, potassium 4.1, BUN 41, creatinine 1.99, calcium 7.8, glucose 90.      ASSESSMENT AND PLAN:  Mr. 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  He is being admitted to the hospital under inpatient status.   1.  Mechanical fall with angulated left femoral neck fracture:  The patient has presented with left hip pain following a mechanical fall as he was trying to  something from the floor at the transitional care facility.  There were no warning symptoms to suggest near-syncope.  The patient also did not lose his consciousness.  The workup in the Emergency Department has revealed an angulated left femoral neck fracture.  Notably, he had a recent fall on 08/25/2018 which resulted in a right femoral neck fracture requiring hemiarthroplasty with Dr. Bill Sanders.  Management as below.   -- N.p.o.    -- P.r.n. Tylenol, oxycodone and Dilaudid available.   -- Fall precautions.   -- His revised cardiac risk index is calculated  at over 11%.  This will place the patient at high risk for the surgery.  Orthopedic Service will consulted to discuss the benefits versus risks of surgery with the patient for the final decision.   -- I will obtain an EKG as preop evaluation.   2.  Hypertension:  The blood pressure is currently controlled.  He will continue on prior to admission Toprol XL 50 mg p.o. daily as well as diltiazem 120 mg p.o. daily.  IV labetalol will be available p.r.n. for systolic blood pressure more than 170.   3.  Dyslipidemia:  He will continue on prior to admission Lipitor.   4.  Benign prostatic hypertrophy:  He will continue with prior to admission Flomax.   5.  Chronic kidney disease, stage III-IV:  The most recent creatinine levels have been around 1.8-1.9.  His creatinine is 1.99 today in the Emergency Department.  His creatinine we will closely monitor while he is being hydrated with intravenous normal saline.  We will avoid NSAIDs or nephrotoxins.     6.  Chronic anemia:  Baseline hemoglobin is around 9.  His hemoglobin will be closely monitored given the risk of acute blood loss.   7.  Coronary artery disease:  The patient is status post coronary artery bypass graft x 4 in 1996.  There is no recent cardiac workup.  At this juncture, he reports no chest pain.  I will obtain an EKG for preoperative evaluations.  He will continue on prior to admission metoprolol and Lipitor.  I will hold his aspirin for now until after the surgery.   8.  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  He will continue on prior to admission Toprol XL 50 mg p.o. daily.   9.  Diabetes mellitus type 2:  The most recent hemoglobin A1c was 8.4% on 08/25/2018.  At home he is on Lantus 7 units subcu every morning and Humalog 2 units subq t.i.d.  Due to n.p.o. status, I will  hold his prior to admission Humalog and decrease the dose of Lantus to 3 units daily.  In addition, I will place the patient on a sliding scale NovoLog.   10.  Deep venous thrombosis prophylaxis:  Pneumatic compression device for now.   11.  Code status:  It was discussed with the patient, and the patient reconfirmed DNR/DNI.   12.  Disposition:  Anticipate at least 2-3 days of inpatient management.      I would like to thank Dr. Carlitos Bee for allowing the Hospitalist Service to participate in the care of this patient.         ABDIFATAH GROSS MD             D: 10/04/2018   T: 10/04/2018   MT: SMITHA      Name:     CHERYL HOLLOWAY   MRN:      [id number]        Account:      [id number]   :      1924        Admitted:     10/04/2018                   Document: [id number]       cc: Carlitos Bee MD

## 2018-10-04 NOTE — PLAN OF CARE
Alert and oriented x4 before surgery after has been more confused due to anesthesia. Has not dangled. CMS intact. Dressing CDI. Blood glucose was 163 and 206. VSS. Plan to continue to monitor.

## 2018-10-04 NOTE — IP AVS SNAPSHOT
` [place] [address] [facility]: [phone]            Medication Administration Report for Abimael Flores as of 10/10/18 1402   Legend:    Given Hold Not Given Due Canceled Entry Other Actions    Time Time (Time) Time  Time-Action       Inactive    Active    Linked        Medications 10/04/18 10/05/18 10/06/18 10/07/18 10/08/18 10/09/18 10/10/18    acetaminophen (TYLENOL) tablet 650 mg  Dose: 650 mg  Freq: EVERY 4 HOURS PRN Route: PO  PRN Reason: other  PRN Comment: multimodal surgical pain management along with NSAIDS and opioid medication as indicated based on pain control and physical function.  Start: 10/07/18 0000   Admin Instructions: May give first dose 4 hours after last scheduled dose of acetaminophen.  Maximum acetaminophen dose from all sources = 75 mg/kg/day not to exceed 4 grams/day.    Admin. Amount: 2 tablet (2 × 325 mg tablet)  Last Admin: 10/10/18 1335  Dispense Loc: [facility]  POC: Post-procedure          0403 (650 mg)-Given       2220 (650 mg)-Given        0638 (650 mg)-Given       1335 (650 mg)-Given           aspirin EC tablet 325 mg  Dose: 325 mg  Freq: DAILY Route: PO  Indications Comment: VTE Prophylaxis  Start: 10/04/18 1615   Admin Instructions: DO NOT CRUSH.    Admin. Amount: 1 tablet (1 × 325 mg tablet)  Last Admin: 10/10/18 0921  Dispense Loc: [facility]  POC: Post-procedure     1751 (325 mg)-Given        0932 (325 mg)-Given        0822 (325 mg)-Given        0851 (325 mg)-Given        0920 (325 mg)-Given        0903 (325 mg)-Given        0921 (325 mg)-Given           atorvastatin (LIPITOR) tablet 10 mg  Dose: 10 mg  Freq: EVERY EVENING Route: PO  Start: 10/04/18 2000   Admin. Amount: 1 tablet (1 × 10 mg tablet)  Last Admin: 10/09/18 1955  Dispense Loc: [facility]A     1152-Auto Hold       1607-Unhold       (2039)-Not Given        1913 (10 mg)-Given        2035 (10 mg)-Given        2148 (10 mg)-Given        (2111)-Not Given        1955 (10 mg)-Given        [ ]  2000           benzocaine-menthol (CHLORASEPTIC) 6-10 MG lozenge 1-2 lozenge  Dose: 1-2 lozenge  Freq: EVERY 1 HOUR PRN Route: BU  PRN Reason: sore throat  PRN Comment: sore throat without fever  Start: 10/04/18 1609   Admin. Amount: 1-2 lozenge  Last Admin: 10/06/18 0329  Dispense Loc: [facility] 55A  POC: Post-procedure      0453 (1 lozenge)-Given        0329 (2 lozenge)-Given               bisacodyl (DULCOLAX) EC tablet 5 mg  Dose: 5 mg  Freq: DAILY PRN Route: PO  PRN Reason: constipation  Start: 10/04/18 0436   Admin Instructions: Do not crush or chew. Swallow whole. May titrate 1 tablet each day until response. Maximum of 3 tablets daily. Hold for loose stools. This is the first step of a three step constipation treatment.    Admin. Amount: 1 tablet (1 × 5 mg tablet)  Dispense Loc: [facility] 55A     1152-Auto Hold       1607-Unhold                Or  bisacodyl (DULCOLAX) EC tablet 10 mg  Dose: 10 mg  Freq: DAILY PRN Route: PO  PRN Reason: constipation  Start: 10/04/18 0436   Admin Instructions: Do not crush or chew. Swallow whole. May titrate 1 tablet each day until response. Maximum of 3 tablets daily. Hold for loose stools. This is the first step of a three step constipation treatment.    Admin. Amount: 2 tablet (2 × 5 mg tablet)  Dispense Loc: [facility] 55[facility]     1152-Auto Hold       1607-Unhold                Or  bisacodyl (DULCOLAX) EC tablet 15 mg  Dose: 15 mg  Freq: DAILY PRN Route: PO  PRN Reason: constipation  Start: 10/04/18 0436   Admin Instructions: Do not crush or chew. Swallow whole. May titrate 1 tablet each day until response. Maximum of 3 tablets daily. Hold for loose stools. This is the first step of a three step constipation treatment.    Admin. Amount: 3 tablet (3 × 5 mg tablet)  Dispense Loc: [facility]     1152-Auto Hold       1607-Unhold                 bisacodyl (DULCOLAX) Suppository 10 mg  Dose: 10 mg  Freq: DAILY PRN Route: RE  PRN Reason: constipation  Start: 10/04/18 0436   Admin Instructions:  Hold for loose stools.  This is the third step of a three step constipation treatment.    Admin. Amount: 1 suppository (1 × 10 mg suppository)  Dispense Loc: [facility] 55A     1152-Auto Hold       1607-Unhold                 diltiazem (DILACOR XR) 24 hr capsule 120 mg  Dose: 120 mg  Freq: DAILY Route: PO  Indications of Use: HYPERTENSION  Start: 10/04/18 0900   Admin Instructions: Hold for SBP<100 or HR<55    Admin. Amount: 1 capsule (1 × 120 mg capsule)  Last Admin: 10/10/18 0921  Dispense Loc: [facility] 55A     0813 (120 mg)-Given       1152-Auto Hold       1607-Unhold        0932 (120 mg)-Given        0822 (120 mg)-Given        0851 (120 mg)-Given        0920 (120 mg)-Given        0902 (120 mg)-Given        0921 (120 mg)-Given           glucose gel 15-30 g  Dose: 15-30 g  Freq: EVERY 15 MIN PRN Route: PO  PRN Reason: low blood sugar  Start: 10/04/18 0436   Admin Instructions: Give 15 g for BG 51 to 69 mg/dL IF patient is conscious and able to swallow. Give 30 g for BG less than or equal to 50 mg/dL IF patient is conscious and able to swallow. Do NOT give glucose gel via enteral tube.  IF patient has enteral tube: give apple juice 120 mL (4 oz or 15 g of CHO) via enteral tube for BG 51 to 69 mg/dL.  Give apple juice 240 mL (8 oz or 30 g of CHO) via enteral tube for BG less than or equal to 50 mg/dL.    ~Oral gel is preferable for conscious and able to swallow patient.   ~IF gel unavailable or patient refuses may provide apple juice 120 mL (4 oz or 15 g of CHO). Document juice on I and O flowsheet.    Admin. Amount: 15-30 g  Dispense Loc: [facility] 55A  Volume: 93.75 mL     1152-Auto Hold       1607-Unhold                Or  dextrose 50 % injection 25-50 mL  Dose: 25-50 mL  Freq: EVERY 15 MIN PRN Route: IV  PRN Reason: low blood sugar  Start: 10/04/18 0436   Admin Instructions: Use if have IV access, BG less than 70 mg/dL and meet dose criteria below:  Dose if conscious and alert (or disorientated) and NPO = 25 mL  Dose if  unconscious / not alert = 50 mL  Vesicant. For ordered doses up to 25 g, give IV Push undiluted. Give each 5g over 1 minute.    Admin. Amount: 25-50 mL  Dispense Loc:  [facility]  Infused Over: 1-5 Minutes  Volume: 50 mL     1152-Auto Hold       1607-Unhold                Or  glucagon injection 1 mg  Dose: 1 mg  Freq: EVERY 15 MIN PRN Route: SC  PRN Reason: low blood sugar  PRN Comment: May repeat x 1 only  Start: 10/04/18 0436   Admin Instructions: May give SQ or IM. ONLY use glucagon IF patient has NO IV access AND is UNABLE to swallow AND blood glucose is LESS than or EQUAL to 50 mg/dL.  If ordered IV, give IV Push over 1 minute. Reconstitute with 1mL sterile water.    Admin. Amount: 1 mg  Dispense Loc:  [facility]     1152-Auto Hold       1607-Unhold                 HYDROmorphone (PF) (DILAUDID) injection 0.2 mg  Dose: 0.2 mg  Freq: EVERY 2 HOURS PRN Route: IV  PRN Reason: other  PRN Comment: pain control or improvement in physical function.  Hold dose for analgesic side effects.  Start: 10/04/18 1609   Admin Instructions: Notify provider to assess for uncontrolled pain or analgesic side effects. Hold while on IV PCA or with regular IV opioid dosing.  For ordered IV doses 0.1-4 mg give IV Push undiluted. Administer each 2mg over 2-5 minutes.    Admin. Amount: 0.2 mg  Dispense Loc:  [facility]  POC: Post-procedure               hydrOXYzine (ATARAX) tablet 10 mg  Dose: 10 mg  Freq: EVERY 6 HOURS PRN Route: PO  PRN Reason: itching  Start: 10/04/18 1609   Admin Instructions: Caution to be used when administering multiple CNS depressing meds within a short time frame.    Admin. Amount: 1 tablet (1 × 10 mg tablet)  Last Admin: 10/10/18 1335  Dispense Loc:  [facility]  POC: Post-procedure           0638 (10 mg)-Given       1335 (10 mg)-Given           insulin aspart (NovoLOG) inj (RAPID ACTING)  Dose: 2 Units  Freq: 3 TIMES DAILY WITH MEALS Route: SC  Start: 10/06/18 1330   Admin Instructions: If given at mealtime,  administer within 30 minutes of start of meal    Admin. Amount: 2 Units  Last Admin: 10/10/18 0952  Dispense Loc: Contact Rx for dose  Volume: 3 mL       (1331)-Not Given [C]       1747 (2 Units)-Given        0852 (2 Units)-Given       1257 (2 Units)-Given       1733 (2 Units)-Given        0804 (2 Units)-Given       (1507)-Not Given [C]       1819 (2 Units)-Given        0916 (2 Units)-Given       1240 (2 Units)-Given       1727 (2 Units)-Given        0952 (2 Units)-Given       (1401)-Not Given [C]       [ ] 1800           insulin aspart (NovoLOG) inj (RAPID ACTING)  Dose: 1-5 Units  Freq: AT BEDTIME Route: SC  Start: 10/04/18 2200   Admin Instructions: MEDIUM INSULIN RESISTANCE DOSING    Do Not give Bedtime Correction Insulin if BG less than  200.   For  - 249 give 1 units.   For  - 299 give 2 units.   For  - 349 give 3 units.   For  -399 give 4 units.   For BG greater than or equal to 400 give 5 units.  Notify provider if glucose greater than or equal to 350 mg/dL after administration of correction dose.  If given at mealtime, administer within 30 minutes of start of meal    Admin. Amount: 1-5 Units  Last Admin: 10/08/18 2217  Dispense Loc: Contact Rx for dose  Volume: 3 mL     (2238)-Not Given        2200 (2 Units)-Given [C]        2108 (1 Units)-Given [C]        2149 (1 Units)-Given [C]        2217 (1 Units)-Given [C]        (2108)-Not Given [C]        [ ] 2200           insulin aspart (NovoLOG) inj (RAPID ACTING)  Dose: 1-7 Units  Freq: 3 TIMES DAILY BEFORE MEALS Route: SC  Start: 10/04/18 1700   Admin Instructions: Correction Scale - MEDIUM INSULIN RESISTANCE DOSING     Do Not give Correction Insulin if Pre-Meal BG less than 140.   For Pre-Meal  - 189 give 1 unit.   For Pre-Meal  - 239 give 2 units.   For Pre-Meal  - 289 give 3 units.   For Pre-Meal  - 339 give 4 units.   For Pre-Meal - 399 give 5 units.   For Pre-Meal -449 give 6 units  For Pre-Meal  BG greater than or equal to 450 give 7 units.   To be given with prandial insulin, and based on pre-meal blood glucose.    Notify provider if glucose greater than or equal to 350 mg/dL after administration of correction dose.  If given at mealtime, administer within 30 minutes of start of meal    Admin. Amount: 1-7 Units  Last Admin: 10/10/18 0922  Dispense Loc: Contact Rx for dose  Volume: 3 mL     1751 (2 Units)-Given        0931 (1 Units)-Given       1241 (1 Units)-Given       1756 (2 Units)-Given [C]        0905 (1 Units)-Given       1331 (3 Units)-Given       1746 (3 Units)-Given [C]        0852 (3 Units)-Given       1257 (1 Units)-Given       1732 (2 Units)-Given [C]        0803 (2 Units)-Given [C]       (1508)-Not Given [C]       1819 (1 Units)-Given        0916 (2 Units)-Given       1240 (1 Units)-Given       1727 (2 Units)-Given        0922 (1 Units)-Given [C]       (1401)-Not Given [C]       [ ] 1700           insulin glargine (LANTUS) injection 7 Units  Dose: 7 Units  Freq: EVERY MORNING BEFORE BREAKFAST Route: SC  Start: 10/07/18 0730   Admin Instructions: Hold for BG<120    Admin. Amount: 7 Units  Last Admin: 10/10/18 0921  Dispense Loc:  Main Pharmacy  Volume: 0.07 mL        0851 (7 Units)-Given        0604 (7 Units)-Given               0916 (7 Units)-Given        0921 (7 Units)-Given           labetalol (NORMODYNE/TRANDATE) injection 10 mg  Dose: 10 mg  Freq: EVERY 2 HOURS PRN Route: IV  PRN Reason: high blood pressure  PRN Comment: give for SBP > 170 or DBP>105  Start: 10/04/18 0436   Admin Instructions: Hold for HR <55  For ordered doses up to 80 mg, give IV Push undiluted. Give each 20 mg over 2 minutes.    Admin. Amount: 10 mg = 2 mL Conc: 5 mg/mL  Dispense Loc:  [facility]  Volume: 2 mL     1152-Auto Hold       1607-Unhold                 lidocaine (LMX4) cream  Freq: EVERY 1 HOUR PRN Route: Top  PRN Reason: pain  PRN Comment: with VAD insertion or accessing implanted port.  Start: 10/04/18 7328  "  Admin Instructions: Do NOT give if patient has a history of allergy to any local anesthetic or any \"forrest\" product.   Apply 30 minutes prior to VAD insertion or port access.  MAX Dose:  2.5 g (  of 5 g tube)    Dispense Loc: Contact Rx for dose  POC: Post-procedure               lidocaine 1 % 1 mL  Dose: 1 mL  Freq: EVERY 1 HOUR PRN Route: OTHER  PRN Comment: mild pain with VAD insertion or accessing implanted port  Start: 10/04/18 1609   Admin Instructions: Do NOT give if patient has a history of allergy to any local anesthetic or any \"forrest\" product. MAX dose 1 mL subcutaneous OR intradermal in divided doses.    Admin. Amount: 1 mL  Dispense Loc:  [facility]  Volume: 20 mL  POC: Post-procedure               melatonin tablet 1 mg  Dose: 1 mg  Freq: AT BEDTIME PRN Route: PO  PRN Reason: sleep  Start: 10/04/18 0436   Admin Instructions: Do not give unless at least 6 hours of uninterrupted sleep is expected. If the patient has multiple insomnia agents ordered PRN, offer in the following order per policy.  Move to the next available step if the earlier step is ineffective.    Step 1 - melatonin; Step 2 - diphenhydramine; Step 3 - temazepam; Step 4 - zolpidem; Step 5 - trazodone.    Admin. Amount: 1 tablet (1 × 1 mg tablet)  Dispense Loc:  [facility]     1152-Auto Hold       1607-Unhold                 metoprolol succinate (TOPROL-XL) 24 hr tablet 50 mg  Dose: 50 mg  Freq: DAILY Route: PO  Start: 10/04/18 0900   Admin Instructions: DO NOT CRUSH. Tablet may be split in half along score line.  Hold for SBP<100 or HR<55    Admin. Amount: 1 tablet (1 × 50 mg tablet)  Last Admin: 10/10/18 0921  Dispense Loc:  [facility]     0813 (50 mg)-Given       1152-Auto Hold       1607-Unhold        0931 (50 mg)-Given        0822 (50 mg)-Given        0851 (50 mg)-Given        0920 (50 mg)-Given        0902 (50 mg)-Given        0921 (50 mg)-Given           metoprolol tartrate (LOPRESSOR) tablet 25 mg  Dose: 25 mg  Freq: 2 TIMES DAILY PRN " Route: PO  PRN Comment: HR > 110  Start: 10/08/18 1525   Admin Instructions: Hold for SBP < 100 or HR < 60<br>See also Labetalol IV prn    Admin. Amount: 1 tablet (1 × 25 mg tablet)  Dispense Loc: [facility] 55[facility]               naloxone (NARCAN) injection 0.1-0.4 mg  Dose: 0.1-0.4 mg  Freq: EVERY 2 MIN PRN Route: IV  PRN Reason: opioid reversal  Start: 10/04/18 0436   Admin Instructions: For respiratory rate LESS than or EQUAL to 8.  Partial reversal dose:  0.1 mg titrated q 2 minutes for Analgesia Side Effects Monitoring Sedation Level of 3 (frequently drowsy, arousable, drifts to sleep during conversation).Full reversal dose:  0.4 mg bolus for Analgesia Side Effects Monitoring Sedation Level of 4 (somnolent, minimal or no response to stimulation).  For ordered IV doses 0.1-2mg give IVP. Give each 0.4mg over 15 seconds in emergency situations. For non-emergent situations further dilute in 9mL of NS to facilitate titration of response.    Admin. Amount: 0.1-0.4 mg = 0.25-1 mL Conc: 0.4 mg/mL  Dispense Loc:  [facility]  Volume: 1 mL     1152-Auto Hold       1607-Unhold                 ondansetron (ZOFRAN-ODT) ODT tab 4 mg  Dose: 4 mg  Freq: EVERY 6 HOURS PRN Route: PO  PRN Reasons: nausea,vomiting  Start: 10/04/18 0436   Admin Instructions: This is Step 1 of nausea and vomiting management.  If nausea not resolved in 15 minutes, go to Step 2 prochlorperazine (COMPAZINE). Do not push through foil backing. Peel back foil and gently remove. Place on tongue immediately. Administration with liquid unnecessary  With dry hands, peel back foil backing and gently remove tablet; do not push oral disintegrating tablet through foil backing; administer immediately on tongue and oral disintegrating tablet dissolves in seconds; then swallow with saliva; liquid not required.    Admin. Amount: 1 tablet (1 × 4 mg tablet)  Dispense Loc:  [facility]     1152-Auto Hold       1607-Unhold                Or  ondansetron (ZOFRAN) injection 4  mg  Dose: 4 mg  Freq: EVERY 6 HOURS PRN Route: IV  PRN Reasons: nausea,vomiting  Start: 10/04/18 0436   Admin Instructions: This is Step 1 of nausea and vomiting management.  If nausea not resolved in 15 minutes, go to Step 2 prochlorperazine (COMPAZINE).  Irritant. For ordered IV doses 0.1-4 mg, give IV Push undiluted over 2-5 minutes.    Admin. Amount: 4 mg = 2 mL Conc: 4 mg/2 mL  Dispense Loc:  [facility]  Infused Over: 2-5 Minutes  Volume: 2 mL     1152-Auto Hold       1607-Unhold                 oxyCODONE IR (ROXICODONE) tablet 5 mg  Dose: 5 mg  Freq: EVERY 3 HOURS PRN Route: PO  PRN Reason: other  PRN Comment: pain control or improvement in physical function. Hold dose for analgesic side effects.  Start: 10/04/18 1609   Admin Instructions: Notify provider to assess for uncontrolled pain or analgesic side effects. Hold while on PCA or with regular IV opioid dosing.  Maximum total  is 40 mg in 24 hours.    Admin. Amount: 1 tablet (1 × 5 mg tablet)  Last Admin: 10/07/18 1316  Dispense Loc:  [facility]  POC: Post-procedure     1918 (5 mg)-Given        0453 (5 mg)-Given       1913 (5 mg)-Given       2210 (5 mg)-Given        0821 (5 mg)-Given       2035 (5 mg)-Given        0851 (5 mg)-Given       1316 (5 mg)-Given              polyethylene glycol (MIRALAX/GLYCOLAX) Packet 17 g  Dose: 17 g  Freq: DAILY PRN Route: PO  PRN Reason: constipation  Start: 10/04/18 0436   Admin Instructions: Give in 8oz of  water, juice, or soda. Hold for loose stools.  This is the second step of a three step constipation treatment.  1 Packet = 17 grams. Mixed prescribed dose in 8 ounces of water. Follow with 8 oz. of water.    Admin. Amount: 17 g  Dispense Loc:  [facility]     1152-Auto Hold       1607-Unhold                 prochlorperazine (COMPAZINE) injection 5 mg  Dose: 5 mg  Freq: EVERY 6 HOURS PRN Route: IV  PRN Reasons: nausea,vomiting  Start: 10/04/18 1609   Admin Instructions: This is Step 2 of nausea and vomiting management.   If  nausea not resolved in 15 minutes, give metoclopramide (REGLAN) if ordered (step 3 of nausea and vomiting management)  For ordered IV doses 0.1-10 mg, give IV Push undiluted. Each 5mg over 1 minute.    Admin. Amount: 5 mg = 1 mL Conc: 5 mg/mL  Dispense Loc:  [facility]  Infused Over: 1-2 Minutes  Volume: 1 mL  POC: Post-procedure              Or  prochlorperazine (COMPAZINE) tablet 5 mg  Dose: 5 mg  Freq: EVERY 6 HOURS PRN Route: PO  PRN Reasons: nausea,vomiting  Start: 10/04/18 1609   Admin Instructions: This is Step 2 of nausea and vomiting management.   If nausea not resolved in 15 minutes, give metoclopramide (REGLAN) if ordered (step 3 of nausea and vomiting management)    Admin. Amount: 1 tablet (1 × 5 mg tablet)  Dispense Loc:  [facility]  POC: Post-procedure               prochlorperazine (COMPAZINE) Suppository 12.5 mg  Dose: 12.5 mg  Freq: EVERY 12 HOURS PRN Route: RE  PRN Reasons: nausea,vomiting  Start: 10/04/18 0436   Admin Instructions: This is Step 2 of nausea and vomiting management. Give if nausea not resolved 15 minutes after giving ondansetron (ZOFRAN). If nausea not resolved in 15 minutes, go to Step 3 metoclopramide (REGLAN), if ordered.    Admin. Amount: 0.5 suppository (0.5 × 25 mg suppository)  Dispense Loc:  Main Pharmacy     1152-Auto Hold       1607-Unhold                 senna-docusate (SENOKOT-S;PERICOLACE) 8.6-50 MG per tablet 1 tablet  Dose: 1 tablet  Freq: 2 TIMES DAILY Route: PO  Start: 10/04/18 1609   Admin Instructions: If no bowel movement in 24 hours, increase to 2 tablets PO.  Hold for loose stools.    Admin. Amount: 1 tablet  Last Admin: 10/10/18 0922  Dispense Loc:  [facility]  POC: Post-procedure                                                                 (2112)-Not Given                              0922 (1 tablet)-Given       [ ] 2100          Or  senna-docusate (SENOKOT-S;PERICOLACE) 8.6-50 MG per tablet 2 tablet  Dose: 2 tablet  Freq: 2 TIMES DAILY Route: PO  Start:  10/04/18 1609   Admin Instructions: Hold for loose stools.    Admin. Amount: 2 tablet  Last Admin: 10/09/18 1955  Dispense Loc:  [facility]  POC: Post-procedure     2039 (2 tablet)-Given        0932 (2 tablet)-Given       2148 (2 tablet)-Given        0821 (2 tablet)-Given       2035 (2 tablet)-Given        0851 (2 tablet)-Given       2148 (2 tablet)-Given        0921 (2 tablet)-Given               1022 (2 tablet)-Given       1955 (2 tablet)-Given                      [ ] 2100           sodium chloride (PF) 0.9% PF flush 3 mL  Dose: 3 mL  Freq: EVERY 8 HOURS Route: IK  Start: 10/04/18 2200   Admin Instructions: And Q1H PRN, to lock peripheral IV dormant line.    Admin. Amount: 3 mL  Last Admin: 10/10/18 0639  Dispense Loc: [facility] Floor Stock  Volume: 3 mL  POC: Post-procedure   Current Line: Peripheral IV 10/04/18 Right Lower forearm    (2107)-Not Given        0933 (3 mL)-Given       1336 (3 mL)-Given       2151 (3 mL)-Given        0726 (3 mL)-Given       1455 (3 mL)-Given       2036 (3 mL)-Given               0702 (3 mL)-Given       1310 (3 mL)-Given       2148 (3 mL)-Given        0523 (3 mL)-Given       1509 (3 mL)-Given       2111 (3 mL)-Given        0648 (3 mL)-Given       1420 (3 mL)-Given       1955 (3 mL)-Given               0639 (3 mL)-Given       [ ] 1400       [ ] 2200           sodium chloride (PF) 0.9% PF flush 3 mL  Dose: 3 mL  Freq: EVERY 1 HOUR PRN Route: IK  PRN Reason: line flush  PRN Comment: for peripheral IV flush post IV meds  Start: 10/04/18 1609   Admin. Amount: 3 mL  Dispense Loc: [facility] Floor Stock  Volume: 3 mL  POC: Post-procedure               tamsulosin (FLOMAX) capsule 0.4 mg  Dose: 0.4 mg  Freq: DAILY Route: PO  Start: 10/04/18 0900   Admin Instructions: Administer 30 minutes after the same meal each day.  Capsules should be swallowed whole; do not crush chew or open.    Admin. Amount: 1 capsule (1 × 0.4 mg capsule)  Last Admin: 10/10/18 0921  Dispense Loc:  [facility]     0813 (0.4  mg)-Given       1152-Auto Hold       1607-Unhold        0931 (0.4 mg)-Given        0822 (0.4 mg)-Given        0851 (0.4 mg)-Given        0921 (0.4 mg)-Given        0903 (0.4 mg)-Given        0921 (0.4 mg)-Given           vitamin D (ERGOCALCIFEROL) capsule 50,000 Units  Dose: 50,000 Units  Freq: EVERY 7 DAYS Route: PO  Start: 10/08/18 1300   Admin. Amount: 1 capsule (1 × 50,000 Units capsule)  Last Admin: 10/08/18 1524  Dispense Loc:  Main Pharmacy         1524 (50,000 Units)-Given             zolpidem (AMBIEN) half-tab 2.5 mg  Dose: 2.5 mg  Freq: DAILY PRN Route: PO  PRN Reason: sleep  Start: 10/09/18 0939   Admin Instructions: Dosed per age. If the patient has multiple insomnia agents ordered PRN, offer in the following order per policy.  Move to the next available step if the earlier step is ineffective.    Step 1 - melatonin; Step 2 - diphenhydramine; Step 3 - temazepam; Step 4 - zolpidem; Step 5 - trazodone.    Admin. Amount: 1 half-tab (1 × 2.5 mg half-tab)  Dispense Loc:  [facility] 55[facility]              Completed Medications  Medications 10/04/18 10/05/18 10/06/18 10/07/18 10/08/18 10/09/18 10/10/18         Dose: 250 mL  Freq: ONCE Route: IV  Last Dose: 250 mL (10/08/18 164)  Start: 10/08/18 1530   End: 10/08/18 174   Admin. Amount: 250 mL  Last Admin: 10/08/18 164  Dispense Loc: [facility] Floor Stock  Infused Over: 1 Hours  Administrations Remainin  Volume: 250 mL   Current Line: Peripheral IV 10/04/18 Right Lower forearm        1647 (250 mL)-New Bag               Dose: 2.5 mg  Freq: ONCE Route: IV  Start: 10/08/18 0515   End: 10/08/18 0518   Admin Instructions: Give IV Push undiluted for IV doses 0.1-15 mg. For new therapy administer each 5mg administered over 1-2 minutes.  When replacing chronic therapy, administer dose over 5-10 minutes.    Admin. Amount: 2.5 mg = 2.5 mL Conc: 1 mg/mL  Last Admin: 10/08/18 0518  Dispense Loc: [facility]  Administrations Remainin  Volume: 2.5 mL   Current Line: Peripheral IV  10/04/18 Right Lower forearm        0518 (2.5 mg)-Given            Discontinued Medications  Medications 10/04/18 10/05/18 10/06/18 10/07/18 10/08/18 10/09/18 10/10/18         Dose: 975 mg  Freq: EVERY 8 HOURS Route: PO  Start: 10/04/18 1700   End: 10/07/18 1659   Admin Instructions: Do not use if patient has an active opioid/acetaminophen combined analgesic product ordered for pain.  Maximum acetaminophen dose from all sources = 75 mg/kg/day not to exceed 4 grams/day.    Admin. Amount: 3 tablet (3 × 325 mg tablet)  Last Admin: 10/05/18 1756  Dispense Loc:  ADS 55A  Administrations Remainin  POC: Post-procedure     1751 (975 mg)-Given        0014 (975 mg)-Given       0932 (975 mg)-Given       1756 (975 mg)-Given        (8)-Not Given       (06)-Not Given       ()-Not Given        (0)-Not Given       (57)-Not Given       1659-Med Discontinued            Dose: 2.5 mg  Freq: EVERY 4 HOURS PRN Route: IV  PRN Reason: other  PRN Comment: HR > 120, hold for SBP < 90  Start: 10/08/18 0505   End: 10/08/18 1526   Admin Instructions: Give IV Push undiluted for IV doses 0.1-15 mg. For new therapy administer each 5mg administered over 1-2 minutes.  When replacing chronic therapy, administer dose over 5-10 minutes.    Admin. Amount: 2.5 mg = 2.5 mL Conc: 1 mg/mL  Last Admin: 10/08/18 1017  Dispense Loc:  ADS 55A  Volume: 2.5 mL   Current Line: Peripheral IV 10/04/18 Right Lower forearm        0600 (2.5 mg)-Given       1017 (2.5 mg)-Given [C]       1526-Med Discontinued           Rate: 100 mL/hr   Freq: CONTINUOUS Route: IV  Last Dose: Stopped (10/05/18 0456)  Start: 10/04/18 1615   End: 10/09/18 1059   Admin Instructions: Change to saline lock when PO well tolerated.    Last Admin: 10/04/18 2000  Dispense Loc: [facility] Floor Stock  Volume: 1,000 mL  POC: Post-procedure   Current Line: Peripheral IV 10/04/18 Right Lower forearm    1750 ( )-New Bag       2000 ( )-Rate/Dose Verify        0456-Stopped            1059-Med Discontinued          Dose: 5 mg  Freq: DAILY PRN Route: PO  PRN Reason: sleep  Start: 10/04/18 0436   End: 10/09/18 0941   Admin. Amount: 1 tablet (1 × 5 mg tablet)  Dispense Loc:  ADS 55A     1152-Auto Hold       1607-Unhold            0941-Med Discontinued

## 2018-10-04 NOTE — IP AVS SNAPSHOT
"    [place] [address] [facility]: [phone]                                              INTERAGENCY TRANSFER FORM - LAB / IMAGING / EKG / EMG RESULTS   10/4/2018                    Hospital Admission Date: 10/4/2018  CHERYL HOLLOWAY   : 1924  Sex: Male        Attending Provider: Radha Collins MD     Allergies:  No Known Allergies    Infection:  None   Service:  HOSPITALIST    Ht:  1.753 m (5' 9\")   Wt:  73.2 kg (161 lb 6 oz)   Admission Wt:  72.6 kg (160 lb)    BMI:  23.83 kg/m 2   BSA:  1.89 m 2            Patient PCP Information     Provider PCP Type    [facility]         Lab Results - 3 Days      Glucose by meter [[id number]] (Abnormal)  Resulted: 10/10/18 0807, Result status: Final result    Ordering provider: Radha Collins MD  10/10/18 0754 Resulting lab: POINT OF CARE TEST, GLUCOSE    Specimen Information    Type Source Collected On     10/10/18 0754          Components       Value Reference Range Flag Lab   Glucose 146 70 - 99 mg/dL H 170            Troponin I [[id number]] (Abnormal)  Resulted: 10/10/18 0752, Result status: Final result    Ordering provider: Isaiah Vasquez MD  10/10/18 0001 Resulting lab: [facility] [place]    Specimen Information    Type Source Collected On   Blood  10/10/18 0700          Components       Value Reference Range Flag Lab   Troponin I ES 0.134 0.000 - 0.045 ug/L  FrStHsLb   Comment:         The 99th percentile for upper reference range is 0.045 ug/L.  Troponin values   in the range of 0.045 - 0.120 ug/L may be associated with risks of adverse   clinical events.  Critical result, provider not notified due to previous critical result   notification.              Creatinine [[id number]] (Abnormal)  Resulted: 10/10/18 0749, Result status: Final result    Ordering provider: Isaiah Vasquez MD  10/10/18 0001 Resulting lab: [facility] [place]    Specimen Information    Type Source Collected On   Blood  10/10/18 " 0700          Components       Value Reference Range Flag Lab   Creatinine 1.79 0.66 - 1.25 mg/dL H FrStHsLb   GFR Estimate 35 >60 mL/min/1.7m2 L FrStHsLb   Comment:  Non  GFR Calc   GFR Estimate If Black 43 >60 mL/min/1.7m2 L FrStHsLb   Comment:   GFR Calc            Hemoglobin [[id number]] (Abnormal)  Resulted: 10/10/18 0730, Result status: Final result    Ordering provider: Isaiah Vasquez MD  10/10/18 0001 Resulting lab: [facility]    Specimen Information    Type Source Collected On   Blood  10/10/18 0700          Components       Value Reference Range Flag Lab   Hemoglobin 8.7 13.3 - 17.7 g/dL L FrStHsLb            Glucose by meter [[id number]] (Abnormal)  Resulted: 10/10/18 0221, Result status: Final result    Ordering provider: Radha Collins MD  10/10/18 0208 Resulting lab: POINT OF CARE TEST, GLUCOSE    Specimen Information    Type Source Collected On     10/10/18 0208          Components       Value Reference Range Flag Lab   Glucose 139 70 - 99 mg/dL H 170            Glucose by meter [[id number]] (Abnormal)  Resulted: 10/09/18 2103, Result status: Final result    Ordering provider: Radha Collins MD  10/09/18 2051 Resulting lab: POINT OF CARE TEST, GLUCOSE    Specimen Information    Type Source Collected On     10/09/18 2051          Components       Value Reference Range Flag Lab   Glucose 165 70 - 99 mg/dL H 170            Glucose by meter [[id number]] (Abnormal)  Resulted: 10/09/18 1708, Result status: Final result    Ordering provider: Radha Collins MD  10/09/18 1656 Resulting lab: POINT OF CARE TEST, GLUCOSE    Specimen Information    Type Source Collected On     10/09/18 1656          Components       Value Reference Range Flag Lab   Glucose 211 70 - 99 mg/dL H 170            Troponin I [[id number]] (Abnormal)  Resulted: 10/09/18 1338, Result status: Final result    Ordering provider: Isaiah Vasquez MD  10/09/18 1059 Resulting lab:  [facility]    Specimen Information    Type Source Collected On   Blood  10/09/18 1302          Components       Value Reference Range Flag Lab   Troponin I ES 0.143 0.000 - 0.045 ug/L HH FrStHsLb   Comment:         The 99th percentile for upper reference range is 0.045 ug/L.  Troponin values   in the range of 0.045 - 0.120 ug/L may be associated with risks of adverse   clinical events.  Critical result, provider not notified due to previous critical result   notification.              Glucose by meter [[id number]] (Abnormal)  Resulted: 10/09/18 1203, Result status: Final result    Ordering provider: Radha Collins MD  10/09/18 1149 Resulting lab: POINT OF CARE TEST, GLUCOSE    Specimen Information    Type Source Collected On     10/09/18 1149          Components       Value Reference Range Flag Lab   Glucose 168 70 - 99 mg/dL H 170            Hepatic panel [[id number]] (Abnormal)  Resulted: 10/09/18 1158, Result status: Final result    Ordering provider: Earl Streeter DO  10/09/18 0703 Resulting lab: [facility]    Specimen Information    Type Source Collected On     10/09/18 0703          Components       Value Reference Range Flag Lab   Bilirubin Direct 0.2 0.0 - 0.2 mg/dL  FrStHsLb   Bilirubin Total 0.6 0.2 - 1.3 mg/dL  FrStHsLb   Albumin 2.1 3.4 - 5.0 g/dL L FrStHsLb   Protein Total 6.3 6.8 - 8.8 g/dL L FrStHsLb   Alkaline Phosphatase 72 40 - 150 U/L  FrStHsLb   ALT 12 0 - 70 U/L  FrStHsLb   AST 22 0 - 45 U/L  FrStHsLb            CK total [[id number]]  Resulted: 10/09/18 1158, Result status: Final result    Ordering provider: Earl Streeter DO  10/09/18 0703 Resulting lab: [facility]    Specimen Information    Type Source Collected On     10/09/18 0703          Components       Value Reference Range Flag Lab   CK Total 109 30 - 300 U/L  FrStHsLb            Nt probnp inpatient [[id number]] (Abnormal)  Resulted: 10/09/18 1158, Result status: Final  result    Ordering provider: Earl Streeter DO  10/09/18 0703 Resulting lab: [facility]    Specimen Information    Type Source Collected On     10/09/18 0703          Components       Value Reference Range Flag Lab   N-Terminal Pro BNP Inpatient 7613 0 - 1800 pg/mL H FrStHsLb   Comment:            Reference range shown and results flagged as abnormal are suggested inpatient   cut points for confirming diagnosis if CHF in an acute setting. Establishing a   baseline value for each individual patient is useful for follow-up. An   inpatient or emergency department NT-proPBNP <300 pg/mL effectively rules out   acute CHF, with 99% negative predictive value.  The outpatient non-acute reference range for ruling out CHF is:   0-125 pg/mL (age 18 to less than 75)   0-450 pg/mL (age 75 yrs and older)              Glucose by meter [[id number]] (Abnormal)  Resulted: 10/09/18 1114, Result status: Final result    Ordering provider: Radha Collins MD  10/09/18 0907 Resulting lab: POINT OF CARE TEST, GLUCOSE    Specimen Information    Type Source Collected On     10/09/18 0907          Components       Value Reference Range Flag Lab   Glucose 204 70 - 99 mg/dL H 170            Troponin I [[id number]] (Abnormal)  Resulted: 10/09/18 0913, Result status: Final result    Ordering provider: Earl Streeter DO  10/09/18 0001 Resulting lab: [facility]    Specimen Information    Type Source Collected On   Blood  10/09/18 0703          Components       Value Reference Range Flag Lab   Troponin I ES 0.157 0.000 - 0.045 ug/L  FrStHsLb   Comment:         The 99th percentile for upper reference range is 0.045 ug/L.  Troponin values   in the range of 0.045 - 0.120 ug/L may be associated with risks of adverse   clinical events.  Critical result, provider not notified due to previous critical result   notification.              Basic metabolic panel [[id number]] (Abnormal)  Resulted: 10/09/18  0738, Result status: Final result    Ordering provider: Earl Streeter DO  10/09/18 0001 Resulting lab: [facility]    Specimen Information    Type Source Collected On   Blood  10/09/18 0703          Components       Value Reference Range Flag Lab   Sodium 139 133 - 144 mmol/L  FrStHsLb   Potassium 4.0 3.4 - 5.3 mmol/L  FrStHsLb   Chloride 105 94 - 109 mmol/L  FrStHsLb   Carbon Dioxide 24 20 - 32 mmol/L  FrStHsLb   Anion Gap 10 3 - 14 mmol/L  FrStHsLb   Glucose 205 70 - 99 mg/dL H FrStHsLb   Urea Nitrogen 40 7 - 30 mg/dL H FrStHsLb   Creatinine 1.96 0.66 - 1.25 mg/dL H FrStHsLb   GFR Estimate 32 >60 mL/min/1.7m2 L FrStHsLb   Comment:  Non  GFR Calc   GFR Estimate If Black 39 >60 mL/min/1.7m2 L FrStHsLb   Comment:  African American GFR Calc   Calcium 7.9 8.5 - 10.1 mg/dL L FrStHsLb            Magnesium [[id number]]  Resulted: 10/09/18 0738, Result status: Final result    Ordering provider: Earl Streeter DO  10/09/18 0001 Resulting lab: [facility]    Specimen Information    Type Source Collected On   Blood  10/09/18 0703          Components       Value Reference Range Flag Lab   Magnesium 2.3 1.6 - 2.3 mg/dL  FrStHsLb            Phosphorus [[id number]]  Resulted: 10/09/18 0738, Result status: Final result    Ordering provider: Earl Streeter DO  10/09/18 0001 Resulting lab: [facility]    Specimen Information    Type Source Collected On   Blood  10/09/18 0703          Components       Value Reference Range Flag Lab   Phosphorus 2.7 2.5 - 4.5 mg/dL  FrStHsLb            CBC with platelets [[id number]] (Abnormal)  Resulted: 10/09/18 0722, Result status: Final result    Ordering provider: Earl Streeter DO  10/09/18 0001 Resulting lab: [facility]    Specimen Information    Type Source Collected On   Blood  10/09/18 0703          Components       Value Reference Range Flag Lab   WBC 9.2 4.0 - 11.0 10e9/L   FrStHsLb   RBC Count 2.81 4.4 - 5.9 10e12/L L FrStHsLb   Hemoglobin 8.2 13.3 - 17.7 g/dL L FrStHsLb   Hematocrit 25.1 40.0 - 53.0 % L FrStHsLb   MCV 89 78 - 100 fl  FrStHsLb   MCH 29.2 26.5 - 33.0 pg  FrStHsLb   MCHC 32.7 31.5 - 36.5 g/dL  FrStHsLb   RDW 14.7 10.0 - 15.0 %  FrStHsLb   Platelet Count 204 150 - 450 10e9/L  FrStHsLb            Glucose by meter [[id number]] (Abnormal)  Resulted: 10/08/18 2112, Result status: Final result    Ordering provider: Radha Collins MD  10/08/18 2059 Resulting lab: POINT OF CARE TEST, GLUCOSE    Specimen Information    Type Source Collected On     10/08/18 2059          Components       Value Reference Range Flag Lab   Glucose 246 70 - 99 mg/dL H 170            TSH with free T4 reflex [[id number]] (Abnormal)  Resulted: 10/08/18 1909, Result status: Edited Result - FINAL    Ordering provider: Earl Streeter DO  10/08/18 0535 Resulting lab: [facility]    Specimen Information    Type Source Collected On     10/08/18 0535          Components       Value Reference Range Flag Lab   TSH 4.57 0.40 - 4.00 mU/L H FrStHsLb            T4 free [[id number]]  Resulted: 10/08/18 1909, Result status: Final result    Ordering provider: Earl Streeter DO  10/08/18 0535 Resulting lab: [facility]    Specimen Information    Type Source Collected On     10/08/18 0535          Components       Value Reference Range Flag Lab   T4 Free 1.28 0.76 - 1.46 ng/dL  FrStHsLb            T4 free [[id number]]  Resulted: 10/08/18 1856, Result status: In process    Ordering provider: Earl Streeter DO  10/08/18 0535 Resulting lab: [facility]    Specimen Information    Type Source Collected On     10/08/18 0535            Glucose by meter [[id number]] (Abnormal)  Resulted: 10/08/18 1748, Result status: Final result    Ordering provider: Radha Collins MD  10/08/18 1739 Resulting lab: POINT OF CARE TEST, GLUCOSE    Specimen Information    Type Source Collected  On     10/08/18 1734          Components       Value Reference Range Flag Lab   Glucose 163 70 - 99 mg/dL H 170            Glucose by meter [[id number]] (Abnormal)  Resulted: 10/08/18 1431, Result status: Final result    Ordering provider: Radha Collins MD  10/08/18 1420 Resulting lab: POINT OF CARE TEST, GLUCOSE    Specimen Information    Type Source Collected On     10/08/18 1420          Components       Value Reference Range Flag Lab   Glucose 145 70 - 99 mg/dL H 170            Glucose by meter [[id number]] (Abnormal)  Resulted: 10/08/18 1319, Result status: Final result    Ordering provider: Radha Collins MD  10/08/18 1245 Resulting lab: POINT OF CARE TEST, GLUCOSE    Specimen Information    Type Source Collected On     10/08/18 1245          Components       Value Reference Range Flag Lab   Glucose 143 70 - 99 mg/dL H 170            Magnesium [[id number]]  Resulted: 10/08/18 1239, Result status: Final result    Ordering provider: Earl Streeter DO  10/08/18 0535 Resulting lab: [facility]    Specimen Information    Type Source Collected On     10/08/18 0535          Components       Value Reference Range Flag Lab   Magnesium 2.1 1.6 - 2.3 mg/dL  [facility]            Vitamin D [[id number]] (Abnormal)  Resulted: 10/08/18 1227, Result status: Final result    Ordering provider: Ximena Stephenson PA-C  10/07/18 0000 Resulting lab: [facility]    Specimen Information    Type Source Collected On   Blood  10/07/18 0634          Components       Value Reference Range Flag Lab   Vitamin D Deficiency screening 9 20 - 75 ug/L L 51   Comment:         Season, race, dietary intake, and treatment affect the concentration of   25-hydroxy-Vitamin D. Values may decrease during winter months and increase   during summer months. Values 20-29 ug/L may indicate Vitamin D insufficiency   and values <20 ug/L may indicate Vitamin D deficiency.  Vitamin D determination  is routinely performed by an immunoassay specific for   25 hydroxyvitamin D3.  If an individual is on vitamin D2 (ergocalciferol)   supplementation, please specify 25 OH vitamin D2 and D3 level determination by   LCMSMS test VITD23.              Glucose by meter [[id number]] (Abnormal)  Resulted: 10/08/18 0811, Result status: Final result    Ordering provider: Radha Collins MD  10/08/18 0758 Resulting lab: POINT OF CARE TEST, GLUCOSE    Specimen Information    Type Source Collected On     10/08/18 0758          Components       Value Reference Range Flag Lab   Glucose 201 70 - 99 mg/dL H 170            Basic metabolic panel [[id number]] (Abnormal)  Resulted: 10/08/18 0619, Result status: Final result    Ordering provider: Earl Streeter DO  10/08/18 0000 Resulting lab: [facility]    Specimen Information    Type Source Collected On   Blood  10/08/18 0535          Components       Value Reference Range Flag Lab   Sodium 138 133 - 144 mmol/L  FrStHsLb   Potassium 4.2 3.4 - 5.3 mmol/L  FrStHsLb   Chloride 107 94 - 109 mmol/L  FrStHsLb   Carbon Dioxide 25 20 - 32 mmol/L  FrStHsLb   Anion Gap 6 3 - 14 mmol/L  FrStHsLb   Glucose 188 70 - 99 mg/dL H FrStHsLb   Urea Nitrogen 37 7 - 30 mg/dL H FrStHsLb   Creatinine 2.11 0.66 - 1.25 mg/dL H FrStHsLb   GFR Estimate 29 >60 mL/min/1.7m2 L FrStHsLb   Comment:  Non  GFR Calc   GFR Estimate If Black 36 >60 mL/min/1.7m2 L FrStHsLb   Comment:  African American GFR Calc   Calcium 7.5 8.5 - 10.1 mg/dL L FrStHsLb            CBC with platelets [[id number]] (Abnormal)  Resulted: 10/08/18 0549, Result status: Final result    Ordering provider: Earl Streeter DO  10/08/18 0000 Resulting lab: [facility]    Specimen Information    Type Source Collected On   Blood  10/08/18 0535          Components       Value Reference Range Flag Lab   WBC 9.2 4.0 - 11.0 10e9/L  FrStHsLb   RBC Count 2.62 4.4 - 5.9 10e12/L L FrStHsLb    Hemoglobin 7.8 13.3 - 17.7 g/dL L FrStHsLb   Hematocrit 23.4 40.0 - 53.0 % L FrStHsLb   MCV 89 78 - 100 fl  FrStHsLb   MCH 29.8 26.5 - 33.0 pg  FrStHsLb   MCHC 33.3 31.5 - 36.5 g/dL  FrStHsLb   RDW 14.8 10.0 - 15.0 %  FrStHsLb   Platelet Count 148 150 - 450 10e9/L L FrStHsLb            Magnesium [[id number]]  Resulted: 10/08/18 0535, Result status: In process    Ordering provider: Moose Valdes MD  10/08/18 0507 Resulting lab: [facility]    Specimen Information    Type Source Collected On   Blood  10/08/18 0535            Glucose by meter [[id number]] (Abnormal)  Resulted: 10/08/18 0151, Result status: Final result    Ordering provider: Radha Collins MD  10/08/18 0138 Resulting lab: POINT OF CARE TEST, GLUCOSE    Specimen Information    Type Source Collected On     10/08/18 0138          Components       Value Reference Range Flag Lab   Glucose 177 70 - 99 mg/dL H 170            Glucose by meter [[id number]] (Abnormal)  Resulted: 10/07/18 2119, Result status: Final result    Ordering provider: Radha Collins MD  10/07/18 2107 Resulting lab: POINT OF CARE TEST, GLUCOSE    Specimen Information    Type Source Collected On     10/07/18 2107          Components       Value Reference Range Flag Lab   Glucose 241 70 - 99 mg/dL H 170            Glucose by meter [[id number]] (Abnormal)  Resulted: 10/07/18 1658, Result status: Final result    Ordering provider: Radha Collins MD  10/07/18 1646 Resulting lab: POINT OF CARE TEST, GLUCOSE    Specimen Information    Type Source Collected On     10/07/18 1646          Components       Value Reference Range Flag Lab   Glucose 191 70 - 99 mg/dL H 170            Hemoglobin [[id number]] (Abnormal)  Resulted: 10/07/18 1213, Result status: Final result    Ordering provider: Earl Streeter DO  10/07/18 1051 Resulting lab: [facility]    Specimen Information    Type Source Collected On   Blood  10/07/18 1135          Components       Value Reference  Range Flag Lab   Hemoglobin 8.1 13.3 - 17.7 g/dL L [facility]            Glucose by meter [[id number]] (Abnormal)  Resulted: 10/07/18 1157, Result status: Final result    Ordering provider: Radha Collins MD  10/07/18 1144 Resulting lab: POINT OF CARE TEST, GLUCOSE    Specimen Information    Type Source Collected On     10/07/18 1144          Components       Value Reference Range Flag Lab   Glucose 185 70 - 99 mg/dL H 170            Glucose by meter [[id number]] (Abnormal)  Resulted: 10/07/18 0901, Result status: Final result    Ordering provider: Radha Collins MD  10/07/18 0849 Resulting lab: POINT OF CARE TEST, GLUCOSE    Specimen Information    Type Source Collected On     10/07/18 0849          Components       Value Reference Range Flag Lab   Glucose 251 70 - 99 mg/dL H 170            Glucose by meter [[id number]] (Abnormal)  Resulted: 10/07/18 0214, Result status: Final result    Ordering provider: Radha Collins MD  10/07/18 0151 Resulting lab: POINT OF CARE TEST, GLUCOSE    Specimen Information    Type Source Collected On     10/07/18 0151          Components       Value Reference Range Flag Lab   Glucose 179 70 - 99 mg/dL H 170            Testing Performed By     Lab - Abbreviation Name Director Address Valid Date Range    14 - [facility] Unknown [address] [place] [address] 05/08/15 1057 - Present    45 - [id number] Unknown Unknown 01/28/02 0000 - Present    51 - Unknown [facility] Unknown [address] [address] 12/31/14 1010 - Present    170 - Unknown POINT OF CARE TEST, GLUCOSE Unknown Unknown 10/31/11 1114 - Present            Unresulted Labs     None      Encounter-Level Documents:     There are no encounter-level documents.      Order-Level Documents:     There are no order-level documents.

## 2018-10-04 NOTE — PROGRESS NOTES
Hospitalist follow up note.  Please see admit note earlier today.      Pt went to the OR today for left DELL.  Remains stable at this time.  Continue plan as per Dr. Collins in admission note.      Demarcus Reis D.O.   10/4/2018

## 2018-10-04 NOTE — OR NURSING
Patient Abimael Flores is in stable condition and has met criteria for PACU discharge.  ADAN Membreno at bedside   and a sign out was given for Unit/Station transfer.

## 2018-10-04 NOTE — ANESTHESIA POSTPROCEDURE EVALUATION
Patient: Abimael Flores    Procedure(s):  LEFT HIP TONYA ARTHROPLASTY - Wound Class: I-Clean    Diagnosis:HIP FRACTURE.  Diagnosis Additional Information: No value filed.    Anesthesia Type:  General, ETT    Note:  Anesthesia Post Evaluation    Patient location during evaluation: PACU  Patient participation: Able to fully participate in evaluation  Level of consciousness: sleepy but conscious and responsive to verbal stimuli  Pain management: adequate  Airway patency: patent  Cardiovascular status: acceptable and hemodynamically stable  Respiratory status: acceptable and unassisted  Hydration status: acceptable  PONV: none     Anesthetic complications: None          Last vitals:  Vitals:    10/04/18 1450 10/04/18 1500 10/04/18 1510   BP: 119/57 122/58 98/47   Resp: 11 10 24   Temp:      SpO2: 92% 94% 92%         Electronically Signed By: Bobby Roche MD  October 4, 2018  3:23 PM

## 2018-10-04 NOTE — IP AVS SNAPSHOT
` `     [place] [address] [facility]: [phone]                 INTERAGENCY TRANSFER FORM - NOTES (H&P, Discharge Summary, Consults, Procedures, Therapies)   10/4/2018                    Hospital Admission Date: 10/4/2018  ABIMAEL HOLLOWAY   : 1924  Sex: Male        Patient PCP Information     Provider PCP Type    CARLITOS BEE General         History & Physicals      H&P signed by Radha Collins MD at 10/4/2018  6:32 AM      Author:  Radha Collins MD Service:  Hospitalist Author Type:  Physician    Filed:  10/4/2018  6:32 AM Date of Service:  10/4/2018  3:51 AM Creation Time:  10/4/2018  4:58 AM    Status:  Signed :  Radha Collins MD (Physician)         Admitted:     10/04/2018      DATE OF ADMISSION:  10/04/2018      PRIMARY CARE PHYSICIAN:  Dr. Carlitos Bee.      CHIEF COMPLAINT:  Left hip pain.      HISTORY OF PRESENT ILLNESS:  Mr. 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  Notably, the patient was hospitalized recently at [facility] from  through 2018 for a right femoral neck fracture for which he underwent hemiarthroplasty on 2018.  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  He was eventually discharged to a skilled nursing facility.  Around 0020 a.m., reportedly, he was trying to  something from the floor when he lost his balance, fell and landed on his left hip.  Following the fall, he had severe pain and not able to ambulate.  Therefore,  he was transferred to [facility] for further management.  The patient reports no prodromal symptoms such as lightheadedness, chest pain, palpitation, dizziness or lightheadedness.  He denies chest pain, cough, sputum, fever, nausea, vomiting, abdominal pain or other complaints.  In the Emergency Department, the patient has been evaluated with Dr. Trivedi, the ER attending physician.  Chemistry profile is significant for creatinine of 1.99.  Blood glucose is 90.  X-ray of the chest shows no acute abnormality.  X-ray of the hip reveals an angulated left femoral neck fracture.  He is being admitted to this hospital under inpatient status.      PAST MEDICAL HISTORY:   1.  Hypertension.   2.  Dyslipidemia.   3.  Coronary artery disease status post CABG x 4 in 1996.   4.  Diabetes mellitus type 2 with the most recent hemoglobin A1c of 8.4% on 08/25/2018.   5.  Chronic kidney disease stage III-IV with the most recent creatinine around 1.8-1.9.   6.  History of nonsustained V-tach during the recent hospitalization in 08/2018.   7.  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   8.  Benign prostatic hypertrophy.   9.  Chronic anemia with baseline hemoglobin around 9.      PAST SURGICAL HISTORY:[MA1.1]   Past Surgical History:   Procedure Laterality Date     CARDIAC SURGERY       CATARACT IOL, RT/LT Bilateral      lacrimal caruncle removed BE Bilateral ~1989     OPEN REDUCTION INTERNAL FIXATION HIP BIPOLAR Right 8/26/2018    Procedure: OPEN REDUCTION INTERNAL FIXATION HIP BIPOLAR;  Right Hip Bipolar Hemiarthroplasty (Biomet);  Surgeon: Bill Sanders MD;  Location:  OR     REPAIR RUPTURED GLOBE  4/21/2014    Procedure: Exploration and Repair of Ruptured Globe ;  Surgeon: Mercedes Rivera MD;  Location:  OR[MA1.2]        FAMILY HISTORY:  Significant for diabetes in his parents.      SOCIAL HISTORY:  The patient is a retired surgeon.  He used to smoke cigars but quit many  years ago.  He denies drinking alcohol.  He does not use illicit drugs.  He does use a walker for ambulation.      MEDICATIONS:[MA1.1]    Prior to Admission Medications   Prescriptions Last Dose Informant Patient Reported? Taking?   IBUPROFEN PO   Yes No   Sig: Take 200 mg by mouth every 6 hours as needed for moderate pain   Skin Protectants, Misc. (ANIBAL PROTECT EX)   Yes No   Sig: Apply topically 2 times daily   Zolpidem Tartrate (AMBIEN PO) Past Week at Unknown time  Yes Yes   Sig: Take 5 mg by mouth daily as needed for sleep   aspirin 325 MG EC tablet 10/3/2018 at Unknown time  No Yes   Sig: Take one Aspirin tab twice daily for 5 weeks.   atorvastatin (LIPITOR) 10 MG tablet 10/3/2018 at Unknown time  No Yes   Sig: Take 1 tablet (10 mg) by mouth every evening   diltiazem (TIAZAC) 120 MG 24 hr ER beaded capsule 10/3/2018 at Unknown time  Yes Yes   Sig: Take 120 mg by mouth daily   insulin aspart (NOVOLOG PEN) 100 UNIT/ML injection 10/3/2018 at Unknown time  No Yes   Sig: Inject 1-7 Units Subcutaneous 3 times daily (before meals)   insulin aspart (NOVOLOG PEN) 100 UNIT/ML injection 10/3/2018 at Unknown time  No Yes   Sig: Inject 1-5 Units Subcutaneous At Bedtime   insulin glargine (LANTUS) 100 UNIT/ML injection   Yes No   Sig: Inject 7 Units Subcutaneous every morning   insulin lispro (HUMALOG KWIKPEN) 100 UNIT/ML injection 10/3/2018 at Unknown time  No Yes   Sig: Inject 2 Units Subcutaneous 3 times daily (before meals)   insulin lispro (HUMALOG) 100 UNIT/ML injection   Yes No   Sig: Inject Subcutaneous 3 times daily (before meals)   metoprolol succinate (TOPROL-XL) 50 MG 24 hr tablet 10/3/2018 at Unknown time  No Yes   Sig: Take 1 tablet (50 mg) by mouth daily   oxyCODONE IR (ROXICODONE) 5 MG tablet 10/4/2018 at 0020  No Yes   Sig: Take 1 tablet (5 mg) by mouth every 4 hours as needed for pain   polyethylene glycol (MIRALAX/GLYCOLAX) Packet 10/3/2018 at Unknown time  Yes Yes   Sig: Take 17 g by mouth daily    tamsulosin (FLOMAX) 0.4 MG capsule 10/3/2018 at Unknown time  Yes Yes   Sig: Take 0.4 mg by mouth daily      Facility-Administered Medications: None[MA1.3]        ALLERGIES AND INTOLERANCES:  NONE.      REVIEW OF SYSTEMS:  A 10-point review of system was performed thoroughly and it was negative except as stated in history of present illness.      PHYSICAL EXAMINATION:   GENERAL:  This patient is a very pleasant but fair-appearing elderly man who is in no acute distress.   VITAL SIGNS:  Blood pressure 139/68, heart rate 91, respiratory rate 14, temperature 97.7 degrees Fahrenheit, oxygen saturation 93% on room air.   HEENT:  The pupils are round, equal, reactive to light bilaterally.  There is mild conjunctival pallor.  The sclerae are anicteric.  The oral mucosa appears dry.   NECK:  Supple.  No elevated JVP.   LUNGS:  Clear to auscultation bilaterally.   CARDIOVASCULAR:  There is normal S1 and S2 with regular rate and rhythm.   ABDOMEN:  Soft, nontender and nondistended.  Bowel sounds are present.   EXTREMITIES:  There is no calf tenderness or lower extremity edema.   SKIN:  There is no jaundice, cyanosis or acute rash.   NEUROLOGIC:  He is awake, alert and appears to be oriented almost x 3.  Speech is normal.  There is no focal weakness on gross examination.      LABORATORY STUDIES:  Chemistry profile:  Sodium 139, potassium 4.1, BUN 41, creatinine 1.99, calcium 7.8, glucose 90.      ASSESSMENT AND PLAN:  Mr. 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  He is being admitted to the hospital under inpatient  status.   1.  Mechanical fall with angulated left femoral neck fracture:  The patient has presented with left hip pain following a mechanical fall as he was trying to  something from the floor at the transitional care facility.  There were no warning symptoms to suggest near-syncope.  The patient also did not lose his consciousness.  The workup in the Emergency Department has revealed an angulated left femoral neck fracture.  Notably, he had a recent fall on 08/25/2018 which resulted in a right femoral neck fracture requiring hemiarthroplasty with Dr. Bill Sanders.  Management as below.   -- N.p.o.    -- P.r.n. Tylenol, oxycodone and Dilaudid available.   -- Fall precautions.   -- His revised cardiac risk index is calculated at over 11%.  This will place the patient at high risk for the surgery.  Orthopedic Service will consulted to discuss the benefits versus risks of surgery with the patient for the final decision.   -- I will obtain an EKG as preop evaluation.   2.  Hypertension:  The blood pressure is currently controlled.  He will continue on prior to admission Toprol XL 50 mg p.o. daily as well as diltiazem 120 mg p.o. daily.  IV labetalol will be available p.r.n. for systolic blood pressure more than 170.   3.  Dyslipidemia:  He will continue on prior to admission Lipitor.   4.  Benign prostatic hypertrophy:  He will continue with prior to admission Flomax.   5.  Chronic kidney disease, stage III-IV:  The most recent creatinine levels have been around 1.8-1.9.  His creatinine is 1.99 today in the Emergency Department.  His creatinine we will closely monitor while he is being hydrated with intravenous normal saline.  We will avoid NSAIDs or nephrotoxins.     6.  Chronic anemia:  Baseline hemoglobin is around 9.  His hemoglobin will be closely monitored given the risk of acute blood loss.   7.  Coronary artery disease:  The patient is status post coronary artery bypass graft x 4 in 1996.  There is no  recent cardiac workup.  At this juncture, he reports no chest pain.  I will obtain an EKG for preoperative evaluations.  He will continue on prior to admission metoprolol and Lipitor.  I will hold his aspirin for now until after the surgery.   8.  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  He will continue on prior to admission Toprol XL 50 mg p.o. daily.   9.  Diabetes mellitus type 2:  The most recent hemoglobin A1c was 8.4% on 2018.  At home he is on Lantus 7 units subcu every morning and Humalog 2 units subq t.i.d.  Due to n.p.o. status, I will hold his prior to admission Humalog and decrease the dose of Lantus to 3 units daily.  In addition, I will place the patient on a sliding scale NovoLog.   10.  Deep venous thrombosis prophylaxis:  Pneumatic compression device for now.   11.  Code status:  It was discussed with the patient, and the patient reconfirmed DNR/DNI.   12.  Disposition:  Anticipate at least 2-3 days of inpatient management.      I would like to thank Dr. Carlitos Bee for allowing the Hospitalist Service to participate in the care of this patient.         ABDIFATAH COLLINS MD             D: 10/04/2018   T: 10/04/2018   MT: SMITHA      Name:     CHERYL HOLLOWAY   MRN:      [id number]        Account:      [id number]   :      1924        Admitted:     10/04/2018                   Document: [id number]       cc: Carlitos Bee MD[MA1.1]        Revision History        User Key Date/Time User Provider Type Action    > MA1.1 10/4/2018  6:32 AM Abdifatah Collins MD Physician Sign     MA1.3 10/4/2018  6:29 AM Abdifatah Collins MD Physician      MA1.2 10/4/2018  6:28 AM Abdifatah Collins MD Physician      [N/A] 10/4/2018  4:58 AM Abdifatah Collins MD Physician Edit                     Discharge  Summaries      Discharge Summaries by Isaiah Vasquez MD at 10/10/2018 12:48 PM     Author:  Isaiah Vasquez MD Service:  Hospitalist Author Type:  Physician    Filed:  10/10/2018 12:58 PM Date of Service:  10/10/2018 12:48 PM Creation Time:  10/10/2018 12:47 PM    Status:  Signed :  Isaiah Vasquez MD (Physician)         Discharge Summary  Hospitalist    Date of Admission:  10/4/2018  Date of Discharge:[SN1.1]  10/10/2018[SN1.2]  Discharging Provider:[SN1.1] Isaiah Vasquez[SN1.2], MD[SN1.1]    Primary Care Physician   RAISSA HILL[SN1.2]  Primary Care Provider Phone Number: [phone]  Primary Care Provider Fax Number: [phone]    PRINCIPAL DIAGNOSIS  Mechanical fall with angulated left femoral neck fracture: s/p left DELL on 10/4    Positive troponin likely from anemia/renal disease/surgical stress.  Acute on chronic anemia likely dilutional/surgical blood loss.  Severe vitamin D deficiency.  Subclinical hypothyroidism.  Atrial fibrillation with intermittent rate-related bundle  Possible mild cognitive impairment at risk for delirium.[SN1.1]    Past Medical History:   Diagnosis Date     Benign essential hypertension 9/4/2018     Coronary artery disease      Nonsenile cataract      Recent retinal detachment, total or subtotal 8/5/2014     Type 2 diabetes mellitus without complications (H)        History of Present Illness[SN1.2]   Abimael Flores is an 89 year old male who presented with fall.[SN1.1]    Hospital Course[SN1.2]     Mr. 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       Mechanical fall with angulated left femoral neck fracture: s/p left DELL on 10/4    The patient has presented with left hip pain following a mechanical fall as he was trying to  something from the floor at the transitional care facility  Notably, he had a recent fall on 08/25/2018 which resulted in a right femoral neck fracture requiring hemiarthroplasty with Dr. Bill Sanders.    On aspirin 325 mg oral daily for DVT prophylaxis.  Pain management with as needed Tylenol.  Prescription for oxycodone provided by Ortho team.  PT/OT at TCU.  Aggressive incentive spirometry.  Stool softeners and suppositories as needed while on pain medication.    Positive troponin likely from anemia/renal disease/surgical stress.  Nursing team had notified hospitalist rounding on 10/8 about ongoing tachycardia.  Troponin 0.157 >0.143 > 0.134  Review of patient's chart he has had very faintly positive troponin [0.03]   Echocardiogram from August 2018 reviewed.  Not complaining of any chest pain at this time.  Not compliant with telemetry monitoring.  Have discussed with patient, his family already on aspirin, statin and metoprolol.  Opted not to pursue any further cardiac workup at this time is asymptomatic.    Acute on chronic anemia likely dilutional/surgical blood loss.  Baseline hemoglobin around 9.  Postoperatively hemoglobin dropped to 6.7, received 1 unit of PRBC on 10/5   Post blood transfusion hemoglobin has been stable around 8.  Monitor hemoglobin levels periodically.      Severe vitamin D deficiency.  Vitamin D levels 9.  Started on 50,000 units of vitamin D replacement for 1 week.  Can then switch to oral vitamin D supplements.     Subclinical hypothyroidism.  Noted to be tachycardic, TSH 4.57.  T4 1.28.  Monitor thyroid function tests in 4-6 weeks.      Hypertension  Continue prior to admission Cardizem 120 mg oral daily, metoprolol 50 mg oral daily.      Dyslipidemia:   Continue on prior to admission Lipitor.        Benign prostatic hypertrophy:   Continue with prior to admission Flomax.       Chronic kidney disease, stage III-IV:    The most recent creatinine levels have been around 1.8-1.9 and is at baseline.   Monitor renal function periodically.  Avoid nephrotoxic drugs.      Coronary artery disease:    The patient is status post coronary artery bypass graft x 4 in 1996.    - Continue on prior to admission metoprolol and Lipitor.   - ASA at discretion of surgery team.       Atrial fibrillation with intermittent rate-related bundle  Supraventricular tachycardia, asymptomatic   During recent hospitalization in August 2018 after a fall with right femoral neck fracture status post ORIF 8/26/18-noted to have arrhythmia.   His CHADS-VASc score is 4 for age, hypertension, and diabetes.  This puts him in the high risk group for a stroke cardiology was then consulted, patient and family declined anticoagulation given risk for falls.  Started on Toprol-XL, Cardizem and aspirin.  Continue PTA Toprol XL, Cardizem.  Currently on aspirin 325 mg oral daily for DVT prophylaxis.    Diabetes mellitus type 2:  T  he most recent hemoglobin A1c was 8.4% on 08/25/2018.    At home he is on Lantus 7 units subcu every morning and Humalog 2 units subq t.i.d.    Continue PTA insulin regimen.    Possible mild cognitive impairment at risk for delirium.  Avoid benzodiazepines, minimize narcotic use as able to.  Minimize interruptions, frequent reorientation.  Fall precautions.    # Discharge Pain Plan:   - During his hospitalization, Abimael experienced pain due to post op pain.  The pain plan for discharge was discussed with Abimael and his family and the plan was created in a collaborative fashion.    Script for oxycodone provided by hospital team.  Patient, his son over the telephone, interdisciplinary team involved in care and agree with discharge plan.    Isaiah Vasquez MD[SN1.1]    Pending Results[SN1.2]   These results will be followed  up by ordering provider[SN1.1]   Unresulted Labs Ordered in the Past 30 Days of this Admission     Date and Time Order Name Status Description    10/8/2018 0535 T4 free In process              Physical Exam   Vitals:    10/07/18 0610 10/08/18 0533 10/10/18 0610   Weight: 72.5 kg (159 lb 12.8 oz) 72.4 kg (159 lb 9.8 oz) 73.2 kg (161 lb 6 oz)[SN1.2]     Vital Signs with Ranges[SN1.1]  Temp:  [97.5  F (36.4  C)-98.3  F (36.8  C)] 97.8  F (36.6  C)  Pulse:  [89] 89  Heart Rate:  [83-87] 85  Resp:  [16-18] 18  BP: ()/(57-73) 135/73  SpO2:  [95 %-98 %] 98 %  I/O last 3 completed shifts:  In: 246 [P.O.:240; I.V.:6]  Out: 1250 [Urine:1250][SN1.2]  PHYSICAL EXAM  GENERAL: Patient is in no distress.  Able to answer simple commands.  HEART: Regular rate and rhythm. S1S2. No murmurs  LUNGS: Bilateral slightly decreased breath sounds.  No wheezing no crackles.  ABDOMEN: Soft, no abdominal tenderness, bowel sounds heard   NEURO: No focal weakness.  Left hip area dressing intact.  EXTREMITIES: Trace pedal edema. 2+ peripheral pulses.  SKIN: Warm, dry.  PSYCHIATRY Cooperative    )[SN1.1]Consultations This Hospital Stay   ORTHOPEDIC SURGERY IP CONSULT  OCCUPATIONAL THERAPY ADULT IP CONSULT  PHYSICAL THERAPY ADULT IP CONSULT  SOCIAL WORK IP CONSULT  PHYSICAL THERAPY ADULT IP CONSULT  OCCUPATIONAL THERAPY ADULT IP CONSULT  PHYSICAL THERAPY ADULT IP CONSULT  OCCUPATIONAL THERAPY ADULT IP CONSULT    Time Spent on this Encounter[SN1.2]   Isaiah NINO, personally saw the patient today and spent greater than 30 minutes discharging this patient.  More than 50% of time spent in direct patient care, care coordination, patient/caregiver counseling, and formalizing plan of care.[SN1.1]    Discharge Orders     Wound care   Daily dry dressing changes.  Staples out 12 days post-op and apply steri-strips.     Weight bearing status   WBAT     General info for SNF   Length of Stay Estimate: Short Term Care: Estimated # of Days  <30  Condition at Discharge: Improving  Level of care:skilled   Rehabilitation Potential: Good  Admission H&P remains valid and up-to-date: Yes  Recent Chemotherapy: N/A  Use Nursing Home Standing Orders: Yes     Mantoux instructions   Give two-step Mantoux (PPD) Per Facility Policy Yes     Reason for your hospital stay   Fall.     Activity - Up with nursing assistance     Follow Up and recommended labs and tests   Follow-up with your orthopedic MD at [facility] in 10-14 days.  Call [phone] to schedule ASAP.  Also, please call with any questions that come up prior to your appointment.     Follow Up and recommended labs and tests   Follow up with [facility] physician.  The following labs/tests are recommended: hemoglobin and creatinine in 1 week   Follow up with primary care provider.   Follow up with Orthopedics as directed.     Additional Discharge Instructions   Aggressive incentive spirometry.  Avoid nephrotoxic drugs.  Discussed with patient's family and would hold off pursuing any further cardiac workup at this time.  Discontinued Ambien as concern patient at risk for delirium while on narcotics.     DNR/DNI     Physical Therapy Adult Consult   Evaluate and treat as clinically indicated.    Reason:  WBAT.  Posterior hip precautions.     Occupational Therapy Adult Consult   Evaluate and treat as clinically indicated.    Reason:  WBAT.  Posterior hip precautions.     Physical Therapy Adult Consult   Evaluate and treat as clinically indicated.    Reason:  Physical deconditioning.     Occupational Therapy Adult Consult   Evaluate and treat as clinically indicated.    Reason:  Physical deconditioning.     Fall precautions     Hip precautions     Fall precautions     Advance Diet as Tolerated   Follow this diet upon discharge: Orders Placed This Encounter     Room Service     Advance Diet as Tolerated: Regular Diet Adult         Discharge Medications   Current Discharge Medication List      START  taking these medications    Details   acetaminophen (TYLENOL) 325 MG tablet Take 2 tablets (650 mg) by mouth every 4 hours as needed for other (multimodal surgical pain management along with NSAIDS and opioid medication as indicated based on pain control and physical function.)  Qty: 100 tablet, Refills: 0    Associated Diagnoses: Fracture of hip, left, closed, initial encounter (H)      Ergocalciferol (VITAMIN D) 85524 units CAPS Take 50,000 Units by mouth every 7 days for 5 doses  Qty: 5 capsule, Refills: 0    Associated Diagnoses: Fracture of hip, left, closed, initial encounter (H)         CONTINUE these medications which have CHANGED    Details   oxyCODONE IR (ROXICODONE) 5 MG tablet Take 1 tablet (5 mg) by mouth every 3 hours as needed for severe pain  Qty: 30 tablet, Refills: 0    Comments: Limit for severe pain due to confusion.  Associated Diagnoses: Fracture of hip, left, closed, initial encounter (H)         CONTINUE these medications which have NOT CHANGED    Details   aspirin 325 MG EC tablet Take one Aspirin tab twice daily for 5 weeks.  Qty: 70 tablet, Refills: 0    Associated Diagnoses: Closed fracture of neck of right femur, initial encounter (H)      atorvastatin (LIPITOR) 10 MG tablet Take 1 tablet (10 mg) by mouth every evening    Associated Diagnoses: Coronary artery disease involving native heart without angina pectoris, unspecified vessel or lesion type      diltiazem (TIAZAC) 120 MG 24 hr ER beaded capsule Take 120 mg by mouth daily      insulin glargine (LANTUS) 100 UNIT/ML injection Inject 7 Units Subcutaneous every morning      !! insulin lispro (HUMALOG KWIKPEN) 100 UNIT/ML injection Inject Subcutaneous At Bedtime Sliding scale:  -249=1 unit  -299=2 units  -349=3 units  -399=4 units  +=5 units      !! insulin lispro (HUMALOG KWIKPEN) 100 UNIT/ML injection Inject 2 Units Subcutaneous 3 times daily (before meals)    Associated Diagnoses: Type 2 diabetes  mellitus with complication, with long-term current use of insulin (H)      insulin lispro (HUMALOG) 100 UNIT/ML injection Inject Subcutaneous 3 times daily (before meals) Sliding scale:   -189=1 unit  -239=2 units  -289=3 units  -339=4 units  -399=5 units  -499=6 units  +=7 units      metoprolol succinate (TOPROL-XL) 50 MG 24 hr tablet Take 1 tablet (50 mg) by mouth daily  Qty: 30 tablet    Associated Diagnoses: Paroxysmal supraventricular tachycardia (H)      polyethylene glycol (MIRALAX/GLYCOLAX) Packet Take 17 g by mouth daily      tamsulosin (FLOMAX) 0.4 MG capsule Take 0.4 mg by mouth every evening       Skin Protectants, Misc. (ANIBAL PROTECT EX) Apply topically 2 times daily       !! - Potential duplicate medications found. Please discuss with provider.      STOP taking these medications       aspirin 81 MG tablet Comments:   Reason for Stopping:         IBUPROFEN PO Comments:   Reason for Stopping:         melatonin 5 MG tablet Comments:   Reason for Stopping:         melatonin 5 MG tablet Comments:   Reason for Stopping:         Zolpidem Tartrate (AMBIEN PO) Comments:   Reason for Stopping:             Allergies   No Known Allergies    Discharge Disposition[SN1.2]   Discharged to TCU  Condition at discharge: Good    DATA  Most Recent 3 CBC's:[SN1.1]  Recent Labs   Lab Test  10/10/18   0700  10/09/18   0703  10/08/18   0535   10/06/18   0643   WBC   --   9.2  9.2   --   10.2   HGB  8.7*  8.2*  7.8*   < >  8.3*   MCV   --   89  89   --   90   PLT   --   204  148*   --   126*    < > = values in this interval not displayed.[SN1.2]      Most Recent 3 BMP's:[SN1.1]  Recent Labs   Lab Test  10/10/18   0700  10/09/18   0703  10/08/18   0535  10/06/18   0643   NA   --   139  138  138   POTASSIUM   --   4.0  4.2  4.5   CHLORIDE   --   105  107  107   CO2   --   24  25  24   BUN   --   40*  37*  29   CR  1.79*  1.96*  2.11*  1.93*   ANIONGAP   --   10  6  7   JOSELINE   --   7.9*   7.5*  7.4*   GLC   --   205*  188*  187*[SN1.2]     Most Recent 2 LFT's:[SN1.1]  Recent Labs   Lab Test  10/09/18   0703   AST  22   ALT  12   ALKPHOS  72   BILITOTAL  0.6[SN1.2]     Most Recent 3 Troponin's:[SN1.1]  Recent Labs   Lab Test  10/10/18   0700  10/09/18   1302  10/09/18   0703   04/21/14   1656   TROPI  0.134*  0.143*  0.157*   < >   --    TROPONIN   --    --    --    --   0.03    < > = values in this interval not displayed.[SN1.2]     Most Recent TSH, T4 and A1c Labs:[SN1.1]  Recent Labs   Lab Test  10/08/18   0535  08/25/18   1954   TSH  4.57*   --    T4  1.28   --    A1C   --   8.4*     Results for orders placed or performed during the hospital encounter of 10/04/18   XR Chest 1 View    Narrative    XR CHEST 1 VW  10/4/2018 3:03 AM     HISTORY: Hip fracture.    COMPARISON: 8/29/2018.    FINDINGS: Supine chest. Sternal wires and mediastinal clips. Mild  probable scarring at the right lung base and in the left lung  laterally. The lungs are otherwise clear. No pneumothorax.      Impression    IMPRESSION: No acute abnormality.    SHARI HERNANDEZ MD   XR Pelvis w Hip Left 1 View    Narrative    XR PELVIS AND HIP LEFT 1 VIEW  10/4/2018 3:04 AM     HISTORY: Pain, fall, left hip pain.    COMPARISON: None.       Impression    IMPRESSION: Angulated left femoral neck fracture.    SHARI HERNANDEZ MD   XR Pelvis w Hip Port Left 1 View    Narrative    XR PELVIS AND HIP PORTABLE LEFT 1 VIEW 10/4/2018 3:43 PM    COMPARISON: Radiographs earlier on the same day.    HISTORY: Arthroplasty.      Impression    IMPRESSION: Bilateral hip hemiarthroplasties, unchanged on the RIGHT  and new on the LEFT. Hardware appears intact. No fractures are seen.    VÍCTOR ISLAS MD[SN1.2]            Revision History        User Key Date/Time User Provider Type Action    > SN1.2 10/10/2018 12:58 PM Isaiah Vasquez MD Physician Sign     SN1.1 10/10/2018 12:47 PM Isaiah Vasquez MD Physician                      Consult Notes       Consults by Bruno Stewart MD at 10/4/2018 12:37 PM     Author:  Bruno Stewart MD Service:  Orthopedics Author Type:  Physician    Filed:  10/4/2018 12:44 PM Date of Service:  10/4/2018 12:37 PM Creation Time:  10/4/2018 12:37 PM    Status:  Signed :  Bruno Stewart MD (Physician)     Consult Orders:    1. Orthopedic Surgery IP Consult: Patient to be seen: Routine - within 24 hours; Left femoral neck fracture; Consultant may enter orders: Yes [[id number]] ordered by Radha Collins MD at 10/04/18 0346                [facility]  Orthopaedics/Foot and Ankle Surgery Consultation         Bruno Stewart MD    Abimael Flores MRN# [id number]   YOB: 1924 Age: 89 year old      Date of Admission:  10/4/2018  Date of Consult: 10/04/2018           Assessment and Plan:   89-year-old gentleman status post mechanical fall with a left displaced femoral neck fracture.  Operative intervention is recommended to allow for early weightbearing activity, assist with pain relief, and limit musculoskeletal and pulmonary deconditioning with prolonged bedrest.  The patient will proceed to the operating room this afternoon for a left hip hemiarthroplasty.  Appreciate hospitalist comanagement.  Further postoperative recommendations will follow OR today.  Anticipate a few days stay in the hospital for pain control and rehabilitation efforts prior to discharge to a TCU.            Code Status:   DNR / DNI         Primary Care Physician:   Carlitos Bee [phone]         Requesting Physician:      Dr. Collins         Chief Complaint:   L hip fracture.    History is obtained from the patient and medical chart.         History of Present Illness:   Abimael Flores is a 89 year old male with past medical history significant for chronic renal disease, coronary artery disease, bundle branch block, and diabetes mellitus who presented to the emergency department  after sustaining a mechanical fall.  The patient was recently hospitalized for a right femoral neck fracture which was addressed with a hemiarthroplasty just over 1 month ago.  The patient has been recuperating at his rehabilitation center and was reaching down to grab an object when he lost his balance and fell onto the left hip.  The patient denies any other significant musculoskeletal injury associated with the fall.  The patient was unable to bear weight on the left hip and was brought to the emergency department where radiographs demonstrated a displaced left femoral neck fracture.  The patient was admitted to the hospitalist service.  Pain has been under reasonable control with bedrest and pain medication.  Orthopedics consultation was requested for definitive evaluation and treatment of this fracture.  The patient reports a very smooth recovery following his right hip hemiarthroplasty at the end of August.  The patient denies motor loss or numbness or tingling distally in the left leg since the fall.  The patient typically utilizes a walker to assist with mobilization, particularly since his right hip surgery.           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)     Cardiac arrhythmia, unspecified cardiac arrhythmia type     CKD (chronic kidney disease) stage 3, GFR 30-59 ml/min (H)     Benign essential hypertension     Coronary artery disease involving coronary bypass graft of native heart without angina pectoris     Pneumonia due to infectious organism     Physical deconditioning     Other insomnia     Bladder spasms     Fracture of femoral neck, left (H)      Past Medical History:   Diagnosis Date     Benign essential hypertension 9/4/2018     Coronary artery disease      Nonsenile  cataract      Recent retinal detachment, total or subtotal 8/5/2014     Type 2 diabetes mellitus without complications (H)              Past Surgical History:     Past Surgical History:   Procedure Laterality Date     CARDIAC SURGERY       CATARACT IOL, RT/LT Bilateral      lacrimal caruncle removed BE Bilateral ~1989     OPEN REDUCTION INTERNAL FIXATION HIP BIPOLAR Right 8/26/2018    Procedure: OPEN REDUCTION INTERNAL FIXATION HIP BIPOLAR;  Right Hip Bipolar Hemiarthroplasty ([employer]);  Surgeon: Bill Sanders MD;  Location: [facility] OR     REPAIR RUPTURED GLOBE  4/21/2014    Procedure: Exploration and Repair of Ruptured Globe ;  Surgeon: Mercedes Rivera MD;  Location: [facility] OR            Home Medications:     Prior to Admission medications    Medication Sig Last Dose Taking? Auth Provider   aspirin 325 MG EC tablet Take one Aspirin tab twice daily for 5 weeks. 10/3/2018 at Unknown time Yes Bill Sanders MD   atorvastatin (LIPITOR) 10 MG tablet Take 1 tablet (10 mg) by mouth every evening 10/3/2018 at Unknown time Yes Meli Arndt PA-C   diltiazem (TIAZAC) 120 MG 24 hr ER beaded capsule Take 120 mg by mouth daily 10/3/2018 at Unknown time Yes Reported, Patient   IBUPROFEN PO Take 200 mg by mouth every 6 hours as needed for moderate pain prn Yes Reported, Patient   insulin glargine (LANTUS) 100 UNIT/ML injection Inject 7 Units Subcutaneous every morning 10/3/2018 at Unknown time Yes Reported, Patient   insulin lispro (HUMALOG KWIKPEN) 100 UNIT/ML injection Inject Subcutaneous At Bedtime Sliding scale:  -249=1 unit  -299=2 units  -349=3 units  -399=4 units  +=5 units Past Week at Unknown time Yes Unknown, Entered By History   insulin lispro (HUMALOG KWIKPEN) 100 UNIT/ML injection Inject 2 Units Subcutaneous 3 times daily (before meals) 10/3/2018 at Unknown time Yes Meli Arndt PA-C   insulin lispro (HUMALOG) 100 UNIT/ML injection Inject Subcutaneous 3 times  daily (before meals) Sliding scale:   -189=1 unit  -239=2 units  -289=3 units  -339=4 units  -399=5 units  -499=6 units  +=7 units 10/3/2018 at Unknown time Yes Reported, Patient   melatonin 5 MG tablet Take 5 mg by mouth At Bedtime 10/3/2018 at Unknown time Yes Unknown, Entered By History   melatonin 5 MG tablet Take 5 mg by mouth nightly as needed for sleep (MR x 1 PRN if scheduled dose is insufficient) Past Week at Unknown time Yes Unknown, Entered By History   metoprolol succinate (TOPROL-XL) 50 MG 24 hr tablet Take 1 tablet (50 mg) by mouth daily 10/3/2018 at Unknown time Yes Meli Arndt PA-C   oxyCODONE IR (ROXICODONE) 5 MG tablet Take 1 tablet (5 mg) by mouth every 4 hours as needed for pain 10/4/2018 at 0020 Yes Bill Sanders MD   polyethylene glycol (MIRALAX/GLYCOLAX) Packet Take 17 g by mouth daily 10/3/2018 at Unknown time Yes Reported, Patient   tamsulosin (FLOMAX) 0.4 MG capsule Take 0.4 mg by mouth every evening  10/3/2018 at Unknown time Yes Reported, Patient   Zolpidem Tartrate (AMBIEN PO) Take 5 mg by mouth daily as needed for sleep Past Week at Unknown time Yes Reported, Patient   aspirin 81 MG tablet Take 81 mg by mouth daily To start on 10/9/18 after 325mg BID dosing is done.   Unknown, Entered By History   Skin Protectants, Misc. (ANIBAL PROTECT EX) Apply topically 2 times daily   Reported, Patient            Current Medications:           [Auto Hold] atorvastatin  10 mg Oral QPM     ceFAZolin  1 g Intravenous See Admin Instructions     ceFAZolin  2 g Intravenous Pre-Op/Pre-procedure x 1 dose     [Auto Hold] diltiazem  120 mg Oral Daily     [START ON 10/5/2018] influenza Vac Split High-Dose  0.5 mL Intramuscular Prior to discharge     [Auto Hold] insulin aspart  1-6 Units Subcutaneous Q4H     [Auto Hold] insulin glargine  3 Units Subcutaneous QAM AC     [Auto Hold] metoprolol succinate  50 mg Oral Daily     [Auto Hold] tamsulosin  0.4  "mg Oral Daily     [Auto Hold] acetaminophen, [Auto Hold] bisacodyl **OR** [Auto Hold] bisacodyl **OR** [Auto Hold] bisacodyl, [Auto Hold] bisacodyl, [Auto Hold] glucose **OR** [Auto Hold] dextrose **OR** [Auto Hold] glucagon, [Auto Hold] HYDROmorphone, [Auto Hold] labetalol, [Auto Hold] melatonin, [Auto Hold] naloxone, [Auto Hold] ondansetron **OR** [Auto Hold] ondansetron, [Auto Hold] oxyCODONE IR, [Auto Hold] polyethylene glycol, [Auto Hold] prochlorperazine **OR** [Auto Hold] prochlorperazine **OR** [Auto Hold] prochlorperazine, [Auto Hold] zolpidem (AMBIEN) tablet 5 mg         Allergies:   No Known Allergies         Social History:     Social History   Substance Use Topics     Smoking status: Former Smoker     Smokeless tobacco: Never Used     Alcohol use Not on file             Family History:     Family History   Problem Relation Age of Onset     Diabetes Mother      Diabetes Father      Cancer No family hx of      Glaucoma No family hx of      Macular Degeneration No family hx of               Review of Systems:   The 10 point Review of Systems is negative other than noted in the HPI            Physical Exam:   Blood pressure 131/72, temperature 98.3  F (36.8  C), temperature source Oral, resp. rate 16, height 1.753 m (5' 9\"), weight 72.6 kg (160 lb), SpO2 90 %.  160 lbs 0 oz    Constitutional:   Awake, alert, cooperative, no apparent distress, and appears stated age.     Lungs:   No increased work of breathing, good air exchange.     Musculoskeletal:   Left hip with minimal shortening or rotational deformity at this time.  There is no overlying skin injury or abrasion over the left hip fracture site.  Logroll is deferred due to the patient's known fracture.  There is no evidence of injury distally in the leg.  AT, GSC, EHL 5/5.  SILT sp/dp/plantar nn.  Toes wwp w/ brisk cap refill and 2+ DP pulse.              Data:   All new lab and imaging data was reviewed.  Radiographs of the left hip obtained in the " emergency department were personally reviewed without limitation and compared to previous studies.  Radiographs demonstrate stable alignment of a right hip hemiarthroplasty.  There is evidence of an acute left femoral neck fracture.[JS1.1]  Results for orders placed or performed during the hospital encounter of 10/04/18 (from the past 24 hour(s))   XR Chest 1 View    Narrative    XR CHEST 1 VW  10/4/2018 3:03 AM     HISTORY: Hip fracture.    COMPARISON: 8/29/2018.    FINDINGS: Supine chest. Sternal wires and mediastinal clips. Mild  probable scarring at the right lung base and in the left lung  laterally. The lungs are otherwise clear. No pneumothorax.      Impression    IMPRESSION: No acute abnormality.    SHARI HERNANDEZ MD   XR Pelvis w Hip Left 1 View    Narrative    XR PELVIS AND HIP LEFT 1 VIEW  10/4/2018 3:04 AM     HISTORY: Pain, fall, left hip pain.    COMPARISON: None.       Impression    IMPRESSION: Angulated left femoral neck fracture.    SHARI HERNANDEZ MD   Glucose by meter   Result Value Ref Range    Glucose 148 (H) 70 - 99 mg/dL   EKG 12-lead, tracing only   Result Value Ref Range    Interpretation ECG Click View Image link to view waveform and result    Glucose by meter   Result Value Ref Range    Glucose 163 (H) 70 - 99 mg/dL[JS1.2]        Revision History        User Key Date/Time User Provider Type Action    > JS1.2 10/4/2018 12:44 PM Bruno Stewart MD Physician Sign     JS1.1 10/4/2018 12:37 PM Bruno Stewart MD Physician                      Progress Notes - Physician (Notes from 10/07/18 through 10/10/18)      Progress Notes by Graciela Wagner LSW at 10/10/2018 11:10 AM     Author:  Graciela Wagner LSW Service:  Social Work Author Type:      Filed:  10/10/2018 11:17 AM Date of Service:  10/10/2018 11:10 AM Creation Time:  10/10/2018 11:10 AM    Status:  Signed :  Graciela Wagner LSW ()          D: SW following for discharge planning.   I: Pt will discharge today to [facility] via transport aide at 1400. Orders faxed and facility updated. Son updated and in agreement. Nursing aware. PAS done 9/3, still valid, PAS-RR confirmation # is : [id number].  P: discharge today.     ESTEFANI Thomas, [facility]  [id number][JJ1.1]       Revision History        User Key Date/Time User Provider Type Action    > JJ1.1 10/10/2018 11:17 AM Graciela Wagner LSW  Sign            ED Notes signed by [facility] Non-Provider at 10/10/2018  9:52 AM      Author:  Scan Non-Provider Service:  (none) Author Type:  (none)    Filed:  10/10/2018  9:52 AM Date of Service:  10/10/2018  9:50 AM Creation Time:  10/10/2018  9:52 AM    Status:  Signed :  [facility] Non-Provider     Scan on 10/10/2018  9:52 AM by Ochoa Non-Provider : PREHOSPITAL CARE REPORT SUMMARY 1          Revision History        User Key Date/Time User Provider Type Action    > [N/A] 10/10/2018  9:52 AM Scan, Non-Provider (none) Sign            Progress Notes by Sally Clifton PA-C at 10/10/2018  8:14 AM     Author:  Sally Clifton PA-C Service:  Orthopedics Author Type:  Physician Assistant - ALANNA    Filed:  10/10/2018  9:14 AM Date of Service:  10/10/2018  8:14 AM Creation Time:  10/10/2018  8:14 AM    Status:  Signed :  Sally Clifton PA-C (Physician Assistant - C)         Orthopedic Surgery  [facility]  10/10/2018  Admit Date:  10/4/2018  POD # 6  S/P Left bipolar hip hemiarthroplasty[EC1.1]     Patient resting comfortably in bed.    Pain controlled.  Tolerating oral intake.    Denies nausea or vomiting  Denies chest pain or shortness of breath  No events overnight.[EC1.2]     Alert and orient to person, place, and time.   Vital Sign Ranges  Temperature Temp  Av.8  F (36.6  C)  Min: 97.5  F (36.4  C)  Max: 98.3  F (36.8  C)   Blood pressure Systolic (24hrs), Av , Min:97 , Max:131        Diastolic (24hrs), Av,  Min:57, Max:72      Pulse No Data Recorded   Respirations Resp  Av.7  Min: 16  Max: 18   Pulse oximetry SpO2  Av %  Min: 95 %  Max: 97 %[EC1.1]       Dressing is clean, dry, and intact.   Minimal erythema of the surrounding skin.   Bilateral calves are soft, non-tender.  Bilateral lower extremity is NVI.  Sensation intact bilateral lower extremities  Active dorsi and plantar flexion bilaterally  +Dp pulse[EC1.2]    Labs:  Recent Labs   Lab Test  10/09/18   0703  10/08/18   0535  10/06/18   0643   POTASSIUM  4.0  4.2  4.5     Recent Labs   Lab Test  10/10/18   0700  10/09/18   0703  10/08/18   0535   HGB  8.7*  8.2*  7.8*     Recent Labs   Lab Test  14   0405   INR  0.91     Recent Labs   Lab Test  10/09/18   0703  10/08/18   0535  10/06/18   0643   PLT  204  148*  126*       A/P  1.[EC1.1] Plan[EC1.2]   Continue[EC1.1] ASA[EC1.2] for DVT prophylaxis.     Mobilize with PT/OT    WBAT   Hip precautions.     Continue current pain regiment.    2. Disposition   Anticipate d/c to TCU pending medical clearance - ok to discharge per ortho.    Sally Clifton PA-C[EC1.1]     Revision History        User Key Date/Time User Provider Type Action    > EC1.2 10/10/2018  9:14 AM Sally Clifton PA-C Physician Assistant - ALANNA Sign     EC1.1 10/10/2018  8:14 AM Sally Clifton PA-C Physician Assistant - C             Progress Notes by Isaiah Vasquez MD at 10/9/2018 10:06 AM     Author:  Isaiah Vasquez MD Service:  Hospitalist Author Type:  Physician    Filed:  10/9/2018  5:17 PM Date of Service:  10/9/2018 10:06 AM Creation Time:  10/9/2018  5:06 PM    Status:  Addendum :  Isaiah Vasquez MD (Physician)            [facility]  Hospitalist Progress Note   10/09/2018          Assessment and Plan:       Mr. 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      Positive troponin likely from anemia/renal disease/surgical stress.  Nursing team had notified hospitalist rounding on 10/8 about ongoing tachycardia.  Routine troponin was ordered for this morning -at 0.157.  Review of patient's chart he has had very faintly positive troponin [0.03] echocardiogram from August 2018 reviewed.  Not complaining of any chest pain at this time.  Not compliant with telemetry monitoring.  We will trend troponins, if stable and no chest pain will plan for discharge tomorrow.  If Troponin trends up will discuss with patient's family and consider echocardiogram and further workup if they would like to pursue it.      Mechanical fall with angulated left femoral neck fracture: s/p left DELL on 10/4    The patient has presented with left hip pain following a mechanical fall as he was trying to  something from the floor at the transitional care facility  Notably, he had a recent fall on 08/25/2018 which resulted in a right femoral neck fracture requiring hemiarthroplasty with Dr. Bill Sanders.    Postoperatively-orthopedic team has cleared for discharge.  On aspirin 325 mg oral daily for DVT prophylaxis.  Pain management with as needed Tylenol.  PT/OT at TCU.  Aggressive incentive spirometry.  Stool softeners and suppositories as needed while on pain medication.     Severe vitamin D deficiency.  Vitamin D levels 9.  Started on 50,000 units of vitamin D replacement for 1 week.    Subclinical hypothyroidism.  Noted to be tachycardic yesterday, TSH 4.57.  T4 1.28.     Hypertension  Continue prior to admission Cardizem 120 mg oral daily, metoprolol 50 mg oral daily.      Dyslipidemia:   Continue on prior to  admission Lipitor.       Benign prostatic hypertrophy:   Continue with prior to admission Flomax.       Chronic kidney disease, stage III-IV:    The most recent creatinine levels have been around 1.8-1.9 and is at baseline.       Chronic anemia with acute blood loss anemia:  Baseline hemoglobin is around 9 and dropped to 6.7.  Received 1 unit of PRBC on 10/5   - Monitor       Coronary artery disease:  The patient is status post coronary artery bypass graft x 4 in 1996.    - Continue on prior to admission metoprolol and Lipitor.   - ASA at discretion of surgery team.       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    - Continue on prior to admission Toprol XL       Diabetes mellitus type 2:  The most recent hemoglobin A1c was 8.4% on 08/25/2018.  At home he is on Lantus 7 units subcu every morning and Humalog 2 units subq t.i.d.  Blood sugars rising into the 200s.   - PTA lantus 7 units in the morning  - novolog 2 units TID WM   - sliding scale      Delirium, mild:  - minimize narcotic use  - reorientation as needed    Active Diet Order      Advance Diet as Tolerated: Regular Diet Adult      Advance Diet as Tolerated    DVT Prophylaxis:  Sequential compression device.  Code Status: Full code   Disposition: Expected discharge tomorrow if no chest pain and troponin stable.    Patient, interdisciplinary team involved in care and agrees with plan.  Total time - 25 min. More than 50% of time spent in direct patient care, care coordination and formalizing plan of care.     Isaiah Vasquez MD        Interval History:      Patient lying in bed.  Patient denies any chest pain or shortness of breath.  No nausea vomiting.  No headache or dizziness.       Physical Exam:        Physical Exam   Temp:  [97.9  F (36.6  C)-98.3  F (36.8  C)] 98.3  F  (36.8  C)  Pulse:  [79-92] 79  Heart Rate:  [85-92] 87  Resp:  [16] 16  BP: ()/(53-71) 97/57  SpO2:  [94 %-98 %] 96 %    Intake/Output Summary (Last 24 hours) at 10/09/18 1706  Last data filed at 10/09/18 1425   Gross per 24 hour   Intake              160 ml   Output              575 ml   Net             -415 ml       Admission Weight: 72.6 kg (160 lb)  Current Weight: 72.4 kg (159 lb 9.8 oz)    PHYSICAL EXAM  GENERAL: Patient is in no distress.  Able to answer simple commands.  HEART: Regular rate and rhythm. S1S2. No murmurs  LUNGS: Bilateral slightly decreased breath sounds.  No wheezing no crackles.  ABDOMEN: Soft, no abdominal tenderness, bowel sounds heard   NEURO: No focal weakness.  Left hip area dressing intact.  EXTREMITIES: Trace pedal edema. 2+ peripheral pulses.  SKIN: Warm, dry.  PSYCHIATRY Cooperative       Medications:          aspirin  325 mg Oral Daily     atorvastatin  10 mg Oral QPM     diltiazem  120 mg Oral Daily     insulin aspart  2 Units Subcutaneous TID w/meals     insulin aspart  1-7 Units Subcutaneous TID AC     insulin aspart  1-5 Units Subcutaneous At Bedtime     insulin glargine  7 Units Subcutaneous QAM AC     metoprolol succinate  50 mg Oral Daily     senna-docusate  1 tablet Oral BID    Or     senna-docusate  2 tablet Oral BID     sodium chloride (PF)  3 mL Intracatheter Q8H     tamsulosin  0.4 mg Oral Daily     vitamin D  50,000 Units Oral Q7 Days     acetaminophen, sore throat lozenge, bisacodyl **OR** bisacodyl **OR** bisacodyl, bisacodyl, glucose **OR** dextrose **OR** glucagon, HYDROmorphone, hydrOXYzine, labetalol, lidocaine 4%, lidocaine (buffered or not buffered), melatonin, metoprolol tartrate, naloxone, ondansetron **OR** ondansetron, oxyCODONE IR, polyethylene glycol, prochlorperazine **OR** prochlorperazine, [DISCONTINUED] prochlorperazine **OR** [DISCONTINUED] prochlorperazine **OR** prochlorperazine, sodium chloride (PF), zolpidem (AMBIEN) half-tab 2.5 mg          Data:      All new lab and imaging data was reviewed.[SN1.1]                  Revision History        User Key Date/Time User Provider Type Action    > [N/A] 10/9/2018  5:17 PM Isaiah Vasquez MD Physician Addend     SN1.1 10/9/2018  5:15 PM Isaiah Vasquez MD Physician Sign            Progress Notes by Edgardo Godfrey PA-C at 10/9/2018  9:40 AM     Author:  Edgardo Godfrey PA-C Service:  Orthopedics Author Type:  Physician Assistant    Filed:  10/9/2018  9:41 AM Date of Service:  10/9/2018  9:40 AM Creation Time:  10/9/2018  9:40 AM    Status:  Signed :  Edgardo Godfrey PA-C (Physician Assistant)         [facility] Orthopedic Progress Note  Abimael Flores is a 89 year old male    Today's Date:10/9/2018  Admission Date: 10/4/2018  Fracture of hip, left, closed, initial encounter (H) [S72.002A]  POD # 5 Left bipolar hemiarthroplasty for femoral neck fracture.      Patient Seen.  Tachycardia persists.    Dressings are clean, dry, and intact.  Circulation, motor and sensory function, intact distally.       PLAN:     Pain control medication: Limit narcotics to decrease delerium.  Discharge plan: When medically stable.  Ok from ortho standpoint.  Ortho discharge orders entered.  WBAT  Posterior hip precautions.      Temp:  [97.9  F (36.6  C)-98.1  F (36.7  C)] 97.9  F (36.6  C)  Pulse:  [79-92] 79  Heart Rate:  [] 90  Resp:  [16-18] 16  BP: ()/(53-83) 117/66  SpO2:  [94 %-98 %] 95 %      Results for orders placed or performed during the hospital encounter of 10/04/18 (from the past 24 hour(s))   Glucose by meter   Result Value Ref Range    Glucose 143 (H) 70 - 99 mg/dL   Glucose by meter   Result Value Ref Range    Glucose 145 (H) 70 - 99 mg/dL   EKG 12-lead, tracing only   Result Value Ref Range    Interpretation ECG Click View Image link to view waveform and result    Glucose by meter   Result Value Ref Range    Glucose 163 (H) 70 - 99 mg/dL   Glucose by meter   Result Value  Ref Range    Glucose 246 (H) 70 - 99 mg/dL   Basic metabolic panel   Result Value Ref Range    Sodium 139 133 - 144 mmol/L    Potassium 4.0 3.4 - 5.3 mmol/L    Chloride 105 94 - 109 mmol/L    Carbon Dioxide 24 20 - 32 mmol/L    Anion Gap 10 3 - 14 mmol/L    Glucose 205 (H) 70 - 99 mg/dL    Urea Nitrogen 40 (H) 7 - 30 mg/dL    Creatinine 1.96 (H) 0.66 - 1.25 mg/dL    GFR Estimate 32 (L) >60 mL/min/1.7m2    GFR Estimate If Black 39 (L) >60 mL/min/1.7m2    Calcium 7.9 (L) 8.5 - 10.1 mg/dL   CBC with platelets   Result Value Ref Range    WBC 9.2 4.0 - 11.0 10e9/L    RBC Count 2.81 (L) 4.4 - 5.9 10e12/L    Hemoglobin 8.2 (L) 13.3 - 17.7 g/dL    Hematocrit 25.1 (L) 40.0 - 53.0 %    MCV 89 78 - 100 fl    MCH 29.2 26.5 - 33.0 pg    MCHC 32.7 31.5 - 36.5 g/dL    RDW 14.7 10.0 - 15.0 %    Platelet Count 204 150 - 450 10e9/L   Troponin I   Result Value Ref Range    Troponin I ES 0.157 (HH) 0.000 - 0.045 ug/L   Magnesium   Result Value Ref Range    Magnesium 2.3 1.6 - 2.3 mg/dL   Phosphorus   Result Value Ref Range    Phosphorus 2.7 2.5 - 4.5 mg/dL         Edgardo Godfrey[DW1.1]       Revision History        User Key Date/Time User Provider Type Action    > DW1.1 10/9/2018  9:41 AM Edgardo Godfrey PA-C Physician Assistant Sign            Progress Notes by Earl Streeter DO at 10/8/2018  8:46 AM     Author:  Earl Streeter DO Service:  Hospitalist Author Type:  Physician    Filed:  10/8/2018  3:31 PM Date of Service:  10/8/2018  8:46 AM Creation Time:  10/8/2018  8:46 AM    Status:  Addendum :  Earl Streeter DO (Physician)         [facility]  Hospitalist Progress Note        Earl Streeter, DO  10/08/2018        Interval History:[ZA1.1]      Patient states that he is doing well. States pain is under control. Completed standing with therapy today.[ZA1.2]          Assessment and Plan:        Mr. 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        Mechanical fall with angulated left femoral neck fracture: s/p left DELL on 10/4  The patient has presented with left hip pain following a mechanical fall as he was trying to  something from the floor at the transitional care facility. No evidence of syncope.  Notably, he had a recent fall on 08/25/2018 which resulted in a right femoral neck fracture requiring hemiarthroplasty with Dr. Bill Sanders.    - post op pain control and management per ortho[ZA1.1]     Addendum: Notified by nursing staff of intermittent tachycardia on afternoon of 10/8.   - TSH  - EKG  - IVF bolus.   - PRN Metoprolol.[ZA1.3]     [ZA1.1]  Vitamin D deficiency: Low vitamin d level.   - Vitamin D replacement.[ZA1.2]     Hypertension:  The blood pressure is currently controlled.    - Prior to admission Toprol XL  - PTA Diltiazem   - IV labetalol p.r.n.      Dyslipidemia: Continue on prior to admission Lipitor.       Benign prostatic hypertrophy: Continue with prior to admission Flomax.       Chronic kidney disease, stage III-IV:  The most recent creatinine levels have been around 1.8-1.9 and is at baseline.   - Monitor       Chronic anemia with acute blood loss anemia:  Baseline hemoglobin is around 9 and dropped to 6.7.  Received 1 unit of PRBC on 10/5   - Monitor       Coronary artery disease:  The patient is status post coronary artery bypass graft x 4 in 1996.    - Continue on prior to admission metoprolol and Lipitor.   - ASA at discretion of surgery team.       History of nonsustained V-tach as well as sinus exit block with left bundle branch  "block:  It occurred during the recent hospitalization in 2018 following a right hip fracture and he was evaluated by the Cardiology Service.    - Continue on prior to admission Toprol XL       Diabetes mellitus type 2:  The most recent hemoglobin A1c was 8.4% on 2018.  At home he is on Lantus 7 units subcu every morning and Humalog 2 units subq t.i.d.  Blood sugars rising into the 200s.   - PTA lantus 7 units in the morning  - novolog 2 units TID WM   - sliding scale      Delirium, mild:  - minimize narcotic use  - reorientation as needed     DVT Prophylaxis: Defer to Surgery.      Code: DNR/DNI      Disposition: Expected discharge[ZA1.1] at[ZA1.2] Surgery discretion.[ZA1.1] If tachycardia resolves can discharge 10/9[ZA1.3]. Therapy suggesting TCU on discharge, SW for disposition.                    Physical Exam:      Heart Rate: 135    Blood pressure 91/56, pulse 98, temperature 98  F (36.7  C), temperature source Oral, resp. rate 18, height 1.753 m (5' 9\"), weight 72.4 kg (159 lb 9.8 oz), SpO2 96 %.    Vitals:    10/06/18 0615 10/07/18 0610 10/08/18 0533   Weight: 73.5 kg (162 lb) 72.5 kg (159 lb 12.8 oz) 72.4 kg (159 lb 9.8 oz)       Vital Sign Ranges  Temperature Temp  Av.1  F (36.7  C)  Min: 97.4  F (36.3  C)  Max: 98.6  F (37  C)   Blood pressure Systolic (24hrs), Av , Min:91 , Max:128        Diastolic (24hrs), Av, Min:56, Max:65      Pulse Pulse  Av.5  Min: 97  Max: 98   Respirations Resp  Av.8  Min: 16  Max: 20   Pulse oximetry SpO2  Av.1 %  Min: 88 %  Max: 98 %     Vital Signs with Ranges  Temp:  [97.4  F (36.3  C)-98.6  F (37  C)] 98  F (36.7  C)  Pulse:  [97-98] 98  Heart Rate:  [] 135  Resp:  [16-20] 18  BP: ()/(56-65) 91/56  SpO2:  [88 %-98 %] 96 %    I/O Last 3 Shifts:   I/O last 3 completed shifts:  In: 350 [P.O.:350]  Out: 650 [Urine:650]    I/O past 24 hours:     Intake/Output Summary (Last 24 hours) at 10/08/18 0846  Last data filed at 10/08/18 " 0455   Gross per 24 hour   Intake              350 ml   Output              600 ml   Net             -250 ml     GENERAL: Alert and oriented. NAD. Conversational, appropriate.[ZA1.1] Pleasant.[ZA1.2]   HEENT: Normocephalic. EOMI. No icterus or injection. Nares normal.   LUNGS: Clear to auscultation. No dyspnea at rest.   HEART: Regular rate. Extremities perfused.   ABDOMEN: Soft, nontender, and nondistended. Positive bowel sounds.   EXTREMITIES: No LE edema noted.   NEUROLOGIC: Moves extremities x4 on command. No acute focal neurologic abnormalities noted.          Prior to Admission Medications:        Prescriptions Prior to Admission   Medication Sig Dispense Refill Last Dose     aspirin 325 MG EC tablet Take one Aspirin tab twice daily for 5 weeks. 70 tablet 0 10/3/2018 at Unknown time     atorvastatin (LIPITOR) 10 MG tablet Take 1 tablet (10 mg) by mouth every evening   10/3/2018 at Unknown time     diltiazem (TIAZAC) 120 MG 24 hr ER beaded capsule Take 120 mg by mouth daily   10/3/2018 at Unknown time     insulin glargine (LANTUS) 100 UNIT/ML injection Inject 7 Units Subcutaneous every morning   10/3/2018 at Unknown time     insulin lispro (HUMALOG KWIKPEN) 100 UNIT/ML injection Inject Subcutaneous At Bedtime Sliding scale:  -249=1 unit  -299=2 units  -349=3 units  -399=4 units  +=5 units   Past Week at Unknown time     insulin lispro (HUMALOG KWIKPEN) 100 UNIT/ML injection Inject 2 Units Subcutaneous 3 times daily (before meals)   10/3/2018 at Unknown time     insulin lispro (HUMALOG) 100 UNIT/ML injection Inject Subcutaneous 3 times daily (before meals) Sliding scale:   -189=1 unit  -239=2 units  -289=3 units  -339=4 units  -399=5 units  -499=6 units  +=7 units   10/3/2018 at Unknown time     metoprolol succinate (TOPROL-XL) 50 MG 24 hr tablet Take 1 tablet (50 mg) by mouth daily 30 tablet  10/3/2018 at Unknown time     oxyCODONE IR  (ROXICODONE) 5 MG tablet Take 1 tablet (5 mg) by mouth every 4 hours as needed for pain 60 tablet 0 10/4/2018 at 0020     polyethylene glycol (MIRALAX/GLYCOLAX) Packet Take 17 g by mouth daily   10/3/2018 at Unknown time     tamsulosin (FLOMAX) 0.4 MG capsule Take 0.4 mg by mouth every evening    10/3/2018 at Unknown time     Zolpidem Tartrate (AMBIEN PO) Take 5 mg by mouth daily as needed for sleep   Past Week at Unknown time     aspirin 81 MG tablet Take 81 mg by mouth daily To start on 10/9/18 after 325mg BID dosing is done.        Skin Protectants, Misc. (ANIBAL PROTECT EX) Apply topically 2 times daily   Taking     [DISCONTINUED] IBUPROFEN PO Take 200 mg by mouth every 6 hours as needed for moderate pain   prn     [DISCONTINUED] melatonin 5 MG tablet Take 5 mg by mouth At Bedtime   10/3/2018 at Unknown time     [DISCONTINUED] melatonin 5 MG tablet Take 5 mg by mouth nightly as needed for sleep (MR x 1 PRN if scheduled dose is insufficient)   Past Week at Unknown time            Medications:        Current Facility-Administered Medications   Medication Last Rate     sodium chloride Stopped (10/05/18 0456)     Current Facility-Administered Medications   Medication Dose Route Frequency     aspirin  325 mg Oral Daily     atorvastatin  10 mg Oral QPM     diltiazem  120 mg Oral Daily     insulin aspart  2 Units Subcutaneous TID w/meals     insulin aspart  1-7 Units Subcutaneous TID AC     insulin aspart  1-5 Units Subcutaneous At Bedtime     insulin glargine  7 Units Subcutaneous QAM AC     metoprolol succinate  50 mg Oral Daily     senna-docusate  1 tablet Oral BID    Or     senna-docusate  2 tablet Oral BID     sodium chloride (PF)  3 mL Intracatheter Q8H     tamsulosin  0.4 mg Oral Daily     Current Facility-Administered Medications   Medication Dose Route Frequency     acetaminophen  650 mg Oral Q4H PRN     sore throat lozenge  1-2 lozenge Buccal Q1H PRN     bisacodyl  5 mg Oral Daily PRN    Or     bisacodyl  10 mg  Oral Daily PRN    Or     bisacodyl  15 mg Oral Daily PRN     bisacodyl  10 mg Rectal Daily PRN     glucose  15-30 g Oral Q15 Min PRN    Or     dextrose  25-50 mL Intravenous Q15 Min PRN    Or     glucagon  1 mg Subcutaneous Q15 Min PRN     HYDROmorphone  0.2 mg Intravenous Q2H PRN     hydrOXYzine  10 mg Oral Q6H PRN     labetalol  10 mg Intravenous Q2H PRN     lidocaine 4%   Topical Q1H PRN     lidocaine (buffered or not buffered)  1 mL Other Q1H PRN     melatonin  1 mg Oral At Bedtime PRN     metoprolol  2.5 mg Intravenous Q4H PRN     naloxone  0.1-0.4 mg Intravenous Q2 Min PRN     ondansetron  4 mg Oral Q6H PRN    Or     ondansetron  4 mg Intravenous Q6H PRN     oxyCODONE IR  5 mg Oral Q3H PRN     polyethylene glycol  17 g Oral Daily PRN     prochlorperazine  5 mg Intravenous Q6H PRN    Or     prochlorperazine  5 mg Oral Q6H PRN     prochlorperazine  12.5 mg Rectal Q12H PRN     sodium chloride (PF)  3 mL Intracatheter Q1H PRN     zolpidem (AMBIEN) tablet 5 mg  5 mg Oral Daily PRN            Data:      Lab data reviewed.     Recent Labs  Lab 10/08/18  0535  10/06/18  0643  10/05/18  0645   HGB 7.8*  < > 8.3*  < > 7.0*   MCV 89  --  90  --  91   *  --  126*  --  133*     --  138  --  140   POTASSIUM 4.2  --  4.5  --  4.7   CHLORIDE 107  --  107  --  108   CO2 25  --  24  --  25   BUN 37*  --  29  --  29   CR 2.11*  --  1.93*  --  1.97*   ANIONGAP 6  --  7  --  7   JOSELINE 7.5*  --  7.4*  --  7.3*   *  --  187*  --  134*   < > = values in this interval not displayed.        Imaging:      Imaging data reviewed.     Dr. Earl Streeter D.O.  [facility]  Pager [phone][ZA1.1]       Revision History        User Key Date/Time User Provider Type Action    > ZA1.3 10/8/2018  3:31 PM Earl Streeter, DO Physician Addend     ZA1.2 10/8/2018 12:48 PM Earl Streeter, DO Physician Sign     ZA1.1 10/8/2018  8:46 AM Earl Streeter, DO Physician             Progress  Notes by Edgardo Godfrey PA-C at 10/8/2018 10:54 AM     Author:  Edgardo Godfrey PA-C Service:  Orthopedics Author Type:  Physician Assistant    Filed:  10/8/2018 10:56 AM Date of Service:  10/8/2018 10:54 AM Creation Time:  10/8/2018 10:54 AM    Status:  Addendum :  Edgardo Godfrey PA-C (Physician Assistant)         [facility] Orthopedic Progress Note  Abimael Flores is a 89 year old male    Today's Date:10/8/2018  Admission Date: 10/4/2018  Fracture of hip, left, closed, initial encounter (H) [S72.002A]  POD # 4 Left bipolar hemiarthroplasty for femoral neck fracture.     Patient Seen.  Tachycardia persists.    Dressings are clean, dry, and intact.  Circulation, motor and sensory function, intact distally.  HGB 7.8        PLAN:    Pain control medication: Limit narcotics to decrease delerium.  Discharge plan: When medically stable.  Ok from ortho standpoint.[DW1.1]  Ortho discharge orders entered.[DW1.2]  WBAT  Posterior hip precautions.      Temp:  [97.4  F (36.3  C)-98.6  F (37  C)] 98  F (36.7  C)  Pulse:  [97-98] 98  Heart Rate:  [] 92  Resp:  [16-20] 18  BP: ()/(55-83) 127/83  SpO2:  [88 %-98 %] 97 %      Results for orders placed or performed during the hospital encounter of 10/04/18 (from the past 24 hour(s))   Hemoglobin   Result Value Ref Range    Hemoglobin 8.1 (L) 13.3 - 17.7 g/dL   Glucose by meter   Result Value Ref Range    Glucose 185 (H) 70 - 99 mg/dL   Glucose by meter   Result Value Ref Range    Glucose 191 (H) 70 - 99 mg/dL   Glucose by meter   Result Value Ref Range    Glucose 241 (H) 70 - 99 mg/dL   Glucose by meter   Result Value Ref Range    Glucose 177 (H) 70 - 99 mg/dL   EKG 12-lead, tracing only   Result Value Ref Range    Interpretation ECG Click View Image link to view waveform and result    CBC with platelets   Result Value Ref Range    WBC 9.2 4.0 - 11.0 10e9/L    RBC Count 2.62 (L) 4.4 - 5.9 10e12/L    Hemoglobin 7.8 (L) 13.3 - 17.7 g/dL     Hematocrit 23.4 (L) 40.0 - 53.0 %    MCV 89 78 - 100 fl    MCH 29.8 26.5 - 33.0 pg    MCHC 33.3 31.5 - 36.5 g/dL    RDW 14.8 10.0 - 15.0 %    Platelet Count 148 (L) 150 - 450 10e9/L   Basic metabolic panel   Result Value Ref Range    Sodium 138 133 - 144 mmol/L    Potassium 4.2 3.4 - 5.3 mmol/L    Chloride 107 94 - 109 mmol/L    Carbon Dioxide 25 20 - 32 mmol/L    Anion Gap 6 3 - 14 mmol/L    Glucose 188 (H) 70 - 99 mg/dL    Urea Nitrogen 37 (H) 7 - 30 mg/dL    Creatinine 2.11 (H) 0.66 - 1.25 mg/dL    GFR Estimate 29 (L) >60 mL/min/1.7m2    GFR Estimate If Black 36 (L) >60 mL/min/1.7m2    Calcium 7.5 (L) 8.5 - 10.1 mg/dL   Glucose by meter   Result Value Ref Range    Glucose 201 (H) 70 - 99 mg/dL         Edgardo Godfrey[DW1.1]       Revision History        User Key Date/Time User Provider Type Action    > [N/A] 10/8/2018 10:56 AM Edgardo Godfrey PA-C Physician Assistant Addend     DW1.2 10/8/2018 10:56 AM Edgardo Godfrey PA-C Physician Assistant Sign     DW1.1 10/8/2018 10:54 AM Edgardo Godfrey PA-C Physician Assistant             Progress Notes by Desirae Landrum APRN CNP at 10/3/2018  2:30 PM     Author:  Desirae Landrum APRN CNP Service:  (none) Author Type:  Nurse Practitioner    Filed:  10/8/2018  1:38 AM Encounter Date:  10/3/2018 Status:  Signed    :  Desirae Landrum APRN CNP (Nurse Practitioner)           [facility]    Chief Complaint   Patient presents with     RECHECK       [facility] Medical Record Number:  [id number]    HPI:    Abimael Flores is a 89 year old  (1/23/1924), who is being seen today for an episodic care visit at [facility] .  HPI information obtained from:[KB1.1] facility chart records, facility staff, patient report and [facility] chart review[RL1.1].Today's concern is:[KB1.1]  Closed fracture of right hip with routine healing, subsequent encounter  Physical deconditioning  Patient transferred to TCU from hospital after treatment  for right hip pain suffered from a fall in the community. He was found to have right femoral neck fracture. He underwent a bipolar hemiarthroplasty on 8/27. Surgery was uneventful. Activity is WBAT. Post op complications include tachy arrhythmia, aspiration pneumonia, hypoglycemia.   --patient doing well with therapies but feels progress is slow. Able to walk to therapies room with walker and SBA and admits to walking alone to the bathroom a few times. To see ortho on 10/11 for follow up. Pain managed well with ibuprofen PRN, oxycodone PRN.  No new complaints today.    Cardiac arrhythmia, unspecified cardiac arrhythmia type  Coronary artery disease involving coronary bypass graft of native heart without angina pectoris  Benign essential hypertension  Denies chest pain or dyspnea. Recent SBP . Continues Metoprolol 50 mg daily and Diltiazem 120 mg PO daily. Also on Lipitor and ASA. In general since admission[RL1.1] BP's: 104/57, 100/54, 127/67, 116/51[KB1.1].[RL1.1] HR: 101, 78, 73, 78[KB1.1].  Denies chest pain/palpitations today.    Pneumonia of right lower lobe due to infectious organism (H)  Symptoms resolved. Sats 97% on room air. No cough.     Type 2 diabetes mellitus with stage 3 chronic kidney disease, with long-term current use of insulin (H)  Managed with Lantus and sliding scale insulin AC and HS. BG elevated in the afternoons. Lantus changed to AM administration time and increase to 7 units subcutaneous daily last week.[RL1.1]   Last BG Levels:    Noon: 383, 373, 308    Dinner: 136, 174, 300    HS: 317, 267, 138[KB1.1]    CKD (chronic kidney disease) stage 3, GFR 30-59 ml/min  Noted decrease in kidney function with today's recheck   Ref. Range 9/3/2018 05:45 9/6/2018 06:25 10/3/2018 06:25   Sodium Latest Ref Range: 133 - 144 mmol/L 141 138 139   Potassium Latest Ref Range: 3.4 - 5.3 mmol/L 4.2 4.1 4.1   Chloride Latest Ref Range: 94 - 109 mmol/L 110 (H) 105 106   Carbon Dioxide Latest Ref Range: 20 -  32 mmol/L 24 23 23   Urea Nitrogen Latest Ref Range: 7 - 30 mg/dL 35 (H) 23 41 (H)   Creatinine Latest Ref Range: 0.66 - 1.25 mg/dL 1.94 (H) 1.57 (H) 1.99 (H)   GFR Estimate Latest Ref Range: >60 mL/min/1.7m2 32 (L) 41 (L) 31 (L)   GFR Estimate If Black Latest Ref Range: >60 mL/min/1.7m2 39 (L) 50 (L) 38 (L)   Calcium Latest Ref Range: 8.5 - 10.1 mg/dL 7.3 (L) 7.9 (L) 7.8 (L)   Anion Gap Latest Ref Range: 3 - 14 mmol/L 7 10 10     Insomnia  Patient noting ongoing difficulty sleeping, currently ordered Ambien which she states is not effective.  Stated he did use melatonin previously worked, however he notes this was very low dose.  Is willing to try it again at a higher dose.[RL1.1]      ALLERGIES: Review of patient's allergies indicates no known allergies.  Past Medical, Surgical, Family and Social History reviewed and updated in [place].    Current Outpatient Prescriptions   Medication Sig Dispense Refill     aspirin 325 MG EC tablet Take one Aspirin tab twice daily for 5 weeks. 70 tablet 0     atorvastatin (LIPITOR) 10 MG tablet Take 1 tablet (10 mg) by mouth every evening       diltiazem (TIAZAC) 120 MG 24 hr ER beaded capsule Take 120 mg by mouth daily       IBUPROFEN PO Take 200 mg by mouth every 6 hours as needed for moderate pain       insulin aspart (NOVOLOG PEN) 100 UNIT/ML injection Inject 1-7 Units Subcutaneous 3 times daily (before meals)       insulin aspart (NOVOLOG PEN) 100 UNIT/ML injection Inject 1-5 Units Subcutaneous At Bedtime       insulin glargine (LANTUS) 100 UNIT/ML injection Inject 7 Units Subcutaneous every morning       insulin lispro (HUMALOG KWIKPEN) 100 UNIT/ML injection Inject 2 Units Subcutaneous 3 times daily (before meals)       insulin lispro (HUMALOG) 100 UNIT/ML injection Inject Subcutaneous 3 times daily (before meals)       metoprolol succinate (TOPROL-XL) 50 MG 24 hr tablet Take 1 tablet (50 mg) by mouth daily 30 tablet      oxyCODONE IR (ROXICODONE) 5 MG tablet Take 1 tablet (5  "mg) by mouth every 4 hours as needed for pain 60 tablet 0     polyethylene glycol (MIRALAX/GLYCOLAX) Packet Take 17 g by mouth daily       Skin Protectants, Misc. (ANIBAL PROTECT EX) Apply topically 2 times daily       tamsulosin (FLOMAX) 0.4 MG capsule Take 0.4 mg by mouth daily       Zolpidem Tartrate (AMBIEN PO) Take 5 mg by mouth daily as needed for sleep       Medications reviewed:  Medications reconciled to facility chart and changes were made to reflect current medications as identified as above med list. Below are the changes that were made:   Medications stopped since last EPIC medication reconciliation:   Medications Discontinued During This Encounter   Medication Reason     insulin glargine (LANTUS SOLOSTAR) 100 UNIT/ML pen        Medications started since last [facility] medication reconciliation:  Orders Placed This Encounter   Medications     insulin lispro (HUMALOG) 100 UNIT/ML injection     Sig: Inject Subcutaneous 3 times daily (before meals)     insulin glargine (LANTUS) 100 UNIT/ML injection     Sig: Inject 7 Units Subcutaneous every morning     REVIEW OF SYSTEMS:[KB1.1]  4 point ROS including Respiratory, CV, GI and , other than that noted in the HPI,  is negative[RL1.1]    Physical Exam:  /57  Pulse 101  Temp 97.4  F (36.3  C)  Resp 16  Ht 5' 8\" (1.727 m)  Wt 168 lb 3.2 oz (76.3 kg)  SpO2 100%  BMI 25.57 kg/m2[KB1.1]  GENERAL APPEARANCE:  Alert, in no distress, pleasant, cooperative, oriented x 4, elderly male appearing younger than stated age resting in bed as he states he did not sleep well last night  EYES:  EOM, lids, pupils and irises normal, sclera clear and conjunctiva normal, no discharge or mattering on lids or lashes noted  ENT:  Mouth normal, moist mucous membranes, nose normal without drainage or crusting, external ears without lesions, hearing acuity intact  NECK: supple, symmetrical  RESP:  respiratory effort and palpation of chest normal, no chest wall tenderness, no " respiratory distress, Lung sounds clear, patient is on room air  CV:  Palpation and auscultation of heart done, rate and rhythm controlled and somewhat irregular, no murmur, no rub or gallop. Edema none bilateral lower extremities.   ABDOMEN:  normal bowel sounds, soft, nontender.  M/S:   Gait and station walks with assist , no tenderness or swelling of the joints; able to move all extremities   NEURO: cranial nerves 2-12 grossly intact, no facial asymmetry, no speech deficits and able to follow directions, moves all extremities symmetrically  PSYCH:  insight and judgement intact, memory at baseline, affect and mood normal[RL1.1]    Recent Labs:  CBC RESULTS:   Recent Labs   Lab Test  09/06/18   0625  09/03/18   0545   WBC  10.1  9.6   RBC  3.15*  2.81*   HGB  9.2*  8.2*   HCT  28.4*  25.5*   MCV  90  91   MCH  29.2  29.2   MCHC  32.4  32.2   RDW  14.9  14.8   PLT  [phone]  260       Last Basic Metabolic Panel:  Recent Labs   Lab Test  10/03/18   0625  09/06/18   0625   NA  139  138   POTASSIUM  4.1  4.1   CHLORIDE  106  105   JOSELINE  7.8*  7.9*   CO2  23  23   BUN  41*  23   CR  1.99*  1.57*   GLC  90  151*     TSH   Date Value Ref Range Status   04/02/2003 2.08 0.4 - 5.0 mU/L Final     Lab Results   Component Value Date    A1C 8.4 08/25/2018    A1C 7.8 04/22/2014         Assessment/Plan:[KB1.1]  Closed fracture of right hip with routine healing, subsequent encounter  Physical deconditioning  Ongoing issues recent surgery, now therapies. Progress slower than patient hoped for. Walking with walker. F/U with progress next week and ortho 10/11 as scheduled. Patient will be moving to SNF with wife at discharge per self repot.     PT/OT ongoing-advanced per their recommendations.    Cardiac arrhythmia, unspecified cardiac arrhythmia type  Coronary artery disease involving coronary bypass graft of native heart without angina pectoris  Benign essential hypertension  Chronic issues, stable, well controlled and asymptomatic. Meds  "as above, VS and WT per routine.      Continue medications as ordered    Pneumonia of right lower lobe due to infectious organism (H)  Resolved. F/U PRN.     Type 2 diabetes mellitus with stage 3 chronic kidney disease, with long-term current use of insulin (H)  Chronic, blood glucose levels previously elevated only in the evenings.  Medications adjusted late last week.  Elevations appear to be occurring throughout the entire day now.  Will allow for a little more time for her body to adjust to change regimen.    Follow-up regarding blood glucose levels next week    CKD (chronic kidney disease) stage 3, GFR 30-59 ml/min  Chronic, stable last check - Today's BMP showed decreased function of kidneys.    Recheck BMP 10/8    Insomnia  Patient with inability to sleep as noted above on current regimen of Ambien    Discontinue Ambien    Start melatonin 5 mg nightly, may repeat x1.[RL1.1]    Electronically signed by[KB1.1]  WILLIAM Carrasco CNP[RL1.1]                     Revision History        User Key Date/Time User Provider Type Action    > RL1.1 10/8/2018  1:38 AM Desirae Landrum APRN CNP Nurse Practitioner Sign     KB1.1 10/3/2018 12:01 PM Makayla Greene MA Medical Assistant Sign at close encounter            ED Provider Notes by Netta Trivedi MD at 10/4/2018  1:50 AM     Author:  Netta Trivedi MD Service:  Emergency Medicine Author Type:  Physician    Filed:  10/7/2018 11:26 PM Date of Service:  10/4/2018  1:50 AM Creation Time:  10/4/2018  2:00 AM    Status:  Signed :  Netta Trivedi MD (Physician)           History     Chief Complaint:  Hip pain    HPI   Abimael Flores is a 89 year old male who presents to the emergency department for evaluation after fall. The patient reports that one hour ago he ambulated to his bathroom with his walker, and noticed that the door was ajar. He attempted to stretch and close the door when he slipped and fell \"very softly.\" He denies any head trauma " "during the fall. He denies all other concerns here in the ER tonight.    Allergies:  NKDA    Medications:    Humalog  Lantus  Roxicodone   Metoprolol  Insulin  Tiazac  Lipitor  Aspirin    Past Medical History:    Insomnia  CKD  Type 2 DM  Retinal detachment  Nonsenile cataract  CAD  HTN    Past Surgical History:    Repair ruptured globe  Open reduction internal fixation hip,  Right  Cataract  Cardiac surgery    Family History:    Diabetes    Social History:  Marital Status:   [2]  Former smoker  Negative for alcohol use.      Review of Systems   Musculoskeletal: Positive for[CP1.1] arthralgias[CP1.2].   Neurological: Negative for[CP1.1] headaches[CP1.2].[CP1.1]     10 point review of systems performed and is negative except as above and in HPI.[CP1.2]      Physical Exam[CP1.1]     Patient Vitals for the past 24 hrs:   BP Temp Temp src Heart Rate Resp SpO2 Height Weight   10/04/18 0317 139/68 - - 91 14 93 % - -   10/04/18 0311 135/79 - - 92 16 91 % - -   10/04/18 0230 115/63 - - 87 9 93 % - -   10/04/18 0215 128/60 - - 90 15 93 % - -   10/04/18 0153 143/70 97.7  F (36.5  C) Oral 95 16 96 % 1.753 m (5' 9\") 72.6 kg (160 lb)[CP1.3]         Physical Exam[CP1.1]    General: Resting on the gurney, appears moderately uncomfortable  Head:  The scalp, face, and head appear normal  Mouth/Throat: Mucus membranes are moist  CV:  Regular rate    Normal S1 and S2  No pathological murmur   Resp:  Breath sounds clear and equal bilaterally    Non-labored, no retractions or accessory muscle use    No coarseness    No wheezing   GI:  Abdomen is soft, no rigidity    No tenderness to palpation  MS:  Normal motor assessment of all extremities.    Good capillary refill noted.    Left hip tender to palpation. Brisk capillary refill in the toes. Normal sensation in DOMINIC Lower extremities  Skin:   No rash or lesions noted.  Neuro:   Speech is normal and fluent. No apparent deficit.  Psych: Awake. Alert.  Normal affect.      Appropriate " interactions.[CP1.4]    Emergency Department Course     Imaging:[CP1.1]  Radiographic findings were communicated with the patient who voiced understanding of the findings.    XR Pelvis w Hip Left 1 view:   Angulated left femoral neck fracture as per radiology.     XR Chest 1 View:  No acute abnormality as per radiology[CP1.2]    Interventions:[CP1.1]    The patient's symptoms were improved with parenteral narcotics.  0212 Roxicodone 5 mg Oral  0319 Dilaudid 0.5 mg IV[CP1.2]      Emergency Department Course:  Nursing notes and vitals reviewed. ([CP1.1]0205[CP1.2]) I performed an exam of the patient as documented above.     IV inserted. Medicine administered as documented above. Blood drawn. This was sent to the lab for further testing, results above.    The patient was sent for a[CP1.1] Pelvis, chest X-ray[CP1.2] while in the emergency department, findings above.     ([CP1.1]0314[CP1.2]) I rechecked the patient and discussed the results of his workup thus far.[CP1.1]     (0318)  I consulted with Dr. Collins of the hospitalist services. They are in agreement to accept the patient for admission.    Findings and plan explained to the Patient who consents to admission. Discussed the patient with Dr. Collins, who will admit the patient to a medical bed for further monitoring, evaluation, and treatment.[CP1.2]        Impression & Plan      Medical Decision Making:[CP1.1]    Abimael Flores is a 89 year old male who presents for evaluation of hip pain pain after fall. CMS is intact distally in the extremity.  Xrays reveal a fracture of the hip that will need orthopedic consultation and likely surgery.  The patients head to toe trauma exam is otherwise normal at this time and no further trauma workup is needed as I believe there is no signs of serious head, neck, chest, spinal, extremity or abdominal injuries.[CP1.2]  Fall was mechanical by description and the patient is reliable.[KD1.1]  Will admit to medicine for further  cares and ortho consultation.  Pain control achieved.[CP1.2]      Diagnosis:[CP1.1]    ICD-10-CM    1. Fracture of hip, left, closed, initial encounter (H) S72.002A[CP1.3]        Disposition:[CP1.1]  Admitted to medical bed under care of Dr. Collins.[CP1.2]    Scribe Disclosure:  I, Brandon Henriquez, am serving as a scribe on 10/4/2018 at 2:00 AM to personally document services performed by Netta Trivedi MD based on my observations and the provider's statements to me.       Brandon Henriquez  10/4/2018    EMERGENCY DEPARTMENT[CP1.1]       Netta Trivedi MD  10/07/18 2326  [KD1.2]     Revision History        User Key Date/Time User Provider Type Action    > KD1.2 10/7/2018 11:26 PM Netta Trivedi MD Physician Sign     KD1.1 10/7/2018 11:25 PM Netta Trivedi MD Physician      CP1.4 10/4/2018  4:47 AM Brandon Henriquezibe Share     CP1.3 10/4/2018  4:05 AM Brandon Henriquezibe Share     CP1.2 10/4/2018  3:53 AM Brandon Henriquez      [N/A] 10/4/2018  2:12 AM Brandon Henriquezibe Share     CP1.1 10/4/2018  2:08 AM Brandon Henriquezibe Share            Progress Notes by Graciela Wagner LSW at 10/7/2018  2:42 PM     Author:  Graciela Wagner LSW Service:  Social Work Author Type:      Filed:  10/7/2018  2:45 PM Date of Service:  10/7/2018  2:42 PM Creation Time:  10/7/2018  2:42 PM    Status:  Signed :  Graciela Wagner LSW ()         D: MAULIK following for discharge planning. MAULIK reviewed chart. Pt discharged from [facility] to [facility] 9/3/18. Pt readmitted from [facility]. Anticipate return to TCU when ready.   P: SW updated [facility]. Per June, they are not able to accept pt today as they have already accepted they max number of admits. They will have a bed for him on Monday.     ESTEFANI Thomas, [facility]  [id number][JJ1.1]     Revision History        User Key Date/Time User Provider Type Action    > JJ1.1 10/7/2018  2:45 PM Graciela Wagner  RORY Bazan  Sign            Progress Notes by Earl Streeter DO at 10/7/2018  8:56 AM     Author:  Earl Streeter DO Service:  Hospitalist Author Type:  Physician    Filed:  10/7/2018 12:36 PM Date of Service:  10/7/2018  8:56 AM Creation Time:  10/7/2018  8:56 AM    Status:  Addendum :  Earl Streeter DO (Physician)         [facility]  Hospitalist Progress Note        Earl Streeter DO  10/07/2018        Interval History:[ZA1.1]      Patient states he is doing well.[ZA1.2]          Assessment and Plan:        Mr. 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  He is being admitted to the hospital under inpatient status.      Mechanical fall with angulated left femoral neck fracture: s/p left DELL on 10/4  The patient has presented with left hip pain following a mechanical fall as he was trying to  something from the floor at the transitional care facility. No evidence of syncope.  Notably, he had a recent fall on 08/25/2018 which resulted in a right femoral neck fracture requiring hemiarthroplasty with Dr. Bill Sanders.    - post op pain control and management per ortho      Hypertension:  The blood pressure is currently controlled.    -[ZA1.1] P[ZA1.2]rior to admission Toprol XL[ZA1.1]  - PTA D[ZA1.2]iltiazem   - IV labetalol p.r.n.     Dyslipidemia:[ZA1.1] C[ZA1.2]ontinue on prior to admission Lipitor.      Benign prostatic hypertrophy:[ZA1.1] C[ZA1.2]ontinue with prior to admission Flomax.      Chronic kidney disease, stage  "III-IV:  The most recent creatinine levels have been around 1.8-1.9 and is at baseline.   -[ZA1.1] M[ZA1.2]onitor      Chronic anemia with acute blood loss anemia:  Baseline hemoglobin is around 9 and dropped to 6.7.  Received 1 unit of PRBC on 10/5   -[ZA1.1] M[ZA1.2]onitor      Coronary artery disease:  The patient is status post coronary artery bypass graft x 4 in .    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    -[ZA1.1] C[ZA1.2]ontinue on prior to admission Toprol XL      Diabetes mellitus type 2:  The most recent hemoglobin A1c was 8.4% on 2018.  At home he is on Lantus 7 units subcu every morning and Humalog 2 units subq t.i.d.  Blood sugars rising into the 200s.   - PTA lantus 7 units in the morning  - novolog 2 units TID WM   - sliding scale     Delirium, mild:  - minimize narcotic use  - reorientation as needed    DVT Prophylaxis:[ZA1.1] Defer to Surgery.[ZA1.2]     Code: DNR/DNI     Disposition: Expected discharge[ZA1.1] per Surgery discretion. Medically stable.[ZA1.2] Therapy suggesting TCU on discharge,  for disposition.[ZA1.3]                    Physical Exam:      Heart Rate: 106    Blood pressure 127/67, pulse 80, temperature 97.9  F (36.6  C), temperature source Oral, resp. rate 20, height 1.753 m (5' 9\"), weight 72.5 kg (159 lb 12.8 oz), SpO2 92 %.    Vitals:    10/05/18 0630 10/06/18 0615 10/07/18 0610   Weight: 73.4 kg (161 lb 12.8 oz) 73.5 kg (162 lb) 72.5 kg (159 lb 12.8 oz)       Vital Sign Ranges  Temperature Temp  Av.2  F (36.8  C)  Min: 97.8  F (36.6  C)  Max: 98.8  F (37.1  C)   Blood pressure Systolic (24hrs), Av , Min:99 , Max:127        Diastolic (24hrs), " Av, Min:37, Max:78      Pulse Pulse  Av.7  Min: 80  Max: 92   Respirations Resp  Av.5  Min: 16  Max: 20   Pulse oximetry SpO2  Av.3 %  Min: 80 %  Max: 95 %     Vital Signs with Ranges  Temp:  [97.8  F (36.6  C)-98.8  F (37.1  C)] 97.9  F (36.6  C)  Pulse:  [80-92] 80  Heart Rate:  [] 106  Resp:  [16-20] 20  BP: ()/(37-78) 127/67  SpO2:  [80 %-95 %] 92 %    I/O Last 3 Shifts:   I/O last 3 completed shifts:  In: 400 [P.O.:400]  Out: 600 [Urine:600]    I/O past 24 hours:     Intake/Output Summary (Last 24 hours) at 10/07/18 0856  Last data filed at 10/07/18 0752   Gross per 24 hour   Intake              400 ml   Output              500 ml   Net             -100 ml     GENERAL: Alert and oriented. NAD. Conversational, appropriate.   HEENT: Normocephalic. EOMI. No icterus or injection. Nares normal.   LUNGS: Clear to auscultation. No dyspnea at rest.   HEART: Regular rate. Extremities perfused.   ABDOMEN: Soft, nontender, and nondistended. Positive bowel sounds.   EXTREMITIES: No LE edema noted.   NEUROLOGIC: Moves extremities x4 on command. No acute focal neurologic abnormalities noted.          Prior to Admission Medications:        Prescriptions Prior to Admission   Medication Sig Dispense Refill Last Dose     aspirin 325 MG EC tablet Take one Aspirin tab twice daily for 5 weeks. 70 tablet 0 10/3/2018 at Unknown time     atorvastatin (LIPITOR) 10 MG tablet Take 1 tablet (10 mg) by mouth every evening   10/3/2018 at Unknown time     diltiazem (TIAZAC) 120 MG 24 hr ER beaded capsule Take 120 mg by mouth daily   10/3/2018 at Unknown time     IBUPROFEN PO Take 200 mg by mouth every 6 hours as needed for moderate pain   prn     insulin glargine (LANTUS) 100 UNIT/ML injection Inject 7 Units Subcutaneous every morning   10/3/2018 at Unknown time     insulin lispro (HUMALOG KWIKPEN) 100 UNIT/ML injection Inject Subcutaneous At Bedtime Sliding scale:  -249=1 unit  -299=2 units  BG  300-349=3 units  -399=4 units  +=5 units   Past Week at Unknown time     insulin lispro (HUMALOG KWIKPEN) 100 UNIT/ML injection Inject 2 Units Subcutaneous 3 times daily (before meals)   10/3/2018 at Unknown time     insulin lispro (HUMALOG) 100 UNIT/ML injection Inject Subcutaneous 3 times daily (before meals) Sliding scale:   -189=1 unit  -239=2 units  -289=3 units  -339=4 units  -399=5 units  -499=6 units  +=7 units   10/3/2018 at Unknown time     melatonin 5 MG tablet Take 5 mg by mouth At Bedtime   10/3/2018 at Unknown time     melatonin 5 MG tablet Take 5 mg by mouth nightly as needed for sleep (MR x 1 PRN if scheduled dose is insufficient)   Past Week at Unknown time     metoprolol succinate (TOPROL-XL) 50 MG 24 hr tablet Take 1 tablet (50 mg) by mouth daily 30 tablet  10/3/2018 at Unknown time     oxyCODONE IR (ROXICODONE) 5 MG tablet Take 1 tablet (5 mg) by mouth every 4 hours as needed for pain 60 tablet 0 10/4/2018 at 0020     polyethylene glycol (MIRALAX/GLYCOLAX) Packet Take 17 g by mouth daily   10/3/2018 at Unknown time     tamsulosin (FLOMAX) 0.4 MG capsule Take 0.4 mg by mouth every evening    10/3/2018 at Unknown time     Zolpidem Tartrate (AMBIEN PO) Take 5 mg by mouth daily as needed for sleep   Past Week at Unknown time     aspirin 81 MG tablet Take 81 mg by mouth daily To start on 10/9/18 after 325mg BID dosing is done.        Skin Protectants, Misc. (ANIBAL PROTECT EX) Apply topically 2 times daily   Taking            Medications:        Current Facility-Administered Medications   Medication Last Rate     sodium chloride Stopped (10/05/18 8086)     Current Facility-Administered Medications   Medication Dose Route Frequency     acetaminophen  975 mg Oral Q8H     aspirin  325 mg Oral Daily     atorvastatin  10 mg Oral QPM     diltiazem  120 mg Oral Daily     insulin aspart  2 Units Subcutaneous TID w/meals     insulin aspart  1-7 Units  Subcutaneous TID AC     insulin aspart  1-5 Units Subcutaneous At Bedtime     insulin glargine  7 Units Subcutaneous QAM AC     metoprolol succinate  50 mg Oral Daily     senna-docusate  1 tablet Oral BID    Or     senna-docusate  2 tablet Oral BID     sodium chloride (PF)  3 mL Intracatheter Q8H     tamsulosin  0.4 mg Oral Daily     Current Facility-Administered Medications   Medication Dose Route Frequency     acetaminophen  650 mg Oral Q4H PRN     sore throat lozenge  1-2 lozenge Buccal Q1H PRN     bisacodyl  5 mg Oral Daily PRN    Or     bisacodyl  10 mg Oral Daily PRN    Or     bisacodyl  15 mg Oral Daily PRN     bisacodyl  10 mg Rectal Daily PRN     glucose  15-30 g Oral Q15 Min PRN    Or     dextrose  25-50 mL Intravenous Q15 Min PRN    Or     glucagon  1 mg Subcutaneous Q15 Min PRN     HYDROmorphone  0.2 mg Intravenous Q2H PRN     hydrOXYzine  10 mg Oral Q6H PRN     labetalol  10 mg Intravenous Q2H PRN     lidocaine 4%   Topical Q1H PRN     lidocaine (buffered or not buffered)  1 mL Other Q1H PRN     melatonin  1 mg Oral At Bedtime PRN     naloxone  0.1-0.4 mg Intravenous Q2 Min PRN     ondansetron  4 mg Oral Q6H PRN    Or     ondansetron  4 mg Intravenous Q6H PRN     oxyCODONE IR  5 mg Oral Q3H PRN     polyethylene glycol  17 g Oral Daily PRN     prochlorperazine  5 mg Intravenous Q6H PRN    Or     prochlorperazine  5 mg Oral Q6H PRN     prochlorperazine  12.5 mg Rectal Q12H PRN     sodium chloride (PF)  3 mL Intracatheter Q1H PRN     zolpidem (AMBIEN) tablet 5 mg  5 mg Oral Daily PRN            Data:      Lab data reviewed.     Recent Labs  Lab 10/06/18  0643  10/05/18  0645 10/03/18  0625   HGB 8.3*  < > 7.0*  --    MCV 90  --  91  --    *  --  133*  --      --  140 139   POTASSIUM 4.5  --  4.7 4.1   CHLORIDE 107  --  108 106   CO2 24  --  25 23   BUN 29  --  29 41*   CR 1.93*  --  1.97* 1.99*   ANIONGAP 7  --  7 10   JOSELINE 7.4*  --  7.3* 7.8*   *  --  134* 90   < > = values in this  "interval not displayed.        Imaging:      Imaging data reviewed.     Dr. Earl Streeter D.O.  [facility] Center[facility]  Pager [phone][ZA1.1]       Revision History        User Key Date/Time User Provider Type Action    > ZA1.3 10/7/2018 12:36 PM Earl Streeter, DO Physician Addend     ZA1.2 10/7/2018 12:33 PM Earl Streeter, DO Physician Sign     ZA1.1 10/7/2018  8:56 AM Earl Streeter, DO Physician             Progress Notes by Allyn Adan RN at 10/7/2018 10:43 AM     Author:  Allyn Adan RN Service:  (none) Author Type:  Registered Nurse    Filed:  10/7/2018 10:44 AM Date of Service:  10/7/2018 10:43 AM Creation Time:  10/7/2018 10:43 AM    Status:  Signed :  Allyn Adan RN (Registered Nurse)         Dr. KERRY Streeter text paged re: discharge planning, hgb recheck?[LM1.1]     Revision History        User Key Date/Time User Provider Type Action    > LM1.1 10/7/2018 10:44 AM Allyn Adan RN Registered Nurse Sign            Progress Notes by Richar Oleary PA-C at 10/7/2018  9:30 AM     Author:  Richar Oleary PA-C Service:  Orthopedics Author Type:  Physician Assistant - C    Filed:  10/7/2018  9:31 AM Date of Service:  10/7/2018  9:30 AM Creation Time:  10/7/2018  9:30 AM    Status:  Signed :  Richar Oleary PA-C (Physician Assistant - C)         Abimael Flores  10/7/2018  Patient status post left hip hemiarthroplasty  Admit Date:  10/4/2018      Doing well.  Objective: no pain in the left hip. States he wants to eat breakfast. No new complaints this morning.   Blood pressure 127/67, pulse 80, temperature 97.9  F (36.6  C), temperature source Oral, resp. rate 20, height 1.753 m (5' 9\"), weight 72.5 kg (159 lb 12.8 oz), SpO2 92 %.    Temperatures:  Current - Temp: 97.9  F (36.6  C); Max - Temp  Av.2  F (36.8  C)  Min: 97.8  F (36.6  C)  Max: 98.8  F (37.1  C)  Pulse range: Pulse  Avg: " 85.7  Min: 80  Max: 92  Blood pressure range: Systolic (24hrs), Av , Min:99 , Max:127   ; Diastolic (24hrs), Av, Min:37, Max:78    Exam:  CMS: intact  alert, stable, wound ok  Dressing c/d/i  Calf s/nt  DF/PF 5/5 in BLE    Labs:  Recent Labs   Lab Test  10/06/18   0643  10/05/18   0645  10/03/18   0625   POTASSIUM  4.5  4.7  4.1     Recent Labs   Lab Test  10/06/18   0643  10/05/18   1718  10/05/18   0645   HGB  8.3*  6.7*  7.0*     Recent Labs   Lab Test  14   0405   INR  0.91     Recent Labs   Lab Test  10/06/18   0643  10/05/18   0645  18   0625   PLT  126*  133*  388       PLAN: continue current therapy regimen. Aspirin for DVT ppx. Discharge once medically stable.[NH1.1]        Revision History        User Key Date/Time User Provider Type Action    > NH1.1 10/7/2018  9:31 AM Richar Oleary PA-C Physician Assistant - C Sign                  Procedure Notes     No notes of this type exist for this encounter.      Progress Notes - Therapies (Notes from 10/07/18 through 10/10/18)     No notes of this type exist for this encounter.

## 2018-10-04 NOTE — IP AVS SNAPSHOT
"` `     [place] [address] [facility]: [phone]                                              INTERAGENCY TRANSFER FORM - NURSING   10/4/2018                    Hospital Admission Date: 10/4/2018  CHERYL HOLLOWAY   : 1924  Sex: Male        Attending Provider: Radha Collins MD     Allergies:  No Known Allergies    Infection:  None   Service:  HOSPITALIST    Ht:  1.753 m (5' 9\")   Wt:  73.2 kg (161 lb 6 oz)   Admission Wt:  72.6 kg (160 lb)    BMI:  23.83 kg/m 2   BSA:  1.89 m 2            Patient PCP Information     Provider PCP Type    RAISSA  [facility]      Current Code Status     Date Active Code Status Order ID Comments User Context       Prior      Code Status History     Date Active Date Inactive Code Status Order ID Comments User Context    10/8/2018 12:50 PM  DNR/DNI [id number]  Earl Streeter DO Outpatient    10/4/2018  4:36 AM 10/8/2018 12:50 PM DNR/DNI [id number]  Radha Collins MD Inpatient    2018  9:30 PM 9/3/2018  4:24 PM DNR/DNI [id number]  Mamie Eubanks MD Inpatient    2014 12:05 PM 2018  9:30 PM Full Code [id number]  Ryne Justin PA-C Outpatient      Advance Directives        Scanned docmt in ACP Activity?           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) Medium  2018    Cardiac arrhythmia, unspecified cardiac arrhythmia type Medium  2018    CKD (chronic kidney disease) stage 3, GFR 30-59 ml/min (H) Medium  2018    " Benign essential hypertension Medium  9/4/2018    Coronary artery disease involving coronary bypass graft of native heart without angina pectoris Medium  9/4/2018    Pneumonia due to infectious organism Medium  9/4/2018    Physical deconditioning Medium  9/4/2018    Other insomnia Medium  9/17/2018    Bladder spasms Medium  9/17/2018      Immunizations     Name Date      Influenza (High Dose) 3 valent vaccine 10/05/18          END      ASSESSMENT     Discharge Profile Flowsheet     EXPECTED DISCHARGE     PAS Number  [id number] 09/03/18 1415    Expected Discharge Date  10/10/18 (return to TCU) 10/10/18 0938   SKIN      DISCHARGE NEEDS ASSESSMENT     Inspection of bony prominences  Full 10/10/18 1229    Equipment Currently Used at Home  walker, rolling;wheelchair, manual 10/06/18 0952   Full except areas not inspected   Buttock, left;Buttock, right;Coccyx 10/09/18 0427    Transportation Available  -- 10/06/18 1427   Inspection under devices  Full 10/10/18 0050    # of Referrals Placed by CTS  Post Acute Facilities 10/07/18 1433   Skin WDL  ex 10/10/18 0050    Equipment Used at Home  none 04/22/14 0848   Skin Color/Characteristics  redness blanchable (coccyx) 10/10/18 1229    GASTROINTESTINAL (ADULT,PEDIATRIC,OB)     Skin Temperature  warm 10/10/18 1207    GI WDL  WDL 10/10/18 1207   Skin Moisture  dry 10/10/18 1207    All Quadrants Bowel Sounds  audible and active in all quadrants 10/10/18 1207   Skin Integrity  incision(s);bruise(s) 10/10/18 1229    Last Bowel Movement  10/09/18 10/10/18 1207   Procedural focused assessment (identify areas inspected)   Elbow, left;Elbow, right;Knee, left;Knee, right;Ankle, left;Ankle, right;Heel, left;Heel, right;Buttock, right;Sacrum;Coccyx;Buttock, left;Hip, right;Hip, left;Abdomen;Spine;Scapula, left;Scapula, right 10/06/18 0518    Passing flatus  yes 10/10/18 1207   Skin Elasticity  quick return to original state 10/07/18 2344    COMMUNICATION ASSESSMENT     Focused inspection of  "bony prominences  Hip, left 10/09/18 0953    Patient's communication style  spoken language (English or Bilingual) 10/04/18 0152   SAFETY      FINAL RESOURCES     Safety WDL  WDL 10/10/18 1207    Resources List  Skilled Nursing Facility 09/03/18 1415   All Alarms  alarm(s) activated and audible 10/10/18 1207                 Assessment WDL (Within Defined Limits) Definitions           Safety WDL     Effective: 09/28/15    Row Information: <b>WDL Definition:</b> Bed in low position, wheels locked; call light in reach; upper side rails up x 2; ID band on<br> <font color=\"gray\"><i>Item=AS safety wdl>>List=AS safety wdl>>Version=F14</i></font>      Skin WDL     Effective: 09/28/15    Row Information: <b>WDL Definition:</b> Warm; dry; intact; elastic; without discoloration; pressure points without redness<br> <font color=\"gray\"><i>Item=AS skin wdl>>List=AS skin wdl>>Version=F14</i></font>      Vitals     Vital Signs Flowsheet     VITAL SIGNS     Side Effects Monitoring: Respiratory Depth  N 10/10/18 0704    Temp  97.8  F (36.6  C) 10/10/18 1114   Side Effects Monitoring: Sedation Level  1 10/10/18 0704    Temp src  Oral 10/10/18 1114   [place] COMA SCALE      Resp  18 10/10/18 0704   Best Eye Response  3-->(E3) to speech 10/10/18 1117    Pulse  89 10/10/18 1114   Best Motor Response  6-->(M6) obeys commands 10/10/18 1117    Heart Rate  85 10/10/18 0017   Best Verbal Response  5-->(V5) oriented 10/10/18 1117    Pulse/Heart Rate Source  Monitor 10/10/18 1114   [place] Coma Scale Score  14 10/10/18 1117    BP  135/73 10/10/18 1114   HEIGHT AND WEIGHT      BP Location  Left arm 10/10/18 1114   Height  1.753 m (5' 9\") 10/04/18 0154    OXYGEN THERAPY     Height Method  Stated 10/04/18 0154    SpO2  98 % 10/10/18 1114   Weight  73.2 kg (161 lb 6 oz) 10/10/18 0610    O2 Device  None (Room air) 10/10/18 1114   Weight Method  Bed scale 10/10/18 0610    Oxygen Delivery  1 LPM 10/08/18 2011   BSA (Calculated - sq m)  1.88 10/04/18 " 0154    RESPIRATORY MONITORING     BMI (Calculated)  23.68 10/04/18 0154    Respiratory Monitoring (EtCO2)  28 mmHg 10/05/18 0019   POSITIONING      Integrated Pulmonary Index (IPI)  8-9 10/05/18 0019   Body Position  supine;neutral head position;neutral body alignment 10/10/18 1114    PAIN/COMFORT     Head of Bed (HOB)  HOB at 30-45 degrees 10/10/18 1114    Patient Currently in Pain  yes 10/10/18 1114   Positioning/Transfer Devices  pillows;in use 10/10/18 1114    Preferred Pain Scale  word (verbal rating pain scale) 10/10/18 1114   DAILY CARE      Patient's Stated Pain Goal  4 10/10/18 0704   Activity Management  up in chair 10/10/18 1114    0-10 Pain Scale  5 10/10/18 1336   Activity Assistance Provided  assistance, 2 people 10/10/18 1114    Pain Location  Groin 10/10/18 1114   Additional Documentation  Activity Device Assistance (Row) 10/09/18 1656    Pain Orientation  Right;Left 10/10/18 0704   Assistive Device Utilized  gait belt;walker 10/10/18 1114    Pain Descriptors  Aching 10/08/18 0455   EKG MONITORING      Pain Intervention(s)  Medication (See eMAR) 10/10/18 0704   Cardiac Regularity  Regular 10/04/18 0210    Response to Interventions  Absence of nonverbal indicators of pain 10/09/18 0623   Cardiac Rhythm  ST 10/04/18 0210    ANALGESIA SIDE EFFECTS MONITORING     ECG      Side Effects Monitoring: Respiratory Quality  R 10/10/18 0704   ECG Rhythm  -- (refusing telemetry) 10/09/18 2004            Patient Lines/Drains/Airways Status    Active LINES/DRAINS/AIRWAYS     Name: Placement date: Placement time: Site: Days: Last dressing change:    Peripheral IV 10/04/18 Right Lower forearm 10/04/18   0315   Lower forearm   6     Incision/Surgical Site 08/26/18 Right Hip 08/26/18   1441    44     Incision/Surgical Site 10/04/18 Left Hip 10/04/18   1350    6             Patient Lines/Drains/Airways Status    Active PICC/CVC     None            Intake/Output Detail Report     Date Intake         Output     Net     Shift P.O. I.V. IV Piggyback Colloid Blood Components Total Urine Other Blood Total       Noc 10/08/18 2300 - 10/09/18 0659 -- -- -- -- -- -- 150 -- -- 150 -150    Day 10/09/18 0700 - 10/09/18 1459 -- -- -- -- -- -- 150 -- -- 150 -150    [place] 10/09/18 1500 - 10/09/18 2259 -- 3 -- -- -- 3 200 -- -- 200 -197    Noc 10/09/18 2300 - 10/10/18 0659 240 3 -- -- -- 243 900 -- -- 900 -657    Day 10/10/18 0700 - 10/10/18 1459 -- -- -- -- -- -- 200 -- -- 200 -200      Last Void/BM       Most Recent Value    Urine Occurrence 1 at 10/10/2018 1259    Stool Occurrence 1 at 10/10/2018 1259      Case Management/Discharge Planning     Case Management/Discharge Planning Flowsheet     REFERRAL INFORMATION     DISCHARGE PLANNING      Admission Type  inpatient 10/07/18 1433   Transportation Available  -- 10/06/18 1427    # of Referrals Placed by CTS  Post Acute Facilities 10/07/18 1433   Equipment Used at Home  none 04/22/14 0848    Post Acute Facilities  TCU 10/10/18 1110   FINAL RESOURCES      Reason For Consult  discharge planning;facility placement 10/07/18 1433   Equipment Currently Used at Home  walker, rolling;wheelchair, manual 10/06/18 0952    Record Reviewed  clinical discipline documentation;medical record;history and physical;patient profile 10/07/18 1433   Resources List  Skilled Nursing Facility 09/03/18 1415    CTS Assigned to Case  Graciela Wagner 10/10/18 1110   PAS Number  [id number] 09/03/18 1415    LIVING ENVIRONMENT     ABUSE RISK SCREEN      Lives With  -- 10/06/18 1427   QUESTION TO PATIENT:  Has a member of your family or a partner(now or in the past) intimidated, hurt, manipulated, or controlled you in any way?  no 10/04/18 0201    Living Arrangements  assisted living 10/06/18 0952   QUESTION TO PATIENT: Do you feel safe going back to the place where you are living?  yes 10/04/18 0201    COPING/STRESS     OTHER      Major Change/Loss/Stressor  hospitalization 10/04/18 0448   Are you depressed or being treated for  depression?  No 10/04/18 0448    EXPECTED DISCHARGE     HOMICIDE RISK      Expected Discharge Date  10/10/18 (return to TCU) 10/10/18 0961   Feels Like Hurting Others  no 10/04/18 0208

## 2018-10-04 NOTE — PHARMACY-ADMISSION MEDICATION HISTORY
Admission medication history interview status for the 10/4/2018  admission is complete. See EPIC admission navigator for prior to admission medications     Medication history source reliability:Good    Actions taken by pharmacist (provider contacted, etc):reviewed MAR sent by nursing facility     Additional medication history information not noted on PTA med list :None    Medication reconciliation/reorder completed by provider prior to medication history? Yes    Time spent in this activity: 15 mins    Prior to Admission medications    Medication Sig Last Dose Taking? Auth Provider   aspirin 325 MG EC tablet Take one Aspirin tab twice daily for 5 weeks. 10/3/2018 at Unknown time Yes Bill Sanders MD   atorvastatin (LIPITOR) 10 MG tablet Take 1 tablet (10 mg) by mouth every evening 10/3/2018 at Unknown time Yes Meli Arndt PA-C   diltiazem (TIAZAC) 120 MG 24 hr ER beaded capsule Take 120 mg by mouth daily 10/3/2018 at Unknown time Yes Reported, Patient   IBUPROFEN PO Take 200 mg by mouth every 6 hours as needed for moderate pain prn Yes Reported, Patient   insulin glargine (LANTUS) 100 UNIT/ML injection Inject 7 Units Subcutaneous every morning 10/3/2018 at Unknown time Yes Reported, Patient   insulin lispro (HUMALOG KWIKPEN) 100 UNIT/ML injection Inject Subcutaneous At Bedtime Sliding scale:  -249=1 unit  -299=2 units  -349=3 units  -399=4 units  +=5 units Past Week at Unknown time Yes Unknown, Entered By History   insulin lispro (HUMALOG KWIKPEN) 100 UNIT/ML injection Inject 2 Units Subcutaneous 3 times daily (before meals) 10/3/2018 at Unknown time Yes Meli Arndt PA-C   insulin lispro (HUMALOG) 100 UNIT/ML injection Inject Subcutaneous 3 times daily (before meals) Sliding scale:   -189=1 unit  -239=2 units  -289=3 units  -339=4 units  -399=5 units  -499=6 units  +=7 units 10/3/2018 at Unknown time Yes Reported,  Patient   melatonin 5 MG tablet Take 5 mg by mouth At Bedtime 10/3/2018 at Unknown time Yes Unknown, Entered By History   melatonin 5 MG tablet Take 5 mg by mouth nightly as needed for sleep (MR x 1 PRN if scheduled dose is insufficient) Past Week at Unknown time Yes Unknown, Entered By History   metoprolol succinate (TOPROL-XL) 50 MG 24 hr tablet Take 1 tablet (50 mg) by mouth daily 10/3/2018 at Unknown time Yes Meli Arndt PA-C   oxyCODONE IR (ROXICODONE) 5 MG tablet Take 1 tablet (5 mg) by mouth every 4 hours as needed for pain 10/4/2018 at 0020 Yes Bill Sanders MD   polyethylene glycol (MIRALAX/GLYCOLAX) Packet Take 17 g by mouth daily 10/3/2018 at Unknown time Yes Reported, Patient   tamsulosin (FLOMAX) 0.4 MG capsule Take 0.4 mg by mouth every evening  10/3/2018 at Unknown time Yes Reported, Patient   Zolpidem Tartrate (AMBIEN PO) Take 5 mg by mouth daily as needed for sleep Past Week at Unknown time Yes Reported, Patient   aspirin 81 MG tablet Take 81 mg by mouth daily To start on 10/9/18 after 325mg BID dosing is done.   Unknown, Entered By History   Skin Protectants, Misc. (ANIBAL PROTECT EX) Apply topically 2 times daily   Reported, Patient     Brandee Leiva, PharmD

## 2018-10-04 NOTE — OP NOTE
PREOPERATIVE DIAGNOSIS: L displaced femoral neck fracture.     POSTOPERATIVE DIAGNOSIS: L displaced femoral neck fracture.    PROCEDURE: L hip hemiarthroplasty.     ATTENDING SURGEON: Dr. Bruno Stewart.     ASSISTANT SURGEON: Parag Gunn PA-C.    ANESTHESIA: General.    ESTIMATED BLOOD LOSS: 150cc.     COMPLICATIONS: None apparent.     IMPLANTS: Biomet Echo hemiarthroplasty system with size 11 cemented femoral stem, +0 neck, and 51mm bipolar femoral head.    A skilled surgical assistant, Parag Gunn PA-C, was necessary and utilized during the case to assist with patient positioning, prepping and draping, completing the soft tissue dissection, bone cuts, and securing the prosthesis, wound closure, and dressing application.  The assistant was utilized throughout the entire case.    INDICATIONS: Abimael is a pleasant 89-year-old gentleman who sustained a mechanical fall onto the left side at his rehabilitation center while recovering from a right hip hemiarthoplasty performed just over one month ago for a femoral neck fracture. The patient was unable to bear weight on the left leg and presented to the Emergency Department where radiographs demonstrated a displaced femoral neck fracture. To limit pulmonary and musculoskeletal deconditioning and allow for early weightbearing activity, operative intervention for the hip fracture was recommended.  Given the patient's age and activity level, a left hip hemiarthroplasty was recommended.  The benefits and risks of surgery were reviewed in full with the patient, including the risks of dislocation, hardware failure, and potential for future conversion to a total hip arthroplasty, and Abimael provided informed consent to proceed.     The patient was identified in the preoperative holding area on the date of surgery. The operative site was marked with indelible marker and the patient was brought back to the operating room and transferred to the operating table in a right  lateral decubitus position after successful administration of anesthesia. All bony prominences were well padded. The left leg was prepped and draped in standard sterile fashion. A preoperative timeout was performed, identifying the correct patient, procedure, operative site, antibiotic administration and equipment necessary for the procedure.     A standard posterior approach was utilized and soft tissue dissection was carefully carried down maintaining excellent hemostasis. The gluteal fascia was encountered and sharply incised directly over the greater trochanter. There was a small trochanteric bursa which was excised. The piriformis and external rotators were tagged and an L-shaped capsulotomy was created taking care to protect the gluteus medius and minimus muscle bellies during exposure of the hip joint. The fracture site was identified and the femoral head was removed without difficulty. The femoral head measured approximately 51mm. The proximal femur was then delivered into the wound. A  was utilized to access the femoral canal and an opening reamer was utilized to prepare the canal for the reamers and broaches. Reaming and broaching was carried down to a size 13 broach and a size 11 cemented femoral stem was selected. Trial components were implanted into position with excellent stability noted of the hip. The trial components were removed and the femoral canal was thoroughly irrigated. A 18mm cement restrictor was implanted. A centralizer was utilized at the distal end of the femoral stem. A size 11 cemented femoral stem was then inserted after cement was injected into the femoral canal and pressurized. After assuring adequate curing of the cement, trial head and neck components were implanted and excellent hip stability was once again noted. A +0 offset neck and 51mm bipolar head were then implanted and the hip was reduced. The hip demonstrated adequate range of motion in extension without  evidence of impingement. The patient demonstrated full internal rotation of the leg with the hip held in 45 degrees of flexion without dislocating. The patient demonstrated nearly 70 degrees of internal rotation with the hip held in 90 degrees of flexion before subluxation occurred. The wound was copiously irrigated. The hip capsule was reapproximated with interrupted #0 Ethibond suture. The gluteal fascia was reapproximated with interrupted 0 Vicryl suture. 2-0 Vicryl was utilized for closure of the superficial fascial layer.  Subcutaneous tissues and skin were reapproximated with interrupted 3-0 Monocryl suture and staples respectively. Dermabond was applied. Xeroform and sterile dressings were applied following administration of 0.5% Marcaine along the wound.  The patient was extubated and brought to the PACU in stable condition for further post-operative care.    Postoperatively the patient will be allowed to bear weight as tolerated on the left lower extremity with an assistive device. The patient will be placed on ASA for DVT prophylaxis. The patient will return to clinic for followup in approximately 2 weeks for repeat evaluation including wound check, staple removal and left hip and AP pelvis radiographs.     Of note all counts were correct at the conclusion of the case.     WILMA VALDIVIA MD

## 2018-10-05 LAB
ABO + RH BLD: NORMAL
ABO + RH BLD: NORMAL
ANION GAP SERPL CALCULATED.3IONS-SCNC: 7 MMOL/L (ref 3–14)
BLD GP AB SCN SERPL QL: NORMAL
BLD PROD TYP BPU: NORMAL
BLD PROD TYP BPU: NORMAL
BLD UNIT ID BPU: 0
BLOOD BANK CMNT PATIENT-IMP: NORMAL
BLOOD PRODUCT CODE: NORMAL
BPU ID: NORMAL
BUN SERPL-MCNC: 29 MG/DL (ref 7–30)
CALCIUM SERPL-MCNC: 7.3 MG/DL (ref 8.5–10.1)
CHLORIDE SERPL-SCNC: 108 MMOL/L (ref 94–109)
CO2 SERPL-SCNC: 25 MMOL/L (ref 20–32)
CREAT SERPL-MCNC: 1.97 MG/DL (ref 0.66–1.25)
ERYTHROCYTE [DISTWIDTH] IN BLOOD BY AUTOMATED COUNT: 15.8 % (ref 10–15)
GFR SERPL CREATININE-BSD FRML MDRD: 32 ML/MIN/1.7M2
GLUCOSE BLDC GLUCOMTR-MCNC: 141 MG/DL (ref 70–99)
GLUCOSE BLDC GLUCOMTR-MCNC: 149 MG/DL (ref 70–99)
GLUCOSE BLDC GLUCOMTR-MCNC: 184 MG/DL (ref 70–99)
GLUCOSE BLDC GLUCOMTR-MCNC: 233 MG/DL (ref 70–99)
GLUCOSE BLDC GLUCOMTR-MCNC: 295 MG/DL (ref 70–99)
GLUCOSE SERPL-MCNC: 134 MG/DL (ref 70–99)
HCT VFR BLD AUTO: 21.4 % (ref 40–53)
HGB BLD-MCNC: 6.7 G/DL (ref 13.3–17.7)
HGB BLD-MCNC: 7 G/DL (ref 13.3–17.7)
INTERPRETATION ECG - MUSE: NORMAL
MCH RBC QN AUTO: 29.8 PG (ref 26.5–33)
MCHC RBC AUTO-ENTMCNC: 32.7 G/DL (ref 31.5–36.5)
MCV RBC AUTO: 91 FL (ref 78–100)
NUM BPU REQUESTED: 1
PLATELET # BLD AUTO: 133 10E9/L (ref 150–450)
POTASSIUM SERPL-SCNC: 4.7 MMOL/L (ref 3.4–5.3)
RBC # BLD AUTO: 2.35 10E12/L (ref 4.4–5.9)
SODIUM SERPL-SCNC: 140 MMOL/L (ref 133–144)
SPECIMEN EXP DATE BLD: NORMAL
TRANSFUSION STATUS PATIENT QL: NORMAL
TRANSFUSION STATUS PATIENT QL: NORMAL
WBC # BLD AUTO: 8.5 10E9/L (ref 4–11)

## 2018-10-05 PROCEDURE — 85018 HEMOGLOBIN: CPT | Performed by: PHYSICIAN ASSISTANT

## 2018-10-05 PROCEDURE — 25000128 H RX IP 250 OP 636: Performed by: PHYSICIAN ASSISTANT

## 2018-10-05 PROCEDURE — A9270 NON-COVERED ITEM OR SERVICE: HCPCS | Mod: GY | Performed by: INTERNAL MEDICINE

## 2018-10-05 PROCEDURE — 25000128 H RX IP 250 OP 636: Performed by: INTERNAL MEDICINE

## 2018-10-05 PROCEDURE — A9270 NON-COVERED ITEM OR SERVICE: HCPCS | Mod: GY | Performed by: PHYSICIAN ASSISTANT

## 2018-10-05 PROCEDURE — 85027 COMPLETE CBC AUTOMATED: CPT | Performed by: INTERNAL MEDICINE

## 2018-10-05 PROCEDURE — 00000146 ZZHCL STATISTIC GLUCOSE BY METER IP

## 2018-10-05 PROCEDURE — 36415 COLL VENOUS BLD VENIPUNCTURE: CPT | Performed by: INTERNAL MEDICINE

## 2018-10-05 PROCEDURE — P9016 RBC LEUKOCYTES REDUCED: HCPCS | Performed by: INTERNAL MEDICINE

## 2018-10-05 PROCEDURE — 12000007 ZZH R&B INTERMEDIATE

## 2018-10-05 PROCEDURE — 25000131 ZZH RX MED GY IP 250 OP 636 PS 637: Mod: GY | Performed by: INTERNAL MEDICINE

## 2018-10-05 PROCEDURE — 99232 SBSQ HOSP IP/OBS MODERATE 35: CPT | Performed by: INTERNAL MEDICINE

## 2018-10-05 PROCEDURE — 80048 BASIC METABOLIC PNL TOTAL CA: CPT | Performed by: INTERNAL MEDICINE

## 2018-10-05 PROCEDURE — 36415 COLL VENOUS BLD VENIPUNCTURE: CPT | Performed by: PHYSICIAN ASSISTANT

## 2018-10-05 PROCEDURE — 25000132 ZZH RX MED GY IP 250 OP 250 PS 637: Mod: GY | Performed by: PHYSICIAN ASSISTANT

## 2018-10-05 PROCEDURE — 90662 IIV NO PRSV INCREASED AG IM: CPT | Performed by: INTERNAL MEDICINE

## 2018-10-05 PROCEDURE — 25000132 ZZH RX MED GY IP 250 OP 250 PS 637: Mod: GY | Performed by: INTERNAL MEDICINE

## 2018-10-05 RX ADMIN — Medication 1 LOZENGE: at 04:53

## 2018-10-05 RX ADMIN — INSULIN GLARGINE 3 UNITS: 100 INJECTION, SOLUTION SUBCUTANEOUS at 09:31

## 2018-10-05 RX ADMIN — OXYCODONE HYDROCHLORIDE 5 MG: 5 TABLET ORAL at 19:13

## 2018-10-05 RX ADMIN — CEFAZOLIN 1 G: 1 INJECTION, POWDER, FOR SOLUTION INTRAMUSCULAR; INTRAVENOUS at 03:11

## 2018-10-05 RX ADMIN — METOPROLOL SUCCINATE 50 MG: 50 TABLET, EXTENDED RELEASE ORAL at 09:31

## 2018-10-05 RX ADMIN — ATORVASTATIN CALCIUM 10 MG: 10 TABLET, FILM COATED ORAL at 19:13

## 2018-10-05 RX ADMIN — TAMSULOSIN HYDROCHLORIDE 0.4 MG: 0.4 CAPSULE ORAL at 09:31

## 2018-10-05 RX ADMIN — OXYCODONE HYDROCHLORIDE 5 MG: 5 TABLET ORAL at 22:10

## 2018-10-05 RX ADMIN — SENNOSIDES AND DOCUSATE SODIUM 2 TABLET: 8.6; 5 TABLET ORAL at 21:48

## 2018-10-05 RX ADMIN — SENNOSIDES AND DOCUSATE SODIUM 2 TABLET: 8.6; 5 TABLET ORAL at 09:32

## 2018-10-05 RX ADMIN — ACETAMINOPHEN 975 MG: 325 TABLET, FILM COATED ORAL at 17:56

## 2018-10-05 RX ADMIN — ACETAMINOPHEN 975 MG: 325 TABLET, FILM COATED ORAL at 00:14

## 2018-10-05 RX ADMIN — DILTIAZEM HYDROCHLORIDE 120 MG: 120 CAPSULE, EXTENDED RELEASE ORAL at 09:32

## 2018-10-05 RX ADMIN — ACETAMINOPHEN 975 MG: 325 TABLET, FILM COATED ORAL at 09:32

## 2018-10-05 RX ADMIN — GABAPENTIN 100 MG: 100 CAPSULE ORAL at 17:56

## 2018-10-05 RX ADMIN — INFLUENZA A VIRUS A/MICHIGAN/45/2015 X-275 (H1N1) ANTIGEN (FORMALDEHYDE INACTIVATED), INFLUENZA A VIRUS A/SINGAPORE/INFIMH-16-0019/2016 IVR-186 (H3N2) ANTIGEN (FORMALDEHYDE INACTIVATED), AND INFLUENZA B VIRUS B/MARYLAND/15/2016 BX-69A (A B/COLORADO/6/2017-LIKE VIRUS) ANTIGEN (FORMALDEHYDE INACTIVATED) 0.5 ML: 60; 60; 60 INJECTION, SUSPENSION INTRAMUSCULAR at 09:36

## 2018-10-05 RX ADMIN — ASPIRIN 325 MG: 325 TABLET, DELAYED RELEASE ORAL at 09:32

## 2018-10-05 RX ADMIN — OXYCODONE HYDROCHLORIDE 5 MG: 5 TABLET ORAL at 04:53

## 2018-10-05 ASSESSMENT — ACTIVITIES OF DAILY LIVING (ADL)
ADLS_ACUITY_SCORE: 14
ADLS_ACUITY_SCORE: 14
ADLS_ACUITY_SCORE: 15
ADLS_ACUITY_SCORE: 14

## 2018-10-05 NOTE — PROVIDER NOTIFICATION
Call to Dr. Stewart.  Hgb 6.7; call to see if MD will place orders for transfusion.  Awaiting callback.      Received call from MD stating hospitalist should follow and order transfusion.

## 2018-10-05 NOTE — PROGRESS NOTES
[facility] Orthopedic Progress Note  Abimael Flores is a 89 year old male    Today's Date:10/5/2018  Admission Date: 10/4/2018  Fracture of hip, left, closed, initial encounter (H) [S72.002A]  POD # 1 Left bipolar hemiarthroplasty for femoral neck fracture.     Patient Seen.  Doing well.  Dressings are clean, dry, and intact.  Circulation, motor and sensory function, intact distally.  HGB 7.0  PLTS 133.  afvss  O2SAT @ 92 with nasal canula.            PLAN:    Pain control medication: No changes.  Recheck HGB @ 5p today.      Temp:  [97.3  F (36.3  C)-98.4  F (36.9  C)] 98.4  F (36.9  C)  Heart Rate:  [60-93] 92  Resp:  [8-24] 16  BP: ()/(41-58) 109/54  SpO2:  [92 %-100 %] 98 %      Results for orders placed or performed during the hospital encounter of 10/04/18 (from the past 24 hour(s))   ABO/Rh type and screen   Result Value Ref Range    ABO A     RH(D) Pos     Antibody Screen Neg     Test Valid Only At [facility]        Specimen Expires 10/07/2018    Glucose by meter   Result Value Ref Range    Glucose 169 (H) 70 - 99 mg/dL   XR Pelvis w Hip Port Left 1 View    Narrative    XR PELVIS AND HIP PORTABLE LEFT 1 VIEW 10/4/2018 3:43 PM    COMPARISON: Radiographs earlier on the same day.    HISTORY: Arthroplasty.      Impression    IMPRESSION: Bilateral hip hemiarthroplasties, unchanged on the RIGHT  and new on the LEFT. Hardware appears intact. No fractures are seen.    VÍCTOR ISLAS MD   Glucose by meter   Result Value Ref Range    Glucose 206 (H) 70 - 99 mg/dL   Glucose by meter   Result Value Ref Range    Glucose 151 (H) 70 - 99 mg/dL   Glucose by meter   Result Value Ref Range    Glucose 149 (H) 70 - 99 mg/dL   Basic metabolic panel   Result Value Ref Range    Sodium 140 133 - 144 mmol/L    Potassium 4.7 3.4 - 5.3 mmol/L    Chloride 108 94 - 109 mmol/L    Carbon Dioxide 25 20 - 32 mmol/L    Anion Gap 7 3 - 14 mmol/L    Glucose 134 (H) 70 - 99 mg/dL    Urea Nitrogen 29 7 - 30 mg/dL     Creatinine 1.97 (H) 0.66 - 1.25 mg/dL    GFR Estimate 32 (L) >60 mL/min/1.7m2    GFR Estimate If Black 38 (L) >60 mL/min/1.7m2    Calcium 7.3 (L) 8.5 - 10.1 mg/dL   CBC with platelets   Result Value Ref Range    WBC 8.5 4.0 - 11.0 10e9/L    RBC Count 2.35 (L) 4.4 - 5.9 10e12/L    Hemoglobin 7.0 (L) 13.3 - 17.7 g/dL    Hematocrit 21.4 (L) 40.0 - 53.0 %    MCV 91 78 - 100 fl    MCH 29.8 26.5 - 33.0 pg    MCHC 32.7 31.5 - 36.5 g/dL    RDW 15.8 (H) 10.0 - 15.0 %    Platelet Count 133 (L) 150 - 450 10e9/L   Glucose by meter   Result Value Ref Range    Glucose 141 (H) 70 - 99 mg/dL         Edgardo Godfrey

## 2018-10-05 NOTE — PLAN OF CARE
Problem: Patient Care Overview  Goal: Plan of Care/Patient Progress Review  PT: Eval orders received. Pt declining OOB activity and evaluation this AM stating he has been uncomfortable all morning and just became comfortable. Adamantly declining therapy. Cancel this AM. Will re attempt as schedule allows        negative...

## 2018-10-05 NOTE — PLAN OF CARE
Problem: Patient Care Overview  Goal: Plan of Care/Patient Progress Review  Outcome: Improving  A&OX4, VSS on RA. POD 1. Denies pain. Dressing on left hip c/d/i. Refused to work with PT today. Has not been OOB today. Rosales catheter patent with good UO. On mod carb diet. BG check. IV saline locked. Continue to monitor.

## 2018-10-05 NOTE — PROGRESS NOTES
[facility]    Hospitalist Progress Note      Assessment & Plan   Mr. 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  He is being admitted to the hospital under inpatient status.     Mechanical fall with angulated left femoral neck fracture: s/p left DELL on 10/4  The patient has presented with left hip pain following a mechanical fall as he was trying to  something from the floor at the transitional care facility. No evidence of syncope.  Notably, he had a recent fall on 08/25/2018 which resulted in a right femoral neck fracture requiring hemiarthroplasty with Dr. Bill Sanders.    -post op pain control and management per ortho     Hypertension:  The blood pressure is currently controlled.    -continue on prior to admission Toprol XL 50 mg p.o. daily as well as diltiazem 120 mg p.o. daily.    -IV labetalol will be available p.r.n.    Dyslipidemia:  He will continue on prior to admission Lipitor.     Benign prostatic hypertrophy:  He will continue with prior to admission Flomax.     Chronic kidney disease, stage III-IV:  The most recent creatinine levels have been around 1.8-1.9 and is at baseline.   -monitor BMP      Chronic anemia:  Baseline hemoglobin is around 9.    -repeat hgb this evening and tomorrow as trended down to 7    Coronary artery disease:  The patient is status post coronary artery bypass graft x 4 in 1996.    -continue on prior to admission metoprolol and Lipitor.   -continue aspirin post op    History of nonsustained V-tach as well as sinus exit block with left bundle  branch block:  It occurred during the recent hospitalization in 08/2018 following a right hip fracture and he was evaluated by the Cardiology Service.    -continue on prior to admission Toprol XL 50 mg p.o. daily.     Diabetes mellitus type 2:  The most recent hemoglobin A1c was 8.4% on 08/25/2018.  At home he is on Lantus 7 units subcu every morning and Humalog 2 units subq t.i.d. ongoing poor po intake at this point  -lantus 3 units at bedtime  -sliding scale  -consider adding back prandial coverage and titrating insulin as po intake improves      DVT Prophylaxis: Pneumatic Compression Devices  Code Status: DNR/DNI    Disposition: Expected discharge in 2 days once completed post op ortho cares.    Demarcus Reis    Interval History   Pain well controlled with meds.  Tired and doesn't have much of an appetite.  No chest pain or shortness of breath.      -Data reviewed today: I reviewed all new labs and imaging results over the last 24 hours. I personally reviewed no images or EKG's today.    Physical Exam   Temp: 98.4  F (36.9  C) Temp src: Oral BP: 109/54   Heart Rate: 92 Resp: 16 SpO2: 98 % O2 Device: Nasal cannula Oxygen Delivery: 2 LPM  Vitals:    10/04/18 0153 10/05/18 0630   Weight: 72.6 kg (160 lb) 73.4 kg (161 lb 12.8 oz)     Vital Signs with Ranges  Temp:  [97.3  F (36.3  C)-98.4  F (36.9  C)] 98.4  F (36.9  C)  Heart Rate:  [60-93] 92  Resp:  [8-24] 16  BP: ()/(41-58) 109/54  SpO2:  [92 %-100 %] 98 %  I/O last 3 completed shifts:  In: 2760 [I.V.:2510]  Out: 1075 [Urine:925; Blood:150]    Constitutional: awake, alert, cooperative    Respiratory: Clear to auscultation bilaterally, no crackles or wheezing noted.  Good air exchange.     Cardiovascular: Regular rate and rhythm.  No murmur, rub or gallop noted.     GI: Positive bowel sounds, soft, non-distended, non-tender.  No masses or organomegaly noted.     Musculoskeletal: moving all extremities    Neurologic: Awake, alert and oriented to name,  place and time.  Cranial nerves II-XII are grossly intact.      Lymphatic: No edema noted    Skin: no rash    Medications     sodium chloride Stopped (10/05/18 0456)       acetaminophen  975 mg Oral Q8H     aspirin  325 mg Oral Daily     atorvastatin  10 mg Oral QPM     diltiazem  120 mg Oral Daily     gabapentin  100 mg Oral Daily     insulin aspart  1-7 Units Subcutaneous TID AC     insulin aspart  1-5 Units Subcutaneous At Bedtime     insulin glargine  3 Units Subcutaneous QAM AC     metoprolol succinate  50 mg Oral Daily     senna-docusate  1 tablet Oral BID    Or     senna-docusate  2 tablet Oral BID     sodium chloride (PF)  3 mL Intracatheter Q8H     tamsulosin  0.4 mg Oral Daily       Data     Recent Labs  Lab 10/05/18  0645 10/03/18  0625   WBC 8.5  --    HGB 7.0*  --    MCV 91  --    *  --     139   POTASSIUM 4.7 4.1   CHLORIDE 108 106   CO2 25 23   BUN 29 41*   CR 1.97* 1.99*   ANIONGAP 7 10   JOSELINE 7.3* 7.8*   * 90       Recent Results (from the past 24 hour(s))   XR Pelvis w Hip Port Left 1 View    Narrative    XR PELVIS AND HIP PORTABLE LEFT 1 VIEW 10/4/2018 3:43 PM    COMPARISON: Radiographs earlier on the same day.    HISTORY: Arthroplasty.      Impression    IMPRESSION: Bilateral hip hemiarthroplasties, unchanged on the RIGHT  and new on the LEFT. Hardware appears intact. No fractures are seen.    VÍCTOR ISLAS MD

## 2018-10-05 NOTE — PLAN OF CARE
Problem: Patient Care Overview  Goal: Plan of Care/Patient Progress Review  Outcome: Improving  Pt alert/oriented x4, complains of pain at surgical site, tx with prn oxycodone, relief noted after administration, repositioned every 2 hours. Surgical dressing CDI, CAPNO on, tolerating regular diet. PIV SL in AM. Rosales patent with adequate output. VSS, will continue to monitor.

## 2018-10-05 NOTE — PROVIDER NOTIFICATION
MD Notification    Notified Person: PA    Notified Person Name: Lazaro Gunn    Notification Date/Time: 10/5/2018 8:26 AM    Notification Interaction: Left a voicemail    Purpose of Notification: POD 1 Hgb 7.0. Conditional order for blood below 8. But no active order for PRBC noted. Could we have an order?     Orders Received:    Comments:

## 2018-10-06 ENCOUNTER — APPOINTMENT (OUTPATIENT)
Dept: OCCUPATIONAL THERAPY | Facility: CLINIC | Age: 83
DRG: 470 | End: 2018-10-06
Attending: PHYSICIAN ASSISTANT
Payer: MEDICARE

## 2018-10-06 ENCOUNTER — APPOINTMENT (OUTPATIENT)
Dept: PHYSICAL THERAPY | Facility: CLINIC | Age: 83
DRG: 470 | End: 2018-10-06
Payer: MEDICARE

## 2018-10-06 LAB
ANION GAP SERPL CALCULATED.3IONS-SCNC: 7 MMOL/L (ref 3–14)
BUN SERPL-MCNC: 29 MG/DL (ref 7–30)
CALCIUM SERPL-MCNC: 7.4 MG/DL (ref 8.5–10.1)
CHLORIDE SERPL-SCNC: 107 MMOL/L (ref 94–109)
CO2 SERPL-SCNC: 24 MMOL/L (ref 20–32)
CREAT SERPL-MCNC: 1.93 MG/DL (ref 0.66–1.25)
ERYTHROCYTE [DISTWIDTH] IN BLOOD BY AUTOMATED COUNT: 14.9 % (ref 10–15)
GFR SERPL CREATININE-BSD FRML MDRD: 33 ML/MIN/1.7M2
GLUCOSE BLDC GLUCOMTR-MCNC: 170 MG/DL (ref 70–99)
GLUCOSE BLDC GLUCOMTR-MCNC: 224 MG/DL (ref 70–99)
GLUCOSE BLDC GLUCOMTR-MCNC: 242 MG/DL (ref 70–99)
GLUCOSE BLDC GLUCOMTR-MCNC: 268 MG/DL (ref 70–99)
GLUCOSE SERPL-MCNC: 187 MG/DL (ref 70–99)
HCT VFR BLD AUTO: 25.2 % (ref 40–53)
HGB BLD-MCNC: 8.3 G/DL (ref 13.3–17.7)
MCH RBC QN AUTO: 29.7 PG (ref 26.5–33)
MCHC RBC AUTO-ENTMCNC: 32.9 G/DL (ref 31.5–36.5)
MCV RBC AUTO: 90 FL (ref 78–100)
PLATELET # BLD AUTO: 126 10E9/L (ref 150–450)
POTASSIUM SERPL-SCNC: 4.5 MMOL/L (ref 3.4–5.3)
RBC # BLD AUTO: 2.79 10E12/L (ref 4.4–5.9)
SODIUM SERPL-SCNC: 138 MMOL/L (ref 133–144)
WBC # BLD AUTO: 10.2 10E9/L (ref 4–11)

## 2018-10-06 PROCEDURE — 97530 THERAPEUTIC ACTIVITIES: CPT | Mod: GP

## 2018-10-06 PROCEDURE — 12000007 ZZH R&B INTERMEDIATE

## 2018-10-06 PROCEDURE — A9270 NON-COVERED ITEM OR SERVICE: HCPCS | Mod: GY | Performed by: PHYSICIAN ASSISTANT

## 2018-10-06 PROCEDURE — 85027 COMPLETE CBC AUTOMATED: CPT | Performed by: INTERNAL MEDICINE

## 2018-10-06 PROCEDURE — 00000146 ZZHCL STATISTIC GLUCOSE BY METER IP

## 2018-10-06 PROCEDURE — A9270 NON-COVERED ITEM OR SERVICE: HCPCS | Mod: GY | Performed by: INTERNAL MEDICINE

## 2018-10-06 PROCEDURE — 36415 COLL VENOUS BLD VENIPUNCTURE: CPT | Performed by: INTERNAL MEDICINE

## 2018-10-06 PROCEDURE — 97535 SELF CARE MNGMENT TRAINING: CPT | Mod: GO | Performed by: REHABILITATION PRACTITIONER

## 2018-10-06 PROCEDURE — 25000132 ZZH RX MED GY IP 250 OP 250 PS 637: Mod: GY | Performed by: PHYSICIAN ASSISTANT

## 2018-10-06 PROCEDURE — 25000132 ZZH RX MED GY IP 250 OP 250 PS 637: Mod: GY | Performed by: INTERNAL MEDICINE

## 2018-10-06 PROCEDURE — 40000133 ZZH STATISTIC OT WARD VISIT: Performed by: REHABILITATION PRACTITIONER

## 2018-10-06 PROCEDURE — 97165 OT EVAL LOW COMPLEX 30 MIN: CPT | Mod: GO | Performed by: REHABILITATION PRACTITIONER

## 2018-10-06 PROCEDURE — 80048 BASIC METABOLIC PNL TOTAL CA: CPT | Performed by: INTERNAL MEDICINE

## 2018-10-06 PROCEDURE — 40000193 ZZH STATISTIC PT WARD VISIT

## 2018-10-06 PROCEDURE — 99233 SBSQ HOSP IP/OBS HIGH 50: CPT | Performed by: INTERNAL MEDICINE

## 2018-10-06 PROCEDURE — 97161 PT EVAL LOW COMPLEX 20 MIN: CPT | Mod: GP

## 2018-10-06 PROCEDURE — 25000131 ZZH RX MED GY IP 250 OP 636 PS 637: Mod: GY | Performed by: INTERNAL MEDICINE

## 2018-10-06 RX ADMIN — SENNOSIDES AND DOCUSATE SODIUM 2 TABLET: 8.6; 5 TABLET ORAL at 08:21

## 2018-10-06 RX ADMIN — METOPROLOL SUCCINATE 50 MG: 50 TABLET, EXTENDED RELEASE ORAL at 08:22

## 2018-10-06 RX ADMIN — GABAPENTIN 100 MG: 100 CAPSULE ORAL at 17:46

## 2018-10-06 RX ADMIN — ASPIRIN 325 MG: 325 TABLET, DELAYED RELEASE ORAL at 08:22

## 2018-10-06 RX ADMIN — TAMSULOSIN HYDROCHLORIDE 0.4 MG: 0.4 CAPSULE ORAL at 08:22

## 2018-10-06 RX ADMIN — OXYCODONE HYDROCHLORIDE 5 MG: 5 TABLET ORAL at 08:21

## 2018-10-06 RX ADMIN — ATORVASTATIN CALCIUM 10 MG: 10 TABLET, FILM COATED ORAL at 20:35

## 2018-10-06 RX ADMIN — SENNOSIDES AND DOCUSATE SODIUM 2 TABLET: 8.6; 5 TABLET ORAL at 20:35

## 2018-10-06 RX ADMIN — Medication 2 LOZENGE: at 03:29

## 2018-10-06 RX ADMIN — OXYCODONE HYDROCHLORIDE 5 MG: 5 TABLET ORAL at 20:35

## 2018-10-06 RX ADMIN — INSULIN GLARGINE 3 UNITS: 100 INJECTION, SOLUTION SUBCUTANEOUS at 09:04

## 2018-10-06 RX ADMIN — DILTIAZEM HYDROCHLORIDE 120 MG: 120 CAPSULE, EXTENDED RELEASE ORAL at 08:22

## 2018-10-06 ASSESSMENT — ACTIVITIES OF DAILY LIVING (ADL)
ADLS_ACUITY_SCORE: 14
ADLS_ACUITY_SCORE: 14
ADLS_ACUITY_SCORE: 15
ADLS_ACUITY_SCORE: 14
ADLS_ACUITY_SCORE: 18
ADLS_ACUITY_SCORE: 17

## 2018-10-06 NOTE — PLAN OF CARE
Problem: Patient Care Overview  Goal: Plan of Care/Patient Progress Review  OT eval completed and treatment initiated. Previous level of function: Patient reports that he has been living in [facility] with his wife, who has been needing increased care. At baseline pt uses a walker and wheelchair occasionally for mobility, and got some level of assistance with self-cares although pt was not able to accurately report. Pt with some confusion about time and recent history, although understands clearly why he is here.    Discharge Planner OT   Patient plan for discharge: Pt not able to state clearly, although understands the need for ongoing rehab well  Current status: Pt is motivated and willing to work with therapist. However, OT session scheduled right after PT session, pt too fatigued to transfer supine to EOB with A of 1. Pt limited by pain, deconditioning, balance deficits, and some confusion.   Barriers to return to prior living situation: None noted if pt can increase services as needed at [facility].  Recommendations for discharge: TCU  Rationale for recommendations: Skilled occupational therapy intervention is warranted at this time to address functional deficits. Ongoing skilled therapy in a TCU setting is recommended to maximize strength and independence.       Entered by: Clarissa Gee 10/06/2018 10:07 AM

## 2018-10-06 NOTE — PLAN OF CARE
Problem: Patient Care Overview  Goal: Plan of Care/Patient Progress Review  Outcome: Improving  Patient turns with assist of 2.  Pain managed with Oxycodone.  VSS on 1L NC.  A/Ox4.  Dressing CDI.  CMS intact.  Hip precautions maintained.  Blood sugar 233 and 295, given sliding scale insulin.  Collins intact and patent; adequate urine output.  Patient refuses collins to be pulled until am when he gets out of bed for the first time.  Fluids encouraged.  Good PO intake.  Received 1 unit of PRBCs for Hgb of 6.7; tolerated well, recheck in am.

## 2018-10-06 NOTE — PROGRESS NOTES
"Abimael Flores  10/6/2018  Patient status post Left hip hemiarthroplasty  Also had recent right hip hemiarthroplasty back in August for a fall.   Admit Date:  10/4/2018      Doing well.  Objective: minimal pain in the hips. Ready to try and work with therapy. No further complaints.   Blood pressure 112/59, pulse 90, temperature 98.8  F (37.1  C), resp. rate 18, height 1.753 m (5' 9\"), weight 73.5 kg (162 lb), SpO2 94 %.    Temperatures:  Current - Temp: 98.8  F (37.1  C); Max - Temp  Av.4  F (36.9  C)  Min: 97.7  F (36.5  C)  Max: 99.2  F (37.3  C)  Pulse range: Pulse  Av.2  Min: 78  Max: 90  Blood pressure range: Systolic (24hrs), Av , Min:96 , Max:123   ; Diastolic (24hrs), Av, Min:40, Max:83    Exam:  CMS: intact  alert, stable, wound ok  Dressing c/d/i  Calf s/nt  DF/PF   Communicates clearly with examiner.     Labs:  Recent Labs   Lab Test  10/06/18   0643  10/05/18   0645  10/03/18   0625   POTASSIUM  4.5  4.7  4.1     Recent Labs   Lab Test  10/06/18   0643  10/05/18   1718  10/05/18   0645   HGB  8.3*  6.7*  7.0*     Recent Labs   Lab Test  14   0405   INR  0.91     Recent Labs   Lab Test  10/06/18   0643  10/05/18   0645  18   0625   PLT  126*  133*  388       PLAN: WBAT in  BLE. Use walker at all times. Continue aspirin for DVT ppx. Discharge to TCU once medically stable.     "

## 2018-10-06 NOTE — PLAN OF CARE
Problem: Patient Care Overview  Goal: Plan of Care/Patient Progress Review  Outcome: No Change  VSS, CMS intact. Up with 2 and walker. Good pain control with oxycodone. Dressing changed, incision looked good. Rosales d/c'd 7:30 am, DTV. Alert and orient x 3 with intermittent confusion. Will continue to monitor.

## 2018-10-06 NOTE — PLAN OF CARE
Problem: Patient Care Overview  Goal: Plan of Care/Patient Progress Review  Outcome: Improving  Patient Alert forgetful at times,  VSS on 1L NC. Denies pain. Dressing CDI.  CMS intact.  Hip precautions maintained. Collins  patent  Patient refuses collins to be pulled until am when he gets out of bed. Oral  fluids encouraged, pt slept between cares .  Hgb recheck in am. plan to go home pending nursing will continue to monitor.

## 2018-10-06 NOTE — PROGRESS NOTES
10/06/18 0800   Quick Adds   Type of Visit Initial PT Evaluation   Living Environment   Lives With alone   Living Arrangements assisted living   Home Accessibility no concerns;bed and bath on same level   Number of Stairs to Enter Home 0   Number of Stairs Within Home 0   Stair Railings at Home none   Transportation Available family or friend will provide   Living Environment Comment Per pt he lives in an longterm with no stairs to manage.    Self-Care   Dominant Hand right   Usual Activity Tolerance good   Current Activity Tolerance poor   Regular Exercise no   Equipment Currently Used at Home walker, rolling;wheelchair, manual   Activity/Exercise/Self-Care Comment Pt was ind with all ADL's per pt   Functional Level Prior   Ambulation 1-->assistive equipment   Transferring 1-->assistive equipment   Toileting 2-->assistive person   Bathing 2-->assistive person   Dressing 0-->independent   Eating 0-->independent   Communication 0-->understands/communicates without difficulty   Swallowing 0-->swallows foods/liquids without difficulty   Cognition 1 - attention or memory deficits   Fall history within last six months yes   Number of times patient has fallen within last six months 2   Which of the above functional risks had a recent onset or change? ambulation;transferring   Prior Functional Level Comment Pt was ind with ambualtion using RW mostly. Per pt he depends on WC when feeling weak.   General Information   Onset of Illness/Injury or Date of Surgery - Date 10/04/18   Referring Physician Parag Gunn PA-C   Patient/Family Goals Statement Be independent and walk   Pertinent History of Current Problem (include personal factors and/or comorbidities that impact the POC) Pt is a 89 yr old male admitted with fall and fx of L femoral neck. Pt is s/p L Lorne hip arthroplasty, Posterior approach with hip precuations on 10/04/2018, POD 2. Pt had another fall in Aug 2018 resulting in R lorne hip arthroplasty. 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    Cognitive Status Examination   Orientation orientation to person, place and time   Level of Consciousness alert   Follows Commands and Answers Questions 75% of the time   Personal Safety and Judgment intact   Memory impaired   Pain Assessment   Patient Currently in Pain (Pt c/o 8/10 pain with wgt bearing and bed mobility)   Range of Motion (ROM)   ROM Comment B hips: NT, otherwise: WFL   Strength   Strength Comments B hipS: NT, otherwise: 3/5   Bed Mobility   Bed Mobility Comments Supine>sit: mod assist x1, Sit>supine: max assist x 2   Transfer Skills   Transfer Comments STS with mod assist x 1    Gait   Gait Comments Unable to initiate due to pain and weakness.    Balance   Balance Comments sitting: good, standing: poor   Sensory Examination   Sensory Perception no deficits were identified   Coordination   Coordination no deficits were identified   Muscle Tone   Muscle Tone other (describe)  (Rigidity noted)   General Therapy Interventions   Planned Therapy Interventions balance training;bed mobility training;gait training;strengthening;transfer training;risk factor education;home program guidelines;progressive activity/exercise   Clinical Impression   Criteria for Skilled Therapeutic Intervention yes, treatment indicated   PT Diagnosis  "Impaired gait   Influenced by the following impairments decreased strength, decreased activity tolerance   Functional limitations due to impairments Level of assistanc needed with bed mobility and transfers.    Clinical Presentation Stable/Uncomplicated   Clinical Presentation Rationale Pt admitted with fall and fx of L femoral neck, is s/p L gracia hip arthroplasty, POD 2 with posterior hip precuations.    Clinical Decision Making (Complexity) Low complexity   Therapy Frequency` daily   Predicted Duration of Therapy Intervention (days/wks) 3   Anticipated Discharge Disposition Transitional Care Facility   Risk & Benefits of therapy have been explained Yes   Patient, Family & other staff in agreement with plan of care Yes   Clinical Impression Comments Pt presents with decreased strength, activity tolerance and pain limiting fucntional mobility and independence. Pt will benefit from continued skilled PT interventions to achieve PLOF and independence.    [facility] AM-PAC  \"6 Clicks\" V.2 Basic Mobility Inpatient Short Form   1. Turning from your back to your side while in a flat bed without using bedrails? 2 - A Lot   2. Moving from lying on your back to sitting on the side of a flat bed without using bedrails? 2 - A Lot   3. Moving to and from a bed to a chair (including a wheelchair)? 2 - A Lot   4. Standing up from a chair using your arms (e.g., wheelchair, or bedside chair)? 2 - A Lot   5. To walk in hospital room? 2 - A Lot   6. Climbing 3-5 steps with a railing? 2 - A Lot   Basic Mobility Raw Score (Score out of 24.Lower scores equate to lower levels of function) 12   Total Evaluation Time   Total Evaluation Time (Minutes) 15     "

## 2018-10-06 NOTE — PROGRESS NOTES
10/06/18 0900   Quick Adds   Type of Visit Initial Occupational Therapy Evaluation   Living Environment   Lives With spouse   Living Arrangements assisted living   Home Accessibility no concerns   Self-Care   Usual Activity Tolerance good   Current Activity Tolerance poor   Equipment Currently Used at Home walker, rolling;wheelchair, manual   General Information   Onset of Illness/Injury or Date of Surgery - Date 10/04/18   Referring Physician Parag Gunn PA-C   Patient/Family Goals Statement Pt would like to feel better and return to his wife   Additional Occupational Profile Info/Pertinent History of Current Problem Pt reports moving frequently in the recent past to accomodate his and his wife's changing needs. Pt is regretful about his fall and overwhelmed with all the changes   Precautions/Limitations fall precautions;bilateral hip precautions   General Observations Pt with recent R hemiarthroplasty of hip as well, B hip precautions   Cognitive Status Examination   Orientation person;place   Cognitive Comment Confusion noted throughout session, confused about timeline   Pain Assessment   Patient Currently in Pain Yes, see Vital Sign flowsheet  (Pt unable to rate pain)   Range of Motion (ROM)   ROM Comment BUE is WFL, has arthritis in hands but no c/o other BUE pain   Strength   Strength Comments BUE is WFL, pt has good baseline BUE strength   Transfer Skill: Sit to Stand   Level of [place]: Sit/Stand unable to perform   Transfer Skill: Toilet Transfer   Level of [place]: Toilet unable to perform   Tub/Shower Transfer   Tub/Shower Transfer Comments Pt will have A with bathing   Balance   Balance Comments Poor sitting balance noted   Upper Body Dressing   Level of [place]: Dress Upper Body maximum assist (25% patients effort)   Lower Body Dressing   Level of [place]: Dress Lower Body dependent (less than 25% patients effort)   Grooming   Level of [place]: Grooming minimum  "assist (75% patients effort)   Instrumental Activities of Daily Living (IADL)   IADL Comments assisted and family perform IADLs for pt   Activities of Daily Living Analysis   Impairments Contributing to Impaired Activities of Daily Living pain;post surgical precautions;balance impaired;cognition impaired   General Therapy Interventions   Planned Therapy Interventions ADL retraining   Clinical Impression   Criteria for Skilled Therapeutic Interventions Met yes, treatment indicated   OT Diagnosis Decreased safety and I with ADL tasks   Influenced by the following impairments Pt is limited by new hip precautions, poor balance, deconditioning, pain, and some confusion   Assessment of Occupational Performance 1-3 Performance Deficits   Identified Performance Deficits Pt is functioning below baseline in toileting, dressing, grooming tasks   Clinical Decision Making (Complexity) Low complexity   Therapy Frequency daily   Predicted Duration of Therapy Intervention (days/wks) 3 days   Anticipated Discharge Disposition Transitional Care Facility   Risks and Benefits of Treatment have been explained. Yes   Patient, Family & other staff in agreement with plan of care Yes   Clinical Impression Comments Skilled OT intervention is warranted to address presenting deficits   [facility] AM-PAC  \"6 Clicks\" Daily Activity Inpatient Short Form   1. Putting on and taking off regular lower body clothing? 1 - Total   2. Bathing (including washing, rinsing, drying)? 1 - Total   3. Toileting, which includes using toilet, bedpan or urinal? 2 - A Lot   4. Putting on and taking off regular upper body clothing? 2 - A Lot   5. Taking care of personal grooming such as brushing teeth? 3 - A Little   6. Eating meals? 3 - A Little   Daily Activity Raw Score (Score out of 24.Lower scores equate to lower levels of function) 12   Total Evaluation Time   Total Evaluation Time (Minutes) 10     "

## 2018-10-06 NOTE — PLAN OF CARE
Problem: Patient Care Overview  Goal: Plan of Care/Patient Progress Review  Discharge Planner PT   Patient plan for discharge: TCU  Current status: PT orders received, PT eval completed, treatment initiated. Pt lives in an shelter, ambulatory using RW, used WC occasionally when feeling weak. Pt requied min assistance with ADL's. Currently pt is at mod assist x 1 with supine>sit, max assist x 2 with sit>supine and scooting, mod assist x 1 with STS, noted to get rigid and heavily leaning backwards. Pt sat at EOb x 12 min with SBA. Pt is educated on posterior hip precautions and verbalized understanding.   Barriers to return to prior living situation: Fall risk, Level of assistance needed.  Recommendations for discharge: TCU  Rationale for recommendations: Pt will benefit from continued skilled PT at a TCu to achieve PLOF and independence.        Entered by: Gurinder Arzola 10/06/2018 8:56 AM

## 2018-10-06 NOTE — PROGRESS NOTES
[facility]    Hospitalist Progress Note      Assessment & Plan   Mr. 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  He is being admitted to the hospital under inpatient status.     Mechanical fall with angulated left femoral neck fracture: s/p left DELL on 10/4  The patient has presented with left hip pain following a mechanical fall as he was trying to  something from the floor at the transitional care facility. No evidence of syncope.  Notably, he had a recent fall on 08/25/2018 which resulted in a right femoral neck fracture requiring hemiarthroplasty with Dr. Bill Sanders.    -post op pain control and management per ortho     Hypertension:  The blood pressure is currently controlled.    -continue on prior to admission Toprol XL 50 mg p.o. daily as well as diltiazem 120 mg p.o. daily.    -IV labetalol will be available p.r.n.    Dyslipidemia:  He will continue on prior to admission Lipitor.     Benign prostatic hypertrophy:  He will continue with prior to admission Flomax.     Chronic kidney disease, stage III-IV:  The most recent creatinine levels have been around 1.8-1.9 and is at baseline.   -monitor BMP      Chronic anemia with acute blood loss anemia:  Baseline hemoglobin is around 9 and dropped to 6.7.  Received 1 unit of PRBC on 10/5   -monitor hgb    Coronary artery disease:  The patient is status post coronary artery bypass graft x 4 in 1996.    -continue on prior to admission metoprolol and Lipitor.   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    -continue on prior to admission Toprol XL 50 mg p.o. daily.     Diabetes mellitus type 2:  The most recent hemoglobin A1c was 8.4% on 08/25/2018.  At home he is on Lantus 7 units subcu every morning and Humalog 2 units subq t.i.d.  Blood sugars rising into the 200s.   -increase to PTA lantus 7 units in the morning  -add back novolog 2 units TID WM if he eats  -sliding scale    Delirium, mild:  -minimize narcotic use  -reorientation as needed      DVT Prophylaxis: Pneumatic Compression Devices  Code Status: DNR/DNI    Disposition: Expected discharge Sunday to TCU if hgb stable and no further delirium.    Demarcus Reis    Interval History   Had some confusion this morning where he wasn't sure where he was or why.  Thought he should be at the TCU.  Eventually this cleared.  Not confused currently.  Doesn't have a great appetite right now.  Pain controlled and taking minimal oxy.  No BM yet but passing flatus.     -Data reviewed today: I reviewed all new labs and imaging results over the last 24 hours. I personally reviewed no images or EKG's today.    Physical Exam   Temp: 98.8  F (37.1  C) Temp src: Oral BP: 112/59 Pulse: 90 Heart Rate: 95 Resp: 16 SpO2: 94 % O2 Device: None (Room air) Oxygen Delivery: 1 LPM  Vitals:    10/04/18 0153 10/05/18 0630 10/06/18 0615   Weight: 72.6 kg (160 lb) 73.4 kg (161 lb 12.8 oz) 73.5 kg (162 lb)     Vital Signs with Ranges  Temp:  [97.7  F (36.5  C)-99.2  F (37.3  C)] 98.8  F (37.1  C)  Pulse:  [78-90] 90  Heart Rate:  [79-95] 95  Resp:  [16-20] 16  BP: ()/(40-83) 112/59  SpO2:  [94 %-100 %] 94 %  I/O last 3 completed shifts:  In: 400 [P.O.:400]  Out: 1510 [Urine:1510]    Constitutional: awake, alert, cooperative    Respiratory: Clear to auscultation bilaterally, no crackles or wheezing  noted.  Good air exchange.     Cardiovascular: Regular rate and rhythm.  No murmur, rub or gallop noted.     GI: Positive bowel sounds, soft, non-distended, non-tender.  No masses or organomegaly noted.     Musculoskeletal: moving all extremities    Neurologic: Awake, alert and oriented to name, place and time.  Cranial nerves II-XII are grossly intact.      Lymphatic: No edema noted    Skin: no rash    Medications     sodium chloride Stopped (10/05/18 0456)       acetaminophen  975 mg Oral Q8H     aspirin  325 mg Oral Daily     atorvastatin  10 mg Oral QPM     diltiazem  120 mg Oral Daily     gabapentin  100 mg Oral Daily     insulin aspart  1-7 Units Subcutaneous TID AC     insulin aspart  1-5 Units Subcutaneous At Bedtime     insulin glargine  3 Units Subcutaneous QAM AC     metoprolol succinate  50 mg Oral Daily     senna-docusate  1 tablet Oral BID    Or     senna-docusate  2 tablet Oral BID     sodium chloride (PF)  3 mL Intracatheter Q8H     tamsulosin  0.4 mg Oral Daily       Data     Recent Labs  Lab 10/06/18  0643 10/05/18  1718 10/05/18  0645 10/03/18  0625   WBC 10.2  --  8.5  --    HGB 8.3* 6.7* 7.0*  --    MCV 90  --  91  --    *  --  133*  --      --  140 139   POTASSIUM 4.5  --  4.7 4.1   CHLORIDE 107  --  108 106   CO2 24  --  25 23   BUN 29  --  29 41*   CR 1.93*  --  1.97* 1.99*   ANIONGAP 7  --  7 10   JOSELINE 7.4*  --  7.3* 7.8*   *  --  134* 90       No results found for this or any previous visit (from the past 24 hour(s)).

## 2018-10-07 ENCOUNTER — APPOINTMENT (OUTPATIENT)
Dept: PHYSICAL THERAPY | Facility: CLINIC | Age: 83
DRG: 470 | End: 2018-10-07
Payer: MEDICARE

## 2018-10-07 LAB
GLUCOSE BLDC GLUCOMTR-MCNC: 179 MG/DL (ref 70–99)
GLUCOSE BLDC GLUCOMTR-MCNC: 185 MG/DL (ref 70–99)
GLUCOSE BLDC GLUCOMTR-MCNC: 191 MG/DL (ref 70–99)
GLUCOSE BLDC GLUCOMTR-MCNC: 241 MG/DL (ref 70–99)
GLUCOSE BLDC GLUCOMTR-MCNC: 251 MG/DL (ref 70–99)
HGB BLD-MCNC: 8.1 G/DL (ref 13.3–17.7)

## 2018-10-07 PROCEDURE — 36415 COLL VENOUS BLD VENIPUNCTURE: CPT | Performed by: HOSPITALIST

## 2018-10-07 PROCEDURE — 25000132 ZZH RX MED GY IP 250 OP 250 PS 637: Mod: GY | Performed by: INTERNAL MEDICINE

## 2018-10-07 PROCEDURE — 99232 SBSQ HOSP IP/OBS MODERATE 35: CPT | Performed by: HOSPITALIST

## 2018-10-07 PROCEDURE — A9270 NON-COVERED ITEM OR SERVICE: HCPCS | Mod: GY | Performed by: INTERNAL MEDICINE

## 2018-10-07 PROCEDURE — 25000132 ZZH RX MED GY IP 250 OP 250 PS 637: Mod: GY | Performed by: PHYSICIAN ASSISTANT

## 2018-10-07 PROCEDURE — 36415 COLL VENOUS BLD VENIPUNCTURE: CPT | Performed by: PHYSICIAN ASSISTANT

## 2018-10-07 PROCEDURE — 85018 HEMOGLOBIN: CPT | Performed by: HOSPITALIST

## 2018-10-07 PROCEDURE — 97530 THERAPEUTIC ACTIVITIES: CPT | Mod: GP

## 2018-10-07 PROCEDURE — A9270 NON-COVERED ITEM OR SERVICE: HCPCS | Mod: GY | Performed by: PHYSICIAN ASSISTANT

## 2018-10-07 PROCEDURE — 25000131 ZZH RX MED GY IP 250 OP 636 PS 637: Mod: GY | Performed by: INTERNAL MEDICINE

## 2018-10-07 PROCEDURE — 00000146 ZZHCL STATISTIC GLUCOSE BY METER IP

## 2018-10-07 PROCEDURE — 82306 VITAMIN D 25 HYDROXY: CPT | Performed by: PHYSICIAN ASSISTANT

## 2018-10-07 PROCEDURE — 40000193 ZZH STATISTIC PT WARD VISIT

## 2018-10-07 PROCEDURE — 12000007 ZZH R&B INTERMEDIATE

## 2018-10-07 RX ADMIN — METOPROLOL SUCCINATE 50 MG: 50 TABLET, EXTENDED RELEASE ORAL at 08:51

## 2018-10-07 RX ADMIN — OXYCODONE HYDROCHLORIDE 5 MG: 5 TABLET ORAL at 08:51

## 2018-10-07 RX ADMIN — OXYCODONE HYDROCHLORIDE 5 MG: 5 TABLET ORAL at 13:16

## 2018-10-07 RX ADMIN — INSULIN GLARGINE 7 UNITS: 100 INJECTION, SOLUTION SUBCUTANEOUS at 08:51

## 2018-10-07 RX ADMIN — DILTIAZEM HYDROCHLORIDE 120 MG: 120 CAPSULE, EXTENDED RELEASE ORAL at 08:51

## 2018-10-07 RX ADMIN — ATORVASTATIN CALCIUM 10 MG: 10 TABLET, FILM COATED ORAL at 21:48

## 2018-10-07 RX ADMIN — SENNOSIDES AND DOCUSATE SODIUM 2 TABLET: 8.6; 5 TABLET ORAL at 21:48

## 2018-10-07 RX ADMIN — SENNOSIDES AND DOCUSATE SODIUM 2 TABLET: 8.6; 5 TABLET ORAL at 08:51

## 2018-10-07 RX ADMIN — ASPIRIN 325 MG: 325 TABLET, DELAYED RELEASE ORAL at 08:51

## 2018-10-07 RX ADMIN — TAMSULOSIN HYDROCHLORIDE 0.4 MG: 0.4 CAPSULE ORAL at 08:51

## 2018-10-07 ASSESSMENT — ACTIVITIES OF DAILY LIVING (ADL)
ADLS_ACUITY_SCORE: 18
ADLS_ACUITY_SCORE: 17
ADLS_ACUITY_SCORE: 18
ADLS_ACUITY_SCORE: 17

## 2018-10-07 NOTE — PLAN OF CARE
Problem: Patient Care Overview  Goal: Plan of Care/Patient Progress Review  Outcome: No Change  A&Ox3, forgetful and intermittently disoriented to situation/time of day. VSS on RA. Oxycodone for pain. CMS intact. Dressing CDI. Up with 2 and walker. Voiding small amounts in urinal, pt needs to be encouraged to try and void. Continue to monitor.

## 2018-10-07 NOTE — PLAN OF CARE
Problem: Patient Care Overview  Goal: Plan of Care/Patient Progress Review  Outcome: Improving  Patient turns with assist of 2.  Pain managed with Oxycodone.  VSS on 2.5L NC.  A/Ox4; forgetful at times.  Dressing CDI.  CMS intact.  Blood sugars 242 and 224, given sliding scale insulin.  Good PO intake.  Voiding with some retention.  Plan to discharge to TCU.

## 2018-10-07 NOTE — PLAN OF CARE
Problem: Patient Care Overview  Goal: Plan of Care/Patient Progress Review  Outcome: Improving  Disoriented to time, forgetful. Confused and agitated this morning but cleared after reorientation. Pain decreased with oxycodone per pt's request. Refused scheduled tylenol. CMS intact ex trace BLE edema. Dressing changed. Up with Ax2-3 and walker, gait belt. Voiding adequately per urinal and bedpan. +BS. Good appetite reg diet. Hgb 8.1. [facility].

## 2018-10-07 NOTE — PROGRESS NOTES
D: MAULIK following for discharge planning. MAULIK reviewed chart. Pt discharged from [facility] to [facility] 9/3/18. Pt readmitted from [facility]. Anticipate return to TCU when ready.   P: MAULIK updated [facility]. Per June, they are not able to accept pt today as they have already accepted they max number of admits. They will have a bed for him on Monday.     ESTEFANI Thomas, SW  [id number]

## 2018-10-07 NOTE — PROGRESS NOTES
"Abimael Flores  10/7/2018  Patient status post left hip hemiarthroplasty  Admit Date:  10/4/2018      Doing well.  Objective: no pain in the left hip. States he wants to eat breakfast. No new complaints this morning.   Blood pressure 127/67, pulse 80, temperature 97.9  F (36.6  C), temperature source Oral, resp. rate 20, height 1.753 m (5' 9\"), weight 72.5 kg (159 lb 12.8 oz), SpO2 92 %.    Temperatures:  Current - Temp: 97.9  F (36.6  C); Max - Temp  Av.2  F (36.8  C)  Min: 97.8  F (36.6  C)  Max: 98.8  F (37.1  C)  Pulse range: Pulse  Av.7  Min: 80  Max: 92  Blood pressure range: Systolic (24hrs), Av , Min:99 , Max:127   ; Diastolic (24hrs), Av, Min:37, Max:78    Exam:  CMS: intact  alert, stable, wound ok  Dressing c/d/i  Calf s/nt  DF/PF 5/5 in BLE    Labs:  Recent Labs   Lab Test  10/06/18   0643  10/05/18   0645  10/03/18   0625   POTASSIUM  4.5  4.7  4.1     Recent Labs   Lab Test  10/06/18   0643  10/05/18   1718  10/05/18   0645   HGB  8.3*  6.7*  7.0*     Recent Labs   Lab Test  14   0405   INR  0.91     Recent Labs   Lab Test  10/06/18   0643  10/05/18   0645  18   0625   PLT  126*  133*  388       PLAN: continue current therapy regimen. Aspirin for DVT ppx. Discharge once medically stable.     "

## 2018-10-07 NOTE — PROGRESS NOTES
[facility]  Hospitalist Progress Note        Earl Arabella Ferdinand, DO  10/07/2018        Interval History:      Patient states he is doing well.          Assessment and Plan:        Mr. 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  He is being admitted to the hospital under inpatient status.      Mechanical fall with angulated left femoral neck fracture: s/p left DELL on 10/4  The patient has presented with left hip pain following a mechanical fall as he was trying to  something from the floor at the transitional care facility. No evidence of syncope.  Notably, he had a recent fall on 08/25/2018 which resulted in a right femoral neck fracture requiring hemiarthroplasty with Dr. Bill Sanders.    - post op pain control and management per ortho      Hypertension:  The blood pressure is currently controlled.    - Prior to admission Toprol XL  - PTA Diltiazem   - IV labetalol p.r.n.     Dyslipidemia: Continue on prior to admission Lipitor.      Benign prostatic hypertrophy: Continue with prior to admission Flomax.      Chronic kidney disease, stage III-IV:  The most recent creatinine levels have been around 1.8-1.9 and is at baseline.   - Monitor      Chronic anemia with acute blood loss anemia:  Baseline hemoglobin is around 9 and dropped to 6.7.  Received 1 unit of PRBC on 10/5   - Monitor      Coronary artery disease:  The patient is status post coronary artery bypass graft x 4 in 1996.    - Continue on prior to admission metoprolol and Lipitor.   - ASA at discretion of surgery team.  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    - Continue on prior to admission Toprol XL      Diabetes mellitus type 2:  The most recent hemoglobin A1c was 8.4% on 2018.  At home he is on Lantus 7 units subcu every morning and Humalog 2 units subq t.i.d.  Blood sugars rising into the 200s.   - PTA lantus 7 units in the morning  - novolog 2 units TID WM   - sliding scale     Delirium, mild:  - minimize narcotic use  - reorientation as needed    DVT Prophylaxis: Defer to Surgery.     Code: DNR/DNI     Disposition: Expected discharge per Surgery discretion. Medically stable. Therapy suggesting TCU on discharge, SW for disposition.                    Physical Exam:      Heart Rate: 106    Blood pressure 127/67, pulse 80, temperature 97.9  F (36.6  C), temperature source Oral, resp. rate 20, height 1.753 m (5' 9\"), weight 72.5 kg (159 lb 12.8 oz), SpO2 92 %.    Vitals:    10/05/18 0630 10/06/18 0615 10/07/18 0610   Weight: 73.4 kg (161 lb 12.8 oz) 73.5 kg (162 lb) 72.5 kg (159 lb 12.8 oz)       Vital Sign Ranges  Temperature Temp  Av.2  F (36.8  C)  Min: 97.8  F (36.6  C)  Max: 98.8  F (37.1  C)   Blood pressure Systolic (24hrs), Av , Min:99 , Max:127        Diastolic (24hrs), Av, Min:37, Max:78      Pulse Pulse  Av.7  Min: 80  Max: 92   Respirations Resp  Av.5  Min: 16  Max: 20   Pulse oximetry SpO2  Av.3 %  Min: 80 %  Max: 95 %     Vital Signs with Ranges  Temp:  [97.8  F (36.6  C)-98.8  F (37.1  C)] 97.9  F (36.6  C)  Pulse:  [80-92] 80  Heart Rate:  [] 106  Resp:  [16-20] 20  BP: ()/(37-78) 127/67  SpO2:  [80 %-95 %] 92 %    I/O Last 3 Shifts:   I/O last 3 completed shifts:  In: 400 [P.O.:400]  Out: 600 [Urine:600]    I/O past 24 hours:     Intake/Output Summary (Last 24 hours) at " 10/07/18 0856  Last data filed at 10/07/18 0752   Gross per 24 hour   Intake              400 ml   Output              500 ml   Net             -100 ml     GENERAL: Alert and oriented. NAD. Conversational, appropriate.   HEENT: Normocephalic. EOMI. No icterus or injection. Nares normal.   LUNGS: Clear to auscultation. No dyspnea at rest.   HEART: Regular rate. Extremities perfused.   ABDOMEN: Soft, nontender, and nondistended. Positive bowel sounds.   EXTREMITIES: No LE edema noted.   NEUROLOGIC: Moves extremities x4 on command. No acute focal neurologic abnormalities noted.          Prior to Admission Medications:        Prescriptions Prior to Admission   Medication Sig Dispense Refill Last Dose     aspirin 325 MG EC tablet Take one Aspirin tab twice daily for 5 weeks. 70 tablet 0 10/3/2018 at Unknown time     atorvastatin (LIPITOR) 10 MG tablet Take 1 tablet (10 mg) by mouth every evening   10/3/2018 at Unknown time     diltiazem (TIAZAC) 120 MG 24 hr ER beaded capsule Take 120 mg by mouth daily   10/3/2018 at Unknown time     IBUPROFEN PO Take 200 mg by mouth every 6 hours as needed for moderate pain   prn     insulin glargine (LANTUS) 100 UNIT/ML injection Inject 7 Units Subcutaneous every morning   10/3/2018 at Unknown time     insulin lispro (HUMALOG KWIKPEN) 100 UNIT/ML injection Inject Subcutaneous At Bedtime Sliding scale:  -249=1 unit  -299=2 units  -349=3 units  -399=4 units  +=5 units   Past Week at Unknown time     insulin lispro (HUMALOG KWIKPEN) 100 UNIT/ML injection Inject 2 Units Subcutaneous 3 times daily (before meals)   10/3/2018 at Unknown time     insulin lispro (HUMALOG) 100 UNIT/ML injection Inject Subcutaneous 3 times daily (before meals) Sliding scale:   -189=1 unit  -239=2 units  -289=3 units  -339=4 units  -399=5 units  -499=6 units  +=7 units   10/3/2018 at Unknown time     melatonin 5 MG tablet Take 5 mg by mouth At  Bedtime   10/3/2018 at Unknown time     melatonin 5 MG tablet Take 5 mg by mouth nightly as needed for sleep (MR x 1 PRN if scheduled dose is insufficient)   Past Week at Unknown time     metoprolol succinate (TOPROL-XL) 50 MG 24 hr tablet Take 1 tablet (50 mg) by mouth daily 30 tablet  10/3/2018 at Unknown time     oxyCODONE IR (ROXICODONE) 5 MG tablet Take 1 tablet (5 mg) by mouth every 4 hours as needed for pain 60 tablet 0 10/4/2018 at 0020     polyethylene glycol (MIRALAX/GLYCOLAX) Packet Take 17 g by mouth daily   10/3/2018 at Unknown time     tamsulosin (FLOMAX) 0.4 MG capsule Take 0.4 mg by mouth every evening    10/3/2018 at Unknown time     Zolpidem Tartrate (AMBIEN PO) Take 5 mg by mouth daily as needed for sleep   Past Week at Unknown time     aspirin 81 MG tablet Take 81 mg by mouth daily To start on 10/9/18 after 325mg BID dosing is done.        Skin Protectants, Misc. (ANIBAL PROTECT EX) Apply topically 2 times daily   Taking            Medications:        Current Facility-Administered Medications   Medication Last Rate     sodium chloride Stopped (10/05/18 0456)     Current Facility-Administered Medications   Medication Dose Route Frequency     acetaminophen  975 mg Oral Q8H     aspirin  325 mg Oral Daily     atorvastatin  10 mg Oral QPM     diltiazem  120 mg Oral Daily     insulin aspart  2 Units Subcutaneous TID w/meals     insulin aspart  1-7 Units Subcutaneous TID AC     insulin aspart  1-5 Units Subcutaneous At Bedtime     insulin glargine  7 Units Subcutaneous QAM AC     metoprolol succinate  50 mg Oral Daily     senna-docusate  1 tablet Oral BID    Or     senna-docusate  2 tablet Oral BID     sodium chloride (PF)  3 mL Intracatheter Q8H     tamsulosin  0.4 mg Oral Daily     Current Facility-Administered Medications   Medication Dose Route Frequency     acetaminophen  650 mg Oral Q4H PRN     sore throat lozenge  1-2 lozenge Buccal Q1H PRN     bisacodyl  5 mg Oral Daily PRN    Or     bisacodyl  10  mg Oral Daily PRN    Or     bisacodyl  15 mg Oral Daily PRN     bisacodyl  10 mg Rectal Daily PRN     glucose  15-30 g Oral Q15 Min PRN    Or     dextrose  25-50 mL Intravenous Q15 Min PRN    Or     glucagon  1 mg Subcutaneous Q15 Min PRN     HYDROmorphone  0.2 mg Intravenous Q2H PRN     hydrOXYzine  10 mg Oral Q6H PRN     labetalol  10 mg Intravenous Q2H PRN     lidocaine 4%   Topical Q1H PRN     lidocaine (buffered or not buffered)  1 mL Other Q1H PRN     melatonin  1 mg Oral At Bedtime PRN     naloxone  0.1-0.4 mg Intravenous Q2 Min PRN     ondansetron  4 mg Oral Q6H PRN    Or     ondansetron  4 mg Intravenous Q6H PRN     oxyCODONE IR  5 mg Oral Q3H PRN     polyethylene glycol  17 g Oral Daily PRN     prochlorperazine  5 mg Intravenous Q6H PRN    Or     prochlorperazine  5 mg Oral Q6H PRN     prochlorperazine  12.5 mg Rectal Q12H PRN     sodium chloride (PF)  3 mL Intracatheter Q1H PRN     zolpidem (AMBIEN) tablet 5 mg  5 mg Oral Daily PRN            Data:      Lab data reviewed.     Recent Labs  Lab 10/06/18  0643  10/05/18  0645 10/03/18  0625   HGB 8.3*  < > 7.0*  --    MCV 90  --  91  --    *  --  133*  --      --  140 139   POTASSIUM 4.5  --  4.7 4.1   CHLORIDE 107  --  108 106   CO2 24  --  25 23   BUN 29  --  29 41*   CR 1.93*  --  1.97* 1.99*   ANIONGAP 7  --  7 10   JOSELINE 7.4*  --  7.3* 7.8*   *  --  134* 90   < > = values in this interval not displayed.        Imaging:      Imaging data reviewed.     Dr. Earl Streeter D.O.  [facility]  Pager [phone]

## 2018-10-07 NOTE — PLAN OF CARE
Problem: Patient Care Overview  Goal: Plan of Care/Patient Progress Review  Discharge Planner PT   Patient plan for discharge: TCU  Current status: Supine>sit: max assist x 1 and max cueing for sequencing and technique. Sat at EOB x 20 min with SBA for safety and B UE supported and unsupported in bed. STS x 2 with mod assist and max verbal cues for hand placement and safety. Standing tolerance: first trail: 2 min, second trail: 50 sec. Pt found to be leaning heavily backwards requiring tactile cues and assistance to maintain standing balance. Pt required constant verbal cues to maintain increased wgt through the toes rather than the heels. Attempted stepping, however pt unable at this time. Pt performed few LE there ex sitting at EOB. Pt was returned to supine with max assist x 2. Reviewed posterior hip precautions with pt.   Barriers to return to prior living situation: Fall risk, level of assistance required.   Recommendations for discharge: TCU  Rationale for recommendations: Pt will need continued skilled PT interventions at a TCU to achieve PLOF and independence.        Entered by: Gurinder Arzola 10/07/2018 10:58 AM

## 2018-10-08 ENCOUNTER — APPOINTMENT (OUTPATIENT)
Dept: PHYSICAL THERAPY | Facility: CLINIC | Age: 83
DRG: 470 | End: 2018-10-08
Payer: MEDICARE

## 2018-10-08 ENCOUNTER — APPOINTMENT (OUTPATIENT)
Dept: OCCUPATIONAL THERAPY | Facility: CLINIC | Age: 83
DRG: 470 | End: 2018-10-08
Payer: MEDICARE

## 2018-10-08 LAB
ANION GAP SERPL CALCULATED.3IONS-SCNC: 6 MMOL/L (ref 3–14)
BUN SERPL-MCNC: 37 MG/DL (ref 7–30)
CALCIUM SERPL-MCNC: 7.5 MG/DL (ref 8.5–10.1)
CHLORIDE SERPL-SCNC: 107 MMOL/L (ref 94–109)
CO2 SERPL-SCNC: 25 MMOL/L (ref 20–32)
CREAT SERPL-MCNC: 2.11 MG/DL (ref 0.66–1.25)
DEPRECATED CALCIDIOL+CALCIFEROL SERPL-MC: 9 UG/L (ref 20–75)
ERYTHROCYTE [DISTWIDTH] IN BLOOD BY AUTOMATED COUNT: 14.8 % (ref 10–15)
GFR SERPL CREATININE-BSD FRML MDRD: 29 ML/MIN/1.7M2
GLUCOSE BLDC GLUCOMTR-MCNC: 143 MG/DL (ref 70–99)
GLUCOSE BLDC GLUCOMTR-MCNC: 145 MG/DL (ref 70–99)
GLUCOSE BLDC GLUCOMTR-MCNC: 163 MG/DL (ref 70–99)
GLUCOSE BLDC GLUCOMTR-MCNC: 177 MG/DL (ref 70–99)
GLUCOSE BLDC GLUCOMTR-MCNC: 201 MG/DL (ref 70–99)
GLUCOSE BLDC GLUCOMTR-MCNC: 246 MG/DL (ref 70–99)
GLUCOSE SERPL-MCNC: 188 MG/DL (ref 70–99)
HCT VFR BLD AUTO: 23.4 % (ref 40–53)
HGB BLD-MCNC: 7.8 G/DL (ref 13.3–17.7)
MAGNESIUM SERPL-MCNC: 2.1 MG/DL (ref 1.6–2.3)
MCH RBC QN AUTO: 29.8 PG (ref 26.5–33)
MCHC RBC AUTO-ENTMCNC: 33.3 G/DL (ref 31.5–36.5)
MCV RBC AUTO: 89 FL (ref 78–100)
PLATELET # BLD AUTO: 148 10E9/L (ref 150–450)
POTASSIUM SERPL-SCNC: 4.2 MMOL/L (ref 3.4–5.3)
RBC # BLD AUTO: 2.62 10E12/L (ref 4.4–5.9)
SODIUM SERPL-SCNC: 138 MMOL/L (ref 133–144)
T4 FREE SERPL-MCNC: 1.28 NG/DL (ref 0.76–1.46)
TSH SERPL DL<=0.005 MIU/L-ACNC: 4.57 MU/L (ref 0.4–4)
WBC # BLD AUTO: 9.2 10E9/L (ref 4–11)

## 2018-10-08 PROCEDURE — 97530 THERAPEUTIC ACTIVITIES: CPT | Mod: GP

## 2018-10-08 PROCEDURE — 40000193 ZZH STATISTIC PT WARD VISIT

## 2018-10-08 PROCEDURE — 25000132 ZZH RX MED GY IP 250 OP 250 PS 637: Mod: GY | Performed by: INTERNAL MEDICINE

## 2018-10-08 PROCEDURE — 83735 ASSAY OF MAGNESIUM: CPT | Performed by: HOSPITALIST

## 2018-10-08 PROCEDURE — 97110 THERAPEUTIC EXERCISES: CPT | Mod: GP

## 2018-10-08 PROCEDURE — 99232 SBSQ HOSP IP/OBS MODERATE 35: CPT | Performed by: HOSPITALIST

## 2018-10-08 PROCEDURE — 84443 ASSAY THYROID STIM HORMONE: CPT | Performed by: HOSPITALIST

## 2018-10-08 PROCEDURE — 00000146 ZZHCL STATISTIC GLUCOSE BY METER IP

## 2018-10-08 PROCEDURE — A9270 NON-COVERED ITEM OR SERVICE: HCPCS | Mod: GY | Performed by: PHYSICIAN ASSISTANT

## 2018-10-08 PROCEDURE — 83735 ASSAY OF MAGNESIUM: CPT | Performed by: INTERNAL MEDICINE

## 2018-10-08 PROCEDURE — 36415 COLL VENOUS BLD VENIPUNCTURE: CPT | Performed by: HOSPITALIST

## 2018-10-08 PROCEDURE — 25000132 ZZH RX MED GY IP 250 OP 250 PS 637: Mod: GY | Performed by: PHYSICIAN ASSISTANT

## 2018-10-08 PROCEDURE — 25000125 ZZHC RX 250: Performed by: INTERNAL MEDICINE

## 2018-10-08 PROCEDURE — 93010 ELECTROCARDIOGRAM REPORT: CPT | Performed by: INTERNAL MEDICINE

## 2018-10-08 PROCEDURE — 85027 COMPLETE CBC AUTOMATED: CPT | Performed by: HOSPITALIST

## 2018-10-08 PROCEDURE — 93005 ELECTROCARDIOGRAM TRACING: CPT

## 2018-10-08 PROCEDURE — 40000133 ZZH STATISTIC OT WARD VISIT: Performed by: OCCUPATIONAL THERAPIST

## 2018-10-08 PROCEDURE — 12000007 ZZH R&B INTERMEDIATE

## 2018-10-08 PROCEDURE — A9270 NON-COVERED ITEM OR SERVICE: HCPCS | Mod: GY | Performed by: HOSPITALIST

## 2018-10-08 PROCEDURE — 97535 SELF CARE MNGMENT TRAINING: CPT | Mod: GO | Performed by: OCCUPATIONAL THERAPIST

## 2018-10-08 PROCEDURE — 25000131 ZZH RX MED GY IP 250 OP 636 PS 637: Mod: GY | Performed by: INTERNAL MEDICINE

## 2018-10-08 PROCEDURE — A9270 NON-COVERED ITEM OR SERVICE: HCPCS | Mod: GY | Performed by: INTERNAL MEDICINE

## 2018-10-08 PROCEDURE — 25000132 ZZH RX MED GY IP 250 OP 250 PS 637: Mod: GY | Performed by: HOSPITALIST

## 2018-10-08 PROCEDURE — 84439 ASSAY OF FREE THYROXINE: CPT | Performed by: HOSPITALIST

## 2018-10-08 PROCEDURE — 80048 BASIC METABOLIC PNL TOTAL CA: CPT | Performed by: HOSPITALIST

## 2018-10-08 PROCEDURE — 25000128 H RX IP 250 OP 636: Performed by: HOSPITALIST

## 2018-10-08 RX ORDER — METOPROLOL TARTRATE 1 MG/ML
2.5 INJECTION, SOLUTION INTRAVENOUS EVERY 4 HOURS PRN
Status: DISCONTINUED | OUTPATIENT
Start: 2018-10-08 | End: 2018-10-08

## 2018-10-08 RX ORDER — METOPROLOL TARTRATE 1 MG/ML
2.5 INJECTION, SOLUTION INTRAVENOUS ONCE
Status: COMPLETED | OUTPATIENT
Start: 2018-10-08 | End: 2018-10-08

## 2018-10-08 RX ORDER — OXYCODONE HYDROCHLORIDE 5 MG/1
5 TABLET ORAL
Qty: 30 TABLET | Refills: 0 | Status: SHIPPED | OUTPATIENT
Start: 2018-10-08 | End: 2018-10-11

## 2018-10-08 RX ORDER — ERGOCALCIFEROL 1.25 MG/1
50000 CAPSULE, LIQUID FILLED ORAL
Qty: 5 CAPSULE | Refills: 0 | Status: SHIPPED | OUTPATIENT
Start: 2018-10-08 | End: 2018-10-31

## 2018-10-08 RX ORDER — ERGOCALCIFEROL 1.25 MG/1
50000 CAPSULE, LIQUID FILLED ORAL
Status: DISCONTINUED | OUTPATIENT
Start: 2018-10-08 | End: 2018-10-10 | Stop reason: HOSPADM

## 2018-10-08 RX ORDER — METOPROLOL TARTRATE 25 MG/1
25 TABLET, FILM COATED ORAL 2 TIMES DAILY PRN
Status: DISCONTINUED | OUTPATIENT
Start: 2018-10-08 | End: 2018-10-10 | Stop reason: HOSPADM

## 2018-10-08 RX ORDER — ACETAMINOPHEN 325 MG/1
650 TABLET ORAL EVERY 4 HOURS PRN
Qty: 100 TABLET | Refills: 0 | Status: ON HOLD | OUTPATIENT
Start: 2018-10-08 | End: 2018-10-16

## 2018-10-08 RX ADMIN — METOPROLOL SUCCINATE 50 MG: 50 TABLET, EXTENDED RELEASE ORAL at 09:20

## 2018-10-08 RX ADMIN — DILTIAZEM HYDROCHLORIDE 120 MG: 120 CAPSULE, EXTENDED RELEASE ORAL at 09:20

## 2018-10-08 RX ADMIN — INSULIN GLARGINE 7 UNITS: 100 INJECTION, SOLUTION SUBCUTANEOUS at 06:04

## 2018-10-08 RX ADMIN — SODIUM CHLORIDE 250 ML: 9 INJECTION, SOLUTION INTRAVENOUS at 16:47

## 2018-10-08 RX ADMIN — SENNOSIDES AND DOCUSATE SODIUM 2 TABLET: 8.6; 5 TABLET ORAL at 09:21

## 2018-10-08 RX ADMIN — METOPROLOL TARTRATE 2.5 MG: 5 INJECTION, SOLUTION INTRAVENOUS at 06:00

## 2018-10-08 RX ADMIN — ASPIRIN 325 MG: 325 TABLET, DELAYED RELEASE ORAL at 09:20

## 2018-10-08 RX ADMIN — METOPROLOL TARTRATE 2.5 MG: 5 INJECTION, SOLUTION INTRAVENOUS at 05:18

## 2018-10-08 RX ADMIN — TAMSULOSIN HYDROCHLORIDE 0.4 MG: 0.4 CAPSULE ORAL at 09:21

## 2018-10-08 RX ADMIN — ERGOCALCIFEROL 50000 UNITS: 1.25 CAPSULE, LIQUID FILLED ORAL at 15:24

## 2018-10-08 RX ADMIN — METOPROLOL TARTRATE 2.5 MG: 5 INJECTION, SOLUTION INTRAVENOUS at 10:17

## 2018-10-08 ASSESSMENT — ACTIVITIES OF DAILY LIVING (ADL)
ADLS_ACUITY_SCORE: 20
ADLS_ACUITY_SCORE: 18
ADLS_ACUITY_SCORE: 18
ADLS_ACUITY_SCORE: 20

## 2018-10-08 NOTE — PROGRESS NOTES
[facility]  Hospitalist Progress Note        Earl Arabella Streeter, DO  10/08/2018        Interval History:      Patient states that he is doing well. States pain is under control. Completed standing with therapy today.          Assessment and Plan:        Mr. 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        Mechanical fall with angulated left femoral neck fracture: s/p left DELL on 10/4  The patient has presented with left hip pain following a mechanical fall as he was trying to  something from the floor at the transitional care facility. No evidence of syncope.  Notably, he had a recent fall on 08/25/2018 which resulted in a right femoral neck fracture requiring hemiarthroplasty with Dr. Bill Sanders.    - post op pain control and management per ortho     Addendum: Notified by nursing staff of intermittent tachycardia on afternoon of 10/8.   - TSH  - EKG  - IVF bolus.   - PRN Metoprolol.       Vitamin D deficiency: Low vitamin d level.   - Vitamin D replacement.     Hypertension:  The blood pressure is currently controlled.    - Prior to admission Toprol XL  - PTA Diltiazem   - IV labetalol p.r.n.      Dyslipidemia: Continue on prior to admission Lipitor.       Benign prostatic hypertrophy: Continue with prior to admission Flomax.       Chronic kidney disease, stage III-IV:  The most recent creatinine levels have been around 1.8-1.9 and is at baseline.   - Monitor       Chronic anemia with acute blood loss anemia:  Baseline hemoglobin is around 9 and dropped to 6.7.  Received 1  "unit of PRBC on 10/5   - Monitor       Coronary artery disease:  The patient is status post coronary artery bypass graft x 4 in .    - Continue on prior to admission metoprolol and Lipitor.   - ASA at discretion of surgery team.       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    - Continue on prior to admission Toprol XL       Diabetes mellitus type 2:  The most recent hemoglobin A1c was 8.4% on 2018.  At home he is on Lantus 7 units subcu every morning and Humalog 2 units subq t.i.d.  Blood sugars rising into the 200s.   - PTA lantus 7 units in the morning  - novolog 2 units TID WM   - sliding scale      Delirium, mild:  - minimize narcotic use  - reorientation as needed     DVT Prophylaxis: Defer to Surgery.      Code: DNR/DNI      Disposition: Expected discharge at Surgery discretion. If tachycardia resolves can discharge 10/9. Therapy suggesting TCU on discharge,  for disposition.                    Physical Exam:      Heart Rate: 135    Blood pressure 91/56, pulse 98, temperature 98  F (36.7  C), temperature source Oral, resp. rate 18, height 1.753 m (5' 9\"), weight 72.4 kg (159 lb 9.8 oz), SpO2 96 %.    Vitals:    10/06/18 0615 10/07/18 0610 10/08/18 0533   Weight: 73.5 kg (162 lb) 72.5 kg (159 lb 12.8 oz) 72.4 kg (159 lb 9.8 oz)       Vital Sign Ranges  Temperature Temp  Av.1  F (36.7  C)  Min: 97.4  F (36.3  C)  Max: 98.6  F (37  C)   Blood pressure Systolic (24hrs), Av , Min:91 , Max:128        Diastolic (24hrs), Av, Min:56, Max:65      Pulse Pulse  Av.5  Min: 97  Max: 98   Respirations Resp  Av.8  Min: 16  Max: 20   Pulse oximetry SpO2  Av.1 %  Min: 88 %  Max: 98 %     Vital Signs with Ranges  Temp:  [97.4  F (36.3  C)-98.6  F (37  C)] 98  F (36.7  C)  Pulse:  [97-98] 98  Heart Rate:  " [] 135  Resp:  [16-20] 18  BP: ()/(56-65) 91/56  SpO2:  [88 %-98 %] 96 %    I/O Last 3 Shifts:   I/O last 3 completed shifts:  In: 350 [P.O.:350]  Out: 650 [Urine:650]    I/O past 24 hours:     Intake/Output Summary (Last 24 hours) at 10/08/18 0846  Last data filed at 10/08/18 0455   Gross per 24 hour   Intake              350 ml   Output              600 ml   Net             -250 ml     GENERAL: Alert and oriented. NAD. Conversational, appropriate. Pleasant.   HEENT: Normocephalic. EOMI. No icterus or injection. Nares normal.   LUNGS: Clear to auscultation. No dyspnea at rest.   HEART: Regular rate. Extremities perfused.   ABDOMEN: Soft, nontender, and nondistended. Positive bowel sounds.   EXTREMITIES: No LE edema noted.   NEUROLOGIC: Moves extremities x4 on command. No acute focal neurologic abnormalities noted.          Prior to Admission Medications:        Prescriptions Prior to Admission   Medication Sig Dispense Refill Last Dose     aspirin 325 MG EC tablet Take one Aspirin tab twice daily for 5 weeks. 70 tablet 0 10/3/2018 at Unknown time     atorvastatin (LIPITOR) 10 MG tablet Take 1 tablet (10 mg) by mouth every evening   10/3/2018 at Unknown time     diltiazem (TIAZAC) 120 MG 24 hr ER beaded capsule Take 120 mg by mouth daily   10/3/2018 at Unknown time     insulin glargine (LANTUS) 100 UNIT/ML injection Inject 7 Units Subcutaneous every morning   10/3/2018 at Unknown time     insulin lispro (HUMALOG KWIKPEN) 100 UNIT/ML injection Inject Subcutaneous At Bedtime Sliding scale:  -249=1 unit  -299=2 units  -349=3 units  -399=4 units  +=5 units   Past Week at Unknown time     insulin lispro (HUMALOG KWIKPEN) 100 UNIT/ML injection Inject 2 Units Subcutaneous 3 times daily (before meals)   10/3/2018 at Unknown time     insulin lispro (HUMALOG) 100 UNIT/ML injection Inject Subcutaneous 3 times daily (before meals) Sliding scale:   -189=1 unit  -239=2  units  -289=3 units  -339=4 units  -399=5 units  -499=6 units  +=7 units   10/3/2018 at Unknown time     metoprolol succinate (TOPROL-XL) 50 MG 24 hr tablet Take 1 tablet (50 mg) by mouth daily 30 tablet  10/3/2018 at Unknown time     oxyCODONE IR (ROXICODONE) 5 MG tablet Take 1 tablet (5 mg) by mouth every 4 hours as needed for pain 60 tablet 0 10/4/2018 at 0020     polyethylene glycol (MIRALAX/GLYCOLAX) Packet Take 17 g by mouth daily   10/3/2018 at Unknown time     tamsulosin (FLOMAX) 0.4 MG capsule Take 0.4 mg by mouth every evening    10/3/2018 at Unknown time     Zolpidem Tartrate (AMBIEN PO) Take 5 mg by mouth daily as needed for sleep   Past Week at Unknown time     aspirin 81 MG tablet Take 81 mg by mouth daily To start on 10/9/18 after 325mg BID dosing is done.        Skin Protectants, Misc. (ANIBAL PROTECT EX) Apply topically 2 times daily   Taking     [DISCONTINUED] IBUPROFEN PO Take 200 mg by mouth every 6 hours as needed for moderate pain   prn     [DISCONTINUED] melatonin 5 MG tablet Take 5 mg by mouth At Bedtime   10/3/2018 at Unknown time     [DISCONTINUED] melatonin 5 MG tablet Take 5 mg by mouth nightly as needed for sleep (MR x 1 PRN if scheduled dose is insufficient)   Past Week at Unknown time            Medications:        Current Facility-Administered Medications   Medication Last Rate     sodium chloride Stopped (10/05/18 0456)     Current Facility-Administered Medications   Medication Dose Route Frequency     aspirin  325 mg Oral Daily     atorvastatin  10 mg Oral QPM     diltiazem  120 mg Oral Daily     insulin aspart  2 Units Subcutaneous TID w/meals     insulin aspart  1-7 Units Subcutaneous TID AC     insulin aspart  1-5 Units Subcutaneous At Bedtime     insulin glargine  7 Units Subcutaneous QAM AC     metoprolol succinate  50 mg Oral Daily     senna-docusate  1 tablet Oral BID    Or     senna-docusate  2 tablet Oral BID     sodium chloride (PF)  3 mL  Intracatheter Q8H     tamsulosin  0.4 mg Oral Daily     Current Facility-Administered Medications   Medication Dose Route Frequency     acetaminophen  650 mg Oral Q4H PRN     sore throat lozenge  1-2 lozenge Buccal Q1H PRN     bisacodyl  5 mg Oral Daily PRN    Or     bisacodyl  10 mg Oral Daily PRN    Or     bisacodyl  15 mg Oral Daily PRN     bisacodyl  10 mg Rectal Daily PRN     glucose  15-30 g Oral Q15 Min PRN    Or     dextrose  25-50 mL Intravenous Q15 Min PRN    Or     glucagon  1 mg Subcutaneous Q15 Min PRN     HYDROmorphone  0.2 mg Intravenous Q2H PRN     hydrOXYzine  10 mg Oral Q6H PRN     labetalol  10 mg Intravenous Q2H PRN     lidocaine 4%   Topical Q1H PRN     lidocaine (buffered or not buffered)  1 mL Other Q1H PRN     melatonin  1 mg Oral At Bedtime PRN     metoprolol  2.5 mg Intravenous Q4H PRN     naloxone  0.1-0.4 mg Intravenous Q2 Min PRN     ondansetron  4 mg Oral Q6H PRN    Or     ondansetron  4 mg Intravenous Q6H PRN     oxyCODONE IR  5 mg Oral Q3H PRN     polyethylene glycol  17 g Oral Daily PRN     prochlorperazine  5 mg Intravenous Q6H PRN    Or     prochlorperazine  5 mg Oral Q6H PRN     prochlorperazine  12.5 mg Rectal Q12H PRN     sodium chloride (PF)  3 mL Intracatheter Q1H PRN     zolpidem (AMBIEN) tablet 5 mg  5 mg Oral Daily PRN            Data:      Lab data reviewed.     Recent Labs  Lab 10/08/18  0535  10/06/18  0643  10/05/18  0645   HGB 7.8*  < > 8.3*  < > 7.0*   MCV 89  --  90  --  91   *  --  126*  --  133*     --  138  --  140   POTASSIUM 4.2  --  4.5  --  4.7   CHLORIDE 107  --  107  --  108   CO2 25  --  24  --  25   BUN 37*  --  29  --  29   CR 2.11*  --  1.93*  --  1.97*   ANIONGAP 6  --  7  --  7   JOSELINE 7.5*  --  7.4*  --  7.3*   *  --  187*  --  134*   < > = values in this interval not displayed.        Imaging:      Imaging data reviewed.     Dr. Earl Streeter D.O.  [facility]  Pager [phone]

## 2018-10-08 NOTE — PROVIDER NOTIFICATION
Text paged Dr Streeter    discharge orders were placed-wanted to confirm that you wanted pt dc'ed to [facility] with IV metoprolol & fluctuating increased HR. Please advise

## 2018-10-08 NOTE — PLAN OF CARE
Problem: Patient Care Overview  Goal: Plan of Care/Patient Progress Review  Outcome: Improving  Pt disoriented to time and place at times. Easily redirect. Declined any narc. Vital sign stable beginning of shift, but became tachycardic. Denies chest pain, SOB, numbness or tingling. Neuro check intact. Tele monitoring initated. Metoprolol given X2. Voiding via urinal. BG check at 177 at 0200. Complained of R shoulder pain, but declined any pain med. Heat pack given with some relieved. Continue to monitor.

## 2018-10-08 NOTE — PROVIDER NOTIFICATION
Brief update:    Paged re: tachycardia, sustained in the 140s    Suspect recurrent ventricular tachycardia.  Patient with a history of ventricular tachycardia following right hip fracture historically    Add on magnesium  2.5 mg IV metoprolol x1 with additional 2.5 mg dosing available If no improvement in heart rate after approximately 10 minutes.  Starting low with frequent dosing given patient's advanced age, systolic blood pressure in the 100 range.    Stat EKG pending    Moose Valdes MD  5:07 AM    Following 1 dose of IV metoprolol, heart rate into the 90s.  EKG consistent with recurrent ventricular tachycardia.

## 2018-10-08 NOTE — PLAN OF CARE
Problem: Patient Care Overview  Goal: Plan of Care/Patient Progress Review  PT:  Discharge Planner PT   Patient plan for discharge: Not stated  Current status: Patient supine in bed upon arrival of therapist, agreeable to working with PT. Educated and instructed patient in supine DELL exercises with verbal and tactile cues needed throughout for technique, patient tolerating well. Re-educated patient on hip precautions as patient not adhering to precautions at time throughout session. Supine>sit completed with Mod A x 1 and cues for sequencing and hip precautions. Patient able to sit at EOB with intermittent posterior lean requiring CGA to Mod A to assist, cues provided to correct with patient following. Patient completed sit<>stand transfer from EOB x 2 with use of FWW and Mod to Min A x 2, cues for proper hand placement on FWW. Patient noted to bear weight through heels when standing, cues for anterior weight shift towards his toes provided, patient following well throughout session. Patient able to stand 2 times for ~2 minutes each stand. Patient in supine at end of session with all needs in reach and bed alarm on.   Barriers to return to prior living situation: Current level of assistance, pain, decreased tolerance to activity   Recommendations for discharge: Back to TCU   Rationale for recommendations: Patient would benefit from continued skilled therapy to further improve strength, balance, and independence with mobility and ambulation to address functional limitations.       Entered by: Ana Campbell 10/08/2018 11:05 AM

## 2018-10-08 NOTE — PLAN OF CARE
Problem: Patient Care Overview  Goal: Plan of Care/Patient Progress Review  Discharge Planner OT   Patient plan for discharge: Rehab  Current status: RN reports pt is appropriate to trial mobility and is now on tele. Pt required MIN-MOD A for supine to sit with constant VC for sequence, pt req CG-MAX A while sitting EOB pt has strong posterior lean which requires more assist when fatigued or when pt is concentrating on another task. Pt attempted to stand 2X, pt required MAX A of 2 to stand and to remain standing pt required blocking of B feet and had strong posterior lean throughout. B feet in strong dorsiflexion, pt unable to correct with VC. MAX A 2 for sit to supine and to shift up in bed, pt appears fatigued and complains of scrotal pain which pt says nurse is aware of.   Barriers to return to prior living situation: Level of assist, cognition  Recommendations for discharge: TCU  Rationale for recommendations: Pt would benefit from continued OT intervention to maximize participation and IND in ADLs.        Entered by: Sally Cantor 10/08/2018 10:01 AM

## 2018-10-08 NOTE — PROGRESS NOTES
[facility] Orthopedic Progress Note  Abimael Flores is a 89 year old male    Today's Date:10/8/2018  Admission Date: 10/4/2018  Fracture of hip, left, closed, initial encounter (H) [S72.002A]  POD # 4 Left bipolar hemiarthroplasty for femoral neck fracture.     Patient Seen.  Tachycardia persists.    Dressings are clean, dry, and intact.  Circulation, motor and sensory function, intact distally.  HGB 7.8        PLAN:    Pain control medication: Limit narcotics to decrease delerium.  Discharge plan: When medically stable.  Ok from ortho standpoint.  Ortho discharge orders entered.  WBAT  Posterior hip precautions.      Temp:  [97.4  F (36.3  C)-98.6  F (37  C)] 98  F (36.7  C)  Pulse:  [97-98] 98  Heart Rate:  [] 92  Resp:  [16-20] 18  BP: ()/(55-83) 127/83  SpO2:  [88 %-98 %] 97 %      Results for orders placed or performed during the hospital encounter of 10/04/18 (from the past 24 hour(s))   Hemoglobin   Result Value Ref Range    Hemoglobin 8.1 (L) 13.3 - 17.7 g/dL   Glucose by meter   Result Value Ref Range    Glucose 185 (H) 70 - 99 mg/dL   Glucose by meter   Result Value Ref Range    Glucose 191 (H) 70 - 99 mg/dL   Glucose by meter   Result Value Ref Range    Glucose 241 (H) 70 - 99 mg/dL   Glucose by meter   Result Value Ref Range    Glucose 177 (H) 70 - 99 mg/dL   EKG 12-lead, tracing only   Result Value Ref Range    Interpretation ECG Click View Image link to view waveform and result    CBC with platelets   Result Value Ref Range    WBC 9.2 4.0 - 11.0 10e9/L    RBC Count 2.62 (L) 4.4 - 5.9 10e12/L    Hemoglobin 7.8 (L) 13.3 - 17.7 g/dL    Hematocrit 23.4 (L) 40.0 - 53.0 %    MCV 89 78 - 100 fl    MCH 29.8 26.5 - 33.0 pg    MCHC 33.3 31.5 - 36.5 g/dL    RDW 14.8 10.0 - 15.0 %    Platelet Count 148 (L) 150 - 450 10e9/L   Basic metabolic panel   Result Value Ref Range    Sodium 138 133 - 144 mmol/L    Potassium 4.2 3.4 - 5.3 mmol/L    Chloride 107 94 - 109 mmol/L    Carbon Dioxide 25 20 -  32 mmol/L    Anion Gap 6 3 - 14 mmol/L    Glucose 188 (H) 70 - 99 mg/dL    Urea Nitrogen 37 (H) 7 - 30 mg/dL    Creatinine 2.11 (H) 0.66 - 1.25 mg/dL    GFR Estimate 29 (L) >60 mL/min/1.7m2    GFR Estimate If Black 36 (L) >60 mL/min/1.7m2    Calcium 7.5 (L) 8.5 - 10.1 mg/dL   Glucose by meter   Result Value Ref Range    Glucose 201 (H) 70 - 99 mg/dL         Edgardo Godfrey

## 2018-10-08 NOTE — PLAN OF CARE
Problem: Patient Care Overview  Goal: Plan of Care/Patient Progress Review  Outcome: Improving  Pt arrived from ED at 2330. Pt oriented to self only d/t dementia, but very agitated throughout shift. Kept pulling at collins, IV and Tele monitoring- Tried mittens but pt pulled it off numerous times. Very hard to redirect. Collins patent, but bloody from pulling at the collins upon arrival. IV dilaudid x1 given. NPO since midnight. Continue to monitor.

## 2018-10-08 NOTE — PLAN OF CARE
Problem: Patient Care Overview  Goal: Plan of Care/Patient Progress Review  Outcome: Improving  Patient declined to get OOB this evening; turns with 2.  Patient denies pain.  VSS on RA; 1L NC for sleep.  Alert to self and situation, forgetful to time and place.  Dressing CDI.  CMS intact.  Some urinary retention; voiding in small amounts frequently.  Good PO intake.  Blood sugar 191 and 241, given sliding scale insulin.  Plan to discharge to [facility] tomorrow.

## 2018-10-09 ENCOUNTER — APPOINTMENT (OUTPATIENT)
Dept: OCCUPATIONAL THERAPY | Facility: CLINIC | Age: 83
DRG: 470 | End: 2018-10-09
Payer: MEDICARE

## 2018-10-09 LAB
ALBUMIN SERPL-MCNC: 2.1 G/DL (ref 3.4–5)
ALP SERPL-CCNC: 72 U/L (ref 40–150)
ALT SERPL W P-5'-P-CCNC: 12 U/L (ref 0–70)
ANION GAP SERPL CALCULATED.3IONS-SCNC: 10 MMOL/L (ref 3–14)
AST SERPL W P-5'-P-CCNC: 22 U/L (ref 0–45)
BILIRUB DIRECT SERPL-MCNC: 0.2 MG/DL (ref 0–0.2)
BILIRUB SERPL-MCNC: 0.6 MG/DL (ref 0.2–1.3)
BUN SERPL-MCNC: 40 MG/DL (ref 7–30)
CALCIUM SERPL-MCNC: 7.9 MG/DL (ref 8.5–10.1)
CHLORIDE SERPL-SCNC: 105 MMOL/L (ref 94–109)
CK SERPL-CCNC: 109 U/L (ref 30–300)
CO2 SERPL-SCNC: 24 MMOL/L (ref 20–32)
CREAT SERPL-MCNC: 1.96 MG/DL (ref 0.66–1.25)
ERYTHROCYTE [DISTWIDTH] IN BLOOD BY AUTOMATED COUNT: 14.7 % (ref 10–15)
GFR SERPL CREATININE-BSD FRML MDRD: 32 ML/MIN/1.7M2
GLUCOSE BLDC GLUCOMTR-MCNC: 165 MG/DL (ref 70–99)
GLUCOSE BLDC GLUCOMTR-MCNC: 168 MG/DL (ref 70–99)
GLUCOSE BLDC GLUCOMTR-MCNC: 204 MG/DL (ref 70–99)
GLUCOSE BLDC GLUCOMTR-MCNC: 211 MG/DL (ref 70–99)
GLUCOSE SERPL-MCNC: 205 MG/DL (ref 70–99)
HCT VFR BLD AUTO: 25.1 % (ref 40–53)
HGB BLD-MCNC: 8.2 G/DL (ref 13.3–17.7)
MAGNESIUM SERPL-MCNC: 2.3 MG/DL (ref 1.6–2.3)
MCH RBC QN AUTO: 29.2 PG (ref 26.5–33)
MCHC RBC AUTO-ENTMCNC: 32.7 G/DL (ref 31.5–36.5)
MCV RBC AUTO: 89 FL (ref 78–100)
NT-PROBNP SERPL-MCNC: 7613 PG/ML (ref 0–1800)
PHOSPHATE SERPL-MCNC: 2.7 MG/DL (ref 2.5–4.5)
PLATELET # BLD AUTO: 204 10E9/L (ref 150–450)
POTASSIUM SERPL-SCNC: 4 MMOL/L (ref 3.4–5.3)
PROT SERPL-MCNC: 6.3 G/DL (ref 6.8–8.8)
RBC # BLD AUTO: 2.81 10E12/L (ref 4.4–5.9)
SODIUM SERPL-SCNC: 139 MMOL/L (ref 133–144)
TROPONIN I SERPL-MCNC: 0.14 UG/L (ref 0–0.04)
TROPONIN I SERPL-MCNC: 0.16 UG/L (ref 0–0.04)
WBC # BLD AUTO: 9.2 10E9/L (ref 4–11)

## 2018-10-09 PROCEDURE — 36415 COLL VENOUS BLD VENIPUNCTURE: CPT | Performed by: HOSPITALIST

## 2018-10-09 PROCEDURE — 12000007 ZZH R&B INTERMEDIATE

## 2018-10-09 PROCEDURE — 83880 ASSAY OF NATRIURETIC PEPTIDE: CPT | Performed by: HOSPITALIST

## 2018-10-09 PROCEDURE — 97530 THERAPEUTIC ACTIVITIES: CPT | Mod: GO | Performed by: OCCUPATIONAL THERAPY ASSISTANT

## 2018-10-09 PROCEDURE — 84100 ASSAY OF PHOSPHORUS: CPT | Performed by: HOSPITALIST

## 2018-10-09 PROCEDURE — 80076 HEPATIC FUNCTION PANEL: CPT | Performed by: HOSPITALIST

## 2018-10-09 PROCEDURE — 85027 COMPLETE CBC AUTOMATED: CPT | Performed by: HOSPITALIST

## 2018-10-09 PROCEDURE — 83735 ASSAY OF MAGNESIUM: CPT | Performed by: HOSPITALIST

## 2018-10-09 PROCEDURE — A9270 NON-COVERED ITEM OR SERVICE: HCPCS | Mod: GY | Performed by: INTERNAL MEDICINE

## 2018-10-09 PROCEDURE — 25000132 ZZH RX MED GY IP 250 OP 250 PS 637: Mod: GY | Performed by: PHYSICIAN ASSISTANT

## 2018-10-09 PROCEDURE — 82550 ASSAY OF CK (CPK): CPT | Performed by: HOSPITALIST

## 2018-10-09 PROCEDURE — 84484 ASSAY OF TROPONIN QUANT: CPT | Performed by: HOSPITALIST

## 2018-10-09 PROCEDURE — 00000146 ZZHCL STATISTIC GLUCOSE BY METER IP

## 2018-10-09 PROCEDURE — 99232 SBSQ HOSP IP/OBS MODERATE 35: CPT | Performed by: HOSPITALIST

## 2018-10-09 PROCEDURE — 40000133 ZZH STATISTIC OT WARD VISIT: Performed by: OCCUPATIONAL THERAPY ASSISTANT

## 2018-10-09 PROCEDURE — 25000131 ZZH RX MED GY IP 250 OP 636 PS 637: Mod: GY | Performed by: INTERNAL MEDICINE

## 2018-10-09 PROCEDURE — A9270 NON-COVERED ITEM OR SERVICE: HCPCS | Mod: GY | Performed by: PHYSICIAN ASSISTANT

## 2018-10-09 PROCEDURE — 25000132 ZZH RX MED GY IP 250 OP 250 PS 637: Mod: GY | Performed by: INTERNAL MEDICINE

## 2018-10-09 PROCEDURE — 80048 BASIC METABOLIC PNL TOTAL CA: CPT | Performed by: HOSPITALIST

## 2018-10-09 PROCEDURE — 97535 SELF CARE MNGMENT TRAINING: CPT | Mod: GO | Performed by: OCCUPATIONAL THERAPY ASSISTANT

## 2018-10-09 RX ADMIN — SENNOSIDES AND DOCUSATE SODIUM 2 TABLET: 8.6; 5 TABLET ORAL at 19:55

## 2018-10-09 RX ADMIN — ASPIRIN 325 MG: 325 TABLET, DELAYED RELEASE ORAL at 09:03

## 2018-10-09 RX ADMIN — INSULIN GLARGINE 7 UNITS: 100 INJECTION, SOLUTION SUBCUTANEOUS at 09:16

## 2018-10-09 RX ADMIN — ATORVASTATIN CALCIUM 10 MG: 10 TABLET, FILM COATED ORAL at 19:55

## 2018-10-09 RX ADMIN — METOPROLOL SUCCINATE 50 MG: 50 TABLET, EXTENDED RELEASE ORAL at 09:02

## 2018-10-09 RX ADMIN — SENNOSIDES AND DOCUSATE SODIUM 2 TABLET: 8.6; 5 TABLET ORAL at 10:22

## 2018-10-09 RX ADMIN — DILTIAZEM HYDROCHLORIDE 120 MG: 120 CAPSULE, EXTENDED RELEASE ORAL at 09:02

## 2018-10-09 RX ADMIN — ACETAMINOPHEN 650 MG: 325 TABLET, FILM COATED ORAL at 22:20

## 2018-10-09 RX ADMIN — ACETAMINOPHEN 650 MG: 325 TABLET, FILM COATED ORAL at 04:03

## 2018-10-09 RX ADMIN — TAMSULOSIN HYDROCHLORIDE 0.4 MG: 0.4 CAPSULE ORAL at 09:03

## 2018-10-09 ASSESSMENT — ACTIVITIES OF DAILY LIVING (ADL)
ADLS_ACUITY_SCORE: 17
ADLS_ACUITY_SCORE: 18
ADLS_ACUITY_SCORE: 17
ADLS_ACUITY_SCORE: 18
ADLS_ACUITY_SCORE: 17
ADLS_ACUITY_SCORE: 18

## 2018-10-09 NOTE — PLAN OF CARE
Problem: Patient Care Overview  Goal: Plan of Care/Patient Progress Review  Outcome: No Change  Pt is POD 5 L hip ORIF. A/O x4 but forgetful at times. Assist of 2. VSS. IV SL. Denies pain this shift. Mod-carb diet. BS checks. 204 and 168. Pt had elevated Tops this AM. Dr. Vasquez wants to keep pt another night to monitor trops. Potential discharge to TCU tomorrow.

## 2018-10-09 NOTE — PROGRESS NOTES
[facility] Orthopedic Progress Note  Abimael Flores is a 89 year old male    Today's Date:10/9/2018  Admission Date: 10/4/2018  Fracture of hip, left, closed, initial encounter (H) [S72.002A]  POD # 5 Left bipolar hemiarthroplasty for femoral neck fracture.      Patient Seen.  Tachycardia persists.    Dressings are clean, dry, and intact.  Circulation, motor and sensory function, intact distally.       PLAN:     Pain control medication: Limit narcotics to decrease delerium.  Discharge plan: When medically stable.  Ok from ortho standpoint.  Ortho discharge orders entered.  WBAT  Posterior hip precautions.      Temp:  [97.9  F (36.6  C)-98.1  F (36.7  C)] 97.9  F (36.6  C)  Pulse:  [79-92] 79  Heart Rate:  [] 90  Resp:  [16-18] 16  BP: ()/(53-83) 117/66  SpO2:  [94 %-98 %] 95 %      Results for orders placed or performed during the hospital encounter of 10/04/18 (from the past 24 hour(s))   Glucose by meter   Result Value Ref Range    Glucose 143 (H) 70 - 99 mg/dL   Glucose by meter   Result Value Ref Range    Glucose 145 (H) 70 - 99 mg/dL   EKG 12-lead, tracing only   Result Value Ref Range    Interpretation ECG Click View Image link to view waveform and result    Glucose by meter   Result Value Ref Range    Glucose 163 (H) 70 - 99 mg/dL   Glucose by meter   Result Value Ref Range    Glucose 246 (H) 70 - 99 mg/dL   Basic metabolic panel   Result Value Ref Range    Sodium 139 133 - 144 mmol/L    Potassium 4.0 3.4 - 5.3 mmol/L    Chloride 105 94 - 109 mmol/L    Carbon Dioxide 24 20 - 32 mmol/L    Anion Gap 10 3 - 14 mmol/L    Glucose 205 (H) 70 - 99 mg/dL    Urea Nitrogen 40 (H) 7 - 30 mg/dL    Creatinine 1.96 (H) 0.66 - 1.25 mg/dL    GFR Estimate 32 (L) >60 mL/min/1.7m2    GFR Estimate If Black 39 (L) >60 mL/min/1.7m2    Calcium 7.9 (L) 8.5 - 10.1 mg/dL   CBC with platelets   Result Value Ref Range    WBC 9.2 4.0 - 11.0 10e9/L    RBC Count 2.81 (L) 4.4 - 5.9 10e12/L    Hemoglobin 8.2 (L) 13.3 -  17.7 g/dL    Hematocrit 25.1 (L) 40.0 - 53.0 %    MCV 89 78 - 100 fl    MCH 29.2 26.5 - 33.0 pg    MCHC 32.7 31.5 - 36.5 g/dL    RDW 14.7 10.0 - 15.0 %    Platelet Count 204 150 - 450 10e9/L   Troponin I   Result Value Ref Range    Troponin I ES 0.157 (HH) 0.000 - 0.045 ug/L   Magnesium   Result Value Ref Range    Magnesium 2.3 1.6 - 2.3 mg/dL   Phosphorus   Result Value Ref Range    Phosphorus 2.7 2.5 - 4.5 mg/dL         Edgardo Godfrey

## 2018-10-09 NOTE — PLAN OF CARE
Problem: Patient Care Overview  Goal: Plan of Care/Patient Progress Review  A&Ox2, forgetful & disoriented- able to be reoriented. CMS intact. Bowel sounds audible, voids well in urinal. VS-HR tachy PRN metoprolol given x1-Pt refusing to wear tele monitor-provider notified, O2 NC 1L-pt dipped into high 80's on RA. Dressing changed-CDI. Up with assist x2 with gb/w. Tolerating mod CHO diet with carb counting. Denies pain. Plan discharge to [facility] tomorrow pending HR stability.

## 2018-10-09 NOTE — PLAN OF CARE
Problem: Patient Care Overview  Goal: Plan of Care/Patient Progress Review  Outcome: No Change  A/O, confused and forgetful at times. AVSS on RA. C/o L shoulder pain, tylenol given and heat applied. L hip dressing CDI. Voiding adequately w/bedside urinal. Up x2 to commode, BM x1. Plan to discharge tomorrow.

## 2018-10-09 NOTE — PROGRESS NOTES
[facility]  Hospitalist Progress Note   10/09/2018          Assessment and Plan:       Mr. 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      Positive troponin likely from anemia/renal disease/surgical stress.  Nursing team had notified hospitalist rounding on 10/8 about ongoing tachycardia.  Routine troponin was ordered for this morning -at 0.157.  Review of patient's chart he has had very faintly positive troponin [0.03] echocardiogram from August 2018 reviewed.  Not complaining of any chest pain at this time.  Not compliant with telemetry monitoring.  We will trend troponins, if stable and no chest pain will plan for discharge tomorrow.  If Troponin trends up will discuss with patient's family and consider echocardiogram and further workup if they would like to pursue it.      Mechanical fall with angulated left femoral neck fracture: s/p left DELL on 10/4    The patient has presented with left hip pain following a mechanical fall as he was trying to  something from the floor at the transitional care facility  Notably, he had a recent fall on 08/25/2018 which resulted in a right femoral neck fracture requiring hemiarthroplasty with Dr. Bill Sanders.    Postoperatively-orthopedic team has cleared for discharge.  On aspirin 325 mg oral daily for DVT prophylaxis.  Pain management with as needed Tylenol.  PT/OT at TCU.  Aggressive incentive spirometry.  Stool softeners and suppositories as needed while on pain medication.     Severe vitamin D deficiency.  Vitamin D levels 9.  Started on  50,000 units of vitamin D replacement for 1 week.    Subclinical hypothyroidism.  Noted to be tachycardic yesterday, TSH 4.57.  T4 1.28.     Hypertension  Continue prior to admission Cardizem 120 mg oral daily, metoprolol 50 mg oral daily.      Dyslipidemia:   Continue on prior to admission Lipitor.       Benign prostatic hypertrophy:   Continue with prior to admission Flomax.       Chronic kidney disease, stage III-IV:    The most recent creatinine levels have been around 1.8-1.9 and is at baseline.       Chronic anemia with acute blood loss anemia:  Baseline hemoglobin is around 9 and dropped to 6.7.  Received 1 unit of PRBC on 10/5   - Monitor       Coronary artery disease:  The patient is status post coronary artery bypass graft x 4 in 1996.    - Continue on prior to admission metoprolol and Lipitor.   - ASA at discretion of surgery team.       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    - Continue on prior to admission Toprol XL       Diabetes mellitus type 2:  The most recent hemoglobin A1c was 8.4% on 08/25/2018.  At home he is on Lantus 7 units subcu every morning and Humalog 2 units subq t.i.d.  Blood sugars rising into the 200s.   - PTA lantus 7 units in the morning  - novolog 2 units TID WM   - sliding scale      Delirium, mild:  - minimize narcotic use  - reorientation as needed    Active Diet Order      Advance Diet as Tolerated: Regular Diet Adult      Advance Diet as Tolerated    DVT Prophylaxis:  Sequential compression device.  Code Status: Full code   Disposition: Expected discharge tomorrow if no chest pain and troponin stable.    Patient, interdisciplinary team involved in care and agrees with plan.  Total time - 25 min. More than 50% of time spent in direct patient care, care coordination and formalizing plan of  care.     Isaiah Vasquez MD        Interval History:      Patient lying in bed.  Patient denies any chest pain or shortness of breath.  No nausea vomiting.  No headache or dizziness.       Physical Exam:        Physical Exam   Temp:  [97.9  F (36.6  C)-98.3  F (36.8  C)] 98.3  F (36.8  C)  Pulse:  [79-92] 79  Heart Rate:  [85-92] 87  Resp:  [16] 16  BP: ()/(53-71) 97/57  SpO2:  [94 %-98 %] 96 %    Intake/Output Summary (Last 24 hours) at 10/09/18 1706  Last data filed at 10/09/18 1425   Gross per 24 hour   Intake              160 ml   Output              575 ml   Net             -415 ml       Admission Weight: 72.6 kg (160 lb)  Current Weight: 72.4 kg (159 lb 9.8 oz)    PHYSICAL EXAM  GENERAL: Patient is in no distress.  Able to answer simple commands.  HEART: Regular rate and rhythm. S1S2. No murmurs  LUNGS: Bilateral slightly decreased breath sounds.  No wheezing no crackles.  ABDOMEN: Soft, no abdominal tenderness, bowel sounds heard   NEURO: No focal weakness.  Left hip area dressing intact.  EXTREMITIES: Trace pedal edema. 2+ peripheral pulses.  SKIN: Warm, dry.  PSYCHIATRY Cooperative       Medications:          aspirin  325 mg Oral Daily     atorvastatin  10 mg Oral QPM     diltiazem  120 mg Oral Daily     insulin aspart  2 Units Subcutaneous TID w/meals     insulin aspart  1-7 Units Subcutaneous TID AC     insulin aspart  1-5 Units Subcutaneous At Bedtime     insulin glargine  7 Units Subcutaneous QAM AC     metoprolol succinate  50 mg Oral Daily     senna-docusate  1 tablet Oral BID    Or     senna-docusate  2 tablet Oral BID     sodium chloride (PF)  3 mL Intracatheter Q8H     tamsulosin  0.4 mg Oral Daily     vitamin D  50,000 Units Oral Q7 Days     acetaminophen, sore throat lozenge, bisacodyl **OR** bisacodyl **OR** bisacodyl, bisacodyl, glucose **OR** dextrose **OR** glucagon, HYDROmorphone, hydrOXYzine, labetalol, lidocaine 4%, lidocaine (buffered or not buffered), melatonin, metoprolol  tartrate, naloxone, ondansetron **OR** ondansetron, oxyCODONE IR, polyethylene glycol, prochlorperazine **OR** prochlorperazine, [DISCONTINUED] prochlorperazine **OR** [DISCONTINUED] prochlorperazine **OR** prochlorperazine, sodium chloride (PF), zolpidem (AMBIEN) half-tab 2.5 mg         Data:      All new lab and imaging data was reviewed.

## 2018-10-09 NOTE — PLAN OF CARE
Problem: Patient Care Overview  Goal: Plan of Care/Patient Progress Review  PT-  PT deferred at time of PT appt as pt just got up to chair with nursing staff and is starting breakfast.

## 2018-10-09 NOTE — PLAN OF CARE
Problem: Surgery Nonspecified (Adult)  Goal: Signs and Symptoms of Listed Potential Problems Will be Absent, Minimized or Managed (Surgery Nonspecified)  Signs and symptoms of listed potential problems will be absent, minimized or managed by discharge/transition of care (reference Surgery Nonspecified (Adult) CPG).   Outcome: No Change  Pt. A&o, forgetful at times, cms intact, vss, up with 1-2 assist and walker, voiding in urinal, denied any pain, discharge held today because of increased troponin, Possible discharge tomorrow if stable, Will continue to monitor.

## 2018-10-09 NOTE — PLAN OF CARE
Problem: Patient Care Overview  Goal: Plan of Care/Patient Progress Review  Discharge Planner OT   Patient plan for discharge: Rehab  Current status: pt completed sit to stand Min Ax2, amb 4ft with FWW Min A x2 to complete standing ADLS with FWW Min A x2 due to posterior lean, multiple VC to keeps toes on ground while standing. Pt educated in LB dressing techniques, pt completed don/doff of slipper socks Mod Ind. with AE.  Barriers to return to prior living situation: Level of assist, cognition  Recommendations for discharge: TCU per plan established by the Occupational therapist.  Rationale for recommendations: Pt would benefit from continued OT intervention to maximize participation and IND in ADLs.          Entered by: Jessika Greene 10/09/2018 12:27 PM

## 2018-10-10 VITALS
WEIGHT: 161.38 LBS | HEIGHT: 69 IN | OXYGEN SATURATION: 98 % | SYSTOLIC BLOOD PRESSURE: 135 MMHG | DIASTOLIC BLOOD PRESSURE: 73 MMHG | BODY MASS INDEX: 23.9 KG/M2 | RESPIRATION RATE: 18 BRPM | HEART RATE: 89 BPM | TEMPERATURE: 97.8 F

## 2018-10-10 LAB
CREAT SERPL-MCNC: 1.79 MG/DL (ref 0.66–1.25)
GFR SERPL CREATININE-BSD FRML MDRD: 35 ML/MIN/1.7M2
GLUCOSE BLDC GLUCOMTR-MCNC: 139 MG/DL (ref 70–99)
GLUCOSE BLDC GLUCOMTR-MCNC: 146 MG/DL (ref 70–99)
HGB BLD-MCNC: 8.7 G/DL (ref 13.3–17.7)
INTERPRETATION ECG - MUSE: NORMAL
INTERPRETATION ECG - MUSE: NORMAL
TROPONIN I SERPL-MCNC: 0.13 UG/L (ref 0–0.04)

## 2018-10-10 PROCEDURE — 36415 COLL VENOUS BLD VENIPUNCTURE: CPT | Performed by: HOSPITALIST

## 2018-10-10 PROCEDURE — 85018 HEMOGLOBIN: CPT | Performed by: HOSPITALIST

## 2018-10-10 PROCEDURE — 82565 ASSAY OF CREATININE: CPT | Performed by: HOSPITALIST

## 2018-10-10 PROCEDURE — 25000131 ZZH RX MED GY IP 250 OP 636 PS 637: Mod: GY | Performed by: INTERNAL MEDICINE

## 2018-10-10 PROCEDURE — 00000146 ZZHCL STATISTIC GLUCOSE BY METER IP

## 2018-10-10 PROCEDURE — 25000132 ZZH RX MED GY IP 250 OP 250 PS 637: Mod: GY | Performed by: PHYSICIAN ASSISTANT

## 2018-10-10 PROCEDURE — 25000132 ZZH RX MED GY IP 250 OP 250 PS 637: Mod: GY | Performed by: INTERNAL MEDICINE

## 2018-10-10 PROCEDURE — 99239 HOSP IP/OBS DSCHRG MGMT >30: CPT | Performed by: HOSPITALIST

## 2018-10-10 PROCEDURE — 84484 ASSAY OF TROPONIN QUANT: CPT | Performed by: HOSPITALIST

## 2018-10-10 PROCEDURE — A9270 NON-COVERED ITEM OR SERVICE: HCPCS | Mod: GY | Performed by: INTERNAL MEDICINE

## 2018-10-10 PROCEDURE — A9270 NON-COVERED ITEM OR SERVICE: HCPCS | Mod: GY | Performed by: PHYSICIAN ASSISTANT

## 2018-10-10 RX ADMIN — INSULIN GLARGINE 7 UNITS: 100 INJECTION, SOLUTION SUBCUTANEOUS at 09:21

## 2018-10-10 RX ADMIN — ACETAMINOPHEN 650 MG: 325 TABLET, FILM COATED ORAL at 13:35

## 2018-10-10 RX ADMIN — HYDROXYZINE HYDROCHLORIDE 10 MG: 10 TABLET ORAL at 06:38

## 2018-10-10 RX ADMIN — TAMSULOSIN HYDROCHLORIDE 0.4 MG: 0.4 CAPSULE ORAL at 09:21

## 2018-10-10 RX ADMIN — METOPROLOL SUCCINATE 50 MG: 50 TABLET, EXTENDED RELEASE ORAL at 09:21

## 2018-10-10 RX ADMIN — DILTIAZEM HYDROCHLORIDE 120 MG: 120 CAPSULE, EXTENDED RELEASE ORAL at 09:21

## 2018-10-10 RX ADMIN — ASPIRIN 325 MG: 325 TABLET, DELAYED RELEASE ORAL at 09:21

## 2018-10-10 RX ADMIN — HYDROXYZINE HYDROCHLORIDE 10 MG: 10 TABLET ORAL at 13:35

## 2018-10-10 RX ADMIN — ACETAMINOPHEN 650 MG: 325 TABLET, FILM COATED ORAL at 06:38

## 2018-10-10 RX ADMIN — SENNOSIDES AND DOCUSATE SODIUM 1 TABLET: 8.6; 5 TABLET ORAL at 09:22

## 2018-10-10 ASSESSMENT — ACTIVITIES OF DAILY LIVING (ADL)
ADLS_ACUITY_SCORE: 19
ADLS_ACUITY_SCORE: 18

## 2018-10-10 NOTE — PLAN OF CARE
Problem: Patient Care Overview  Goal: Plan of Care/Patient Progress Review  Occupational Therapy Discharge Summary    Reason for therapy discharge:    Discharged to transitional care facility.    Progress towards therapy goal(s). See goals on Care Plan in [facility] electronic health record for goal details.  Goals not met.  Barriers to achieving goals:   discharge from facility.    Therapy recommendation(s):    Continued therapy is recommended.  Rationale/Recommendations:  Continued skilled OT in TCU setting to address independence in I/ADLs.

## 2018-10-10 NOTE — PLAN OF CARE
Problem: Patient Care Overview  Goal: Plan of Care/Patient Progress Review  Outcome: No Change  A&Ox4 but forgetful. VSS. CMS intact. Incision covered by gauze, CDI. Up with heavy 2 assist to bedside commode. Uses urinal to void. Regular diet. BM 10-9. bgm checks. Continues to refuse telemetry. Denies chest pain or shortness of breath, checking troponins. Plan for discharge to [place] tomorrow if troponins continue to trend down.

## 2018-10-10 NOTE — DISCHARGE SUMMARY
Discharge Summary  Hospitalist    Date of Admission:  10/4/2018  Date of Discharge:  10/10/2018  Discharging Provider: Isaiah Vasquez MD    Primary Care Physician   RAISSA HILL  Primary Care Provider Phone Number: [phone]  Primary Care Provider Fax Number: [phone]    PRINCIPAL DIAGNOSIS  Mechanical fall with angulated left femoral neck fracture: s/p left DELL on 10/4    Positive troponin likely from anemia/renal disease/surgical stress.  Acute on chronic anemia likely dilutional/surgical blood loss.  Severe vitamin D deficiency.  Subclinical hypothyroidism.  Atrial fibrillation with intermittent rate-related bundle  Possible mild cognitive impairment at risk for delirium.    Past Medical History:   Diagnosis Date     Benign essential hypertension 9/4/2018     Coronary artery disease      Nonsenile cataract      Recent retinal detachment, total or subtotal 8/5/2014     Type 2 diabetes mellitus without complications (H)        History of Present Illness   Abimael Flores is an 89 year old male who presented with fall.    Hospital Course     Mr. 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      Mechanical fall with angulated left femoral neck fracture: s/p left DELL on 10/4    The patient has presented with left hip pain following a mechanical fall as he was trying to  something from the floor at the transitional care facility  Notably, he had a recent fall on 08/25/2018 which resulted in a right femoral neck fracture requiring hemiarthroplasty with Dr. Bill Sanders.    On aspirin 325 mg oral  daily for DVT prophylaxis.  Pain management with as needed Tylenol.  Prescription for oxycodone provided by Ortho team.  PT/OT at TCU.  Aggressive incentive spirometry.  Stool softeners and suppositories as needed while on pain medication.    Positive troponin likely from anemia/renal disease/surgical stress.  Nursing team had notified hospitalist rounding on 10/8 about ongoing tachycardia.  Troponin 0.157 >0.143 > 0.134  Review of patient's chart he has had very faintly positive troponin [0.03]   Echocardiogram from August 2018 reviewed.  Not complaining of any chest pain at this time.  Not compliant with telemetry monitoring.  Have discussed with patient, his family already on aspirin, statin and metoprolol.  Opted not to pursue any further cardiac workup at this time is asymptomatic.    Acute on chronic anemia likely dilutional/surgical blood loss.  Baseline hemoglobin around 9.  Postoperatively hemoglobin dropped to 6.7, received 1 unit of PRBC on 10/5   Post blood transfusion hemoglobin has been stable around 8.  Monitor hemoglobin levels periodically.      Severe vitamin D deficiency.  Vitamin D levels 9.  Started on 50,000 units of vitamin D replacement for 1 week.  Can then switch to oral vitamin D supplements.     Subclinical hypothyroidism.  Noted to be tachycardic, TSH 4.57.  T4 1.28.  Monitor thyroid function tests in 4-6 weeks.      Hypertension  Continue prior to admission Cardizem 120 mg oral daily, metoprolol 50 mg oral daily.      Dyslipidemia:   Continue on prior to admission Lipitor.       Benign prostatic hypertrophy:   Continue with prior to admission Flomax.       Chronic kidney disease, stage III-IV:    The most recent creatinine levels have been around 1.8-1.9 and is at baseline.   Monitor renal function periodically.  Avoid nephrotoxic drugs.      Coronary artery disease:    The patient is status post coronary artery bypass graft x 4 in 1996.    - Continue on prior to admission metoprolol  and Lipitor.   - ASA at discretion of surgery team.       Atrial fibrillation with intermittent rate-related bundle  Supraventricular tachycardia, asymptomatic   During recent hospitalization in August 2018 after a fall with right femoral neck fracture status post ORIF 8/26/18-noted to have arrhythmia.   His CHADS-VASc score is 4 for age, hypertension, and diabetes.  This puts him in the high risk group for a stroke cardiology was then consulted, patient and family declined anticoagulation given risk for falls.  Started on Toprol-XL, Cardizem and aspirin.  Continue PTA Toprol XL, Cardizem.  Currently on aspirin 325 mg oral daily for DVT prophylaxis.    Diabetes mellitus type 2:  T  he most recent hemoglobin A1c was 8.4% on 08/25/2018.    At home he is on Lantus 7 units subcu every morning and Humalog 2 units subq t.i.d.    Continue PTA insulin regimen.    Possible mild cognitive impairment at risk for delirium.  Avoid benzodiazepines, minimize narcotic use as able to.  Minimize interruptions, frequent reorientation.  Fall precautions.    # Discharge Pain Plan:   - During his hospitalization, Abimael experienced pain due to post op pain.  The pain plan for discharge was discussed with Abimael and his family and the plan was created in a collaborative fashion.    Script for oxycodone provided by hospital team.  Patient, his son over the telephone, interdisciplinary team involved in care and agree with discharge plan.    Isaiah Vasquez MD    Pending Results   These results will be followed up by ordering provider   Unresulted Labs Ordered in the Past 30 Days of this Admission     Date and Time Order Name Status Description    10/8/2018 0535 T4 free In process              Physical Exam   Vitals:    10/07/18 0610 10/08/18 0533 10/10/18 0610   Weight: 72.5 kg (159 lb 12.8 oz) 72.4 kg (159 lb 9.8 oz) 73.2 kg (161 lb 6 oz)     Vital Signs with Ranges  Temp:  [97.5  F (36.4  C)-98.3  F (36.8  C)] 97.8  F (36.6   C)  Pulse:  [89] 89  Heart Rate:  [83-87] 85  Resp:  [16-18] 18  BP: ()/(57-73) 135/73  SpO2:  [95 %-98 %] 98 %  I/O last 3 completed shifts:  In: 246 [P.O.:240; I.V.:6]  Out: 1250 [Urine:1250]  PHYSICAL EXAM  GENERAL: Patient is in no distress.  Able to answer simple commands.  HEART: Regular rate and rhythm. S1S2. No murmurs  LUNGS: Bilateral slightly decreased breath sounds.  No wheezing no crackles.  ABDOMEN: Soft, no abdominal tenderness, bowel sounds heard   NEURO: No focal weakness.  Left hip area dressing intact.  EXTREMITIES: Trace pedal edema. 2+ peripheral pulses.  SKIN: Warm, dry.  PSYCHIATRY Cooperative    )Consultations This Hospital Stay   ORTHOPEDIC SURGERY IP CONSULT  OCCUPATIONAL THERAPY ADULT IP CONSULT  PHYSICAL THERAPY ADULT IP CONSULT  SOCIAL WORK IP CONSULT  PHYSICAL THERAPY ADULT IP CONSULT  OCCUPATIONAL THERAPY ADULT IP CONSULT  PHYSICAL THERAPY ADULT IP CONSULT  OCCUPATIONAL THERAPY ADULT IP CONSULT    Time Spent on this Encounter   IIsaiah, personally saw the patient today and spent greater than 30 minutes discharging this patient.  More than 50% of time spent in direct patient care, care coordination, patient/caregiver counseling, and formalizing plan of care.    Discharge Orders     Wound care   Daily dry dressing changes.  Staples out 12 days post-op and apply steri-strips.     Weight bearing status   WBAT     General info for SNF   Length of Stay Estimate: Short Term Care: Estimated # of Days <30  Condition at Discharge: Improving  Level of care:skilled   Rehabilitation Potential: Good  Admission H&P remains valid and up-to-date: Yes  Recent Chemotherapy: N/A  Use Nursing Home Standing Orders: Yes     Mantoux instructions   Give two-step Mantoux (PPD) Per Facility Policy Yes     Reason for your hospital stay   Fall.     Activity - Up with nursing assistance     Follow Up and recommended labs and tests   Follow-up with your orthopedic MD at [facility] in  10-14 days.  Call [phone] to schedule ASAP.  Also, please call with any questions that come up prior to your appointment.     Follow Up and recommended labs and tests   Follow up with longterm physician.  The following labs/tests are recommended: hemoglobin and creatinine in 1 week   Follow up with primary care provider.   Follow up with Orthopedics as directed.     Additional Discharge Instructions   Aggressive incentive spirometry.  Avoid nephrotoxic drugs.  Discussed with patient's family and would hold off pursuing any further cardiac workup at this time.  Discontinued Ambien as concern patient at risk for delirium while on narcotics.     DNR/DNI     Physical Therapy Adult Consult   Evaluate and treat as clinically indicated.    Reason:  WBAT.  Posterior hip precautions.     Occupational Therapy Adult Consult   Evaluate and treat as clinically indicated.    Reason:  WBAT.  Posterior hip precautions.     Physical Therapy Adult Consult   Evaluate and treat as clinically indicated.    Reason:  Physical deconditioning.     Occupational Therapy Adult Consult   Evaluate and treat as clinically indicated.    Reason:  Physical deconditioning.     Fall precautions     Hip precautions     Fall precautions     Advance Diet as Tolerated   Follow this diet upon discharge: Orders Placed This Encounter     Room Service     Advance Diet as Tolerated: Regular Diet Adult         Discharge Medications   Current Discharge Medication List      START taking these medications    Details   acetaminophen (TYLENOL) 325 MG tablet Take 2 tablets (650 mg) by mouth every 4 hours as needed for other (multimodal surgical pain management along with NSAIDS and opioid medication as indicated based on pain control and physical function.)  Qty: 100 tablet, Refills: 0    Associated Diagnoses: Fracture of hip, left, closed, initial encounter (H)      Ergocalciferol (VITAMIN D) 86614 units CAPS Take 50,000 Units by mouth every 7 days for 5  doses  Qty: 5 capsule, Refills: 0    Associated Diagnoses: Fracture of hip, left, closed, initial encounter (H)         CONTINUE these medications which have CHANGED    Details   oxyCODONE IR (ROXICODONE) 5 MG tablet Take 1 tablet (5 mg) by mouth every 3 hours as needed for severe pain  Qty: 30 tablet, Refills: 0    Comments: Limit for severe pain due to confusion.  Associated Diagnoses: Fracture of hip, left, closed, initial encounter (H)         CONTINUE these medications which have NOT CHANGED    Details   aspirin 325 MG EC tablet Take one Aspirin tab twice daily for 5 weeks.  Qty: 70 tablet, Refills: 0    Associated Diagnoses: Closed fracture of neck of right femur, initial encounter (H)      atorvastatin (LIPITOR) 10 MG tablet Take 1 tablet (10 mg) by mouth every evening    Associated Diagnoses: Coronary artery disease involving native heart without angina pectoris, unspecified vessel or lesion type      diltiazem (TIAZAC) 120 MG 24 hr ER beaded capsule Take 120 mg by mouth daily      insulin glargine (LANTUS) 100 UNIT/ML injection Inject 7 Units Subcutaneous every morning      !! insulin lispro (HUMALOG KWIKPEN) 100 UNIT/ML injection Inject Subcutaneous At Bedtime Sliding scale:  -249=1 unit  -299=2 units  -349=3 units  -399=4 units  +=5 units      !! insulin lispro (HUMALOG KWIKPEN) 100 UNIT/ML injection Inject 2 Units Subcutaneous 3 times daily (before meals)    Associated Diagnoses: Type 2 diabetes mellitus with complication, with long-term current use of insulin (H)      insulin lispro (HUMALOG) 100 UNIT/ML injection Inject Subcutaneous 3 times daily (before meals) Sliding scale:   -189=1 unit  -239=2 units  -289=3 units  -339=4 units  -399=5 units  -499=6 units  +=7 units      metoprolol succinate (TOPROL-XL) 50 MG 24 hr tablet Take 1 tablet (50 mg) by mouth daily  Qty: 30 tablet    Associated Diagnoses: Paroxysmal supraventricular  tachycardia (H)      polyethylene glycol (MIRALAX/GLYCOLAX) Packet Take 17 g by mouth daily      tamsulosin (FLOMAX) 0.4 MG capsule Take 0.4 mg by mouth every evening       Skin Protectants, Misc. (ANIBAL PROTECT EX) Apply topically 2 times daily       !! - Potential duplicate medications found. Please discuss with provider.      STOP taking these medications       aspirin 81 MG tablet Comments:   Reason for Stopping:         IBUPROFEN PO Comments:   Reason for Stopping:         melatonin 5 MG tablet Comments:   Reason for Stopping:         melatonin 5 MG tablet Comments:   Reason for Stopping:         Zolpidem Tartrate (AMBIEN PO) Comments:   Reason for Stopping:             Allergies   No Known Allergies    Discharge Disposition   Discharged to TCU  Condition at discharge: Good    DATA  Most Recent 3 CBC's:  Recent Labs   Lab Test  10/10/18   0700  10/09/18   0703  10/08/18   0535   10/06/18   0643   WBC   --   9.2  9.2   --   10.2   HGB  8.7*  8.2*  7.8*   < >  8.3*   MCV   --   89  89   --   90   PLT   --   204  148*   --   126*    < > = values in this interval not displayed.      Most Recent 3 BMP's:  Recent Labs   Lab Test  10/10/18   0700  10/09/18   0703  10/08/18   0535  10/06/18   0643   NA   --   139  138  138   POTASSIUM   --   4.0  4.2  4.5   CHLORIDE   --   105  107  107   CO2   --   24  25  24   BUN   --   40*  37*  29   CR  1.79*  1.96*  2.11*  1.93*   ANIONGAP   --   10  6  7   JOSELINE   --   7.9*  7.5*  7.4*   GLC   --   205*  188*  187*     Most Recent 2 LFT's:  Recent Labs   Lab Test  10/09/18   0703   AST  22   ALT  12   ALKPHOS  72   BILITOTAL  0.6     Most Recent 3 Troponin's:  Recent Labs   Lab Test  10/10/18   0700  10/09/18   1302  10/09/18   0703   04/21/14   1656   TROPI  0.134*  0.143*  0.157*   < >   --    TROPONIN   --    --    --    --   0.03    < > = values in this interval not displayed.     Most Recent TSH, T4 and A1c Labs:  Recent Labs   Lab Test  10/08/18   0535  08/25/18   1954   TSH   4.57*   --    T4  1.28   --    A1C   --   8.4*     Results for orders placed or performed during the hospital encounter of 10/04/18   XR Chest 1 View    Narrative    XR CHEST 1 VW  10/4/2018 3:03 AM     HISTORY: Hip fracture.    COMPARISON: 8/29/2018.    FINDINGS: Supine chest. Sternal wires and mediastinal clips. Mild  probable scarring at the right lung base and in the left lung  laterally. The lungs are otherwise clear. No pneumothorax.      Impression    IMPRESSION: No acute abnormality.    SHARI HERNANDEZ MD   XR Pelvis w Hip Left 1 View    Narrative    XR PELVIS AND HIP LEFT 1 VIEW  10/4/2018 3:04 AM     HISTORY: Pain, fall, left hip pain.    COMPARISON: None.       Impression    IMPRESSION: Angulated left femoral neck fracture.    SHARI HERNANDEZ MD   XR Pelvis w Hip Port Left 1 View    Narrative    XR PELVIS AND HIP PORTABLE LEFT 1 VIEW 10/4/2018 3:43 PM    COMPARISON: Radiographs earlier on the same day.    HISTORY: Arthroplasty.      Impression    IMPRESSION: Bilateral hip hemiarthroplasties, unchanged on the RIGHT  and new on the LEFT. Hardware appears intact. No fractures are seen.    VÍCTOR ISLAS MD

## 2018-10-10 NOTE — PLAN OF CARE
Problem: Patient Care Overview  Goal: Plan of Care/Patient Progress Review  OT: attempted to see pt for OT session, pt refused due to fatigue and just getting back into bed.     Pt discharging to TCU today, GOALS NOT MET, see discharge summary

## 2018-10-10 NOTE — PROGRESS NOTES
Orthopedic Surgery  Abimael Flores  10/10/2018  Admit Date:  10/4/2018  POD # 6  S/P Left bipolar hip hemiarthroplasty     Patient resting comfortably in bed.    Pain controlled.  Tolerating oral intake.    Denies nausea or vomiting  Denies chest pain or shortness of breath  No events overnight.     Alert and orient to person, place, and time.   Vital Sign Ranges  Temperature Temp  Av.8  F (36.6  C)  Min: 97.5  F (36.4  C)  Max: 98.3  F (36.8  C)   Blood pressure Systolic (24hrs), Av , Min:97 , Max:131        Diastolic (24hrs), Av, Min:57, Max:72      Pulse No Data Recorded   Respirations Resp  Av.7  Min: 16  Max: 18   Pulse oximetry SpO2  Av %  Min: 95 %  Max: 97 %       Dressing is clean, dry, and intact.   Minimal erythema of the surrounding skin.   Bilateral calves are soft, non-tender.  Bilateral lower extremity is NVI.  Sensation intact bilateral lower extremities  Active dorsi and plantar flexion bilaterally  +Dp pulse    Labs:  Recent Labs   Lab Test  10/09/18   0703  10/08/18   0535  10/06/18   0643   POTASSIUM  4.0  4.2  4.5     Recent Labs   Lab Test  10/10/18   0700  10/09/18   0703  10/08/18   0535   HGB  8.7*  8.2*  7.8*     Recent Labs   Lab Test  14   0405   INR  0.91     Recent Labs   Lab Test  10/09/18   0703  10/08/18   0535  10/06/18   0643   PLT  204  148*  126*       A/P  1. Plan   Continue ASA for DVT prophylaxis.     Mobilize with PT/OT    WBAT   Hip precautions.     Continue current pain regiment.    2. Disposition   Anticipate d/c to TCU pending medical clearance - ok to discharge per ortho.    Sally Clifton PA-C

## 2018-10-10 NOTE — PLAN OF CARE
Problem: Patient Care Overview  Goal: Plan of Care/Patient Progress Review  PT Note 10/10/2018    PT attempted at scheduled time. RN was present in room and stated that he is being discharged soon.

## 2018-10-10 NOTE — PLAN OF CARE
Problem: Patient Care Overview  Goal: Plan of Care/Patient Progress Review  Physical Therapy Discharge Summary    Reason for therapy discharge:    Discharged to transitional care facility.    Progress towards therapy goal(s). See goals on Care Plan in [facility] record for goal details.  Goals not met.  Barriers to achieving goals:   discharge from facility.    Therapy recommendation(s):    Continued therapy is recommended.  Rationale/Recommendations:  to acheive PLOF and independence with mobility.

## 2018-10-10 NOTE — PROGRESS NOTES
D: SW following for discharge planning.   I: Pt will discharge today to [facility] via transport aide at 1400. Orders faxed and facility updated. Son updated and in agreement. Nursing aware. PAS done 9/3, still valid, PAS-RR confirmation # is : [id number].  P: discharge today.     ESTEFANI Thomas, LGSW  [id number]

## 2018-10-10 NOTE — PLAN OF CARE
Problem: Patient Care Overview  Goal: Plan of Care/Patient Progress Review  Outcome: Adequate for Discharge Date Met: 10/10/18  Patient up with assist of 2 and walker. Adequate I/O. Pain managed with PO pain meds. Patient discharged to TCU, all belongings sent with patient.

## 2018-10-11 ENCOUNTER — NURSING HOME VISIT (OUTPATIENT)
Dept: GERIATRICS | Facility: CLINIC | Age: 83
End: 2018-10-11
Payer: MEDICARE

## 2018-10-11 VITALS
HEART RATE: 88 BPM | DIASTOLIC BLOOD PRESSURE: 72 MMHG | HEIGHT: 68 IN | SYSTOLIC BLOOD PRESSURE: 144 MMHG | WEIGHT: 164.2 LBS | TEMPERATURE: 98 F | OXYGEN SATURATION: 96 % | BODY MASS INDEX: 24.89 KG/M2 | RESPIRATION RATE: 17 BRPM

## 2018-10-11 DIAGNOSIS — I49.9 CARDIAC ARRHYTHMIA, UNSPECIFIED CARDIAC ARRHYTHMIA TYPE: ICD-10-CM

## 2018-10-11 DIAGNOSIS — I25.810 CORONARY ARTERY DISEASE INVOLVING CORONARY BYPASS GRAFT OF NATIVE HEART WITHOUT ANGINA PECTORIS: ICD-10-CM

## 2018-10-11 DIAGNOSIS — N18.30 CKD (CHRONIC KIDNEY DISEASE) STAGE 3, GFR 30-59 ML/MIN (H): ICD-10-CM

## 2018-10-11 DIAGNOSIS — E11.22 TYPE 2 DIABETES MELLITUS WITH STAGE 3 CHRONIC KIDNEY DISEASE, WITH LONG-TERM CURRENT USE OF INSULIN (H): ICD-10-CM

## 2018-10-11 DIAGNOSIS — R53.81 PHYSICAL DECONDITIONING: ICD-10-CM

## 2018-10-11 DIAGNOSIS — Z79.4 TYPE 2 DIABETES MELLITUS WITH STAGE 3 CHRONIC KIDNEY DISEASE, WITH LONG-TERM CURRENT USE OF INSULIN (H): ICD-10-CM

## 2018-10-11 DIAGNOSIS — N18.30 TYPE 2 DIABETES MELLITUS WITH STAGE 3 CHRONIC KIDNEY DISEASE, WITH LONG-TERM CURRENT USE OF INSULIN (H): ICD-10-CM

## 2018-10-11 DIAGNOSIS — I10 BENIGN ESSENTIAL HYPERTENSION: ICD-10-CM

## 2018-10-11 DIAGNOSIS — S72.002D CLOSED FRACTURE OF NECK OF LEFT FEMUR WITH ROUTINE HEALING, SUBSEQUENT ENCOUNTER: Primary | ICD-10-CM

## 2018-10-11 DIAGNOSIS — D62 ANEMIA DUE TO BLOOD LOSS, ACUTE: ICD-10-CM

## 2018-10-11 PROCEDURE — 99310 SBSQ NF CARE HIGH MDM 45: CPT | Performed by: NURSE PRACTITIONER

## 2018-10-11 RX ORDER — TRAMADOL HYDROCHLORIDE 50 MG/1
50 TABLET ORAL EVERY 6 HOURS
Qty: 10 TABLET | Refills: 0 | Status: ON HOLD
Start: 2018-10-11 | End: 2018-10-16

## 2018-10-11 NOTE — LETTER
10/11/2018        RE: Abimael Flores  [address]  [facility] [address]        [facility]  PRIMARY CARE PROVIDER AND CLINIC:  [place] [address]  / [place] *  Chief Complaint   Patient presents with     Hospital F/U     [facility] Medical Record Number:  [id number]  Place of Service where encounter took place:  [facility] ([facility]) [[id number]]    HPI:    Abimael Flores is a 89 year old  (1/23/1924),admitted to the above facility from  [facility].  Hospital stay 10/4/2018 through 10/10/2018.  Admitted to this facility for  rehab, medical management and nursing care.  HPI information obtained from: facility chart records, facility staff, patient report and [facility] chart review.  Current issues are:      Closed fracture of neck of left femur with routine healing, subsequent encounter  Patient transferred back to TCU following repair of left hip fracture suffered from a fall at TCU where he was recovering from right hip fracture.   Surgery on 10/4 was uneventful. Post op issues included ABLA, tachycardia and mild delirium. Vit D added to meds  Activity is WBAT. He has oxycodone for pain but he does not want to take it due to SEs.   DVT prophylaxis is ASA     Coronary artery disease involving coronary bypass graft of native heart without angina pectoris  Cardiac arrhythmia, unspecified cardiac arrhythmia type  Patient has h/o CABG X4 in '96. He also has h/o Patient possible AF on ECG. He was taking atenolol PTA.. 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 CHADS-VASc score is 4.   Post operatively from right hip fracture repair patient did have episodes of tachycardia (RRT called twice due to low BP and tachycardia). He was started on metoprolol (replacing atenolol) and diltiazem. He was asymptomatic at time of tachycardia. Simvastatin was changed to atorvastatin as well, due to drug  interactions.   He had another bout of tachycardia following this most recent surgery.   HR today is around 80. No reports of chest pain or light headedness.   ABLA- hgb dropped from 9 to 6.7 post op. He was transfused 1 unit(s) PRBC on 10/5  Benign essential hypertension  No change to BPS meds   BP's: 144/72, 154/84, 109/61    CKD (chronic kidney disease) stage 3, GFR 30-59 ml/min (H)  Baseline creat 1.8- 1.9  Creatinine   Date Value Ref Range Status   10/10/2018 1.79 (H) 0.66 - 1.25 mg/dL Final       Type 2 diabetes mellitus with stage 3 chronic kidney disease, with long-term current use of insulin (H)  BG in , 97. He continues on PTA lantus.     Physical deconditioning  Lives with wife in [facility]. Wife has moved to memory care. At baseline he walks with 4WW.        CODE STATUS/ADVANCE DIRECTIVES DISCUSSION:   DNR / DNI  Patient's living condition: lives alone    ALLERGIES:Review of patient's allergies indicates no known allergies.  PAST MEDICAL HISTORY:  has a past medical history of Benign essential hypertension (9/4/2018); Coronary artery disease; Nonsenile cataract; Recent retinal detachment, total or subtotal (8/5/2014); and Type 2 diabetes mellitus without complications (H). He also has no past medical history of Diabetic retinopathy (H); Glaucoma; or Macular degeneration.  PAST SURGICAL HISTORY:  has a past surgical history that includes Cardiac surgery; Repair ruptured globe (4/21/2014); cataract iol, rt/lt (Bilateral); lacrimal caruncle removed BE (Bilateral, ~1989); Open reduction internal fixation hip bipolar (Right, 8/26/2018); and Open reduction internal fixation hip bipolar (Left, 10/4/2018).  FAMILY HISTORY: family history includes Diabetes in his father and mother. There is no history of Cancer, Glaucoma, or Macular Degeneration.  SOCIAL HISTORY:  reports that he has quit smoking. He has never used smokeless tobacco.    Post Discharge Medication Reconciliation Status: discharge medications  reconciled, continue medications without change.  Current Outpatient Prescriptions   Medication Sig Dispense Refill     acetaminophen (TYLENOL) 325 MG tablet Take 2 tablets (650 mg) by mouth every 4 hours as needed for other (multimodal surgical pain management along with NSAIDS and opioid medication as indicated based on pain control and physical function.) 100 tablet 0     aspirin 325 MG EC tablet Take one Aspirin tab twice daily for 5 weeks. 70 tablet 0     atorvastatin (LIPITOR) 10 MG tablet Take 1 tablet (10 mg) by mouth every evening       diltiazem (TIAZAC) 120 MG 24 hr ER beaded capsule Take 120 mg by mouth daily       Ergocalciferol (VITAMIN D) 23586 units CAPS Take 50,000 Units by mouth every 7 days for 5 doses 5 capsule 0     insulin glargine (LANTUS) 100 UNIT/ML injection Inject 7 Units Subcutaneous every morning       insulin lispro (HUMALOG KWIKPEN) 100 UNIT/ML injection Inject Subcutaneous At Bedtime Sliding scale:  -249=1 unit  -299=2 units  -349=3 units  -399=4 units  +=5 units       insulin lispro (HUMALOG KWIKPEN) 100 UNIT/ML injection Inject 2 Units Subcutaneous 3 times daily (before meals)       insulin lispro (HUMALOG) 100 UNIT/ML injection Inject Subcutaneous 3 times daily (before meals) Sliding scale:   -189=1 unit  -239=2 units  -289=3 units  -339=4 units  -399=5 units  -499=6 units  +=7 units       metoprolol succinate (TOPROL-XL) 50 MG 24 hr tablet Take 1 tablet (50 mg) by mouth daily 30 tablet      oxyCODONE IR (ROXICODONE) 5 MG tablet Take 1 tablet (5 mg) by mouth every 3 hours as needed for severe pain 30 tablet 0     polyethylene glycol (MIRALAX/GLYCOLAX) Packet Take 17 g by mouth daily       tamsulosin (FLOMAX) 0.4 MG capsule Take 0.4 mg by mouth every evening        Skin Protectants, Misc. (ANIBAL PROTECT EX) Apply topically 2 times daily         ROS:  4 point ROS including Respiratory, CV, GI and , other than  "that noted in the HPI,  is negative    Exam:  /72  Pulse 88  Temp 98  F (36.7  C)  Resp 17  Ht 5' 8\" (1.727 m)  Wt 164 lb 3.2 oz (74.5 kg)  SpO2 96%  BMI 24.97 kg/m2  GENERAL APPEARANCE:  Alert, in no distress  ENT:  Mouth and posterior oropharynx normal, moist mucous membranes, hearing acuity adequate   EYES:  EOM, conjunctivae, lids, pupils and irises normal  NECK:  No adenopathy,masses or thyromegaly  RESP:  respiratory effort and palpation of chest normal, no respiratory distress, Lung sounds clear  CV:  Palpation and auscultation of heart done , rate and rhythm reg, no murmur, no rub or gallop, Edema none  ABDOMEN:  normal bowel sounds, soft, nontender, no hepatosplenomegaly or other masses  M/S:   Gait and station antalgic gait, Digits and nails normal   SKIN:  Inspection/Palpation of skin and subcutaneous tissue coccyx is red with two 1cm open areas. Left hip incision is CDI.   NEURO: 2-12 in normal limits and at patient's baseline  PSYCH:  insight and judgement, memory intact , affect and mood normal      Lab/Diagnostic data:  CBC RESULTS:   Recent Labs   Lab Test  10/10/18   0700  10/09/18   0703  10/08/18   0535   WBC   --   9.2  9.2   RBC   --   2.81*  2.62*   HGB  8.7*  8.2*  7.8*   HCT   --   25.1*  23.4*   MCV   --   89  89   MCH   --   29.2  29.8   MCHC   --   32.7  33.3   RDW   --   14.7  14.8   PLT   --   204  148*       Last Basic Metabolic Panel:  Recent Labs   Lab Test  10/10/18   0700  10/09/18   0703  10/08/18   0535   NA   --   139  138   POTASSIUM   --   4.0  4.2   CHLORIDE   --   105  107   JSOELINE   --   7.9*  7.5*   CO2   --   24  25   BUN   --   40*  37*   CR  1.79*  1.96*  2.11*   GLC   --   205*  188*       Liver Function Studies -   Recent Labs   Lab Test  10/09/18   0703   PROTTOTAL  6.3*   ALBUMIN  2.1*   BILITOTAL  0.6   ALKPHOS  72   AST  22   ALT  12       TSH   Date Value Ref Range Status   10/08/2018 4.57 (H) 0.40 - 4.00 mU/L Final   04/02/2003 2.08 0.4 - 5.0 mU/L Final "   ]    Lab Results   Component Value Date    A1C 8.4 08/25/2018    A1C 7.8 04/22/2014       ASSESSMENT/PLAN:  (S72.002D) Closed fracture of neck of left femur with routine healing, subsequent encounter  (primary encounter diagnosis)  Comment: recent repair of left hip fracture that occurred about one month after right hip fracture.  Plan: traMADol (ULTRAM) 50 MG tablet QID, DISCONTINUE oxycodone        physical therapy     (I25.810) Coronary artery disease involving coronary bypass graft of native heart without angina pectoris  (I49.9) Cardiac arrhythmia, unspecified cardiac arrhythmia type  Comment: episode of tachycardia post op. Resolved. He was started on metop and dilt following right hip fracture due to similar episode  Plan: monitor HR and BP    (D62) Anemia due to blood loss, acute  Comment: required 1u PRBC due to hgb dorp to 6.7  Plan: CBC 10/15    (I10) Benign essential hypertension  Comment: adequate control  Plan: monitor     (N18.3) CKD (chronic kidney disease) stage 3, GFR 30-59 ml/min (H)  Comment: at baseline  Plan: avoid nephrotoxins. BMP 10/15    (E11.22,  N18.3,  Z79.4) Type 2 diabetes mellitus with stage 3 chronic kidney disease, with long-term current use of insulin (H)  Comment: taking PTA insulin  Plan: FBG QID    (R53.81) Physical deconditioning  Comment: due to left hip fracture. Fall in TCU.   Plan: physical therapy and OCCUPATIONAL THERAPY       Electronically signed by:  WILLIAM Cota CNP                    Sincerely,        WILLIAM Cota CNP

## 2018-10-11 NOTE — PROGRESS NOTES
[facility]  PRIMARY CARE PROVIDER AND CLINIC:  [facility] [address]  / [place] *  Chief Complaint   Patient presents with     Hospital F/U     [facility] Medical Record Number:  [id number]  [facility][id number]    HPI:    Abimael Flores is a 89 year old  (1/23/1924),admitted to the above facility from  [facility].  Hospital stay 10/4/2018 through 10/10/2018.  Admitted to this facility for  rehab, medical management and nursing care.  HPI information obtained from: facility chart records, facility staff, patient report and [facility] chart review.  Current issues are:      Closed fracture of neck of left femur with routine healing, subsequent encounter  Patient transferred back to TCU following repair of left hip fracture suffered from a fall at TCU where he was recovering from right hip fracture.   Surgery on 10/4 was uneventful. Post op issues included ABLA, tachycardia and mild delirium. Vit D added to meds  Activity is WBAT. He has oxycodone for pain but he does not want to take it due to SEs.   DVT prophylaxis is ASA     Coronary artery disease involving coronary bypass graft of native heart without angina pectoris  Cardiac arrhythmia, unspecified cardiac arrhythmia type  Patient has h/o CABG X4 in '96. He also has h/o Patient possible AF on ECG. He was taking atenolol PTA.. 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 CHADS-VASc score is 4.   Post operatively from right hip fracture repair patient did have episodes of tachycardia (RRT called twice due to low BP and tachycardia). He was started on metoprolol (replacing atenolol) and diltiazem. He was asymptomatic at time of tachycardia. Simvastatin was changed to atorvastatin as well, due to drug interactions.   He had another bout of tachycardia following this most recent surgery.   HR today is  around 80. No reports of chest pain or light headedness.   ABLA- hgb dropped from 9 to 6.7 post op. He was transfused 1 unit(s) PRBC on 10/5  Benign essential hypertension  No change to BPS meds   BP's: 144/72, 154/84, 109/61    CKD (chronic kidney disease) stage 3, GFR 30-59 ml/min (H)  Baseline creat 1.8- 1.9  Creatinine   Date Value Ref Range Status   10/10/2018 1.79 (H) 0.66 - 1.25 mg/dL Final       Type 2 diabetes mellitus with stage 3 chronic kidney disease, with long-term current use of insulin (H)  BG in , 97. He continues on PTA lantus.     Physical deconditioning  [place] Wife has moved to memory care. At baseline he walks with 4WW.        CODE STATUS/ADVANCE DIRECTIVES DISCUSSION:   DNR / DNI  Patient's living condition: lives alone    ALLERGIES:Review of patient's allergies indicates no known allergies.  PAST MEDICAL HISTORY:  has a past medical history of Benign essential hypertension (9/4/2018); Coronary artery disease; Nonsenile cataract; Recent retinal detachment, total or subtotal (8/5/2014); and Type 2 diabetes mellitus without complications (H). He also has no past medical history of Diabetic retinopathy (H); Glaucoma; or Macular degeneration.  PAST SURGICAL HISTORY:  has a past surgical history that includes Cardiac surgery; Repair ruptured globe (4/21/2014); cataract iol, rt/lt (Bilateral); lacrimal caruncle removed BE (Bilateral, ~1989); Open reduction internal fixation hip bipolar (Right, 8/26/2018); and Open reduction internal fixation hip bipolar (Left, 10/4/2018).  FAMILY HISTORY: family history includes Diabetes in his father and mother. There is no history of Cancer, Glaucoma, or Macular Degeneration.  SOCIAL HISTORY:  reports that he has quit smoking. He has never used smokeless tobacco.    Post Discharge Medication Reconciliation Status: discharge medications reconciled, continue medications without change.  Current Outpatient Prescriptions   Medication Sig  Dispense Refill     acetaminophen (TYLENOL) 325 MG tablet Take 2 tablets (650 mg) by mouth every 4 hours as needed for other (multimodal surgical pain management along with NSAIDS and opioid medication as indicated based on pain control and physical function.) 100 tablet 0     aspirin 325 MG EC tablet Take one Aspirin tab twice daily for 5 weeks. 70 tablet 0     atorvastatin (LIPITOR) 10 MG tablet Take 1 tablet (10 mg) by mouth every evening       diltiazem (TIAZAC) 120 MG 24 hr ER beaded capsule Take 120 mg by mouth daily       Ergocalciferol (VITAMIN D) 95650 units CAPS Take 50,000 Units by mouth every 7 days for 5 doses 5 capsule 0     insulin glargine (LANTUS) 100 UNIT/ML injection Inject 7 Units Subcutaneous every morning       insulin lispro (HUMALOG KWIKPEN) 100 UNIT/ML injection Inject Subcutaneous At Bedtime Sliding scale:  -249=1 unit  -299=2 units  -349=3 units  -399=4 units  +=5 units       insulin lispro (HUMALOG KWIKPEN) 100 UNIT/ML injection Inject 2 Units Subcutaneous 3 times daily (before meals)       insulin lispro (HUMALOG) 100 UNIT/ML injection Inject Subcutaneous 3 times daily (before meals) Sliding scale:   -189=1 unit  -239=2 units  -289=3 units  -339=4 units  -399=5 units  -499=6 units  +=7 units       metoprolol succinate (TOPROL-XL) 50 MG 24 hr tablet Take 1 tablet (50 mg) by mouth daily 30 tablet      oxyCODONE IR (ROXICODONE) 5 MG tablet Take 1 tablet (5 mg) by mouth every 3 hours as needed for severe pain 30 tablet 0     polyethylene glycol (MIRALAX/GLYCOLAX) Packet Take 17 g by mouth daily       tamsulosin (FLOMAX) 0.4 MG capsule Take 0.4 mg by mouth every evening        Skin Protectants, Misc. (ANIBAL PROTECT EX) Apply topically 2 times daily         ROS:  4 point ROS including Respiratory, CV, GI and , other than that noted in the HPI,  is negative    Exam:  /72  Pulse 88  Temp 98  F (36.7  C)  Resp 17   "Ht 5' 8\" (1.727 m)  Wt 164 lb 3.2 oz (74.5 kg)  SpO2 96%  BMI 24.97 kg/m2  GENERAL APPEARANCE:  Alert, in no distress  ENT:  Mouth and posterior oropharynx normal, moist mucous membranes, hearing acuity adequate   EYES:  EOM, conjunctivae, lids, pupils and irises normal  NECK:  No adenopathy,masses or thyromegaly  RESP:  respiratory effort and palpation of chest normal, no respiratory distress, Lung sounds clear  CV:  Palpation and auscultation of heart done , rate and rhythm reg, no murmur, no rub or gallop, Edema none  ABDOMEN:  normal bowel sounds, soft, nontender, no hepatosplenomegaly or other masses  M/S:   Gait and station antalgic gait, Digits and nails normal   SKIN:  Inspection/Palpation of skin and subcutaneous tissue coccyx is red with two 1cm open areas. Left hip incision is CDI.   NEURO: 2-12 in normal limits and at patient's baseline  PSYCH:  insight and judgement, memory intact , affect and mood normal      Lab/Diagnostic data:  CBC RESULTS:   Recent Labs   Lab Test  10/10/18   0700  10/09/18   0703  10/08/18   0535   WBC   --   9.2  9.2   RBC   --   2.81*  2.62*   HGB  8.7*  8.2*  7.8*   HCT   --   25.1*  23.4*   MCV   --   89  89   MCH   --   29.2  29.8   MCHC   --   32.7  33.3   RDW   --   14.7  14.8   PLT   --   204  148*       Last Basic Metabolic Panel:  Recent Labs   Lab Test  10/10/18   0700  10/09/18   0703  10/08/18   0535   NA   --   139  138   POTASSIUM   --   4.0  4.2   CHLORIDE   --   105  107   JOSELINE   --   7.9*  7.5*   CO2   --   24  25   BUN   --   40*  37*   CR  1.79*  1.96*  2.11*   GLC   --   205*  188*       Liver Function Studies -   Recent Labs   Lab Test  10/09/18   0703   PROTTOTAL  6.3*   ALBUMIN  2.1*   BILITOTAL  0.6   ALKPHOS  72   AST  22   ALT  12       TSH   Date Value Ref Range Status   10/08/2018 4.57 (H) 0.40 - 4.00 mU/L Final   04/02/2003 2.08 0.4 - 5.0 mU/L Final   ]    Lab Results   Component Value Date    A1C 8.4 08/25/2018    A1C 7.8 04/22/2014 "       ASSESSMENT/PLAN:  (S72.002D) Closed fracture of neck of left femur with routine healing, subsequent encounter  (primary encounter diagnosis)  Comment: recent repair of left hip fracture that occurred about one month after right hip fracture.  Plan: traMADol (ULTRAM) 50 MG tablet QID, DISCONTINUE oxycodone        physical therapy     (I25.810) Coronary artery disease involving coronary bypass graft of native heart without angina pectoris  (I49.9) Cardiac arrhythmia, unspecified cardiac arrhythmia type  Comment: episode of tachycardia post op. Resolved. He was started on metop and dilt following right hip fracture due to similar episode  Plan: monitor HR and BP    (D62) Anemia due to blood loss, acute  Comment: required 1u PRBC due to hgb dorp to 6.7  Plan: CBC 10/15    (I10) Benign essential hypertension  Comment: adequate control  Plan: monitor     (N18.3) CKD (chronic kidney disease) stage 3, GFR 30-59 ml/min (H)  Comment: at baseline  Plan: avoid nephrotoxins. BMP 10/15    (E11.22,  N18.3,  Z79.4) Type 2 diabetes mellitus with stage 3 chronic kidney disease, with long-term current use of insulin (H)  Comment: taking PTA insulin  Plan: FBG QID    (R53.81) Physical deconditioning  Comment: due to left hip fracture. Fall in TCU.   Plan: physical therapy and OCCUPATIONAL THERAPY       Electronically signed by:  WILLIAM Cota CNP

## 2018-10-14 ENCOUNTER — APPOINTMENT (OUTPATIENT)
Dept: GENERAL RADIOLOGY | Facility: CLINIC | Age: 83
DRG: 560 | End: 2018-10-14
Attending: ORTHOPAEDIC SURGERY
Payer: MEDICARE

## 2018-10-14 ENCOUNTER — TELEPHONE (OUTPATIENT)
Dept: GERIATRICS | Facility: CLINIC | Age: 83
End: 2018-10-14

## 2018-10-14 ENCOUNTER — ANESTHESIA EVENT (OUTPATIENT)
Dept: SURGERY | Facility: CLINIC | Age: 83
DRG: 560 | End: 2018-10-14
Payer: MEDICARE

## 2018-10-14 ENCOUNTER — HOSPITAL ENCOUNTER (INPATIENT)
Facility: CLINIC | Age: 83
LOS: 3 days | Discharge: NURSING, REHAB OR LONG-TERM CARE | DRG: 560 | End: 2018-10-17
Attending: EMERGENCY MEDICINE | Admitting: STUDENT IN AN ORGANIZED HEALTH CARE EDUCATION/TRAINING PROGRAM
Payer: MEDICARE

## 2018-10-14 ENCOUNTER — APPOINTMENT (OUTPATIENT)
Dept: GENERAL RADIOLOGY | Facility: CLINIC | Age: 83
DRG: 560 | End: 2018-10-14
Attending: EMERGENCY MEDICINE
Payer: MEDICARE

## 2018-10-14 ENCOUNTER — ANESTHESIA (OUTPATIENT)
Dept: SURGERY | Facility: CLINIC | Age: 83
DRG: 560 | End: 2018-10-14
Payer: MEDICARE

## 2018-10-14 ENCOUNTER — SURGERY (OUTPATIENT)
Age: 83
End: 2018-10-14

## 2018-10-14 DIAGNOSIS — S72.002D CLOSED FRACTURE OF NECK OF LEFT FEMUR WITH ROUTINE HEALING, SUBSEQUENT ENCOUNTER: ICD-10-CM

## 2018-10-14 DIAGNOSIS — S73.005A DISLOCATION OF LEFT HIP, INITIAL ENCOUNTER (H): ICD-10-CM

## 2018-10-14 PROBLEM — J18.9 PNEUMONIA DUE TO INFECTIOUS ORGANISM: Status: RESOLVED | Noted: 2018-09-04 | Resolved: 2018-10-14

## 2018-10-14 PROBLEM — D62 ANEMIA DUE TO BLOOD LOSS, ACUTE: Status: RESOLVED | Noted: 2018-10-11 | Resolved: 2018-10-14

## 2018-10-14 PROBLEM — S73.006A DISLOCATION OF HIP (H): Status: ACTIVE | Noted: 2018-10-14

## 2018-10-14 PROBLEM — R53.81 PHYSICAL DECONDITIONING: Status: RESOLVED | Noted: 2018-09-04 | Resolved: 2018-10-14

## 2018-10-14 LAB
ANION GAP SERPL CALCULATED.3IONS-SCNC: 8 MMOL/L (ref 3–14)
BASOPHILS # BLD AUTO: 0 10E9/L (ref 0–0.2)
BASOPHILS NFR BLD AUTO: 0.2 %
BUN SERPL-MCNC: 41 MG/DL (ref 7–30)
CALCIUM SERPL-MCNC: 7.7 MG/DL (ref 8.5–10.1)
CHLORIDE SERPL-SCNC: 102 MMOL/L (ref 94–109)
CO2 SERPL-SCNC: 25 MMOL/L (ref 20–32)
CREAT SERPL-MCNC: 2.14 MG/DL (ref 0.66–1.25)
DIFFERENTIAL METHOD BLD: ABNORMAL
EOSINOPHIL # BLD AUTO: 0.3 10E9/L (ref 0–0.7)
EOSINOPHIL NFR BLD AUTO: 2.4 %
ERYTHROCYTE [DISTWIDTH] IN BLOOD BY AUTOMATED COUNT: 15.5 % (ref 10–15)
GFR SERPL CREATININE-BSD FRML MDRD: 29 ML/MIN/1.7M2
GLUCOSE BLDC GLUCOMTR-MCNC: 219 MG/DL (ref 70–99)
GLUCOSE SERPL-MCNC: 245 MG/DL (ref 70–99)
HCT VFR BLD AUTO: 26.7 % (ref 40–53)
HGB BLD-MCNC: 8.6 G/DL (ref 13.3–17.7)
IMM GRANULOCYTES # BLD: 0.1 10E9/L (ref 0–0.4)
IMM GRANULOCYTES NFR BLD: 0.5 %
INR PPP: 1.08 (ref 0.86–1.14)
LYMPHOCYTES # BLD AUTO: 2 10E9/L (ref 0.8–5.3)
LYMPHOCYTES NFR BLD AUTO: 18.4 %
MCH RBC QN AUTO: 29.3 PG (ref 26.5–33)
MCHC RBC AUTO-ENTMCNC: 32.2 G/DL (ref 31.5–36.5)
MCV RBC AUTO: 91 FL (ref 78–100)
MONOCYTES # BLD AUTO: 0.9 10E9/L (ref 0–1.3)
MONOCYTES NFR BLD AUTO: 8.5 %
NEUTROPHILS # BLD AUTO: 7.6 10E9/L (ref 1.6–8.3)
NEUTROPHILS NFR BLD AUTO: 70 %
NRBC # BLD AUTO: 0 10*3/UL
NRBC BLD AUTO-RTO: 0 /100
PLATELET # BLD AUTO: 355 10E9/L (ref 150–450)
POTASSIUM SERPL-SCNC: 4.7 MMOL/L (ref 3.4–5.3)
RBC # BLD AUTO: 2.94 10E12/L (ref 4.4–5.9)
SODIUM SERPL-SCNC: 135 MMOL/L (ref 133–144)
WBC # BLD AUTO: 10.8 10E9/L (ref 4–11)

## 2018-10-14 PROCEDURE — 25000128 H RX IP 250 OP 636: Performed by: EMERGENCY MEDICINE

## 2018-10-14 PROCEDURE — 25000128 H RX IP 250 OP 636: Performed by: NURSE ANESTHETIST, CERTIFIED REGISTERED

## 2018-10-14 PROCEDURE — 40000986 XR PELVIS AND HIP PORTABLE LEFT 1 VIEW

## 2018-10-14 PROCEDURE — A9270 NON-COVERED ITEM OR SERVICE: HCPCS | Mod: GY | Performed by: STUDENT IN AN ORGANIZED HEALTH CARE EDUCATION/TRAINING PROGRAM

## 2018-10-14 PROCEDURE — P9041 ALBUMIN (HUMAN),5%, 50ML: HCPCS | Performed by: NURSE ANESTHETIST, CERTIFIED REGISTERED

## 2018-10-14 PROCEDURE — 80048 BASIC METABOLIC PNL TOTAL CA: CPT | Performed by: EMERGENCY MEDICINE

## 2018-10-14 PROCEDURE — 96376 TX/PRO/DX INJ SAME DRUG ADON: CPT

## 2018-10-14 PROCEDURE — 25000566 ZZH SEVOFLURANE, EA 15 MIN: Performed by: ORTHOPAEDIC SURGERY

## 2018-10-14 PROCEDURE — 12000007 ZZH R&B INTERMEDIATE

## 2018-10-14 PROCEDURE — 36000050 ZZH SURGERY LEVEL 2 1ST 30 MIN: Performed by: ORTHOPAEDIC SURGERY

## 2018-10-14 PROCEDURE — 37000008 ZZH ANESTHESIA TECHNICAL FEE, 1ST 30 MIN: Performed by: ORTHOPAEDIC SURGERY

## 2018-10-14 PROCEDURE — 25000132 ZZH RX MED GY IP 250 OP 250 PS 637: Mod: GY | Performed by: STUDENT IN AN ORGANIZED HEALTH CARE EDUCATION/TRAINING PROGRAM

## 2018-10-14 PROCEDURE — 36000052 ZZH SURGERY LEVEL 2 EA 15 ADDTL MIN: Performed by: ORTHOPAEDIC SURGERY

## 2018-10-14 PROCEDURE — 37000009 ZZH ANESTHESIA TECHNICAL FEE, EACH ADDTL 15 MIN: Performed by: ORTHOPAEDIC SURGERY

## 2018-10-14 PROCEDURE — 96375 TX/PRO/DX INJ NEW DRUG ADDON: CPT

## 2018-10-14 PROCEDURE — 85610 PROTHROMBIN TIME: CPT | Performed by: EMERGENCY MEDICINE

## 2018-10-14 PROCEDURE — 99285 EMERGENCY DEPT VISIT HI MDM: CPT | Mod: 25

## 2018-10-14 PROCEDURE — 40000170 ZZH STATISTIC PRE-PROCEDURE ASSESSMENT II: Performed by: ORTHOPAEDIC SURGERY

## 2018-10-14 PROCEDURE — 85025 COMPLETE CBC W/AUTO DIFF WBC: CPT | Performed by: EMERGENCY MEDICINE

## 2018-10-14 PROCEDURE — 25000128 H RX IP 250 OP 636

## 2018-10-14 PROCEDURE — 0SSBXZZ REPOSITION LEFT HIP JOINT, EXTERNAL APPROACH: ICD-10-PCS | Performed by: ORTHOPAEDIC SURGERY

## 2018-10-14 PROCEDURE — 99223 1ST HOSP IP/OBS HIGH 75: CPT | Mod: AI | Performed by: STUDENT IN AN ORGANIZED HEALTH CARE EDUCATION/TRAINING PROGRAM

## 2018-10-14 PROCEDURE — 73502 X-RAY EXAM HIP UNI 2-3 VIEWS: CPT

## 2018-10-14 PROCEDURE — 00000146 ZZHCL STATISTIC GLUCOSE BY METER IP

## 2018-10-14 PROCEDURE — 25000128 H RX IP 250 OP 636: Performed by: STUDENT IN AN ORGANIZED HEALTH CARE EDUCATION/TRAINING PROGRAM

## 2018-10-14 PROCEDURE — 71000012 ZZH RECOVERY PHASE 1 LEVEL 1 FIRST HR: Performed by: ORTHOPAEDIC SURGERY

## 2018-10-14 PROCEDURE — 96374 THER/PROPH/DIAG INJ IV PUSH: CPT

## 2018-10-14 PROCEDURE — 40000277 XR SURGERY CARM FLUORO LESS THAN 5 MIN W STILLS

## 2018-10-14 RX ORDER — TAMSULOSIN HYDROCHLORIDE 0.4 MG/1
0.4 CAPSULE ORAL EVERY EVENING
Status: DISCONTINUED | OUTPATIENT
Start: 2018-10-14 | End: 2018-10-17 | Stop reason: HOSPADM

## 2018-10-14 RX ORDER — NICOTINE POLACRILEX 4 MG
15-30 LOZENGE BUCCAL
Status: DISCONTINUED | OUTPATIENT
Start: 2018-10-14 | End: 2018-10-17 | Stop reason: HOSPADM

## 2018-10-14 RX ORDER — ALBUTEROL SULFATE 0.83 MG/ML
2.5 SOLUTION RESPIRATORY (INHALATION) EVERY 4 HOURS PRN
Status: DISCONTINUED | OUTPATIENT
Start: 2018-10-14 | End: 2018-10-14 | Stop reason: HOSPADM

## 2018-10-14 RX ORDER — FENTANYL CITRATE 50 UG/ML
25-50 INJECTION, SOLUTION INTRAMUSCULAR; INTRAVENOUS
Status: DISCONTINUED | OUTPATIENT
Start: 2018-10-14 | End: 2018-10-14 | Stop reason: HOSPADM

## 2018-10-14 RX ORDER — MORPHINE SULFATE 2 MG/ML
INJECTION, SOLUTION INTRAMUSCULAR; INTRAVENOUS
Status: COMPLETED
Start: 2018-10-14 | End: 2018-10-14

## 2018-10-14 RX ORDER — HYDROMORPHONE HYDROCHLORIDE 1 MG/ML
.3-.5 INJECTION, SOLUTION INTRAMUSCULAR; INTRAVENOUS; SUBCUTANEOUS EVERY 5 MIN PRN
Status: DISCONTINUED | OUTPATIENT
Start: 2018-10-14 | End: 2018-10-14 | Stop reason: HOSPADM

## 2018-10-14 RX ORDER — CEFAZOLIN SODIUM 1 G/3ML
1 INJECTION, POWDER, FOR SOLUTION INTRAMUSCULAR; INTRAVENOUS SEE ADMIN INSTRUCTIONS
Status: DISCONTINUED | OUTPATIENT
Start: 2018-10-14 | End: 2018-10-14 | Stop reason: HOSPADM

## 2018-10-14 RX ORDER — LIDOCAINE 40 MG/G
CREAM TOPICAL
Status: DISCONTINUED | OUTPATIENT
Start: 2018-10-14 | End: 2018-10-17 | Stop reason: HOSPADM

## 2018-10-14 RX ORDER — NALOXONE HYDROCHLORIDE 0.4 MG/ML
.1-.4 INJECTION, SOLUTION INTRAMUSCULAR; INTRAVENOUS; SUBCUTANEOUS
Status: DISCONTINUED | OUTPATIENT
Start: 2018-10-14 | End: 2018-10-17 | Stop reason: HOSPADM

## 2018-10-14 RX ORDER — MEPERIDINE HYDROCHLORIDE 25 MG/ML
12.5 INJECTION INTRAMUSCULAR; INTRAVENOUS; SUBCUTANEOUS EVERY 5 MIN PRN
Status: DISCONTINUED | OUTPATIENT
Start: 2018-10-14 | End: 2018-10-14 | Stop reason: HOSPADM

## 2018-10-14 RX ORDER — HYDROMORPHONE HYDROCHLORIDE 1 MG/ML
.5-1 INJECTION, SOLUTION INTRAMUSCULAR; INTRAVENOUS; SUBCUTANEOUS
Status: DISCONTINUED | OUTPATIENT
Start: 2018-10-14 | End: 2018-10-15

## 2018-10-14 RX ORDER — DEXTROSE MONOHYDRATE 25 G/50ML
25-50 INJECTION, SOLUTION INTRAVENOUS
Status: DISCONTINUED | OUTPATIENT
Start: 2018-10-14 | End: 2018-10-17 | Stop reason: HOSPADM

## 2018-10-14 RX ORDER — ONDANSETRON 2 MG/ML
INJECTION INTRAMUSCULAR; INTRAVENOUS PRN
Status: DISCONTINUED | OUTPATIENT
Start: 2018-10-14 | End: 2018-10-14

## 2018-10-14 RX ORDER — HYDROMORPHONE HYDROCHLORIDE 1 MG/ML
.3-.5 INJECTION, SOLUTION INTRAMUSCULAR; INTRAVENOUS; SUBCUTANEOUS
Status: DISCONTINUED | OUTPATIENT
Start: 2018-10-14 | End: 2018-10-14

## 2018-10-14 RX ORDER — CEFAZOLIN SODIUM 2 G/100ML
2 INJECTION, SOLUTION INTRAVENOUS
Status: DISCONTINUED | OUTPATIENT
Start: 2018-10-14 | End: 2018-10-14 | Stop reason: HOSPADM

## 2018-10-14 RX ORDER — SODIUM CHLORIDE, SODIUM LACTATE, POTASSIUM CHLORIDE, CALCIUM CHLORIDE 600; 310; 30; 20 MG/100ML; MG/100ML; MG/100ML; MG/100ML
INJECTION, SOLUTION INTRAVENOUS CONTINUOUS PRN
Status: DISCONTINUED | OUTPATIENT
Start: 2018-10-14 | End: 2018-10-14

## 2018-10-14 RX ORDER — HYDRALAZINE HYDROCHLORIDE 20 MG/ML
2.5-5 INJECTION INTRAMUSCULAR; INTRAVENOUS EVERY 10 MIN PRN
Status: DISCONTINUED | OUTPATIENT
Start: 2018-10-14 | End: 2018-10-14 | Stop reason: HOSPADM

## 2018-10-14 RX ORDER — ONDANSETRON 2 MG/ML
4 INJECTION INTRAMUSCULAR; INTRAVENOUS EVERY 30 MIN PRN
Status: DISCONTINUED | OUTPATIENT
Start: 2018-10-14 | End: 2018-10-14 | Stop reason: HOSPADM

## 2018-10-14 RX ORDER — ALBUMIN, HUMAN INJ 5% 5 %
SOLUTION INTRAVENOUS CONTINUOUS PRN
Status: DISCONTINUED | OUTPATIENT
Start: 2018-10-14 | End: 2018-10-14

## 2018-10-14 RX ORDER — FENTANYL CITRATE 50 UG/ML
INJECTION, SOLUTION INTRAMUSCULAR; INTRAVENOUS PRN
Status: DISCONTINUED | OUTPATIENT
Start: 2018-10-14 | End: 2018-10-14

## 2018-10-14 RX ORDER — DILTIAZEM HYDROCHLORIDE 120 MG/1
120 CAPSULE, EXTENDED RELEASE ORAL DAILY
Status: DISCONTINUED | OUTPATIENT
Start: 2018-10-15 | End: 2018-10-17 | Stop reason: HOSPADM

## 2018-10-14 RX ORDER — LABETALOL HYDROCHLORIDE 5 MG/ML
10 INJECTION, SOLUTION INTRAVENOUS
Status: DISCONTINUED | OUTPATIENT
Start: 2018-10-14 | End: 2018-10-14 | Stop reason: HOSPADM

## 2018-10-14 RX ORDER — ONDANSETRON 4 MG/1
4 TABLET, ORALLY DISINTEGRATING ORAL EVERY 6 HOURS PRN
Status: DISCONTINUED | OUTPATIENT
Start: 2018-10-14 | End: 2018-10-17 | Stop reason: HOSPADM

## 2018-10-14 RX ORDER — SODIUM CHLORIDE 9 MG/ML
INJECTION, SOLUTION INTRAVENOUS CONTINUOUS
Status: DISCONTINUED | OUTPATIENT
Start: 2018-10-14 | End: 2018-10-15

## 2018-10-14 RX ORDER — ACETAMINOPHEN 325 MG/1
650 TABLET ORAL EVERY 4 HOURS PRN
Status: DISCONTINUED | OUTPATIENT
Start: 2018-10-14 | End: 2018-10-17 | Stop reason: HOSPADM

## 2018-10-14 RX ORDER — HYDROMORPHONE HYDROCHLORIDE 1 MG/ML
0.5 INJECTION, SOLUTION INTRAMUSCULAR; INTRAVENOUS; SUBCUTANEOUS
Status: DISCONTINUED | OUTPATIENT
Start: 2018-10-14 | End: 2018-10-14

## 2018-10-14 RX ORDER — OXYCODONE HYDROCHLORIDE 5 MG/1
5-10 TABLET ORAL EVERY 4 HOURS PRN
Status: DISCONTINUED | OUTPATIENT
Start: 2018-10-14 | End: 2018-10-17 | Stop reason: HOSPADM

## 2018-10-14 RX ORDER — MORPHINE SULFATE 2 MG/ML
4 INJECTION, SOLUTION INTRAMUSCULAR; INTRAVENOUS ONCE
Status: COMPLETED | OUTPATIENT
Start: 2018-10-14 | End: 2018-10-14

## 2018-10-14 RX ORDER — TRAMADOL HYDROCHLORIDE 50 MG/1
50 TABLET ORAL EVERY 12 HOURS
Status: DISCONTINUED | OUTPATIENT
Start: 2018-10-14 | End: 2018-10-17 | Stop reason: HOSPADM

## 2018-10-14 RX ORDER — SODIUM CHLORIDE, SODIUM LACTATE, POTASSIUM CHLORIDE, CALCIUM CHLORIDE 600; 310; 30; 20 MG/100ML; MG/100ML; MG/100ML; MG/100ML
INJECTION, SOLUTION INTRAVENOUS CONTINUOUS
Status: DISCONTINUED | OUTPATIENT
Start: 2018-10-14 | End: 2018-10-14 | Stop reason: HOSPADM

## 2018-10-14 RX ORDER — NALOXONE HYDROCHLORIDE 0.4 MG/ML
.1-.4 INJECTION, SOLUTION INTRAMUSCULAR; INTRAVENOUS; SUBCUTANEOUS
Status: DISCONTINUED | OUTPATIENT
Start: 2018-10-14 | End: 2018-10-14

## 2018-10-14 RX ORDER — ONDANSETRON 4 MG/1
4 TABLET, ORALLY DISINTEGRATING ORAL EVERY 30 MIN PRN
Status: DISCONTINUED | OUTPATIENT
Start: 2018-10-14 | End: 2018-10-14 | Stop reason: HOSPADM

## 2018-10-14 RX ORDER — ATORVASTATIN CALCIUM 10 MG/1
10 TABLET, FILM COATED ORAL EVERY EVENING
Status: DISCONTINUED | OUTPATIENT
Start: 2018-10-14 | End: 2018-10-17 | Stop reason: HOSPADM

## 2018-10-14 RX ORDER — ONDANSETRON 2 MG/ML
4 INJECTION INTRAMUSCULAR; INTRAVENOUS EVERY 6 HOURS PRN
Status: DISCONTINUED | OUTPATIENT
Start: 2018-10-14 | End: 2018-10-17 | Stop reason: HOSPADM

## 2018-10-14 RX ORDER — METOPROLOL SUCCINATE 50 MG/1
50 TABLET, EXTENDED RELEASE ORAL DAILY
Status: DISCONTINUED | OUTPATIENT
Start: 2018-10-15 | End: 2018-10-17 | Stop reason: HOSPADM

## 2018-10-14 RX ADMIN — ONDANSETRON 4 MG: 2 INJECTION INTRAMUSCULAR; INTRAVENOUS at 20:02

## 2018-10-14 RX ADMIN — MORPHINE SULFATE 2 MG: 2 INJECTION, SOLUTION INTRAMUSCULAR; INTRAVENOUS at 12:46

## 2018-10-14 RX ADMIN — PHENYLEPHRINE HYDROCHLORIDE 100 MCG: 10 INJECTION, SOLUTION INTRAMUSCULAR; INTRAVENOUS; SUBCUTANEOUS at 19:56

## 2018-10-14 RX ADMIN — ALBUMIN (HUMAN): 12.5 SOLUTION INTRAVENOUS at 20:07

## 2018-10-14 RX ADMIN — SODIUM CHLORIDE, PRESERVATIVE FREE: 5 INJECTION INTRAVENOUS at 16:23

## 2018-10-14 RX ADMIN — FENTANYL CITRATE 100 MCG: 50 INJECTION, SOLUTION INTRAMUSCULAR; INTRAVENOUS at 19:52

## 2018-10-14 RX ADMIN — TRAMADOL HYDROCHLORIDE 50 MG: 50 TABLET, COATED ORAL at 22:53

## 2018-10-14 RX ADMIN — Medication 0.5 MG: at 14:53

## 2018-10-14 RX ADMIN — Medication 0.5 MG: at 16:23

## 2018-10-14 RX ADMIN — MORPHINE SULFATE 2 MG: 2 INJECTION, SOLUTION INTRAMUSCULAR; INTRAVENOUS at 12:41

## 2018-10-14 RX ADMIN — Medication 0.5 MG: at 13:55

## 2018-10-14 RX ADMIN — ACETAMINOPHEN 650 MG: 325 TABLET, FILM COATED ORAL at 16:23

## 2018-10-14 RX ADMIN — SODIUM CHLORIDE, POTASSIUM CHLORIDE, SODIUM LACTATE AND CALCIUM CHLORIDE: 600; 310; 30; 20 INJECTION, SOLUTION INTRAVENOUS at 19:44

## 2018-10-14 ASSESSMENT — ACTIVITIES OF DAILY LIVING (ADL)
TOILETING: 2-->ASSISTIVE PERSON
WHICH_OF_THE_ABOVE_FUNCTIONAL_RISKS_HAD_A_RECENT_ONSET_OR_CHANGE?: AMBULATION;FALL HISTORY
SWALLOWING: 0-->SWALLOWS FOODS/LIQUIDS WITHOUT DIFFICULTY
NUMBER_OF_TIMES_PATIENT_HAS_FALLEN_WITHIN_LAST_SIX_MONTHS: 3
RETIRED_COMMUNICATION: 0-->UNDERSTANDS/COMMUNICATES WITHOUT DIFFICULTY
RETIRED_EATING: 0-->INDEPENDENT
BATHING: 2-->ASSISTIVE PERSON
FALL_HISTORY_WITHIN_LAST_SIX_MONTHS: YES
ADLS_ACUITY_SCORE: 15
COGNITION: 0 - NO COGNITION ISSUES REPORTED
DRESS: 2-->ASSISTIVE PERSON

## 2018-10-14 ASSESSMENT — ENCOUNTER SYMPTOMS
ARTHRALGIAS: 1
HEADACHES: 0

## 2018-10-14 NOTE — ED NOTES
[facility]  ED Nurse Handoff Report    ED Chief complaint: Fall (leftg hip pain)      ED Diagnosis:   Final diagnoses:   Dislocation of left hip, initial encounter (H)       Code Status: ED physician did not axxess    Allergies: No Known Allergies    Activity level - Baseline/Home:  Unable to Assess    Activity Level - Current:   Unable to Assess     Needed?: No    Isolation: No  Infection: Not Applicable  Bariatric?: No    Vital Signs:   Vitals:    10/14/18 1230 10/14/18 1300 10/14/18 1330 10/14/18 1400   BP: 106/59 117/57 115/52 112/48   Pulse:       Resp:       Temp:       TempSrc:       SpO2: 95% 96% 97% 97%   Weight:       Height:           Cardiac Rhythm: ,        Pain level: 0-10 Pain Scale: 10    Is this patient confused?: No   [place] - Suicide Severity Rating Scale Completed?  Yes  If yes, what color did the patient score?  White    Patient Report: Initial Complaint: Fall with left thigh pain  Focused Assessment: presents with left thigh pain after falling. Hx of hip replacement   Tests Performed: xray labs  Abnormal Results: see results  Treatments provided: MS and Dilaudid    Family Comments: Son here. 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   Interventions performed were .    Severe Sepsis OR Septic Shock Diagnosis Present: No    To be done/followed up on inpatient unit:      ED NURSE PHONE NUMBER: [phone]

## 2018-10-14 NOTE — IP AVS SNAPSHOT
"          [place] [address] [facility]: [phone]                                              INTERAGENCY TRANSFER FORM - LAB / IMAGING / EKG / EMG RESULTS   10/14/2018                    Hospital Admission Date: 10/14/2018  CHERYL HOLLOWAY   : 1924  Sex: Male        Attending Provider: Silvano Kumar MD     Allergies:  No Known Allergies    Infection:  None   Service:  HOSPITALIST    Ht:  1.727 m (5' 8\")   Wt:  71 kg (156 lb 8 oz)   Admission Wt:  72.6 kg (160 lb)    BMI:  23.8 kg/m 2   BSA:  1.85 m 2            Patient PCP Information     Provider PCP Type    [facility]         Lab Results - 3 Days      Glucose by meter [[id number]] (Abnormal)  Resulted: 10/17/18 1212, Result status: Final result    Ordering provider: Silvano Kumar MD  10/17/18 1131 Resulting lab: POINT OF CARE TEST, GLUCOSE    Specimen Information    Type Source Collected On     10/17/18 1131          Components       Value Reference Range Flag Lab   Glucose 258 70 - 99 mg/dL H 170            Glucose by meter [[id number]] (Abnormal)  Resulted: 10/17/18 0720, Result status: Final result    Ordering provider: Silvano Kumar MD  10/17/18 0708 Resulting lab: POINT OF CARE TEST, GLUCOSE    Specimen Information    Type Source Collected On     10/17/18 0708          Components       Value Reference Range Flag Lab   Glucose 140 70 - 99 mg/dL H 170            Glucose by meter [[id number]] (Abnormal)  Resulted: 10/17/18 0231, Result status: Final result    Ordering provider: Silvano Kumar MD  10/17/18 0216 Resulting lab: POINT OF CARE TEST, GLUCOSE    Specimen Information    Type Source Collected On     10/17/18 0216          Components       Value Reference Range Flag Lab   Glucose 149 70 - 99 mg/dL H 170            Hemoglobin [[id number]] (Abnormal)  Resulted: 10/16/18 1833, Result status: Final result    Ordering provider: Milton Gusman MD  10/16/18 1100 Resulting lab: [facility]    Specimen " Information    Type Source Collected On   Blood  10/16/18 1808          Components       Value Reference Range Flag Lab   Hemoglobin 8.2 13.3 - 17.7 g/dL L FrStHsLb            Glucose by meter [[id number]] (Abnormal)  Resulted: 10/16/18 1723, Result status: Final result    Ordering provider: Silvano Kumar MD  10/16/18 1710 Resulting lab: POINT OF CARE TEST, GLUCOSE    Specimen Information    Type Source Collected On     10/16/18 1710          Components       Value Reference Range Flag Lab   Glucose 219 70 - 99 mg/dL H 170            Glucose by meter [[id number]] (Abnormal)  Resulted: 10/16/18 1149, Result status: Final result    Ordering provider: Silvano Kumar MD  10/16/18 1136 Resulting lab: POINT OF CARE TEST, GLUCOSE    Specimen Information    Type Source Collected On     10/16/18 1136          Components       Value Reference Range Flag Lab   Glucose 176 70 - 99 mg/dL H 170            Glucose by meter [[id number]] (Abnormal)  Resulted: 10/16/18 1149, Result status: Final result    Ordering provider: Silvano Kumar MD  10/16/18 0808 Resulting lab: POINT OF CARE TEST, GLUCOSE    Specimen Information    Type Source Collected On     10/16/18 0808          Components       Value Reference Range Flag Lab   Glucose 135 70 - 99 mg/dL H 170            Glucose [[id number]] (Abnormal)  Resulted: 10/16/18 0708, Result status: Final result    Ordering provider: Silvano Kumar MD  10/16/18 0000 Resulting lab: [facility]    Specimen Information    Type Source Collected On   Blood  10/16/18 0623          Components       Value Reference Range Flag Lab   Glucose 129 70 - 99 mg/dL H FrStHsLb            Hemoglobin [[id number]] (Abnormal)  Resulted: 10/16/18 0659, Result status: Final result    Ordering provider: Milton Gusman MD  10/16/18 0000 Resulting lab: [facility]    Specimen Information    Type Source Collected On   Blood  10/16/18 0623          Components       Value Reference Range Flag Lab    Hemoglobin 7.5 13.3 - 17.7 g/dL L FrStLb            Glucose by meter [[id number]] (Abnormal)  Resulted: 10/16/18 0436, Result status: Final result    Ordering provider: Silvano Kumar MD  10/16/18 0422 Resulting lab: POINT OF CARE TEST, GLUCOSE    Specimen Information    Type Source Collected On     10/16/18 0422          Components       Value Reference Range Flag Lab   Glucose 140 70 - 99 mg/dL H 170            Glucose by meter [[id number]] (Abnormal)  Resulted: 10/16/18 0009, Result status: Final result    Ordering provider: Silvano Kumar MD  10/15/18 2353 Resulting lab: POINT OF CARE TEST, GLUCOSE    Specimen Information    Type Source Collected On     10/15/18 2353          Components       Value Reference Range Flag Lab   Glucose 177 70 - 99 mg/dL H 170            Glucose by meter [[id number]] (Abnormal)  Resulted: 10/15/18 2017, Result status: Final result    Ordering provider: Silvano Kumar MD  10/15/18 2005 Resulting lab: POINT OF CARE TEST, GLUCOSE    Specimen Information    Type Source Collected On     10/15/18 2005          Components       Value Reference Range Flag Lab   Glucose 189 70 - 99 mg/dL H 170            Glucose by meter [[id number]] (Abnormal)  Resulted: 10/15/18 1722, Result status: Final result    Ordering provider: Silvano Kumar MD  10/15/18 1708 Resulting lab: POINT OF CARE TEST, GLUCOSE    Specimen Information    Type Source Collected On     10/15/18 1708          Components       Value Reference Range Flag Lab   Glucose 187 70 - 99 mg/dL H 170            Glucose by meter [[id number]] (Abnormal)  Resulted: 10/15/18 1306, Result status: Final result    Ordering provider: Silvano Kumar MD  10/15/18 1255 Resulting lab: POINT OF CARE TEST, GLUCOSE    Specimen Information    Type Source Collected On     10/15/18 1255          Components       Value Reference Range Flag Lab   Glucose 204 70 - 99 mg/dL H 170            Basic metabolic panel [[id number]] (Abnormal)  Resulted: 10/15/18 0751, Result  status: Final result    Ordering provider: Silvano Kumar MD  10/15/18 0000 Resulting lab: [facility]    Specimen Information    Type Source Collected On   Blood  10/15/18 0715          Components       Value Reference Range Flag Lab   Sodium 136 133 - 144 mmol/L  FrStHsLb   Potassium 4.9 3.4 - 5.3 mmol/L  FrStHsLb   Chloride 105 94 - 109 mmol/L  FrStHsLb   Carbon Dioxide 26 20 - 32 mmol/L  FrStHsLb   Anion Gap 5 3 - 14 mmol/L  FrStHsLb   Glucose 127 70 - 99 mg/dL H FrStHsLb   Urea Nitrogen 31 7 - 30 mg/dL H FrStHsLb   Creatinine 1.69 0.66 - 1.25 mg/dL H FrStHsLb   GFR Estimate 38 >60 mL/min/1.7m2 L FrStHsLb   Comment:  Non  GFR Calc   GFR Estimate If Black 46 >60 mL/min/1.7m2 L FrStHsLb   Comment:  African American GFR Calc   Calcium 7.3 8.5 - 10.1 mg/dL L FrStHsLb            CBC with platelets [[id number]] (Abnormal)  Resulted: 10/15/18 0737, Result status: Final result    Ordering provider: Silvano Kumar MD  10/15/18 0000 Resulting lab: [facility]    Specimen Information    Type Source Collected On   Blood  10/15/18 0715          Components       Value Reference Range Flag Lab   WBC 8.6 4.0 - 11.0 10e9/L  FrStHsLb   RBC Count 2.63 4.4 - 5.9 10e12/L L FrStHsLb   Hemoglobin 7.9 13.3 - 17.7 g/dL L FrStHsLb   Hematocrit 24.0 40.0 - 53.0 % L FrStHsLb   MCV 91 78 - 100 fl  FrStHsLb   MCH 30.0 26.5 - 33.0 pg  FrStHsLb   MCHC 32.9 31.5 - 36.5 g/dL  FrStHsLb   RDW 15.5 10.0 - 15.0 % H FrStHsLb   Platelet Count 316 150 - 450 10e9/L  FrStHsLb            Glucose by meter [[id number]] (Abnormal)  Resulted: 10/15/18 0511, Result status: Final result    Ordering provider: Silvano Kumar MD  10/15/18 0459 Resulting lab: POINT OF CARE TEST, GLUCOSE    Specimen Information    Type Source Collected On     10/15/18 0459          Components       Value Reference Range Flag Lab   Glucose 132 70 - 99 mg/dL H 170            Glucose by meter [[id number]] (Abnormal)  Resulted: 10/15/18 0144,  Result status: Final result    Ordering provider: Silvano Kumar MD  10/15/18 0131 Resulting lab: POINT OF CARE TEST, GLUCOSE    Specimen Information    Type Source Collected On     10/15/18 0131          Components       Value Reference Range Flag Lab   Glucose 148 70 - 99 mg/dL H 170            Glucose by meter [[id number]] (Abnormal)  Resulted: 10/14/18 1956, Result status: Final result    Ordering provider: Silvano Kumar MD  10/14/18 1923 Resulting lab: POINT OF CARE TEST, GLUCOSE    Specimen Information    Type Source Collected On     10/14/18 1923          Components       Value Reference Range Flag Lab   Glucose 219 70 - 99 mg/dL H 170            CBC with platelets + differential [[id number]] (Abnormal)  Resulted: 10/14/18 1423, Result status: Final result    Ordering provider: Kenneth Hdz MD  10/14/18 1347 Resulting lab: [facility]    Specimen Information    Type Source Collected On   Blood  10/14/18 1220          Components       Value Reference Range Flag Lab   WBC 10.8 4.0 - 11.0 10e9/L  FrStHsLb   RBC Count 2.94 4.4 - 5.9 10e12/L L FrStHsLb   Hemoglobin 8.6 13.3 - 17.7 g/dL L FrStHsLb   Hematocrit 26.7 40.0 - 53.0 % L FrStHsLb   MCV 91 78 - 100 fl  FrStHsLb   MCH 29.3 26.5 - 33.0 pg  FrStHsLb   MCHC 32.2 31.5 - 36.5 g/dL  FrStHsLb   RDW 15.5 10.0 - 15.0 % H FrStHsLb   Platelet Count 355 150 - 450 10e9/L  FrStHsLb   Diff Method Automated Method   FrStHsLb   % Neutrophils 70.0 %  FrStHsLb   % Lymphocytes 18.4 %  FrStHsLb   % Monocytes 8.5 %  FrStHsLb   % Eosinophils 2.4 %  FrStHsLb   % Basophils 0.2 %  FrStHsLb   % Immature Granulocytes 0.5 %  FrStHsLb   Nucleated RBCs 0 0 /100  FrStHsLb   Absolute Neutrophil 7.6 1.6 - 8.3 10e9/L  FrStHsLb   Absolute Lymphocytes 2.0 0.8 - 5.3 10e9/L  FrStHsLb   Absolute Monocytes 0.9 0.0 - 1.3 10e9/L  FrStHsLb   Absolute Eosinophils 0.3 0.0 - 0.7 10e9/L  FrStHsLb   Absolute Basophils 0.0 0.0 - 0.2 10e9/L  FrStHsLb   Abs Immature Granulocytes 0.1 0 -  0.4 10e9/L  FrStHsLb   Absolute Nucleated RBC 0.0   FrStHsLb            INR [[id number]]  Resulted: 10/14/18 1414, Result status: Final result    Ordering provider: Kenneth Hdz MD  10/14/18 1347 Resulting lab: [facility]    Specimen Information    Type Source Collected On   Blood  10/14/18 1220          Components       Value Reference Range Flag Lab   INR 1.08 0.86 - 1.14  FrStHsLb            Basic metabolic panel [[id number]] (Abnormal)  Resulted: 10/14/18 1408, Result status: Final result    Ordering provider: Kenneth Hdz MD  10/14/18 1347 Resulting lab: [facility]    Specimen Information    Type Source Collected On   Blood  10/14/18 1220          Components       Value Reference Range Flag Lab   Sodium 135 133 - 144 mmol/L  FrStHsLb   Potassium 4.7 3.4 - 5.3 mmol/L  FrStHsLb   Chloride 102 94 - 109 mmol/L  FrStHsLb   Carbon Dioxide 25 20 - 32 mmol/L  FrStHsLb   Anion Gap 8 3 - 14 mmol/L  FrStHsLb   Glucose 245 70 - 99 mg/dL H FrStHsLb   Urea Nitrogen 41 7 - 30 mg/dL H FrStHsLb   Creatinine 2.14 0.66 - 1.25 mg/dL H FrStHsLb   GFR Estimate 29 >60 mL/min/1.7m2 L FrStHsLb   Comment:  Non  GFR Calc   GFR Estimate If Black 35 >60 mL/min/1.7m2 L FrStHsLb   Comment:  African American GFR Calc   Calcium 7.7 8.5 - 10.1 mg/dL L FrStHsLb            Testing Performed By     Lab - Abbreviation Name Director Address Valid Date Range    14 - FrStHsLb [facility] Unknown [address] [place] [address] 05/08/15 1057 - Present    170 - Unknown POINT OF CARE TEST, GLUCOSE Unknown Unknown 10/31/11 1114 - Present            Unresulted Labs (24h ago through future)    Start       Ordered    Unscheduled  Hemoglobin  CONDITIONAL X 2,   Routine     Comments:  IF morning Hemoglobin result is LESS than 8.0 on POD 1 and/or POD 2, release order and recheck Hemoglobin in the evening at 1700.  Notify Provider if second Hemoglobin result is LESS than 7.5.     10/14/18 2132         Imaging Results - 3 Days      XR Surgery OSORIO Fluoro L/T 5 Min w Stills [[id number]]  Resulted: 10/15/18 0733, Result status: Final result    Ordering provider: Milton Gusman MD  10/14/18 2013 Resulted by: Hannah Hooker MD    Performed: 10/14/18 1930 - 10/14/18 2015 Resulting lab: RADIOLOGY RESULTS    Narrative:       SURGERY C-ARM FLUORO LESS THAN 5 MIN W STILLS October 14, 2018 8:15 PM      HISTORY: Closed reduction left hip.     COMPARISON: 10/14/2018 at 1317 hours.    NUMBER OF IMAGES ACQUIRED: Two.    VIEWS: AP.    FLUOROSCOPY TIME: .1      Impression:       IMPRESSION: Two intraoperative images of the left hip demonstrates the  dislocated acetabular component has been appropriately reduced.    HANNAH HOOKER MD      XR Pelvis w Hip Port Left 1 View [[id number]]  Resulted: 10/14/18 2119, Result status: Final result    Ordering provider: Milton Gusman MD  10/14/18 2058 Resulted by: Nicola Taylor MD    Performed: 10/14/18 2059 - 10/14/18 2112 Resulting lab: RADIOLOGY RESULTS    Narrative:       PELVIS AND HIP PORTABLE LEFT ONE VIEW   10/14/2018 9:12 PM     HISTORY: Postop hip arthroplasty.     COMPARISON: Intraoperative images from 10/14/2018.    FINDINGS: Postoperative change of left total hip prosthesis placement  are demonstrated. Alignment is anatomic. No fractures are identified.  Expected postoperative swelling and staples noted.      Impression:       IMPRESSION: Expected postoperative appearance following left total hip  arthroplasty.     NICOLA TAYLOR MD      XR Pelvis w Hip Left 1 View [[id number]]  Resulted: 10/14/18 1402, Result status: Final result    Ordering provider: Kenneth Hdz MD  10/14/18 1236 Resulted by: Berry June MD    Performed: 10/14/18 1304 - 10/14/18 1323 Resulting lab: RADIOLOGY RESULTS    Narrative:       PELVIS WITH UNILATERAL HIP ONE VIEW LEFT  10/14/2018 1:23 PM     HISTORY: fall ?dislocation;      COMPARISON: None.    FINDINGS: There is a dislocation of the left hip arthroplasty. The  acetabular and femoral components are dislocated superiorly with  respect to the native acetabulum..      Impression:       IMPRESSION: Dislocated left hip arthroplasty.    CEASAR BLEDSOE MD      Testing Performed By     Lab - Abbreviation Name Director Address Valid Date Range    104 - Rad Rslts RADIOLOGY RESULTS Unknown Unknown 02/16/05 1553 - Present            Encounter-Level Documents:     There are no encounter-level documents.      Order-Level Documents:     There are no order-level documents.

## 2018-10-14 NOTE — IP AVS SNAPSHOT
[address] [facility]    [address] [place] [address]    Phone:  [phone]                                       After Visit Summary   10/14/2018    Abimael Flores    MRN: [id number]           After Visit Summary Signature Page     I have received my discharge instructions, and my questions have been answered. I have discussed any challenges I see with this plan with the nurse or doctor.    ..........................................................................................................................................  Patient/Patient Representative Signature      ..........................................................................................................................................  Patient Representative Print Name and Relationship to Patient    ..................................................               ................................................  Date                                   Time    ..........................................................................................................................................  Reviewed by Signature/Title    ...................................................              ..............................................  Date                                               Time          22EPIC Rev 08/18

## 2018-10-14 NOTE — ED PROVIDER NOTES
"  History     Chief Complaint:  Left hip pain    HPI   Abimael Flores is a 89 year old male who presents for evaluation of left hip pain after a fall. The patient reports today, he stood up to go to the bathroom and fell down lightly and now he is experiencing left hip pain. He denies hitting his head. Of note, he reports 2 weeks ago, he fell and broke his right hip and then a week ago, he was leaving rehab when he fell and broke his left hip. The patient is concerned about another possible fracture.     Allergies:  No known drug allergies    Medications:    Acetaminophen   aspirin    atorvastatin    diltiazem    insulin glargine    insulin lispro    metoprolol succinate    polyethylene glycol    Skin Protectants, Misc.   tamsulosin   traMADol     Past Medical History:    Benign essential hypertension   Coronary artery disease   Nonsenile cataract   Recent retinal detachment, total or subtotal   Type 2 diabetes mellitus   CKD, stage 3  Cardiac arrhythmia  Hip fracture x2  Anemia     Past Surgical History:    Cardiac surgery   Cataract bilateral  Hip reduction right  Hip reduction left  Ruptured globe repair     Family History:    Diabetes    Social History:  Smoking status: Former smoker  Alcohol use: No  Marital Status:   [2]  He is a retired general surgeon.      Review of Systems   Musculoskeletal: Positive for arthralgias.   Neurological: Negative for headaches.   All other systems reviewed and are negative.    Physical Exam     Patient Vitals for the past 24 hrs:   BP Temp Temp src Pulse Resp SpO2 Height Weight   10/14/18 1430 106/50 - - - - 93 % - -   10/14/18 1400 112/48 - - - - 97 % - -   10/14/18 1330 115/52 - - - - 97 % - -   10/14/18 1300 117/57 - - - - 96 % - -   10/14/18 1230 106/59 - - - - 95 % - -   10/14/18 1216 123/60 98.3  F (36.8  C) Oral 76 18 95 % 1.727 m (5' 8\") 72.6 kg (160 lb)   10/14/18 1215 123/60 - - - - 97 % - -       Physical Exam  Constitutional: Elderly white male " supine.  HENT: No signs of trauma.   Eyes: EOM are normal. Pupils are equal, round, and reactive to light.   Neck: Normal range of motion. No JVD present. No cervical adenopathy.  Cardiovascular: Regular rhythm.  Exam reveals no gallop and no friction rub. Occasional ectopy. Sternotomy incision.  No murmur heard.  Pulmonary/Chest: Bilateral breath sounds normal. No wheezes, rhonchi or rales.  Abdominal: Soft. No tenderness. No rebound or guarding. 2+ femoral pulses.   Musculoskeletal:  Left leg shortened, internally rotated. Swelling over lateral hip. Surgical  dressing in place. No redness or discharge.   Lymphadenopathy: No lymphadenopathy.   Neurological: Alert and oriented to person, place, and time. Normal strength. Coordination normal. GCS 15. Fluent speech. No facial asymmetry. Normal sensation.   Skin: Skin is warm and dry. No rash noted. No erythema.     Emergency Department Course   Imaging:  Radiographic findings were communicated with the patient who voiced understanding of the findings.    XR Pelvis w Hip Left 1 View  Dislocated left hip arthroplasty.  Reading per radiology     Laboratory:  CBC: WBC 10.8, HGB 8.6,   BMP: Glucose 245, BUN 41, Creatinine 2.13, GFR 29 , Calcium 7.7   INR: 1.08    Interventions:  1241 Morphine 2 mg IV  1246 Morphine 2 mg IV  1355 Dilaudid 0.5 mg IV    Emergency Department Course:  Past medical records, nursing notes, and vitals reviewed.  1227: I performed an exam of the patient and obtained history, as documented above.  IV inserted and blood drawn.  The patient was sent for a left hip and pelvis XR while in the emergency department, findings above.    1408: I paged Dr. Esparza of the orthopedics service regarding patient    1425: I discussed the treatment plan with the patient. They expressed understanding of this plan and consented to admission. I discussed the patient with Stacy Young, who will admit the patient to a monitored bed for further evaluation and  treatment.    All questions were answered prior to admission.     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 He was in a nursing facility. He stood up, caught his foot on a wire, possibly a phone or a call line, and twisted and fell. He denies any injury except to his left hip. His left hip on exam is internally rotated and shortened. The staple line is intact with dressing in place with no redness or swelling. Patient received pain meds. XRs were obtained showing a totally hip dislocation of both prosthetic components, acetabular and femoral. This would not be amenable to simple reduction in the ED. I've contacted hospitalist who will admit the patient, and also placed calls for orthopedics who is in the OR.   Patient will be kept NPO for the time being.     Diagnosis:    ICD-10-CM    1. Dislocation of left hip, initial encounter (H) S73.005A CBC with platelets + differential     Basic metabolic panel     INR     Disposition:  Admitted to Adult Med/Surg by Dr. Stacy Ohara  10/14/2018    EMERGENCY DEPARTMENT  Ang[place] NINO, am serving as a scribe at 12:27 PM on 10/14/2018 to document services personally performed by Kenneth Hdz MD based on my observations and the provider's statements to me.        Kenneth Hdz MD  10/14/18 1556

## 2018-10-14 NOTE — PROGRESS NOTES
Ortho Brief -   S/p L gracia 10/4/18  R gracia 8/26/18 with left gracia posterior dislocation.      Plan  NPO  To OR for closed vs open reduction

## 2018-10-14 NOTE — IP AVS SNAPSHOT
"` `           [place] [address] [facility]: [phone]                                              INTERAGENCY TRANSFER FORM - NURSING   10/14/2018                    Hospital Admission Date: 10/14/2018  CHERYL HOLLOWAY   : 1924  Sex: Male        Attending Provider: Silvano Kumar MD     Allergies:  No Known Allergies    Infection:  None   Service:  HOSPITALIST    Ht:  1.727 m (5' 8\")   Wt:  71 kg (156 lb 8 oz)   Admission Wt:  72.6 kg (160 lb)    BMI:  23.8 kg/m 2   BSA:  1.85 m 2            Patient PCP Information     Provider PCP Type    [facility]      Current Code Status     Date Active Code Status Order ID Comments User Context       Prior      Code Status History     Date Active Date Inactive Code Status Order ID Comments User Context    10/17/2018  2:31 PM  DNR/DNI [id number]  Terence Persaud MD Outpatient    10/15/2018  9:52 AM 10/17/2018  2:31 PM DNR/DNI [id number]  Silvano Kumar MD Inpatient    10/14/2018  3:45 PM 10/15/2018  9:52 AM Full Code During Procedure [id number]  Silvano Kumar MD Inpatient    10/8/2018 12:50 PM 10/14/2018  3:45 PM DNR/DNI [id number]  Earl Streeter DO Outpatient    10/4/2018  4:36 AM 10/8/2018 12:50 PM DNR/DNI [id number]  Radha Collins MD Inpatient    2018  9:30 PM 9/3/2018  4:24 PM DNR/DNI [id number]  Mamie Eubanks MD Inpatient    2014 12:05 PM 2018  9:30 PM Full Code [id number]  Ryne Justin PA-C Outpatient      Advance Directives        Scanned docmt in ACP Activity?           No scanned doc        Hospital Problems as of 10/17/2018              Priority Class Noted POA    S/P total hip arthroplasty Medium  2018 Yes    Type 2 diabetes mellitus with renal complication (H) Medium  2018 Yes    CKD (chronic kidney disease) stage 3, GFR 30-59 ml/min (H) Medium  2018 Yes    Benign essential hypertension Medium  2018 Yes    Coronary artery disease involving coronary bypass graft of native heart " without angina pectoris Medium  9/4/2018 Yes    * (Principal)Dislocation of hip (H) Medium  10/14/2018 Yes      Non-Hospital Problems as of 10/17/2018              Priority Class Noted    Injury of globe of eye Medium  4/21/2014    Recent retinal detachment, total or subtotal Medium  8/5/2014    Closed right hip fracture (H) Medium  8/25/2018    Cardiac arrhythmia, unspecified cardiac arrhythmia type Medium  9/4/2018    Other insomnia Medium  9/17/2018    Bladder spasms Medium  9/17/2018    Fracture of femoral neck, left (H) Medium  10/4/2018    Hip fracture, left (H) Medium  10/4/2018      Immunizations     Name Date      Influenza (High Dose) 3 valent vaccine defer-10/16/18     Deferral:       Influenza (High Dose) 3 valent vaccine 10/05/18          END      ASSESSMENT     Discharge Profile Flowsheet     EXPECTED DISCHARGE     Resources List  Skilled Nursing Facility 09/03/18 1415    Expected Discharge Date  10/17/18 (TCU) 10/16/18 1646   PAS Number  [id number] 09/03/18 1415    DISCHARGE NEEDS ASSESSMENT     SKIN      Equipment Currently Used at Home  walker, rolling 10/16/18 1339   Inspection of bony prominences  Full 10/17/18 1056    # of Referrals Placed by CTS  Post Acute Facilities 10/07/18 1433   Full except areas not inspected   Buttock, left;Buttock, right;Sacrum;Coccyx 10/15/18 2202    Equipment Used at Home  none 04/22/14 0848   Inspection under devices  Full 10/16/18 1348    GASTROINTESTINAL (ADULT,PEDIATRIC,OB)     Skin WDL  ex 10/17/18 1056    GI WDL  WDL 10/17/18 1056   Skin Temperature  warm 10/16/18 2239    All Quadrants Bowel Sounds  audible and active in all quadrants 10/16/18 2239   Skin Moisture  dry 10/16/18 2239    Last Bowel Movement  10/16/18 (small per pt report) 10/16/18 2239   Skin Integrity  incision(s);scar(s);wound(s) 10/17/18 1056    Passing flatus  yes 10/16/18 2239   SAFETY      COMMUNICATION ASSESSMENT     Safety WDL  WDL 10/17/18 1056    Patient's communication style  spoken  "language (English or Bilingual) 10/14/18 1928   All Alarms  alarm(s) activated and audible 10/17/18 1056    FINAL RESOURCES                        Assessment WDL (Within Defined Limits) Definitions           Safety WDL     Effective: 09/28/15    Row Information: <b>WDL Definition:</b> Bed in low position, wheels locked; call light in reach; upper side rails up x 2; ID band on<br> <font color=\"gray\"><i>Item=AS safety wdl>>List=AS safety wdl>>Version=F14</i></font>      Skin WDL     Effective: 09/28/15    Row Information: <b>WDL Definition:</b> Warm; dry; intact; elastic; without discoloration; pressure points without redness<br> <font color=\"gray\"><i>Item=AS skin wdl>>List=AS skin wdl>>Version=F14</i></font>      Vitals     Vital Signs Flowsheet     VITAL SIGNS     Side Effects Monitoring: Respiratory Quality  R 10/17/18 1330    Temp  97.6  F (36.4  C) 10/17/18 0905   Side Effects Monitoring: Respiratory Depth  N 10/17/18 1330    Temp src  Oral 10/17/18 0052   Side Effects Monitoring: Sedation Level  1 10/17/18 1330    Resp  16 10/17/18 1044   MILTON COMA SCALE      Pulse  75 10/16/18 1133   Best Eye Response  4-->(E4) spontaneous 10/17/18 1056    Heart Rate  65 10/17/18 0905   Best Motor Response  6-->(M6) obeys commands 10/17/18 1056    Pulse/Heart Rate Source  Monitor 10/17/18 1044   Best Verbal Response  5-->(V5) oriented 10/17/18 1056    BP  129/65 10/17/18 0905   [place] Coma Scale Score  15 10/17/18 1056    BP Location  Right arm 10/17/18 1044   HEIGHT AND WEIGHT      Patient Position  Lying 10/14/18 2032   Height  1.727 m (5' 8\") 10/14/18 1218    OXYGEN THERAPY     Height Method  Stated 10/14/18 1218    SpO2  95 % 10/17/18 0905   Weight  71 kg (156 lb 8 oz) 10/17/18 0512    O2 Device  None (Room air) 10/17/18 1044   BSA (Calculated - sq m)  1.87 10/14/18 1218    Oxygen Delivery  1 LPM 10/16/18 0439   BMI (Calculated)  24.38 10/14/18 1218    RESPIRATORY MONITORING     ECG      Respiratory Monitoring (EtCO2)  " 23 mmHg 10/15/18 0029   ECG Rhythm  Sinus rhythm;Right bundle branch block 10/14/18 2032    Integrated Pulmonary Index (IPI)  8-9 10/15/18 0029   Ectopy  None 10/14/18 2032    PAIN/COMFORT     Lead Monitored  Lead II 10/14/18 2032    Patient Currently in Pain  denies 10/17/18 1330   POSITIONING      Preferred Pain Scale  number (Numeric Rating Pain Scale) 10/16/18 1343   Body Position  turned 10/17/18 1056    0-10 Pain Scale  1 10/17/18 0905   Head of Bed (HOB)  HOB at 20-30 degrees 10/17/18 0157    Pain Location  Hip 10/17/18 1044   Chair  Upright in chair 10/16/18 1356    Pain Orientation  Left 10/17/18 1044   DAILY CARE      Pain Intervention(s)  Medication (See eMAR) (prior to PT) 10/17/18 1044   Activity Management  up to commode 10/17/18 1155    Response to Interventions  Decrease in pain 10/17/18 1044   Activity Assistance Provided  assistance, 2 people;other (see comments) 10/17/18 1155    ANALGESIA SIDE EFFECTS MONITORING     Assistive Device Utilized  gait belt;walker 10/17/18 1155            Patient Lines/Drains/Airways Status    Active LINES/DRAINS/AIRWAYS     Name: Placement date: Placement time: Site: Days: Last dressing change:    Peripheral IV 10/14/18 Left Lower forearm 10/14/18   2020   Lower forearm   2     Wound 10/17/18 Mid Coccyx Suspected pressure ulcer blanchable redness surrounding small opening on coccyx 10/17/18   0900   Coccyx   less than 1     Incision/Surgical Site 08/26/18 Right Hip 08/26/18   1441    52     Incision/Surgical Site 10/04/18 Left Hip 10/04/18   1350    13     Incision/Surgical Site 10/14/18 Lateral;Left Hip 10/14/18   2135    2             Patient Lines/Drains/Airways Status    Active PICC/CVC     None            Intake/Output Detail Report     Date Intake       Output   Net    Shift P.O. I.V. IV Piggyback Colloid Total Urine Blood Total       Day 10/16/18 0700 - 10/16/18 1459 -- -- -- -- -- -- -- -- 0    [place]ia 10/16/18 1500 - 10/16/18 2259 -- -- -- -- -- 250 -- 250 -250     Noc 10/16/18 2300 - 10/17/18 0659 -- -- -- -- -- 325 -- 325 -325    Day 10/17/18 0700 - 10/17/18 1459 120 -- -- -- 120 325 -- 325 -205    Victoria 10/17/18 1500 - 10/17/18 2259 -- -- -- -- -- -- -- -- 0      Last Void/BM       Most Recent Value    Urine Occurrence     Stool Occurrence 1 at 10/16/2018 1500      Case Management/Discharge Planning     Case Management/Discharge Planning Flowsheet     REFERRAL INFORMATION     Equipment Currently Used at Home  walker, rolling 10/16/18 1339    # of Referrals Placed by CTS  Post Acute Facilities 10/07/18 1433   Resources List  Skilled Nursing Facility 09/03/18 1415    LIVING ENVIRONMENT     PAS Number  [id number] 09/03/18 1415    Lives With  facility resident 10/16/18 1339   ABUSE RISK SCREEN      Living Arrangements  assisted living 10/16/18 1339   QUESTION TO PATIENT:  Has a member of your family or a partner(now or in the past) intimidated, hurt, manipulated, or controlled you in any way?  no 10/14/18 1216    COPING/STRESS     QUESTION TO PATIENT: Do you feel safe going back to the place where you are living?  yes 10/14/18 1216    Major Change/Loss/Stressor  denies 10/14/18 1928   OBSERVATION: Is there reason to believe there has been maltreatment of a vulnerable adult (ie. Physical/Sexual/Emotional abuse, self neglect, lack of adequate food, shelter, medical care, or financial exploitation)?  no 10/14/18 1216    EXPECTED DISCHARGE     OTHER      Expected Discharge Date  10/17/18 (TCU) 10/16/18 1646   Are you depressed or being treated for depression?  No 10/14/18 1928    DISCHARGE PLANNING     HOMICIDE RISK      Equipment Used at Home  none 04/22/14 0848   Feels Like Hurting Others  no 10/14/18 1216    FINAL RESOURCES

## 2018-10-14 NOTE — IP AVS SNAPSHOT
` ` Patient Information     Patient Name Sex     Abimael Flores ([id number]) Male 1924       Room Bed    5504 5504-02      Patient Demographics     Address Phone    [address]  [facility] [address] [phone] (Home) *Preferred*  NONE (Work)  [phone] (Mobile)      Patient Ethnicity & Race     Ethnic Group Patient Race    American Choose not to answer      Emergency Contact(s)     Name Relation Home Work Mobile    Jaye Flores Spouse [phone] NONE     Aleksandar Flores Son [phone] NONE [phone]      Documents on File        Status Date Received Description       Documents for the Patient    Privacy Notice - [place] Received 14     Face Sheet  04/15/03     Insurance Card  04/15/03     Consent for Services - Hospital/Clinic Received () 14     Consent for EHR Access Received 14     External Medication Information Consent       Patient ID Received 18     [facility] Specified Other       HIM JUDD Authorization - File Only  14 [facility] AUTHORIZATION FOR RELEASE OF PROTECTED HEALTH INFORMATION    HIM JUDD Authorization - File Only  14 AUTHORIZATION FOR RELEASE OF PROTECTED HEALTH INFORMATION    Business/Insurance/Care Coordination/Health Form - Patient  14 CARE COORDINATION LETTER    HIM JUDD Authorization  07/03/15 [facility]    Consent for Services - Hospital and Clinic Received 18     HIE Auth Received 18     Insurance Card Received 18 new medicare    Insurance Card Received 18     Consent for Services - Geriatrics Received 09/10/18     Advance Directives and Living Will Not Received (Deleted)  invalid; to be deleted    Advance Directives and Living Will Not Received (Deleted)  Invalid - to be deleted       Documents for the Encounter    CMS IM for Patient Signature Received 10/14/18     EMS/Ambulance Record  10/16/18 PREHOSPITAL CARE REPORT      Admission Information     Attending Provider Admitting  Provider Admission Type Admission Date/Time    Silvano Kumar MD Huynh, Nick, MD Elective 10/14/18  1211    Discharge Date Hospital Service Auth/Cert Status Service Area     Hospitalist [facility] SERVICES    Unit Room/Bed Admission Status        55 ORTHO SPEC UNIT 5504/5504-02 Admission (Confirmed)       Admission     Complaint    Dislocation of hip (H), Closed vs Open reduction of left hip      Hospital Account     Name Acct ID Class Status Primary Coverage    Abimael Flores [id number] Inpatient Open MEDICARE - MEDICARE            Guarantor Account (for Hospital Account #[id number])     Name Relation to Pt Service Area Active? Acct Type    Abimael Florse  FCS Yes Personal/Family    Address Phone          [address]  [place] [address] [phone](H)  NONE(O)              Coverage Information (for Hospital Account #[id number])     1. MEDICARE/MEDICARE     F/O Payor/Plan Precert #    MEDICARE/MEDICARE     Subscriber Subscriber #    Abimael Flores [id number]    Address Phone    [employer]  [address]  [place] [address] [phone]          2. [place]     F/O Payor/Plan Precert #    [place]     Subscriber Subscriber #    Abimael Flores [id number]    Address Phone    [address] [address]  [place] [address] [phone]

## 2018-10-14 NOTE — H&P
Hospitalist  History and Physical         Date of Admission:  10/14/2018    Assessment & Plan     Mr. 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 The patient has presented with left hip pain following a mechanical fall as he stood up to go to the bathroom at [facility]. Hip XR showed dislocated left hip arthroplasty.  Plan:  - Admit to inpatient  - Hold ASA   - NPO   - orthopedic surgery consult  - Pain control as needed  - Bedrest    Normocytic Anemia  Assessment: Baseline hemoglobin around 9. ON admission Hgb stable at 8.6  Plan:  - Monitor hemoglobin levels periodically.      Severe vitamin D deficiency.  Assessment/Plan: on oral vitamin D supplements.      Subclinical hypothyroidism.  Assessment: Noted to be tachycardic, TSH 4.57.  T4 1.28.  Plan:  - Monitor thyroid function tests in 4-6 weeks.      Hypertension  Assessment: PTA on Cardizem 120 mg oral daily, metoprolol 50 mg oral daily.  Plan:  - Continue prior to admission Cardizem 120 mg oral daily, metoprolol 50 mg oral daily.      Dyslipidemia:   Assessment/Plan: Continue on prior to admission Lipitor.       Benign prostatic hypertrophy:   Assessment/Plan: Continue with prior to admission Flomax.       Chronic kidney disease, stage III-IV:    Assessment: the most recent creatinine levels have been around 1.8-1.9. On admission Cr 2.14, likely pre-renal in etiology  Plan:  - MVIF  - Monitor renal function periodically.  - Avoid nephrotoxic drugs.      Coronary artery  disease:    Assessment: The patient is status post coronary artery bypass graft x 4 in 1996.    Plan:  - Continue on prior to admission metoprolol and Lipitor.   - ASA hold for now, resume when okay with surgery      Atrial fibrillation with intermittent rate-related bundle  Supraventricular tachycardia, asymptomatic   Assessment: During recent hospitalization in August 2018 after a fall with right femoral neck fracture status post ORIF 8/26/18-noted to have arrhythmia. CHADS-VASc score is 4 for age, hypertension, and diabetes.  This puts him in the high risk group for a stroke cardiology was then consulted, patient and family declined anticoagulation given risk for falls.  Plan:  - Continue on Toprol-XL, Cardizem.  - Hold ASA given likely surgery     Diabetes mellitus type 2:  T  Assessment: the most recent hemoglobin A1c was 8.4% on 08/25/2018.  At home he is on Lantus 7 units subcu every morning and Humalog 2 units subq t.i.d.    Plan:  - Continue insulin regimen with lantus reduced to 5 U and sliding scale insulin available.    # Pain Assessment:  Current Pain Score 10/14/2018   Patient currently in pain? -   Pain score (0-10) 10   Pain location -   Pain descriptors -   - Abimael is experiencing pain due to Hip pain. Pain management was discussed and the plan was created in a collaborative fashion.  Abimael's response to the current recommendations: engaged  - Please see the plan for pain management as documented above        DVT Prophylaxis: Pneumatic Compression Devices  Code Status: Full Code    Disposition: Expected discharge pending orthopedic surgery plan    Silvano Kumar MD    Primary Care Physician   RAISSA [facility]    Chief Complaint   Fall  Hip Pain    History is obtained from the patient    History of Present Illness   Abimael Flores is a 89 year old male with medical history notable for hypertension, dyslipidemia, coronary artery disease, status post coronary artery bypass graft in 1996, diabetes  mellitus type 2, chronic kidney disease stage III-IV, chronic anemia, benign prostatic hypertrophy, nonsustained V-tach 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 He tripped over a cord on the floor. No CP/SOB/syncope. He denies hitting his head. Of note, he reports 2 weeks ago, he fell and broke his right hip and then a week ago, he picking up object from the floor and fell and fractured his left femoral neck. His main complaint is hip pain, he reports any movement worsens his left hip pain significantly. No fever/cough. No urinary complaints. He otherwise does not endorse blood in his stool, no nausea/vomiting. Son is present at bedside who is a physician. Patient has no other complaints at this time.    Past Medical History    I have reviewed this patient's medical history and updated it with pertinent information if needed.   Past Medical History:   Diagnosis Date     Benign essential hypertension 9/4/2018     Coronary artery disease      Nonsenile cataract      Recent retinal detachment, total or subtotal 8/5/2014     Type 2 diabetes mellitus without complications (H)        Past Surgical History   I have reviewed this patient's surgical history and updated it with pertinent information if needed.  Past Surgical History:   Procedure Laterality Date     CARDIAC SURGERY       CATARACT IOL, RT/LT Bilateral      lacrimal caruncle removed BE Bilateral ~1989     OPEN REDUCTION INTERNAL FIXATION HIP BIPOLAR Right 8/26/2018    Procedure: OPEN REDUCTION INTERNAL FIXATION HIP BIPOLAR;  Right Hip Bipolar Hemiarthroplasty (Biomet);  Surgeon: Bill Sanders MD;  Location:  OR     OPEN REDUCTION INTERNAL FIXATION HIP BIPOLAR Left 10/4/2018    Procedure: OPEN REDUCTION INTERNAL FIXATION HIP BIPOLAR;   LEFT HIP TONYA ARTHROPLASTY;  Surgeon: Bruno Stewart MD;  Location: [facility] OR     REPAIR RUPTURED GLOBE  4/21/2014    Procedure: Exploration and Repair of Ruptured Globe ;  Surgeon: Mercedes Rivera MD;  Location: [facility] OR       Prior to Admission Medications   Prior to Admission Medications   Prescriptions Last Dose Informant Patient Reported? Taking?   Ergocalciferol (VITAMIN D) 57549 units CAPS   No No   Sig: Take 50,000 Units by mouth every 7 days for 5 doses   Skin Protectants, Misc. (ANIBAL PROTECT EX)  Nursing Home Yes No   Sig: Apply topically 2 times daily   acetaminophen (TYLENOL) 325 MG tablet   No No   Sig: Take 2 tablets (650 mg) by mouth every 4 hours as needed for other (multimodal surgical pain management along with NSAIDS and opioid medication as indicated based on pain control and physical function.)   aspirin 325 MG EC tablet  Nursing Home No No   Sig: Take one Aspirin tab twice daily for 5 weeks.   atorvastatin (LIPITOR) 10 MG tablet  Nursing Home No No   Sig: Take 1 tablet (10 mg) by mouth every evening   diltiazem (TIAZAC) 120 MG 24 hr ER beaded capsule  Nursing Home Yes No   Sig: Take 120 mg by mouth daily   insulin glargine (LANTUS) 100 UNIT/ML injection  Nursing Home Yes No   Sig: Inject 7 Units Subcutaneous every morning   insulin lispro (HUMALOG KWIKPEN) 100 UNIT/ML injection  Nursing Home No No   Sig: Inject 2 Units Subcutaneous 3 times daily (before meals)   insulin lispro (HUMALOG KWIKPEN) 100 UNIT/ML injection  Nursing Home Yes No   Sig: Inject Subcutaneous At Bedtime Sliding scale:  -249=1 unit  -299=2 units  -349=3 units  -399=4 units  +=5 units   insulin lispro (HUMALOG) 100 UNIT/ML injection  Nursing Home Yes No   Sig: Inject Subcutaneous 3 times daily (before meals) Sliding scale:   -189=1 unit  -239=2 units  -289=3 units  -339=4 units  -399=5 units  -499=6 units  +=7 units   metoprolol succinate (TOPROL-XL) 50  MG 24 hr tablet  Nursing Home No No   Sig: Take 1 tablet (50 mg) by mouth daily   polyethylene glycol (MIRALAX/GLYCOLAX) Packet  Nursing Home Yes No   Sig: Take 17 g by mouth daily   tamsulosin (FLOMAX) 0.4 MG capsule  Nursing Home Yes No   Sig: Take 0.4 mg by mouth every evening    traMADol (ULTRAM) 50 MG tablet   No No   Sig: Take 1 tablet (50 mg) by mouth every 6 hours      Facility-Administered Medications: None     Allergies   No Known Allergies    Social History   I have reviewed this patient's social history and updated it with pertinent information if needed. Abimael Flores  reports that he has quit smoking. He has never used smokeless tobacco. He reports that he does not drink alcohol or use illicit drugs.    Family History   I have reviewed this patient's family history and updated it with pertinent information if needed.   Family History   Problem Relation Age of Onset     Diabetes Mother      Diabetes Father      Cancer No family hx of      Glaucoma No family hx of      Macular Degeneration No family hx of        Review of Systems   The 10 point Review of Systems is negative other than noted in the HPI or here.     Physical Exam   Temp: 98.3  F (36.8  C) Temp src: Oral BP: 112/48 Pulse: 76   Resp: 18 SpO2: 97 %      Vital Signs with Ranges  Temp:  [98.3  F (36.8  C)] 98.3  F (36.8  C)  Pulse:  [76] 76  Resp:  [18] 18  BP: (106-123)/(48-60) 112/48  SpO2:  [95 %-97 %] 97 %  160 lbs 0 oz    Constitutional: Awake, alert, cooperative, no apparent distress.  Eyes: Conjunctiva and pupils examined and normal.  HEENT: Moist mucous membranes, normal dentition.  Respiratory: Clear to auscultation bilaterally, no crackles or wheezing.  Cardiovascular: Regular rate and rhythm, normal S1 and S2, and no murmur noted.  GI: Soft, non-distended, non-tender, normal bowel sounds.  Lymph/Hematologic: No anterior cervical or supraclavicular adenopathy.  Skin: No rashes, no cyanosis, no edema.  Musculoskeletal: Point  tenderness to lateral left hip. ROM of hip severely limited due to pain  Neurologic: Cranial nerves 2-12 intact, normal strength and sensation.  Psychiatric: Alert, oriented to person, place and time, no obvious anxiety or depression.    Data   Data reviewed today:  I personally reviewed the Hip XR image(s) showing Dislocated left hip arthroplasty..    Recent Labs  Lab 10/14/18  1220 10/10/18  0700 10/09/18  1302 10/09/18  0703 10/08/18  0535   WBC 10.8  --   --  9.2 9.2   HGB 8.6* 8.7*  --  8.2* 7.8*   MCV 91  --   --  89 89     --   --  204 148*   INR 1.08  --   --   --   --      --   --  139 138   POTASSIUM 4.7  --   --  4.0 4.2   CHLORIDE 102  --   --  105 107   CO2 25  --   --  24 25   BUN 41*  --   --  40* 37*   CR 2.14* 1.79*  --  1.96* 2.11*   ANIONGAP 8  --   --  10 6   JOSELINE 7.7*  --   --  7.9* 7.5*   *  --   --  205* 188*   ALBUMIN  --   --   --  2.1*  --    PROTTOTAL  --   --   --  6.3*  --    BILITOTAL  --   --   --  0.6  --    ALKPHOS  --   --   --  72  --    ALT  --   --   --  12  --    AST  --   --   --  22  --    TROPI  --  0.134* 0.143* 0.157*  --        Recent Results (from the past 24 hour(s))   XR Pelvis w Hip Left 1 View    Narrative    PELVIS WITH UNILATERAL HIP ONE VIEW LEFT  10/14/2018 1:23 PM     HISTORY: fall ?dislocation;     COMPARISON: None.    FINDINGS: There is a dislocation of the left hip arthroplasty. The  acetabular and femoral components are dislocated superiorly with  respect to the native acetabulum..      Impression    IMPRESSION: Dislocated left hip arthroplasty.    CEASAR BLEDSOE MD

## 2018-10-14 NOTE — ED NOTES
Bed: ED07  Expected date:   Expected time:   Means of arrival:   Comments:  Kate 2 fall left hip pain 89 male

## 2018-10-14 NOTE — IP AVS SNAPSHOT
MRN:[id number]                      After Visit Summary   10/14/2018    Abimael Flores    MRN: [id number]           Thank you!     Thank you for choosing [facility] for your care. Our goal is always to provide you with excellent care. Hearing back from our patients is one way we can continue to improve our services. Please take a few minutes to complete the written survey that you may receive in the mail after you visit with us. Thank you!        Patient Information     Date Of Birth          1/23/1924        Designated Caregiver       Most Recent Value    Caregiver    Will someone help with your care after discharge? yes    Name of designated caregiver assisted care    Phone number of caregiver same    Caregiver address same      About your hospital stay     You were admitted on:  October 14, 2018 You last received care in the:  [facility] [address] [facility]    You were discharged on:  October 17, 2018        Reason for your hospital stay       This is a 89 year old male admitted with a hip dislocation.                  Who to Call     For medical emergencies, please call 911.  For non-urgent questions about your medical care, please call your primary care provider or clinic, [phone]  For questions related to your surgery, please call your surgery clinic        Attending Provider     Provider Specialty    Kenneth Hdz MD Emergency Medicine    KumarSilvano garcia MD Internal Medicine       Primary Care Provider Office Phone # Fax #    Carlitos Bee [phone]      After Care Instructions     Activity - Up with nursing assistance           Advance Diet as Tolerated       Follow this diet upon discharge: Orders Placed This Encounter      Room Service      Moderate Consistent CHO Diet            Fall precautions           Fall precautions           General info for SNF       Length of Stay Estimate: Short Term Care: Estimated # of Days <30  Condition at Discharge:  Improving  Level of care:skilled   Rehabilitation Potential: Good  Admission H&P remains valid and up-to-date: Yes  Recent Chemotherapy: N/A  Use Nursing Home Standing Orders: Yes            Hip precautions       WBAT.  Strict posterior hip precautions.  No hip flexion past 70deg.  No ADduction.            Mantoux instructions       Give two-step Mantoux (PPD) Per Facility Policy Yes            Weight bearing status       WBAT.  Strict posterior hip precautions.  No hip flexion past 70deg.  No ADduction.                  Follow-up Appointments     Follow Up and recommended labs and tests       Follow-up with your orthopedic MD at [facility] in 6 weeks  Call [phone] to schedule ASAP.  Also, please call with any questions that come up prior to your appointment.            Follow Up and recommended labs and tests       Follow up with long-term physician.  The following labs/tests are recommended: None.                  Additional Services     Occupational Therapy Adult Consult       Evaluate and treat as clinically indicated.    Reason:  WBAT.  Strict posterior hip precautions.  No hip flexion past 70deg.  No ADduction.            Occupational Therapy Adult Consult       Evaluate and treat as clinically indicated.    Reason:  Physical deconditioning            Physical Therapy Adult Consult       Evaluate and treat as clinically indicated.    Reason:  WBAT.  Strict posterior hip precautions.  No hip flexion past 70deg.  No ADduction.            Physical Therapy Adult Consult       Evaluate and treat as clinically indicated.    Reason:  Physical deconditioning                  Pending Results     No orders found from 10/12/2018 to 10/15/2018.            Statement of Approval     Ordered          10/17/18 1437  I have reviewed and agree with all the recommendations and orders detailed in this document.  EFFECTIVE NOW     Approved and electronically signed by:  Terence Persaud MD            "  Admission Information     Date & Time Provider Department Dept. Phone    10/14/2018 Silvano Kumar MD [place] [address] [facility] [phone]      Your Vitals Were     Blood Pressure Pulse Temperature Respirations Height Weight    129/65 (BP Location: Right arm) 75 97.6  F (36.4  C) 16 1.727 m (5' 8\") 71 kg (156 lb 8 oz)    Pulse Oximetry BMI (Body Mass Index)                95% 23.8 kg/m2          [employer] Information     [employer] lets you send messages to your doctor, view your test results, renew your prescriptions, schedule appointments and more. To sign up, go to [email or web address] . Click on \"Log in\" on the left side of the screen, which will take you to the Welcome page. Then click on \"Sign up Now\" on the right side of the page.     You will be asked to enter the access code listed below, as well as some personal information. Please follow the directions to create your username and password.     Your access code is: [email or web address]  Expires: 2018 12:59 PM     Your access code will  in 90 days. If you need help or a new code, please call your [facility] clinic or [phone].        Care EveryWhere ID     This is your Care EveryWhere ID. This could be used by other organizations to access your [facility] medical records  [id number]        Equal Access to Services     [facility]: Hadii haydee martínez hadmaylino Sochris, waaxda luqadaha, qaybta kaalmada adeaddisonyada, kylah schuster . So [facility] [phone].    ATENCIÓN: Si habla español, tiene a funes disposición servicios gratuitos de asistencia lingüística. Thomas maguire [phone].    We comply with applicable federal civil rights laws and [place] laws. We do not discriminate on the basis of race, color, national origin, age, disability, sex, sexual orientation, or gender identity.               Review of your medicines      START taking        Dose / Directions    oxyCODONE IR 5 MG tablet   Commonly known as:  ROXICODONE "        Dose:  5-10 mg   Take 1-2 tablets (5-10 mg) by mouth every 4 hours as needed for severe pain   Quantity:  20 tablet   Refills:  0       senna-docusate 8.6-50 MG per tablet   Commonly known as:  SENOKOT-S;PERICOLACE        Dose:  1 tablet   Take 1 tablet by mouth 2 times daily   Quantity:  60 tablet   Refills:  1         CONTINUE these medicines which may have CHANGED, or have new prescriptions. If we are uncertain of the size of tablets/capsules you have at home, strength may be listed as something that might have changed.        Dose / Directions    DRISDOL 21886 units Caps   This may have changed:  additional instructions   Used for:  Fracture of hip, left, closed, initial encounter (H)        Dose:  92665 Units   Take 50,000 Units by mouth every 7 days for 5 doses   Quantity:  5 capsule   Refills:  0         CONTINUE these medicines which have NOT CHANGED        Dose / Directions    acetaminophen 325 MG tablet   Commonly known as:  TYLENOL        Dose:  650 mg   Take 2 tablets (650 mg) by mouth every 4 hours as needed for other (multimodal surgical pain management along with NSAIDS and opioid medication as indicated based on pain control and physical function.)   Quantity:  100 tablet   Refills:  0       aspirin 325 MG EC tablet   Used for:  Closed fracture of neck of right femur, initial encounter (H)        Take one Aspirin tab twice daily for 5 weeks.   Quantity:  70 tablet   Refills:  0       atorvastatin 10 MG tablet   Commonly known as:  LIPITOR   Used for:  Coronary artery disease involving native heart without angina pectoris, unspecified vessel or lesion type        Dose:  10 mg   Take 1 tablet (10 mg) by mouth every evening   Refills:  0       ANIBAL PROTECT EX        Apply topically 2 times daily   Refills:  0       diltiazem 120 MG 24 hr ER beaded capsule   Commonly known as:  TIAZAC   Indication:  High Blood Pressure Disorder        Dose:  120 mg   Take 120 mg by mouth daily   Refills:  0        insulin glargine 100 UNIT/ML injection   Commonly known as:  LANTUS        Dose:  7 Units   Inject 7 Units Subcutaneous every morning   Refills:  0       * insulin lispro 100 UNIT/ML injection   Commonly known as:  HumaLOG        Inject Subcutaneous 3 times daily (before meals) Sliding scale:  -189=1 unit -239=2 units -289=3 units -339=4 units -399=5 units -499=6 units +=7 units   Refills:  0       * HumaLOG KWIKpen 100 UNIT/ML injection   Generic drug:  insulin lispro        Inject Subcutaneous At Bedtime Sliding scale: -249=1 unit -299=2 units -349=3 units -399=4 units +=5 units   Refills:  0       * insulin lispro 100 UNIT/ML injection   Commonly known as:  HumaLOG KWIKpen   Used for:  Type 2 diabetes mellitus with complication, with long-term current use of insulin (H)        Dose:  2 Units   Inject 2 Units Subcutaneous 3 times daily (before meals)   Refills:  0       metoprolol succinate 50 MG 24 hr tablet   Commonly known as:  TOPROL-XL   Used for:  Paroxysmal supraventricular tachycardia (H)        Dose:  50 mg   Take 1 tablet (50 mg) by mouth daily   Quantity:  30 tablet   Refills:  0       polyethylene glycol Packet   Commonly known as:  MIRALAX/GLYCOLAX        Dose:  17 g   Take 17 g by mouth daily   Refills:  0       tamsulosin 0.4 MG capsule   Commonly known as:  FLOMAX        Dose:  0.4 mg   Take 0.4 mg by mouth every evening   Refills:  0       traMADol 50 MG tablet   Commonly known as:  ULTRAM   Used for:  Closed fracture of neck of left femur with routine healing, subsequent encounter        Dose:  50 mg   Take 1 tablet (50 mg) by mouth every 6 hours   Quantity:  50 tablet   Refills:  0       * Notice:  This list has 3 medication(s) that are the same as other medications prescribed for you. Read the directions carefully, and ask your doctor or other care provider to review them with you.         Where to get your medicines       These medications were sent to [facility] [place], [place] - [address]  [address], [place] [address]     Phone:  [phone]     senna-docusate 8.6-50 MG per tablet         Some of these will need a paper prescription and others can be bought over the counter. Ask your nurse if you have questions.     Bring a paper prescription for each of these medications     acetaminophen 325 MG tablet    oxyCODONE IR 5 MG tablet    traMADol 50 MG tablet                Protect others around you: Learn how to safely use, store and throw away your medicines at www.disposemymeds.org.        Information about OPIOIDS     PRESCRIPTION OPIOIDS: WHAT YOU NEED TO KNOW   We gave you an opioid (narcotic) pain medicine. It is important to manage your pain, but opioids are not always the best choice. You should first try all the other options your care team gave you. Take this medicine for as short a time (and as few doses) as possible.    Some activities can increase your pain, such as bandage changes or therapy sessions. It may help to take your pain medicine 30 to 60 minutes before these activities. Reduce your stress by getting enough sleep, working on hobbies you enjoy and practicing relaxation or meditation. Talk to your care team about ways to manage your pain beyond prescription opioids.    These medicines have risks:    DO NOT drive when on new or higher doses of pain medicine. These medicines can affect your alertness and reaction times, and you could be arrested for driving under the influence (DUI). If you need to use opioids long-term, talk to your care team about driving.    DO NOT operate heavy machinery    DO NOT do any other dangerous activities while taking these medicines.    DO NOT drink any alcohol while taking these medicines.     If the opioid prescribed includes acetaminophen, DO NOT take with any other medicines that contain acetaminophen. Read all labels carefully. Look for the  word  acetaminophen  or  Tylenol.  Ask your pharmacist if you have questions or are unsure.    You can get addicted to pain medicines, especially if you have a history of addiction (chemical, alcohol or substance dependence). Talk to your care team about ways to reduce this risk.    All opioids tend to cause constipation. Drink plenty of water and eat foods that have a lot of fiber, such as fruits, vegetables, prune juice, apple juice and high-fiber cereal. Take a laxative (Miralax, milk of magnesia, Colace, Senna) if you don t move your bowels at least every other day. Other side effects include upset stomach, sleepiness, dizziness, throwing up, tolerance (needing more of the medicine to have the same effect), physical dependence and slowed breathing.    Store your pills in a secure place, locked if possible. We will not replace any lost or stolen medicine. If you don t finish your medicine, please throw away (dispose) as directed by your pharmacist. The [place] Pollution Control Agency has more information about safe disposal: https://www.pca.[facility].[place].us/living-green/managing-unwanted-medications             Medication List: This is a list of all your medications and when to take them. Check marks below indicate your daily home schedule. Keep this list as a reference.      Medications           Morning Afternoon Evening Bedtime As Needed    acetaminophen 325 MG tablet   Commonly known as:  TYLENOL   Take 2 tablets (650 mg) by mouth every 4 hours as needed for other (multimodal surgical pain management along with NSAIDS and opioid medication as indicated based on pain control and physical function.)   Last time this was given:  650 mg on 10/17/2018  9:05 AM                                aspirin 325 MG EC tablet   Take one Aspirin tab twice daily for 5 weeks.   Last time this was given:  325 mg on 10/17/2018  9:05 AM                                atorvastatin 10 MG tablet   Commonly known as:  LIPITOR   Take 1  tablet (10 mg) by mouth every evening   Last time this was given:  10 mg on 10/16/2018  8:49 PM                                ANIBAL PROTECT EX   Apply topically 2 times daily                                diltiazem 120 MG 24 hr ER beaded capsule   Commonly known as:  TIAZAC   Take 120 mg by mouth daily                                DRISDOL 80592 units Caps   Take 50,000 Units by mouth every 7 days for 5 doses                                insulin glargine 100 UNIT/ML injection   Commonly known as:  LANTUS   Inject 7 Units Subcutaneous every morning   Last time this was given:  5 Units on 10/17/2018  6:51 AM                                * insulin lispro 100 UNIT/ML injection   Commonly known as:  HumaLOG   Inject Subcutaneous 3 times daily (before meals) Sliding scale:  -189=1 unit -239=2 units -289=3 units -339=4 units -399=5 units -499=6 units +=7 units                                * HumaLOG KWIKpen 100 UNIT/ML injection   Inject Subcutaneous At Bedtime Sliding scale: -249=1 unit -299=2 units -349=3 units -399=4 units +=5 units   Generic drug:  insulin lispro                                * insulin lispro 100 UNIT/ML injection   Commonly known as:  HumaLOG KWIKpen   Inject 2 Units Subcutaneous 3 times daily (before meals)                                metoprolol succinate 50 MG 24 hr tablet   Commonly known as:  TOPROL-XL   Take 1 tablet (50 mg) by mouth daily   Last time this was given:  50 mg on 10/17/2018  9:05 AM                                oxyCODONE IR 5 MG tablet   Commonly known as:  ROXICODONE   Take 1-2 tablets (5-10 mg) by mouth every 4 hours as needed for severe pain                                polyethylene glycol Packet   Commonly known as:  MIRALAX/GLYCOLAX   Take 17 g by mouth daily   Last time this was given:  17 g on 10/16/2018  3:40 PM                                senna-docusate 8.6-50 MG per tablet   Commonly  known as:  SENOKOT-S;PERICOLACE   Take 1 tablet by mouth 2 times daily                                tamsulosin 0.4 MG capsule   Commonly known as:  FLOMAX   Take 0.4 mg by mouth every evening   Last time this was given:  0.4 mg on 10/16/2018  8:49 PM                                traMADol 50 MG tablet   Commonly known as:  ULTRAM   Take 1 tablet (50 mg) by mouth every 6 hours   Last time this was given:  50 mg on 10/17/2018  9:05 AM                                * Notice:  This list has 3 medication(s) that are the same as other medications prescribed for you. Read the directions carefully, and ask your doctor or other care provider to review them with you.

## 2018-10-14 NOTE — IP AVS SNAPSHOT
` [place] [address] [facility]: [phone]            Medication Administration Report for Abimael Flores as of 10/17/18 1543   Legend:    Given Hold Not Given Due Canceled Entry Other Actions    Time Time (Time) Time  Time-Action       Inactive    Active    Linked        Medications 10/11/18 10/12/18 10/13/18 10/14/18 10/15/18 10/16/18 10/17/18    acetaminophen (TYLENOL) tablet 650 mg  Dose: 650 mg  Freq: EVERY 4 HOURS PRN Route: PO  PRN Reason: other  PRN Comment: multimodal surgical pain management along with NSAIDS and opioid medication as indicated based on pain control and physical function.  Start: 10/14/18 1545   Admin Instructions: Maximum acetaminophen dose from all sources = 75 mg/kg/day not to exceed 4 grams/day.    Admin. Amount: 2 tablet (2 × 325 mg tablet)  Last Admin: 10/17/18 0905  Dispense Loc: [place] [id number]        1623 (650 mg)-Given       1908-Auto Hold       2123-Unhold         1229 (650 mg)-Given        0509 (650 mg)-Given       0905 (650 mg)-Given           aspirin EC tablet 325 mg  Dose: 325 mg  Freq: DAILY Route: PO  Indications Comment: VTE Prophylaxis  Start: 10/15/18 0900   Admin Instructions: DO NOT CRUSH.    Admin. Amount: 1 tablet (1 × 325 mg tablet)  Last Admin: 10/17/18 0905  Dispense Loc: [facility]  POC: Post-procedure         0848 (325 mg)-Given        0908 (325 mg)-Given        0905 (325 mg)-Given           atorvastatin (LIPITOR) tablet 10 mg  Dose: 10 mg  Freq: EVERY EVENING Route: PO  Start: 10/14/18 2000   Admin. Amount: 1 tablet (1 × 10 mg tablet)  Last Admin: 10/16/18 2049  Dispense Loc: [facility]        1908-Auto Hold       2000 Auto Hold-Automatically Held       2123-Unhold        2043 (10 mg)-Given        2049 (10 mg)-Given        [ ] 2000           benzocaine-menthol (CHLORASEPTIC) 6-10 MG lozenge 1-2 lozenge  Dose: 1-2 lozenge  Freq: EVERY 1 HOUR PRN Route: BU  PRN Reason: sore throat  PRN Comment: sore throat without fever  Start: 10/14/18 0097    Admin. Amount: 1-2 lozenge  Dispense Loc:  [facility]  POC: Post-procedure               bisacodyl (DULCOLAX) Suppository 10 mg  Dose: 10 mg  Freq: DAILY PRN Route: RE  PRN Reason: constipation  Start: 10/16/18 1526   Admin. Amount: 1 suppository (1 × 10 mg suppository)  Dispense Loc: [facility]               diltiazem (DILACOR XR) 24 hr capsule 120 mg  Dose: 120 mg  Freq: DAILY Route: PO  Indications of Use: HYPERTENSION  Start: 10/15/18 0900   Admin. Amount: 1 capsule (1 × 120 mg capsule)  Last Admin: 10/17/18 0905  Dispense Loc:  [facility]        1908-Auto Hold       2123-Unhold        0847 (120 mg)-Given        0908 (120 mg)-Given        0905 (120 mg)-Given           glucose gel 15-30 g  Dose: 15-30 g  Freq: EVERY 15 MIN PRN Route: PO  PRN Reason: low blood sugar  Start: 10/14/18 1549   Admin Instructions: Give 15 g for BG 51 to 69 mg/dL IF patient is conscious and able to swallow. Give 30 g for BG less than or equal to 50 mg/dL IF patient is conscious and able to swallow. Do NOT give glucose gel via enteral tube.  IF patient has enteral tube: give apple juice 120 mL (4 oz or 15 g of CHO) via enteral tube for BG 51 to 69 mg/dL.  Give apple juice 240 mL (8 oz or 30 g of CHO) via enteral tube for BG less than or equal to 50 mg/dL.    ~Oral gel is preferable for conscious and able to swallow patient.   ~IF gel unavailable or patient refuses may provide apple juice 120 mL (4 oz or 15 g of CHO). Document juice on I and O flowsheet.    Admin. Amount: 15-30 g  Dispense Loc:  [facility]  Volume: 93.75 mL        1908-Auto Hold       2123-Unhold             Or  dextrose 50 % injection 25-50 mL  Dose: 25-50 mL  Freq: EVERY 15 MIN PRN Route: IV  PRN Reason: low blood sugar  Start: 10/14/18 1549   Admin Instructions: Use if have IV access, BG less than 70 mg/dL and meet dose criteria below:  Dose if conscious and alert (or disorientated) and NPO = 25 mL  Dose if unconscious / not alert = 50 mL  Vesicant. For ordered doses up  to 25 g, give IV Push undiluted. Give each 5g over 1 minute.    Admin. Amount: 25-50 mL  Dispense Loc:  [facility]  Infused Over: 1-5 Minutes  Volume: 50 mL        -Auto Hold       -Unhold             Or  glucagon injection 1 mg  Dose: 1 mg  Freq: EVERY 15 MIN PRN Route: SC  PRN Reason: low blood sugar  PRN Comment: May repeat x 1 only  Start: 10/14/18 1549   Admin Instructions: May give SQ or IM. ONLY use glucagon IF patient has NO IV access AND is UNABLE to swallow AND blood glucose is LESS than or EQUAL to 50 mg/dL.  If ordered IV, give IV Push over 1 minute. Reconstitute with 1mL sterile water.    Admin. Amount: 1 mg  Dispense Loc:  [facility]        -Auto Hold       -Unhold              HYDROmorphone (PF) (DILAUDID) injection 0.3 mg  Dose: 0.3 mg  Freq: EVERY 4 HOURS PRN Route: IV  PRN Reason: other  PRN Comment: for pain control or improvement in physical function.  Hold dose for analgesic side effects.  Start: 10/15/18 1415   Admin Instructions: Start at the lowest dose.  May adjust dose by 0.1 mg every 2 hours as needed.  Notify provider to assess for uncontrolled pain or analgesic side effects. Hold while on PCA or with regular IV opioid dosing  For ordered IV doses 0.1-4 mg give IV Push undiluted. Administer each 2mg over 2-5 minutes.    Admin. Amount: 0.3 mg  Dispense Loc:  [facility]               influenza Vac Split High-Dose (FLUZONE) injection 0.5 mL  Dose: 0.5 mL  Freq: PRIOR TO DISCHARGE Route: IM  Start: 10/15/18 1000   Admin Instructions: Administer when afebrile (less than 100.4F) x 24 hours, or Prior to Discharge.  If not administering when scheduled , change the due time by following the instructions in the reference link below.  If patient refuses vaccine, chart as Vaccine Refused.    Admin. Amount: 0.5 mL  Dispense Loc:  Main Pharmacy  Administrations Remainin  Volume: 0.5 mL          (1048)-Vaccine Refused [C]            insulin aspart (NovoLOG) inj (RAPID ACTING)  Dose:  "1-4 Units  Freq: 3 TIMES DAILY WITH MEALS Route: SC  Start: 10/16/18 1200   Admin Instructions: Correction Scale - LOW INSULIN RESISTANCE DOSING     Do Not give Correction Insulin if BG less than 140.  For  - 239 give 1 unit.  For  - 339 give 2 units.  For BG  340 - 439  give 3 units  For BG greater than or equal to 440 give 4 units  Check blood glucose Q4H and administer based on blood glucose.   Notify provider if glucose greater than or equal to 350 mg/dL after administration of correction dose.  If given at mealtime, administer within 30 minutes of start of meal    Admin. Amount: 1-4 Units  Last Admin: 10/17/18 1229  Dispense Loc: Contact Rx for dose  Volume: 3 mL          1216 (1 Units)-Given [C]       1743 (1 Units)-Given [C]        0907 (1 Units)-Given       1229 (2 Units)-Given       [ ] 1800           insulin glargine (LANTUS) injection 5 Units  Dose: 5 Units  Freq: EVERY MORNING BEFORE BREAKFAST Route: SC  Start: 10/15/18 0730   Admin. Amount: 5 Units  Last Admin: 10/17/18 0651  Dispense Loc:  Main Pharmacy  Volume: 0.05 mL        1908-Auto Hold       2123-Unhold        0850 (5 Units)-Given        0651 (5 Units)-Given        0651 (5 Units)-Given           lidocaine (LMX4) cream  Freq: EVERY 1 HOUR PRN Route: Top  PRN Reason: pain  PRN Comment: with VAD insertion or accessing implanted port.  Start: 10/14/18 2132   Admin Instructions: Do NOT give if patient has a history of allergy to any local anesthetic or any \"forrest\" product.   Apply 30 minutes prior to VAD insertion or port access.  MAX Dose:  2.5 g (  of 5 g tube)    Dispense Loc: Contact Rx for dose  POC: Post-procedure               lidocaine 1 % 1 mL  Dose: 1 mL  Freq: EVERY 1 HOUR PRN Route: OTHER  PRN Comment: mild pain with VAD insertion or accessing implanted port  Start: 10/14/18 2132   Admin Instructions: Do NOT give if patient has a history of allergy to any local anesthetic or any \"forrest\" product. MAX dose 1 mL subcutaneous OR " intradermal in divided doses.    Admin. Amount: 1 mL  Dispense Loc: [facility]  Volume: 20 mL  POC: Post-procedure               melatonin tablet 1 mg  Dose: 1 mg  Freq: AT BEDTIME PRN Route: PO  PRN Reason: sleep  Start: 10/14/18 1545   Admin Instructions: Do not give unless at least 6 hours of uninterrupted sleep is expected.    Admin. Amount: 1 tablet (1 × 1 mg tablet)  Dispense Loc: [address]        1908-Auto Hold       2123-Unhold              metoprolol succinate (TOPROL-XL) 24 hr tablet 50 mg  Dose: 50 mg  Freq: DAILY Route: PO  Start: 10/15/18 0900   Admin Instructions: DO NOT CRUSH. Tablet may be split in half along score line.    Admin. Amount: 1 tablet (1 × 50 mg tablet)  Last Admin: 10/17/18 0905  Dispense Loc: [address]        1908-Auto Hold       2123-Unhold        0848 (50 mg)-Given        0908 (50 mg)-Given        0905 (50 mg)-Given           naloxone (NARCAN) injection 0.1-0.4 mg  Dose: 0.1-0.4 mg  Freq: EVERY 2 MIN PRN Route: IV  PRN Reason: opioid reversal  Start: 10/14/18 2132   Admin Instructions: For respiratory rate LESS than or EQUAL to 8.  Partial reversal dose:  0.1 mg titrated q 2 minutes for Analgesia Side Effects Monitoring Sedation Level of 3 (frequently drowsy, arousable, drifts to sleep during conversation).Full reversal dose:  0.4 mg bolus for Analgesia Side Effects Monitoring Sedation Level of 4 (somnolent, minimal or no response to stimulation).  For ordered IV doses 0.1-2mg give IVP. Give each 0.4mg over 15 seconds in emergency situations. For non-emergent situations further dilute in 9mL of NS to facilitate titration of response.    Admin. Amount: 0.1-0.4 mg = 0.25-1 mL Conc: 0.4 mg/mL  Dispense Loc: [facility]  Volume: 1 mL  POC: Post-procedure               ondansetron (ZOFRAN-ODT) ODT tab 4 mg  Dose: 4 mg  Freq: EVERY 6 HOURS PRN Route: PO  PRN Reasons: nausea,vomiting  Start: 10/14/18 2132   Admin Instructions: This is Step 1 of nausea and vomiting management.  If nausea  not resolved in 15 minutes, go to Step 2 prochlorperazine (COMPAZINE). Do not push through foil backing. Peel back foil and gently remove. Place on tongue immediately. Administration with liquid unnecessary  With dry hands, peel back foil backing and gently remove tablet; do not push oral disintegrating tablet through foil backing; administer immediately on tongue and oral disintegrating tablet dissolves in seconds; then swallow with saliva; liquid not required.    Admin. Amount: 1 tablet (1 × 4 mg tablet)  Dispense Loc: [facility]  POC: Post-procedure              Or  ondansetron (ZOFRAN) injection 4 mg  Dose: 4 mg  Freq: EVERY 6 HOURS PRN Route: IV  PRN Reasons: nausea,vomiting  Start: 10/14/18 2132   Admin Instructions: This is Step 1 of nausea and vomiting management.  If nausea not resolved in 15 minutes, go to Step 2 prochlorperazine (COMPAZINE).  Irritant. For ordered IV doses 0.1-4 mg, give IV Push undiluted over 2-5 minutes.    Admin. Amount: 4 mg = 2 mL Conc: 4 mg/2 mL  Dispense Loc: [facility]  Infused Over: 2-5 Minutes  Volume: 2 mL  POC: Post-procedure               oxyCODONE IR (ROXICODONE) tablet 5-10 mg  Dose: 5-10 mg  Freq: EVERY 4 HOURS PRN Route: PO  PRN Reason: moderate to severe pain  Start: 10/14/18 1545   Admin. Amount: 1-2 tablet (1-2 × 5 mg tablet)  Dispense Loc: [facility]        1908-Auto Hold       2123-Unhold              polyethylene glycol (MIRALAX/GLYCOLAX) Packet 17 g  Dose: 17 g  Freq: 2 TIMES DAILY PRN Route: PO  PRN Comment: constipation  Start: 10/16/18 1525   Admin Instructions: 1 Packet = 17 grams. Mixed prescribed dose in 8 ounces of water. Follow with 8 oz. of water.    Admin. Amount: 17 g  Last Admin: 10/16/18 1540  Dispense Loc: [facility]          1540 (17 g)-Given            sennosides (SENOKOT) tablet 2 tablet  Dose: 2 tablet  Freq: 2 TIMES DAILY PRN Route: PO  PRN Reason: constipation  Start: 10/16/18 1525   Admin. Amount: 2 tablet  Last Admin: 10/16/18 5600  Dispense  Loc: [facility]          2054 (2 tablet)-Given            sodium chloride (PF) 0.9% PF flush 3 mL  Dose: 3 mL  Freq: EVERY 8 HOURS Route: IK  Start: 10/14/18 2200   Admin Instructions: And Q1H PRN, to lock peripheral IV dormant line.    Admin. Amount: 3 mL  Last Admin: 10/17/18 0653  Dispense Loc: [facility] Floor Stock  Volume: 3 mL  POC: Post-procedure   Current Line: Peripheral IV 10/14/18 Left Lower forearm       (2233)-Not Given        (0730)-Not Given       (1410)-Not Given       2047 (3 mL)-Given               0441 (3 mL)-Given              1541 (3 mL)-Given       2049 (3 mL)-Given               0653 (3 mL)-Given       [ ] 1430       [ ] 2230           sodium chloride (PF) 0.9% PF flush 3 mL  Dose: 3 mL  Freq: EVERY 1 HOUR PRN Route: IK  PRN Reason: line flush  PRN Comment: for peripheral IV flush post IV meds  Start: 10/14/18 2132   Admin. Amount: 3 mL  Dispense Loc: [facility] Floor Stock  Volume: 3 mL  POC: Post-procedure               tamsulosin (FLOMAX) capsule 0.4 mg  Dose: 0.4 mg  Freq: EVERY EVENING Route: PO  Start: 10/14/18 2000   Admin Instructions: Administer 30 minutes after the same meal each day.  Capsules should be swallowed whole; do not crush chew or open.    Admin. Amount: 1 capsule (1 × 0.4 mg capsule)  Last Admin: 10/16/18 2049  Dispense Loc: [place] [id number]        1908-Auto Hold       2000 Auto Hold-Automatically Held       2123-Unhold        2043 (0.4 mg)-Given        2049 (0.4 mg)-Given        [ ] 2000           traMADol (ULTRAM) tablet 50 mg  Dose: 50 mg  Freq: EVERY 12 HOURS Route: PO  Start: 10/14/18 2200   Admin Instructions: Dose adjusted per renal dosing policy.  Estimated CrCl = 10-29 mL/min. <br>    Admin. Amount: 1 tablet (1 × 50 mg tablet)  Last Admin: 10/17/18 0905  Dispense Loc: [facility]        1908-Auto Hold       2123-Unhold       2253 (50 mg)-Given        0846 (50 mg)-Given       2043 (50 mg)-Given        0908 (50 mg)-Given       2049 (50 mg)-Given        0905 (50 mg)-Given       [ ]            Discontinued Medications  Medications 10/11/18 10/12/18 10/13/18 10/14/18 10/15/18 10/16/18 10/17/18         Dose: 2.5 mg  Freq: EVERY 4 HOURS PRN Route: NEBULIZATION  PRN Reason: shortness of breath / dyspnea  Start: 10/14/18 2058   End: 10/14/18 2123   Admin. Amount: 2.5 mg = 3 mL Conc: 2.5 mg/3 mL  Dispense Loc: [facility] PACU  Volume: 3 mL  POC: PACU        -Med Discontinued            Dose: 1 g  Freq: SEE ADMIN INSTRUCTIONS Route: IV  Indications of Use: PERIOPERATIVE PHARMACOPROPHYLAXIS  Start: 10/14/18 1917   End: 10/14/18 2025   Admin Instructions: Intra-Op Dose.  Give every 2 hours while patient in surgery, starting 2 hours after pre-op dose.  DO NOT GIVE intra-op dose if CrCl less than 10 mL/min (on dialysis).  If CrCL less than 50 mL/min, double the time interval between doses.    Admin. Amount: 1 g  Dispense Loc:  Main Pharmacy  Infused Over: 30 Minutes  POC: Pre-procedure        Med Discontinued            Dose: 2 g  Freq: PRE-OP/PRE-PROCEDURE Route: IV  Indications of Use: PERIOPERATIVE PHARMACOPROPHYLAXIS  Start: 10/14/18 1917   End: 10/14/18 2025   Admin Instructions: Give first dose within 1 hour PRIOR to incision. If patient weight is greater than or equal to 120 kg increase dose to 3 g.    Admin. Amount: 2 g = 100 mL Conc: 2 g/100 mL  Dispense Loc: [facility] PACU2  Infused Over: 30 Minutes  Administrations Remainin  Volume: 100 mL  POC: Pre-procedure        Med Discontinued            Dose: 25-50 mcg  Freq: EVERY 2 MIN PRN Route: IV  PRN Reason: other  PRN Comment: acute pain  Start: 10/14/18 2058   End: 10/14/18 2123   Admin Instructions: MAX cumulative dose = 250 mcg.    Use Fentanyl initially, as a short acting agent for acute pain control.  If insufficient, or a longer acting agent is needed, begin Morphine or Hydromorphone if ordered.  For ordered IV doses 1-100 mcg give IV Push undiluted over a minimum of 3-5 minutes.    Admin. Amount: 25-50 mcg = 0.5-1 mL Conc:  50 mcg/mL  Dispense Loc: [facility] PACU2  Volume: 2 mL  POC: PACU        2123-Med Discontinued            Dose: 2.5-5 mg  Freq: EVERY 10 MIN PRN Route: IV  PRN Reason: high blood pressure  PRN Comment: for Systolic Blood Pressure greater than 160 and Heart Rate greater than 60 bpm.  Start: 10/14/18 2058   End: 10/14/18 2123   Admin Instructions: Max cumulative dose = 20 mg.  FOR USE IN PACU ONLY, DC WHEN TRANSFERRED TO FLOOR.  For ordered IV doses 1-40 mg, give IV Push undiluted over 1 minute.    Admin. Amount: 2.5-5 mg = 0.125-0.25 mL Conc: 20 mg/mL  Dispense Loc: [facility] PACU  Infused Over: 1 Minutes  Volume: 0.25 mL  POC: PACU        2123-Med Discontinued            Dose: 0.3-0.5 mg  Freq: EVERY 5 MIN PRN Route: IV  PRN Reason: other  PRN Comment: acute pain.  May administer if Respiratory Rate is greater than 10  Start: 10/14/18 2058   End: 10/14/18 2123   Admin Instructions: If fentanyl is also ordered, use HYDROmorphone if pain control insufficient with fentanyl or a longer acting agent is needed.   Max cumulative dose = 2 mg  For ordered IV doses 0.1-4 mg give IV Push undiluted. Administer each 2mg over 2-5 minutes.    Admin. Amount: 0.3-0.5 mg  Dispense Loc: [facility] PACU2  POC: PACU        2123-Med Discontinued            Dose: 0.3-0.5 mg  Freq: EVERY 3 HOURS PRN Route: IV  PRN Reason: other  PRN Comment: for pain control or improvement in physical function.  Hold dose for analgesic side effects.  Start: 10/14/18 1545   End: 10/14/18 1551   Admin Instructions: Start at the lowest dose.  May adjust dose by 0.1 mg every 2 hours as needed.  Notify provider to assess for uncontrolled pain or analgesic side effects. Hold while on PCA or with regular IV opioid dosing  For ordered IV doses 0.1-4 mg give IV Push undiluted. Administer each 2mg over 2-5 minutes.    Admin. Amount: 0.3-0.5 mg  Dispense Loc: [facility]        1551-Med Discontinued            Dose: 0.5 mg  Freq: EVERY 15 MIN PRN Route: IV  PRN Reason: moderate  to severe pain  Start: 10/14/18 1348   End: 10/14/18 1546   Admin Instructions: For ordered IV doses 0.1-4 mg give IV Push undiluted. Administer each 2mg over 2-5 minutes.    Admin. Amount: 0.5 mg  Last Admin: 10/14/18 1453  Dispense Loc:  [facility]  Administrations Remainin        1355 (0.5 mg)-Given       1453 (0.5 mg)-Given       1546-Med Discontinued            Dose: 0.5-1 mg  Freq: EVERY 3 HOURS PRN Route: IV  PRN Reason: other  PRN Comment: for pain control or improvement in physical function.  Hold dose for analgesic side effects.  Start: 10/14/18 1551   End: 10/15/18 1413   Admin Instructions: Start at the lowest dose.  May adjust dose by 0.1 mg every 2 hours as needed.  Notify provider to assess for uncontrolled pain or analgesic side effects. Hold while on PCA or with regular IV opioid dosing  For ordered IV doses 0.1-4 mg give IV Push undiluted. Administer each 2mg over 2-5 minutes.    Admin. Amount: 0.5-1 mg  Last Admin: 10/14/18 1623  Dispense Loc:  [facility]        1623 (0.5 mg)-Given       -Auto Hold       -Unhold        1413-Med Discontinued           Dose: 1-4 Units  Freq: EVERY 4 HOURS Route: SC  Start: 10/14/18 1615   End: 10/16/18 0940   Admin Instructions: Correction Scale - LOW INSULIN RESISTANCE DOSING     Do Not give Correction Insulin if BG less than 140.  For  - 239 give 1 unit.  For  - 339 give 2 units.  For BG  340 - 439  give 3 units  For BG greater than or equal to 440 give 4 units  Check blood glucose Q4H and administer based on blood glucose.   Notify provider if glucose greater than or equal to 350 mg/dL after administration of correction dose.  If given at mealtime, administer within 30 minutes of start of meal    Admin. Amount: 1-4 Units  Last Admin: 10/16/18 5389  Dispense Loc: Contact Rx for dose  Volume: 3 mL        (1852)-Not Given       -Auto Hold        Auto Hold-Automatically Held       -Unhold        (134)-Not Given [C]       (04)-Not  Given [C]       (0837)-Not Given       (1300)-Not Given       1755 (1 Units)-Given       2043 (1 Units)-Given       2359 (1 Units)-Given [C]        0439 (1 Units)-Given       0940-Med Discontinued  (1031)-Not Given [C]              Dose: 10 mg  Freq: ONCE PRN Route: IV  PRN Reason: high blood pressure  PRN Comment: for Systemic Blood Pressure greater than 160 mmHg and Heart Rate greater than 60 bpm.    Start: 10/14/18 2058   End: 10/14/18 2123   Admin Instructions: For PACU USE ONLY.  DC WHEN TRANSFERRED TO FLOOR.  For ordered doses up to 80 mg, give IV Push undiluted. Give each 20 mg over 2 minutes.    Admin. Amount: 10 mg = 2 mL Conc: 5 mg/mL  Dispense Loc: [facility] PACU  Infused Over: 2-8 Minutes  Administrations Remainin  Volume: 2 mL  POC: PACU        Med Discontinued            Rate: 100 mL/hr   Freq: CONTINUOUS Route: IV  Start: 10/14/18 2100   End: 10/14/18 2123   Admin Instructions: Continue until IV catheter is weaned    Dispense Loc: [facility] Floor Stock  Volume: 1,000 mL  POC: PACU               -Med Discontinued            Dose: 12.5 mg  Freq: EVERY 5 MIN PRN Route: IV  PRN Comment: post anesthesia shivering if Respiratory Rate greater than 10  Start: 10/14/18 2058   End: 10/14/18 2123   Admin Instructions: Give IV Push undiluted. 10-40 mg over 2-3 minutes, up to 125 mg over 3-15 minutes    Admin. Amount: 12.5 mg = 0.5 mL Conc: 25 mg/mL  Dispense Loc: [facility] PACU2  Administrations Remainin  Volume: 1 mL  POC: PACU        Med Discontinued            Dose: 0.1-0.4 mg  Freq: EVERY 2 MIN PRN Route: IV  PRN Reason: opioid reversal  Start: 10/15/18 0821   End: 10/16/18 0820   Admin Instructions: For apnea or imminent respiratory arrest: give 0.4 mg IV undiluted Q 2 minutes PRN until desired degree of reversal is obtained, stop opioid and notify provider. Continue monitoring until discharge criteria are met for a minimum of 2 hours.  For severe sedation, decrease in respiratory depth, quality or  respiratory rate less than 8: give 0.1 mg IV Q 2 minutes x 3 doses, stop opioid and notify provider.  Try to minimize reversal of analgesia especially in end-of-life patients  For ordered IV doses 0.1-2mg give IVP. Give each 0.4mg over 15 seconds in emergency situations. For non-emergent situations further dilute in 9mL of NS to facilitate titration of response.    Admin. Amount: 0.1-0.4 mg = 0.25-1 mL Conc: 0.4 mg/mL  Dispense Loc:  [facility]  Volume: 1 mL  POC: Post-procedure          0820-Med Discontinued          Dose: 0.1-0.4 mg  Freq: EVERY 2 MIN PRN Route: IV  PRN Reason: opioid reversal  Start: 10/14/18 1545   End: 10/14/18 2218   Admin Instructions: For respiratory rate LESS than or EQUAL to 8.  Partial reversal dose:  0.1 mg titrated q 2 minutes for Analgesia Side Effects Monitoring Sedation Level of 3 (frequently drowsy, arousable, drifts to sleep during conversation).Full reversal dose:  0.4 mg bolus for Analgesia Side Effects Monitoring Sedation Level of 4 (somnolent, minimal or no response to stimulation).  For ordered IV doses 0.1-2mg give IVP. Give each 0.4mg over 15 seconds in emergency situations. For non-emergent situations further dilute in 9mL of NS to facilitate titration of response.    Admin. Amount: 0.1-0.4 mg = 0.25-1 mL Conc: 0.4 mg/mL  Dispense Loc:  [facility]  Volume: 1 mL        1908-Auto Hold       2123-Unhold       2218-Med Discontinued            Dose: 4 mg  Freq: EVERY 30 MIN PRN Route: PO  PRN Reason: nausea  Start: 10/14/18 2058   End: 10/14/18 2123   Admin Instructions: MAX total dose = 8 mg, including OR dosing. If not resolved in 15 minutes, then go to step 2 [prochlorperazine (COMPAZINE), if ordered].  With dry hands, peel back foil backing and gently remove tablet; do not push oral disintegrating tablet through foil backing; administer immediately on tongue and oral disintegrating tablet dissolves in seconds; then swallow with saliva; liquid not required.    Admin. Amount: 1  tablet (1 × 4 mg tablet)  Dispense Loc: [facility] PACU  Administrations Remainin  POC: PACU        2123-Med Discontinued         Or    Dose: 4 mg  Freq: EVERY 30 MIN PRN Route: IV  PRN Reason: nausea  Start: 10/14/18 2058   End: 10/14/18 2123   Admin Instructions: MAX total dose = 8 mg, including OR dosing. If not resolved in 15 minutes, then go to step 2 [prochlorperazine (COMPAZINE), if ordered].  Irritant. For ordered IV doses 0.1-4 mg, give IV Push undiluted over 2-5 minutes.    Admin. Amount: 4 mg = 2 mL Conc: 4 mg/2 mL  Dispense Loc: [facility] PACU2  Infused Over: 2-5 Minutes  Administrations Remainin  Volume: 2 mL  POC: PACU        3-Med Discontinued            Dose: 5 mg  Freq: EVERY 6 HOURS PRN Route: IV  PRN Reasons: nausea,vomiting  Start: 10/14/18 2058   End: 10/14/18 2123   Admin Instructions: This is Step 2 of the nausea and vomiting protocol.   If nausea not resolved in 15 minutes, give metoclopramide (REGLAN) if ordered (step 3 of nausea and vomiting protocol)  For ordered IV doses 0.1-10 mg, give IV Push undiluted. Each 5mg over 1 minute.    Admin. Amount: 5 mg = 1 mL Conc: 5 mg/mL  Dispense Loc: [facility] PACU  Infused Over: 1-2 Minutes  Volume: 1 mL  POC: PACU        2123-Med Discontinued            Rate: 100 mL/hr   Freq: CONTINUOUS Route: IV  Last Dose: 100 mL/hr (10/15/18 0811)  Start: 10/14/18 1600   End: 10/15/18 1408   Last Admin: 10/15/18 0811  Dispense Loc: [facility] Floor Stock  Volume: 1,000 mL        1623 ( )-New Bag        0811 (100 mL/hr)-New Bag       1408-Med Discontinued      Medications 10/11/18 10/12/18 10/13/18 10/14/18 10/15/18 10/16/18 10/17/18

## 2018-10-14 NOTE — IP AVS SNAPSHOT
` `     [place] [address] [facility]: [phone]                 INTERAGENCY TRANSFER FORM - NOTES (H&P, Discharge Summary, Consults, Procedures, Therapies)   10/14/2018                    Hospital Admission Date: 10/14/2018  ABIMAEL FLORES   : 1924  Sex: Male        Patient PCP Information     Provider PCP Type    [facility]         History & Physicals      H&P by Silvano Kumar MD at 10/14/2018  2:37 PM     Author:  Silvano Kumar MD Service:  Hospitalist Author Type:  Physician    Filed:  10/14/2018  5:17 PM Date of Service:  10/14/2018  2:37 PM Creation Time:  10/14/2018  2:28 PM    Status:  Addendum :  Silvano Kumar MD (Physician)             Hospitalist  History and Physical         Date of Admission:  10/14/2018    Assessment & Plan     Mr. 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 Hip XR showed[NH1.1] d[NH1.2]islocated left hip arthroplasty.  Plan:[NH1.1]  - Admit to inpatient[NH1.2]  - Hold ASA   - NPO   - orthopedic surgery consult  - Pain control as needed  - Bedrest    Normocytic A[NH1.1]n[NH1.2]emia  Assessment: Baseline hemoglobin around 9. ON admission Hgb stable at 8.6  Plan:  - Monitor hemoglobin levels periodically.      Severe vitamin D deficiency.  Assessment/Plan: on oral vitamin D supplements.      Subclinical  hypothyroidism.  Assessment: Noted to be tachycardic, TSH 4.57.  T4 1.28.  Plan:  - Monitor thyroid function tests in 4-6 weeks.      Hypertension  Assessment: PTA on Cardizem 120 mg oral daily, metoprolol 50 mg oral daily.  Plan:  - Continue prior to admission Cardizem 120 mg oral daily, metoprolol 50 mg oral daily.      Dyslipidemia:   Assessment/Plan: Continue on prior to admission Lipitor.       Benign prostatic hypertrophy:   Assessment/Plan: Continue with prior to admission Flomax.       Chronic kidney disease, stage III-IV:    Assessment: the most recent creatinine levels have been around 1.8-1.9. On admission Cr 2.14, likely pre-renal in etiology  Plan:  - MVIF  - Monitor renal function periodically.  - Avoid nephrotoxic drugs.      Coronary artery disease:    Assessment: The patient is status post coronary artery bypass graft x 4 in 1996.    Plan:  - Continue on prior to admission metoprolol and Lipitor.   - ASA[NH1.1] hold for now, resume when okay with surgery[NH1.2]      Atrial fibrillation with intermittent rate-related bundle  Supraventricular tachycardia, asymptomatic   Assessment: During recent hospitalization in August 2018 after a fall with right femoral neck fracture status post ORIF 8/26/18-noted to have arrhythmia. CHADS-VASc score is 4 for age, hypertension, and diabetes.  This puts him in the high risk group for a stroke cardiology was then consulted, patient and family declined anticoagulation given risk for falls.  Plan:  - Continue on Toprol-XL, Cardizem.  - Hold ASA given likely surgery     Diabetes mellitus type 2:  T  Assessment: the most recent hemoglobin A1c was 8.4% on 08/25/2018.  At home he is on Lantus 7 units subcu every morning and Humalog 2 units subq t.i.d.    Plan:  - Continue insulin regimen with lantus reduced to 5 U and sliding scale insulin available.    # Pain Assessment:  Current Pain Score 10/14/2018   Patient currently in pain? -   Pain score (0-10) 10   Pain location  -   Pain descriptors -   - Abimael is experiencing pain due to Hip pain. Pain management was discussed and the plan was created in a collaborative fashion.  Abimael's response to the current recommendations: engaged  - Please see the plan for pain management as documented above        DVT Prophylaxis: Pneumatic Compression Devices  Code Status: Full Code    Disposition: Expected discharge pending orthopedic surgery plan    Silvano Kumar MD    Primary Care Physician   [facility]    Chief Complaint   Fall  Hip Pain    History is obtained from the patient    History of Present Illness   Abimael Flores is a 89 year old male with medical history notable for hypertension, dyslipidemia, coronary artery disease, status post coronary artery bypass graft in 1996, diabetes mellitus type 2, chronic kidney disease stage III-IV, chronic anemia, benign prostatic hypertrophy, nonsustained V-tach and 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 No CP/SOB/syncope.[NH1.2] He denies hitting his head. Of note, he reports 2 weeks ago, he fell and broke his right hip and then a week ago, he[NH1.1] picking up object from the floor and fell and fractured his left femoral neck[NH1.2].[NH1.1] His main complaint is hip pain, he reports any movement worsens his left hip pain significantly. No fever/cough. No urinary complaints. He otherwise does not endorse blood in his stool, no nausea/vomiting. Son is present at bedside who is a physician. Patient has no other complaints at this time.[NH1.2]    Past Medical History    I have reviewed this patient's medical history and updated it with pertinent information if needed.[NH1.1]   Past  Medical History:   Diagnosis Date     Benign essential hypertension 9/4/2018     Coronary artery disease      Nonsenile cataract      Recent retinal detachment, total or subtotal 8/5/2014     Type 2 diabetes mellitus without complications (H)[NH1.3]        Past Surgical History   I have reviewed this patient's surgical history and updated it with pertinent information if needed.[NH1.1]  Past Surgical History:   Procedure Laterality Date     CARDIAC SURGERY       CATARACT IOL, RT/LT Bilateral      lacrimal caruncle removed BE Bilateral ~1989     OPEN REDUCTION INTERNAL FIXATION HIP BIPOLAR Right 8/26/2018    Procedure: OPEN REDUCTION INTERNAL FIXATION HIP BIPOLAR;  Right Hip Bipolar Hemiarthroplasty (Biomet);  Surgeon: Bill Sanders MD;  Location:  OR     OPEN REDUCTION INTERNAL FIXATION HIP BIPOLAR Left 10/4/2018    Procedure: OPEN REDUCTION INTERNAL FIXATION HIP BIPOLAR;  LEFT HIP TONYA ARTHROPLASTY;  Surgeon: Bruno Stewart MD;  Location:  OR     REPAIR RUPTURED GLOBE  4/21/2014    Procedure: Exploration and Repair of Ruptured Globe ;  Surgeon: Mercedes Rivera MD;  Location: [facility][NH1.3]       Prior to Admission Medications   Prior to Admission Medications   Prescriptions Last Dose Informant Patient Reported? Taking?   Ergocalciferol (VITAMIN D) 59025 units CAPS   No No   Sig: Take 50,000 Units by mouth every 7 days for 5 doses   Skin Protectants, Misc. (ANIBAL PROTECT EX)  Nursing Home Yes No   Sig: Apply topically 2 times daily   acetaminophen (TYLENOL) 325 MG tablet   No No   Sig: Take 2 tablets (650 mg) by mouth every 4 hours as needed for other (multimodal surgical pain management along with NSAIDS and opioid medication as indicated based on pain control and physical function.)   aspirin 325 MG EC tablet  Nursing Home No No   Sig: Take one Aspirin tab twice daily for 5 weeks.   atorvastatin (LIPITOR) 10 MG tablet  Nursing Home No No   Sig: Take 1 tablet (10 mg) by mouth every evening    diltiazem (TIAZAC) 120 MG 24 hr ER beaded capsule  Nursing Home Yes No   Sig: Take 120 mg by mouth daily   insulin glargine (LANTUS) 100 UNIT/ML injection  Nursing Home Yes No   Sig: Inject 7 Units Subcutaneous every morning   insulin lispro (HUMALOG KWIKPEN) 100 UNIT/ML injection  Nursing Home No No   Sig: Inject 2 Units Subcutaneous 3 times daily (before meals)   insulin lispro (HUMALOG KWIKPEN) 100 UNIT/ML injection  Nursing Home Yes No   Sig: Inject Subcutaneous At Bedtime Sliding scale:  -249=1 unit  -299=2 units  -349=3 units  -399=4 units  +=5 units   insulin lispro (HUMALOG) 100 UNIT/ML injection  Nursing Home Yes No   Sig: Inject Subcutaneous 3 times daily (before meals) Sliding scale:   -189=1 unit  -239=2 units  -289=3 units  -339=4 units  -399=5 units  -499=6 units  +=7 units   metoprolol succinate (TOPROL-XL) 50 MG 24 hr tablet  Nursing Home No No   Sig: Take 1 tablet (50 mg) by mouth daily   polyethylene glycol (MIRALAX/GLYCOLAX) Packet  Nursing Home Yes No   Sig: Take 17 g by mouth daily   tamsulosin (FLOMAX) 0.4 MG capsule  Nursing Home Yes No   Sig: Take 0.4 mg by mouth every evening    traMADol (ULTRAM) 50 MG tablet   No No   Sig: Take 1 tablet (50 mg) by mouth every 6 hours      Facility-Administered Medications: None     Allergies   No Known Allergies    Social History   I have reviewed this patient's social history and updated it with pertinent information if needed. Abimael Flores[NH1.1]  reports that he has quit smoking. He has never used smokeless tobacco. He reports that he does not drink alcohol or use illicit drugs.[NH1.3]    Family History   I have reviewed this patient's family history and updated it with pertinent information if needed.[NH1.1]   Family History   Problem Relation Age of Onset     Diabetes Mother      Diabetes Father      Cancer No family hx of      Glaucoma No family hx of      Macular  Degeneration No family hx of[NH1.3]        Review of Systems   The 10 point Review of Systems is negative other than noted in the HPI or here.     Physical Exam   Temp: 98.3  F (36.8  C) Temp src: Oral BP: 112/48 Pulse: 76   Resp: 18 SpO2: 97 %      Vital Signs with Ranges  Temp:  [98.3  F (36.8  C)] 98.3  F (36.8  C)  Pulse:  [76] 76  Resp:  [18] 18  BP: (106-123)/(48-60) 112/48  SpO2:  [95 %-97 %] 97 %  160 lbs 0 oz    Constitutional: Awake, alert, cooperative, no apparent distress.  Eyes: Conjunctiva and pupils examined and normal.  HEENT: Moist mucous membranes, normal dentition.  Respiratory: Clear to auscultation bilaterally, no crackles or wheezing.  Cardiovascular: Regular rate and rhythm, normal S1 and S2, and no murmur noted.  GI: Soft, non-distended, non-tender, normal bowel sounds.  Lymph/Hematologic: No anterior cervical or supraclavicular adenopathy.  Skin: No rashes, no cyanosis, no edema.  Musculoskeletal:[NH1.1] Point tenderness to lateral left hip. ROM of hip severely limited due to pain[NH1.2]  Neurologic: Cranial nerves 2-12 intact, normal strength and sensation.  Psychiatric: Alert, oriented to person, place and time, no obvious anxiety or depression.    Data   Data reviewed today:  I personally reviewed the Hip XR image(s) showing Dislocated left hip arthroplasty..    Recent Labs  Lab 10/14/18  1220 10/10/18  0700 10/09/18  1302 10/09/18  0703 10/08/18  0535   WBC 10.8  --   --  9.2 9.2   HGB 8.6* 8.7*  --  8.2* 7.8*   MCV 91  --   --  89 89     --   --  204 148*   INR 1.08  --   --   --   --      --   --  139 138   POTASSIUM 4.7  --   --  4.0 4.2   CHLORIDE 102  --   --  105 107   CO2 25  --   --  24 25   BUN 41*  --   --  40* 37*   CR 2.14* 1.79*  --  1.96* 2.11*   ANIONGAP 8  --   --  10 6   JOSELINE 7.7*  --   --  7.9* 7.5*   *  --   --  205* 188*   ALBUMIN  --   --   --  2.1*  --    PROTTOTAL  --   --   --  6.3*  --    BILITOTAL  --   --   --  0.6  --    ALKPHOS  --   --    --  72  --    ALT  --   --   --  12  --    AST  --   --   --  22  --    TROPI  --  0.134* 0.143* 0.157*  --        Recent Results (from the past 24 hour(s))   XR Pelvis w Hip Left 1 View    Narrative    PELVIS WITH UNILATERAL HIP ONE VIEW LEFT  10/14/2018 1:23 PM     HISTORY: fall ?dislocation;     COMPARISON: None.    FINDINGS: There is a dislocation of the left hip arthroplasty. The  acetabular and femoral components are dislocated superiorly with  respect to the native acetabulum..      Impression    IMPRESSION: Dislocated left hip arthroplasty.    CEASAR BLEDSOE MD[NH1.1]          Revision History        User Key Date/Time User Provider Type Action    > [N/A] 10/14/2018  5:17 PM Silvano Kumar MD Physician Addend     NH1.2 10/14/2018  4:02 PM Silvano Kumar MD Physician Sign     NH1.3 10/14/2018  2:37 PM Silvano Kumar MD Physician      NH1.1 10/14/2018  2:28 PM Silvano Kumar MD Physician                   Discharge Summaries     No notes of this type exist for this encounter.         Consult Notes      Consults by Milton Gusman MD at 10/14/2018  8:45 PM     Author:  Milton Gusman MD Service:  Orthopedics Author Type:  Physician    Filed:  10/14/2018  8:45 PM Date of Service:  10/14/2018  8:45 PM Creation Time:  10/14/2018  8:39 PM    Status:  Signed :  Milton Gusman MD (Physician)     Consult Orders:    1. Orthopedic Surgery IP Consult: Patient to be seen: Routine - within 24 hours; Hip dislocation; Consultant may enter orders: Yes [[id number]] ordered by Silvano Kumar MD at 10/14/18 1509                [facility]    Orthopedics Consultation    Date of Admission:  10/14/2018    Assessment & Plan   Abimael Flores is a 89 year old male who was admitted on 10/14/2018. I was asked to see the patient for left hip hemiarthroplasty posterior dislocation.    N.p.o.  Discussed both operative and nonoperative management the risk and benefits of each.  Patient thoughtfully acknowledges  risks and wishes to proceed with left open versus closed reduction of the left hip.  To OR          Milton Gusman MD    Code Status    Full Code During Procedure    Reason for Consult   Reason for consult: I was asked by Dr. Kumar to evaluate this patient for left hip hemiarthroplasty dislocation.    Primary Care Physician   RAISSA HILL    History of Present Illness   Abimael Flores is a 89 year old male who was admitted on 10/14/2018. I was asked to see the patient for left hip hemiarthroplasty posterior dislocation.  Patient had a fall and had a right hip fracture treated with a bipolar hemiarthroplasty in August.  Fell again in October having a left hip hemiarthroplasty on October 4.  He tripped and fell at his TCU now having a posterior hip dislocation.  Denies any lightheadedness prior to the fall.  States he has had no issues with his wound.  Denies chest pain or shortness of breath..    MEDS:   No current outpatient prescriptions on file.       PAST MEDICAL HISTORY:   Past Medical History:   Diagnosis Date     Benign essential hypertension 9/4/2018     Coronary artery disease      Nonsenile cataract      Recent retinal detachment, total or subtotal 8/5/2014     Type 2 diabetes mellitus without complications (H)        PAST SURGICAL HISTORY:   Past Surgical History:   Procedure Laterality Date     CARDIAC SURGERY       CATARACT IOL, RT/LT Bilateral      lacrimal caruncle removed BE Bilateral ~1989     OPEN REDUCTION INTERNAL FIXATION HIP BIPOLAR Right 8/26/2018    Procedure: OPEN REDUCTION INTERNAL FIXATION HIP BIPOLAR;  Right Hip Bipolar Hemiarthroplasty (Biomet);  Surgeon: Bill Sanders MD;  Location:  OR     OPEN REDUCTION INTERNAL FIXATION HIP BIPOLAR Left 10/4/2018    Procedure: OPEN REDUCTION INTERNAL FIXATION HIP BIPOLAR;  LEFT HIP TONYA ARTHROPLASTY;  Surgeon: Bruno Stewart MD;  Location:  OR     REPAIR RUPTURED GLOBE  4/21/2014    Procedure: Exploration and Repair  of Ruptured Globe ;  Surgeon: Mercedes Rivera MD;  [facility]       FAMILY HISTORY:   Family History   Problem Relation Age of Onset     Diabetes Mother      Diabetes Father      Cancer No family hx of      Glaucoma No family hx of      Macular Degeneration No family hx of        SOCIAL HISTORY:   Social History   Substance Use Topics     Smoking status: Former Smoker     Smokeless tobacco: Never Used     Alcohol use No       ALLERGIES:  No Known Allergies    ROS:  10 point ROS neg other than the symptoms noted above in the HPI.      Physical Exam   Temp: 96.9  F (36.1  C) Temp src: Temporal BP: 117/62 Pulse: 70 Heart Rate: 74 Resp: 12 SpO2: 100 % O2 Device: Nasal cannula Oxygen Delivery: 4 LPM  Vital Signs with Ranges  Temp:  [96.9  F (36.1  C)-98.3  F (36.8  C)] 96.9  F (36.1  C)  Pulse:  [70-76] 70  Heart Rate:  [74-78] 74  Resp:  [12-18] 12  BP: (106-138)/(48-80) 117/62  SpO2:  [93 %-100 %] 100 %  160 lbs 0 oz    Constitutional: Pleasant, alert, appropriate, following commands.  HEENT: Head atraumatic normocephalic. Pupils equal round and reactive to light.  Respiratory: Unlabored breathing no audible wheeze  Cardiovascular: Regular rate and rhythm  GI: Abdomen soft nontender nondistended.  Skin: No rashes, no cyanosis, no edema.  Musculoskeletal: Right hip incision healed.  Left leg shortened and rotated.  5/5df/pf/ehl, SILT, palpable dp pulse, incision with staples in place c/d/i  Neurologic: normal without focal findings, mental status, speech normal, alert and oriented x iii, EMILY        Data[RH1.1]   Results for orders placed or performed during the hospital encounter of 10/14/18 (from the past 24 hour(s))   CBC with platelets + differential   Result Value Ref Range    WBC 10.8 4.0 - 11.0 10e9/L    RBC Count 2.94 (L) 4.4 - 5.9 10e12/L    Hemoglobin 8.6 (L) 13.3 - 17.7 g/dL    Hematocrit 26.7 (L) 40.0 - 53.0 %    MCV 91 78 - 100 fl    MCH 29.3 26.5 - 33.0 pg    MCHC 32.2 31.5 - 36.5 g/dL    RDW 15.5 (H)  10.0 - 15.0 %    Platelet Count 355 150 - 450 10e9/L    Diff Method Automated Method     % Neutrophils 70.0 %    % Lymphocytes 18.4 %    % Monocytes 8.5 %    % Eosinophils 2.4 %    % Basophils 0.2 %    % Immature Granulocytes 0.5 %    Nucleated RBCs 0 0 /100    Absolute Neutrophil 7.6 1.6 - 8.3 10e9/L    Absolute Lymphocytes 2.0 0.8 - 5.3 10e9/L    Absolute Monocytes 0.9 0.0 - 1.3 10e9/L    Absolute Eosinophils 0.3 0.0 - 0.7 10e9/L    Absolute Basophils 0.0 0.0 - 0.2 10e9/L    Abs Immature Granulocytes 0.1 0 - 0.4 10e9/L    Absolute Nucleated RBC 0.0    Basic metabolic panel   Result Value Ref Range    Sodium 135 133 - 144 mmol/L    Potassium 4.7 3.4 - 5.3 mmol/L    Chloride 102 94 - 109 mmol/L    Carbon Dioxide 25 20 - 32 mmol/L    Anion Gap 8 3 - 14 mmol/L    Glucose 245 (H) 70 - 99 mg/dL    Urea Nitrogen 41 (H) 7 - 30 mg/dL    Creatinine 2.14 (H) 0.66 - 1.25 mg/dL    GFR Estimate 29 (L) >60 mL/min/1.7m2    GFR Estimate If Black 35 (L) >60 mL/min/1.7m2    Calcium 7.7 (L) 8.5 - 10.1 mg/dL   INR   Result Value Ref Range    INR 1.08 0.86 - 1.14   XR Pelvis w Hip Left 1 View    Narrative    PELVIS WITH UNILATERAL HIP ONE VIEW LEFT  10/14/2018 1:23 PM     HISTORY: fall ?dislocation;     COMPARISON: None.    FINDINGS: There is a dislocation of the left hip arthroplasty. The  acetabular and femoral components are dislocated superiorly with  respect to the native acetabulum..      Impression    IMPRESSION: Dislocated left hip arthroplasty.    CEASAR BLEDSOE MD   Glucose by meter   Result Value Ref Range    Glucose 219 (H) 70 - 99 mg/dL[RH1.2]                  Revision History        User Key Date/Time User Provider Type Action    > RH1.2 10/14/2018  8:45 PM Milton Gusman MD Physician Sign     RH1.1 10/14/2018  8:39 PM Milton Gusman MD Physician                      Progress Notes - Physician (Notes from 10/14/18 through 10/17/18)      ED Notes signed by Scan, Non-Provider at 10/16/2018  4:46 PM      Author:   Scan, Non-Provider Service:  (none) Author Type:  (none)    Filed:  10/16/2018  4:46 PM Date of Service:  10/16/2018  4:45 PM Creation Time:  10/16/2018  4:46 PM    Status:  Signed :  Ochoa, Non-Provider     Scan on 10/16/2018  4:46 PM by Scan Non-Provider : PREHOSPITAL CARE REPORT 1          Revision History        User Key Date/Time User Provider Type Action    > [N/A] 10/16/2018  4:46 PM Scan, Non-Provider (none) Sign            Progress Notes by Lynnette Iyer PT at 10/16/2018  4:40 PM     Author:  Lynnette Iyer PT Service:  (none) Author Type:  Physical Therapist    Filed:  10/16/2018  4:40 PM Date of Service:  10/16/2018  4:40 PM Creation Time:  10/16/2018  4:40 PM    Status:  Signed :  Lynnette Iyer PT (Physical Therapist)          10/16/18 1145   Quick Adds   Type of Visit Initial PT Evaluation   Living Environment   Lives With facility resident   Living Arrangements assisted living   Home Accessibility no concerns   Number of Stairs to Enter Home 0   Number of Stairs Within Home 0   Stair Railings at Home none   Living Environment Comment Patient reports he now lives in [place].    Self-Care   Dominant Hand right   Usual Activity Tolerance good   Current Activity Tolerance poor   Regular Exercise no   Equipment Currently Used at Home walker, rolling   Activity/Exercise/Self-Care Comment Patient was independent prior to initial fall in August.    Functional Level Prior   Ambulation 0-->independent   Transferring 0-->independent   Toileting 0-->independent   Bathing 0-->independent   Dressing 0-->independent   Eating 0-->independent   Communication 0-->understands/communicates without difficulty   Swallowing 0-->swallows foods/liquids without difficulty   Cognition 0 - no cognition issues reported   Fall history within last six months yes   Number of times patient has fallen within last six months 4   Which of the above functional risks had a recent onset or change? ambulation;transferring;fall  history   General Information   Onset of Illness/Injury or Date of Surgery - Date 10/14/18   Referring Physician Milton Gusman   Patient/Family Goals Statement To go back to [facility]   Pertinent History of Current Problem (include personal factors and/or comorbidities that impact the POC) POD # 11 Left bipolar hemiarthroplasty for femoral neck fracture.   Precautions/Limitations fall precautions   Weight-Bearing Status - LLE weight-bearing as tolerated   Cognitive Status Examination   Orientation orientation to person, place and time   Level of Consciousness alert   Follows Commands and Answers Questions 100% of the time   Personal Safety and Judgment impaired   Memory intact   Pain Assessment   Patient Currently in Pain Yes, see Vital Sign flowsheet   Posture    Posture Forward head position   Range of Motion (ROM)   ROM Comment Left hip limited by brace and hip precautions.    Strength   Strength Comments Generalized weakness and shakiness throughout.    Bed Mobility   Bed Mobility Comments Sup to sit with max assist of 2   Transfer Skills   Transfer Comments Sit to stand with mod assist of 2.    Gait   Gait Comments Gait 5 feet with mod assist and frequent cues for technique.    Balance   Balance Comments Initially needed assist for sitting balance but eventually able to maintain sitting balance. Static and dynamic standing balance poor. Shaky   Sensory Examination   Sensory Perception Comments no   Coordination   Coordination Comments LE coordination appears impaired during gait.    General Therapy Interventions   Planned Therapy Interventions bed mobility training;gait training;strengthening;transfer training   Clinical Impression   Criteria for Skilled Therapeutic Intervention yes, treatment indicated   PT Diagnosis Impaired functional independence   Influenced by the following impairments Impaired balance pain, impaired coordination.    Functional limitations due to impairments Needs assist for all  "functional mobilitiy   Clinical Presentation Evolving/Changing   Clinical Presentation Rationale Many preexisting factors.    Clinical Decision Making (Complexity) Moderate complexity   Therapy Frequency` daily   Predicted Duration of Therapy Intervention (days/wks) 3 days   Anticipated Equipment Needs at Discharge (To be determined by TCU. )   Anticipated Discharge Disposition Transitional Care Facility   Risk & Benefits of therapy have been explained Yes   Patient, Family & other staff in agreement with plan of care Yes   [facility] [employer]-[facility] TM \"6 Clicks\"   2016, Trustees of [facility], under license to [employer].  All rights reserved.   6 Clicks Short Forms Basic Mobility Inpatient Short Form   [facility] AM-[facility]  \"6 Clicks\" V.2 Basic Mobility Inpatient Short Form   1. Turning from your back to your side while in a flat bed without using bedrails? 2 - A Lot   2. Moving from lying on your back to sitting on the side of a flat bed without using bedrails? 2 - A Lot   3. Moving to and from a bed to a chair (including a wheelchair)? 2 - A Lot   4. Standing up from a chair using your arms (e.g., wheelchair, or bedside chair)? 2 - A Lot   5. To walk in hospital room? 2 - A Lot   6. Climbing 3-5 steps with a railing? 1 - Total   Basic Mobility Raw Score (Score out of 24.Lower scores equate to lower levels of function) 11   Total Evaluation Time   Total Evaluation Time (Minutes) 12[SL1.1]        Revision History        User Key Date/Time User Provider Type Action    > SL1.1 10/16/2018  4:40 PM Lynnette Iyer, PT Physical Therapist Sign            Progress Notes by Silvano Kumar MD at 10/16/2018  1:32 PM     Author:  Silvano Kumar MD Service:  Hospitalist Author Type:  Physician    Filed:  10/16/2018  2:55 PM Date of Service:  10/16/2018  1:32 PM Creation Time:  10/16/2018  1:32 PM    Status:  Signed :  Silvano Kumar MD (Physician)             Hospitalist Progress Note    Date of Service: " 10/16/2018         Assessment and Plan:          Mr. 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 on10/14 following a mechanical fall with hip dislocation     Mechanical fall   Hip Dislocation  Assessment: Notably, he had a recent DELL on 10/04. The patient has presented with left hip pain following a mechanical fall as he stood up to go to the bathroom at [facility]. Hip XR showed dislocated left hip arthroplasty. Now s/p Left hip hemiarthroplasty closed reduction under anesthesia on 10/14 with no complications.[NH1.1] Patient refused hip brace unfortunately. Still Requiring 2 person assist and feels very constipated.[NH1.2]  Plan:  -  daily  - Pain control as needed[NH1.1] (oxycodone, tramadol and IV dilaudid for breakthrough)  - Bowel regimen[NH1.2]  - PT/SW[NH1.1]  - Should be ready for discharge tomorrow[NH1.2]     Normocytic Anemia  Assessment: Baseline hemoglobin around 9. ON admission Hgb stable at 8.6  Plan:  - Monitor hemoglobin levels periodically.      Severe vitamin D deficiency.  Assessment/Plan: on oral vitamin D supplements.      Subclinical hypothyroidism.  Assessment: Noted to be tachycardic, TSH 4.57.  T4 1.28.  Plan:  - Monitor thyroid function tests in 4-6 weeks.      Hypertension  Assessment: PTA on Cardizem 120 mg oral daily, metoprolol 50 mg oral daily.  Plan:  - Continue prior to admission Cardizem 120 mg oral daily, metoprolol 50 mg oral daily.      Dyslipidemia:   Assessment/Plan: Continue on prior to admission Lipitor.       Benign prostatic hypertrophy:   Assessment/Plan: Continue with prior to admission Flomax.       Chronic kidney  disease, stage III-IV:    Assessment: the most recent creatinine levels have been around 1.8-1.9. On admission Cr 2.14, likely pre-renal in etiology  Plan:  - Monitor renal function periodically.  - Avoid nephrotoxic drugs.      Coronary artery disease:    Assessment: The patient is status post coronary artery bypass graft x 4 in 1996.    Plan:  - Continue on prior to admission metoprolol and Lipitor.   - Resume ASA      Atrial fibrillation with intermittent rate-related bundle  Supraventricular tachycardia, asymptomatic   Assessment: During recent hospitalization in August 2018 after a fall with right femoral neck fracture status post ORIF 8/26/18-noted to have arrhythmia. CHADS-VASc score is 4 for age, hypertension, and diabetes.  This puts him in the high risk group for a stroke cardiology was then consulted, patient and family declined anticoagulation given risk for falls.  Plan:  - Continue on Toprol-XL, Cardizem.  - Resume ASA      Diabetes mellitus type 2:  T  Assessment: the most recent hemoglobin A1c was 8.4% on 08/25/2018.  At home he is on Lantus 7 units subcu every morning and Humalog 2 units subq t.i.d.    Plan:  - Continue insulin regimen with lantus reduced to 5 U and sliding scale insulin available.    Active Diet Order      Moderate Consistent CHO Diet    DVT Prophylaxis: Pneumatic Compression Devices  Code Status: DNR/DNI    Disposition: Expected discharge[NH1.1] 10/16 to [facility][NH1.2]    Attestation:   I have reviewed today's relevant vital signs, notes, medications, labs and imaging.I personally reviewed no images or EKG's today.    Silvano Kumar MD  [facility]  Text Page  (7am - 6pm)        Interval History:        Pain improved  More mobility in left hip,[NH1.1] refused hip brace as it was too painful for him to move for it to be placed on  No new complaints aside from constipation[NH1.2]         Physical Exam:        Physical Exam   Temp: 97.8  F (36.6  C) Temp src: Oral BP:  101/47 Pulse: 75 Heart Rate: 77 Resp: 14 SpO2: 95 % O2 Device: None (Room air) Oxygen Delivery: 1 LPM  Vitals:    10/14/18 1216   Weight: 72.6 kg (160 lb)     Vital Signs with Ranges  Temp:  [97.7  F (36.5  C)-98.1  F (36.7  C)] 97.8  F (36.6  C)  Pulse:  [75] 75  Heart Rate:  [56-79] 77  Resp:  [14-16] 14  BP: ()/(42-56) 101/47  SpO2:  [95 %-99 %] 95 %  I/O last 3 completed shifts:  In: 1801 [P.O.:100; I.V.:1701]  Out: 900 [Urine:900]    Constitutional:Awake, alert, cooperative, no apparent distress  Respiratory: Clear to auscultation bilaterally, no crackles or wheezing  Cardiovascular: Regular rate and rhythm, normal S1 and S2, and no murmur noted  GI: Normal bowel sounds, soft, non-distended, non-tender  Skin/Integumen: No rashes, no cyanosis, no edema, incision site dressing C/D/I on left hip  Other:normal mood and affect  Neuro: A/Ox3. Moving all extremities, speech fluent    # Pain Assessment:  Current Pain Score 10/16/2018   Patient currently in pain? -   Pain score (0-10) 4   Pain location -   Pain descriptors -   - Abimael is experiencing pain due to hip pain. Pain management was discussed and the plan was created in a collaborative fashion.  Abimael's response to the current recommendations: engaged  - Please see the plan for pain management as documented above         Data:     CBC RESULTS:    Recent Labs  Lab 10/16/18  0623 10/15/18  0715 10/14/18  1220 10/10/18  0700   WBC  --  8.6 10.8  --    RBC  --  2.63* 2.94*  --    HGB 7.5* 7.9* 8.6* 8.7*   HCT  --  24.0* 26.7*  --    PLT  --  316 355  --        BASIC METABOLIC PANEL:    Recent Labs  Lab 10/16/18  0623 10/15/18  0715 10/14/18  1220 10/10/18  0700   NA  --  136 135  --    POTASSIUM  --  4.9 4.7  --    CHLORIDE  --  105 102  --    CO2  --  26 25  --    BUN  --  31* 41*  --    CR  --  1.69* 2.14* 1.79*   * 127* 245*  --    JOSELINE  --  7.3* 7.7*  --        HEPATIC FUNCTION PANEL  No results for input(s): AST, ALT, GGT, ALKPHOS, BILITOTAL,  BILICONJ, BILIDIRECT, SOFIE in the last 168 hours.    Invalid input(s): BILIRUBININDIRECT  INR    Recent Labs  Lab 10/14/18  1220   INR 1.08       OTHER LABS  None    Medications       aspirin  325 mg Oral Daily     atorvastatin  10 mg Oral QPM     diltiazem  120 mg Oral Daily     influenza Vac Split High-Dose  0.5 mL Intramuscular Prior to discharge     insulin aspart  1-4 Units Subcutaneous TID w/meals     insulin glargine  5 Units Subcutaneous QAM AC     metoprolol succinate  50 mg Oral Daily     sodium chloride (PF)  3 mL Intracatheter Q8H     tamsulosin  0.4 mg Oral QPM     traMADol  50 mg Oral Q12H         No results found for this or any previous visit (from the past 24 hour(s)).[NH1.1]     Revision History        User Key Date/Time User Provider Type Action    > NH1.2 10/16/2018  2:55 PM Silvano Kumar MD Physician Sign     NH1.1 10/16/2018  1:32 PM Silvano Kumar MD Physician             Progress Notes by Edgardo Godfrey PA-C at 10/16/2018  9:45 AM     Author:  Edgardo Godfrey PA-C Service:  Orthopedics Author Type:  Physician Assistant    Filed:  10/16/2018  9:48 AM Date of Service:  10/16/2018  9:45 AM Creation Time:  10/16/2018  9:45 AM    Status:  Signed :  Edgardo Godfrey PA-C (Physician Assistant)         [facility] Orthopedic Progress Note  Abimael Flores is a 89 year old male    Today's Date:10/16/2018  Admission Date: 10/14/2018  Dislocation of left hip, initial encounter (H) [S73.005A]  S/p closed reduction of left bipolar hemiarthroplasty dislocation on 10/14  S/p Left bipolar hemiarthroplasty for femoral neck fracture on 10/4    Patient Seen.  Doing well.  Refusing ABduction brace.  Dressings are clean, dry, and intact.  Circulation, motor and sensory function, intact distally.        PLAN:  Staples out.    We discussed the risk of recurrent dislocation, fall risk.  [facility]      Temp:  [97.4  F (36.3  C)-98.1  F (36.7  C)] 97.8  F (36.6  C)  Heart  Rate:  [56-79] 77  Resp:  [16] 16  BP: ()/(42-56) 119/52  SpO2:  [96 %-99 %] 96 %      Results for orders placed or performed during the hospital encounter of 10/14/18 (from the past 24 hour(s))   Glucose by meter   Result Value Ref Range    Glucose 204 (H) 70 - 99 mg/dL   Glucose by meter   Result Value Ref Range    Glucose 187 (H) 70 - 99 mg/dL   Glucose by meter   Result Value Ref Range    Glucose 189 (H) 70 - 99 mg/dL   Glucose by meter   Result Value Ref Range    Glucose 177 (H) 70 - 99 mg/dL   Glucose by meter   Result Value Ref Range    Glucose 140 (H) 70 - 99 mg/dL   Hemoglobin   Result Value Ref Range    Hemoglobin 7.5 (L) 13.3 - 17.7 g/dL   Glucose   Result Value Ref Range    Glucose 129 (H) 70 - 99 mg/dL         Edgardo Godfrey[DW1.1]       Revision History        User Key Date/Time User Provider Type Action    > DW1.1 10/16/2018  9:48 AM Edgardo Godfrey PA-C Physician Assistant Sign            Progress Notes by Manny Eubanks at 10/15/2018  6:01 PM     Author:  Manny Eubanks Service:  (none) Author Type:  Orthotist    Filed:  10/15/2018  6:01 PM Date of Service:  10/15/2018  6:01 PM Creation Time:  10/15/2018  6:01 PM    Status:  Signed :  Manny Eubanks (Orthotist)         S:Pt. seen in [facility]. Male. Dr. Gusman. RX; Left Hip orthosis. 70 degree flexion stop, 20 degree abduction. DX: Hip dislocation.  O: Pt. has dislocated his right hip also.   A: orthosis was donned while sitting on the edge of the bed. Pt. stood with assistance of the nurse and a walker. Pt. is very unstable, he can not fully extend his knees and balances on his heels. Fit was appropriate. Pt. to have PT tomorrow. Donning doffing wear and care instructions given to Pt. and nurse. 70 degree hip flexion and 20 abduction stops set.  P: Pt. to be seen as needed.    This note has been electronically signed by VIDHYA Alcantar.[GH1.1]     Revision History        User Key Date/Time User Provider Type  Action    > GH1.1 10/15/2018  6:01 PM Manny Eubanks Orthotist Sign            Progress Notes by Silvano Kumar MD at 10/15/2018  2:06 PM     Author:  Silvano Kumar MD Service:  Hospitalist Author Type:  Physician    Filed:  10/15/2018  2:14 PM Date of Service:  10/15/2018  2:06 PM Creation Time:  10/15/2018  2:06 PM    Status:  Addendum :  Silvano Kumra MD (Physician)             Hospitalist Progress Note    Date of Service: 10/15/2018         Assessment and Plan:          Mr. 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 The patient has presented with left hip pain following a mechanical fall as he stood up to go to the bathroom at [facility]. Hip XR showed dislocated left hip arthroplasty. Now s/p Left hip hemiarthroplasty closed reduction under anesthesia on 10/14 with no complications.  Plan:  - Resume ASA  - orthopedic surgery following  - Pain control as needed  - Bedrest until hip abduction brace placed  - PT/OT/SW     Normocytic Anemia  Assessment: Baseline hemoglobin around 9. ON admission Hgb stable at 8.6  Plan:  - Monitor hemoglobin levels periodically.      Severe vitamin D deficiency.  Assessment/Plan: on oral vitamin D supplements.      Subclinical hypothyroidism.  Assessment: Noted to be tachycardic, TSH 4.57.  T4 1.28.  Plan:  - Monitor thyroid function tests in 4-6 weeks.      Hypertension  Assessment: PTA on Cardizem 120 mg oral daily, metoprolol 50 mg oral daily.  Plan:  - Continue prior to  admission Cardizem 120 mg oral daily, metoprolol 50 mg oral daily.      Dyslipidemia:   Assessment/Plan: Continue on prior to admission Lipitor.       Benign prostatic hypertrophy:   Assessment/Plan: Continue with prior to admission Flomax.       Chronic kidney disease, stage III-IV:    Assessment: the most recent creatinine levels have been around 1.8-1.9. On admission Cr 2.14, likely pre-renal in etiology  Plan:  - Monitor renal function periodically.  - Avoid nephrotoxic drugs.      Coronary artery disease:    Assessment: The patient is status post coronary artery bypass graft x 4 in 1996.    Plan:  - Continue on prior to admission metoprolol and Lipitor.   - Resume ASA      Atrial fibrillation with intermittent rate-related bundle  Supraventricular tachycardia, asymptomatic   Assessment: During recent hospitalization in August 2018 after a fall with right femoral neck fracture status post ORIF 8/26/18-noted to have arrhythmia. CHADS-VASc score is 4 for age, hypertension, and diabetes.  This puts him in the high risk group for a stroke cardiology was then consulted, patient and family declined anticoagulation given risk for falls.  Plan:  - Continue on Toprol-XL, Cardizem.  - Resume ASA      Diabetes mellitus type 2:  T  Assessment: the most recent hemoglobin A1c was 8.4% on 08/25/2018.  At home he is on Lantus 7 units subcu every morning and Humalog 2 units subq t.i.d.    Plan:  - Continue insulin regimen with lantus reduced to 5 U and sliding scale insulin available.    Active Diet Order      Moderate Consistent CHO Diet    DVT Prophylaxis: Pneumatic Compression Devices  Code Status: DNR/DNI    Disposition: Expected discharge in 1-3. Days pending improvement in pain, mobility    Attestation:   I have reviewed today's relevant vital signs, notes, medications, labs and imaging.I personally reviewed no images or EKG's today.    Silvano Kumar MD  [facility]  Text Page  (7am - 6pm)        Interval  History:        Pain improved  More mobility in left hip, disappointed that he will need a hip brace  No fever/chills  No new hip pain/swelling  No new physical complaints         Physical Exam:        Physical Exam   Temp: 97.4  F (36.3  C) Temp src: Oral BP: 102/48 Pulse: 82 Heart Rate: 57 Resp: 16 SpO2: 97 % O2 Device: Nasal cannula Oxygen Delivery: 2 LPM  Vitals:    10/14/18 1216   Weight: 72.6 kg (160 lb)     Vital Signs with Ranges  Temp:  [96.9  F (36.1  C)-97.8  F (36.6  C)] 97.4  F (36.3  C)  Pulse:  [70-82] 82  Heart Rate:  [57-90] 57  Resp:  [12-21] 16  BP: (102-138)/(48-80) 102/48  SpO2:  [88 %-100 %] 97 %  I/O last 3 completed shifts:  In: 500 [P.O.:100; I.V.:400]  Out: 250 [Urine:250]    Constitutional:Awake, alert, cooperative, no apparent distress  Respiratory: Clear to auscultation bilaterally, no crackles or wheezing  Cardiovascular: Regular rate and rhythm, normal S1 and S2, and no murmur noted  GI: Normal bowel sounds, soft, non-distended, non-tender  Skin/Integumen: No rashes, no cyanosis, no edema, incision site dressing C/D/I on left hip  Other:normal mood and affect  Neuro: A/Ox3. Moving all extremities, speech fluent    # Pain Assessment:  Current Pain Score 10/15/2018   Patient currently in pain? -   Pain score (0-10) 3   Pain location Hip   Pain descriptors -   - Abimael is experiencing pain due to hip pain. Pain management was discussed and the plan was created in a collaborative fashion.  Abimael's response to the current recommendations: engaged  - Please see the plan for pain management as documented above         Data:     CBC RESULTS:    Recent Labs  Lab 10/15/18  0715 10/14/18  1220 10/10/18  0700 10/09/18  0703   WBC 8.6 10.8  --  9.2   RBC 2.63* 2.94*  --  2.81*   HGB 7.9* 8.6* 8.7* 8.2*   HCT 24.0* 26.7*  --  25.1*    355  --  204       BASIC METABOLIC PANEL:    Recent Labs  Lab 10/15/18  0715 10/14/18  1220 10/10/18  0700 10/09/18  0703    135  --  139   POTASSIUM  4.9 4.7  --  4.0   CHLORIDE 105 102  --  105   CO2 26 25  --  24   BUN 31* 41*  --  40*   CR 1.69* 2.14* 1.79* 1.96*   * 245*  --  205*   JOSELINE 7.3* 7.7*  --  7.9*       HEPATIC FUNCTION PANEL    Recent Labs  Lab 10/09/18  0703   AST 22   ALT 12   ALKPHOS 72   BILITOTAL 0.6     INR    Recent Labs  Lab 10/14/18  1220   INR 1.08       OTHER LABS  None    Medications     sodium chloride 100 mL/hr (10/15/18 0811)       aspirin  325 mg Oral Daily     atorvastatin  10 mg Oral QPM     diltiazem  120 mg Oral Daily     influenza Vac Split High-Dose  0.5 mL Intramuscular Prior to discharge     insulin aspart  1-4 Units Subcutaneous Q4H     insulin glargine  5 Units Subcutaneous QAM AC     metoprolol succinate  50 mg Oral Daily     sodium chloride (PF)  3 mL Intracatheter Q8H     tamsulosin  0.4 mg Oral QPM     traMADol  50 mg Oral Q12H         Recent Results (from the past 24 hour(s))   XR Surgery OSORIO Fluoro L/T 5 Min w Stills    Narrative    SURGERY C-ARM FLUORO LESS THAN 5 MIN W STILLS October 14, 2018 8:15 PM      HISTORY: Closed reduction left hip.     COMPARISON: 10/14/2018 at 1317 hours.    NUMBER OF IMAGES ACQUIRED: Two.    VIEWS: AP.    FLUOROSCOPY TIME: .1      Impression    IMPRESSION: Two intraoperative images of the left hip demonstrates the  dislocated acetabular component has been appropriately reduced.    HANNAH HOOKER MD   XR Pelvis w Hip Port Left 1 View    Narrative    PELVIS AND HIP PORTABLE LEFT ONE VIEW   10/14/2018 9:12 PM     HISTORY: Postop hip arthroplasty.     COMPARISON: Intraoperative images from 10/14/2018.    FINDINGS: Postoperative change of left total hip prosthesis placement  are demonstrated. Alignment is anatomic. No fractures are identified.  Expected postoperative swelling and staples noted.      Impression    IMPRESSION: Expected postoperative appearance following left total hip  arthroplasty.     NICOLA TAYLOR MD[NH1.1]        Revision History        User Key Date/Time User  Provider Type Action    > [N/A] 10/15/2018  2:14 PM Silvano Kumar MD Physician Addend     NH1.1 10/15/2018  2:14 PM Silvano Kumar MD Physician Sign            Progress Notes by Edgardo Godfrey PA-C at 10/15/2018 10:45 AM     Author:  Edgardo Godfrey PA-C Service:  Orthopedics Author Type:  Physician Assistant    Filed:  10/15/2018 10:46 AM Date of Service:  10/15/2018 10:45 AM Creation Time:  10/15/2018 10:45 AM    Status:  Signed :  Edgardo Godfrey PA-C (Physician Assistant)         [facility] Orthopedic Progress Note  Abimael Flores is a 89 year old male    Today's Date:10/15/2018  Admission Date: 10/14/2018  Dislocation of left hip, initial encounter (H) [S73.005A]  POD # 11 Left bipolar hemiarthroplasty for femoral neck fracture.  POD # 1 Closed reduction following fall and dislocation of above    Patient Seen.  Doing well.  Dressings are clean, dry, and intact.  Circulation, motor and sensory function, intact distally.        PLAN:  Awaiting Orthotist consult for hip abduction brace.  Hold PT until brace is fitted.  Pain control medication: No changes.        Temp:  [96.9  F (36.1  C)-98.3  F (36.8  C)] 97.6  F (36.4  C)  Pulse:  [70-82] 82  Heart Rate:  [63-90] 90  Resp:  [12-21] 16  BP: (105-138)/(48-80) 132/56  SpO2:  [88 %-100 %] 100 %      Results for orders placed or performed during the hospital encounter of 10/14/18 (from the past 24 hour(s))   CBC with platelets + differential   Result Value Ref Range    WBC 10.8 4.0 - 11.0 10e9/L    RBC Count 2.94 (L) 4.4 - 5.9 10e12/L    Hemoglobin 8.6 (L) 13.3 - 17.7 g/dL    Hematocrit 26.7 (L) 40.0 - 53.0 %    MCV 91 78 - 100 fl    MCH 29.3 26.5 - 33.0 pg    MCHC 32.2 31.5 - 36.5 g/dL    RDW 15.5 (H) 10.0 - 15.0 %    Platelet Count 355 150 - 450 10e9/L    Diff Method Automated Method     % Neutrophils 70.0 %    % Lymphocytes 18.4 %    % Monocytes 8.5 %    % Eosinophils 2.4 %    % Basophils 0.2 %    % Immature Granulocytes 0.5 %    Nucleated  RBCs 0 0 /100    Absolute Neutrophil 7.6 1.6 - 8.3 10e9/L    Absolute Lymphocytes 2.0 0.8 - 5.3 10e9/L    Absolute Monocytes 0.9 0.0 - 1.3 10e9/L    Absolute Eosinophils 0.3 0.0 - 0.7 10e9/L    Absolute Basophils 0.0 0.0 - 0.2 10e9/L    Abs Immature Granulocytes 0.1 0 - 0.4 10e9/L    Absolute Nucleated RBC 0.0    Basic metabolic panel   Result Value Ref Range    Sodium 135 133 - 144 mmol/L    Potassium 4.7 3.4 - 5.3 mmol/L    Chloride 102 94 - 109 mmol/L    Carbon Dioxide 25 20 - 32 mmol/L    Anion Gap 8 3 - 14 mmol/L    Glucose 245 (H) 70 - 99 mg/dL    Urea Nitrogen 41 (H) 7 - 30 mg/dL    Creatinine 2.14 (H) 0.66 - 1.25 mg/dL    GFR Estimate 29 (L) >60 mL/min/1.7m2    GFR Estimate If Black 35 (L) >60 mL/min/1.7m2    Calcium 7.7 (L) 8.5 - 10.1 mg/dL   INR   Result Value Ref Range    INR 1.08 0.86 - 1.14   XR Pelvis w Hip Left 1 View    Narrative    PELVIS WITH UNILATERAL HIP ONE VIEW LEFT  10/14/2018 1:23 PM     HISTORY: fall ?dislocation;     COMPARISON: None.    FINDINGS: There is a dislocation of the left hip arthroplasty. The  acetabular and femoral components are dislocated superiorly with  respect to the native acetabulum..      Impression    IMPRESSION: Dislocated left hip arthroplasty.    CEASAR BLEDSOE MD   Orthopedic Surgery IP Consult: Patient to be seen: Routine - within 24 hours; Hip dislocation; Consultant may enter orders: Yes    Narrative    Milton Gusman MD     10/14/2018  8:45 PM  [facility]    Orthopedics Consultation    Date of Admission:  10/14/2018    Assessment & Plan   Abimael Flores is a 89 year old male who was admitted on   10/14/2018. I was asked to see the patient for left hip   hemiarthroplasty posterior dislocation.    N.p.o.  Discussed both operative and nonoperative management the risk and   benefits of each.  Patient thoughtfully acknowledges risks and   wishes to proceed with left open versus closed reduction of the   left hip.  To OR          Milton GOODE  MD [place]    Code Status    Full Code During Procedure    Reason for Consult   Reason for consult: I was asked by Dr. Kumar to evaluate this   patient for left hip hemiarthroplasty dislocation.    Primary Care Physician   RAISSA HILL    History of Present Illness   Abimael Flores is a 89 year old male who was admitted on   10/14/2018. I was asked to see the patient for left hip   hemiarthroplasty posterior dislocation.  Patient had a fall and   had a right hip fracture treated with a bipolar hemiarthroplasty   in August.  Fell again in October having a left hip   hemiarthroplasty on October 4.  He tripped and fell at his TCU   now having a posterior hip dislocation.  Denies any   lightheadedness prior to the fall.  States he has had no issues   with his wound.  Denies chest pain or shortness of breath..    MEDS:   No current outpatient prescriptions on file.       PAST MEDICAL HISTORY:   Past Medical History:   Diagnosis Date     Benign essential hypertension 9/4/2018     Coronary artery disease      Nonsenile cataract      Recent retinal detachment, total or subtotal 8/5/2014     Type 2 diabetes mellitus without complications (H)        PAST SURGICAL HISTORY:   Past Surgical History:   Procedure Laterality Date     CARDIAC SURGERY       CATARACT IOL, RT/LT Bilateral      lacrimal caruncle removed BE Bilateral ~1989     OPEN REDUCTION INTERNAL FIXATION HIP BIPOLAR Right 8/26/2018    Procedure: OPEN REDUCTION INTERNAL FIXATION HIP BIPOLAR;  Right   Hip Bipolar Hemiarthroplasty (Biomet);  Surgeon: Bill Sanders MD;  Location:  OR     OPEN REDUCTION INTERNAL FIXATION HIP BIPOLAR Left 10/4/2018    Procedure: OPEN REDUCTION INTERNAL FIXATION HIP BIPOLAR;  LEFT   HIP TONYA ARTHROPLASTY;  Surgeon: Bruno Stewart MD;    Location:  OR     REPAIR RUPTURED GLOBE  4/21/2014    Procedure: Exploration and Repair of Ruptured Globe ;  Surgeon:   Mercedes Rivera MD;  Location: [facility]       FAMILY  HISTORY:   Family History   Problem Relation Age of Onset     Diabetes Mother      Diabetes Father      Cancer No family hx of      Glaucoma No family hx of      Macular Degeneration No family hx of        SOCIAL HISTORY:   Social History   Substance Use Topics     Smoking status: Former Smoker     Smokeless tobacco: Never Used     Alcohol use No       ALLERGIES:  No Known Allergies    ROS:  10 point ROS neg other than the symptoms noted above in the   HPI.      Physical Exam   Temp: 96.9  F (36.1  C) Temp src: Temporal BP: 117/62 Pulse: 70   Heart Rate: 74 Resp: 12 SpO2: 100 % O2 Device: Nasal cannula   Oxygen Delivery: 4 LPM  Vital Signs with Ranges  Temp:  [96.9  F (36.1  C)-98.3  F (36.8  C)] 96.9  F (36.1  C)  Pulse:  [70-76] 70  Heart Rate:  [74-78] 74  Resp:  [12-18] 12  BP: (106-138)/(48-80) 117/62  SpO2:  [93 %-100 %] 100 %  160 lbs 0 oz    Constitutional: Pleasant, alert, appropriate, following commands.  HEENT: Head atraumatic normocephalic. Pupils equal round and   reactive to light.  Respiratory: Unlabored breathing no audible wheeze  Cardiovascular: Regular rate and rhythm  GI: Abdomen soft nontender nondistended.  Skin: No rashes, no cyanosis, no edema.  Musculoskeletal: Right hip incision healed.  Left leg shortened   and rotated.  5/5df/pf/ehl, SILT, palpable dp pulse, incision   with staples in place c/d/i  Neurologic: normal without focal findings, mental status, speech   normal, alert and oriented x iii, EMILY        Data   Results for orders placed or performed during the hospital   encounter of 10/14/18 (from the past 24 hour(s))   CBC with platelets + differential   Result Value Ref Range    WBC 10.8 4.0 - 11.0 10e9/L    RBC Count 2.94 (L) 4.4 - 5.9 10e12/L    Hemoglobin 8.6 (L) 13.3 - 17.7 g/dL    Hematocrit 26.7 (L) 40.0 - 53.0 %    MCV 91 78 - 100 fl    MCH 29.3 26.5 - 33.0 pg    MCHC 32.2 31.5 - 36.5 g/dL    RDW 15.5 (H) 10.0 - 15.0 %    Platelet Count 355 150 - 450 10e9/L    Diff Method  Automated Method     % Neutrophils 70.0 %    % Lymphocytes 18.4 %    % Monocytes 8.5 %    % Eosinophils 2.4 %    % Basophils 0.2 %    % Immature Granulocytes 0.5 %    Nucleated RBCs 0 0 /100    Absolute Neutrophil 7.6 1.6 - 8.3 10e9/L    Absolute Lymphocytes 2.0 0.8 - 5.3 10e9/L    Absolute Monocytes 0.9 0.0 - 1.3 10e9/L    Absolute Eosinophils 0.3 0.0 - 0.7 10e9/L    Absolute Basophils 0.0 0.0 - 0.2 10e9/L    Abs Immature Granulocytes 0.1 0 - 0.4 10e9/L    Absolute Nucleated RBC 0.0    Basic metabolic panel   Result Value Ref Range    Sodium 135 133 - 144 mmol/L    Potassium 4.7 3.4 - 5.3 mmol/L    Chloride 102 94 - 109 mmol/L    Carbon Dioxide 25 20 - 32 mmol/L    Anion Gap 8 3 - 14 mmol/L    Glucose 245 (H) 70 - 99 mg/dL    Urea Nitrogen 41 (H) 7 - 30 mg/dL    Creatinine 2.14 (H) 0.66 - 1.25 mg/dL    GFR Estimate 29 (L) >60 mL/min/1.7m2    GFR Estimate If Black 35 (L) >60 mL/min/1.7m2    Calcium 7.7 (L) 8.5 - 10.1 mg/dL   INR   Result Value Ref Range    INR 1.08 0.86 - 1.14   XR Pelvis w Hip Left 1 View    Narrative    PELVIS WITH UNILATERAL HIP ONE VIEW LEFT  10/14/2018 1:23 PM     HISTORY: fall ?dislocation;     COMPARISON: None.    FINDINGS: There is a dislocation of the left hip arthroplasty.   The  acetabular and femoral components are dislocated superiorly with  respect to the native acetabulum..      Impression    IMPRESSION: Dislocated left hip arthroplasty.    CEASAR BLEDSOE MD   Glucose by meter   Result Value Ref Range    Glucose 219 (H) 70 - 99 mg/dL                Glucose by meter   Result Value Ref Range    Glucose 219 (H) 70 - 99 mg/dL   XR Surgery OSORIO Fluoro L/T 5 Min w Stills    Narrative    SURGERY C-ARM FLUORO LESS THAN 5 MIN W STILLS October 14, 2018 8:15 PM      HISTORY: Closed reduction left hip.     COMPARISON: 10/14/2018 at 1317 hours.    NUMBER OF IMAGES ACQUIRED: Two.    VIEWS: AP.    FLUOROSCOPY TIME: .1      Impression    IMPRESSION: Two intraoperative images of the left hip demonstrates  the  dislocated acetabular component has been appropriately reduced.    HANNAH HOOKER MD   XR Pelvis w Hip Port Left 1 View    Narrative    PELVIS AND HIP PORTABLE LEFT ONE VIEW   10/14/2018 9:12 PM     HISTORY: Postop hip arthroplasty.     COMPARISON: Intraoperative images from 10/14/2018.    FINDINGS: Postoperative change of left total hip prosthesis placement  are demonstrated. Alignment is anatomic. No fractures are identified.  Expected postoperative swelling and staples noted.      Impression    IMPRESSION: Expected postoperative appearance following left total hip  arthroplasty.     NICOLA TAYLOR MD   Glucose by meter   Result Value Ref Range    Glucose 148 (H) 70 - 99 mg/dL   Glucose by meter   Result Value Ref Range    Glucose 132 (H) 70 - 99 mg/dL   Basic metabolic panel   Result Value Ref Range    Sodium 136 133 - 144 mmol/L    Potassium 4.9 3.4 - 5.3 mmol/L    Chloride 105 94 - 109 mmol/L    Carbon Dioxide 26 20 - 32 mmol/L    Anion Gap 5 3 - 14 mmol/L    Glucose 127 (H) 70 - 99 mg/dL    Urea Nitrogen 31 (H) 7 - 30 mg/dL    Creatinine 1.69 (H) 0.66 - 1.25 mg/dL    GFR Estimate 38 (L) >60 mL/min/1.7m2    GFR Estimate If Black 46 (L) >60 mL/min/1.7m2    Calcium 7.3 (L) 8.5 - 10.1 mg/dL   CBC with platelets   Result Value Ref Range    WBC 8.6 4.0 - 11.0 10e9/L    RBC Count 2.63 (L) 4.4 - 5.9 10e12/L    Hemoglobin 7.9 (L) 13.3 - 17.7 g/dL    Hematocrit 24.0 (L) 40.0 - 53.0 %    MCV 91 78 - 100 fl    MCH 30.0 26.5 - 33.0 pg    MCHC 32.9 31.5 - 36.5 g/dL    RDW 15.5 (H) 10.0 - 15.0 %    Platelet Count 316 150 - 450 10e9/L         Edgardo Godfrey[DW1.1]       Revision History        User Key Date/Time User Provider Type Action    > DW1.1 10/15/2018 10:46 AM Edgardo Godfrey PA-C Physician Assistant Sign            Progress Notes by Jessika Sawyer RN at 10/15/2018  1:01 AM     Author:  Jessika Sawyer, RN Service:  (none) Author Type:  Registered Nurse    Filed:  10/15/2018  1:02 AM Date of  Service:  10/15/2018  1:01 AM Creation Time:  10/15/2018  1:01 AM    Status:  Signed :  Jessika Sawyer RN (Registered Nurse)         Pt refused capno and replaced with continuous pulse ox applied.[AD1.1]       Revision History        User Key Date/Time User Provider Type Action    > AD1.1 10/15/2018  1:02 AM Jessika Sawyer RN Registered Nurse Sign            Progress Notes by Milton Gusman MD at 10/14/2018  5:30 PM     Author:  Milton Gusman MD Service:  Orthopedics Author Type:  Physician    Filed:  10/14/2018  5:34 PM Date of Service:  10/14/2018  5:30 PM Creation Time:  10/14/2018  5:30 PM    Status:  Signed :  Milton Gusman MD (Physician)         Ortho Brief -   S/p L gracia 10/4/18  R gracia 8/26/18 with left gracia posterior dislocation.      Plan  NPO  To OR for closed vs open reduction[RH1.1]     Revision History        User Key Date/Time User Provider Type Action    > RH1.1 10/14/2018  5:34 PM Milton Gusman MD Physician Sign            ED Provider Notes by Kenneth Hdz MD at 10/14/2018 12:11 PM     Author:  Kenneth Hdz MD Service:  Emergency Medicine Author Type:  Physician    Filed:  10/14/2018  3:56 PM Date of Service:  10/14/2018 12:11 PM Creation Time:  10/14/2018 12:24 PM    Status:  Signed :  Kenneth Hdz MD (Physician)           History     Chief Complaint:[KC1.1]  L[KC1.2]eft hip pain[KC1.3]    HPI   Abimael Flores is a 89 year old male who presents[KC1.1] for evaluation of left hip pain after a fall. The patient reports today, he stood up to go to the bathroom and fell down lightly and now he is experiencing left hip pain. He denies hitting his head. Of note, he reports 2 weeks ago, he fell and broke his right hip and then a week ago, he was leaving rehab when he fell and broke his left hip. The patient is concerned about another possible fracture.     Allergies:  No known drug allergies[KC1.2]    Medications:[KC1.1]   "  Acetaminophen   aspirin    atorvastatin    diltiazem    insulin glargine    insulin lispro    metoprolol succinate    polyethylene glycol    Skin Protectants, Misc.   tamsulosin   traMADol[KC1.4]     Past Medical History:[KC1.1]    Benign essential hypertension   Coronary artery disease   Nonsenile cataract   Recent retinal detachment, total or subtotal   Type 2 diabetes mellitus   CKD, stage 3[KC1.2]  Cardiac arrhythmia[KC1.1]  Hip fracture x2  Anemia[KC1.2]     Past Surgical History:[KC1.1]    Cardiac surgery   Cataract bilateral  Hip reduction right  Hip reduction left  Ruptured globe repair[KC1.2]     Family History:[KC1.1]    Diabetes[KC1.2]    Social History:[KC1.1]  Smoking status: Former smo[KC1.2]ker[KC1.5]  Alcohol use: No[KC1.2]  Marital Status:   [2][KC1.1]  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   Neurological: Negative for[KC1.1] headaches[KC1.2].[KC1.1]   All other systems reviewed and are negative[KC1.2].    Physical Exam[KC1.1]     Patient Vitals for the past 24 hrs:   BP Temp Temp src Pulse Resp SpO2 Height Weight   10/14/18 1430 106/50 - - - - 93 % - -   10/14/18 1400 112/48 - - - - 97 % - -   10/14/18 1330 115/52 - - - - 97 % - -   10/14/18 1300 117/57 - - - - 96 % - -   10/14/18 1230 106/59 - - - - 95 % - -   10/14/18 1216 123/60 98.3  F (36.8  C) Oral 76 18 95 % 1.727 m (5' 8\") 72.6 kg (160 lb)   10/14/18 1215 123/60 - - - - 97 % - -[KC1.6]       Physical Exam[KC1.1]  Constitutional: Elderly white male supine.  HENT: No signs of trauma.   Eyes: EOM are normal. Pupils are equal, round, and reactive to light.   Neck: Normal range of motion. No JVD present. No cervical adenopathy.  Cardiovascular: Regular rhythm.  Exam reveals no gallop and no friction rub. Occasional ectopy. Sternotomy incision.  No murmur heard.  Pulmonary/Chest: Bilateral breath sounds normal. No wheezes, rhonchi or rales.  Abdominal: " Soft. No tenderness. No rebound or guarding. 2+ femoral pulses.   Musculoskeletal:  Left leg shortened, internally rotated. Swelling over lateral hip. Surgical  dressing in place. No redness or discharge.   Lymphadenopathy: No lymphadenopathy.   Neurological: Alert and oriented to person, place, and time. Normal strength. Coordination normal. GCS 15. Fluent speech. No facial asymmetry. Normal sensation.   Skin: Skin is warm and dry. No rash noted. No erythema.[KC1.2]     Emergency Department Course   Imaging:[KC1.1]  Radiographic findings were communicated with the patient who voiced understanding of the findings.    XR Pelvis w Hip Left 1 View  Dislocated left hip arthroplasty.  Reading per radiology[LG1.1]     Laboratory:[KC1.1]  CBC: WBC 10.8, HGB 8.6,   BMP: Glucose 245, BUN 41, Creatinine 2.13, GFR 29 , Calcium 7.7   INR: 1.08[LG1.1]    Interventions:[KC1.1]  1241 Morphine 2 mg IV  1246 Morphine 2 mg IV  1355 Dilaudid 0.5 mg IV[LG1.1]    Emergency Department Course:  Past medical records, nursing notes, and vitals reviewed.[KC1.1]  1227[KC1.2]: I performed an exam of the patient and obtained history, as documented above.[KC1.1]  IV inserted and blood drawn.  The patient was sent for a left hip and pelvis XR while in the emergency department, findings above.[KC1.5]    1408: I[LG1.1] paged[RS1.1] Dr. Esparza of the orthopedics service regarding patient    1425: I discussed the treatment plan with the patient. They expressed understanding of this plan and consented to admission. I discussed the patient with Stacy Young, who will admit the patient to a monitored bed for further evaluation and treatment.    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 He was in a nursing facility. He stood up, caught his foot on a wire, possibly  a phone or a call line, and twisted and fell. He denies any injury except to his left hip. His left hip on exam is internally rotated and shortened. The staple line is intact with dressing in place with no redness or swelling. Patient received pain meds. XRs were obtained showing a totally hip dislocation of both prosthetic comp[KC1.5]on[RS1.1]ents, acetabular and femoral. This would not be amenable to simple reduction in the ED. I've contacted hospitalist who will admit the patient, and also placed calls for orthopedics who is in the OR.   Patient will be kept NPO for the time being.[KC1.5]     Diagnosis:[KC1.1]    ICD-10-CM    1. Dislocation of left hip, initial encounter (H) S73.005A CBC with platelets + differential     Basic metabolic panel     INR[KC1.5]     Disposition:[KC1.1]  Admitted to[LG1.1] Adult Med/Surg by Dr. Kumar[KC1.5]    Angeles Ohara  10/14/2018    EMERGENCY DEPARTMENT[KC1.1]  I, Angeles Ohara, am serving as a scribe at 12:27 PM on 10/14/2018 to document services personally performed by Kenneth Hdz MD based on my observations and the provider's statements to me.[KC1.2]        Kenneth Hdz MD  10/14/18 1556  [RS1.2]     Revision History        User Key Date/Time User Provider Type Action    > RS1.2 10/14/2018  3:56 PM Kenneth Hdz MD Physician Sign     RS1.1 10/14/2018  3:54 PM Kenneth Hdz MD Physician      KC1.6 10/14/2018  3:39 PM Angeles Ohara Scribe Share     KC1.5 10/14/2018  3:31 PM Angeles Ohara Scribe      LG1.1 10/14/2018  2:35 PM Melonie Cardoza Scribe Share     KC1.4 10/14/2018 12:45 PM Angeles Ohara Scribe Share     KC1.3 10/14/2018 12:39 PM Angeles Ohara Scribe Share     KC1.2 10/14/2018 12:25 PM Angeles Ohara      KC1.1 10/14/2018 12:24 PM Angeles Ohara Share            ED Notes by Emily Lang, RN at 10/14/2018  2:08 PM     Author:  Emily Lang, RN Service:  (none) Author Type:  Registered Nurse     Filed:  10/14/2018  2:11 PM Date of Service:  10/14/2018  2:08 PM Creation Time:  10/14/2018  2:11 PM    Status:  Signed :  Emily Lang RN (Registered Nurse)         [facility]  ED Nurse Handoff Report    ED Chief complaint: Fall (leftg hip pain)      ED Diagnosis:   Final diagnoses:   Dislocation of left hip, initial encounter (H)       Code Status: ED physician did not axxess    Allergies: No Known Allergies    Activity level - Baseline/Home:  Unable to Assess    Activity Level - Current:   Unable to Assess     Needed?: No    Isolation: No  Infection: Not Applicable  Bariatric?: No    Vital Signs:   Vitals:    10/14/18 1230 10/14/18 1300 10/14/18 1330 10/14/18 1400   BP: 106/59 117/57 115/52 112/48   Pulse:       Resp:       Temp:       TempSrc:       SpO2: 95% 96% 97% 97%   Weight:       Height:           Cardiac Rhythm: ,        Pain level: 0-10 Pain Scale: 10    Is this patient confused?: No   [place] - Suicide Severity Rating Scale Completed?  Yes  If yes, what color did the patient score?  White    Patient Report: Initial Complaint: Fall with left thigh pain  Focused Assessment: presents with left thigh pain after falling. Hx of hip replacement   Tests Performed: xray labs  Abnormal Results: see results  Treatments provided: MS and Dilaudid    Family Comments: Son here. Son is a physician as well as patient is a retired surgeon    OBS brochure/video discussed/provided to patient/family: N/A              Name of person given brochure if not patient:               Relationship to patient:     ED Medications:   Medications   HYDROmorphone (PF) (DILAUDID) injection 0.5 mg (0.5 mg Intravenous Given 10/14/18 3109)   morphine (PF) injection 4 mg (2 mg Intravenous Given 10/14/18 1246)       Drips infusing?:  No    For the majority of the shift this patient was Green.   Interventions performed were .    Severe Sepsis OR Septic Shock Diagnosis Present: No    To be  done/followed up on inpatient unit:      ED NURSE PHONE NUMBER: [phone][RN1.1]            Revision History        User Key Date/Time User Provider Type Action    > RN1.1 10/14/2018  2:11 PM Emily Lang RN Registered Nurse Sign            ED Notes by Anni Aleman RN at 10/14/2018 12:11 PM     Author:  Anni Aleman RN Service:  (none) Author Type:  Registered Nurse    Filed:  10/14/2018 12:11 PM Date of Service:  10/14/2018 12:11 PM Creation Time:  10/14/2018 12:11 PM    Status:  Signed :  Anni Aleman RN (Registered Nurse)         Bed: ED07  Expected date:   Expected time:   Means of arrival:   Comments:  Kate 2 fall left hip pain 89 male     Revision History        User Key Date/Time User Provider Type Action    > SH1.1 10/14/2018 12:11 PM Anni Aleman RN Registered Nurse Sign                  Procedure Notes     No notes of this type exist for this encounter.         Progress Notes - Therapies (Notes from 10/14/18 through 10/17/18)      Progress Notes by Lynnette Iyer, PT at 10/16/2018  4:40 PM     Author:  Lynnette Iyer PT Service:  (none) Author Type:  Physical Therapist    Filed:  10/16/2018  4:40 PM Date of Service:  10/16/2018  4:40 PM Creation Time:  10/16/2018  4:40 PM    Status:  Signed :  Lynnette Iyer PT (Physical Therapist)          10/16/18 1145   Quick Adds   Type of Visit Initial PT Evaluation   Living Environment   Lives With facility resident   Living Arrangements assisted living   Home Accessibility no concerns   Number of Stairs to Enter Home 0   Number of Stairs Within Home 0   Stair Railings at Home none   Living Environment Comment Patient reports he now lives in [place].    Self-Care   Dominant Hand right   Usual Activity Tolerance good   Current Activity Tolerance poor   Regular Exercise no   Equipment Currently Used at Home walker, rolling   Activity/Exercise/Self-Care Comment Patient was independent prior to initial fall in  August.    Functional Level Prior   Ambulation 0-->independent   Transferring 0-->independent   Toileting 0-->independent   Bathing 0-->independent   Dressing 0-->independent   Eating 0-->independent   Communication 0-->understands/communicates without difficulty   Swallowing 0-->swallows foods/liquids without difficulty   Cognition 0 - no cognition issues reported   Fall history within last six months yes   Number of times patient has fallen within last six months 4   Which of the above functional risks had a recent onset or change? ambulation;transferring;fall history   General Information   Onset of Illness/Injury or Date of Surgery - Date 10/14/18   Referring Physician Milton Gusman   Patient/Family Goals Statement To go back to [facility]   Pertinent History of Current Problem (include personal factors and/or comorbidities that impact the POC) POD # 11 Left bipolar hemiarthroplasty for femoral neck fracture.   Precautions/Limitations fall precautions   Weight-Bearing Status - LLE weight-bearing as tolerated   Cognitive Status Examination   Orientation orientation to person, place and time   Level of Consciousness alert   Follows Commands and Answers Questions 100% of the time   Personal Safety and Judgment impaired   Memory intact   Pain Assessment   Patient Currently in Pain Yes, see Vital Sign flowsheet   Posture    Posture Forward head position   Range of Motion (ROM)   ROM Comment Left hip limited by brace and hip precautions.    Strength   Strength Comments Generalized weakness and shakiness throughout.    Bed Mobility   Bed Mobility Comments Sup to sit with max assist of 2   Transfer Skills   Transfer Comments Sit to stand with mod assist of 2.    Gait   Gait Comments Gait 5 feet with mod assist and frequent cues for technique.    Balance   Balance Comments Initially needed assist for sitting balance but eventually able to maintain sitting balance. Static and dynamic standing balance poor. Shaky   Sensory  "Examination   Sensory Perception Comments no   Coordination   Coordination Comments LE coordination appears impaired during gait.    General Therapy Interventions   Planned Therapy Interventions bed mobility training;gait training;strengthening;transfer training   Clinical Impression   Criteria for Skilled Therapeutic Intervention yes, treatment indicated   PT Diagnosis Impaired functional independence   Influenced by the following impairments Impaired balance pain, impaired coordination.    Functional limitations due to impairments Needs assist for all functional mobilitiy   Clinical Presentation Evolving/Changing   Clinical Presentation Rationale Many preexisting factors.    Clinical Decision Making (Complexity) Moderate complexity   Therapy Frequency` daily   Predicted Duration of Therapy Intervention (days/wks) 3 days   Anticipated Equipment Needs at Discharge (To be determined by TCU. )   Anticipated Discharge Disposition Transitional Care Facility   Risk & Benefits of therapy have been explained Yes   Patient, Family & other staff in agreement with plan of care Yes   [facility] [employer] TM \"6 Clicks\"   2016, Trustees of [facility], under license to [employer].  All rights reserved.   6 Clicks Short Forms Basic Mobility Inpatient Short Form   [facility] AM-PAC  \"6 Clicks\" V.2 Basic Mobility Inpatient Short Form   1. Turning from your back to your side while in a flat bed without using bedrails? 2 - A Lot   2. Moving from lying on your back to sitting on the side of a flat bed without using bedrails? 2 - A Lot   3. Moving to and from a bed to a chair (including a wheelchair)? 2 - A Lot   4. Standing up from a chair using your arms (e.g., wheelchair, or bedside chair)? 2 - A Lot   5. To walk in hospital room? 2 - A Lot   6. Climbing 3-5 steps with a railing? 1 - Total   Basic Mobility Raw Score (Score out of 24.Lower scores equate to lower levels of function) 11   Total Evaluation Time "   Total Evaluation Time (Minutes) 12[SL1.1]        Revision History        User Key Date/Time User Provider Type Action    > SL1.1 10/16/2018  4:40 PM Lynnette Iyer, PT Physical Therapist Sign

## 2018-10-14 NOTE — IP AVS SNAPSHOT
"    [place] [address] [facility]: [phone]                                              INTERAGENCY TRANSFER FORM - PHYSICIAN ORDERS   10/14/2018                    Hospital Admission Date: 10/14/2018  CHERYL HOLLOWAY   : 1924  Sex: Male        Attending Provider: Silvano Kumar MD     Allergies:  No Known Allergies    Infection:  None   Service:  HOSPITALIST    Ht:  1.727 m (5' 8\")   Wt:  71 kg (156 lb 8 oz)   Admission Wt:  72.6 kg (160 lb)    BMI:  23.8 kg/m 2   BSA:  1.85 m 2            Patient PCP Information     Provider PCP Type    [facility]      ED Clinical Impression     Diagnosis Description Comment Added By Time Added    Dislocation of left hip, initial encounter (H) [S73.005A] Dislocation of left hip, initial encounter (H) [S73.005A]  Kenneth Hdz MD 10/14/2018  1:47 PM      Hospital Problems as of 10/17/2018              Priority Class Noted POA    S/P total hip arthroplasty Medium  2018 Yes    Type 2 diabetes mellitus with renal complication (H) Medium  2018 Yes    CKD (chronic kidney disease) stage 3, GFR 30-59 ml/min (H) Medium  2018 Yes    Benign essential hypertension Medium  2018 Yes    Coronary artery disease involving coronary bypass graft of native heart without angina pectoris Medium  2018 Yes    * (Principal)Dislocation of hip (H) Medium  10/14/2018 Yes      Non-Hospital Problems as of 10/17/2018              Priority Class Noted    Injury of globe of eye Medium  2014    Recent retinal detachment, total or subtotal Medium  2014    Closed right hip fracture (H) Medium  2018    Cardiac arrhythmia, unspecified cardiac arrhythmia type Medium  2018    Other insomnia Medium  2018    Bladder spasms Medium  2018    Fracture of femoral neck, left (H) Medium  10/4/2018    Hip fracture, left (H) Medium  10/4/2018      Code Status History     Date Active Date Inactive Code Status Order ID Comments User " Context    10/17/2018  2:31 PM  DNR/DNI [id number]  Terence Persaud MD Outpatient    10/15/2018  9:52 AM 10/17/2018  2:31 PM DNR/DNI [id number]  Silvano Kumar MD Inpatient    10/14/2018  3:45 PM 10/15/2018  9:52 AM Full Code During Procedure [id number]  Silvano Kumar MD Inpatient    10/8/2018 12:50 PM 10/14/2018  3:45 PM DNR/DNI [id number]  Earl Streeter DO Outpatient    10/4/2018  4:36 AM 10/8/2018 12:50 PM DNR/DNI [id number]  Radha Collins MD Inpatient    8/25/2018  9:30 PM 9/3/2018  4:24 PM DNR/DNI [id number]  Mamie Eubanks MD Inpatient    4/22/2014 12:05 PM 8/25/2018  9:30 PM Full Code [id number]  Ryne Justin PA-C Outpatient         Medication Review      START taking        Dose / Directions Comments    oxyCODONE IR 5 MG tablet   Commonly known as:  ROXICODONE        Dose:  5-10 mg   Take 1-2 tablets (5-10 mg) by mouth every 4 hours as needed for severe pain   Quantity:  20 tablet   Refills:  0        senna-docusate 8.6-50 MG per tablet   Commonly known as:  SENOKOT-S;PERICOLACE        Dose:  1 tablet   Take 1 tablet by mouth 2 times daily   Quantity:  60 tablet   Refills:  1          CONTINUE these medications which may have CHANGED, or have new prescriptions. If we are uncertain of the size of tablets/capsules you have at home, strength may be listed as something that might have changed.        Dose / Directions Comments    DRISDOL 72295 units Caps   This may have changed:  additional instructions   Used for:  Fracture of hip, left, closed, initial encounter (H)        Dose:  78007 Units   Take 50,000 Units by mouth every 7 days for 5 doses   Quantity:  5 capsule   Refills:  0          CONTINUE these medications which have NOT CHANGED        Dose / Directions Comments    acetaminophen 325 MG tablet   Commonly known as:  TYLENOL        Dose:  650 mg   Take 2 tablets (650 mg) by mouth every 4 hours as needed for other (multimodal surgical pain management along with NSAIDS and opioid  medication as indicated based on pain control and physical function.)   Quantity:  100 tablet   Refills:  0        aspirin 325 MG EC tablet   Used for:  Closed fracture of neck of right femur, initial encounter (H)        Take one Aspirin tab twice daily for 5 weeks.   Quantity:  70 tablet   Refills:  0        atorvastatin 10 MG tablet   Commonly known as:  LIPITOR   Used for:  Coronary artery disease involving native heart without angina pectoris, unspecified vessel or lesion type        Dose:  10 mg   Take 1 tablet (10 mg) by mouth every evening   Refills:  0        ANIBAL PROTECT EX        Apply topically 2 times daily   Refills:  0        diltiazem 120 MG 24 hr ER beaded capsule   Commonly known as:  TIAZAC   Indication:  High Blood Pressure Disorder        Dose:  120 mg   Take 120 mg by mouth daily   Refills:  0        insulin glargine 100 UNIT/ML injection   Commonly known as:  LANTUS        Dose:  7 Units   Inject 7 Units Subcutaneous every morning   Refills:  0        * insulin lispro 100 UNIT/ML injection   Commonly known as:  HumaLOG        Inject Subcutaneous 3 times daily (before meals) Sliding scale:  -189=1 unit -239=2 units -289=3 units -339=4 units -399=5 units -499=6 units +=7 units   Refills:  0        * HumaLOG KWIKpen 100 UNIT/ML injection   Generic drug:  insulin lispro        Inject Subcutaneous At Bedtime Sliding scale: -249=1 unit -299=2 units -349=3 units -399=4 units +=5 units   Refills:  0        * insulin lispro 100 UNIT/ML injection   Commonly known as:  HumaLOG KWIKpen   Used for:  Type 2 diabetes mellitus with complication, with long-term current use of insulin (H)        Dose:  2 Units   Inject 2 Units Subcutaneous 3 times daily (before meals)   Refills:  0        metoprolol succinate 50 MG 24 hr tablet   Commonly known as:  TOPROL-XL   Used for:  Paroxysmal supraventricular tachycardia (H)        Dose:  50 mg    Take 1 tablet (50 mg) by mouth daily   Quantity:  30 tablet   Refills:  0        polyethylene glycol Packet   Commonly known as:  MIRALAX/GLYCOLAX        Dose:  17 g   Take 17 g by mouth daily   Refills:  0        tamsulosin 0.4 MG capsule   Commonly known as:  FLOMAX        Dose:  0.4 mg   Take 0.4 mg by mouth every evening   Refills:  0        traMADol 50 MG tablet   Commonly known as:  ULTRAM   Used for:  Closed fracture of neck of left femur with routine healing, subsequent encounter        Dose:  50 mg   Take 1 tablet (50 mg) by mouth every 6 hours   Quantity:  50 tablet   Refills:  0        * Notice:  This list has 3 medication(s) that are the same as other medications prescribed for you. Read the directions carefully, and ask your doctor or other care provider to review them with you.            Summary of Visit     Reason for your hospital stay       This is a 89 year old male admitted with a hip dislocation.             After Care     Activity - Up with nursing assistance           Advance Diet as Tolerated       Follow this diet upon discharge: Orders Placed This Encounter      Room Service      Moderate Consistent CHO Diet       Fall precautions           Fall precautions           General info for SNF       Length of Stay Estimate: Short Term Care: Estimated # of Days <30  Condition at Discharge: Improving  Level of care:skilled   Rehabilitation Potential: Good  Admission H&P remains valid and up-to-date: Yes  Recent Chemotherapy: N/A  Use Nursing Home Standing Orders: Yes       Hip precautions       WBAT.  Strict posterior hip precautions.  No hip flexion past 70deg.  No ADduction.       Mantoux instructions       Give two-step Mantoux (PPD) Per Facility Policy Yes       Weight bearing status       WBAT.  Strict posterior hip precautions.  No hip flexion past 70deg.  No ADduction.             Referrals     Occupational Therapy Adult Consult       Evaluate and treat as clinically indicated.    Reason:   WBAT.  Strict posterior hip precautions.  No hip flexion past 70deg.  No ADduction.       Occupational Therapy Adult Consult       Evaluate and treat as clinically indicated.    Reason:  Physical deconditioning       Physical Therapy Adult Consult       Evaluate and treat as clinically indicated.    Reason:  WBAT.  Strict posterior hip precautions.  No hip flexion past 70deg.  No ADduction.       Physical Therapy Adult Consult       Evaluate and treat as clinically indicated.    Reason:  Physical deconditioning             Follow-Up Appointment Instructions     Future Labs/Procedures    Follow Up and recommended labs and tests     Comments:    Follow-up with your orthopedic MD at [facility] in 6 weeks  Call [phone] to schedule ASAP.  Also, please call with any questions that come up prior to your appointment.    Follow Up and recommended labs and tests     Comments:    Follow up with skilled nursing physician.  The following labs/tests are recommended: None.      Follow-Up Appointment Instructions     Follow Up and recommended labs and tests       Follow-up with your orthopedic MD at [facility] in 6 weeks  Call [phone] to schedule ASAP.  Also, please call with any questions that come up prior to your appointment.       Follow Up and recommended labs and tests       Follow up with skilled nursing physician.  The following labs/tests are recommended: None.             Statement of Approval     Ordered          10/17/18 1437  I have reviewed and agree with all the recommendations and orders detailed in this document.  EFFECTIVE NOW     Approved and electronically signed by:  Terence Persaud MD

## 2018-10-14 NOTE — PHARMACY-ADMISSION MEDICATION HISTORY
Admission medication history interview status for the 10/14/2018  admission is complete. See EPIC admission navigator for prior to admission medications     Medication history source reliability:Good    Actions taken by pharmacist (provider contacted, etc):  PTA list obtained from [facility]     Additional medication history information not noted on PTA med list :  -Unable to confirm if patient uses ANIBAL Protext Ex 2 times daily (Not on the MAR).     Medication reconciliation/reorder completed by provider prior to medication history? No    Time spent in this activity:  10 minutes    Prior to Admission medications    Medication Sig Last Dose Taking? Auth Provider   acetaminophen (TYLENOL) 325 MG tablet Take 2 tablets (650 mg) by mouth every 4 hours as needed for other (multimodal surgical pain management along with NSAIDS and opioid medication as indicated based on pain control and physical function.) 10/12/2018 at PRN Yes Edgardo Godfrey PA-C   aspirin 325 MG EC tablet Take one Aspirin tab twice daily for 5 weeks. 10/14/2018 at 0800 Yes Bill Sanders MD   atorvastatin (LIPITOR) 10 MG tablet Take 1 tablet (10 mg) by mouth every evening 10/13/2018 at 2000 Yes Meli Arndt PA-C   diltiazem (TIAZAC) 120 MG 24 hr ER beaded capsule Take 120 mg by mouth daily 10/14/2018 at AM Yes Reported, Patient   insulin glargine (LANTUS) 100 UNIT/ML injection Inject 7 Units Subcutaneous every morning 10/14/2018 at AM Yes Reported, Patient   insulin lispro (HUMALOG KWIKPEN) 100 UNIT/ML injection Inject Subcutaneous At Bedtime Sliding scale:  -249=1 unit  -299=2 units  -349=3 units  -399=4 units  +=5 units 10/13/2018 at HS Yes Unknown, Entered By History   insulin lispro (HUMALOG KWIKPEN) 100 UNIT/ML injection Inject 2 Units Subcutaneous 3 times daily (before meals) 10/14/2018 at 1130 Yes Meli Arndt PA-C   insulin lispro (HUMALOG) 100 UNIT/ML injection Inject Subcutaneous  3 times daily (before meals) Sliding scale:   -189=1 unit  -239=2 units  -289=3 units  -339=4 units  -399=5 units  -499=6 units  +=7 units 10/14/2018 at 1130 Yes Reported, Patient   metoprolol succinate (TOPROL-XL) 50 MG 24 hr tablet Take 1 tablet (50 mg) by mouth daily 10/14/2018 Yes Meli Arndt PA-C   polyethylene glycol (MIRALAX/GLYCOLAX) Packet Take 17 g by mouth daily 10/14/2018 Yes Reported, Patient   tamsulosin (FLOMAX) 0.4 MG capsule Take 0.4 mg by mouth every evening  10/13/2018 at 2000 Yes Reported, Patient   traMADol (ULTRAM) 50 MG tablet Take 1 tablet (50 mg) by mouth every 6 hours 10/14/2018 at 1400 Yes Donna Spicer APRN CNP   Ergocalciferol (VITAMIN D) 07202 units CAPS Take 50,000 Units by mouth every 7 days for 5 doses  Patient taking differently: Take 50,000 Units by mouth every 7 days On Monday Start on 10/15/18 not started yet  Earl Streeter, DO   Skin Protectants, Misc. (ANIBAL PROTECT EX) Apply topically 2 times daily   Reported, Patient     Quynh Lo, Pharm.D IV Student

## 2018-10-15 LAB
ANION GAP SERPL CALCULATED.3IONS-SCNC: 5 MMOL/L (ref 3–14)
BUN SERPL-MCNC: 31 MG/DL (ref 7–30)
CALCIUM SERPL-MCNC: 7.3 MG/DL (ref 8.5–10.1)
CHLORIDE SERPL-SCNC: 105 MMOL/L (ref 94–109)
CO2 SERPL-SCNC: 26 MMOL/L (ref 20–32)
CREAT SERPL-MCNC: 1.69 MG/DL (ref 0.66–1.25)
ERYTHROCYTE [DISTWIDTH] IN BLOOD BY AUTOMATED COUNT: 15.5 % (ref 10–15)
GFR SERPL CREATININE-BSD FRML MDRD: 38 ML/MIN/1.7M2
GLUCOSE BLDC GLUCOMTR-MCNC: 132 MG/DL (ref 70–99)
GLUCOSE BLDC GLUCOMTR-MCNC: 148 MG/DL (ref 70–99)
GLUCOSE BLDC GLUCOMTR-MCNC: 187 MG/DL (ref 70–99)
GLUCOSE BLDC GLUCOMTR-MCNC: 189 MG/DL (ref 70–99)
GLUCOSE BLDC GLUCOMTR-MCNC: 204 MG/DL (ref 70–99)
GLUCOSE SERPL-MCNC: 127 MG/DL (ref 70–99)
HCT VFR BLD AUTO: 24 % (ref 40–53)
HGB BLD-MCNC: 7.9 G/DL (ref 13.3–17.7)
MCH RBC QN AUTO: 30 PG (ref 26.5–33)
MCHC RBC AUTO-ENTMCNC: 32.9 G/DL (ref 31.5–36.5)
MCV RBC AUTO: 91 FL (ref 78–100)
PLATELET # BLD AUTO: 316 10E9/L (ref 150–450)
POTASSIUM SERPL-SCNC: 4.9 MMOL/L (ref 3.4–5.3)
RBC # BLD AUTO: 2.63 10E12/L (ref 4.4–5.9)
SODIUM SERPL-SCNC: 136 MMOL/L (ref 133–144)
WBC # BLD AUTO: 8.6 10E9/L (ref 4–11)

## 2018-10-15 PROCEDURE — A9270 NON-COVERED ITEM OR SERVICE: HCPCS | Mod: GY | Performed by: STUDENT IN AN ORGANIZED HEALTH CARE EDUCATION/TRAINING PROGRAM

## 2018-10-15 PROCEDURE — L2624 HIP ADJ FLEX EXT ABDUCT CONT: HCPCS

## 2018-10-15 PROCEDURE — 25000132 ZZH RX MED GY IP 250 OP 250 PS 637: Mod: GY | Performed by: STUDENT IN AN ORGANIZED HEALTH CARE EDUCATION/TRAINING PROGRAM

## 2018-10-15 PROCEDURE — L1686 HO POST-OP HIP ABDUCTION: HCPCS

## 2018-10-15 PROCEDURE — 25000128 H RX IP 250 OP 636: Performed by: STUDENT IN AN ORGANIZED HEALTH CARE EDUCATION/TRAINING PROGRAM

## 2018-10-15 PROCEDURE — 80048 BASIC METABOLIC PNL TOTAL CA: CPT | Performed by: STUDENT IN AN ORGANIZED HEALTH CARE EDUCATION/TRAINING PROGRAM

## 2018-10-15 PROCEDURE — 36415 COLL VENOUS BLD VENIPUNCTURE: CPT | Performed by: STUDENT IN AN ORGANIZED HEALTH CARE EDUCATION/TRAINING PROGRAM

## 2018-10-15 PROCEDURE — A9270 NON-COVERED ITEM OR SERVICE: HCPCS | Mod: GY | Performed by: ORTHOPAEDIC SURGERY

## 2018-10-15 PROCEDURE — 85018 HEMOGLOBIN: CPT | Performed by: STUDENT IN AN ORGANIZED HEALTH CARE EDUCATION/TRAINING PROGRAM

## 2018-10-15 PROCEDURE — 25000132 ZZH RX MED GY IP 250 OP 250 PS 637: Mod: GY | Performed by: ORTHOPAEDIC SURGERY

## 2018-10-15 PROCEDURE — 85027 COMPLETE CBC AUTOMATED: CPT | Performed by: STUDENT IN AN ORGANIZED HEALTH CARE EDUCATION/TRAINING PROGRAM

## 2018-10-15 PROCEDURE — 00000146 ZZHCL STATISTIC GLUCOSE BY METER IP

## 2018-10-15 PROCEDURE — 99232 SBSQ HOSP IP/OBS MODERATE 35: CPT | Performed by: STUDENT IN AN ORGANIZED HEALTH CARE EDUCATION/TRAINING PROGRAM

## 2018-10-15 PROCEDURE — 25000131 ZZH RX MED GY IP 250 OP 636 PS 637: Mod: GY | Performed by: STUDENT IN AN ORGANIZED HEALTH CARE EDUCATION/TRAINING PROGRAM

## 2018-10-15 PROCEDURE — 12000007 ZZH R&B INTERMEDIATE

## 2018-10-15 RX ORDER — HYDROMORPHONE HYDROCHLORIDE 1 MG/ML
0.3 INJECTION, SOLUTION INTRAMUSCULAR; INTRAVENOUS; SUBCUTANEOUS EVERY 4 HOURS PRN
Status: DISCONTINUED | OUTPATIENT
Start: 2018-10-15 | End: 2018-10-17 | Stop reason: HOSPADM

## 2018-10-15 RX ORDER — NALOXONE HYDROCHLORIDE 0.4 MG/ML
.1-.4 INJECTION, SOLUTION INTRAMUSCULAR; INTRAVENOUS; SUBCUTANEOUS
Status: ACTIVE | OUTPATIENT
Start: 2018-10-15 | End: 2018-10-16

## 2018-10-15 RX ADMIN — TRAMADOL HYDROCHLORIDE 50 MG: 50 TABLET, COATED ORAL at 20:43

## 2018-10-15 RX ADMIN — INSULIN ASPART 1 UNITS: 100 INJECTION, SOLUTION INTRAVENOUS; SUBCUTANEOUS at 17:55

## 2018-10-15 RX ADMIN — ASPIRIN 325 MG: 325 TABLET, DELAYED RELEASE ORAL at 08:48

## 2018-10-15 RX ADMIN — METOPROLOL SUCCINATE 50 MG: 50 TABLET, EXTENDED RELEASE ORAL at 08:48

## 2018-10-15 RX ADMIN — ATORVASTATIN CALCIUM 10 MG: 10 TABLET, FILM COATED ORAL at 20:43

## 2018-10-15 RX ADMIN — INSULIN GLARGINE 5 UNITS: 100 INJECTION, SOLUTION SUBCUTANEOUS at 08:50

## 2018-10-15 RX ADMIN — INSULIN ASPART 1 UNITS: 100 INJECTION, SOLUTION INTRAVENOUS; SUBCUTANEOUS at 23:59

## 2018-10-15 RX ADMIN — DILTIAZEM HYDROCHLORIDE 120 MG: 120 CAPSULE, EXTENDED RELEASE ORAL at 08:47

## 2018-10-15 RX ADMIN — SODIUM CHLORIDE, PRESERVATIVE FREE 100 ML/HR: 5 INJECTION INTRAVENOUS at 08:11

## 2018-10-15 RX ADMIN — TRAMADOL HYDROCHLORIDE 50 MG: 50 TABLET, COATED ORAL at 08:46

## 2018-10-15 RX ADMIN — INSULIN ASPART 1 UNITS: 100 INJECTION, SOLUTION INTRAVENOUS; SUBCUTANEOUS at 20:43

## 2018-10-15 RX ADMIN — TAMSULOSIN HYDROCHLORIDE 0.4 MG: 0.4 CAPSULE ORAL at 20:43

## 2018-10-15 ASSESSMENT — ACTIVITIES OF DAILY LIVING (ADL)
ADLS_ACUITY_SCORE: 10
ADLS_ACUITY_SCORE: 11
ADLS_ACUITY_SCORE: 10

## 2018-10-15 NOTE — PLAN OF CARE
Problem: Patient Care Overview  Goal: Plan of Care/Patient Progress Review  Outcome: Improving  Arrived on back on floor from surgery at 2135.  A&Ox4.  VSS on 1L NC.  Denies pain.  LS clear.  BS+.  CMS intact.  Dressing to L hip CDI, ice applied.  Hip abductor protector in place.  PCDs applied.  Pt refused capno, replaced with continuous pulse ox.  Clear liquid diet.  Pt has only wanted water this shift.  Can take meds whole with water, insulin held.  Bedrest this shift.  NS 100mL/hr.

## 2018-10-15 NOTE — PROGRESS NOTES
Hospitalist Progress Note    Date of Service: 10/15/2018         Assessment and Plan:          Mr. 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 The patient has presented with left hip pain following a mechanical fall as he stood up to go to the bathroom at [facility]. Hip XR showed dislocated left hip arthroplasty. Now s/p Left hip hemiarthroplasty closed reduction under anesthesia on 10/14 with no complications.  Plan:  - Resume ASA  - orthopedic surgery following  - Pain control as needed  - Bedrest until hip abduction brace placed  - PT/OT/SW     Normocytic Anemia  Assessment: Baseline hemoglobin around 9. ON admission Hgb stable at 8.6  Plan:  - Monitor hemoglobin levels periodically.      Severe vitamin D deficiency.  Assessment/Plan: on oral vitamin D supplements.      Subclinical hypothyroidism.  Assessment: Noted to be tachycardic, TSH 4.57.  T4 1.28.  Plan:  - Monitor thyroid function tests in 4-6 weeks.      Hypertension  Assessment: PTA on Cardizem 120 mg oral daily, metoprolol 50 mg oral daily.  Plan:  - Continue prior to admission Cardizem 120 mg oral daily, metoprolol 50 mg oral daily.      Dyslipidemia:   Assessment/Plan: Continue on prior to admission Lipitor.       Benign prostatic hypertrophy:   Assessment/Plan: Continue with prior to admission Flomax.       Chronic kidney disease, stage III-IV:    Assessment: the most recent creatinine levels have been around 1.8-1.9. On admission Cr 2.14, likely  pre-renal in etiology  Plan:  - Monitor renal function periodically.  - Avoid nephrotoxic drugs.      Coronary artery disease:    Assessment: The patient is status post coronary artery bypass graft x 4 in 1996.    Plan:  - Continue on prior to admission metoprolol and Lipitor.   - Resume ASA      Atrial fibrillation with intermittent rate-related bundle  Supraventricular tachycardia, asymptomatic   Assessment: During recent hospitalization in August 2018 after a fall with right femoral neck fracture status post ORIF 8/26/18-noted to have arrhythmia. CHADS-VASc score is 4 for age, hypertension, and diabetes.  This puts him in the high risk group for a stroke cardiology was then consulted, patient and family declined anticoagulation given risk for falls.  Plan:  - Continue on Toprol-XL, Cardizem.  - Resume ASA      Diabetes mellitus type 2:  T  Assessment: the most recent hemoglobin A1c was 8.4% on 08/25/2018.  At home he is on Lantus 7 units subcu every morning and Humalog 2 units subq t.i.d.    Plan:  - Continue insulin regimen with lantus reduced to 5 U and sliding scale insulin available.    Active Diet Order      Moderate Consistent CHO Diet    DVT Prophylaxis: Pneumatic Compression Devices  Code Status: DNR/DNI    Disposition: Expected discharge in 1-3. Days pending improvement in pain, mobility    Attestation:   I have reviewed today's relevant vital signs, notes, medications, labs and imaging.I personally reviewed no images or EKG's today.    Silvano Kumar MD  [facility]  Text Page  (7am - 6pm)        Interval History:        Pain improved  More mobility in left hip, disappointed that he will need a hip brace  No fever/chills  No new hip pain/swelling  No new physical complaints         Physical Exam:        Physical Exam   Temp: 97.4  F (36.3  C) Temp src: Oral BP: 102/48 Pulse: 82 Heart Rate: 57 Resp: 16 SpO2: 97 % O2 Device: Nasal cannula Oxygen Delivery: 2 LPM  Vitals:    10/14/18 1216    Weight: 72.6 kg (160 lb)     Vital Signs with Ranges  Temp:  [96.9  F (36.1  C)-97.8  F (36.6  C)] 97.4  F (36.3  C)  Pulse:  [70-82] 82  Heart Rate:  [57-90] 57  Resp:  [12-21] 16  BP: (102-138)/(48-80) 102/48  SpO2:  [88 %-100 %] 97 %  I/O last 3 completed shifts:  In: 500 [P.O.:100; I.V.:400]  Out: 250 [Urine:250]    Constitutional:Awake, alert, cooperative, no apparent distress  Respiratory: Clear to auscultation bilaterally, no crackles or wheezing  Cardiovascular: Regular rate and rhythm, normal S1 and S2, and no murmur noted  GI: Normal bowel sounds, soft, non-distended, non-tender  Skin/Integumen: No rashes, no cyanosis, no edema, incision site dressing C/D/I on left hip  Other:normal mood and affect  Neuro: A/Ox3. Moving all extremities, speech fluent    # Pain Assessment:  Current Pain Score 10/15/2018   Patient currently in pain? -   Pain score (0-10) 3   Pain location Hip   Pain descriptors -   - Abimael is experiencing pain due to hip pain. Pain management was discussed and the plan was created in a collaborative fashion.  Abimael's response to the current recommendations: engaged  - Please see the plan for pain management as documented above         Data:     CBC RESULTS:    Recent Labs  Lab 10/15/18  0715 10/14/18  1220 10/10/18  0700 10/09/18  0703   WBC 8.6 10.8  --  9.2   RBC 2.63* 2.94*  --  2.81*   HGB 7.9* 8.6* 8.7* 8.2*   HCT 24.0* 26.7*  --  25.1*    355  --  204       BASIC METABOLIC PANEL:    Recent Labs  Lab 10/15/18  0715 10/14/18  1220 10/10/18  0700 10/09/18  0703    135  --  139   POTASSIUM 4.9 4.7  --  4.0   CHLORIDE 105 102  --  105   CO2 26 25  --  24   BUN 31* 41*  --  40*   CR 1.69* 2.14* 1.79* 1.96*   * 245*  --  205*   JOSELINE 7.3* 7.7*  --  7.9*       HEPATIC FUNCTION PANEL    Recent Labs  Lab 10/09/18  0703   AST 22   ALT 12   ALKPHOS 72   BILITOTAL 0.6     INR    Recent Labs  Lab 10/14/18  1220   INR 1.08       OTHER LABS  None    Medications     sodium  chloride 100 mL/hr (10/15/18 0811)       aspirin  325 mg Oral Daily     atorvastatin  10 mg Oral QPM     diltiazem  120 mg Oral Daily     influenza Vac Split High-Dose  0.5 mL Intramuscular Prior to discharge     insulin aspart  1-4 Units Subcutaneous Q4H     insulin glargine  5 Units Subcutaneous QAM AC     metoprolol succinate  50 mg Oral Daily     sodium chloride (PF)  3 mL Intracatheter Q8H     tamsulosin  0.4 mg Oral QPM     traMADol  50 mg Oral Q12H         Recent Results (from the past 24 hour(s))   XR Surgery OSORIO Fluoro L/T 5 Min w Stills    Narrative    SURGERY C-ARM FLUORO LESS THAN 5 MIN W STILLS October 14, 2018 8:15 PM      HISTORY: Closed reduction left hip.     COMPARISON: 10/14/2018 at 1317 hours.    NUMBER OF IMAGES ACQUIRED: Two.    VIEWS: AP.    FLUOROSCOPY TIME: .1      Impression    IMPRESSION: Two intraoperative images of the left hip demonstrates the  dislocated acetabular component has been appropriately reduced.    HANNAH HOOKER MD   XR Pelvis w Hip Port Left 1 View    Narrative    PELVIS AND HIP PORTABLE LEFT ONE VIEW   10/14/2018 9:12 PM     HISTORY: Postop hip arthroplasty.     COMPARISON: Intraoperative images from 10/14/2018.    FINDINGS: Postoperative change of left total hip prosthesis placement  are demonstrated. Alignment is anatomic. No fractures are identified.  Expected postoperative swelling and staples noted.      Impression    IMPRESSION: Expected postoperative appearance following left total hip  arthroplasty.     NICOLA TAYLOR MD

## 2018-10-15 NOTE — ANESTHESIA CARE TRANSFER NOTE
Patient: Abimael Lockwoodegler    Procedure(s):  CLOSED REDUCTION HIP - Wound Class: I-Clean    Diagnosis: Closed vs Open reduction of left hip  Diagnosis Additional Information: No value filed.    Anesthesia Type:   General, ETT     Note:  Airway :Face Mask  Patient transferred to:PACU  Comments: Transferred to PACU, spontaneous respirations, 10L oxygen via facemask.  All monitors and alarms on and functioning, VSS.  Patient awake, comfortable.  Report to PACU RN.Handoff Report: Identifed the Patient, Identified the Reponsible Provider, Reviewed the pertinent medical history, Discussed the surgical course, Reviewed Intra-OP anesthesia mangement and issues during anesthesia, Set expectations for post-procedure period and Allowed opportunity for questions and acknowledgement of understanding      Vitals: (Last set prior to Anesthesia Care Transfer)    CRNA VITALS  10/14/2018 1948 - 10/14/2018 2025      10/14/2018             Pulse: 80    SpO2: 100 %    Resp Rate (set): 10                Electronically Signed By: WILLIAM Swanson CRNA  October 14, 2018  8:25 PM

## 2018-10-15 NOTE — ANESTHESIA PREPROCEDURE EVALUATION
Anesthesia Evaluation     .             ROS/MED HX    ENT/Pulmonary:      (-) sleep apnea   Neurologic:       Cardiovascular:     (+) hypertension--CAD, --. : . . . :. .       METS/Exercise Tolerance:     Hematologic:         Musculoskeletal:   (+) , fracture lower extremity, -       GI/Hepatic:        (-) GERD   Renal/Genitourinary:     (+) chronic renal disease,       Endo:     (+) type II DM .      Psychiatric:         Infectious Disease:         Malignancy:         Other:                     Physical Exam  Normal systems: cardiovascular, pulmonary and dental    Airway   Mallampati: I  TM distance: >3 FB  Neck ROM: full    Dental     Cardiovascular       Pulmonary    breath sounds clear to auscultation                    Anesthesia Plan      History & Physical Review  History and physical reviewed and following examination; no interval change.    ASA Status:  3 .    NPO Status:  > 8 hours    Plan for General and ETT with Intravenous induction. Maintenance will be Balanced.    PONV prophylaxis:  Ondansetron (or other 5HT-3) and Dexamethasone or Solumedrol       Postoperative Care  Postoperative pain management:  IV analgesics and Oral pain medications.      Consents  Anesthetic plan, risks, benefits and alternatives discussed with:  Patient..                        Procedure: Procedure(s):  ARTHROPLASTY HIP  Preop diagnosis: Closed vs Open reduction of left hip    No Known Allergies  Past Medical History:   Diagnosis Date     Benign essential hypertension 9/4/2018     Coronary artery disease      Nonsenile cataract      Recent retinal detachment, total or subtotal 8/5/2014     Type 2 diabetes mellitus without complications (H)      Past Surgical History:   Procedure Laterality Date     CARDIAC SURGERY       CATARACT IOL, RT/LT Bilateral      lacrimal caruncle removed BE Bilateral ~1989     OPEN REDUCTION INTERNAL FIXATION HIP BIPOLAR Right 8/26/2018    Procedure: OPEN REDUCTION INTERNAL FIXATION HIP BIPOLAR;   Right Hip Bipolar Hemiarthroplasty (Biomet);  Surgeon: Bill Sanders MD;  Location: [facility] OR     OPEN REDUCTION INTERNAL FIXATION HIP BIPOLAR Left 10/4/2018    Procedure: OPEN REDUCTION INTERNAL FIXATION HIP BIPOLAR;  LEFT HIP TONYA ARTHROPLASTY;  Surgeon: Bruno Stewart MD;  Location: [facility] OR     REPAIR RUPTURED GLOBE  4/21/2014    Procedure: Exploration and Repair of Ruptured Globe ;  Surgeon: Mercedes Rivera MD;  Location: [facility] OR     Social History   Substance Use Topics     Smoking status: Former Smoker     Smokeless tobacco: Never Used     Alcohol use No     Prior to Admission medications    Medication Sig Start Date End Date Taking? Authorizing Provider   acetaminophen (TYLENOL) 325 MG tablet Take 2 tablets (650 mg) by mouth every 4 hours as needed for other (multimodal surgical pain management along with NSAIDS and opioid medication as indicated based on pain control and physical function.) 10/8/18  Yes Edgardo Godfrey PA-C   aspirin 325 MG EC tablet Take one Aspirin tab twice daily for 5 weeks. 8/27/18  Yes Bill Sanders MD   atorvastatin (LIPITOR) 10 MG tablet Take 1 tablet (10 mg) by mouth every evening 9/3/18  Yes Meli Arndt PA-C   diltiazem (TIAZAC) 120 MG 24 hr ER beaded capsule Take 120 mg by mouth daily 9/13/18  Yes Reported, Patient   insulin glargine (LANTUS) 100 UNIT/ML injection Inject 7 Units Subcutaneous every morning   Yes Reported, Patient   insulin lispro (HUMALOG KWIKPEN) 100 UNIT/ML injection Inject Subcutaneous At Bedtime Sliding scale:  -249=1 unit  -299=2 units  -349=3 units  -399=4 units  +=5 units   Yes Unknown, Entered By History   insulin lispro (HUMALOG KWIKPEN) 100 UNIT/ML injection Inject 2 Units Subcutaneous 3 times daily (before meals) 9/3/18  Yes Meli Arndt PA-C   insulin lispro (HUMALOG) 100 UNIT/ML injection Inject Subcutaneous 3 times daily (before meals) Sliding scale:   -189=1 unit  BG  190-239=2 units  -289=3 units  -339=4 units  -399=5 units  -499=6 units  +=7 units   Yes Reported, Patient   metoprolol succinate (TOPROL-XL) 50 MG 24 hr tablet Take 1 tablet (50 mg) by mouth daily 9/4/18  Yes Meli Arndt PA-C   polyethylene glycol (MIRALAX/GLYCOLAX) Packet Take 17 g by mouth daily   Yes Reported, Patient   tamsulosin (FLOMAX) 0.4 MG capsule Take 0.4 mg by mouth every evening    Yes Reported, Patient   traMADol (ULTRAM) 50 MG tablet Take 1 tablet (50 mg) by mouth every 6 hours 10/11/18  Yes Donna Spicer APRN CNP   Ergocalciferol (VITAMIN D) 80198 units CAPS Take 50,000 Units by mouth every 7 days for 5 doses  Patient taking differently: Take 50,000 Units by mouth every 7 days On Monday Start on 10/15/18 10/8/18 11/6/18  Earl Streeter,    Skin Protectants, Misc. (ANIBAL PROTECT EX) Apply topically 2 times daily    Reported, Patient     Current Facility-Administered Medications Ordered in Epic   Medication Dose Route Frequency Last Rate Last Dose     [Auto Hold] acetaminophen (TYLENOL) tablet 650 mg  650 mg Oral Q4H PRN   650 mg at 10/14/18 1623     [Auto Hold] atorvastatin (LIPITOR) tablet 10 mg  10 mg Oral QPM         ceFAZolin (ANCEF) 1 g vial to attach to  ml bag for ADULT or 50 ml bag for PEDS  1 g Intravenous See Admin Instructions         ceFAZolin (ANCEF) intermittent infusion 2 g in 100 mL dextrose PRE-MIX  2 g Intravenous Pre-Op/Pre-procedure x 1 dose         [Auto Hold] glucose gel 15-30 g  15-30 g Oral Q15 Min PRN        Or     [Auto Hold] dextrose 50 % injection 25-50 mL  25-50 mL Intravenous Q15 Min PRN        Or     [Auto Hold] glucagon injection 1 mg  1 mg Subcutaneous Q15 Min PRN         [Auto Hold] diltiazem (DILACOR XR) 24 hr capsule 120 mg  120 mg Oral Daily         [Auto Hold] HYDROmorphone (PF) (DILAUDID) injection 0.5-1 mg  0.5-1 mg Intravenous Q3H PRN   0.5 mg at 10/14/18 1623     [START ON 10/15/2018] influenza  Vac Split High-Dose (FLUZONE) injection 0.5 mL  0.5 mL Intramuscular Prior to discharge         [Auto Hold] insulin aspart (NovoLOG) inj (RAPID ACTING)  1-4 Units Subcutaneous Q4H         [Auto Hold] insulin glargine (LANTUS) injection 5 Units  5 Units Subcutaneous QAM AC         [Auto Hold] melatonin tablet 1 mg  1 mg Oral At Bedtime PRN         [Auto Hold] metoprolol succinate (TOPROL-XL) 24 hr tablet 50 mg  50 mg Oral Daily         [Auto Hold] naloxone (NARCAN) injection 0.1-0.4 mg  0.1-0.4 mg Intravenous Q2 Min PRN         [Auto Hold] oxyCODONE IR (ROXICODONE) tablet 5-10 mg  5-10 mg Oral Q4H PRN         sodium chloride 0.9% infusion   Intravenous Continuous 100 mL/hr at 10/14/18 1623       [Auto Hold] tamsulosin (FLOMAX) capsule 0.4 mg  0.4 mg Oral QPM         [Auto Hold] traMADol (ULTRAM) tablet 50 mg  50 mg Oral Q12H         No current Epic-ordered outpatient prescriptions on file.       sodium chloride 100 mL/hr at 10/14/18 1623     Wt Readings from Last 1 Encounters:   10/14/18 72.6 kg (160 lb)     Temp Readings from Last 1 Encounters:   10/14/18 36.6  C (97.8  F) (Oral)     BP Readings from Last 6 Encounters:   10/14/18 132/61   10/11/18 144/72   10/10/18 135/73   10/03/18 104/57   09/28/18 107/54   09/17/18 127/77     Pulse Readings from Last 4 Encounters:   10/14/18 70   10/11/18 88   10/10/18 89   10/03/18 101     Resp Readings from Last 1 Encounters:   10/14/18 18     SpO2 Readings from Last 1 Encounters:   10/14/18 95%     Recent Labs   Lab Test  10/14/18   1220  10/10/18   0700  10/09/18   0703   NA  135   --   139   POTASSIUM  4.7   --   4.0   CHLORIDE  102   --   105   CO2  25   --   24   ANIONGAP  8   --   10   GLC  245*   --   205*   BUN  41*   --   40*   CR  2.14*  1.79*  1.96*   JOSELINE  7.7*   --   7.9*     Recent Labs   Lab Test  10/09/18   0703   AST  22   ALT  12   ALKPHOS  72   BILITOTAL  0.6     Recent Labs   Lab Test  10/14/18   1220  10/10/18   0700  10/09/18   0703   WBC  10.8   --   9.2    HGB  8.6*  8.7*  8.2*   PLT  355   --   204     Recent Labs   Lab Test  10/04/18   1330   ABO  A   RH  Pos     Recent Labs   Lab Test  10/14/18   1220  04/21/14   0405   INR  1.08  0.91      Recent Labs   Lab Test  10/10/18   0700  10/09/18   1302  10/09/18   0703   TROPI  0.134*  0.143*  0.157*     No results for input(s): PH, PCO2, PO2, HCO3 in the last 15800 hours.  No results for input(s): HCG in the last 69207 hours.  Recent Results (from the past 744 hour(s))   XR Chest 1 View    Narrative    XR CHEST 1 VW  10/4/2018 3:03 AM     HISTORY: Hip fracture.    COMPARISON: 8/29/2018.    FINDINGS: Supine chest. Sternal wires and mediastinal clips. Mild  probable scarring at the right lung base and in the left lung  laterally. The lungs are otherwise clear. No pneumothorax.      Impression    IMPRESSION: No acute abnormality.    SHARI HERNANDEZ MD   XR Pelvis w Hip Left 1 View    Narrative    XR PELVIS AND HIP LEFT 1 VIEW  10/4/2018 3:04 AM     HISTORY: Pain, fall, left hip pain.    COMPARISON: None.       Impression    IMPRESSION: Angulated left femoral neck fracture.    SHARI HERNANDEZ MD   XR Pelvis w Hip Port Left 1 View    Narrative    XR PELVIS AND HIP PORTABLE LEFT 1 VIEW 10/4/2018 3:43 PM    COMPARISON: Radiographs earlier on the same day.    HISTORY: Arthroplasty.      Impression    IMPRESSION: Bilateral hip hemiarthroplasties, unchanged on the RIGHT  and new on the LEFT. Hardware appears intact. No fractures are seen.    VÍCTOR ISLAS MD   XR Pelvis w Hip Left 1 View    Narrative    PELVIS WITH UNILATERAL HIP ONE VIEW LEFT  10/14/2018 1:23 PM     HISTORY: fall ?dislocation;     COMPARISON: None.    FINDINGS: There is a dislocation of the left hip arthroplasty. The  acetabular and femoral components are dislocated superiorly with  respect to the native acetabulum..      Impression    IMPRESSION: Dislocated left hip arthroplasty.    CEASAR BLEDSOE MD       RECENT LABS:   ECG:   ECHO:

## 2018-10-15 NOTE — OP NOTE
Procedure Date: 10/14/2018      PREOPERATIVE DIAGNOSIS:  Left hip hemiarthroplasty, posterior dislocation.      POSTOPERATIVE DIAGNOSIS:  Left hip hemiarthroplasty, posterior dislocation.      PROCEDURE PERFORMED:  Left hip hemiarthroplasty closed reduction under anesthesia.      SURGEON:  Jeanette Lewis MD      ANESTHETIC:  General.      ESTIMATED BLOOD LOSS:  None.      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  Fell again having a left hip fracture having a left hip hemiarthroplasty 10/04/2018.  Unfortunately, he has fallen again, now at the transitional care unit and had a posterior hip dislocation.  Risks, benefits, alternatives explained, and he elects to proceed with open versus closed reduction.        DESCRIPTION OF PROCEDURE:  The patient identified in the preoperative holding area per hospital policy.  Correct hip site marked, to the OR on the OR table.  Timeout performed per WHO protocol correctly identifying the patient, operative site, procedure to be performed.  All in the room agreed.  Timeout performed.        After anesthesia induced pulled traction with the knee slightly flexed and rotated, felt a palpable reduction of the hip, and leg lengths were now symmetric.  X-rays showed a reduced hip with no signs of lee ann-implant fracture.  Awakened, transferred stable to the PACU with a hip abduction pillow placed.      POSTOPERATIVE PLAN:   1.  Fit with hip abduction brace.  Restrictions 70 degrees flexion, 20 degrees abduction.   2.  Resume aspirin for DVT prophylaxis.   3.  Follow up in Dr. Stewart's clinic in 1 week for wound check and staple removal.   4.  Osteoporosis workup and treatment.         JEANETTE LEWIS MD             D: 10/14/2018   T: 10/15/2018   MT: KONG      Name:     CHERYL HOLLOWAY   MRN:      [id number]        Account:        [id number]   :      1924            Procedure Date: 10/14/2018      Document: [id number]

## 2018-10-15 NOTE — PROGRESS NOTES
[facility] Orthopedic Progress Note  Abimael Flores is a 89 year old male    Today's Date:10/15/2018  Admission Date: 10/14/2018  Dislocation of left hip, initial encounter (H) [S73.005A]  POD # 11 Left bipolar hemiarthroplasty for femoral neck fracture.  POD # 1 Closed reduction following fall and dislocation of above    Patient Seen.  Doing well.  Dressings are clean, dry, and intact.  Circulation, motor and sensory function, intact distally.        PLAN:  Awaiting Orthotist consult for hip abduction brace.  Hold PT until brace is fitted.  Pain control medication: No changes.        Temp:  [96.9  F (36.1  C)-98.3  F (36.8  C)] 97.6  F (36.4  C)  Pulse:  [70-82] 82  Heart Rate:  [63-90] 90  Resp:  [12-21] 16  BP: (105-138)/(48-80) 132/56  SpO2:  [88 %-100 %] 100 %      Results for orders placed or performed during the hospital encounter of 10/14/18 (from the past 24 hour(s))   CBC with platelets + differential   Result Value Ref Range    WBC 10.8 4.0 - 11.0 10e9/L    RBC Count 2.94 (L) 4.4 - 5.9 10e12/L    Hemoglobin 8.6 (L) 13.3 - 17.7 g/dL    Hematocrit 26.7 (L) 40.0 - 53.0 %    MCV 91 78 - 100 fl    MCH 29.3 26.5 - 33.0 pg    MCHC 32.2 31.5 - 36.5 g/dL    RDW 15.5 (H) 10.0 - 15.0 %    Platelet Count 355 150 - 450 10e9/L    Diff Method Automated Method     % Neutrophils 70.0 %    % Lymphocytes 18.4 %    % Monocytes 8.5 %    % Eosinophils 2.4 %    % Basophils 0.2 %    % Immature Granulocytes 0.5 %    Nucleated RBCs 0 0 /100    Absolute Neutrophil 7.6 1.6 - 8.3 10e9/L    Absolute Lymphocytes 2.0 0.8 - 5.3 10e9/L    Absolute Monocytes 0.9 0.0 - 1.3 10e9/L    Absolute Eosinophils 0.3 0.0 - 0.7 10e9/L    Absolute Basophils 0.0 0.0 - 0.2 10e9/L    Abs Immature Granulocytes 0.1 0 - 0.4 10e9/L    Absolute Nucleated RBC 0.0    Basic metabolic panel   Result Value Ref Range    Sodium 135 133 - 144 mmol/L    Potassium 4.7 3.4 - 5.3 mmol/L    Chloride 102 94 - 109 mmol/L    Carbon Dioxide 25 20 - 32 mmol/L    Anion  Gap 8 3 - 14 mmol/L    Glucose 245 (H) 70 - 99 mg/dL    Urea Nitrogen 41 (H) 7 - 30 mg/dL    Creatinine 2.14 (H) 0.66 - 1.25 mg/dL    GFR Estimate 29 (L) >60 mL/min/1.7m2    GFR Estimate If Black 35 (L) >60 mL/min/1.7m2    Calcium 7.7 (L) 8.5 - 10.1 mg/dL   INR   Result Value Ref Range    INR 1.08 0.86 - 1.14   XR Pelvis w Hip Left 1 View    Narrative    PELVIS WITH UNILATERAL HIP ONE VIEW LEFT  10/14/2018 1:23 PM     HISTORY: fall ?dislocation;     COMPARISON: None.    FINDINGS: There is a dislocation of the left hip arthroplasty. The  acetabular and femoral components are dislocated superiorly with  respect to the native acetabulum..      Impression    IMPRESSION: Dislocated left hip arthroplasty.    CEASAR BLEDSOE MD   Orthopedic Surgery IP Consult: Patient to be seen: Routine - within 24 hours; Hip dislocation; Consultant may enter orders: Yes    Narrative    Milton Gusman MD     10/14/2018  8:45 PM  [facility]    Orthopedics Consultation    Date of Admission:  10/14/2018    Assessment & Plan   Abimael Flores is a 89 year old male who was admitted on   10/14/2018. I was asked to see the patient for left hip   hemiarthroplasty posterior dislocation.    N.p.o.  Discussed both operative and nonoperative management the risk and   benefits of each.  Patient thoughtfully acknowledges risks and   wishes to proceed with left open versus closed reduction of the   left hip.  To OR          Milton Gusman MD    Code Status    Full Code During Procedure    Reason for Consult   Reason for consult: I was asked by Dr. Kumar to evaluate this   patient for left hip hemiarthroplasty dislocation.    Primary Care Physician   RAISSA HILL    History of Present Illness   Abimael Flores is a 89 year old male who was admitted on   10/14/2018. I was asked to see the patient for left hip   hemiarthroplasty posterior dislocation.  Patient had a fall and   had a right hip fracture treated with a bipolar  hemiarthroplasty   in August.  Fell again in October having a left hip   hemiarthroplasty on October 4.  He tripped and fell at his TCU   now having a posterior hip dislocation.  Denies any   lightheadedness prior to the fall.  States he has had no issues   with his wound.  Denies chest pain or shortness of breath..    MEDS:   No current outpatient prescriptions on file.       PAST MEDICAL HISTORY:   Past Medical History:   Diagnosis Date     Benign essential hypertension 9/4/2018     Coronary artery disease      Nonsenile cataract      Recent retinal detachment, total or subtotal 8/5/2014     Type 2 diabetes mellitus without complications (H)        PAST SURGICAL HISTORY:   Past Surgical History:   Procedure Laterality Date     CARDIAC SURGERY       CATARACT IOL, RT/LT Bilateral      lacrimal caruncle removed BE Bilateral ~1989     OPEN REDUCTION INTERNAL FIXATION HIP BIPOLAR Right 8/26/2018    Procedure: OPEN REDUCTION INTERNAL FIXATION HIP BIPOLAR;  Right   Hip Bipolar Hemiarthroplasty (Biomet);  Surgeon: Bill Sanders MD;  Location:  OR     OPEN REDUCTION INTERNAL FIXATION HIP BIPOLAR Left 10/4/2018    Procedure: OPEN REDUCTION INTERNAL FIXATION HIP BIPOLAR;  LEFT   HIP TONYA ARTHROPLASTY;  Surgeon: Bruno Stewart MD;    Location:  OR     REPAIR RUPTURED GLOBE  4/21/2014    Procedure: Exploration and Repair of Ruptured Globe ;  Surgeon:   Mercedes Rivera MD;  Location:  OR       FAMILY HISTORY:   Family History   Problem Relation Age of Onset     Diabetes Mother      Diabetes Father      Cancer No family hx of      Glaucoma No family hx of      Macular Degeneration No family hx of        SOCIAL HISTORY:   Social History   Substance Use Topics     Smoking status: Former Smoker     Smokeless tobacco: Never Used     Alcohol use No       ALLERGIES:  No Known Allergies    ROS:  10 point ROS neg other than the symptoms noted above in the   HPI.      Physical Exam   Temp: 96.9  F (36.1  C)  Temp src: Temporal BP: 117/62 Pulse: 70   Heart Rate: 74 Resp: 12 SpO2: 100 % O2 Device: Nasal cannula   Oxygen Delivery: 4 LPM  Vital Signs with Ranges  Temp:  [96.9  F (36.1  C)-98.3  F (36.8  C)] 96.9  F (36.1  C)  Pulse:  [70-76] 70  Heart Rate:  [74-78] 74  Resp:  [12-18] 12  BP: (106-138)/(48-80) 117/62  SpO2:  [93 %-100 %] 100 %  160 lbs 0 oz    Constitutional: Pleasant, alert, appropriate, following commands.  HEENT: Head atraumatic normocephalic. Pupils equal round and   reactive to light.  Respiratory: Unlabored breathing no audible wheeze  Cardiovascular: Regular rate and rhythm  GI: Abdomen soft nontender nondistended.  Skin: No rashes, no cyanosis, no edema.  Musculoskeletal: Right hip incision healed.  Left leg shortened   and rotated.  5/5df/pf/ehl, SILT, palpable dp pulse, incision   with staples in place c/d/i  Neurologic: normal without focal findings, mental status, speech   normal, alert and oriented x iii, EMILY        Data   Results for orders placed or performed during the hospital   encounter of 10/14/18 (from the past 24 hour(s))   CBC with platelets + differential   Result Value Ref Range    WBC 10.8 4.0 - 11.0 10e9/L    RBC Count 2.94 (L) 4.4 - 5.9 10e12/L    Hemoglobin 8.6 (L) 13.3 - 17.7 g/dL    Hematocrit 26.7 (L) 40.0 - 53.0 %    MCV 91 78 - 100 fl    MCH 29.3 26.5 - 33.0 pg    MCHC 32.2 31.5 - 36.5 g/dL    RDW 15.5 (H) 10.0 - 15.0 %    Platelet Count 355 150 - 450 10e9/L    Diff Method Automated Method     % Neutrophils 70.0 %    % Lymphocytes 18.4 %    % Monocytes 8.5 %    % Eosinophils 2.4 %    % Basophils 0.2 %    % Immature Granulocytes 0.5 %    Nucleated RBCs 0 0 /100    Absolute Neutrophil 7.6 1.6 - 8.3 10e9/L    Absolute Lymphocytes 2.0 0.8 - 5.3 10e9/L    Absolute Monocytes 0.9 0.0 - 1.3 10e9/L    Absolute Eosinophils 0.3 0.0 - 0.7 10e9/L    Absolute Basophils 0.0 0.0 - 0.2 10e9/L    Abs Immature Granulocytes 0.1 0 - 0.4 10e9/L    Absolute Nucleated RBC 0.0    Basic metabolic  panel   Result Value Ref Range    Sodium 135 133 - 144 mmol/L    Potassium 4.7 3.4 - 5.3 mmol/L    Chloride 102 94 - 109 mmol/L    Carbon Dioxide 25 20 - 32 mmol/L    Anion Gap 8 3 - 14 mmol/L    Glucose 245 (H) 70 - 99 mg/dL    Urea Nitrogen 41 (H) 7 - 30 mg/dL    Creatinine 2.14 (H) 0.66 - 1.25 mg/dL    GFR Estimate 29 (L) >60 mL/min/1.7m2    GFR Estimate If Black 35 (L) >60 mL/min/1.7m2    Calcium 7.7 (L) 8.5 - 10.1 mg/dL   INR   Result Value Ref Range    INR 1.08 0.86 - 1.14   XR Pelvis w Hip Left 1 View    Narrative    PELVIS WITH UNILATERAL HIP ONE VIEW LEFT  10/14/2018 1:23 PM     HISTORY: fall ?dislocation;     COMPARISON: None.    FINDINGS: There is a dislocation of the left hip arthroplasty.   The  acetabular and femoral components are dislocated superiorly with  respect to the native acetabulum..      Impression    IMPRESSION: Dislocated left hip arthroplasty.    CEASAR BLEDSOE MD   Glucose by meter   Result Value Ref Range    Glucose 219 (H) 70 - 99 mg/dL                Glucose by meter   Result Value Ref Range    Glucose 219 (H) 70 - 99 mg/dL   XR Surgery OSORIO Fluoro L/T 5 Min w Stills    Narrative    SURGERY C-ARM FLUORO LESS THAN 5 MIN W STILLS October 14, 2018 8:15 PM      HISTORY: Closed reduction left hip.     COMPARISON: 10/14/2018 at 1317 hours.    NUMBER OF IMAGES ACQUIRED: Two.    VIEWS: AP.    FLUOROSCOPY TIME: .1      Impression    IMPRESSION: Two intraoperative images of the left hip demonstrates the  dislocated acetabular component has been appropriately reduced.    HANNAH HOOKER MD   XR Pelvis w Hip Port Left 1 View    Narrative    PELVIS AND HIP PORTABLE LEFT ONE VIEW   10/14/2018 9:12 PM     HISTORY: Postop hip arthroplasty.     COMPARISON: Intraoperative images from 10/14/2018.    FINDINGS: Postoperative change of left total hip prosthesis placement  are demonstrated. Alignment is anatomic. No fractures are identified.  Expected postoperative swelling and staples noted.      Impression     IMPRESSION: Expected postoperative appearance following left total hip  arthroplasty.     NICOLA TAYLOR MD   Glucose by meter   Result Value Ref Range    Glucose 148 (H) 70 - 99 mg/dL   Glucose by meter   Result Value Ref Range    Glucose 132 (H) 70 - 99 mg/dL   Basic metabolic panel   Result Value Ref Range    Sodium 136 133 - 144 mmol/L    Potassium 4.9 3.4 - 5.3 mmol/L    Chloride 105 94 - 109 mmol/L    Carbon Dioxide 26 20 - 32 mmol/L    Anion Gap 5 3 - 14 mmol/L    Glucose 127 (H) 70 - 99 mg/dL    Urea Nitrogen 31 (H) 7 - 30 mg/dL    Creatinine 1.69 (H) 0.66 - 1.25 mg/dL    GFR Estimate 38 (L) >60 mL/min/1.7m2    GFR Estimate If Black 46 (L) >60 mL/min/1.7m2    Calcium 7.3 (L) 8.5 - 10.1 mg/dL   CBC with platelets   Result Value Ref Range    WBC 8.6 4.0 - 11.0 10e9/L    RBC Count 2.63 (L) 4.4 - 5.9 10e12/L    Hemoglobin 7.9 (L) 13.3 - 17.7 g/dL    Hematocrit 24.0 (L) 40.0 - 53.0 %    MCV 91 78 - 100 fl    MCH 30.0 26.5 - 33.0 pg    MCHC 32.9 31.5 - 36.5 g/dL    RDW 15.5 (H) 10.0 - 15.0 %    Platelet Count 316 150 - 450 10e9/L         Edgardo Godfrey

## 2018-10-15 NOTE — PLAN OF CARE
Problem: Patient Care Overview  Goal: Plan of Care/Patient Progress Review  PT:  Orders received and chart reviewed. Patient admitted on 10/14/18 after a fall and resultant left hip hemiarthroplasty, posterior dislocation. Per chart review, patient awaiting orthotist consult for hip abduction brace, will hold PT evaluation this date until patient is fitted for brace.

## 2018-10-15 NOTE — PLAN OF CARE
"Problem: Patient Care Overview  Goal: Plan of Care/Patient Progress Review    Patient is alert and oriented X 4. VSS /61  Pulse 70  Temp 97.8  F (36.6  C) (Oral)  Resp 18  Ht 1.727 m (5' 8\")  Wt 72.6 kg (160 lb)  SpO2 95%  BMI 24.33 kg/m2 on RA. Admitted from ED -- post fall. Pain is controlled with dilaudid, and tylenol with sips of water. He is going to the OR at this time. His son Aleksandar was called and updated. Will cont to monitor.       "

## 2018-10-15 NOTE — CONSULTS
[facility]    Orthopedics Consultation    Date of Admission:  10/14/2018    Assessment & Plan   Abimael Flores is a 89 year old male who was admitted on 10/14/2018. I was asked to see the patient for left hip hemiarthroplasty posterior dislocation.    N.p.o.  Discussed both operative and nonoperative management the risk and benefits of each.  Patient thoughtfully acknowledges risks and wishes to proceed with left open versus closed reduction of the left hip.  To OR          Milton Gusman MD    Code Status    Full Code During Procedure    Reason for Consult   Reason for consult: I was asked by Dr. Kumar to evaluate this patient for left hip hemiarthroplasty dislocation.    Primary Care Physician   RAISSA HILL    History of Present Illness   Abimael Flores is a 89 year old male who was admitted on 10/14/2018. I was asked to see the patient for left hip hemiarthroplasty posterior dislocation.  Patient had a fall and had a right hip fracture treated with a bipolar hemiarthroplasty in August.  Fell again in October having a left hip hemiarthroplasty on October 4.  He tripped and fell at his TCU now having a posterior hip dislocation.  Denies any lightheadedness prior to the fall.  States he has had no issues with his wound.  Denies chest pain or shortness of breath..    MEDS:   No current outpatient prescriptions on file.       PAST MEDICAL HISTORY:   Past Medical History:   Diagnosis Date     Benign essential hypertension 9/4/2018     Coronary artery disease      Nonsenile cataract      Recent retinal detachment, total or subtotal 8/5/2014     Type 2 diabetes mellitus without complications (H)        PAST SURGICAL HISTORY:   Past Surgical History:   Procedure Laterality Date     CARDIAC SURGERY       CATARACT IOL, RT/LT Bilateral      lacrimal caruncle removed BE Bilateral ~1989     OPEN REDUCTION INTERNAL FIXATION HIP BIPOLAR Right 8/26/2018    Procedure: OPEN REDUCTION INTERNAL FIXATION  HIP BIPOLAR;  Right Hip Bipolar Hemiarthroplasty (Biomet);  Surgeon: Bill Sanders MD;  Location: [facility] OR     OPEN REDUCTION INTERNAL FIXATION HIP BIPOLAR Left 10/4/2018    Procedure: OPEN REDUCTION INTERNAL FIXATION HIP BIPOLAR;  LEFT HIP TONYA ARTHROPLASTY;  Surgeon: Bruno Stewart MD;  Location:  OR     REPAIR RUPTURED GLOBE  4/21/2014    Procedure: Exploration and Repair of Ruptured Globe ;  Surgeon: Mercedes Rivera MD;  Location: [facility] OR       FAMILY HISTORY:   Family History   Problem Relation Age of Onset     Diabetes Mother      Diabetes Father      Cancer No family hx of      Glaucoma No family hx of      Macular Degeneration No family hx of        SOCIAL HISTORY:   Social History   Substance Use Topics     Smoking status: Former Smoker     Smokeless tobacco: Never Used     Alcohol use No       ALLERGIES:  No Known Allergies    ROS:  10 point ROS neg other than the symptoms noted above in the HPI.      Physical Exam   Temp: 96.9  F (36.1  C) Temp src: Temporal BP: 117/62 Pulse: 70 Heart Rate: 74 Resp: 12 SpO2: 100 % O2 Device: Nasal cannula Oxygen Delivery: 4 LPM  Vital Signs with Ranges  Temp:  [96.9  F (36.1  C)-98.3  F (36.8  C)] 96.9  F (36.1  C)  Pulse:  [70-76] 70  Heart Rate:  [74-78] 74  Resp:  [12-18] 12  BP: (106-138)/(48-80) 117/62  SpO2:  [93 %-100 %] 100 %  160 lbs 0 oz    Constitutional: Pleasant, alert, appropriate, following commands.  HEENT: Head atraumatic normocephalic. Pupils equal round and reactive to light.  Respiratory: Unlabored breathing no audible wheeze  Cardiovascular: Regular rate and rhythm  GI: Abdomen soft nontender nondistended.  Skin: No rashes, no cyanosis, no edema.  Musculoskeletal: Right hip incision healed.  Left leg shortened and rotated.  5/5df/pf/ehl, SILT, palpable dp pulse, incision with staples in place c/d/i  Neurologic: normal without focal findings, mental status, speech normal, alert and oriented x iii, EMILY        Data   Results for orders  placed or performed during the hospital encounter of 10/14/18 (from the past 24 hour(s))   CBC with platelets + differential   Result Value Ref Range    WBC 10.8 4.0 - 11.0 10e9/L    RBC Count 2.94 (L) 4.4 - 5.9 10e12/L    Hemoglobin 8.6 (L) 13.3 - 17.7 g/dL    Hematocrit 26.7 (L) 40.0 - 53.0 %    MCV 91 78 - 100 fl    MCH 29.3 26.5 - 33.0 pg    MCHC 32.2 31.5 - 36.5 g/dL    RDW 15.5 (H) 10.0 - 15.0 %    Platelet Count 355 150 - 450 10e9/L    Diff Method Automated Method     % Neutrophils 70.0 %    % Lymphocytes 18.4 %    % Monocytes 8.5 %    % Eosinophils 2.4 %    % Basophils 0.2 %    % Immature Granulocytes 0.5 %    Nucleated RBCs 0 0 /100    Absolute Neutrophil 7.6 1.6 - 8.3 10e9/L    Absolute Lymphocytes 2.0 0.8 - 5.3 10e9/L    Absolute Monocytes 0.9 0.0 - 1.3 10e9/L    Absolute Eosinophils 0.3 0.0 - 0.7 10e9/L    Absolute Basophils 0.0 0.0 - 0.2 10e9/L    Abs Immature Granulocytes 0.1 0 - 0.4 10e9/L    Absolute Nucleated RBC 0.0    Basic metabolic panel   Result Value Ref Range    Sodium 135 133 - 144 mmol/L    Potassium 4.7 3.4 - 5.3 mmol/L    Chloride 102 94 - 109 mmol/L    Carbon Dioxide 25 20 - 32 mmol/L    Anion Gap 8 3 - 14 mmol/L    Glucose 245 (H) 70 - 99 mg/dL    Urea Nitrogen 41 (H) 7 - 30 mg/dL    Creatinine 2.14 (H) 0.66 - 1.25 mg/dL    GFR Estimate 29 (L) >60 mL/min/1.7m2    GFR Estimate If Black 35 (L) >60 mL/min/1.7m2    Calcium 7.7 (L) 8.5 - 10.1 mg/dL   INR   Result Value Ref Range    INR 1.08 0.86 - 1.14   XR Pelvis w Hip Left 1 View    Narrative    PELVIS WITH UNILATERAL HIP ONE VIEW LEFT  10/14/2018 1:23 PM     HISTORY: fall ?dislocation;     COMPARISON: None.    FINDINGS: There is a dislocation of the left hip arthroplasty. The  acetabular and femoral components are dislocated superiorly with  respect to the native acetabulum..      Impression    IMPRESSION: Dislocated left hip arthroplasty.    CEASAR BLEDSOE MD   Glucose by meter   Result Value Ref Range    Glucose 219 (H) 70 - 99 mg/dL

## 2018-10-15 NOTE — ANESTHESIA POSTPROCEDURE EVALUATION
Patient: Abimael Reynosoler    Procedure(s):  CLOSED REDUCTION HIP - Wound Class: I-Clean    Diagnosis:Closed vs Open reduction of left hip  Diagnosis Additional Information: No value filed.    Anesthesia Type:  General, ETT    Note:  Anesthesia Post Evaluation    Patient location during evaluation: PACU  Patient participation: Able to fully participate in evaluation  Level of consciousness: awake  Pain management: adequate  Airway patency: patent  Cardiovascular status: acceptable  Respiratory status: acceptable  Hydration status: acceptable  PONV: none     Anesthetic complications: None          Last vitals:  Vitals:    10/14/18 2135 10/14/18 2215 10/14/18 2245   BP: 112/56 106/60 121/57   Pulse: 82     Resp: 17 19 17   Temp: 36.3  C (97.3  F)     SpO2: 94% 94% 98%         Electronically Signed By: Sue Chaparro MD, MD  October 15, 2018  12:11 AM

## 2018-10-15 NOTE — TELEPHONE ENCOUNTER
[facility] TELEPHONE ENCOUNTER    Chief Complaint   Patient presents with     Fall       Abimael Flores is a 89 year old  (1/23/1924),Nurse called today to report: Fall    ASSESSMENT/PLAN  Patient with fall in the room, unable to get up in floor.  Patient has found previously in facility within the last couple of weeks refracturing his previously fractured hip.  Concern for refracture of refractured hip.    Sent to ED for further evaluation    WILLIAM Carrasco CNP

## 2018-10-15 NOTE — OR NURSING
X Ray complete- no need for further blood glucose per Dr Chaparro- report called to JENNIFER Rosen rm [id number]

## 2018-10-15 NOTE — PROGRESS NOTES
S:Pt. seen in [facility]. Male. Dr. Gusman. RX; Left Hip orthosis. 70 degree flexion stop, 20 degree abduction. DX: Hip dislocation.  O: Pt. has dislocated his right hip also.   A: orthosis was donned while sitting on the edge of the bed. Pt. stood with assistance of the nurse and a walker. Pt. is very unstable, he can not fully extend his knees and balances on his heels. Fit was appropriate. Pt. to have PT tomorrow. Donning doffing wear and care instructions given to Pt. and nurse. 70 degree hip flexion and 20 abduction stops set.  P: Pt. to be seen as needed.    This note has been electronically signed by VIDHYA Alcantar.

## 2018-10-16 ENCOUNTER — APPOINTMENT (OUTPATIENT)
Dept: PHYSICAL THERAPY | Facility: CLINIC | Age: 83
DRG: 560 | End: 2018-10-16
Attending: ORTHOPAEDIC SURGERY
Payer: MEDICARE

## 2018-10-16 LAB
GLUCOSE BLDC GLUCOMTR-MCNC: 135 MG/DL (ref 70–99)
GLUCOSE BLDC GLUCOMTR-MCNC: 140 MG/DL (ref 70–99)
GLUCOSE BLDC GLUCOMTR-MCNC: 176 MG/DL (ref 70–99)
GLUCOSE BLDC GLUCOMTR-MCNC: 177 MG/DL (ref 70–99)
GLUCOSE BLDC GLUCOMTR-MCNC: 219 MG/DL (ref 70–99)
GLUCOSE SERPL-MCNC: 129 MG/DL (ref 70–99)
HGB BLD-MCNC: 7.5 G/DL (ref 13.3–17.7)
HGB BLD-MCNC: 8.2 G/DL (ref 13.3–17.7)

## 2018-10-16 PROCEDURE — A9270 NON-COVERED ITEM OR SERVICE: HCPCS | Mod: GY | Performed by: STUDENT IN AN ORGANIZED HEALTH CARE EDUCATION/TRAINING PROGRAM

## 2018-10-16 PROCEDURE — 40000894 ZZH STATISTIC OT IP EVAL DEFER: Performed by: OCCUPATIONAL THERAPIST

## 2018-10-16 PROCEDURE — 82947 ASSAY GLUCOSE BLOOD QUANT: CPT | Performed by: ORTHOPAEDIC SURGERY

## 2018-10-16 PROCEDURE — 97161 PT EVAL LOW COMPLEX 20 MIN: CPT | Mod: GP | Performed by: PHYSICAL THERAPIST

## 2018-10-16 PROCEDURE — 99232 SBSQ HOSP IP/OBS MODERATE 35: CPT | Performed by: STUDENT IN AN ORGANIZED HEALTH CARE EDUCATION/TRAINING PROGRAM

## 2018-10-16 PROCEDURE — 12000007 ZZH R&B INTERMEDIATE

## 2018-10-16 PROCEDURE — 25000131 ZZH RX MED GY IP 250 OP 636 PS 637: Mod: GY | Performed by: STUDENT IN AN ORGANIZED HEALTH CARE EDUCATION/TRAINING PROGRAM

## 2018-10-16 PROCEDURE — 00000146 ZZHCL STATISTIC GLUCOSE BY METER IP

## 2018-10-16 PROCEDURE — 40000193 ZZH STATISTIC PT WARD VISIT: Performed by: PHYSICAL THERAPIST

## 2018-10-16 PROCEDURE — 97116 GAIT TRAINING THERAPY: CPT | Mod: GP | Performed by: PHYSICAL THERAPIST

## 2018-10-16 PROCEDURE — 85018 HEMOGLOBIN: CPT | Performed by: ORTHOPAEDIC SURGERY

## 2018-10-16 PROCEDURE — 25000132 ZZH RX MED GY IP 250 OP 250 PS 637: Mod: GY | Performed by: STUDENT IN AN ORGANIZED HEALTH CARE EDUCATION/TRAINING PROGRAM

## 2018-10-16 PROCEDURE — 97530 THERAPEUTIC ACTIVITIES: CPT | Mod: GP | Performed by: PHYSICAL THERAPIST

## 2018-10-16 PROCEDURE — A9270 NON-COVERED ITEM OR SERVICE: HCPCS | Mod: GY | Performed by: ORTHOPAEDIC SURGERY

## 2018-10-16 PROCEDURE — 36415 COLL VENOUS BLD VENIPUNCTURE: CPT | Performed by: ORTHOPAEDIC SURGERY

## 2018-10-16 PROCEDURE — 25000132 ZZH RX MED GY IP 250 OP 250 PS 637: Mod: GY | Performed by: ORTHOPAEDIC SURGERY

## 2018-10-16 RX ORDER — SENNOSIDES 8.6 MG
2 TABLET ORAL 2 TIMES DAILY PRN
Status: DISCONTINUED | OUTPATIENT
Start: 2018-10-16 | End: 2018-10-17 | Stop reason: HOSPADM

## 2018-10-16 RX ORDER — BISACODYL 10 MG
10 SUPPOSITORY, RECTAL RECTAL DAILY PRN
Status: DISCONTINUED | OUTPATIENT
Start: 2018-10-16 | End: 2018-10-17 | Stop reason: HOSPADM

## 2018-10-16 RX ORDER — ACETAMINOPHEN 325 MG/1
650 TABLET ORAL EVERY 4 HOURS PRN
Qty: 100 TABLET | Refills: 0 | Status: ON HOLD | OUTPATIENT
Start: 2018-10-16 | End: 2018-10-23

## 2018-10-16 RX ORDER — POLYETHYLENE GLYCOL 3350 17 G/17G
17 POWDER, FOR SOLUTION ORAL 2 TIMES DAILY PRN
Status: DISCONTINUED | OUTPATIENT
Start: 2018-10-16 | End: 2018-10-17 | Stop reason: HOSPADM

## 2018-10-16 RX ORDER — TRAMADOL HYDROCHLORIDE 50 MG/1
50 TABLET ORAL EVERY 6 HOURS
Qty: 50 TABLET | Refills: 0 | Status: ON HOLD | OUTPATIENT
Start: 2018-10-16 | End: 2018-10-23

## 2018-10-16 RX ORDER — AMOXICILLIN 250 MG
1 CAPSULE ORAL 2 TIMES DAILY
Qty: 60 TABLET | Refills: 1 | Status: SHIPPED | OUTPATIENT
Start: 2018-10-16 | End: 2019-01-05

## 2018-10-16 RX ORDER — OXYCODONE HYDROCHLORIDE 5 MG/1
5-10 TABLET ORAL EVERY 4 HOURS PRN
Qty: 20 TABLET | Refills: 0 | Status: ON HOLD | OUTPATIENT
Start: 2018-10-16 | End: 2018-10-22

## 2018-10-16 RX ADMIN — INSULIN ASPART 1 UNITS: 100 INJECTION, SOLUTION INTRAVENOUS; SUBCUTANEOUS at 04:39

## 2018-10-16 RX ADMIN — TAMSULOSIN HYDROCHLORIDE 0.4 MG: 0.4 CAPSULE ORAL at 20:49

## 2018-10-16 RX ADMIN — TRAMADOL HYDROCHLORIDE 50 MG: 50 TABLET, COATED ORAL at 20:49

## 2018-10-16 RX ADMIN — ACETAMINOPHEN 650 MG: 325 TABLET, FILM COATED ORAL at 12:29

## 2018-10-16 RX ADMIN — METOPROLOL SUCCINATE 50 MG: 50 TABLET, EXTENDED RELEASE ORAL at 09:08

## 2018-10-16 RX ADMIN — DILTIAZEM HYDROCHLORIDE 120 MG: 120 CAPSULE, EXTENDED RELEASE ORAL at 09:08

## 2018-10-16 RX ADMIN — TRAMADOL HYDROCHLORIDE 50 MG: 50 TABLET, COATED ORAL at 09:08

## 2018-10-16 RX ADMIN — INSULIN GLARGINE 5 UNITS: 100 INJECTION, SOLUTION SUBCUTANEOUS at 06:51

## 2018-10-16 RX ADMIN — ASPIRIN 325 MG: 325 TABLET, DELAYED RELEASE ORAL at 09:08

## 2018-10-16 RX ADMIN — POLYETHYLENE GLYCOL 3350 17 G: 17 POWDER, FOR SOLUTION ORAL at 15:40

## 2018-10-16 RX ADMIN — ATORVASTATIN CALCIUM 10 MG: 10 TABLET, FILM COATED ORAL at 20:49

## 2018-10-16 RX ADMIN — SENNOSIDES 2 TABLET: 8.6 TABLET, FILM COATED ORAL at 20:54

## 2018-10-16 ASSESSMENT — ACTIVITIES OF DAILY LIVING (ADL)
ADLS_ACUITY_SCORE: 11

## 2018-10-16 NOTE — PLAN OF CARE
Problem: Patient Care Overview  Goal: Plan of Care/Patient Progress Review  Discharge Planner PT   Patient plan for discharge: To go back to [facility]  Current status: Patient seen for initial eval and treatment initiated. Patient and his wife lived in an apartment until his first hip fracture 8/26/18. Per patient he has been in transitional care since then and his wife has moved to an AL. He fell and suffered a left hip fx at [facility] on 10/4 and on 10/14 suffered dislocation of left hip. Patient reports he had gotten to where he was amb to the dining room at [facility] after the first fx. Currently, patient needs max assist of 2 for sup to/from sit. He needed mod assist of 2 for sit to stand. Patient initially was refusing to put the abductor brace on because he said it made it too hard to keep his feet inside the walker. Therapist got a wide walker and he was agreeable to put the brace on. He needed max assist to don brace. Patient tolerated amb 20 feet in room with ww with mod assist to maintain balance and multiple cues for technique with the walker and for foot placement. Left in bed and brace removed.   Barriers to return to prior living situation: Currently needs max assist of 2 for bed mobility. Impaired endurance. Poor balance and at significant risk of falling again.   Recommendations for discharge: Back to TCU per [facility] plan  Rationale for recommendations: Patient currently far from independent and would benefit from continued skilled PT to maximize his independence.        Entered by: Lynnette Iyer 10/16/2018 1:24 PM

## 2018-10-16 NOTE — PLAN OF CARE
Problem: Patient Care Overview  Goal: Plan of Care/Patient Progress Review  Outcome: Improving  Pt A&O to self, unable to report place, situation and times. VSS on 1L O2. Dressing CDI. Repositioning w/ A1. Taking scheduled tramadol for pain. Frequent voiding via urinal. Continue to monitor.

## 2018-10-16 NOTE — PROGRESS NOTES
[facility] Orthopedic Progress Note  Abimael Flores is a 89 year old male    Today's Date:10/16/2018  Admission Date: 10/14/2018  Dislocation of left hip, initial encounter (H) [S73.005A]  S/p closed reduction of left bipolar hemiarthroplasty dislocation on 10/14  S/p Left bipolar hemiarthroplasty for femoral neck fracture on 10/4    Patient Seen.  Doing well.  Refusing ABduction brace.  Dressings are clean, dry, and intact.  Circulation, motor and sensory function, intact distally.        PLAN:  Staples out.    We discussed the risk of recurrent dislocation, fall risk.  [facility]      Temp:  [97.4  F (36.3  C)-98.1  F (36.7  C)] 97.8  F (36.6  C)  Heart Rate:  [56-79] 77  Resp:  [16] 16  BP: ()/(42-56) 119/52  SpO2:  [96 %-99 %] 96 %      Results for orders placed or performed during the hospital encounter of 10/14/18 (from the past 24 hour(s))   Glucose by meter   Result Value Ref Range    Glucose 204 (H) 70 - 99 mg/dL   Glucose by meter   Result Value Ref Range    Glucose 187 (H) 70 - 99 mg/dL   Glucose by meter   Result Value Ref Range    Glucose 189 (H) 70 - 99 mg/dL   Glucose by meter   Result Value Ref Range    Glucose 177 (H) 70 - 99 mg/dL   Glucose by meter   Result Value Ref Range    Glucose 140 (H) 70 - 99 mg/dL   Hemoglobin   Result Value Ref Range    Hemoglobin 7.5 (L) 13.3 - 17.7 g/dL   Glucose   Result Value Ref Range    Glucose 129 (H) 70 - 99 mg/dL         Edgardo Godfrey

## 2018-10-16 NOTE — PROGRESS NOTES
10/16/18 1145   Quick Adds   Type of Visit Initial PT Evaluation   Living Environment   Lives With facility resident   Living Arrangements assisted living   Home Accessibility no concerns   Number of Stairs to Enter Home 0   Number of Stairs Within Home 0   Stair Railings at Home none   Living Environment Comment Patient reports he now lives in AL.    Self-Care   Dominant Hand right   Usual Activity Tolerance good   Current Activity Tolerance poor   Regular Exercise no   Equipment Currently Used at Home walker, rolling   Activity/Exercise/Self-Care Comment Patient was independent prior to initial fall in August.    Functional Level Prior   Ambulation 0-->independent   Transferring 0-->independent   Toileting 0-->independent   Bathing 0-->independent   Dressing 0-->independent   Eating 0-->independent   Communication 0-->understands/communicates without difficulty   Swallowing 0-->swallows foods/liquids without difficulty   Cognition 0 - no cognition issues reported   Fall history within last six months yes   Number of times patient has fallen within last six months 4   Which of the above functional risks had a recent onset or change? ambulation;transferring;fall history   General Information   Onset of Illness/Injury or Date of Surgery - Date 10/14/18   Referring Physician Milton Gusman   Patient/Family Goals Statement To go back to [facility]   Pertinent History of Current Problem (include personal factors and/or comorbidities that impact the POC) POD # 11 Left bipolar hemiarthroplasty for femoral neck fracture.   Precautions/Limitations fall precautions   Weight-Bearing Status - LLE weight-bearing as tolerated   Cognitive Status Examination   Orientation orientation to person, place and time   Level of Consciousness alert   Follows Commands and Answers Questions 100% of the time   Personal Safety and Judgment impaired   Memory intact   Pain Assessment   Patient Currently in Pain Yes, see Vital Sign flowsheet  "  Posture    Posture Forward head position   Range of Motion (ROM)   ROM Comment Left hip limited by brace and hip precautions.    Strength   Strength Comments Generalized weakness and shakiness throughout.    Bed Mobility   Bed Mobility Comments Sup to sit with max assist of 2   Transfer Skills   Transfer Comments Sit to stand with mod assist of 2.    Gait   Gait Comments Gait 5 feet with mod assist and frequent cues for technique.    Balance   Balance Comments Initially needed assist for sitting balance but eventually able to maintain sitting balance. Static and dynamic standing balance poor. Shaky   Sensory Examination   Sensory Perception Comments no   Coordination   Coordination Comments LE coordination appears impaired during gait.    General Therapy Interventions   Planned Therapy Interventions bed mobility training;gait training;strengthening;transfer training   Clinical Impression   Criteria for Skilled Therapeutic Intervention yes, treatment indicated   PT Diagnosis Impaired functional independence   Influenced by the following impairments Impaired balance pain, impaired coordination.    Functional limitations due to impairments Needs assist for all functional mobilitiy   Clinical Presentation Evolving/Changing   Clinical Presentation Rationale Many preexisting factors.    Clinical Decision Making (Complexity) Moderate complexity   Therapy Frequency` daily   Predicted Duration of Therapy Intervention (days/wks) 3 days   Anticipated Equipment Needs at Discharge (To be determined by TCU. )   Anticipated Discharge Disposition Transitional Care Facility   Risk & Benefits of therapy have been explained Yes   Patient, Family & other staff in agreement with plan of care Yes   [facility] [employer] TM \"6 Clicks\"   2016, Trustees of [facility], under license to [employer].  All rights reserved.   6 Clicks Short Forms Basic Mobility Inpatient Short Form   [facility] [employer]  \"6 Clicks\" V.2 Basic " Mobility Inpatient Short Form   1. Turning from your back to your side while in a flat bed without using bedrails? 2 - A Lot   2. Moving from lying on your back to sitting on the side of a flat bed without using bedrails? 2 - A Lot   3. Moving to and from a bed to a chair (including a wheelchair)? 2 - A Lot   4. Standing up from a chair using your arms (e.g., wheelchair, or bedside chair)? 2 - A Lot   5. To walk in hospital room? 2 - A Lot   6. Climbing 3-5 steps with a railing? 1 - Total   Basic Mobility Raw Score (Score out of 24.Lower scores equate to lower levels of function) 11   Total Evaluation Time   Total Evaluation Time (Minutes) 12

## 2018-10-16 NOTE — PROVIDER NOTIFICATION
MD Notification    Notified Person: MD    Notified Person Name: Edgardo Godfrey    Notification Date/Time: 10/16 morning    Notification Interaction: In person    Purpose of Notification: Pt up, adamantly refuses to wear abduction brace. Discussed with patient that he should really be wearing brace. Refuses.

## 2018-10-16 NOTE — PLAN OF CARE
Problem: Patient Care Overview  Goal: Plan of Care/Patient Progress Review  Outcome: Improving  Pt alert, disoriented to time, situation at times. Up with heavy assist of 2. C/O constipation. Enema ordered, pt changed his mind and declined it. Obtained order for miralax, senna, bisacodyl suppository. Gave miralax per pt request. Minimal pain, scheduled tramadol and PRN tylenol given.  at lunch. Mod Carb diet. Hgb 7.5 this morning. Recheck scheduled for 1700 today. Refuses brace most of the time-did wear it when up with PT. Will continue to encourage use of brace. Pt did not feel ready for discharge today due to lack of bowel movement and significantly decreased mobility. Plans for discharge to TCU tomorrow.

## 2018-10-16 NOTE — PLAN OF CARE
Problem: Patient Care Overview  Goal: Plan of Care/Patient Progress Review  Discharge Planner OT   Patient plan for discharge: TCU today  Current status: order received and chart reviewed.  Per chart, patient to discharge to TCU setting, likely today.  Barriers to return to prior living situation: defer to PT  Recommendations for discharge: defer to PT  Rationale for recommendations: patient discharging to TCU setting.  Will defer OT evaluation to next level of care.  Will complete orders.       Entered by: JEANNIE HOFFMAN 10/16/2018 12:43 PM

## 2018-10-16 NOTE — PROGRESS NOTES
Hospitalist Progress Note    Date of Service: 10/16/2018         Assessment and Plan:          Mr. Abimael Flores is a 89-year-old  gentleman with medical history notable for hypertension, dyslipidemia, coronary artery disease, status post coronary artery bypass graft in 1996, diabetes mellitus type 2, chronic kidney disease stage III-IV, chronic anemia, benign prostatic hypertrophy, nonsustained V-tach and sinus exit block with left bundle branch block admitted to [facility] on10/14 following a mechanical fall with hip dislocation     Mechanical fall   Hip Dislocation  Assessment: Notably, he had a recent DELL on 10/04. The patient has presented with left hip pain following a mechanical fall as he stood up to go to the bathroom at [facility]. Hip XR showed dislocated left hip arthroplasty. Now s/p Left hip hemiarthroplasty closed reduction under anesthesia on 10/14 with no complications. Patient refused hip brace unfortunately. Still Requiring 2 person assist and feels very constipated.  Plan:  -  daily  - Pain control as needed (oxycodone, tramadol and IV dilaudid for breakthrough)  - Bowel regimen  - PT/SW  - Should be ready for discharge tomorrow     Normocytic Anemia  Assessment: Baseline hemoglobin around 9. ON admission Hgb stable at 8.6  Plan:  - Monitor hemoglobin levels periodically.      Severe vitamin D deficiency.  Assessment/Plan: on oral vitamin D supplements.      Subclinical hypothyroidism.  Assessment: Noted to be tachycardic, TSH 4.57.  T4 1.28.  Plan:  - Monitor thyroid function tests in 4-6 weeks.      Hypertension  Assessment: PTA on Cardizem 120 mg oral daily, metoprolol 50 mg oral daily.  Plan:  - Continue prior to admission Cardizem 120 mg oral daily, metoprolol 50 mg oral daily.      Dyslipidemia:   Assessment/Plan: Continue on prior to admission Lipitor.       Benign prostatic hypertrophy:   Assessment/Plan: Continue with prior to admission Flomax.        Chronic kidney disease, stage III-IV:    Assessment: the most recent creatinine levels have been around 1.8-1.9. On admission Cr 2.14, likely pre-renal in etiology  Plan:  - Monitor renal function periodically.  - Avoid nephrotoxic drugs.      Coronary artery disease:    Assessment: The patient is status post coronary artery bypass graft x 4 in 1996.    Plan:  - Continue on prior to admission metoprolol and Lipitor.   - Resume ASA      Atrial fibrillation with intermittent rate-related bundle  Supraventricular tachycardia, asymptomatic   Assessment: During recent hospitalization in August 2018 after a fall with right femoral neck fracture status post ORIF 8/26/18-noted to have arrhythmia. CHADS-VASc score is 4 for age, hypertension, and diabetes.  This puts him in the high risk group for a stroke cardiology was then consulted, patient and family declined anticoagulation given risk for falls.  Plan:  - Continue on Toprol-XL, Cardizem.  - Resume ASA      Diabetes mellitus type 2:  T  Assessment: the most recent hemoglobin A1c was 8.4% on 08/25/2018.  At home he is on Lantus 7 units subcu every morning and Humalog 2 units subq t.i.d.    Plan:  - Continue insulin regimen with lantus reduced to 5 U and sliding scale insulin available.    Active Diet Order      Moderate Consistent CHO Diet    DVT Prophylaxis: Pneumatic Compression Devices  Code Status: DNR/DNI    Disposition: Expected discharge 10/16 to [facility]    Attestation:   I have reviewed today's relevant vital signs, notes, medications, labs and imaging.I personally reviewed no images or EKG's today.    Silvano Kumar MD  [facility]  Text Page  (7am - 6pm)        Interval History:        Pain improved  More mobility in left hip, refused hip brace as it was too painful for him to move for it to be placed on  No new complaints aside from constipation         Physical Exam:        Physical Exam   Temp: 97.8  F (36.6  C) Temp src: Oral BP: 101/47  Pulse: 75 Heart Rate: 77 Resp: 14 SpO2: 95 % O2 Device: None (Room air) Oxygen Delivery: 1 LPM  Vitals:    10/14/18 1216   Weight: 72.6 kg (160 lb)     Vital Signs with Ranges  Temp:  [97.7  F (36.5  C)-98.1  F (36.7  C)] 97.8  F (36.6  C)  Pulse:  [75] 75  Heart Rate:  [56-79] 77  Resp:  [14-16] 14  BP: ()/(42-56) 101/47  SpO2:  [95 %-99 %] 95 %  I/O last 3 completed shifts:  In: 1801 [P.O.:100; I.V.:1701]  Out: 900 [Urine:900]    Constitutional:Awake, alert, cooperative, no apparent distress  Respiratory: Clear to auscultation bilaterally, no crackles or wheezing  Cardiovascular: Regular rate and rhythm, normal S1 and S2, and no murmur noted  GI: Normal bowel sounds, soft, non-distended, non-tender  Skin/Integumen: No rashes, no cyanosis, no edema, incision site dressing C/D/I on left hip  Other:normal mood and affect  Neuro: A/Ox3. Moving all extremities, speech fluent    # Pain Assessment:  Current Pain Score 10/16/2018   Patient currently in pain? -   Pain score (0-10) 4   Pain location -   Pain descriptors -   - Abimael is experiencing pain due to hip pain. Pain management was discussed and the plan was created in a collaborative fashion.  Abimael's response to the current recommendations: engaged  - Please see the plan for pain management as documented above         Data:     CBC RESULTS:    Recent Labs  Lab 10/16/18  0623 10/15/18  0715 10/14/18  1220 10/10/18  0700   WBC  --  8.6 10.8  --    RBC  --  2.63* 2.94*  --    HGB 7.5* 7.9* 8.6* 8.7*   HCT  --  24.0* 26.7*  --    PLT  --  316 355  --        BASIC METABOLIC PANEL:    Recent Labs  Lab 10/16/18  0623 10/15/18  0715 10/14/18  1220 10/10/18  0700   NA  --  136 135  --    POTASSIUM  --  4.9 4.7  --    CHLORIDE  --  105 102  --    CO2  --  26 25  --    BUN  --  31* 41*  --    CR  --  1.69* 2.14* 1.79*   * 127* 245*  --    JOSELINE  --  7.3* 7.7*  --        HEPATIC FUNCTION PANEL  No results for input(s): AST, ALT, GGT, ALKPHOS, BILITOTAL,  BILICONJ, BILIDIRECT, SOFIE in the last 168 hours.    Invalid input(s): BILIRUBININDIRECT  INR    Recent Labs  Lab 10/14/18  1220   INR 1.08       OTHER LABS  None    Medications       aspirin  325 mg Oral Daily     atorvastatin  10 mg Oral QPM     diltiazem  120 mg Oral Daily     influenza Vac Split High-Dose  0.5 mL Intramuscular Prior to discharge     insulin aspart  1-4 Units Subcutaneous TID w/meals     insulin glargine  5 Units Subcutaneous QAM AC     metoprolol succinate  50 mg Oral Daily     sodium chloride (PF)  3 mL Intracatheter Q8H     tamsulosin  0.4 mg Oral QPM     traMADol  50 mg Oral Q12H         No results found for this or any previous visit (from the past 24 hour(s)).

## 2018-10-17 ENCOUNTER — APPOINTMENT (OUTPATIENT)
Dept: PHYSICAL THERAPY | Facility: CLINIC | Age: 83
DRG: 560 | End: 2018-10-17
Payer: MEDICARE

## 2018-10-17 VITALS
HEIGHT: 68 IN | WEIGHT: 156.5 LBS | BODY MASS INDEX: 23.72 KG/M2 | OXYGEN SATURATION: 95 % | DIASTOLIC BLOOD PRESSURE: 65 MMHG | SYSTOLIC BLOOD PRESSURE: 129 MMHG | RESPIRATION RATE: 16 BRPM | HEART RATE: 75 BPM | TEMPERATURE: 97.6 F

## 2018-10-17 LAB
GLUCOSE BLDC GLUCOMTR-MCNC: 140 MG/DL (ref 70–99)
GLUCOSE BLDC GLUCOMTR-MCNC: 149 MG/DL (ref 70–99)
GLUCOSE BLDC GLUCOMTR-MCNC: 258 MG/DL (ref 70–99)

## 2018-10-17 PROCEDURE — A9270 NON-COVERED ITEM OR SERVICE: HCPCS | Mod: GY | Performed by: ORTHOPAEDIC SURGERY

## 2018-10-17 PROCEDURE — 97110 THERAPEUTIC EXERCISES: CPT | Mod: GP | Performed by: PHYSICAL THERAPIST

## 2018-10-17 PROCEDURE — 25000132 ZZH RX MED GY IP 250 OP 250 PS 637: Mod: GY | Performed by: ORTHOPAEDIC SURGERY

## 2018-10-17 PROCEDURE — 99239 HOSP IP/OBS DSCHRG MGMT >30: CPT | Performed by: INTERNAL MEDICINE

## 2018-10-17 PROCEDURE — 40000193 ZZH STATISTIC PT WARD VISIT: Performed by: PHYSICAL THERAPIST

## 2018-10-17 PROCEDURE — 25000132 ZZH RX MED GY IP 250 OP 250 PS 637: Mod: GY | Performed by: STUDENT IN AN ORGANIZED HEALTH CARE EDUCATION/TRAINING PROGRAM

## 2018-10-17 PROCEDURE — 25000131 ZZH RX MED GY IP 250 OP 636 PS 637: Mod: GY | Performed by: STUDENT IN AN ORGANIZED HEALTH CARE EDUCATION/TRAINING PROGRAM

## 2018-10-17 PROCEDURE — A9270 NON-COVERED ITEM OR SERVICE: HCPCS | Mod: GY | Performed by: STUDENT IN AN ORGANIZED HEALTH CARE EDUCATION/TRAINING PROGRAM

## 2018-10-17 PROCEDURE — 00000146 ZZHCL STATISTIC GLUCOSE BY METER IP

## 2018-10-17 RX ADMIN — ACETAMINOPHEN 650 MG: 325 TABLET, FILM COATED ORAL at 05:09

## 2018-10-17 RX ADMIN — ASPIRIN 325 MG: 325 TABLET, DELAYED RELEASE ORAL at 09:05

## 2018-10-17 RX ADMIN — DILTIAZEM HYDROCHLORIDE 120 MG: 120 CAPSULE, EXTENDED RELEASE ORAL at 09:05

## 2018-10-17 RX ADMIN — ACETAMINOPHEN 650 MG: 325 TABLET, FILM COATED ORAL at 09:05

## 2018-10-17 RX ADMIN — METOPROLOL SUCCINATE 50 MG: 50 TABLET, EXTENDED RELEASE ORAL at 09:05

## 2018-10-17 RX ADMIN — INSULIN GLARGINE 5 UNITS: 100 INJECTION, SOLUTION SUBCUTANEOUS at 06:51

## 2018-10-17 RX ADMIN — TRAMADOL HYDROCHLORIDE 50 MG: 50 TABLET, COATED ORAL at 09:05

## 2018-10-17 ASSESSMENT — ACTIVITIES OF DAILY LIVING (ADL)
ADLS_ACUITY_SCORE: 11
ADLS_ACUITY_SCORE: 11
ADLS_ACUITY_SCORE: 10
ADLS_ACUITY_SCORE: 11
ADLS_ACUITY_SCORE: 10

## 2018-10-17 NOTE — PROGRESS NOTES
Patient doing well s/p closed reduction of dislocated left hemiarthroplasty  Unfortunately refused abduction brace, discussed importance of hip precautions    Follow-up with Dr. Stewart in 10-14 days  Sally Clifton PAC

## 2018-10-17 NOTE — PLAN OF CARE
Problem: Patient Care Overview  Goal: Plan of Care/Patient Progress Review  Outcome: Improving  Pt disoriented to time, place, situation at times. VSS on RA. Taking scheduled tramadol and prn tylenol for pain. Small BM yesterday per pt report.  Incision site CDI. A1 to turn and reposition. Voiding in urinal. Continue to monitor.

## 2018-10-17 NOTE — PLAN OF CARE
Problem: Patient Care Overview  Goal: Plan of Care/Patient Progress Review  Discharge Planner PT   Patient plan for discharge: Return to TCU.  Current status: PT: Patient needs Joshua and vc for supine DELL exercises.  Patient declines to mobilize OOB until his sacral dressing is changed. Nursing notified.    Barriers to return to prior living situation: Postop pain, edema, weakness, and R hip dislocation risk.  Recommendations for discharge: Return to TCU.  Rationale for recommendations: Will continue to need skilled PT for recovery of functional mobility and safety for negotiation of chosen residence.     Entered by: Kendell Mueller 10/17/2018 9:53 AM       Physical Therapy Discharge Summary    Reason for therapy discharge:    Discharged to transitional care facility.    Progress towards therapy goal(s). See goals on Care Plan in [facility] record for goal details.  Goals not met.  Barriers to achieving goals:   limited tolerance for therapy.    Therapy recommendation(s):    Continued therapy is recommended.  Rationale/Recommendations:  Will continue to need skilled PT for recovery of functional mobility and safety for negotiation of chosen residence..  Continue home exercise program.

## 2018-10-17 NOTE — PROGRESS NOTES
MAULIK  D: Received update pt is ready for discharge today. Called and updated [facility] on discharge plan. [facility] transport aide can come and get pt at 1630. Faxed orders and scripts.     Albina JARA, LGSW

## 2018-10-17 NOTE — DISCHARGE SUMMARY
"Discharge Summary    Abimael Flores MRN# [id number]   YOB: 1924 Age: 89 year old     Date of Admission:  10/14/2018  Date of Discharge:  10/17/2018  Admitting Physician:  Silvano Kumar MD  Discharge Physician:  Terence Persaud MD  Discharging Service:  Hospitalist     Primary Provider: Carlitos Bee          Admission Diagnoses:   Dislocation of left hip, initial encounter (H) [S73.005A]          Discharge Diagnosis:   Patient Active Problem List   Diagnosis     Injury of globe of eye     Recent retinal detachment, total or subtotal     Closed right hip fracture (H)     S/P total hip arthroplasty     Type 2 diabetes mellitus with renal complication (H)     Cardiac arrhythmia, unspecified cardiac arrhythmia type     CKD (chronic kidney disease) stage 3, GFR 30-59 ml/min (H)     Benign essential hypertension     Coronary artery disease involving coronary bypass graft of native heart without angina pectoris     Other insomnia     Bladder spasms     Fracture of femoral neck, left (H)     Hip fracture, left (H)     Dislocation of hip (H)             Condition on Discharge:       Discharge vitals: Blood pressure 129/65, pulse 75, temperature 97.6  F (36.4  C), resp. rate 16, height 1.727 m (5' 8\"), weight 71 kg (156 lb 8 oz), SpO2 95 %.         Gen: Well nourished, elderly male, alert and oriented x 3, no acute distressed  HEENT: Atraumatic, normocephalic  Lungs: Clear to ausculation without wheezes, rhonchi, or rales  Heart: Regular rate and rhythm, no murmurs, gallops, or rubs  GI: Bowel sound normal, no hepatosplenomegaly or masses  Lymph: No lymphadenopathy or edema  Skin: No rashes    # Discharge Pain Plan:   - During his hospitalization, Abimael experienced pain due to hip pain.  The pain plan for discharge was discussed with Abimael and the plan was created in a collaborative fashion.    - Discharge with oxycodone, tramadol, and acetaminophen.            Procedures / Labs / Imaging:   Most " Recent 3 CBC's:  Recent Labs   Lab Test  10/16/18   1808  10/16/18   0623  10/15/18   0715  10/14/18   1220   10/09/18   0703   WBC   --    --   8.6  10.8   --   9.2   HGB  8.2*  7.5*  7.9*  8.6*   < >  8.2*   MCV   --    --   91  91   --   89   PLT   --    --   316  355   --   204    < > = values in this interval not displayed.      Most Recent 3 BMP's:  Recent Labs   Lab Test  10/16/18   0623  10/15/18   0715  10/14/18   1220  10/10/18   0700  10/09/18   0703   NA   --   136  135   --   139   POTASSIUM   --   4.9  4.7   --   4.0   CHLORIDE   --   105  102   --   105   CO2   --   26  25   --   24   BUN   --   31*  41*   --   40*   CR   --   1.69*  2.14*  1.79*  1.96*   ANIONGAP   --   5  8   --   10   JOSELINE   --   7.3*  7.7*   --   7.9*   GLC  129*  127*  245*   --   205*     Most Recent 3 Troponin's:  Recent Labs   Lab Test  10/10/18   0700  10/09/18   1302  10/09/18   0703   04/21/14   1656   TROPI  0.134*  0.143*  0.157*   < >   --    TROPONIN   --    --    --    --   0.03    < > = values in this interval not displayed.     Most Recent 3 INR's:  Recent Labs   Lab Test  10/14/18   1220  04/21/14   0405   INR  1.08  0.91     Most Recent 2 LFT's:  Recent Labs   Lab Test  10/09/18   0703   AST  22   ALT  12   ALKPHOS  72   BILITOTAL  0.6     Most Recent Cholesterol Panel:No lab results found.  Most Recent 6 Bacteria Isolates From Any Culture (See EPIC Reports for Culture Details):  Recent Labs   Lab Test  09/06/18   1050  08/27/18   2330   CULT  No growth  No growth     Most Recent TSH, T4 and HgbA1c:   Recent Labs   Lab Test  10/08/18   0535  08/25/18   1954   TSH  4.57*   --    T4  1.28   --    A1C   --   8.4*     Results for orders placed or performed during the hospital encounter of 10/14/18   XR Pelvis w Hip Left 1 View    Narrative    PELVIS WITH UNILATERAL HIP ONE VIEW LEFT  10/14/2018 1:23 PM     HISTORY: fall ?dislocation;     COMPARISON: None.    FINDINGS: There is a dislocation of the left hip arthroplasty.  The  acetabular and femoral components are dislocated superiorly with  respect to the native acetabulum..      Impression    IMPRESSION: Dislocated left hip arthroplasty.    CEASAR BLEDSOE MD   XR Surgery OSORIO Fluoro L/T 5 Min w Stills    Narrative    SURGERY C-ARM FLUORO LESS THAN 5 MIN W STILLS October 14, 2018 8:15 PM      HISTORY: Closed reduction left hip.     COMPARISON: 10/14/2018 at 1317 hours.    NUMBER OF IMAGES ACQUIRED: Two.    VIEWS: AP.    FLUOROSCOPY TIME: .1      Impression    IMPRESSION: Two intraoperative images of the left hip demonstrates the  dislocated acetabular component has been appropriately reduced.    HANNAH HOOKER MD   XR Pelvis w Hip Port Left 1 View    Narrative    PELVIS AND HIP PORTABLE LEFT ONE VIEW   10/14/2018 9:12 PM     HISTORY: Postop hip arthroplasty.     COMPARISON: Intraoperative images from 10/14/2018.    FINDINGS: Postoperative change of left total hip prosthesis placement  are demonstrated. Alignment is anatomic. No fractures are identified.  Expected postoperative swelling and staples noted.      Impression    IMPRESSION: Expected postoperative appearance following left total hip  arthroplasty.     NICOLA TAYLOR MD             Medications Prior to Admission:     Prescriptions Prior to Admission   Medication Sig Dispense Refill Last Dose     aspirin 325 MG EC tablet Take one Aspirin tab twice daily for 5 weeks. 70 tablet 0 10/14/2018 at 0800     atorvastatin (LIPITOR) 10 MG tablet Take 1 tablet (10 mg) by mouth every evening   10/13/2018 at 2000     diltiazem (TIAZAC) 120 MG 24 hr ER beaded capsule Take 120 mg by mouth daily   10/14/2018 at AM     insulin glargine (LANTUS) 100 UNIT/ML injection Inject 7 Units Subcutaneous every morning   10/14/2018 at AM     insulin lispro (HUMALOG KWIKPEN) 100 UNIT/ML injection Inject Subcutaneous At Bedtime Sliding scale:  -249=1 unit  -299=2 units  -349=3 units  -399=4 units  +=5 units   10/13/2018 at HS      insulin lispro (HUMALOG KWIKPEN) 100 UNIT/ML injection Inject 2 Units Subcutaneous 3 times daily (before meals)   10/14/2018 at 1130     insulin lispro (HUMALOG) 100 UNIT/ML injection Inject Subcutaneous 3 times daily (before meals) Sliding scale:   -189=1 unit  -239=2 units  -289=3 units  -339=4 units  -399=5 units  -499=6 units  +=7 units   10/14/2018 at 1130     metoprolol succinate (TOPROL-XL) 50 MG 24 hr tablet Take 1 tablet (50 mg) by mouth daily 30 tablet  10/14/2018     polyethylene glycol (MIRALAX/GLYCOLAX) Packet Take 17 g by mouth daily   10/14/2018     tamsulosin (FLOMAX) 0.4 MG capsule Take 0.4 mg by mouth every evening    10/13/2018 at 2000     Ergocalciferol (VITAMIN D) 24960 units CAPS Take 50,000 Units by mouth every 7 days for 5 doses (Patient taking differently: Take 50,000 Units by mouth every 7 days On Monday Start on 10/15/18) 5 capsule 0 not started yet     Skin Protectants, Misc. (ANIBAL PROTECT EX) Apply topically 2 times daily   Taking     [DISCONTINUED] acetaminophen (TYLENOL) 325 MG tablet Take 2 tablets (650 mg) by mouth every 4 hours as needed for other (multimodal surgical pain management along with NSAIDS and opioid medication as indicated based on pain control and physical function.) 100 tablet 0 10/12/2018 at PRN             Discharge Medications:     Current Discharge Medication List      START taking these medications    Details   oxyCODONE IR (ROXICODONE) 5 MG tablet Take 1-2 tablets (5-10 mg) by mouth every 4 hours as needed for severe pain  Qty: 20 tablet, Refills: 0    Associated Diagnoses: Dislocation of left hip, initial encounter (H)      senna-docusate (SENOKOT-S;PERICOLACE) 8.6-50 MG per tablet Take 1 tablet by mouth 2 times daily  Qty: 60 tablet, Refills: 1    Associated Diagnoses: Dislocation of left hip, initial encounter (H)         CONTINUE these medications which have CHANGED    Details   acetaminophen (TYLENOL) 325 MG  tablet Take 2 tablets (650 mg) by mouth every 4 hours as needed for other (multimodal surgical pain management along with NSAIDS and opioid medication as indicated based on pain control and physical function.)  Qty: 100 tablet, Refills: 0    Associated Diagnoses: Dislocation of left hip, initial encounter (H)      traMADol (ULTRAM) 50 MG tablet Take 1 tablet (50 mg) by mouth every 6 hours  Qty: 50 tablet, Refills: 0    Associated Diagnoses: Closed fracture of neck of left femur with routine healing, subsequent encounter         CONTINUE these medications which have NOT CHANGED    Details   aspirin 325 MG EC tablet Take one Aspirin tab twice daily for 5 weeks.  Qty: 70 tablet, Refills: 0    Associated Diagnoses: Closed fracture of neck of right femur, initial encounter (H)      atorvastatin (LIPITOR) 10 MG tablet Take 1 tablet (10 mg) by mouth every evening    Associated Diagnoses: Coronary artery disease involving native heart without angina pectoris, unspecified vessel or lesion type      diltiazem (TIAZAC) 120 MG 24 hr ER beaded capsule Take 120 mg by mouth daily      insulin glargine (LANTUS) 100 UNIT/ML injection Inject 7 Units Subcutaneous every morning      !! insulin lispro (HUMALOG KWIKPEN) 100 UNIT/ML injection Inject Subcutaneous At Bedtime Sliding scale:  -249=1 unit  -299=2 units  -349=3 units  -399=4 units  +=5 units      !! insulin lispro (HUMALOG KWIKPEN) 100 UNIT/ML injection Inject 2 Units Subcutaneous 3 times daily (before meals)    Associated Diagnoses: Type 2 diabetes mellitus with complication, with long-term current use of insulin (H)      insulin lispro (HUMALOG) 100 UNIT/ML injection Inject Subcutaneous 3 times daily (before meals) Sliding scale:   -189=1 unit  -239=2 units  -289=3 units  -339=4 units  -399=5 units  -499=6 units  +=7 units      metoprolol succinate (TOPROL-XL) 50 MG 24 hr tablet Take 1 tablet (50 mg) by  mouth daily  Qty: 30 tablet    Associated Diagnoses: Paroxysmal supraventricular tachycardia (H)      polyethylene glycol (MIRALAX/GLYCOLAX) Packet Take 17 g by mouth daily      tamsulosin (FLOMAX) 0.4 MG capsule Take 0.4 mg by mouth every evening       Ergocalciferol (VITAMIN D) 47641 units CAPS Take 50,000 Units by mouth every 7 days for 5 doses  Qty: 5 capsule, Refills: 0    Associated Diagnoses: Fracture of hip, left, closed, initial encounter (H)      Skin Protectants, Misc. (ANIBAL PROTECT EX) Apply topically 2 times daily       !! - Potential duplicate medications found. Please discuss with provider.                Brief History of Illness:   Abimael Flores is a 89 year old male who was admitted for hip dislocation.          Hospital Course:   This is a 89-year-old male was admitted after mechanical fall found to have a left hip dislocation.  Patient was admitted overnight.  Orthopedic surgery consulted on the patient in the morning.  He was taken to the operating room and the dislocation was reduced under general anesthesia.  Postoperatively patient refused abduction brace.  The patient was discharged to [facility] with orthopedic follow-up in 10-14 days.    Greater than 35 minutes were spent in discharge planning.             Pending Results:   Unresulted Labs Ordered in the Past 30 Days of this Admission     Date and Time Order Name Status Description    10/8/2018 0535 T4 free In process

## 2018-10-17 NOTE — PLAN OF CARE
Problem: Patient Care Overview  Goal: Plan of Care/Patient Progress Review  Outcome: Improving  Pt up with 2/ww. Refused abduction brace. Denies pain. BM today. Discharge back to [facility], pt given a copy of his discharge AVS.

## 2018-10-18 ENCOUNTER — NURSING HOME VISIT (OUTPATIENT)
Dept: GERIATRICS | Facility: CLINIC | Age: 83
End: 2018-10-18
Payer: MEDICARE

## 2018-10-18 VITALS
OXYGEN SATURATION: 96 % | SYSTOLIC BLOOD PRESSURE: 102 MMHG | BODY MASS INDEX: 24.6 KG/M2 | WEIGHT: 161.8 LBS | HEART RATE: 103 BPM | TEMPERATURE: 97.9 F | DIASTOLIC BLOOD PRESSURE: 57 MMHG | RESPIRATION RATE: 18 BRPM

## 2018-10-18 DIAGNOSIS — I49.9 CARDIAC ARRHYTHMIA, UNSPECIFIED CARDIAC ARRHYTHMIA TYPE: ICD-10-CM

## 2018-10-18 DIAGNOSIS — R53.81 PHYSICAL DECONDITIONING: ICD-10-CM

## 2018-10-18 DIAGNOSIS — I10 BENIGN ESSENTIAL HYPERTENSION: ICD-10-CM

## 2018-10-18 DIAGNOSIS — S72.002G CLOSED FRACTURE OF LEFT HIP WITH DELAYED HEALING, SUBSEQUENT ENCOUNTER: ICD-10-CM

## 2018-10-18 DIAGNOSIS — S73.005D DISLOCATION OF LEFT HIP, SUBSEQUENT ENCOUNTER: Primary | ICD-10-CM

## 2018-10-18 DIAGNOSIS — E11.22 TYPE 2 DIABETES MELLITUS WITH STAGE 3 CHRONIC KIDNEY DISEASE, WITH LONG-TERM CURRENT USE OF INSULIN (H): ICD-10-CM

## 2018-10-18 DIAGNOSIS — Z79.4 TYPE 2 DIABETES MELLITUS WITH STAGE 3 CHRONIC KIDNEY DISEASE, WITH LONG-TERM CURRENT USE OF INSULIN (H): ICD-10-CM

## 2018-10-18 DIAGNOSIS — I25.810 CORONARY ARTERY DISEASE INVOLVING CORONARY BYPASS GRAFT OF NATIVE HEART WITHOUT ANGINA PECTORIS: ICD-10-CM

## 2018-10-18 DIAGNOSIS — N18.30 CKD (CHRONIC KIDNEY DISEASE) STAGE 3, GFR 30-59 ML/MIN (H): ICD-10-CM

## 2018-10-18 DIAGNOSIS — N18.30 TYPE 2 DIABETES MELLITUS WITH STAGE 3 CHRONIC KIDNEY DISEASE, WITH LONG-TERM CURRENT USE OF INSULIN (H): ICD-10-CM

## 2018-10-18 DIAGNOSIS — D62 ANEMIA DUE TO BLOOD LOSS, ACUTE: ICD-10-CM

## 2018-10-18 PROCEDURE — 99309 SBSQ NF CARE MODERATE MDM 30: CPT | Performed by: NURSE PRACTITIONER

## 2018-10-18 NOTE — PROGRESS NOTES
[place] [facility]  PRIMARY CARE PROVIDER AND CLINIC:  Carlitos Bee [facility] [address]  / [place] *  Chief Complaint   Patient presents with     RECHECK     [facility] Medical Record Number:  [id number]  Place of Service where encounter took place:  [facility] [[id number]]    HPI:    Abimael Flores is a 89 year old  (1/23/1924),admitted to the above facility from  [facility].  Hospital stay 10/14/2018 through 10/17/2018.  Admitted to this facility for  rehab, medical management and nursing care.  HPI information obtained from: facility chart records, facility staff, patient report and [facility] chart review.  Current issues are:      Dislocation of left hip, subsequent encounter  Patient went back to ED on 10/14 after falling in TCU. He suffered a left hip dislocation. Dislocation was reduced under general anesthesia.. He returned to TCU to continue therapy for repair of left femur fracture.   He now understands the importance of asking for help.      Closed fracture of neck of left femur with routine healing, subsequent encounter  Patient transferred back to TCU following repair of left hip fracture suffered from a fall at TCU where he was recovering from right hip fracture.   Surgery on 10/4 was uneventful. Post op issues included ABLA, tachycardia and mild delirium. Vit D added to meds  Activity is WBAT. He is taking tramadol every 6 h for pain. DVT prophylaxis is ASA for 5 weeks.      Coronary artery disease involving coronary bypass graft of native heart without angina pectoris  Cardiac arrhythmia, unspecified cardiac arrhythmia type  Patient has h/o CABG X4 in '96. He also has h/o Patient possible AF on ECG. He was taking atenolol PTA.. 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 CHADS-VASc score is 4.   Post operatively from right hip fracture repair patient did have episodes of tachycardia (RRT called  twice due to low BP and tachycardia). He was started on metoprolol (replacing atenolol) and diltiazem. He was asymptomatic at time of tachycardia. Simvastatin was changed to atorvastatin as well, due to drug interactions.   He had another bout of tachycardia following this most recent surgery.   HR today is around 80. No reports of chest pain or light headedness.   ABLA- hgb dropped from 9 to 6.7 post op. He was transfused 1 unit(s) PRBC on 10/5    Benign essential hypertension  No change to BPS meds   BP's: 102/57, 135/69, 121/63     CKD (chronic kidney disease) stage 3, GFR 30-59 ml/min (H)  Baseline creat 1.8- 1.9        Creatinine   Date Value Ref Range Status   10/10/2018 1.79 (H) 0.66 - 1.25 mg/dL Final         Type 2 diabetes mellitus with stage 3 chronic kidney disease, with long-term current use of insulin (H)  Last BG Levels:    AM: 163     Dinner: 259    HS: 315   He continues on PTA lantus.      Physical deconditioning  Lives with wife in [facility]. Wife has moved to memory care. At baseline he walks with 4WW. He understands he needs more therapy before returning home.          CODE STATUS/ADVANCE DIRECTIVES DISCUSSION:   DNR / DNI  Patient's living condition: lives in an assisted living facility    ALLERGIES:Review of patient's allergies indicates no known allergies.  PAST MEDICAL HISTORY:  has a past medical history of Benign essential hypertension (9/4/2018); Coronary artery disease; Nonsenile cataract; Recent retinal detachment, total or subtotal (8/5/2014); and Type 2 diabetes mellitus without complications (H). He also has no past medical history of Diabetic retinopathy (H); Glaucoma; or Macular degeneration.  PAST SURGICAL HISTORY:  has a past surgical history that includes Cardiac surgery; Repair ruptured globe (4/21/2014); cataract iol, rt/lt (Bilateral); lacrimal caruncle removed BE (Bilateral, ~1989); Open reduction internal fixation hip bipolar (Right, 8/26/2018); Open reduction internal fixation  hip bipolar (Left, 10/4/2018); and Closed reduction hip (Left, 10/14/2018).  FAMILY HISTORY: family history includes Diabetes in his father and mother. There is no history of Cancer, Glaucoma, or Macular Degeneration.  SOCIAL HISTORY:  reports that he has quit smoking. He has never used smokeless tobacco. He reports that he does not drink alcohol or use illicit drugs.    Post Discharge Medication Reconciliation Status: discharge medications reconciled, continue medications without change.  Current Outpatient Prescriptions   Medication Sig Dispense Refill     acetaminophen (TYLENOL) 325 MG tablet Take 2 tablets (650 mg) by mouth every 4 hours as needed for other (multimodal surgical pain management along with NSAIDS and opioid medication as indicated based on pain control and physical function.) 100 tablet 0     aspirin 325 MG EC tablet Take one Aspirin tab twice daily for 5 weeks. 70 tablet 0     atorvastatin (LIPITOR) 10 MG tablet Take 1 tablet (10 mg) by mouth every evening       diltiazem (TIAZAC) 120 MG 24 hr ER beaded capsule Take 120 mg by mouth daily       Ergocalciferol (VITAMIN D) 82022 units CAPS Take 50,000 Units by mouth every 7 days for 5 doses 5 capsule 0     insulin glargine (LANTUS) 100 UNIT/ML injection Inject 7 Units Subcutaneous every morning       insulin lispro (HUMALOG KWIKPEN) 100 UNIT/ML injection Inject Subcutaneous At Bedtime Sliding scale:  -249=1 unit  -299=2 units  -349=3 units  -399=4 units  +=5 units       insulin lispro (HUMALOG KWIKPEN) 100 UNIT/ML injection Inject 2 Units Subcutaneous 3 times daily (before meals)       insulin lispro (HUMALOG) 100 UNIT/ML injection Inject Subcutaneous 3 times daily (before meals) Sliding scale:   -189=1 unit  -239=2 units  -289=3 units  -339=4 units  -399=5 units  -499=6 units  +=7 units       metoprolol succinate (TOPROL-XL) 50 MG 24 hr tablet Take 1 tablet (50 mg) by mouth  daily 30 tablet      oxyCODONE IR (ROXICODONE) 5 MG tablet Take 1-2 tablets (5-10 mg) by mouth every 4 hours as needed for severe pain 20 tablet 0     polyethylene glycol (MIRALAX/GLYCOLAX) Packet Take 17 g by mouth daily       senna-docusate (SENOKOT-S;PERICOLACE) 8.6-50 MG per tablet Take 1 tablet by mouth 2 times daily 60 tablet 1     Skin Protectants, Misc. (ANIBAL PROTECT EX) Apply topically 2 times daily       tamsulosin (FLOMAX) 0.4 MG capsule Take 0.4 mg by mouth every evening        traMADol (ULTRAM) 50 MG tablet Take 1 tablet (50 mg) by mouth every 6 hours 50 tablet 0       ROS:  4 point ROS including Respiratory, CV, GI and , other than that noted in the HPI,  is negative    Exam:  /57  Pulse 103  Temp 97.9  F (36.6  C)  Resp 18  Wt 161 lb 12.8 oz (73.4 kg)  SpO2 96%  BMI 24.6 kg/m2  GENERAL APPEARANCE:  Alert, in no distress  ENT:  Mouth and posterior oropharynx normal, moist mucous membranes, hearing acuity adequate   EYES:  EOM, conjunctivae, lids, pupils and irises normal  NECK:  No adenopathy,masses or thyromegaly  RESP:  respiratory effort and palpation of chest normal, no respiratory distress, Lung sounds rales in RLL  CV:  Palpation and auscultation of heart done , rate and rhythm reg, no murmur, no rub or gallop, Edema none  ABDOMEN:  normal bowel sounds, soft, nontender, no hepatosplenomegaly or other masses  M/S:   Gait and station antalgic, Digits and nails normal   SKIN:  Inspection/Palpation of skin and subcutaneous tissue no rash  NEURO: 2-12 in normal limits and at patient's baseline  PSYCH:  insight and judgement, memory mild cognitive impairment  , affect and mood normal      Lab/Diagnostic data:  CBC RESULTS:   Recent Labs   Lab Test  10/16/18   1808  10/16/18   0623  10/15/18   0715  10/14/18   1220   WBC   --    --   8.6  10.8   RBC   --    --   2.63*  2.94*   HGB  8.2*  7.5*  7.9*  8.6*   HCT   --    --   24.0*  26.7*   MCV   --    --   91  91   MCH   --    --   30.0   29.3   MCHC   --    --   32.9  32.2   RDW   --    --   15.5*  15.5*   PLT   --    --   316  355       Last Basic Metabolic Panel:  Recent Labs   Lab Test  10/16/18   0623  10/15/18   0715  10/14/18   1220   NA   --   136  135   POTASSIUM   --   4.9  4.7   CHLORIDE   --   105  102   JOSELINE   --   7.3*  7.7*   CO2   --   26  25   BUN   --   31*  41*   CR   --   1.69*  2.14*   GLC  129*  127*  245*       Liver Function Studies -   Recent Labs   Lab Test  10/09/18   0703   PROTTOTAL  6.3*   ALBUMIN  2.1*   BILITOTAL  0.6   ALKPHOS  72   AST  22   ALT  12       TSH   Date Value Ref Range Status   10/08/2018 4.57 (H) 0.40 - 4.00 mU/L Final   04/02/2003 2.08 0.4 - 5.0 mU/L Final     Lab Results   Component Value Date    A1C 8.4 08/25/2018    A1C 7.8 04/22/2014       ASSESSMENT/PLAN:  (S73.005D) Dislocation of left hip, subsequent encounter  (primary encounter diagnosis)  Comment: back in TCU after brief hospitalization due to hip dislocation due to fall in TCU.   Plan: physical therapy     (S72.002G) Closed fracture of left hip with delayed healing, subsequent encounter  Comment: recent repair of left hip fracture that occurred about one month after right hip fracture.  Plan: traMADol (ULTRAM) 50 MG tablet QID, DISCONTINUE oxycodone        physical therapy     (I25.810) Coronary artery disease involving coronary bypass graft of native heart without angina pectoris  (I49.9) Cardiac arrhythmia, unspecified cardiac arrhythmia type  Comment: episode of tachycardia post op. Resolved. He was started on metop and dilt following right hip fracture due to similar episode  Plan: monitor HR and BP    (D62) Anemia due to blood loss, acute  Comment: required 1u PRBC due to hgb dorp to 6.7 following repair of left hip fracture. hgb stable in 8s  Plan: CBC 10/22    (I10) Benign essential hypertension  Comment: adequate control  Plan: no change    (N18.3) CKD (chronic kidney disease) stage 3, GFR 30-59 ml/min (H)  Comment: at baseline    Plan: avoid nephrotoxins. BMP next week.     (E11.22,  N18.3,  Z79.4) Type 2 diabetes mellitus with stage 3 chronic kidney disease, with long-term current use of insulin (H)  Comment: elevated BGs  Plan: sliding scale insuline    (R53.81) Physical deconditioning  Comment: due to hip fracture and now dislocation. Goal is to return to [facility]  Plan: physical therapy and OCCUPATIONAL THERAPY           Electronically signed by:  WILLIAM Cota CNP

## 2018-10-18 NOTE — LETTER
10/18/2018        RE: Abimael Flores  [address]  [facility] [address]        [place]  PRIMARY CARE PROVIDER AND CLINIC:  [place], [place] [facility] [address] / [place] *  Chief Complaint   Patient presents with     RECHECK     [place] Medical Record Number:  [id number]  Place of Service where encounter took place:  [facility] [[id number]]    HPI:    Abimael Flores is a 89 year old  (1/23/1924),admitted to the above facility from  [facility].  Hospital stay 10/14/2018 through 10/17/2018.  Admitted to this facility for  rehab, medical management and nursing care.  HPI information obtained from: facility chart records, facility staff, patient report and [facility] chart review.  Current issues are:      Dislocation of left hip, subsequent encounter  Patient went back to ED on 10/14 after falling in TCU. He suffered a left hip dislocation. Dislocation was reduced under general anesthesia.. He returned to TCU to continue therapy for repair of left femur fracture.   He now understands the importance of asking for help.      Closed fracture of neck of left femur with routine healing, subsequent encounter  Patient transferred back to TCU following repair of left hip fracture suffered from a fall at TCU where he was recovering from right hip fracture.   Surgery on 10/4 was uneventful. Post op issues included ABLA, tachycardia and mild delirium. Vit D added to meds  Activity is WBAT. He is taking tramadol every 6 h for pain. DVT prophylaxis is ASA for 5 weeks.      Coronary artery disease involving coronary bypass graft of native heart without angina pectoris  Cardiac arrhythmia, unspecified cardiac arrhythmia type  Patient has h/o CABG X4 in '96. He also has h/o Patient possible AF on ECG. He was taking atenolol PTA.. 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 CHADS-VASc score is 4.   Post  operatively from right hip fracture repair patient did have episodes of tachycardia (RRT called twice due to low BP and tachycardia). He was started on metoprolol (replacing atenolol) and diltiazem. He was asymptomatic at time of tachycardia. Simvastatin was changed to atorvastatin as well, due to drug interactions.   He had another bout of tachycardia following this most recent surgery.   HR today is around 80. No reports of chest pain or light headedness.   ABLA- hgb dropped from 9 to 6.7 post op. He was transfused 1 unit(s) PRBC on 10/5    Benign essential hypertension  No change to BPS meds   BP's: 102/57, 135/69, 121/63     CKD (chronic kidney disease) stage 3, GFR 30-59 ml/min (H)  Baseline creat 1.8- 1.9        Creatinine   Date Value Ref Range Status   10/10/2018 1.79 (H) 0.66 - 1.25 mg/dL Final         Type 2 diabetes mellitus with stage 3 chronic kidney disease, with long-term current use of insulin (H)  Last BG Levels:    AM: 163     Dinner: 259    HS: 315   He continues on PTA lantus.      Physical deconditioning  Lives with wife in [facility]. Wife has moved to memory care. At baseline he walks with 4WW. He understands he needs more therapy before returning home.          CODE STATUS/ADVANCE DIRECTIVES DISCUSSION:   DNR / DNI  Patient's living condition: lives in an assisted living facility    ALLERGIES:Review of patient's allergies indicates no known allergies.  PAST MEDICAL HISTORY:  has a past medical history of Benign essential hypertension (9/4/2018); Coronary artery disease; Nonsenile cataract; Recent retinal detachment, total or subtotal (8/5/2014); and Type 2 diabetes mellitus without complications (H). He also has no past medical history of Diabetic retinopathy (H); Glaucoma; or Macular degeneration.  PAST SURGICAL HISTORY:  has a past surgical history that includes Cardiac surgery; Repair ruptured globe (4/21/2014); cataract iol, rt/lt (Bilateral); lacrimal caruncle removed BE (Bilateral, ~1989);  Open reduction internal fixation hip bipolar (Right, 8/26/2018); Open reduction internal fixation hip bipolar (Left, 10/4/2018); and Closed reduction hip (Left, 10/14/2018).  FAMILY HISTORY: family history includes Diabetes in his father and mother. There is no history of Cancer, Glaucoma, or Macular Degeneration.  SOCIAL HISTORY:  reports that he has quit smoking. He has never used smokeless tobacco. He reports that he does not drink alcohol or use illicit drugs.    Post Discharge Medication Reconciliation Status: discharge medications reconciled, continue medications without change.  Current Outpatient Prescriptions   Medication Sig Dispense Refill     acetaminophen (TYLENOL) 325 MG tablet Take 2 tablets (650 mg) by mouth every 4 hours as needed for other (multimodal surgical pain management along with NSAIDS and opioid medication as indicated based on pain control and physical function.) 100 tablet 0     aspirin 325 MG EC tablet Take one Aspirin tab twice daily for 5 weeks. 70 tablet 0     atorvastatin (LIPITOR) 10 MG tablet Take 1 tablet (10 mg) by mouth every evening       diltiazem (TIAZAC) 120 MG 24 hr ER beaded capsule Take 120 mg by mouth daily       Ergocalciferol (VITAMIN D) 99408 units CAPS Take 50,000 Units by mouth every 7 days for 5 doses 5 capsule 0     insulin glargine (LANTUS) 100 UNIT/ML injection Inject 7 Units Subcutaneous every morning       insulin lispro (HUMALOG KWIKPEN) 100 UNIT/ML injection Inject Subcutaneous At Bedtime Sliding scale:  -249=1 unit  -299=2 units  -349=3 units  -399=4 units  +=5 units       insulin lispro (HUMALOG KWIKPEN) 100 UNIT/ML injection Inject 2 Units Subcutaneous 3 times daily (before meals)       insulin lispro (HUMALOG) 100 UNIT/ML injection Inject Subcutaneous 3 times daily (before meals) Sliding scale:   -189=1 unit  -239=2 units  -289=3 units  -339=4 units  -399=5 units  -499=6 units  +=7  units       metoprolol succinate (TOPROL-XL) 50 MG 24 hr tablet Take 1 tablet (50 mg) by mouth daily 30 tablet      oxyCODONE IR (ROXICODONE) 5 MG tablet Take 1-2 tablets (5-10 mg) by mouth every 4 hours as needed for severe pain 20 tablet 0     polyethylene glycol (MIRALAX/GLYCOLAX) Packet Take 17 g by mouth daily       senna-docusate (SENOKOT-S;PERICOLACE) 8.6-50 MG per tablet Take 1 tablet by mouth 2 times daily 60 tablet 1     Skin Protectants, Misc. (ANIBAL PROTECT EX) Apply topically 2 times daily       tamsulosin (FLOMAX) 0.4 MG capsule Take 0.4 mg by mouth every evening        traMADol (ULTRAM) 50 MG tablet Take 1 tablet (50 mg) by mouth every 6 hours 50 tablet 0       ROS:  4 point ROS including Respiratory, CV, GI and , other than that noted in the HPI,  is negative    Exam:  /57  Pulse 103  Temp 97.9  F (36.6  C)  Resp 18  Wt 161 lb 12.8 oz (73.4 kg)  SpO2 96%  BMI 24.6 kg/m2  GENERAL APPEARANCE:  Alert, in no distress  ENT:  Mouth and posterior oropharynx normal, moist mucous membranes, hearing acuity adequate   EYES:  EOM, conjunctivae, lids, pupils and irises normal  NECK:  No adenopathy,masses or thyromegaly  RESP:  respiratory effort and palpation of chest normal, no respiratory distress, Lung sounds rales in RLL  CV:  Palpation and auscultation of heart done , rate and rhythm reg, no murmur, no rub or gallop, Edema none  ABDOMEN:  normal bowel sounds, soft, nontender, no hepatosplenomegaly or other masses  M/S:   Gait and station antalgic, Digits and nails normal   SKIN:  Inspection/Palpation of skin and subcutaneous tissue no rash  NEURO: 2-12 in normal limits and at patient's baseline  PSYCH:  insight and judgement, memory mild cognitive impairment  , affect and mood normal      Lab/Diagnostic data:  CBC RESULTS:   Recent Labs   Lab Test  10/16/18   1808  10/16/18   0623  10/15/18   0715  10/14/18   1220   WBC   --    --   8.6  10.8   RBC   --    --   2.63*  2.94*   HGB  8.2*  7.5*   7.9*  8.6*   HCT   --    --   24.0*  26.7*   MCV   --    --   91  91   MCH   --    --   30.0  29.3   MCHC   --    --   32.9  32.2   RDW   --    --   15.5*  15.5*   PLT   --    --   316  355       Last Basic Metabolic Panel:  Recent Labs   Lab Test  10/16/18   0623  10/15/18   0715  10/14/18   1220   NA   --   136  135   POTASSIUM   --   4.9  4.7   CHLORIDE   --   105  102   JOSELINE   --   7.3*  7.7*   CO2   --   26  25   BUN   --   31*  41*   CR   --   1.69*  2.14*   GLC  129*  127*  245*       Liver Function Studies -   Recent Labs   Lab Test  10/09/18   0703   PROTTOTAL  6.3*   ALBUMIN  2.1*   BILITOTAL  0.6   ALKPHOS  72   AST  22   ALT  12       TSH   Date Value Ref Range Status   10/08/2018 4.57 (H) 0.40 - 4.00 mU/L Final   04/02/2003 2.08 0.4 - 5.0 mU/L Final     Lab Results   Component Value Date    A1C 8.4 08/25/2018    A1C 7.8 04/22/2014       ASSESSMENT/PLAN:  (S73.005D) Dislocation of left hip, subsequent encounter  (primary encounter diagnosis)  Comment: back in TCU after brief hospitalization due to hip dislocation due to fall in TCU.   Plan: physical therapy     (S72.002G) Closed fracture of left hip with delayed healing, subsequent encounter  Comment: recent repair of left hip fracture that occurred about one month after right hip fracture.  Plan: traMADol (ULTRAM) 50 MG tablet QID, DISCONTINUE oxycodone        physical therapy     (I25.810) Coronary artery disease involving coronary bypass graft of native heart without angina pectoris  (I49.9) Cardiac arrhythmia, unspecified cardiac arrhythmia type  Comment: episode of tachycardia post op. Resolved. He was started on metop and dilt following right hip fracture due to similar episode  Plan: monitor HR and BP    (D62) Anemia due to blood loss, acute  Comment: required 1u PRBC due to hgb dorp to 6.7 following repair of left hip fracture. hgb stable in 8s  Plan: CBC 10/22    (I10) Benign essential hypertension  Comment: adequate control  Plan: no  change    (N18.3) CKD (chronic kidney disease) stage 3, GFR 30-59 ml/min (H)  Comment: at baseline   Plan: avoid nephrotoxins. BMP next week.     (E11.22,  N18.3,  Z79.4) Type 2 diabetes mellitus with stage 3 chronic kidney disease, with long-term current use of insulin (H)  Comment: elevated BGs  Plan: sliding scale insuline    (R53.81) Physical deconditioning  Comment: due to hip fracture and now dislocation. Goal is to return to group home  Plan: physical therapy and OCCUPATIONAL THERAPY           Electronically signed by:  WILLIAM Cota CNP                    Sincerely,        WILLIAM Cota CNP

## 2018-10-21 ENCOUNTER — APPOINTMENT (OUTPATIENT)
Dept: GENERAL RADIOLOGY | Facility: CLINIC | Age: 83
DRG: 467 | End: 2018-10-21
Attending: EMERGENCY MEDICINE
Payer: MEDICARE

## 2018-10-21 ENCOUNTER — TELEPHONE (OUTPATIENT)
Dept: GERIATRICS | Facility: CLINIC | Age: 83
End: 2018-10-21

## 2018-10-21 ENCOUNTER — HOSPITAL ENCOUNTER (INPATIENT)
Facility: CLINIC | Age: 83
LOS: 4 days | Discharge: NURSING, REHAB OR LONG-TERM CARE | DRG: 467 | End: 2018-10-25
Attending: EMERGENCY MEDICINE | Admitting: INTERNAL MEDICINE
Payer: MEDICARE

## 2018-10-21 DIAGNOSIS — Z96.642 STATUS POST TOTAL REPLACEMENT OF LEFT HIP: Primary | ICD-10-CM

## 2018-10-21 DIAGNOSIS — S72.001A CLOSED FRACTURE OF NECK OF RIGHT FEMUR, INITIAL ENCOUNTER (H): ICD-10-CM

## 2018-10-21 DIAGNOSIS — S73.005A HIP DISLOCATION, LEFT (H): ICD-10-CM

## 2018-10-21 LAB — GLUCOSE BLDC GLUCOMTR-MCNC: 101 MG/DL (ref 70–99)

## 2018-10-21 PROCEDURE — 99152 MOD SED SAME PHYS/QHP 5/>YRS: CPT

## 2018-10-21 PROCEDURE — 99285 EMERGENCY DEPT VISIT HI MDM: CPT | Mod: 25

## 2018-10-21 PROCEDURE — 40000275 ZZH STATISTIC RCP TIME EA 10 MIN

## 2018-10-21 PROCEDURE — 00000146 ZZHCL STATISTIC GLUCOSE BY METER IP

## 2018-10-21 PROCEDURE — 12000007 ZZH R&B INTERMEDIATE

## 2018-10-21 PROCEDURE — 25000128 H RX IP 250 OP 636: Performed by: INTERNAL MEDICINE

## 2018-10-21 PROCEDURE — 73502 X-RAY EXAM HIP UNI 2-3 VIEWS: CPT

## 2018-10-21 PROCEDURE — 99222 1ST HOSP IP/OBS MODERATE 55: CPT | Mod: AI | Performed by: INTERNAL MEDICINE

## 2018-10-21 PROCEDURE — 40000965 ZZH STATISTIC END TITIAL CO2 MONITORING

## 2018-10-21 PROCEDURE — 27265 TREAT HIP DISLOCATION: CPT | Mod: LT

## 2018-10-21 PROCEDURE — 25000128 H RX IP 250 OP 636: Performed by: EMERGENCY MEDICINE

## 2018-10-21 PROCEDURE — 96361 HYDRATE IV INFUSION ADD-ON: CPT

## 2018-10-21 PROCEDURE — 96374 THER/PROPH/DIAG INJ IV PUSH: CPT

## 2018-10-21 PROCEDURE — 96375 TX/PRO/DX INJ NEW DRUG ADDON: CPT

## 2018-10-21 PROCEDURE — 40000986 XR HIP PORT LT 1 VW

## 2018-10-21 PROCEDURE — 25000128 H RX IP 250 OP 636

## 2018-10-21 PROCEDURE — 96376 TX/PRO/DX INJ SAME DRUG ADON: CPT

## 2018-10-21 RX ORDER — NALOXONE HYDROCHLORIDE 0.4 MG/ML
.1-.4 INJECTION, SOLUTION INTRAMUSCULAR; INTRAVENOUS; SUBCUTANEOUS
Status: DISCONTINUED | OUTPATIENT
Start: 2018-10-21 | End: 2018-10-21

## 2018-10-21 RX ORDER — DILTIAZEM HYDROCHLORIDE 120 MG/1
120 CAPSULE, EXTENDED RELEASE ORAL DAILY
Status: DISCONTINUED | OUTPATIENT
Start: 2018-10-22 | End: 2018-10-25 | Stop reason: HOSPADM

## 2018-10-21 RX ORDER — NALOXONE HYDROCHLORIDE 0.4 MG/ML
.1-.4 INJECTION, SOLUTION INTRAMUSCULAR; INTRAVENOUS; SUBCUTANEOUS
Status: DISCONTINUED | OUTPATIENT
Start: 2018-10-21 | End: 2018-10-22

## 2018-10-21 RX ORDER — ACETAMINOPHEN 650 MG/1
650 SUPPOSITORY RECTAL EVERY 4 HOURS PRN
Status: DISCONTINUED | OUTPATIENT
Start: 2018-10-21 | End: 2018-10-25 | Stop reason: HOSPADM

## 2018-10-21 RX ORDER — SODIUM CHLORIDE 9 MG/ML
1000 INJECTION, SOLUTION INTRAVENOUS CONTINUOUS
Status: DISCONTINUED | OUTPATIENT
Start: 2018-10-21 | End: 2018-10-21

## 2018-10-21 RX ORDER — FENTANYL CITRATE 50 UG/ML
50 INJECTION, SOLUTION INTRAMUSCULAR; INTRAVENOUS ONCE
Status: COMPLETED | OUTPATIENT
Start: 2018-10-21 | End: 2018-10-21

## 2018-10-21 RX ORDER — HYDROMORPHONE HYDROCHLORIDE 1 MG/ML
INJECTION, SOLUTION INTRAMUSCULAR; INTRAVENOUS; SUBCUTANEOUS
Status: COMPLETED
Start: 2018-10-21 | End: 2018-10-21

## 2018-10-21 RX ORDER — OXYCODONE HYDROCHLORIDE 5 MG/1
5-10 TABLET ORAL
Status: DISCONTINUED | OUTPATIENT
Start: 2018-10-21 | End: 2018-10-23

## 2018-10-21 RX ORDER — METOPROLOL SUCCINATE 50 MG/1
50 TABLET, EXTENDED RELEASE ORAL DAILY
Status: DISCONTINUED | OUTPATIENT
Start: 2018-10-22 | End: 2018-10-25 | Stop reason: HOSPADM

## 2018-10-21 RX ORDER — FLUMAZENIL 0.1 MG/ML
0.2 INJECTION, SOLUTION INTRAVENOUS
Status: DISCONTINUED | OUTPATIENT
Start: 2018-10-21 | End: 2018-10-21

## 2018-10-21 RX ORDER — PROCHLORPERAZINE MALEATE 5 MG
5 TABLET ORAL EVERY 6 HOURS PRN
Status: DISCONTINUED | OUTPATIENT
Start: 2018-10-21 | End: 2018-10-25 | Stop reason: HOSPADM

## 2018-10-21 RX ORDER — NICOTINE POLACRILEX 4 MG
15-30 LOZENGE BUCCAL
Status: DISCONTINUED | OUTPATIENT
Start: 2018-10-21 | End: 2018-10-25 | Stop reason: HOSPADM

## 2018-10-21 RX ORDER — ACETAMINOPHEN 325 MG/1
650 TABLET ORAL EVERY 4 HOURS PRN
Status: DISCONTINUED | OUTPATIENT
Start: 2018-10-21 | End: 2018-10-25 | Stop reason: HOSPADM

## 2018-10-21 RX ORDER — ATORVASTATIN CALCIUM 10 MG/1
10 TABLET, FILM COATED ORAL EVERY EVENING
Status: DISCONTINUED | OUTPATIENT
Start: 2018-10-21 | End: 2018-10-25 | Stop reason: HOSPADM

## 2018-10-21 RX ORDER — HYDRALAZINE HYDROCHLORIDE 20 MG/ML
10 INJECTION INTRAMUSCULAR; INTRAVENOUS EVERY 4 HOURS PRN
Status: DISCONTINUED | OUTPATIENT
Start: 2018-10-21 | End: 2018-10-25 | Stop reason: HOSPADM

## 2018-10-21 RX ORDER — POLYETHYLENE GLYCOL 3350 17 G/17G
17 POWDER, FOR SOLUTION ORAL DAILY PRN
Status: DISCONTINUED | OUTPATIENT
Start: 2018-10-21 | End: 2018-10-25 | Stop reason: HOSPADM

## 2018-10-21 RX ORDER — HYDROMORPHONE HYDROCHLORIDE 1 MG/ML
0.5 INJECTION, SOLUTION INTRAMUSCULAR; INTRAVENOUS; SUBCUTANEOUS
Status: COMPLETED | OUTPATIENT
Start: 2018-10-21 | End: 2018-10-21

## 2018-10-21 RX ORDER — DEXTROSE MONOHYDRATE 25 G/50ML
25-50 INJECTION, SOLUTION INTRAVENOUS
Status: DISCONTINUED | OUTPATIENT
Start: 2018-10-21 | End: 2018-10-25 | Stop reason: HOSPADM

## 2018-10-21 RX ORDER — ONDANSETRON 2 MG/ML
4 INJECTION INTRAMUSCULAR; INTRAVENOUS EVERY 6 HOURS PRN
Status: DISCONTINUED | OUTPATIENT
Start: 2018-10-21 | End: 2018-10-25 | Stop reason: HOSPADM

## 2018-10-21 RX ORDER — HYDROMORPHONE HYDROCHLORIDE 1 MG/ML
0.2 INJECTION, SOLUTION INTRAMUSCULAR; INTRAVENOUS; SUBCUTANEOUS
Status: DISCONTINUED | OUTPATIENT
Start: 2018-10-21 | End: 2018-10-25 | Stop reason: HOSPADM

## 2018-10-21 RX ORDER — ONDANSETRON 4 MG/1
4 TABLET, ORALLY DISINTEGRATING ORAL EVERY 6 HOURS PRN
Status: DISCONTINUED | OUTPATIENT
Start: 2018-10-21 | End: 2018-10-25 | Stop reason: HOSPADM

## 2018-10-21 RX ORDER — SODIUM CHLORIDE 9 MG/ML
INJECTION, SOLUTION INTRAVENOUS CONTINUOUS
Status: DISCONTINUED | OUTPATIENT
Start: 2018-10-21 | End: 2018-10-25 | Stop reason: HOSPADM

## 2018-10-21 RX ORDER — HYDROMORPHONE HYDROCHLORIDE 1 MG/ML
0.5 INJECTION, SOLUTION INTRAMUSCULAR; INTRAVENOUS; SUBCUTANEOUS ONCE
Status: COMPLETED | OUTPATIENT
Start: 2018-10-21 | End: 2018-10-21

## 2018-10-21 RX ORDER — PROCHLORPERAZINE 25 MG
12.5 SUPPOSITORY, RECTAL RECTAL EVERY 12 HOURS PRN
Status: DISCONTINUED | OUTPATIENT
Start: 2018-10-21 | End: 2018-10-25 | Stop reason: HOSPADM

## 2018-10-21 RX ORDER — PROPOFOL 10 MG/ML
INJECTION, EMULSION INTRAVENOUS
Status: COMPLETED
Start: 2018-10-21 | End: 2018-10-21

## 2018-10-21 RX ORDER — TAMSULOSIN HYDROCHLORIDE 0.4 MG/1
0.4 CAPSULE ORAL EVERY EVENING
Status: DISCONTINUED | OUTPATIENT
Start: 2018-10-21 | End: 2018-10-25 | Stop reason: HOSPADM

## 2018-10-21 RX ORDER — PROPOFOL 10 MG/ML
.5-1 INJECTION, EMULSION INTRAVENOUS ONCE
Status: DISCONTINUED | OUTPATIENT
Start: 2018-10-21 | End: 2018-10-21

## 2018-10-21 RX ADMIN — PROPOFOL 40 MG: 10 INJECTION, EMULSION INTRAVENOUS at 22:10

## 2018-10-21 RX ADMIN — HYDROMORPHONE HYDROCHLORIDE 0.5 MG: 1 INJECTION, SOLUTION INTRAMUSCULAR; INTRAVENOUS; SUBCUTANEOUS at 22:54

## 2018-10-21 RX ADMIN — SODIUM CHLORIDE: 9 INJECTION, SOLUTION INTRAVENOUS at 23:17

## 2018-10-21 RX ADMIN — Medication 0.5 MG: at 21:06

## 2018-10-21 RX ADMIN — HYDROMORPHONE HYDROCHLORIDE 0.2 MG: 1 INJECTION, SOLUTION INTRAMUSCULAR; INTRAVENOUS; SUBCUTANEOUS at 23:23

## 2018-10-21 RX ADMIN — SODIUM CHLORIDE 1000 ML: 9 INJECTION, SOLUTION INTRAVENOUS at 22:08

## 2018-10-21 RX ADMIN — Medication 0.5 MG: at 22:54

## 2018-10-21 RX ADMIN — FENTANYL CITRATE 50 MCG: 50 INJECTION, SOLUTION INTRAMUSCULAR; INTRAVENOUS at 20:25

## 2018-10-21 NOTE — IP AVS SNAPSHOT
` [address] SPECIALTY UNIT: [phone]            Medication Administration Report for Abimael Flores as of 10/25/18 1500   Legend:    Given Hold Not Given Due Canceled Entry Other Actions    Time Time (Time) Time  Time-Action       Inactive    Active    Linked        Medications 10/19/18 10/20/18 10/21/18 10/22/18 10/23/18 10/24/18 10/25/18    acetaminophen (TYLENOL) Suppository 650 mg  Dose: 650 mg  Freq: EVERY 4 HOURS PRN Route: RE  PRN Reason: mild pain  Start: 10/21/18 2312   Admin Instructions: Alternate ibuprofen (if ordered) with acetaminophen.  Maximum acetaminophen dose from all sources = 75 mg/kg/day not to exceed 4 grams/day.    Admin. Amount: 1 suppository (1 × 650 mg suppository)  Dispense Loc: [address]        1051-Auto Hold       1753-Unhold              acetaminophen (TYLENOL) tablet 650 mg  Dose: 650 mg  Freq: EVERY 4 HOURS PRN Route: PO  PRN Reason: mild pain  Start: 10/21/18 2312   Admin Instructions: Alternate ibuprofen (if ordered) with acetaminophen.  Maximum acetaminophen dose from all sources = 75 mg/kg/day not to exceed 4 grams/day.    Admin. Amount: 2 tablet (2 × 325 mg tablet)  Last Admin: 10/24/18 2013  Dispense Loc: [address]        1051-Auto Hold       1753-Unhold         1321 (650 mg)-Given       2013 (650 mg)-Given            atorvastatin (LIPITOR) tablet 10 mg  Dose: 10 mg  Freq: EVERY EVENING Route: PO  Start: 10/21/18 2315   Admin. Amount: 1 tablet (1 × 10 mg tablet)  Last Admin: 10/24/18 2014  Dispense Loc: [address]        0018 (10 mg)-Given       1051-Auto Hold       1753-Unhold       1957 (10 mg)-Given        2058 (10 mg)-Given        2014 (10 mg)-Given        [ ] 2000           benzocaine-menthol (CHLORASEPTIC) 6-10 MG lozenge 1-2 lozenge  Dose: 1-2 lozenge  Freq: EVERY 1 HOUR PRN Route: BU  PRN Reason: sore throat  PRN Comment: sore throat without fever  Start: 10/22/18 1804   Admin. Amount: 1-2 lozenge  Dispense Loc:  [facility] 55C  POC:  Post-procedure               diltiazem (DILACOR XR) 24 hr capsule 120 mg  Dose: 120 mg  Freq: DAILY Route: PO  Indications of Use: HYPERTENSION  Start: 10/22/18 0900   Admin Instructions: Hold for SBP<100 or HR<55    Admin. Amount: 1 capsule (1 × 120 mg capsule)  Last Admin: 10/25/18 0909  Dispense Loc:  [facility] 55C        0840 (120 mg)-Given       1051-Auto Hold       1753-Unhold        0824 (120 mg)-Given        0837 (120 mg)-Given        0909 (120 mg)-Given           enoxaparin (LOVENOX) injection 30 mg  Dose: 30 mg  Freq: EVERY 24 HOURS Route: SC  Start: 10/23/18 1015   Admin Instructions: Check to make sure start date/time is 12-24 hours post op unless documented complication, AND no sooner than 22 hours post op if spinal anesthesia used.  Continue until discharge to home. HOLD if platelet count falls below 50% of baseline or less than 100,000/ L and notify provider.    Admin. Amount: 30 mg = 0.3 mL Conc: 30 mg/0.3 mL  Last Admin: 10/25/18 1035  Dispense Loc: [facility] 55C  Volume: 0.3 mL  POC: Post-procedure         1004 (30 mg)-Given        1124 (30 mg)-Given        1035 (30 mg)-Given           glucose gel 15-30 g  Dose: 15-30 g  Freq: EVERY 15 MIN PRN Route: PO  PRN Reason: low blood sugar  Start: 10/21/18 2312   Admin Instructions: Give 15 g for BG 51 to 69 mg/dL IF patient is conscious and able to swallow. Give 30 g for BG less than or equal to 50 mg/dL IF patient is conscious and able to swallow. Do NOT give glucose gel via enteral tube.  IF patient has enteral tube: give apple juice 120 mL (4 oz or 15 g of CHO) via enteral tube for BG 51 to 69 mg/dL.  Give apple juice 240 mL (8 oz or 30 g of CHO) via enteral tube for BG less than or equal to 50 mg/dL.    ~Oral gel is preferable for conscious and able to swallow patient.   ~IF gel unavailable or patient refuses may provide apple juice 120 mL (4 oz or 15 g of CHO). Document juice on I and O flowsheet.    Admin. Amount: 15-30 g  Dispense Loc: [facility] 55  Volume:  93.75 mL        1051-Auto Hold       1753-Unhold             Or  dextrose 50 % injection 25-50 mL  Dose: 25-50 mL  Freq: EVERY 15 MIN PRN Route: IV  PRN Reason: low blood sugar  Start: 10/21/18 2312   Admin Instructions: Use if have IV access, BG less than 70 mg/dL and meet dose criteria below:  Dose if conscious and alert (or disorientated) and NPO = 25 mL  Dose if unconscious / not alert = 50 mL  Vesicant. For ordered doses up to 25 g, give IV Push undiluted. Give each 5g over 1 minute.    Admin. Amount: 25-50 mL  Dispense Loc: [facility] 55C  Infused Over: 1-5 Minutes  Volume: 50 mL        1051-Auto Hold       1753-Unhold             Or  glucagon injection 1 mg  Dose: 1 mg  Freq: EVERY 15 MIN PRN Route: SC  PRN Reason: low blood sugar  PRN Comment: May repeat x 1 only  Start: 10/21/18 2312   Admin Instructions: May give SQ or IM. ONLY use glucagon IF patient has NO IV access AND is UNABLE to swallow AND blood glucose is LESS than or EQUAL to 50 mg/dL.  If ordered IV, give IV Push over 1 minute. Reconstitute with 1mL sterile water.    Admin. Amount: 1 mg  Dispense Loc:  [facility] 55C        1051-Auto Hold       1753-Unhold              hydrALAZINE (APRESOLINE) injection 10 mg  Dose: 10 mg  Freq: EVERY 4 HOURS PRN Route: IV  PRN Reason: high blood pressure  PRN Comment: give for SBP > 180  Start: 10/21/18 2312   Admin Instructions: For ordered IV doses 1-40 mg, give IV Push undiluted over 1 minute.    Admin. Amount: 10 mg = 0.5 mL Conc: 20 mg/mL  Dispense Loc: [facility] 55C  Volume: 0.5 mL        1051-Auto Hold       1753-Unhold              HYDROmorphone (PF) (DILAUDID) injection 0.2 mg  Dose: 0.2 mg  Freq: EVERY 2 HOURS PRN Route: IV  PRN Reason: other  PRN Comment: pain control or improvement in physical function. Hold dose for analgesic side effects.  Start: 10/21/18 2312   Admin Instructions: Notify provider to assess for uncontrolled pain or analgesic side effects. Hold while on PCA or with regular IV opioid dosing  For  ordered IV doses 0.1-4 mg give IV Push undiluted. Administer each 2mg over 2-5 minutes.    Admin. Amount: 0.2 mg  Last Admin: 10/23/18 0515  Dispense Loc:  [facility] 55C       2323 (0.2 mg)-Given        0132 (0.2 mg)-Given       1051-Auto Hold       1753-Unhold       2035 (0.2 mg)-Given       2253 (0.2 mg)-Given        0243 (0.2 mg)-Given       0515 (0.2 mg)-Given             insulin aspart (NovoLOG) inj (RAPID ACTING)  Dose: 1-6 Units  Freq: 3 TIMES DAILY WITH MEALS Route: SC  Start: 10/24/18 1800   Admin Instructions: Correction Scale - MEDIUM INSULIN RESISTANCE DOSING     Do Not give Correction Insulin if BG less than 140.  For  - 189 give 1 unit.  For  - 239 give 2 units.  For  - 289 give 3 units.  For  - 339 give 4 units.  For  - 399 give 5 units.  For BG greater than or equal to 400 give 6 units.  Check blood glucose Q4H and administer based on blood glucose.  Notify provider if glucose greater than or equal to 350 mg/dL after administration of correction dose.  If given at mealtime, administer within 30 minutes of start of meal    Admin. Amount: 1-6 Units  Last Admin: 10/25/18 0909  Dispense Loc: Contact Rx for dose  Volume: 3 mL          (1848)-Not Given        0909 (1 Units)-Given       (1403)-Not Given       [ ] 1800           insulin glargine (LANTUS) injection 7 Units  Dose: 7 Units  Freq: EVERY MORNING BEFORE BREAKFAST Route: SC  Start: 10/24/18 0730   Admin Instructions: Hold for BG<120, please call if not eating at least 50% of meals for dose adjustment.    Admin. Amount: 7 Units  Last Admin: 10/25/18 0909  Dispense Loc:  Main Pharmacy  Volume: 0.07 mL          1037 (7 Units)-Given        0909 (7 Units)-Given           lactated ringers infusion  Rate: 100 mL/hr   Freq: CONTINUOUS Route: IV  Start: 10/22/18 1815   Admin Instructions: Change to saline lock when PO well tolerated.    Last Admin: 10/22/18 1857  Dispense Loc: [facility] Floor Stock  Volume: 1,000 mL  POC:  "Post-procedure        1857 ( )-New Bag              lidocaine (LMX4) cream  Freq: EVERY 1 HOUR PRN Route: Top  PRN Reason: pain  PRN Comment: with VAD insertion or accessing implanted port.  Start: 10/22/18 1804   Admin Instructions: Do NOT give if patient has a history of allergy to any local anesthetic or any \"forrest\" product.   Apply 30 minutes prior to VAD insertion or port access.  MAX Dose:  2.5 g (  of 5 g tube)    Dispense Loc: Contact Rx for dose  POC: Post-procedure               lidocaine 1 % 1 mL  Dose: 1 mL  Freq: EVERY 1 HOUR PRN Route: OTHER  PRN Comment: mild pain with VAD insertion or accessing implanted port  Start: 10/22/18 1804   Admin Instructions: Do NOT give if patient has a history of allergy to any local anesthetic or any \"forrest\" product. MAX dose 1 mL subcutaneous OR intradermal in divided doses.    Admin. Amount: 1 mL  Dispense Loc: [address]  Volume: 20 mL  POC: Post-procedure               melatonin tablet 1 mg  Dose: 1 mg  Freq: AT BEDTIME PRN Route: PO  PRN Reason: sleep  Start: 10/21/18 2312   Admin Instructions: Do not give unless at least 6 hours of uninterrupted sleep is expected.    Admin. Amount: 1 tablet (1 × 1 mg tablet)  Last Admin: 10/24/18 2249  Dispense Loc: [facility] 55        1051-Auto Hold       1753-Unhold       1957 (1 mg)-Given        2058 (1 mg)-Given        2249 (1 mg)-Given            metoprolol succinate (TOPROL-XL) 24 hr tablet 50 mg  Dose: 50 mg  Freq: DAILY Route: PO  Start: 10/22/18 0900   Admin Instructions: DO NOT CRUSH. Tablet may be split in half along score line.  Hold for SBP<100 or HR<55    Admin. Amount: 1 tablet (1 × 50 mg tablet)  Last Admin: 10/25/18 0909  Dispense Loc: [facility] 55        0840 (50 mg)-Given       1051-Auto Hold       1753-Unhold        0824 (50 mg)-Given        0837 (50 mg)-Given        0909 (50 mg)-Given           naloxone (NARCAN) injection 0.1-0.4 mg  Dose: 0.1-0.4 mg  Freq: EVERY 2 MIN PRN Route: IV  PRN Reason: opioid " reversal  Start: 10/22/18 1853   Admin Instructions: For respiratory rate LESS than or EQUAL to 8.  Partial reversal dose:  0.1 mg titrated q 2 minutes for Analgesia Side Effects Monitoring Sedation Level of 3 (frequently drowsy, arousable, drifts to sleep during conversation).Full reversal dose:  0.4 mg bolus for Analgesia Side Effects Monitoring Sedation Level of 4 (somnolent, minimal or no response to stimulation).  For ordered IV doses 0.1-2mg give IVP. Give each 0.4mg over 15 seconds in emergency situations. For non-emergent situations further dilute in 9mL of NS to facilitate titration of response.    Admin. Amount: 0.1-0.4 mg = 0.25-1 mL Conc: 0.4 mg/mL  Dispense Loc: [facility] 55  Volume: 1 mL               ondansetron (ZOFRAN-ODT) ODT tab 4 mg  Dose: 4 mg  Freq: EVERY 6 HOURS PRN Route: PO  PRN Reasons: nausea,vomiting  Start: 10/21/18 2312   Admin Instructions: This is Step 1 of nausea and vomiting management.  If nausea not resolved in 15 minutes, go to Step 2 prochlorperazine (COMPAZINE). Do not push through foil backing. Peel back foil and gently remove. Place on tongue immediately. Administration with liquid unnecessary  With dry hands, peel back foil backing and gently remove tablet; do not push oral disintegrating tablet through foil backing; administer immediately on tongue and oral disintegrating tablet dissolves in seconds; then swallow with saliva; liquid not required.    Admin. Amount: 1 tablet (1 × 4 mg tablet)  Dispense Loc: [address]        1051-Auto Hold       1753-Unhold             Or  ondansetron (ZOFRAN) injection 4 mg  Dose: 4 mg  Freq: EVERY 6 HOURS PRN Route: IV  PRN Reasons: nausea,vomiting  Start: 10/21/18 2312   Admin Instructions: This is Step 1 of nausea and vomiting management.  If nausea not resolved in 15 minutes, go to Step 2 prochlorperazine (COMPAZINE).  Irritant. For ordered IV doses 0.1-4 mg, give IV Push undiluted over 2-5 minutes.    Admin. Amount: 4 mg = 2 mL Conc: 4  mg/2 mL  Dispense Loc: [facility] 55C  Infused Over: 2-5 Minutes  Volume: 2 mL        1051-Auto Hold       1753-Unhold              polyethylene glycol (MIRALAX/GLYCOLAX) Packet 17 g  Dose: 17 g  Freq: DAILY PRN Route: PO  PRN Reason: constipation  Start: 10/21/18 2312   Admin Instructions: Give in 8oz of  water, juice, or soda. Hold for loose stools.  This is the second step of a three step constipation treatment.  1 Packet = 17 grams. Mixed prescribed dose in 8 ounces of water. Follow with 8 oz. of water.    Admin. Amount: 17 g  Dispense Loc: [facility] 55C        1051-Auto Hold       1753-Unhold              prochlorperazine (COMPAZINE) injection 5 mg  Dose: 5 mg  Freq: EVERY 6 HOURS PRN Route: IV  PRN Reasons: nausea,vomiting  Start: 10/21/18 2312   Admin Instructions: This is Step 2 of nausea and vomiting management. Give if nausea not resolved 15 minutes after giving ondansetron (ZOFRAN). If nausea not resolved in 15 minutes, go to Step 3 metoclopramide (REGLAN), if ordered.  For ordered IV doses 0.1-10 mg, give IV Push undiluted. Each 5mg over 1 minute.    Admin. Amount: 5 mg = 1 mL Conc: 5 mg/mL  Dispense Loc: [facility] 55C  Infused Over: 1-2 Minutes  Volume: 1 mL        1051-Auto Hold       1753-Unhold             Or  prochlorperazine (COMPAZINE) tablet 5 mg  Dose: 5 mg  Freq: EVERY 6 HOURS PRN Route: PO  PRN Reason: vomiting  Start: 10/21/18 2312   Admin Instructions: This is Step 2 of nausea and vomiting management. Give if nausea not resolved 15 minutes after giving ondansetron (ZOFRAN). If nausea not resolved in 15 minutes, go to Step 3 metoclopramide (REGLAN), if ordered.    Admin. Amount: 1 tablet (1 × 5 mg tablet)  Dispense Loc: [facility] 55C        1051-Auto Hold       1753-Unhold             Or  prochlorperazine (COMPAZINE) Suppository 12.5 mg  Dose: 12.5 mg  Freq: EVERY 12 HOURS PRN Route: RE  PRN Reasons: nausea,vomiting  Start: 10/21/18 2312   Admin Instructions: This is Step 2 of nausea and vomiting  management. Give if nausea not resolved 15 minutes after giving ondansetron (ZOFRAN). If nausea not resolved in 15 minutes, go to Step 3 metoclopramide (REGLAN), if ordered.    Admin. Amount: 0.5 suppository (0.5 × 25 mg suppository)  Dispense Loc:  Main Pharmacy        1051-Auto Hold       1753-Unhold              sodium chloride (PF) 0.9% PF flush 3 mL  Dose: 3 mL  Freq: EVERY 8 HOURS Route: IK  Start: 10/22/18 1815   Admin Instructions: And Q1H PRN, to lock peripheral IV dormant line.    Admin. Amount: 3 mL  Last Admin: 10/25/18 1035  Dispense Loc: [facility] Floor Stock  Volume: 3 mL  POC: Post-procedure   Current Line: Peripheral IV 10/23/18 Right Upper forearm       (1853)-Not Given        (0316)-Not Given       (1219)-Not Given       (1801)-Not Given        (0117)-Not Given       1038 (3 mL)-Given       2014 (3 mL)-Given        0511 (3 mL)-Given       1035 (3 mL)-Given       [ ] 1815           sodium chloride (PF) 0.9% PF flush 3 mL  Dose: 3 mL  Freq: EVERY 1 HOUR PRN Route: IK  PRN Reason: line flush  PRN Comment: for peripheral IV flush post IV meds  Start: 10/22/18 1804   Admin. Amount: 3 mL  Last Admin: 10/23/18 0521  Dispense Loc: [facility] Stock  Volume: 3 mL  POC: Post-procedure        2036 (3 mL)-Given       2253 (3 mL)-Given        0521 (3 mL)-Given             sodium chloride 0.9% infusion  Rate: 75 mL/hr   Freq: CONTINUOUS Route: IV  Start: 10/21/18 2315   Last Admin: 10/21/18 2317  Dispense Loc: [facility] Floor Stock  Volume: 1,000 mL       2317 ( )-New Bag               tamsulosin (FLOMAX) capsule 0.4 mg  Dose: 0.4 mg  Freq: EVERY EVENING Route: PO  Start: 10/21/18 2315   Admin Instructions: Administer 30 minutes after the same meal each day.  Capsules should be swallowed whole; do not crush chew or open.    Admin. Amount: 1 capsule (1 × 0.4 mg capsule)  Last Admin: 10/24/18 2014  Dispense Loc:  [facility] 55C        0018 (0.4 mg)-Given       1051-Auto Hold       1753-Unhold       1957 (0.4 mg)-Given        2058  (0.4 mg)-Given         (0.4 mg)-Given        [ ]            traMADol (ULTRAM) half-tab 25 mg  Dose: 25 mg  Freq: EVERY 6 HOURS PRN Route: PO  PRN Reason: moderate pain  Start: 10/23/18 0538   Admin. Amount: 1 half-tab (1 × 25 mg half-tab)  Last Admin: 10/25/18 1426  Dispense Loc:  [facility] 55[facility]         1003 (25 mg)-Given        1321 (25 mg)-Given       2250 (25 mg)-Given        1426 (25 mg)-Given          Completed Medications  Medications 10/19/18 10/20/18 10/21/18 10/22/18 10/23/18 10/24/18 10/25/18         Dose: 1 g  Freq: EVERY 8 HOURS Route: IV  Indications of Use: PERIOPERATIVE PHARMACOPROPHYLAXIS  Start: 10/22/18 2200   End: 10/23/18 05   Admin Instructions: First post-op dose due 8 hours after intra-op dose, see eMAR.    Admin. Amount: 1 g  Last Admin: 10/23/18 0514  Dispense Loc: [facility] 55C  Infused Over: 30 Minutes  Administrations Remainin  POC: Post-procedure        2210 (1 g)-New Bag        0514 (1 g)-New Bag               Dose: 3 Units  Freq: ONCE Route: SC  Start: 10/23/18 1315   End: 10/23/18 1455   Admin. Amount: 3 Units  Last Admin: 10/23/18 1455  Dispense Loc:  Main Pharmacy  Administrations Remainin  Volume: 0.03 mL          (3 Units)-Given            Discontinued Medications  Medications 10/19/18 10/20/18 10/21/18 10/22/18 10/23/18 10/24/18 10/25/18         Dose: 1 g  Freq: SEE ADMIN INSTRUCTIONS Route: IV  Indications of Use: PERIOPERATIVE PHARMACOPROPHYLAXIS  Start: 10/22/18 1120   End: 10/22/18 1602   Admin Instructions: Intra-Op Dose.  Give every 2 hours while patient in surgery, starting 2 hours after pre-op dose.  DO NOT GIVE intra-op dose if CrCl less than 10 mL/min (on dialysis).  If CrCL less than 50 mL/min, double the time interval between doses.    Admin. Amount: 1 g  Dispense Loc:  Main Pharmacy  Infused Over: 30 Minutes  POC: Pre-procedure        1602-Med Discontinued            Freq: PRN  Start: 10/22/18 1445   End: 10/22/18 1753   Last Admin: 10/22/18  1528  Volume: 1,000 mL  POC: Intra-procedure   Mixture Administration Information:   Medication Type Amount   ceFAZolin 200 mg/mL SOLR Medications 1,000 mg   gentamicin 40 MG/ML SOLN Medications 120 mg   sodium chloride 0.9% (bag) 0.9 % SOLN Base 1,000 mL                1445 (1,000 mL)-Given       1528 (1,000 mL)-Given       1753-Med Discontinued            Dose: 25-50 mcg  Freq: EVERY 2 MIN PRN Route: IV  PRN Reason: other  PRN Comment: acute pain  Start: 10/22/18 1616   End: 10/22/18 1753   Admin Instructions: MAX cumulative dose = 250 mcg.   Use fentaNYL (SUBLIMAZE) initially, as a short acting agent for acute pain control. If insufficient, or a longer acting agent is needed, begin morphine or HYDROmorphone (DILAUDID) if ordered.  For ordered IV doses 1-100 mcg give IV Push undiluted over a minimum of 3-5 minutes.    Admin. Amount: 25-50 mcg = 0.5-1 mL Conc: 50 mcg/mL  Dispense Loc: [facility] PACU2  Volume: 2 mL  POC: PACU        1753-Med Discontinued            Dose: 2 Units  Freq: 3 TIMES DAILY WITH MEALS Route: SC  Start: 10/23/18 1315   End: 10/23/18 1335   Admin Instructions: If given at mealtime, administer within 30 minutes of start of meal    Admin. Amount: 2 Units  Volume: 0.02 mL         1335-Med Discontinued                 Dose: 1-6 Units  Freq: EVERY 4 HOURS Route: SC  Start: 10/21/18 2315   End: 10/24/18 1226   Admin Instructions: Correction Scale - MEDIUM INSULIN RESISTANCE DOSING     Do Not give Correction Insulin if BG less than 140.  For  - 189 give 1 unit.  For  - 239 give 2 units.  For  - 289 give 3 units.  For  - 339 give 4 units.  For  - 399 give 5 units.  For BG greater than or equal to 400 give 6 units.  Check blood glucose Q4H and administer based on blood glucose.  Notify provider if glucose greater than or equal to 350 mg/dL after administration of correction dose.  If given at mealtime, administer within 30 minutes of start of meal    Admin. Amount: 1-6  "Units  Last Admin: 10/24/18 1202  Dispense Loc: Contact Rx for dose  Volume: 3 mL        (0007)-Not Given       (0654)-Not Given       (0735)-Not Given       1051-Auto Hold       1115 Auto Hold-Automatically Held       1515 Auto Hold-Automatically Held       1753-Unhold       (1905)-Not Given [C]       (2216)-Not Given        (0315)-Not Given [C]       (0825)-Not Given       (1221)-Not Given [C]       (1713)-Not Given [C]       (1938)-Not Given [C]       (2312)-Not Given [C]        (0348)-Not Given [C]       (0800)-Not Given       1202 (2 Units)-Given       1226-Med Discontinued          Dose: 4 Units  Freq: EVERY MORNING BEFORE BREAKFAST Route: SC  Start: 10/22/18 0730   End: 10/23/18 1305   Admin Instructions: Hold for BG<120    Admin. Amount: 4 Units  Last Admin: 10/23/18 0824  Dispense Loc:  Main Pharmacy  Volume: 0.04 mL        (0844)-Not Given       1051-Auto Hold       1753-Unhold        0824 (4 Units)-Given       1305-Med Discontinued           Rate: 100 mL/hr   Freq: CONTINUOUS Route: IV  Start: 10/22/18 1630   End: 10/22/18 1753   Admin Instructions: Continue until IV catheter is weaned    Dispense Loc: [facility] Floor Stock  Volume: 1,000 mL  POC: PACU               1753-Med Discontinued            Rate: 25 mL/hr   Freq: CONTINUOUS Route: IV  Start: 10/22/18 1130   End: 10/22/18 1602   Admin Instructions: IF patient NOT on dialysis.    Last Admin: 10/22/18 1121  Dispense Loc: [facility] Floor Stock  Volume: 1,000 mL  POC: Pre-procedure        1121 ( )-New Bag       1533 ( )-Anesthesia Volume Adjustment       1602-Med Discontinued  1611 ( )-Anesthesia Volume Adjustment                Dose: 1 mL  Freq: EVERY 1 HOUR PRN Route: OTHER  PRN Comment: mild pain with VAD insertion or accessing implanted port  Start: 10/22/18 1120   End: 10/22/18 1602   Admin Instructions: Do NOT give if patient has a history of allergy to any local anesthetic or any \"forrest\" product. MAX dose 1 mL subcutaneous OR intradermal in divided doses. "    Admin. Amount: 1 mL  Dispense Loc: [facility] PACU  Volume: 20 mL  POC: Pre-procedure        1602-Med Discontinued            Dose: 0.1-0.4 mg  Freq: EVERY 2 MIN PRN Route: IV  PRN Reason: opioid reversal  Start: 10/22/18 1804   End: 10/22/18 1853   Admin Instructions: For apnea or imminent respiratory arrest: give 0.4 mg IV undiluted Q 2 minutes PRN until desired degree of reversal is obtained, stop opioid and notify provider. Continue monitoring until discharge criteria are met for a minimum of 2 hours.  For severe sedation, decrease in respiratory depth, quality or respiratory rate less than 8: give 0.1 mg IV Q 2 minutes x 3 doses, stop opioid and notify provider.  Try to minimize reversal of analgesia especially in end-of-life patients  For ordered IV doses 0.1-2mg give IVP. Give each 0.4mg over 15 seconds in emergency situations. For non-emergent situations further dilute in 9mL of NS to facilitate titration of response.    Admin. Amount: 0.1-0.4 mg = 0.25-1 mL Conc: 0.4 mg/mL  Volume: 1 mL  POC: Post-procedure        1853-Med Discontinued            Dose: 0.1-0.4 mg  Freq: EVERY 2 MIN PRN Route: IV  PRN Reason: opioid reversal  Start: 10/21/18 2312   End: 10/22/18 1847   Admin Instructions: For respiratory rate LESS than or EQUAL to 8.  Partial reversal dose:  0.1 mg titrated q 2 minutes for Analgesia Side Effects Monitoring Sedation Level of 3 (frequently drowsy, arousable, drifts to sleep during conversation).Full reversal dose:  0.4 mg bolus for Analgesia Side Effects Monitoring Sedation Level of 4 (somnolent, minimal or no response to stimulation).  For ordered IV doses 0.1-2mg give IVP. Give each 0.4mg over 15 seconds in emergency situations. For non-emergent situations further dilute in 9mL of NS to facilitate titration of response.    Admin. Amount: 0.1-0.4 mg = 0.25-1 mL Conc: 0.4 mg/mL  Dispense Loc:  [facility] 55  Volume: 1 mL        1051-Auto Hold       1753-Unhold       1847-Med Discontinued             Dose: 4 mg  Freq: EVERY 30 MIN PRN Route: PO  PRN Reason: nausea  Start: 10/22/18 1616   End: 10/22/18 1753   Admin Instructions: MAX total dose = 8 mg, including OR dosing. If not resolved in 15 minutes, then go to step 2 [prochlorperazine (COMPAZINE), if ordered].  With dry hands, peel back foil backing and gently remove tablet; do not push oral disintegrating tablet through foil backing; administer immediately on tongue and oral disintegrating tablet dissolves in seconds; then swallow with saliva; liquid not required.    Admin. Amount: 1 tablet (1 × 4 mg tablet)  Dispense Loc: [facility] PACU  Administrations Remainin  POC: PACU        1753-Med Discontinued         Or    Dose: 4 mg  Freq: EVERY 30 MIN PRN Route: IV  PRN Reason: nausea  Start: 10/22/18 1616   End: 10/22/18 1753   Admin Instructions: MAX total dose = 8 mg, including OR dosing. If not resolved in 15 minutes, then go to step 2 [prochlorperazine (COMPAZINE), if ordered].  Irritant. For ordered IV doses 0.1-4 mg, give IV Push undiluted over 2-5 minutes.    Admin. Amount: 4 mg = 2 mL Conc: 4 mg/2 mL  Dispense Loc: [facility] PACU2  Infused Over: 2-5 Minutes  Administrations Remainin  Volume: 2 mL  POC: PACU        1753-Med Discontinued            Dose: 5-10 mg  Freq: EVERY 3 HOURS PRN Route: PO  PRN Reason: other  PRN Comment: pain control or improvement in physical function. Hold dose for analgesic side effects.  Start: 10/21/18 2312   End: 10/23/18 0538   Admin Instructions: Start with the lowest dose.  May adjust dose by 5 mg every 3 hours as needed. Notify provider to assess for uncontrolled pain or analgesic side effects. Hold while on PCA or with regular IV opioid dosing.    Admin. Amount: 1-2 tablet (1-2 × 5 mg tablet)  Dispense Loc:  ADS 55C        1051-Auto Hold       1753-Unhold        0538-Med Discontinued           Dose: 5 mg  Freq: EVERY 6 HOURS PRN Route: IV  PRN Reasons: nausea,vomiting  Start: 10/22/18 1616   End: 10/22/18 1753    Admin Instructions: This is Step 2 of the nausea and vomiting protocol.   If nausea not resolved in 15 minutes, give metoclopramide (REGLAN) if ordered (step 3 of nausea and vomiting protocol)  For ordered IV doses 0.1-10 mg, give IV Push undiluted. Each 5mg over 1 minute.    Admin. Amount: 5 mg = 1 mL Conc: 5 mg/mL  Dispense Loc: [facility] PACU  Infused Over: 1-2 Minutes  Volume: 1 mL  POC: PACU        1753-Med Discontinued            Freq: PRN  Start: 10/22/18 1428   End: 10/22/18 1753   Last Admin: 10/22/18 1428  POC: Intra-procedure        1428 (1,000 mL)-Given       1753-Med Discontinued       Medications 10/19/18 10/20/18 10/21/18 10/22/18 10/23/18 10/24/18 10/25/18

## 2018-10-21 NOTE — IP AVS SNAPSHOT
` ` Patient Information     Patient Name Sex     Abimael Flores ([id number]) Male 1924       Room Bed    5518 5518-02      Patient Demographics     Address Phone    [address]  [facility] [id number] [phone] (Home) *Preferred*  NONE (Work)  [phone] (Mobile)      Patient Ethnicity & Race     Ethnic Group Patient Race    American Choose not to answer      Emergency Contact(s)     Name Relation Home Work Mobile    Jaye Flores Spouse [phone] NONE     Aleksandar Flores Son [phone] NONE [phone]      Documents on File        Status Date Received Description       Documents for the Patient    Privacy Notice - [place] Received 14     Face Sheet  04/15/03     Insurance Card  04/15/03     Consent for Services - Hospital/Clinic Received () 14     Consent for EHR Access Received 14     External Medication Information Consent       Patient ID Received 18     [facility] Specified Other       HIM JUDD Authorization - File Only  14 [facility] AUTHORIZATION FOR RELEASE OF PROTECTED HEALTH INFORMATION    HIM JUDD Authorization - File Only  14 AUTHORIZATION FOR RELEASE OF PROTECTED HEALTH INFORMATION    Business/Insurance/Care Coordination/Health Form - Patient  14 CARE COORDINATION LETTER    HIM JUDD Authorization  07/03/15 [facility]    Consent for Services - Hospital and Clinic Received 18     HIE Auth Received 18     Insurance Card Received 18 new medicare    Insurance Card Received 18     Consent for Services - Geriatrics Received 09/10/18     Advance Directives and Living Will Not Received (Deleted)  invalid; to be deleted    Advance Directives and Living Will Not Received (Deleted)  Invalid - to be deleted       Documents for the Encounter    CMS IM for Patient Signature Received 10/21/18     EMS/Ambulance Record  10/24/18 PREHOSPITAL CARE REPORT SUMMARY    CMS IM for Patient Signature Received 10/25/18 2MM       Admission Information     Attending Provider Admitting Provider Admission Type Admission Date/Time    Radha Collins MD Arbabi, Mohammad H, MD Emergency 10/21/18  1942    Discharge Date Hospital Service Auth/Cert Status Service Area     Hospitalist [facility] SERVICES    Unit Room/Bed Admission Status        55 ORTHO SPEC UNIT 5518/5518-02 Admission (Confirmed)       Admission     Complaint    Hip dislocation, left (H), DISLOCATION OF HIP.      Hospital Account     Name Acct ID Class Status Primary Coverage    Abimael Flores [id number] Inpatient Open MEDICARE - MEDICARE            Guarantor Account (for Hospital Account #[id number])     Name Relation to Pt Service Area Active? Acct Type    Abimael Flores  FCS Yes Personal/Family    Address Phone          [address]  [place] [address] [phone](H)  NONE(O)              Coverage Information (for Hospital Account #[id number])     1. MEDICARE/MEDICARE     F/O Payor/Plan Precert #    MEDICARE/MEDICARE     Subscriber Subscriber #    Abimael Flores [id number]    Address Phone    [employer]  [address]  [place] [address] [phone]          2. [place]     F/O Payor/Plan Precert #    [place]     Subscriber Subscriber #    Abimael Flores [id number]    Address Phone    [address] [address]  [place] [address] [phone]

## 2018-10-21 NOTE — IP AVS SNAPSHOT
"          [place] [address] ORTHO SPECIALTY UNIT: [phone]                                              INTERAGENCY TRANSFER FORM - LAB / IMAGING / EKG / EMG RESULTS   10/21/2018                    Hospital Admission Date: 10/21/2018  CHERYL HOLLOWAY   : 1924  Sex: Male        Attending Provider: Radha Collins MD     Allergies:  No Known Allergies    Infection:  None   Service:  HOSPITALIST    Ht:  1.753 m (5' 9\")   Wt:  74.5 kg (164 lb 3.2 oz)   Admission Wt:  72.6 kg (160 lb)    BMI:  24.25 kg/m 2   BSA:  1.9 m 2            Patient PCP Information     Provider PCP Type    [facility]         Lab Results - 3 Days      Glucose by meter [[id number]] (Abnormal)  Resulted: 10/25/18 1359, Result status: Final result    Ordering provider: Radha Collins MD  10/25/18 1345 Resulting lab: POINT OF CARE TEST, GLUCOSE    Specimen Information    Type Source Collected On     10/25/18 1345          Components       Value Reference Range Flag Lab   Glucose 136 70 - 99 mg/dL H 170            Basic metabolic panel [[id number]] (Abnormal)  Resulted: 10/25/18 0732, Result status: Final result    Ordering provider: Silvano Kumar MD  10/25/18 0000 Resulting lab: [facility]    Specimen Information    Type Source Collected On   Blood  10/25/18 0705          Components       Value Reference Range Flag Lab   Sodium 137 133 - 144 mmol/L  FrStHsLb   Potassium 3.8 3.4 - 5.3 mmol/L  FrStHsLb   Chloride 104 94 - 109 mmol/L  FrStHsLb   Carbon Dioxide 26 20 - 32 mmol/L  FrStHsLb   Anion Gap 7 3 - 14 mmol/L  FrStHsLb   Glucose 165 70 - 99 mg/dL H FrStHsLb   Urea Nitrogen 25 7 - 30 mg/dL  FrStHsLb   Creatinine 1.49 0.66 - 1.25 mg/dL H FrStHsLb   GFR Estimate 44 >60 mL/min/1.7m2 L FrStHsLb   Comment:  Non  GFR Calc   GFR Estimate If Black 53 >60 mL/min/1.7m2 L FrStHsLb   Comment:  African American GFR Calc   Calcium 8.0 8.5 - 10.1 mg/dL L FrStHsLb            Platelet count " [[id number]]  Resulted: 10/25/18 0723, Result status: Final result    Ordering provider: Marcos Winchester MD  10/25/18 0000 Resulting lab: [facility]    Specimen Information    Type Source Collected On   Blood  10/25/18 0705          Components       Value Reference Range Flag Lab   Platelet Count 227 150 - 450 10e9/L  FrStHsLb            Hemoglobin [[id number]] (Abnormal)  Resulted: 10/25/18 0723, Result status: Final result    Ordering provider: Silvano Kumar MD  10/25/18 0000 Resulting lab: [facility]    Specimen Information    Type Source Collected On   Blood  10/25/18 0705          Components       Value Reference Range Flag Lab   Hemoglobin 7.5 13.3 - 17.7 g/dL L FrStHsLb            Glucose by meter [[id number]] (Abnormal)  Resulted: 10/25/18 0231, Result status: Final result    Ordering provider: Radha Collins MD  10/25/18 0159 Resulting lab: POINT OF CARE TEST, GLUCOSE    Specimen Information    Type Source Collected On     10/25/18 0159          Components       Value Reference Range Flag Lab   Glucose 149 70 - 99 mg/dL H 170   Comment:  /RN Notified            Glucose by meter [[id number]] (Abnormal)  Resulted: 10/24/18 2136, Result status: Final result    Ordering provider: Radha Collins MD  10/24/18 2123 Resulting lab: POINT OF CARE TEST, GLUCOSE    Specimen Information    Type Source Collected On     10/24/18 2123          Components       Value Reference Range Flag Lab   Glucose 258 70 - 99 mg/dL H 170            Glucose by meter [[id number]] (Abnormal)  Resulted: 10/24/18 1741, Result status: Final result    Ordering provider: Radha Collins MD  10/24/18 1717 Resulting lab: POINT OF CARE TEST, GLUCOSE    Specimen Information    Type Source Collected On     10/24/18 1717          Components       Value Reference Range Flag Lab   Glucose 116 70 - 99 mg/dL H 170            Glucose by meter [[id number]] (Abnormal)  Resulted: 10/24/18 1203, Result status: Final  result    Ordering provider: Radha Collins MD  10/24/18 1130 Resulting lab: POINT OF CARE TEST, GLUCOSE    Specimen Information    Type Source Collected On     10/24/18 1130          Components       Value Reference Range Flag Lab   Glucose 211 70 - 99 mg/dL H 170            Hemoglobin [[id number]] (Abnormal)  Resulted: 10/24/18 1049, Result status: Final result    Ordering provider: Kathy Winchester MD  10/24/18 0000 Resulting lab: [facility]    Specimen Information    Type Source Collected On   Blood  10/24/18 1037          Components       Value Reference Range Flag Lab   Hemoglobin 7.6 13.3 - 17.7 g/dL L FrStHsLb            Urine Culture Aerobic Bacterial [[id number]] (Abnormal)  Resulted: 10/24/18 0954, Result status: Final result    Ordering provider: Radha Collins MD  10/22/18 1610 Resulting lab: INFECTIOUS DISEASE DIAGNOSTIC LABORATORY    Specimen Information    Type Source Collected On   Midstream Urine  10/22/18 1610          Components       Value Reference Range Flag Lab   Specimen Description Midstream Urine      Special Requests Specimen received in preservative   75   Culture Micro --  A 225   Result:         >100,000 colonies/mL  Candida albicans / dubliniensis  Candida albicans and Candida dubliniensis are not routinely speciated  Susceptibility testing not routinely done              Surgical pathology exam [[id number]]  Resulted: 10/24/18 0917, Result status: Final result    Ordering provider: Kathy Winchester MD  10/22/18 1600 Resulting lab: [facility]    Specimen Information    Type Source Collected On   Other (specify in comments) Hip, Left 10/22/18 1500   Comment:  GROSS ONLY          Components       Value Reference Range Flag Lab   Copath Report --      Result:         Patient Name: CHERYL HOLLOWAY MD  MR#: [id number]  Specimen #: [id number]  Collected: 10/22/2018  Received: 10/23/2018  Reported: 10/24/2018 09:15  Ordering Phy(s): KATHY WINCHESTER    For improved result  "formatting, select 'View Enhanced Report Format' under   Linked Documents section.    SPECIMEN(S):  Left hip explants    FINAL DIAGNOSIS:  Left hip hemiarthroplasty explant: Metallic hemiarthroplasty explant   identified (gross examination only).    Electronically signed out by:    DUSTIN Arce:  The specimen is labeled \"left hip hemiarthroplasty explant\" with the   patient's name and proper identification.   The specimen consists of metallic femoral head socket (5.0 x 5.0 x 3.0   cm) and metallic femoral head(2.6 x  2.6 x 2.4 cm).  Gross only, supervisor direct per Dr. Arroyo. (Dictated   by: Kameron Metcalf 10/23/2018 02:24  PM)    CPT Codes:  A: [address]-[place]    TESTING LAB LOCATION:  [address]  [address]  [phone]    COLLECTION SITE:  Client: [facility]  Location: [facility] (S)              Glucose by meter [[id number]] (Abnormal)  Resulted: 10/24/18 0346, Result status: Final result    Ordering provider: Radha Collins MD  10/24/18 0332 Resulting lab: POINT OF CARE TEST, GLUCOSE    Specimen Information    Type Source Collected On     10/24/18 0332          Components       Value Reference Range Flag Lab   Glucose 135 70 - 99 mg/dL H 170   Comment:  /RN Notified            Glucose by meter [[id number]] (Abnormal)  Resulted: 10/23/18 2007, Result status: Final result    Ordering provider: Radha Collins MD  10/23/18 1855 Resulting lab: POINT OF CARE TEST, GLUCOSE    Specimen Information    Type Source Collected On     10/23/18 1855          Components       Value Reference Range Flag Lab   Glucose 173 70 - 99 mg/dL H 170            Hemoglobin [[id number]] (Abnormal)  Resulted: 10/23/18 1703, Result status: Final result    Ordering provider: Marcos Winchester MD  10/23/18 1140 Resulting lab: [facility]    Specimen Information    Type Source Collected On   Blood  10/23/18 1655          Components       " Value Reference Range Flag Lab   Hemoglobin 7.2 13.3 - 17.7 g/dL L FrStHsLb            Glucose by meter [[id number]] (Abnormal)  Resulted: 10/23/18 1628, Result status: Final result    Ordering provider: Radha Collins MD  10/23/18 1615 Resulting lab: POINT OF CARE TEST, GLUCOSE    Specimen Information    Type Source Collected On     10/23/18 1615          Components       Value Reference Range Flag Lab   Glucose 218 70 - 99 mg/dL H 170            Glucose by meter [[id number]] (Abnormal)  Resulted: 10/23/18 1446, Result status: Final result    Ordering provider: Radha Collins MD  10/23/18 1434 Resulting lab: POINT OF CARE TEST, GLUCOSE    Specimen Information    Type Source Collected On     10/23/18 1434          Components       Value Reference Range Flag Lab   Glucose 254 70 - 99 mg/dL H 170            Glucose by meter [[id number]] (Abnormal)  Resulted: 10/23/18 1235, Result status: Final result    Ordering provider: Radha Collins MD  10/23/18 1217 Resulting lab: POINT OF CARE TEST, GLUCOSE    Specimen Information    Type Source Collected On     10/23/18 1217          Components       Value Reference Range Flag Lab   Glucose 249 70 - 99 mg/dL H 170            Basic metabolic panel [[id number]] (Abnormal)  Resulted: 10/23/18 0820, Result status: Final result    Ordering provider: Aleksandar Li MD  10/23/18 0001 Resulting lab: [facility]    Specimen Information    Type Source Collected On   Blood  10/23/18 0725          Components       Value Reference Range Flag Lab   Sodium 137 133 - 144 mmol/L  FrStHsLb   Potassium 4.6 3.4 - 5.3 mmol/L  FrStHsLb   Chloride 104 94 - 109 mmol/L  FrStHsLb   Carbon Dioxide 26 20 - 32 mmol/L  FrStHsLb   Anion Gap 7 3 - 14 mmol/L  FrStHsLb   Glucose 140 70 - 99 mg/dL H FrStHsLb   Urea Nitrogen 24 7 - 30 mg/dL  FrStHsLb   Creatinine 1.58 0.66 - 1.25 mg/dL H FrStHsLb   GFR Estimate 41 >60 mL/min/1.7m2 L FrStHsLb   Comment:  Non  GFR  Calc   GFR Estimate If Black 50 >60 mL/min/1.7m2 L FrStHsLb   Comment:  African American GFR Calc   Calcium 7.4 8.5 - 10.1 mg/dL L FrStHsLb            Hemoglobin [[id number]] (Abnormal)  Resulted: 10/23/18 0806, Result status: Final result    Ordering provider: Aleksandar Li MD  10/23/18 0001 Resulting lab: [facility]    Specimen Information    Type Source Collected On   Blood  10/23/18 0725          Components       Value Reference Range Flag Lab   Hemoglobin 7.4 13.3 - 17.7 g/dL L FrStHsLb            Glucose by meter [[id number]] (Abnormal)  Resulted: 10/23/18 0649, Result status: Final result    Ordering provider: Radha Collins MD  10/23/18 0608 Resulting lab: POINT OF CARE TEST, GLUCOSE    Specimen Information    Type Source Collected On     10/23/18 0608          Components       Value Reference Range Flag Lab   Glucose 125 70 - 99 mg/dL H 170            Glucose by meter [[id number]] (Abnormal)  Resulted: 10/23/18 0217, Result status: Final result    Ordering provider: Radha Collins MD  10/23/18 0200 Resulting lab: POINT OF CARE TEST, GLUCOSE    Specimen Information    Type Source Collected On     10/23/18 0200          Components       Value Reference Range Flag Lab   Glucose 120 70 - 99 mg/dL H 170            Glucose by meter [[id number]] (Abnormal)  Resulted: 10/22/18 2226, Result status: Final result    Ordering provider: Radha Collins MD  10/22/18 2214 Resulting lab: POINT OF CARE TEST, GLUCOSE    Specimen Information    Type Source Collected On     10/22/18 2214          Components       Value Reference Range Flag Lab   Glucose 126 70 - 99 mg/dL H 170            Glucose by meter [[id number]] (Abnormal)  Resulted: 10/22/18 1916, Result status: Final result    Ordering provider: Radha Collins MD  10/22/18 1904 Resulting lab: POINT OF CARE TEST, GLUCOSE    Specimen Information    Type Source Collected On     10/22/18 1904          Components       Value Reference  Range Flag Lab   Glucose 146 70 - 99 mg/dL H 170            Glucose by meter [[id number]] (Abnormal)  Resulted: 10/22/18 1641, Result status: Final result    Ordering provider: Radha Collins MD  10/22/18 1624 Resulting lab: POINT OF CARE TEST, GLUCOSE    Specimen Information    Type Source Collected On     10/22/18 1624          Components       Value Reference Range Flag Lab   Glucose 144 70 - 99 mg/dL H 170            UA with Microscopic reflex to Culture [[id number]] (Abnormal)  Resulted: 10/22/18 1639, Result status: Final result    Ordering provider: Marcos Winchester MD  10/22/18 1609 Resulting lab: [facility]    Specimen Information    Type Source Collected On   Midstream Urine Urine catheter 10/22/18 1610          Components       Value Reference Range Flag Lab   Color Urine Yellow   FrStHsLb   Appearance Urine Cloudy   FrStHsLb   Glucose Urine Negative NEG^Negative mg/dL  FrStHsLb   Bilirubin Urine Negative NEG^Negative  FrStHsLb   Ketones Urine 5 NEG^Negative mg/dL A FrStHsLb   Specific Calcium Urine 1.025 1.003 - 1.035  FrStHsLb   Blood Urine Moderate NEG^Negative A FrStHsLb   pH Urine 5.0 5.0 - 7.0 pH  FrStHsLb   Protein Albumin Urine 30 NEG^Negative mg/dL A FrStHsLb   Urobilinogen mg/dL Normal 0.0 - 2.0 mg/dL  FrStHsLb   Nitrite Urine Negative NEG^Negative  FrStHsLb   Leukocyte Esterase Urine Large NEG^Negative A FrStHsLb   Source Midstream Urine   FrStHsLb   WBC Urine 2396 0 - 5 /HPF H FrStHsLb   RBC Urine 62 0 - 2 /HPF H FrStHsLb   WBC Clumps Present NEG^Negative /HPF A FrStHsLb            Glucose by meter [[id number]] (Abnormal)  Resulted: 10/22/18 1125, Result status: Final result    Ordering provider: Radha Collins MD  10/22/18 1110 Resulting lab: POINT OF CARE TEST, GLUCOSE    Specimen Information    Type Source Collected On     10/22/18 1110          Components       Value Reference Range Flag Lab   Glucose 131 70 - 99 mg/dL H 170            Basic metabolic panel [[id number]]  (Abnormal)  Resulted: 10/22/18 0741, Result status: Final result    Ordering provider: Radha Collins MD  10/21/18 231 Resulting lab: [facility]    Specimen Information    Type Source Collected On   Blood  10/22/18 0718          Components       Value Reference Range Flag Lab   Sodium 138 133 - 144 mmol/L  FrStHsLb   Potassium 4.4 3.4 - 5.3 mmol/L  FrStHsLb   Chloride 105 94 - 109 mmol/L  FrStHsLb   Carbon Dioxide 27 20 - 32 mmol/L  FrStHsLb   Anion Gap 6 3 - 14 mmol/L  FrStHsLb   Glucose 108 70 - 99 mg/dL H FrStHsLb   Urea Nitrogen 27 7 - 30 mg/dL  FrStHsLb   Creatinine 1.88 0.66 - 1.25 mg/dL H FrStHsLb   GFR Estimate 34 >60 mL/min/1.7m2 L FrStHsLb   Comment:  Non  GFR Calc   GFR Estimate If Black 41 >60 mL/min/1.7m2 L FrStHsLb   Comment:  African American GFR Calc   Calcium 7.7 8.5 - 10.1 mg/dL L FrStHsLb            CBC with platelets [[id number]] (Abnormal)  Resulted: 10/22/18 0726, Result status: Final result    Ordering provider: Radha Collins MD  10/21/18 2315 Resulting lab: [facility]    Specimen Information    Type Source Collected On   Blood  10/22/18 0718          Components       Value Reference Range Flag Lab   WBC 9.4 4.0 - 11.0 10e9/L  FrStHsLb   RBC Count 2.65 4.4 - 5.9 10e12/L L FrStHsLb   Hemoglobin 7.9 13.3 - 17.7 g/dL L FrStHsLb   Hematocrit 24.4 40.0 - 53.0 % L FrStHsLb   MCV 92 78 - 100 fl  FrStHsLb   MCH 29.8 26.5 - 33.0 pg  FrStHsLb   MCHC 32.4 31.5 - 36.5 g/dL  FrStHsLb   RDW 15.8 10.0 - 15.0 % H FrStHsLb   Platelet Count 294 150 - 450 10e9/L  FrStHsLb            Glucose by meter [[id number]] (Abnormal)  Resulted: 10/22/18 0632, Result status: Final result    Ordering provider: Radha Collins MD  10/22/18 0615 Resulting lab: POINT OF CARE TEST, GLUCOSE    Specimen Information    Type Source Collected On     10/22/18 0615          Components       Value Reference Range Flag Lab   Glucose 112 70 - 99 mg/dL H 170            Testing  Performed By     Lab - Abbreviation Name Director Address Valid Date Range    14 - FrStHsLb [facility] Unknown [address] [place] [address] 05/08/15 1057 - Present    75 - Unknown [facility] Unknown [address] [facility] [address] 01/15/15 1019 - Present    88 - Unknown [facility] Unknown Unknown 10/30/02 0000 - Present    170 - Unknown POINT OF CARE TEST, GLUCOSE Unknown Unknown 10/31/11 1114 - Present    225 - Unknown [facility] Unknown [address] [facility] [address] 12/19/14 0954 - Present            Unresulted Labs (24h ago through future)    Start       Ordered    10/25/18 0600  Platelet count  (enoxaparin (LOVENOX) (Weight less than 40 kg OR CrCl 15 - 30 mL/min) *enoxaparin (LOVENOX) NOT recommended if CrCl less than 15 mL/min)  EVERY THREE DAYS,   Routine     Comments:  Repeat every 3 days while on VTE prophylaxis. If no result is listed, this lab has not been done the past 365 days. LATEST LAB RESULT: Platelet Count (10e9/L)       Date                     Value                 10/22/2018               294              ----------      10/22/18 1804    10/25/18 0600  Creatinine  EVERY THREE DAYS,   Routine     Comments:  Last Lab Result: Creatinine (mg/dL)       Date                     Value                 10/22/2018               1.88 (H)         ----------    10/22/18 1847    Unscheduled  Hemoglobin  CONDITIONAL X 2,   Routine     Comments:  IF morning Hemoglobin result is LESS than 8.0 on POD 1 and/or POD 2, release order and recheck Hemoglobin in the evening at 1700.  Notify Provider if second Hemoglobin result is LESS than 7.5.    10/22/18 1804         Imaging Results - 3 Days      XR Pelvis w Hip Port Left 1 View [[id number]]  Resulted: 10/22/18 1719, Result status: Final result    Ordering provider: Marcos Winchester MD  10/22/18 1626 Resulted by: Duncan Bettencourt MD    Performed: 10/22/18 1629 - 10/22/18 1716  Resulting lab: RADIOLOGY RESULTS    Narrative:       XR PELVIS AND HIP PORTABLE LEFT 1 VIEW 10/22/2018 5:16 PM    HISTORY: Postop.     COMPARISON: October 21, 2018.       Impression:       IMPRESSION: Status post bilateral total hip arthroplasties. Hardware  is intact. Alignment is anatomic.     ALBERTO GARDINER MD      Testing Performed By     Lab - Abbreviation Name Director Address Valid Date Range    104 - Rad Rslts RADIOLOGY RESULTS Unknown Unknown 02/16/05 1553 - Present            Encounter-Level Documents:     There are no encounter-level documents.      Order-Level Documents:     There are no order-level documents.

## 2018-10-21 NOTE — IP AVS SNAPSHOT
MRN:[id number]                      After Visit Summary   10/21/2018    Abimael Flores    MRN: [id number]           Thank you!     Thank you for choosing [facility] for your care. Our goal is always to provide you with excellent care. Hearing back from our patients is one way we can continue to improve our services. Please take a few minutes to complete the written survey that you may receive in the mail after you visit with us. Thank you!        Patient Information     Date Of Birth          1/23/1924        Designated Caregiver       Most Recent Value    Caregiver    Will someone help with your care after discharge? 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  For non-urgent questions about your medical care, please call your primary care provider or clinic, [phone]  For questions related to your surgery, please call your surgery clinic        Attending Provider     Provider Specialty    Gaurav Ryan DO Emergency Medicine    Radha Collins MD Internal Medicine       Primary Care Provider Office Phone # Fax #    Carlitos Bee [phone]      After Care Instructions     Activity - Up with assistive device           Advance Diet as Tolerated       Follow this diet upon discharge: Orders Placed This Encounter      Room Service      Advance Diet as Tolerated: Regular Diet Adult            Encourage PO fluids           Fall precautions           General info for SNF       Length  "of Stay Estimate: Short Term Care: Estimated # of Days <30  Condition at Discharge: Improving  Level of care:skilled   Rehabilitation Potential: Good  Admission H&P remains valid and up-to-date: Yes  Recent Chemotherapy: N/A  Use Nursing Home Standing Orders: Yes            Hip precautions           Mantoux instructions       Give two-step Mantoux (PPD) Per Facility Policy Yes            Weight bearing status       WBAT            Wound care       Site:   Right hip  Instructions:  Leave dressing intact if Aquacel and remove at POD #7, remove staples POD #14  Daily dressing changes if gauze and tape                  Follow-up Appointments     Follow Up and recommended labs and tests       Follow up with Dr. Winchester in 2 weeks.  No follow up labs or test are needed.  Call [facility] for appointment [phone]                  Additional Services     Occupational Therapy Adult Consult       Evaluate and treat as clinically indicated.    Reason:  Right total hip arthroplasty            Physical Therapy Adult Consult       Evaluate and treat as clinically indicated.    Reason:  Right total hip arthroplasty                  Pending Results     Date and Time Order Name Status Description    10/21/2018 2312 EKG 12-lead, tracing only Preliminary             Statement of Approval     Ordered          10/25/18 1321  I have reviewed and agree with all the recommendations and orders detailed in this document.  EFFECTIVE NOW     Approved and electronically signed by:  Silvano Kumar MD             Admission Information     Date & Time Provider Department Dept. Phone    10/21/2018 Radha Collins MD [facility] [address] [facility] [phone]      Your Vitals Were     Blood Pressure Pulse Temperature Respirations Height Weight    118/75 (BP Location: Left arm) 87 98.6  F (37  C) 16 1.753 m (5' 9\") 74.5 kg (164 lb 3.2 oz)    Pulse Oximetry BMI (Body Mass Index)                96% 24.25 kg/m2          MyChart Information  " "   [employer] lets you send messages to your doctor, view your test results, renew your prescriptions, schedule appointments and more. To sign up, go to [email or web address] . Click on \"Log in\" on the left side of the screen, which will take you to the Welcome page. Then click on \"Sign up Now\" on the right side of the page.     You will be asked to enter the access code listed below, as well as some personal information. Please follow the directions to create your username and password.     Your access code is: [email or web address]  Expires: 2018 12:59 PM     Your access code will  in 90 days. If you need help or a new code, please call your [facility] clinic or [phone].        Care EveryWhere ID     This is your Care EveryWhere ID. This could be used by other organizations to access your [facility] medical records  [id number]        Equal Access to Services     NICOL HANNON : Heidi Valenzuela, chandra canas, karla kaalmadani sheldon, kylah schuster . So [facility] [phone].    ATENCIÓN: Si habla español, tiene a funes disposición servicios gratuitos de asistencia lingüística. [facility] [phone].    We comply with applicable federal civil rights laws and [place] laws. We do not discriminate on the basis of race, color, national origin, age, disability, sex, sexual orientation, or gender identity.               Review of your medicines      START taking        Dose / Directions    ondansetron 4 MG ODT tab   Commonly known as:  ZOFRAN-ODT   Used for:  Status post total replacement of left hip        Dose:  4 mg   Take 1 tablet (4 mg) by mouth every 6 hours as needed for nausea or vomiting   Quantity:  15 tablet   Refills:  0         CONTINUE these medicines which may have CHANGED, or have new prescriptions. If we are uncertain of the size of tablets/capsules you have at home, strength may be listed as something that might have changed.        Dose / Directions    " acetaminophen 325 MG tablet   Commonly known as:  TYLENOL   This may have changed:  reasons to take this   Used for:  Status post total replacement of left hip        Dose:  650 mg   Take 2 tablets (650 mg) by mouth every 4 hours as needed for mild pain   Quantity:  40 tablet   Refills:  0       traMADol 50 MG tablet   Commonly known as:  ULTRAM   This may have changed:    - when to take this  - reasons to take this   Used for:  Status post total replacement of left hip        Dose:  50 mg   Take 1 tablet (50 mg) by mouth every 4 hours as needed for moderate pain   Quantity:  30 tablet   Refills:  0         CONTINUE these medicines which have NOT CHANGED        Dose / Directions    aspirin 325 MG EC tablet   Used for:  Closed fracture of neck of right femur, initial encounter (H)        Take one Aspirin tab twice daily for 5 weeks.   Quantity:  30 tablet   Refills:  0       atorvastatin 10 MG tablet   Commonly known as:  LIPITOR   Used for:  Coronary artery disease involving native heart without angina pectoris, unspecified vessel or lesion type        Dose:  10 mg   Take 1 tablet (10 mg) by mouth every evening   Refills:  0       ANIBAL PROTECT EX        Apply topically 2 times daily   Refills:  0       diltiazem 120 MG 24 hr capsule   Commonly known as:  DILACOR XR        Dose:  120 mg   Take 120 mg by mouth daily   Refills:  0       DRISDOL 04141 units Caps   Used for:  Fracture of hip, left, closed, initial encounter (H)        Dose:  46719 Units   Take 50,000 Units by mouth every 7 days for 5 doses   Quantity:  5 capsule   Refills:  0       insulin glargine 100 UNIT/ML injection   Commonly known as:  LANTUS        Dose:  7 Units   Inject 7 Units Subcutaneous every morning   Refills:  0       * insulin lispro 100 UNIT/ML injection   Commonly known as:  HumaLOG        Inject Subcutaneous 3 times daily (before meals) Sliding scale:  -189=1 unit -239=2 units -289=3 units -339=4 units BG  340-399=5 units -499=6 units +=7 units   Refills:  0       * HumaLOG KWIKpen 100 UNIT/ML injection   Generic drug:  insulin lispro        Inject Subcutaneous At Bedtime Sliding scale: -249=1 unit -299=2 units -349=3 units -399=4 units +=5 units   Refills:  0       * insulin lispro 100 UNIT/ML injection   Commonly known as:  HumaLOG KWIKpen   Used for:  Type 2 diabetes mellitus with complication, with long-term current use of insulin (H)        Dose:  2 Units   Inject 2 Units Subcutaneous 3 times daily (before meals)   Refills:  0       metoprolol succinate 50 MG 24 hr tablet   Commonly known as:  TOPROL-XL   Used for:  Paroxysmal supraventricular tachycardia (H)        Dose:  50 mg   Take 1 tablet (50 mg) by mouth daily   Quantity:  30 tablet   Refills:  0       polyethylene glycol Packet   Commonly known as:  MIRALAX/GLYCOLAX        Dose:  17 g   Take 17 g by mouth daily   Refills:  0       senna-docusate 8.6-50 MG per tablet   Commonly known as:  SENOKOT-S;PERICOLACE   Used for:  Dislocation of left hip, initial encounter (H)        Dose:  1 tablet   Take 1 tablet by mouth 2 times daily   Quantity:  60 tablet   Refills:  1       tamsulosin 0.4 MG capsule   Commonly known as:  FLOMAX        Dose:  0.4 mg   Take 0.4 mg by mouth every evening   Refills:  0       * Notice:  This list has 3 medication(s) that are the same as other medications prescribed for you. Read the directions carefully, and ask your doctor or other care provider to review them with you.         Where to get your medicines      Some of these will need a paper prescription and others can be bought over the counter. Ask your nurse if you have questions.     Bring a paper prescription for each of these medications     acetaminophen 325 MG tablet    aspirin 325 MG EC tablet    ondansetron 4 MG ODT tab    traMADol 50 MG tablet                Protect others around you: Learn how to safely use, store and throw  away your medicines at www.disposemymeds.org.        Information about OPIOIDS     PRESCRIPTION OPIOIDS: WHAT YOU NEED TO KNOW   We gave you an opioid (narcotic) pain medicine. It is important to manage your pain, but opioids are not always the best choice. You should first try all the other options your care team gave you. Take this medicine for as short a time (and as few doses) as possible.    Some activities can increase your pain, such as bandage changes or therapy sessions. It may help to take your pain medicine 30 to 60 minutes before these activities. Reduce your stress by getting enough sleep, working on hobbies you enjoy and practicing relaxation or meditation. Talk to your care team about ways to manage your pain beyond prescription opioids.    These medicines have risks:    DO NOT drive when on new or higher doses of pain medicine. These medicines can affect your alertness and reaction times, and you could be arrested for driving under the influence (DUI). If you need to use opioids long-term, talk to your care team about driving.    DO NOT operate heavy machinery    DO NOT do any other dangerous activities while taking these medicines.    DO NOT drink any alcohol while taking these medicines.     If the opioid prescribed includes acetaminophen, DO NOT take with any other medicines that contain acetaminophen. Read all labels carefully. Look for the word  acetaminophen  or  Tylenol.  Ask your pharmacist if you have questions or are unsure.    You can get addicted to pain medicines, especially if you have a history of addiction (chemical, alcohol or substance dependence). Talk to your care team about ways to reduce this risk.    All opioids tend to cause constipation. Drink plenty of water and eat foods that have a lot of fiber, such as fruits, vegetables, prune juice, apple juice and high-fiber cereal. Take a laxative (Miralax, milk of magnesia, Colace, Senna) if you don t move your bowels at least every  other day. Other side effects include upset stomach, sleepiness, dizziness, throwing up, tolerance (needing more of the medicine to have the same effect), physical dependence and slowed breathing.    Store your pills in a secure place, locked if possible. We will not replace any lost or stolen medicine. If you don t finish your medicine, please throw away (dispose) as directed by your pharmacist. The [place] [employer] has more information about safe disposal: https://www.pca.[facility].[place].us/living-green/managing-unwanted-medications             Medication List: This is a list of all your medications and when to take them. Check marks below indicate your daily home schedule. Keep this list as a reference.      Medications           Morning Afternoon Evening Bedtime As Needed    acetaminophen 325 MG tablet   Commonly known as:  TYLENOL   Take 2 tablets (650 mg) by mouth every 4 hours as needed for mild pain   Last time this was given:  650 mg on 10/24/2018  8:13 PM                                aspirin 325 MG EC tablet   Take one Aspirin tab twice daily for 5 weeks.                                atorvastatin 10 MG tablet   Commonly known as:  LIPITOR   Take 1 tablet (10 mg) by mouth every evening   Last time this was given:  10 mg on 10/24/2018  8:14 PM                                ANIBAL PROTECT EX   Apply topically 2 times daily                                diltiazem 120 MG 24 hr capsule   Commonly known as:  DILACOR XR   Take 120 mg by mouth daily   Last time this was given:  120 mg on 10/25/2018  9:09 AM                                DRISDOL 38914 units Caps   Take 50,000 Units by mouth every 7 days for 5 doses                                insulin glargine 100 UNIT/ML injection   Commonly known as:  LANTUS   Inject 7 Units Subcutaneous every morning   Last time this was given:  7 Units on 10/25/2018  9:09 AM                                * insulin lispro 100 UNIT/ML injection   Commonly known  as:  HumaLOG   Inject Subcutaneous 3 times daily (before meals) Sliding scale:  -189=1 unit -239=2 units -289=3 units -339=4 units -399=5 units -499=6 units +=7 units                                * HumaLOG KWIKpen 100 UNIT/ML injection   Inject Subcutaneous At Bedtime Sliding scale: -249=1 unit -299=2 units -349=3 units -399=4 units +=5 units   Generic drug:  insulin lispro                                * insulin lispro 100 UNIT/ML injection   Commonly known as:  HumaLOG KWIKpen   Inject 2 Units Subcutaneous 3 times daily (before meals)                                metoprolol succinate 50 MG 24 hr tablet   Commonly known as:  TOPROL-XL   Take 1 tablet (50 mg) by mouth daily   Last time this was given:  50 mg on 10/25/2018  9:09 AM                                ondansetron 4 MG ODT tab   Commonly known as:  ZOFRAN-ODT   Take 1 tablet (4 mg) by mouth every 6 hours as needed for nausea or vomiting                                polyethylene glycol Packet   Commonly known as:  MIRALAX/GLYCOLAX   Take 17 g by mouth daily                                senna-docusate 8.6-50 MG per tablet   Commonly known as:  SENOKOT-S;PERICOLACE   Take 1 tablet by mouth 2 times daily                                tamsulosin 0.4 MG capsule   Commonly known as:  FLOMAX   Take 0.4 mg by mouth every evening   Last time this was given:  0.4 mg on 10/24/2018  8:14 PM                                traMADol 50 MG tablet   Commonly known as:  ULTRAM   Take 1 tablet (50 mg) by mouth every 4 hours as needed for moderate pain   Last time this was given:  25 mg on 10/25/2018  2:26 PM                                * Notice:  This list has 3 medication(s) that are the same as other medications prescribed for you. Read the directions carefully, and ask your doctor or other care provider to review them with you.

## 2018-10-21 NOTE — IP AVS SNAPSHOT
` `     [place] [address] [facility]: [phone]                 INTERAGENCY TRANSFER FORM - NOTES (H&P, Discharge Summary, Consults, Procedures, Therapies)   10/21/2018                    Hospital Admission Date: 10/21/2018  ABIMAEL HOLLOWAY   : 1924  Sex: Male        Patient PCP Information     Provider PCP Type    CARLITOS BEE General         History & Physicals      H&P signed by Radha Collins MD at 10/22/2018 12:15 AM      Author:  Radha Collins MD Service:  Hospitalist Author Type:  Physician    Filed:  10/22/2018 12:15 AM Date of Service:  10/21/2018 10:59 PM Creation Time:  10/21/2018 11:49 PM    Status:  Signed :  Radha Collins MD (Physician)         Admitted:     10/21/2018      PRIMARY CARE PHYSICIAN:  Carlitos Bee MD      CHIEF COMPLAINT:  Left hip pain.      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  The patient states earlier he bent over to get his shoes off and he heard a pop in his left hip similar to his recent hip dislocation on 10/14/2018.  Following this, he had severe pain in his left hip and was not able to ambulate.  He has been referred to the Emergency Department for further evaluation.  The patient reports no cough, dyspnea, chest pain, palpitation, fever, nausea, vomiting or other complaints.      In the Emergency Department, the patient has been evaluated by Dr. Ryan, the ER attending physician.  The x-ray of the left hip has revealed dislocation of left hemiarthroplasty.  The case was communicated to Dr. Winchester on-call orthopedic surgeon who recommended an attempt for  reduction in the Emergency Department.  The patient received propofol and had reduction done by Dr. Ryan, however, it was partially reduced as a repeat x-ray revealed the femoral component no longer within the acetabular component and the acetabular component overlies the greater trochanter.  Dr. Winchester of Orthopedic Service was called again who recommended that patient be admitted to the hospital for revision of the left hip arthroplasty tomorrow.      PAST MEDICAL HISTORY:   1.  Coronary artery disease, status post CABG x4 in 1996.   2.  Hypertension.   3.  Dyslipidemia.   4.  Diabetes mellitus type 2 with the most recent hemoglobin A1c of 8.4% on 08/25/2018.   5.  Chronic kidney disease stage 3-4 with the recent creatinine around 1.7-1.9.   6.  History of nonsustained V-tach in 08/19.   7.  Question paroxysmal atrial fibrillation/SVT in 08/2018.  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  The patient did not wish to be on anticoagulation regardless.   8.  Benign prostatic hypertrophy.   9.  Chronic anemia with a recent acute blood loss anemia due to left hip surgery.  The most recent hemoglobin was 8.2 in 10/16/2018.      PAST SURGICAL HISTORY:[MA1.1]   Past Surgical History:   Procedure Laterality Date     CARDIAC SURGERY       CATARACT IOL, RT/LT Bilateral      CLOSED REDUCTION HIP Left 10/14/2018    Procedure: CLOSED REDUCTION HIP;  CLOSED REDUCTION HIP;  Surgeon: Milton Gusman MD;  Location:  OR     lacrimal caruncle removed BE Bilateral ~1989     OPEN REDUCTION INTERNAL FIXATION HIP BIPOLAR Right 8/26/2018    Procedure: OPEN REDUCTION INTERNAL FIXATION HIP BIPOLAR;  Right Hip Bipolar Hemiarthroplasty (Biomet);  Surgeon: Bill Sanders MD;  Location:  OR     OPEN REDUCTION INTERNAL FIXATION HIP BIPOLAR Left 10/4/2018    Procedure: OPEN REDUCTION INTERNAL FIXATION HIP BIPOLAR;  LEFT HIP TONYA ARTHROPLASTY;  Surgeon: Terry  Bruno Henry MD;  Location:  OR     REPAIR RUPTURED GLOBE  4/21/2014    Procedure: Exploration and Repair of Ruptured Globe ;  Surgeon: Mercedes Rivera MD;  Location:  OR[MA1.2]        FAMILY HISTORY:  Significant for diabetes in his parents.      SOCIAL HISTORY:  The patient is a retired surgeon.  He used to smoke cigars but quit many years ago.  He denies drinking alcohol.  He does not use illicit drugs.  He does use a walker for ambulation.  He is currently undergoing rehab at [place].     MEDICATIONS:[MA1.1]    Prior to Admission Medications   Prescriptions Last Dose Informant Patient Reported? Taking?   Ergocalciferol (VITAMIN D) 47399 units CAPS  Nursing Home No No   Sig: Take 50,000 Units by mouth every 7 days for 5 doses   Skin Protectants, Misc. (ANIBAL PROTECT EX)  Nursing Home Yes No   Sig: Apply topically 2 times daily   acetaminophen (TYLENOL) 325 MG tablet   No No   Sig: Take 2 tablets (650 mg) by mouth every 4 hours as needed for other (multimodal surgical pain management along with NSAIDS and opioid medication as indicated based on pain control and physical function.)   aspirin 325 MG EC tablet  Nursing Home No No   Sig: Take one Aspirin tab twice daily for 5 weeks.   atorvastatin (LIPITOR) 10 MG tablet  Nursing Home No No   Sig: Take 1 tablet (10 mg) by mouth every evening   diltiazem (TIAZAC) 120 MG 24 hr ER beaded capsule  Nursing Home Yes No   Sig: Take 120 mg by mouth daily   insulin glargine (LANTUS) 100 UNIT/ML injection  Nursing Home Yes No   Sig: Inject 7 Units Subcutaneous every morning   insulin lispro (HUMALOG KWIKPEN) 100 UNIT/ML injection  Nursing Home No No   Sig: Inject 2 Units Subcutaneous 3 times daily (before meals)   insulin lispro (HUMALOG KWIKPEN) 100 UNIT/ML injection  Nursing Home Yes No   Sig: Inject Subcutaneous At Bedtime Sliding scale:  -249=1 unit  -299=2 units  -349=3 units  -399=4 units  +=5 units   insulin lispro (HUMALOG) 100 UNIT/ML  injection  Nursing Home Yes No   Sig: Inject Subcutaneous 3 times daily (before meals) Sliding scale:   -189=1 unit  -239=2 units  -289=3 units  -339=4 units  -399=5 units  -499=6 units  +=7 units   metoprolol succinate (TOPROL-XL) 50 MG 24 hr tablet  Nursing Home No No   Sig: Take 1 tablet (50 mg) by mouth daily   oxyCODONE IR (ROXICODONE) 5 MG tablet   No No   Sig: Take 1-2 tablets (5-10 mg) by mouth every 4 hours as needed for severe pain   polyethylene glycol (MIRALAX/GLYCOLAX) Packet  Nursing Home Yes No   Sig: Take 17 g by mouth daily   senna-docusate (SENOKOT-S;PERICOLACE) 8.6-50 MG per tablet   No No   Sig: Take 1 tablet by mouth 2 times daily   tamsulosin (FLOMAX) 0.4 MG capsule  Nursing Home Yes No   Sig: Take 0.4 mg by mouth every evening    traMADol (ULTRAM) 50 MG tablet   No No   Sig: Take 1 tablet (50 mg) by mouth every 6 hours      Facility-Administered Medications: None[MA1.2]     ALLERGIES:  NONE.      REVIEW OF SYSTEMS:  A 10-point review of system was performed thoroughly and was negative except as stated in the history of present illness.      PHYSICAL EXAMINATION:   GENERAL:  This patient is a very pleasant elderly gentleman who is in no acute distress.   VITAL SIGNS:  Blood pressure 126/60, heart rate 76, respiratory rate 10, temperature 97.7 degrees Fahrenheit, oxygen saturation 100% on room air.   HEENT:  The pupils are reactive to light bilaterally.  There is mild conjunctival pallor.  The sclerae are anicteric.  The oral mucosa appears moist.   NECK:  Supple.  There is no elevated JVP.   LUNGS:  Clear to auscultation bilaterally.   CARDIOVASCULAR:  There is normal S1 and S2 with regular rate and rhythm.   ABDOMEN:  Soft, nontender, nondistended.  Bowel sounds present.   EXTREMITIES:  There is trace bilateral ankle edema.   SKIN:  There is no jaundice, cyanosis or rash.   NEUROLOGIC:  He is awake, alert and appears to be oriented almost x3.  The  speech is normal.  There is no focal deficit on gross examination.      LABORATORY DATA:  Glucose 101.      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  He is being admitted to the hospital under inpatient status.   1.  Left hip dislocation, recurrent:  The patient has a history of left hip hemiarthroplasty on 10/04 after the patient sustained a hip fracture following a fall.  He was readmitted on 10/14 for left hip dislocation for which he underwent closed reduction under general anesthesia on 10/15.  Subsequently, he was discharged to [place] for rehabilitation.  Earlier today he bent over to get his shoes off and he heard a pop and subsequently developed severe pain in his left hip pain.  The x-ray in the Emergency Department indicated posterior superiorly dislocation of the left hemiarthroplasty.  An attempt at reduction in the emergency department was partially successful, however, the repeat x-ray revealed the femoral component was no longer in the acetabular component.  The case was communicated to Dr. Winchester, the on-call orthopedic surgeon, who has recommended a revision hemiarthroplasty.  Management as below.    A.  His revised cardiac risk index is calculated at more than 11%.     B.  NPO after midnight.   C.  PRN acetaminophen, oxycodone and Dilaudid are available.   D.  IV normal saline at a rate of 75 mL per hour.   E  Formal orthopedic consult.   F.  Fall precautions.   G.  Preop standard labs/EKG.   2.  Hypertension:  The blood pressure is currently controlled.  At home he is on Toprol XL 50 mg p.o. daily as diltiazem 120 mg p.o. daily, which we will continue.  I will have IV hydralazine available p.r.n. for systolic blood pressure more than  170.   3.  Dyslipidemia:  He will continue on prior to admission Lipitor.   4.  Benign prostatic hypertrophy.  He will continue on prior to admission Flomax.   5.  Chronic kidney disease, stage III-IV.  His baseline creatinine is in the range of 1.7-1.9.  His most recent creatinine was 1.69 on 10/15/2018.  I will order a BMP and monitor his renal function closely during this hospitalization.  We will avoid NSAIDs or other nephrotoxins.   6.  Chronic anemia:  His baseline hemoglobin is around 9.  However, after his hip surgery, his hemoglobin dropped to 7.5 on 10/06/2018, due to acute blood loss.  His most recent hemoglobin was 8.2 before discharge from the hospital.  His hemoglobin will be closely monitored during this hospitalization.   7.  Coronary artery disease:  The patient is status post CABG x4 in 1996.  There is no recent cardiac workup.  At this juncture he reports no chest pain.  I will obtain an EKG for preop evaluation.  He will continue on prior to admission metoprolol and Lipitor.  His aspirin will be held until after surgery.   8.  History of nonsustained ventricular  tachycardia/question paroxysmal atrial fibrillation/supraventricular tachycardia:  It occurred during the hospitalization in 08/2018, following his right hip fracture.  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  He is not on anticoagulation therapy.  He will continue on prior to admission Toprol XL 50 mg p.o. daily.   9.  Diabetes mellitus type 2:  The most recent hemoglobin A1c was 8.4% on 08/25/2018.  At home he is on Lantus 7 units subcutaneous every morning and Humalog 2 units subQ t.i.d.  Due to n.p.o. status, I will reduce the dose of Lantus to 4 units subQ daily and hold his prior to admission Humalog.  I will place the patient on a sliding scale NovoLog.   10.   Deep venous thrombosis prophylaxis:  Pneumatic compression device for now.   11.  Code status:  It was discussed with the patient.  The patient stated that he wants to continue with DNR/DNI status as established previously.   12.  Disposition:  At least 2-3 days of inpatient management.      I would like to thank Dr. Carlitos Bee for allowing the Hospitalist Service to participate in the care of this patient.         ABDIFATAH GROSS MD             D: 10/21/2018   T: 10/22/2018   MT: GIN      Name:     ABIMAEL HOLLOWAY   MRN:      [id number]        Account:      [id number]   :      1924        Admitted:     10/21/2018                   Document: [id number]       cc: Carlitos Bee MD[MA1.1]        Revision History        User Key Date/Time User Provider Type Action    > MA1.1 10/22/2018 12:15 AM Abdifatah Gross MD Physician Sign     MA1.2 10/22/2018 12:10 AM Abdifatah Gross MD Physician      [N/A] 10/21/2018 11:49 PM Abdifatah Gross MD Physician Edit                     Discharge Summaries      Discharge Summaries by Silvano Kumar MD at 10/25/2018 12:34 PM     Author:  Silvano Kumar MD Service:  Hospitalist Author Type:  Physician    Filed:  10/25/2018  1:22 PM Date of Service:  10/25/2018 12:34 PM Creation Time:  10/25/2018  1:10 PM    Status:  Signed :  Silvano Kumar MD (Physician)             Discharge Summary  Hospitalist    Date of Admission:  10/21/2018  Date of Discharge:[NH1.1]  10/25/2018[NH1.2]  Discharging Provider:[NH1.1] Silvano Kumar[NH1.2], MD  Date of Service (when I saw the patient):[NH1.1] 10/25/18    Discharge Diagnoses[NH1.2]     Left hip dislocation[NH1.1]    History of Present Illness[NH1.2]      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  The following problems were addressed during his hospitalization:     1.  Left hip dislocation, recurrent:    The patient has a history of left hip hemiarthroplasty on 10/04 after the patient sustained a hip fracture following a fall.  He was readmitted on 10/14 for left hip dislocation for which he underwent closed reduction under general anesthesia on 10/15.  Subsequently, he was discharged to [place] for rehabilitation.  Earlier today he bent over to get his shoes off and he heard a pop and subsequently developed severe pain in his left hip pain.  The x-ray in the Emergency Department indicated posterior superiorly dislocation of the left hemiarthroplasty.  An attempt at reduction in the emergency department was partially successful, however, the repeat x-ray revealed the femoral component was no longer in the acetabular component.  The case was communicated to Dr. Winchester, the on-call orthopedic surgeon, who has recommended a revision hemiarthroplasty.[NH1.1] S[NH1.3]/p arthroplasty revision of left hip 10/22   Plan:  - PT/OT at TCU  - Tramadol/Tylenol as needed for pain at discharge     2.  Hypertension:    At home he is on Toprol XL 50 mg p.o. daily as diltiazem 120 mg p.o. daily, which we will continue at discharge     3.  Dyslipidemia:    - He will continue on prior to admission Lipitor.   4.  Benign prostatic hypertrophy.    - He will continue on prior to admission Flomax.      5.  Chronic kidney disease, stage III-IV.    His baseline creatinine is in the range of 1.7-1.9.  His most recent creatinine was 1.69 on 10/15/2018.  I will order a BMP and monitor his renal function closely during this hospitalization.  We will avoid NSAIDs or other nephrotoxins.   - Cr below baseline at discharge     6.  Chronic anemia:   His baseline hemoglobin is around 9.  However, after his hip surgery, his hemoglobin dropped to 7.5 on 10/06/2018, due to  acute blood loss.  His most recent hemoglobin was 8.2 before discharge from the hospital.  His hemoglobin will be closely monitored during this hospitalization.   - pre-op HGB today 7.6, stable at discharge at 7.5     7.  Coronary artery disease:    The patient is status post CABG x4 in 1996.  There is no recent cardiac workup.  At this juncture he reports no chest pain.  I will obtain an EKG for preop evaluation.  He will continue on prior to admission metoprolol and Lipitor.  His aspirin will be held until after surgery.   - Resume ASA     8.  History of nonsustained ventricular  tachycardia/question paroxysmal atrial fibrillation/supraventricular tachycardia:   It occurred during the hospitalization in 08/2018, following his right hip fracture.  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  He is not on anticoagulation therapy.  He will continue on prior to admission Toprol XL 50 mg p.o. Daily at discharge       9.  Diabetes mellitus type 2:    The most recent hemoglobin A1c was 8.4% on 08/25/2018.  At home he is on Lantus 7 units subcutaneous every morning and Humalog 2 units subQ t.i.d.   - Resume Lantus 7U in AM    # Discharge Pain Plan:  - During his hospitalization, Abimael experienced pain due to hip dislocation.  The pain plan for discharge was discussed with Abimael and the plan was created in a collaborative fashion.      Silvano Kumar MD[NH1.1]    Significant Results and Procedures[NH1.2]     None[NH1.1]    Pending Results     Unresulted Labs Ordered in the Past 30 Days of this Admission     Date and Time Order Name Status Description    10/8/2018 0535 T4 free In process         Code Status[NH1.2]   Full Code[NH1.1]       Primary Care Physician   RAISSA HILL    Physical Exam   Temp: 98.6  F (37  C) Temp src: Oral BP: 118/75 Pulse: 87 Heart  Rate: 78 Resp: 16 SpO2: 96 % O2 Device: None (Room air)    Vitals:    10/21/18 1943 10/25/18 0704   Weight: 72.6 kg (160 lb) 74.5 kg (164 lb 3.2 oz)[NH1.2]     Vital Signs with Ranges[NH1.1]  Temp:  [97.6  F (36.4  C)-98.6  F (37  C)] 98.6  F (37  C)  Pulse:  [85-87] 87  Heart Rate:  [65-97] 78  Resp:  [16-18] 16  BP: (101-118)/(45-75) 118/75  SpO2:  [94 %-97 %] 96 %  I/O last 3 completed shifts:  In: 360 [P.O.:360]  Out: 1050 [Urine:1050][NH1.2]    Constitutional: Awake, alert, cooperative, no apparent distress  Respiratory: Clear to auscultation bilaterally, no crackles or wheezing  Cardiovascular: Regular rate and rhythm, normal S1 and S2, and no murmur noted  GI: Normal bowel sounds, soft, non-distended, non-tender  Skin/Integumen: No rashes, no cyanosis, no edema  MSK: limited ROM in LLE due to pain  Other: Normal mood and affect[NH1.1]      Discharge Disposition[NH1.2]   Discharged to rehabilitation facility  Condition at discharge: Stable[NH1.1]    Consultations This Hospital Stay   ORTHOPEDIC SURGERY IP CONSULT  OCCUPATIONAL THERAPY ADULT IP CONSULT  PHYSICAL THERAPY ADULT IP CONSULT  PHYSICAL THERAPY ADULT IP CONSULT  OCCUPATIONAL THERAPY ADULT IP CONSULT    Time Spent on this Encounter[NH1.2]   I, Silvano Kumar, personally saw the patient today and spent greater than 30 minutes discharging this patient.[NH1.1]    Discharge Orders     General info for SNF   Length of Stay Estimate: Short Term Care: Estimated # of Days <30  Condition at Discharge: Improving  Level of care:skilled   Rehabilitation Potential: Good  Admission H&P remains valid and up-to-date: Yes  Recent Chemotherapy: N/A  Use Nursing Home Standing Orders: Yes     Mantoux instructions   Give two-step Mantoux (PPD) Per Facility Policy Yes     Reason for your hospital stay   Right total hip arthroplasty converted from right hip hemiarthroplasty secondary to dislocations     Wound care   Site:   Right hip  Instructions:  Leave dressing intact if  Aquacel and remove at POD #7, remove staples POD #14  Daily dressing changes if gauze and tape     Follow Up and recommended labs and tests   Follow up with Dr. Winchester in 2 weeks.  No follow up labs or test are needed.  Call [place] for appointment [phone]     Activity - Up with assistive device     Weight bearing status   WBAT     Encourage PO fluids     Physical Therapy Adult Consult   Evaluate and treat as clinically indicated.    Reason:  Right total hip arthroplasty     Occupational Therapy Adult Consult   Evaluate and treat as clinically indicated.    Reason:  Right total hip arthroplasty     Fall precautions     Hip precautions     Advance Diet as Tolerated   Follow this diet upon discharge: Orders Placed This Encounter     Room Service     Advance Diet as Tolerated: Regular Diet Adult       Discharge Medications   Current Discharge Medication List      START taking these medications    Details   ondansetron (ZOFRAN-ODT) 4 MG ODT tab Take 1 tablet (4 mg) by mouth every 6 hours as needed for nausea or vomiting  Qty: 15 tablet, Refills: 0    Associated Diagnoses: Status post total replacement of left hip         CONTINUE these medications which have CHANGED    Details   acetaminophen (TYLENOL) 325 MG tablet Take 2 tablets (650 mg) by mouth every 4 hours as needed for mild pain  Qty: 40 tablet, Refills: 0    Associated Diagnoses: Status post total replacement of left hip      aspirin 325 MG EC tablet Take one Aspirin tab twice daily for 5 weeks.  Qty: 30 tablet, Refills: 0    Associated Diagnoses: Closed fracture of neck of right femur, initial encounter (H)      traMADol (ULTRAM) 50 MG tablet Take 1 tablet (50 mg) by mouth every 4 hours as needed for moderate pain  Qty: 30 tablet, Refills: 0    Associated Diagnoses: Status post total replacement of left hip         CONTINUE these medications which have NOT CHANGED    Details   atorvastatin (LIPITOR) 10 MG tablet Take 1 tablet (10 mg) by mouth every evening     Associated Diagnoses: Coronary artery disease involving native heart without angina pectoris, unspecified vessel or lesion type      diltiazem (DILACOR XR) 120 MG 24 hr capsule Take 120 mg by mouth daily      Ergocalciferol (VITAMIN D) 87795 units CAPS Take 50,000 Units by mouth every 7 days for 5 doses  Qty: 5 capsule, Refills: 0    Associated Diagnoses: Fracture of hip, left, closed, initial encounter (H)      insulin glargine (LANTUS) 100 UNIT/ML injection Inject 7 Units Subcutaneous every morning      !! insulin lispro (HUMALOG KWIKPEN) 100 UNIT/ML injection Inject Subcutaneous At Bedtime Sliding scale:  -249=1 unit  -299=2 units  -349=3 units  -399=4 units  +=5 units      !! insulin lispro (HUMALOG KWIKPEN) 100 UNIT/ML injection Inject 2 Units Subcutaneous 3 times daily (before meals)    Associated Diagnoses: Type 2 diabetes mellitus with complication, with long-term current use of insulin (H)      insulin lispro (HUMALOG) 100 UNIT/ML injection Inject Subcutaneous 3 times daily (before meals) Sliding scale:   -189=1 unit  -239=2 units  -289=3 units  -339=4 units  -399=5 units  -499=6 units  +=7 units      metoprolol succinate (TOPROL-XL) 50 MG 24 hr tablet Take 1 tablet (50 mg) by mouth daily  Qty: 30 tablet    Associated Diagnoses: Paroxysmal supraventricular tachycardia (H)      polyethylene glycol (MIRALAX/GLYCOLAX) Packet Take 17 g by mouth daily      senna-docusate (SENOKOT-S;PERICOLACE) 8.6-50 MG per tablet Take 1 tablet by mouth 2 times daily  Qty: 60 tablet, Refills: 1    Associated Diagnoses: Dislocation of left hip, initial encounter (H)      Skin Protectants, Misc. (ANIBAL PROTECT EX) Apply topically 2 times daily      tamsulosin (FLOMAX) 0.4 MG capsule Take 0.4 mg by mouth every evening        !! - Potential duplicate medications found. Please discuss with provider.      STOP taking these medications       diltiazem (TIAZAC) 120  MG 24 hr ER beaded capsule Comments:   Reason for Stopping:             Allergies   No Known Allergies  Data[NH1.2]   Most Recent 3 CBC's:[NH1.1]  Recent Labs   Lab Test  10/25/18   0705  10/24/18   1037  10/23/18   1655   10/22/18   0718   10/15/18   0715  10/14/18   1220   WBC   --    --    --    --   9.4   --   8.6  10.8   HGB  7.5*  7.6*  7.2*   < >  7.9*   < >  7.9*  8.6*   MCV   --    --    --    --   92   --   91  91   PLT  227   --    --    --   294   --   316  355    < > = values in this interval not displayed.[NH1.2]      Most Recent 3 BMP's:[NH1.1]  Recent Labs   Lab Test  10/25/18   0705  10/23/18   0725  10/22/18   0718   NA  137  137  138   POTASSIUM  3.8  4.6  4.4   CHLORIDE  104  104  105   CO2  26  26  27   BUN  25  24  27   CR  1.49*  1.58*  1.88*   ANIONGAP  7  7  6   JOSELINE  8.0*  7.4*  7.7*   GLC  165*  140*  108*[NH1.2]     Most Recent 2 LFT's:[NH1.1]  Recent Labs   Lab Test  10/09/18   0703   AST  22   ALT  12   ALKPHOS  72   BILITOTAL  0.6[NH1.2]     Most Recent INR's and Anticoagulation Dosing History:  Anticoagulation Dose History     Recent Dosing and Labs Latest Ref Rng & Units 4/21/2014 10/14/2018    INR 0.86 - 1.14 0.91 1.08        Most Recent 3 Troponin's:[NH1.1]  Recent Labs   Lab Test  10/10/18   0700  10/09/18   1302  10/09/18   0703   04/21/14   1656   TROPI  0.134*  0.143*  0.157*   < >   --    TROPONIN   --    --    --    --   0.03    < > = values in this interval not displayed.[NH1.2]     Most Recent Cholesterol Panel:[NH1.1]No lab results found.[NH1.2]  Most Recent 6 Bacteria Isolates From Any Culture (See EPIC Reports for Culture Details):[NH1.1]  Recent Labs   Lab Test  10/22/18   1610  09/06/18   1050  08/27/18   2330   CULT  >100,000 colonies/mL  Candida albicans / dubliniensis  Candida albicans and Candida dubliniensis are not routinely speciated  Susceptibility testing not routinely done  *  No growth  No growth[NH1.2]     Most Recent TSH, T4 and A1c Labs:[NH1.1]  Recent  Labs   Lab Test  10/08/18   0535  08/25/18   1954   TSH  4.57*   --    T4  1.28   --    A1C   --   8.4*     Results for orders placed or performed during the hospital encounter of 10/21/18   XR Pelvis w Hip Left 1 View    Narrative    XR PELVIS AND HIP LEFT 1 VIEW 10/21/2018 8:51 PM     HISTORY: hip pain; r/o fracture/dislocation;       Impression    IMPRESSION: Bilateral hip hemiarthroplasties. The left  hemiarthroplasty appears to be posterior superiorly dislocated.    KARON ALMONTE MD   XR Hip Port Left 1 View    Narrative    XR HIP PORT LT 1 VW   10/21/2018 10:05 PM     HISTORY: left hip reduction;     COMPARISON: None.      Impression    IMPRESSION: The femoral component is displaced out of the acetabular  component. Acetabular component overlies the greater trochanter. The  head of the femoral component is in the native acetabular region.    IMPRESSION: The femoral component is no longer within the acetabular  component and the acetabular component overlies the greater  trochanter.    CEASAR BLEDSOE MD   XR Pelvis w Hip Port Left 1 View    Narrative    XR PELVIS AND HIP PORTABLE LEFT 1 VIEW 10/22/2018 5:16 PM    HISTORY: Postop.     COMPARISON: October 21, 2018.       Impression    IMPRESSION: Status post bilateral total hip arthroplasties. Hardware  is intact. Alignment is anatomic.     ALBERTO GARDINER MD[NH1.2]          Revision History        User Key Date/Time User Provider Type Action    > NH1.3 10/25/2018  1:22 PM Silvano Kumar MD Physician Sign     NH1.2 10/25/2018  1:11 PM Silvano Kumar MD Physician      NH1.1 10/25/2018  1:10 PM Silvano Kumar MD Physician                      Consult Notes      Consults by Edgardo Godfrey PA-C at 10/22/2018  9:26 AM     Author:  Edgardo Godfrey PA-C Service:  Orthopedics Author Type:  Physician Assistant    Filed:  10/22/2018  9:30 AM Date of Service:  10/22/2018  9:26 AM Creation Time:  10/22/2018  9:26 AM    Status:  Attested :  Edgardo Godfrey PA-C  (Physician Assistant)    Cosigner:  Marcos Winchester MD at 10/22/2018 12:20 PM         Consult Orders:    1. Orthopedic Surgery IP Consult: Patient to be seen: Routine - within 24 hours; Recurrent left hip dislocation; Consultant may enter orders: Yes [[id number]] ordered by Radha Collins MD at 10/21/18 2242           Attestation signed by Marcos Winchester MD at 10/22/2018 12:20 PM        Physician Attestation   IMarcos, saw and evaluated Abimael Flores as part of a shared visit.  I have reviewed and discussed with the advanced practice provider their history, physical and plan.    I personally reviewed the vital signs, medications, labs and imaging.    My key history or physical exam findings: Agree with PA note. Briefly, patient is a 89 yo M who underrwent a L hip hemiarthroplasty 2 weeks ago. He had a subsequent fall and a dislcoation of his hemiarthroplasty 1 week ago and underwent a successful closed reduction. Yesterday, the patient was bending over to get his shoes off when he heard a pop and felt pain in his L hip. He was unable to ambulate and was brought to the ED where he was diagnosed with another hip dislocation. A closed reduction was attempted but the bipolar head dislocated off the stem during the reduction. He was admitted and ortho was consulted. On exam he is NVI distally. Incision is well healed. LLE is shortened. Imaging demonstrates a dissociated L hip hemiarthroplasty.    Key management decisions made by me: Plan for open reduction of L hip with possible revision to a total hip arthroplasty. Risks discussed. Patient agrees to proceed.    Marcos Winchester  Date of Service (when I saw the patient): 10/22/18                               [facility] Orthopedic Consultation    Abimael Flores MRN# [id number]   Age: 89 year old YOB: 1924     Date of Admission:  10/21/2018    Reason for consult: Recurrent left hip prosthesis dislocation.       Requesting physician: Dennis        Level of consult: Consult, follow and place orders           Assessment and Plan:   Assessment:   Recurrent left hip prosthesis dislocation.  Closed.      Plan:   Open reduction of Recurrent left hip prosthesis dislocation.           Chief Complaint:   Left hip pain.         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  The patient states earlier he bent over to get his shoes off and he heard a pop in his left hip similar to his recent hip dislocation on 10/14/2018.  Following this, he had severe pain in his left hip and was not able to ambulate.  He has been referred to the Emergency Department for further evaluation.  The patient reports no cough, dyspnea, chest pain, palpitation, fever, nausea, vomiting or other complaints.       In the Emergency Department, the patient has been evaluated by Dr. Rayn, the ER attending physician.  The x-ray of the left hip has revealed dislocation of left hemiarthroplasty.  The case was communicated to Dr. Winchester on-call orthopedic surgeon who recommended an attempt for reduction in the Emergency Department.  The patient received propofol and had reduction done by Dr. Ryan, however, it was partially reduced as a repeat x-ray revealed the femoral component no longer within the acetabular component and the acetabular component overlies the greater trochanter.  Dr. Winchester of Orthopedic Service was called again who recommended that patient be admitted to the hospital for revision of the left hip arthroplasty tomorrow.  The patient was fit with a hip AB duction brace during his last  hospitalization, but he did not tolerate it and refused to wear it.          Past Medical History:     Past Medical History:   Diagnosis Date     Benign essential hypertension 9/4/2018     Coronary artery disease      Nonsenile cataract      Recent retinal detachment, total or subtotal 8/5/2014     Type 2 diabetes mellitus without complications (H)              Past Surgical History:     Past Surgical History:   Procedure Laterality Date     CARDIAC SURGERY       CATARACT IOL, RT/LT Bilateral      CLOSED REDUCTION HIP Left 10/14/2018    Procedure: CLOSED REDUCTION HIP;  CLOSED REDUCTION HIP;  Surgeon: Milton Gusman MD;  Location: [facility] OR     lacrimal caruncle removed BE Bilateral ~1989     OPEN REDUCTION INTERNAL FIXATION HIP BIPOLAR Right 8/26/2018    Procedure: OPEN REDUCTION INTERNAL FIXATION HIP BIPOLAR;  Right Hip Bipolar Hemiarthroplasty (Biomet);  Surgeon: Bill Sanders MD;  Location:  OR     OPEN REDUCTION INTERNAL FIXATION HIP BIPOLAR Left 10/4/2018    Procedure: OPEN REDUCTION INTERNAL FIXATION HIP BIPOLAR;  LEFT HIP TONYA ARTHROPLASTY;  Surgeon: Bruno Stewart MD;  Location:  OR     REPAIR RUPTURED GLOBE  4/21/2014    Procedure: Exploration and Repair of Ruptured Globe ;  Surgeon: Mercedes Rivera MD;  Location:  OR             Social History:     Social History   Substance Use Topics     Smoking status: Former Smoker     Smokeless tobacco: Never Used     Alcohol use No             Family History:     Family History   Problem Relation Age of Onset     Diabetes Mother      Diabetes Father      Cancer No family hx of      Glaucoma No family hx of      Macular Degeneration No family hx of              Immunizations:     VACCINE/DOSE   Diptheria   DPT   DTAP   HBIG   Hepatitis A   Hepatitis B   HIB   Influenza   Measles   Meningococcal   MMR   Mumps   Pneumococcal   Polio   Rubella   Small Pox   TDAP   Varicella   Zoster             Allergies:   No Known Allergies           Medications:     Current Facility-Administered Medications   Medication     acetaminophen (TYLENOL) Suppository 650 mg     acetaminophen (TYLENOL) tablet 650 mg     atorvastatin (LIPITOR) tablet 10 mg     glucose gel 15-30 g    Or     dextrose 50 % injection 25-50 mL    Or     glucagon injection 1 mg     diltiazem (DILACOR XR) 24 hr capsule 120 mg     hydrALAZINE (APRESOLINE) injection 10 mg     HYDROmorphone (PF) (DILAUDID) injection 0.2 mg     insulin aspart (NovoLOG) inj (RAPID ACTING)     insulin glargine (LANTUS) injection 4 Units     melatonin tablet 1 mg     metoprolol succinate (TOPROL-XL) 24 hr tablet 50 mg     naloxone (NARCAN) injection 0.1-0.4 mg     ondansetron (ZOFRAN-ODT) ODT tab 4 mg    Or     ondansetron (ZOFRAN) injection 4 mg     oxyCODONE IR (ROXICODONE) tablet 5-10 mg     polyethylene glycol (MIRALAX/GLYCOLAX) Packet 17 g     prochlorperazine (COMPAZINE) injection 5 mg    Or     prochlorperazine (COMPAZINE) tablet 5 mg    Or     prochlorperazine (COMPAZINE) Suppository 12.5 mg     sodium chloride 0.9% infusion     tamsulosin (FLOMAX) capsule 0.4 mg             Review of Systems:   CV: NEGATIVE for chest pain, palpitations or peripheral edema  C: NEGATIVE for fever, chills, change in weight  E/M: NEGATIVE for ear, mouth and throat problems  R: NEGATIVE for significant cough or SOB          Physical Exam:   All vitals have been reviewed  Patient Vitals for the past 24 hrs:   BP Temp Temp src Pulse Heart Rate Resp SpO2 Height Weight   10/22/18 0759 134/68 98.1  F (36.7  C) Oral 79 83 18 100 % - -   10/21/18 2331 149/70 98.1  F (36.7  C) Oral - 81 18 100 % - -   10/21/18 2323 - - - - - 18 - - -   10/21/18 2235 - - - - - - 100 % - -   10/21/18 2202 126/60 - - - 76 10 100 % - -   10/21/18 2156 146/65 - - - 75 - - - -   10/21/18 2141 142/90 - - - 74 10 100 % - -   10/21/18 2106 - - - - - - 96 % - -   10/21/18 2105 138/67 - - - - - - - -   10/21/18 1943 137/63 97.7  F (36.5  C) - 76 - 18 98 % 1.753 m  "(5' 9\") 72.6 kg (160 lb)       Intake/Output Summary (Last 24 hours) at 10/22/18 0952  Last data filed at 10/22/18 0806   Gross per 24 hour   Intake                0 ml   Output              800 ml   Net             -800 ml     Examination of the left hip shows the skin in good condition without erythema, ecchymosis, or lymphadenopathy.  Surgical incision healing well.  Stable ligamentous exam.  Strength 5/5.  Circulation, motor, and sensory function intact distally.          Data:   All laboratory data reviewed  Results for orders placed or performed during the hospital encounter of 10/21/18   XR Pelvis w Hip Left 1 View    Narrative    XR PELVIS AND HIP LEFT 1 VIEW 10/21/2018 8:51 PM     HISTORY: hip pain; r/o fracture/dislocation;       Impression    IMPRESSION: Bilateral hip hemiarthroplasties. The left  hemiarthroplasty appears to be posterior superiorly dislocated.    KARON ALMONTE MD   XR Hip Port Left 1 View    Narrative    XR HIP PORT LT 1 VW   10/21/2018 10:05 PM     HISTORY: left hip reduction;     COMPARISON: None.      Impression    IMPRESSION: The femoral component is displaced out of the acetabular  component. Acetabular component overlies the greater trochanter. The  head of the femoral component is in the native acetabular region.    IMPRESSION: The femoral component is no longer within the acetabular  component and the acetabular component overlies the greater  trochanter.    CEASAR BLEDSOE MD   Glucose by meter   Result Value Ref Range    Glucose 101 (H) 70 - 99 mg/dL   Basic metabolic panel   Result Value Ref Range    Sodium 138 133 - 144 mmol/L    Potassium 4.4 3.4 - 5.3 mmol/L    Chloride 105 94 - 109 mmol/L    Carbon Dioxide 27 20 - 32 mmol/L    Anion Gap 6 3 - 14 mmol/L    Glucose 108 (H) 70 - 99 mg/dL    Urea Nitrogen 27 7 - 30 mg/dL    Creatinine 1.88 (H) 0.66 - 1.25 mg/dL    GFR Estimate 34 (L) >60 mL/min/1.7m2    GFR Estimate If Black 41 (L) >60 mL/min/1.7m2    Calcium 7.7 (L) 8.5 - 10.1 " mg/dL   CBC with platelets   Result Value Ref Range    WBC 9.4 4.0 - 11.0 10e9/L    RBC Count 2.65 (L) 4.4 - 5.9 10e12/L    Hemoglobin 7.9 (L) 13.3 - 17.7 g/dL    Hematocrit 24.4 (L) 40.0 - 53.0 %    MCV 92 78 - 100 fl    MCH 29.8 26.5 - 33.0 pg    MCHC 32.4 31.5 - 36.5 g/dL    RDW 15.8 (H) 10.0 - 15.0 %    Platelet Count 294 150 - 450 10e9/L   Glucose by meter   Result Value Ref Range    Glucose 119 (H) 70 - 99 mg/dL   Glucose by meter   Result Value Ref Range    Glucose 112 (H) 70 - 99 mg/dL   EKG 12-lead, tracing only   Result Value Ref Range    Interpretation ECG Click View Image link to view waveform and result           Attestation:  I have reviewed today's vital signs, notes, medications, labs and imaging with our orthopedic trauma surgeon on-call.  Amount of time performed on this consult: 30 minutes.    Edgardo Godfrey PA-C[DW1.1]          Revision History        User Key Date/Time User Provider Type Action    > DW1.1 10/22/2018  9:30 AM Edgardo Godfrey PA-C Physician Assistant Sign                     Progress Notes - Physician (Notes from 10/22/18 through 10/25/18)      Progress Notes by Albina Centeno BSW at 10/25/2018  2:29 PM     Author:  Albina Centeno BSW Service:  Social Work Author Type:      Filed:  10/25/2018  2:31 PM Date of Service:  10/25/2018  2:29 PM Creation Time:  10/25/2018  2:29 PM    Status:  Signed :  Albina Centeno BSW ()         MAULIK  D: Received discharge orders for pt. Called and updated [facility] on discharge. [facility] can accept pt back for admission today. Pt will be transported via transport aide at 1530. Called and updated pt's son, Aleksandar on discharge time. Aleksandar was in agreement for discharge and will meet pt back at [facility] after dinner. Faxed orders, scripts to [facility].     Albina Centeno MSW, LGSW[HH1.1]      Revision History        User Key Date/Time User Provider Type Action    > HH1.1 10/25/2018  2:31 PM Albina Centeno BSW   Sign            Progress Notes by Sally Clifton PA-C at 10/24/2018  6:16 PM     Author:  Sally Clifton PA-C Service:  Orthopedics Author Type:  Physician Assistant - C    Filed:  10/24/2018  6:18 PM Date of Service:  10/24/2018  6:16 PM Creation Time:  10/24/2018  6:16 PM    Status:  Signed :  Sally Clifton PA-C (Physician Assistant - C)         Orthopedic Surgery  Abimael Flores  10/24/2018  Admit Date:  10/21/2018  POD # 2  S/P Left total hip arthroplasty     Patient resting comfortably in bed.    Pain controlled.  Tolerating oral intake.    Denies nausea or vomiting  Denies chest pain or shortness of breath  No events overnight.     Alert and orient to person, place, and time.  Vital Sign Ranges  Temperature Temp  Av.5  F (36.9  C)  Min: 97.8  F (36.6  C)  Max: 99.2  F (37.3  C)   Blood pressure Systolic (24hrs), Av , Min:101 , Max:128        Diastolic (24hrs), Av, Min:51, Max:58      Pulse Pulse  Av.5  Min: 85  Max: 98   Respirations Resp  Av.4  Min: 16  Max: 18   Pulse oximetry SpO2  Av.6 %  Min: 94 %  Max: 99 %       Dressing is clean, dry, and intact.   Minimal erythema of the surrounding skin.   Bilateral calves are soft, non-tender.  Bilateral lower extremity is NVI.  Sensation intact bilateral lower extremities  5/5 motor with resisted dorsi and plantar flexion bilaterally  +Dp pulse    Labs:  Recent Labs   Lab Test  10/23/18   0725  10/22/18   0718  10/15/18   0715   POTASSIUM  4.6  4.4  4.9     Recent Labs   Lab Test  10/24/18   1037  10/23/18   1655  10/23/18   0725   HGB  7.6*  7.2*  7.4*     Recent Labs   Lab Test  10/14/18   1220  14   0405   INR  1.08  0.91     Recent Labs   Lab Test  10/22/18   0718  10/15/18   0715  10/14/18   1220   PLT  294  316  355       A/P  1. Plan   Continue Lovenox for DVT prophylaxis.  ASA on discharge   Mobilize with PT/OT    WBAT.   Hip precaution     Continue current pain regiment.   Leave  dressing intact    2. Disposition   Anticipate d/c to TCU maybe tomorrow - ok to discharge once medically cleared    Sally Clifton PA-C[EC1.1]     Revision History        User Key Date/Time User Provider Type Action    > EC1.1 10/24/2018  6:18 PM Sally Clifton PA-C Physician Assistant - C Sign            Progress Notes by Silvano Kumar MD at 10/24/2018  1:41 PM     Author:  Silvano Kumar MD Service:  Hospitalist Author Type:  Physician    Filed:  10/24/2018  1:49 PM Date of Service:  10/24/2018  1:41 PM Creation Time:  10/24/2018  1:41 PM    Status:  Signed :  Silvano Kumar MD (Physician)         [facility]    Hospitalist Progress Note    Date of Service (when I saw the patient): 10/24/2018    Assessment & Plan   89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  He is being admitted to the hospital under inpatient status.     1.  Left hip dislocation, recurrent:    The patient has a history of left hip hemiarthroplasty on 10/04 after the patient sustained a hip fracture following a fall.  He was readmitted on 10/14 for left hip dislocation for which he underwent closed reduction under general anesthesia on 10/15.  Subsequently, he was discharged to [facility] for rehabilitation.  Earlier today he bent over to get his shoes off and he heard a pop and subsequently developed severe pain in his left hip pain.  The x-ray in the Emergency Department indicated posterior superiorly dislocation of the left hemiarthroplasty.  An attempt at reduction in the emergency department was partially successful, however, the repeat x-ray revealed the femoral component was no longer in the acetabular component.  The case was communicated to Dr. Winchester, the on-call orthopedic surgeon, who has recommended a revision  hemiarthroplasty.  Management as below. - s/p arthroplasty revision of left hip 10/22   Plan:  - post-op cares for pain and DVT mgmt per ortho  - PT/OT as able  - SW for placement    2.  Hypertension:    At home he is on Toprol XL 50 mg p.o. daily as diltiazem 120 mg p.o. daily, which we will continue.  I will have IV hydralazine available p.r.n. for systolic blood pressure more than 170.   - remains well controlled today, no changes     3.  Dyslipidemia:    - He will continue on prior to admission Lipitor.     4.  Benign prostatic hypertrophy.    - He will continue on prior to admission Flomax.     5.  Chronic kidney disease, stage III-IV.    His baseline creatinine is in the range of 1.7-1.9.  His most recent creatinine was 1.69 on 10/15/2018.  I will order a BMP and monitor his renal function closely during this hospitalization.  We will avoid NSAIDs or other nephrotoxins.   - Cr this AM is dwon to 1.58, is below baseline, dont need to follow BMP anymore    6.  Chronic anemia:   His baseline hemoglobin is around 9.  However, after his hip surgery, his hemoglobin dropped to 7.5 on 10/06/2018, due to acute blood loss.  His most recent hemoglobin was 8.2 before discharge from the hospital.  His hemoglobin will be closely monitored during this hospitalization.   - pre-op HGB today 7.6, increase from 7.2, will watch very closely, repeat HGB in AM, transfuse if <7    7.  Coronary artery disease:    The patient is status post CABG x4 in 1996.  There is no recent cardiac workup.  At this juncture he reports no chest pain.  I will obtain an EKG for preop evaluation.  He will continue on prior to admission metoprolol and Lipitor.  His aspirin will be held until after surgery.   - I suspect he will be able to resume ASA in AM, may even go on high dose for DVT prophy    8.  History of nonsustained ventricular  tachycardia/question paroxysmal atrial fibrillation/supraventricular tachycardia:   It occurred during the  "hospitalization in 08/2018, following his right hip fracture.  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  He is not on anticoagulation therapy.  He will continue on prior to admission Toprol XL 50 mg p.o. daily.   - no events overnight, monitor  - No indication for telemetry    9.  Diabetes mellitus type 2:    The most recent hemoglobin A1c was 8.4% on 08/25/2018.  At home he is on Lantus 7 units subcutaneous every morning and Humalog 2 units subQ t.i.d.     Recent Labs  Lab 10/24/18  1130 10/24/18  0332 10/23/18  1855 10/23/18  1615 10/23/18  1434 10/23/18  1217 10/23/18  0725  10/22/18  0718   GLC  --   --   --   --   --   --  140*  --  108*   * 135* 173* 218* 254* 249*  --   < >  --    < > = values in this interval not displayed.  - resumed diet  - Lantus 7U in AM    Deep venous thrombosis prophylaxis:  Per ortho   Code status: DNR/DNI  Disposition:  At least 2 of inpatient management, will need TCU.     Interval History      Patient seen and examined  \"Too much pain with moving\"  No new complaints, no worse pain compared to previous day  No new numbness/weakness    -Data reviewed today: I reviewed all new labs and imaging results over the last 24 hours. I personally reviewed no images or EKG's today.    Physical Exam   Temp: 98.2  F (36.8  C) Temp src: Oral BP: 112/51 Pulse: 98 Heart Rate: 86 Resp: 18 SpO2: 99 % O2 Device: None (Room air)    Vitals:    10/21/18 1943   Weight: 72.6 kg (160 lb)     Vital Signs with Ranges  Temp:  [98.2  F (36.8  C)-99.2  F (37.3  C)] 98.2  F (36.8  C)  Pulse:  [85-98] 98  Heart Rate:  [85-87] 86  Resp:  [16-18] 18  BP: (106-128)/(50-58) 112/51  SpO2:  [95 %-99 %] 99 %  I/O last 3 completed shifts:  In: 400 [I.V.:400]  Out: 500 [Urine:500]    Constitutional: NAD, afebrile, A+Ox4  Respiratory: CTA B, normal " efforts  Cardiovascular: RRR, no murmur  GI: S, NT, ND, normal BS  Skin/Integumen: Dry, warm, no edema  MSK: mild lateral thigh tenderness to palpation, able to lift left leg about 15-20 degrees off the bed otherwise ROM limited due to pain  Neuro: A/Ox3, moving all extremitiesd      Medications     lactated ringers 100 mL/hr at 10/22/18 1857     sodium chloride 75 mL/hr at 10/21/18 2317       atorvastatin  10 mg Oral QPM     diltiazem  120 mg Oral Daily     enoxaparin  30 mg Subcutaneous Q24H     insulin aspart  1-6 Units Subcutaneous TID w/meals     insulin glargine  7 Units Subcutaneous QAM AC     metoprolol succinate  50 mg Oral Daily     sodium chloride (PF)  3 mL Intracatheter Q8H     tamsulosin  0.4 mg Oral QPM       Data     Recent Labs  Lab 10/24/18  1037 10/23/18  1655 10/23/18  0725 10/22/18  0718   WBC  --   --   --  9.4   HGB 7.6* 7.2* 7.4* 7.9*   MCV  --   --   --  92   PLT  --   --   --  294   NA  --   --  137 138   POTASSIUM  --   --  4.6 4.4   CHLORIDE  --   --  104 105   CO2  --   --  26 27   BUN  --   --  24 27   CR  --   --  1.58* 1.88*   ANIONGAP  --   --  7 6   JOSELINE  --   --  7.4* 7.7*   GLC  --   --  140* 108*       No results found for this or any previous visit (from the past 24 hour(s)).[NH1.1]     Revision History        User Key Date/Time User Provider Type Action    > NH1.1 10/24/2018  1:49 PM Silvano Kumar MD Physician Sign            ED Notes signed by [facility] Non-Provider at 10/24/2018 11:01 AM      Author:  [facility] Non-Provider Service:  (none) Author Type:  (none)    Filed:  10/24/2018 11:01 AM Date of Service:  10/24/2018 11:01 AM Creation Time:  10/24/2018 11:01 AM    Status:  Signed :  Scan, Non-Betsy     Scan on 10/24/2018 11:01 AM by Rocio Packer-Provider : PREHOSPITAL CARE REPORT SUMMARY 1          Revision History        User Key Date/Time User Provider Type Action    > [N/A] 10/24/2018 11:01 AM Scan, Non-Provider (none) Sign            Progress Notes by Aleksadnar Li,  MD at 10/23/2018  1:02 PM     Author:  Aleksandar Li MD Service:  Hospitalist Author Type:  Physician    Filed:  10/23/2018  3:24 PM Date of Service:  10/23/2018  1:02 PM Creation Time:  10/23/2018  1:02 PM    Status:  Addendum :  Aleksandar Li MD (Physician)         [facility]    Hospitalist Progress Note     Date of Service (when I saw the patient): 10/23/2018  Aleksandar Li MD  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  He is being admitted to the hospital under inpatient status.     1.  Left hip dislocation, recurrent:    The patient has a history of left hip hemiarthroplasty on 10/04 after the patient sustained a hip fracture following a fall.  He was readmitted on 10/14 for left hip dislocation for which he underwent closed reduction under general anesthesia on 10/15.  Subsequently, he was discharged to [place] for rehabilitation.  Earlier today he bent over to get his shoes off and he heard a pop and subsequently developed severe pain in his left hip pain.  The x-ray in the Emergency Department indicated posterior superiorly dislocation of the left hemiarthroplasty.  An attempt at reduction in the emergency department was partially successful, however, the repeat x-ray revealed the femoral component was no longer in the acetabular component.  The case was communicated to Dr. Winchester, the on-call orthopedic surgeon, who has recommended a revision hemiarthroplasty.  Management as below.    - s/p arthroplasty revision of left hip today  - post-op cares for pain and DVT mgmt per ortho  - PT/OT as able[MM1.1]  - complains of what sounds like muscle spasms in legs, would consider low dose diazepam but  given age of 89 is likely to increase risk of delerium, will hold for now but consider if symptoms worsen[MM1.2]    2.  Hypertension:    At home he is on Toprol XL 50 mg p.o. daily as diltiazem 120 mg p.o. daily, which we will continue.  I will have IV hydralazine available p.r.n. for systolic blood pressure more than 170.   - remains well controlled today, no changes     3.  Dyslipidemia:    - He will continue on prior to admission Lipitor.     4.  Benign prostatic hypertrophy.    - He will continue on prior to admission Flomax.     5.  Chronic kidney disease, stage III-IV.    His baseline creatinine is in the range of 1.7-1.9.  His most recent creatinine was 1.69 on 10/15/2018.  I will order a BMP and monitor his renal function closely during this hospitalization.  We will avoid NSAIDs or other nephrotoxins.   - Cr this AM is[MM1.1] dwon to 1.58 from[MM1.3] 1.88, nothing new today,[MM1.1] is below baseline, dont need to follow BMP anymore[MM1.3]    6.  Chronic anemia:   His baseline hemoglobin is around 9.  However, after his hip surgery, his hemoglobin dropped to 7.5 on 10/06/2018, due to acute blood loss.  His most recent hemoglobin was 8.2 before discharge from the hospital.  His hemoglobin will be closely monitored during this hospitalization.   - pre-op HGB today[MM1.1] 7.4 down from[MM1.3] 7.9, will watch very closely,[MM1.1] repeat[MM1.3] HGB in AM, transfuse if <7    7.  Coronary artery disease:    The patient is status post CABG x4 in 1996.  There is no recent cardiac workup.  At this juncture he reports no chest pain.  I will obtain an EKG for preop evaluation.  He will continue on prior to admission metoprolol and Lipitor.  His aspirin will be held until after surgery.   - I suspect he will be able to resume ASA in AM, may even go on high dose for DVT prophy    8.  History of nonsustained ventricular  tachycardia/question paroxysmal atrial fibrillation/supraventricular tachycardia:   It occurred during  the hospitalization in 08/2018, following his right hip fracture.  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  He is not on anticoagulation therapy.  He will continue on prior to admission Toprol XL 50 mg p.o. daily.   - no events overnight, monitor    9.  Diabetes mellitus type 2:    The most recent hemoglobin A1c was 8.4% on 08/25/2018.  At home he is on Lantus 7 units subcutaneous every morning and Humalog 2 units subQ t.i.d.  Due to n.p.o. status, I will reduce the dose of Lantus to 4 units subQ daily and hold his prior to admission Humalog.  I will place the patient on a sliding scale NovoLog.     Recent Labs  Lab 10/23/18  1217 10/23/18  0725 10/23/18  0608 10/23/18  0200 10/22/18  2214 10/22/18  1904 10/22/18  1624  10/22/18  0718   GLC  --  140*  --   --   --   --   --   --  108*   *  --  125* 120* 126* 146* 144*  < >  --    < > = values in this interval not displayed.  - resumed diet, will need to increase to his PTA dose  - give 3 more units of lantus now, resume his 7U in AM  -[MM1.1] will hold on adding back his PTA prandial insulin for now as his RN reports he isnt eating much yet  - if not eating at least 50% of meals I asked RN to call for dose adjustments on insulin[MM1.2]    Deep venous thrombosis prophylaxis:  Per ortho   Code status:  It was discussed with the patient.  The patient stated that he wants to continue with DNR/DNI status as established previously.   Disposition:  At least 2-3 days of inpatient management[MM1.1], likely needs TCU[MM1.2].     Interval History[MM1.1]   Today the patient complains that he cant get his legs comfortable and that he is having spasms.  No other complaints.  RN informs me his UO is low today, we bladder scanned and he is retaining, had about 350cc, refusing cath, he feels he will go soon  enough.[MM1.2]    -Data reviewed today: I reviewed all new labs and imaging results over the last 24 hours. I personally reviewed no images or EKG's today.    Physical Exam   Temp: 98.6  F (37  C) Temp src: Oral BP: 124/51 Pulse: 81 Heart Rate: 98 Resp: 16 SpO2: 98 % O2 Device: None (Room air)    Vitals:    10/21/18 1943   Weight: 72.6 kg (160 lb)     Vital Signs with Ranges  Temp:  [97  F (36.1  C)-99.3  F (37.4  C)] 98.6  F (37  C)  Pulse:  [81] 81  Heart Rate:  [] 98  Resp:  [11-20] 16  BP: ()/(35-54) 124/51  SpO2:  [94 %-100 %] 98 %  I/O last 3 completed shifts:  In: 1850 [I.V.:1300]  Out: 1600 [Urine:1350; Blood:250][MM1.1]    Constitutional: NAD, afebrile, A+Ox4  Respiratory: CTA B, normal efforts  Cardiovascular: RRR, no murmur  GI: S, NT, ND, normal BS  Skin/Integumen: Dry, warm, no edema[MM1.2]      Medications     lactated ringers 100 mL/hr at 10/22/18 1857     sodium chloride 75 mL/hr at 10/21/18 2317       atorvastatin  10 mg Oral QPM     diltiazem  120 mg Oral Daily     enoxaparin  30 mg Subcutaneous Q24H     insulin aspart  1-6 Units Subcutaneous Q4H     insulin glargine  4 Units Subcutaneous QAM AC     metoprolol succinate  50 mg Oral Daily     sodium chloride (PF)  3 mL Intracatheter Q8H     tamsulosin  0.4 mg Oral QPM       Data     Recent Labs  Lab 10/23/18  0725 10/22/18  0718 10/16/18  1808   WBC  --  9.4  --    HGB 7.4* 7.9* 8.2*   MCV  --  92  --    PLT  --  294  --     138  --    POTASSIUM 4.6 4.4  --    CHLORIDE 104 105  --    CO2 26 27  --    BUN 24 27  --    CR 1.58* 1.88*  --    ANIONGAP 7 6  --    JOSELINE 7.4* 7.7*  --    * 108*  --        Recent Results (from the past 24 hour(s))   XR Pelvis w Hip Port Left 1 View    Narrative    XR PELVIS AND HIP PORTABLE LEFT 1 VIEW 10/22/2018 5:16 PM    HISTORY: Postop.     COMPARISON: October 21, 2018.       Impression    IMPRESSION: Status post bilateral total hip arthroplasties. Hardware  is intact. Alignment is anatomic.      ALBERTO GARDINER MD[MM1.1]          Revision History        User Key Date/Time User Provider Type Action    > MM1.3 10/23/2018  3:24 PM Aleksandar Li MD Physician Addend     MM1.2 10/23/2018  3:13 PM Aleksandar Li MD Physician Sign     MM1.1 10/23/2018  1:02 PM Aleksandar Li MD Physician             Progress Notes by Jeff Martínez RN at 10/23/2018  2:28 PM     Author:  Jeff Martínez RN Service:  Orthopedics Author Type:  Registered Nurse    Filed:  10/23/2018  2:29 PM Date of Service:  10/23/2018  2:28 PM Creation Time:  10/23/2018  2:28 PM    Status:  Signed :  Jeff Martínez RN (Registered Nurse)         Pt's was bladder scanned for 347 ml. Refused to let RN do straight cath. Didn't eat any lunch. MD notified. Order to watch output and to do anothet BG check now.[DT1.1]      Revision History        User Key Date/Time User Provider Type Action    > DT1.1 10/23/2018  2:29 PM Jeff Martínez RN Registered Nurse Sign            Progress Notes by Marjan Tong PT at 10/23/2018 12:27 PM     Author:  Marjan Tong PT Service:  (none) Author Type:  Physical Therapist    Filed:  10/23/2018 12:27 PM Date of Service:  10/23/2018 12:27 PM Creation Time:  10/23/2018 12:27 PM    Status:  Signed :  Marjan Tong PT (Physical Therapist)          10/23/18 1100   Quick Adds   Type of Visit Initial PT Evaluation   Living Environment   Lives With spouse   Living Arrangements assisted living   Home Accessibility no concerns   Transportation Available family or friend will provide   Living Environment Comment Pt was admitted from TCU.    Self-Care   Usual Activity Tolerance good   Current Activity Tolerance fair   Regular Exercise no   Equipment Currently Used at Home walker, rolling   Functional Level Prior   Ambulation 3-->assistive equipment and person   Transferring 3-->assistive equipment and person   Toileting 2-->assistive person   Bathing 2-->assistive person   Dressing  2-->assistive person   Fall history within last six months yes   Number of times patient has fallen within last six months 3   General Information   Onset of Illness/Injury or Date of Surgery - Date 10/21/18   Referring Physician Marcos Winchester MD   Patient/Family Goals Statement None stated.    Pertinent History of Current Problem (include personal factors and/or comorbidities that impact the POC) 89 y/o male POD # 1 L hemiarthroplasty coverted to a L DELL after dislocating L hip while reaching down to the floor. PMH including R hip ORIF (8/26/18), L hip ORIF 10/4 and subsequent dislocation 10/14 and reduced in OR 10/15.    Precautions/Limitations fall precautions;left hip precautions  (Posterior-lateral)   Weight-Bearing Status - LLE weight-bearing as tolerated   General Observations Pt in supine upon arrival of therapist.    General Info Comments Ambulate with assist.    Cognitive Status Examination   Orientation person;place   Level of Consciousness alert   Follows Commands and Answers Questions 75% of the time   Personal Safety and Judgment at risk behaviors demonstrated   Pain Assessment   Patient Currently in Pain (L hip pain at rest: 10/10)   Integumentary/Edema   Integumentary/Edema Comments L hip incision covered with dressing.    Posture    Posture Comments Noted posterior lean in sitting and standing.    Range of Motion (ROM)   ROM Comment Limited L hip ROM due to pain and hip precautions, otherwise B LEs WFL.    Strength   Strength Comments Not formally assessed, pt demonstrates B LE weakness with mobility.    Bed Mobility   Bed Mobility Comments Supine-sit, modA of 2.    Transfer Skills   Transfer Comments Sit <> stand with FWW and modA of 2.    Gait   Gait Comments Pt amb a couple steps to bedside chair with FWW and modA of 2.    Balance   Balance Comments Noted fair sitting balance and poor standing balance at FWW.    Sensory Examination   Sensory Perception Comments Pt denies numbness/tingling in B  "LEs.    Modality Interventions   Planned Modality Interventions Cryotherapy   Planned Modality Interventions Comments PRN.   General Therapy Interventions   Planned Therapy Interventions bed mobility training;gait training;ROM;strengthening;transfer training   Clinical Impression   Criteria for Skilled Therapeutic Intervention yes, treatment indicated   PT Diagnosis Difficulty with gait.   Influenced by the following impairments Pain, Impaired L hip ROM, Decreased strength, Decreased activity tolerance   Functional limitations due to impairments Limited functional mobility requiring AD and assist   Clinical Presentation Stable/Uncomplicated   Clinical Presentation Rationale Based on PMH, current presentation, and social support.    Clinical Decision Making (Complexity) Low complexity   Therapy Frequency` 2 times/day   Predicted Duration of Therapy Intervention (days/wks) 3 days   Anticipated Discharge Disposition Transitional Care Facility   Risk & Benefits of therapy have been explained Yes   Patient, Family & other staff in agreement with plan of care Yes   [facility] TM \"6 Clicks\"   2016, Trustees of [facility], under license to [employer].  All rights reserved.   6 Clicks Short Forms Basic Mobility Inpatient Short Form   [facility]  \"6 Clicks\" V.2 Basic Mobility Inpatient Short Form   1. Turning from your back to your side while in a flat bed without using bedrails? 3 - A Little   2. Moving from lying on your back to sitting on the side of a flat bed without using bedrails? 3 - A Little   3. Moving to and from a bed to a chair (including a wheelchair)? 2 - A Lot   4. Standing up from a chair using your arms (e.g., wheelchair, or bedside chair)? 2 - A Lot   5. To walk in hospital room? 2 - A Lot   6. Climbing 3-5 steps with a railing? 1 - Total   Basic Mobility Raw Score (Score out of 24.Lower scores equate to lower levels of function) 13   Total Evaluation Time   Total " Evaluation Time (Minutes) 15[JH1.1]        Revision History        User Key Date/Time User Provider Type Action    > JH1.1 10/23/2018 12:27 PM Marjan Tong, PT Physical Therapist Sign            Progress Notes by Edgardo Godfrey PA-C at 10/23/2018 11:13 AM     Author:  Edgardo Godfrey PA-C Service:  Orthopedics Author Type:  Physician Assistant    Filed:  10/23/2018 11:15 AM Date of Service:  10/23/2018 11:13 AM Creation Time:  10/23/2018 11:13 AM    Status:  Signed :  Edgardo Godfrey PA-C (Physician Assistant)         [facility] Orthopedic Progress Note  Abimael Flores is a 89 year old male    Today's Date:10/23/2018  Admission Date: 10/21/2018  Hip dislocation, left (H) [S73.005A]  POD # 1 Left Total hip arthroplasty.  Revision surgery from bipolar hemiarthroplasty due to recurrent dislocation.    Patient Seen.  Doing well.  No CP/SOB.  BP soft.  96% O2 on room air.  Dressings are clean, dry, and intact.  Circulation, motor and sensory function, intact distally.  Medial left knee pain.  Possible pressure from ABduction pillow.      PLAN:  Ice to left hip and medial knee.  Pain control medication: No changes.  Discharge plan: TCU in a couple of days.      Temp:  [97  F (36.1  C)-99.3  F (37.4  C)] 98.6  F (37  C)  Pulse:  [81] 81  Heart Rate:  [] 98  Resp:  [11-20] 16  BP: ()/(35-54) 124/51  SpO2:  [94 %-100 %] 98 %      Results for orders placed or performed during the hospital encounter of 10/21/18 (from the past 24 hour(s))   UA with Microscopic reflex to Culture   Result Value Ref Range    Color Urine Yellow     Appearance Urine Cloudy     Glucose Urine Negative NEG^Negative mg/dL    Bilirubin Urine Negative NEG^Negative    Ketones Urine 5 (A) NEG^Negative mg/dL    Specific Gravity Urine 1.025 1.003 - 1.035    Blood Urine Moderate (A) NEG^Negative    pH Urine 5.0 5.0 - 7.0 pH    Protein Albumin Urine 30 (A) NEG^Negative mg/dL    Urobilinogen mg/dL Normal 0.0 - 2.0 mg/dL     Nitrite Urine Negative NEG^Negative    Leukocyte Esterase Urine Large (A) NEG^Negative    Source Midstream Urine     WBC Urine 2396 (H) 0 - 5 /HPF    RBC Urine 62 (H) 0 - 2 /HPF    WBC Clumps Present (A) NEG^Negative /HPF   Urine Culture Aerobic Bacterial   Result Value Ref Range    Specimen Description Midstream Urine     Special Requests Specimen received in preservative     Culture Micro Culture in progress    Glucose by meter   Result Value Ref Range    Glucose 144 (H) 70 - 99 mg/dL   XR Pelvis w Hip Port Left 1 View    Narrative    XR PELVIS AND HIP PORTABLE LEFT 1 VIEW 10/22/2018 5:16 PM    HISTORY: Postop.     COMPARISON: October 21, 2018.       Impression    IMPRESSION: Status post bilateral total hip arthroplasties. Hardware  is intact. Alignment is anatomic.     ALBERTO GARDINER MD   Glucose by meter   Result Value Ref Range    Glucose 146 (H) 70 - 99 mg/dL   Glucose by meter   Result Value Ref Range    Glucose 126 (H) 70 - 99 mg/dL   Glucose by meter   Result Value Ref Range    Glucose 120 (H) 70 - 99 mg/dL   Glucose by meter   Result Value Ref Range    Glucose 125 (H) 70 - 99 mg/dL   Basic metabolic panel   Result Value Ref Range    Sodium 137 133 - 144 mmol/L    Potassium 4.6 3.4 - 5.3 mmol/L    Chloride 104 94 - 109 mmol/L    Carbon Dioxide 26 20 - 32 mmol/L    Anion Gap 7 3 - 14 mmol/L    Glucose 140 (H) 70 - 99 mg/dL    Urea Nitrogen 24 7 - 30 mg/dL    Creatinine 1.58 (H) 0.66 - 1.25 mg/dL    GFR Estimate 41 (L) >60 mL/min/1.7m2    GFR Estimate If Black 50 (L) >60 mL/min/1.7m2    Calcium 7.4 (L) 8.5 - 10.1 mg/dL   Hemoglobin   Result Value Ref Range    Hemoglobin 7.4 (L) 13.3 - 17.7 g/dL         Edgardo Godfrey[DW1.1]       Revision History        User Key Date/Time User Provider Type Action    > DW1.1 10/23/2018 11:15 AM Edgardo Godfrey PAAnnaC Physician Assistant Sign            Progress Notes by Aleksandar Li MD at 10/22/2018  1:52 PM     Author:  Aleksandar Li MD Service:   Hospitalist Author Type:  Physician    Filed:  10/22/2018  3:43 PM Date of Service:  10/22/2018  1:52 PM Creation Time:  10/22/2018  1:52 PM    Status:  Signed :  Aleksandar Li MD (Physician)         [facility]    Hospitalist Progress Note[MM1.1]/Chart Check/No Charge Note[MM1.2]    Date of Service (when I saw the patient): 10/22/2018  Aleksandar Li MD  [facility]  Pager [phone]    Assessment & Plan   89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  He is being admitted to the hospital under inpatient status.     1.  Left hip dislocation, recurrent:    The patient has a history of left hip hemiarthroplasty on 10/04 after the patient sustained a hip fracture following a fall.  He was readmitted on 10/14 for left hip dislocation for which he underwent closed reduction under general anesthesia on 10/15.  Subsequently, he was discharged to [place] for rehabilitation.  Earlier today he bent over to get his shoes off and he heard a pop and subsequently developed severe pain in his left hip pain.  The x-ray in the Emergency Department indicated posterior superiorly dislocation of the left hemiarthroplasty.  An attempt at reduction in the emergency department was partially successful, however, the repeat x-ray revealed the femoral component was no longer in the acetabular component.  The case was communicated to Dr. Winchester, the on-call orthopedic surgeon, who has recommended a revision hemiarthroplasty.  Management as below.    - s/p arthroplasty revision of left hip today  - post-op cares for pain and DVT mgmt per ortho  - PT/OT as able    2.  Hypertension:    At home he is on Toprol XL 50 mg p.o. daily as diltiazem 120 mg p.o. daily, which we will continue.  I will  have IV hydralazine available p.r.n. for systolic blood pressure more than 170.[MM1.1]   - remains well controlled today, no changes[MM1.2]     3.  Dyslipidemia:    - He will continue on prior to admission Lipitor.     4.  Benign prostatic hypertrophy.    - He will continue on prior to admission Flomax.     5.  Chronic kidney disease, stage III-IV.    His baseline creatinine is in the range of 1.7-1.9.  His most recent creatinine was 1.69 on 10/15/2018.  I will order a BMP and monitor his renal function closely during this hospitalization.  We will avoid NSAIDs or other nephrotoxins.[MM1.1]   - Cr this AM is 1.88, nothing new today, check BMP in AM[MM1.2]    6.  Chronic anemia:   His baseline hemoglobin is around 9.  However, after his hip surgery, his hemoglobin dropped to 7.5 on 10/06/2018, due to acute blood loss.  His most recent hemoglobin was 8.2 before discharge from the hospital.  His hemoglobin will be closely monitored during this hospitalization.[MM1.1]   - pre-op HGB today 7.9, will watch very closely, HGB in AM, transfuse if <7[MM1.2]    7.  Coronary artery disease:    The patient is status post CABG x4 in 1996.  There is no recent cardiac workup.  At this juncture he reports no chest pain.  I will obtain an EKG for preop evaluation.  He will continue on prior to admission metoprolol and Lipitor.  His aspirin will be held until after surgery.[MM1.1]   - I suspect he will be able to resume ASA in AM, may even go on high dose for DVT prophy[MM1.2]    8.  History of nonsustained ventricular  tachycardia/question paroxysmal atrial fibrillation/supraventricular tachycardia:   It occurred during the hospitalization in 08/2018, following his right hip fracture.  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  He is not on anticoagulation therapy.   He will continue on prior to admission Toprol XL 50 mg p.o. daily.[MM1.1]   - no events overnight, monitor[MM1.2]    9.  Diabetes mellitus type 2:    The most recent hemoglobin A1c was 8.4% on 08/25/2018.  At home he is on Lantus 7 units subcutaneous every morning and Humalog 2 units subQ t.i.d.  Due to n.p.o. status, I will reduce the dose of Lantus to 4 units subQ daily and hold his prior to admission Humalog.  I will place the patient on a sliding scale NovoLog.[MM1.1]     Recent Labs  Lab 10/22/18  1110 10/22/18  0718 10/22/18  0615 10/21/18  2348 10/21/18  2124 10/17/18  1131 10/17/18  0708  10/16/18  0623   GLC  --  108*  --   --   --   --   --   --  129*   *  --  112* 119* 101* 258* 140*  < >  --    < > = values in this interval not displayed.[MM1.3]  - as his diet not likely to increase much today leave on current regimen  - will likely need increase to PTA dose of Lantus in AM    Deep venous thrombosis prophylaxis:  Per ortho   Code status:  It was discussed with the patient.  The patient stated that he wants to continue with DNR/DNI status as established previously.   Disposition:  At least 2-3 days of inpatient management.     Interval History[MM1.1]   Unfortunately the patient was gone for surgery by the time I reached the floor today, he is still in the OR as of the end of my shift so this will only be a chart check, no charge note.  My service is on call 24 hours a day for any concerns.  [facility] on him if he has time between admissions/consults.[MM1.2]    -Data reviewed today: I reviewed all new labs and imaging results over the last 24 hours. I personally reviewed[MM1.1] no images or EKG's today[MM1.2].    Physical Exam   Temp: 97.2  F (36.2  C) Temp src: Temporal BP: 137/60 Pulse: 79 Heart Rate: 81 Resp: 16 SpO2: 100 % O2 Device: None (Room air) Oxygen Delivery: 2 LPM  Vitals:    10/21/18 1943   Weight: 72.6 kg (160 lb)     Vital Signs with Ranges  Temp:   [97.2  F (36.2  C)-98.1  F (36.7  C)] 97.2  F (36.2  C)  Pulse:  [76-79] 79  Heart Rate:  [74-83] 81  Resp:  [10-18] 16  BP: (126-149)/(60-90) 137/60  SpO2:  [96 %-100 %] 100 %  I/O last 3 completed shifts:  In: -   Out: 550 [Urine:550][MM1.1]    Unable to do physical exam as patient was in OR[MM1.2]    Medications     lactated ringers 25 mL/hr at 10/22/18 1121     sodium chloride 75 mL/hr at 10/21/18 2317       [Auto Hold] atorvastatin  10 mg Oral QPM     ceFAZolin  1 g Intravenous See Admin Instructions     ceFAZolin  2 g Intravenous Pre-Op/Pre-procedure x 1 dose     [Auto Hold] diltiazem  120 mg Oral Daily     [Auto Hold] insulin aspart  1-6 Units Subcutaneous Q4H     [Auto Hold] insulin glargine  4 Units Subcutaneous QAM AC     [Auto Hold] metoprolol succinate  50 mg Oral Daily     [Auto Hold] tamsulosin  0.4 mg Oral QPM       Data     Recent Labs  Lab 10/22/18  0718 10/16/18  1808 10/16/18  0623   WBC 9.4  --   --    HGB 7.9* 8.2* 7.5*   MCV 92  --   --      --   --      --   --    POTASSIUM 4.4  --   --    CHLORIDE 105  --   --    CO2 27  --   --    BUN 27  --   --    CR 1.88*  --   --    ANIONGAP 6  --   --    JOSELINE 7.7*  --   --    *  --  129*       Recent Results (from the past 24 hour(s))   XR Pelvis w Hip Left 1 View    Narrative    XR PELVIS AND HIP LEFT 1 VIEW 10/21/2018 8:51 PM     HISTORY: hip pain; r/o fracture/dislocation;       Impression    IMPRESSION: Bilateral hip hemiarthroplasties. The left  hemiarthroplasty appears to be posterior superiorly dislocated.    KARON ALMONTE MD   XR Hip Port Left 1 View    Narrative    XR HIP PORT LT 1 VW   10/21/2018 10:05 PM     HISTORY: left hip reduction;     COMPARISON: None.      Impression    IMPRESSION: The femoral component is displaced out of the acetabular  component. Acetabular component overlies the greater trochanter. The  head of the femoral component is in the native acetabular region.    IMPRESSION: The femoral component is no  longer within the acetabular  component and the acetabular component overlies the greater  trochanter.    CEASAR BLEDSOE MD[MM1.1]          Revision History        User Key Date/Time User Provider Type Action    > MM1.2 10/22/2018  3:43 PM Aleksandar Li MD Physician Sign     MM1.3 10/22/2018  1:57 PM Aleksandar Li MD Physician      MM1.1 10/22/2018  1:52 PM Aleksandar Li MD Physician             Progress Notes by Sally Clifton PA-C at 10/22/2018  8:20 AM     Author:  Sally Clifton PA-C Service:  Orthopedics Author Type:  Physician Assistant - C    Filed:  10/22/2018  8:23 AM Date of Service:  10/22/2018  8:20 AM Creation Time:  10/22/2018  8:20 AM    Status:  Addendum :  Sally Clifton PA-C (Physician Assistant - C)         Patient scheduled for[EC1.1] procedure today  Left hip hemiarthroplasty revision[EC1.2]    NPO  Bed rest  Pain medication as needed    Sally Clifton PAC[EC1.1]     Revision History        User Key Date/Time User Provider Type Action    > EC1.2 10/22/2018  8:23 AM Sally Clifton PA-C Physician Assistant - ALANNA Addend     EC1.1 10/22/2018  8:21 AM Sally Clifton PA-C Physician Assistant - C Sign            ED Provider Notes by Gaurav Ryan DO at 10/21/2018  7:40 PM     Author:  Gaurav Ryan DO Service:  Emergency Medicine Author Type:  Physician    Filed:  10/22/2018 12:05 AM Date of Service:  10/21/2018  7:40 PM Creation Time:  10/21/2018  7:44 PM    Status:  Signed :  Gaurav Ryan DO (Physician)           History     Chief Complaint:  Hip Pain      HPI[MK1.1]   [MK1.2] Abimael Flores is a 89 year old male[MK1.1] with a history of CAD, DM, and CKD[MK1.2] who presents[MK1.1] via EMS[MK1.3] with[MK1.1] hip pain.  The patient previously underwent left hip fracture repair on 10/4/18 and then subsequently dislocated his hip on 10/14/18.  This was successfully reduced the following day in  "the[MK1.2] O[CO1.1]R.  This evening, the patient reports feeling his[MK1.2] left[CO1.1] hip dislocate posteriorly when he bent over.  This feels exactly like his previous dislocation did.  He received 75 mcg of Fentanyl en route with some relief but still describes his pain as severe.  He denies any recent fall or other trauma.[MK1.2]    Allergies:[MK1.1]  No known drug allergies.[MK1.2]     Medications:[MK1.1]    Tylenol  Oxycodone   Tramadol   Aspirin  Atorvastatin  Diltiazem  Lantus  Humalog   Vitamin D  Miralax   Senna-docusate  Tamsulosin[MK1.2]     Past Medical History:[MK1.1]    Benign essential hypertension   Coronary artery disease   Cardiac arrhythmia   Type 2 diabetes   Chronic kidney disease, stage 3  Retinal detachment   Anemia  Insomnia[MK1.2]      Past Surgical History:[MK1.1]    Closed hip reduction, left 10/14/18  Open reduction with internal fixation, left hip 10/4/18  Open reduction with internal fixation, right hip 8/26/18  Ruptured globe repair 4/21/14  Cataract surgery, bilateral[MK1.2]     Family History:[MK1.1]    Diabetes - mother, father[MK1.2]     Social History:[MK1.1]  Marital Status:   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  Tobacco Use: Former smoker  Alcohol Use: No alcohol use.   PCP:[MK1.2] RAISSA HILL[MK1.4]      Review of Systems   Musculoskeletal: Positive for[MK1.1] gait problem[MK1.2].[MK1.1]        Positive for hip pain.   All other systems reviewed and are negative[MK1.2].    Physical Exam   First Vitals:[MK1.1]  BP: 137/63  Pulse: 76  Heart Rate: 74  Temp: 97.7  F (36.5  C)  Resp: 18  Height: 175.3 cm (5' 9\")  Weight: 72.6 kg (160 lb)  SpO2: 98 %[MK1.4]    Physical Exam[MK1.1]  General: Patient in no acute distress.  Alert and cooperative with exam. Normal mentation  HEENT: NC/AT. Conjunctiva without injection or scleral icterus. External ears normal.  Respiratory: Breathing comfortably on room air  CV: Normal rate, all extremities well " perfused  GI:  Non-distended abdomen  Skin: Warm, dry, no rashes/open wounds on exposed skin  Musculoskeletal: LLE: left leg is shortened and internally rotated.[MK1.2] CMS intact.  Surgical incision C/D/I[CO1.1]  Neuro: Alert, answers questions appropriately. No gross motor deficits[MK1.2]    Emergency Department Course     Imaging:[MK1.1]  XR Hip Port Left 1 View   Final Result   IMPRESSION: The femoral component is displaced out of the acetabular   component. Acetabular component overlies the greater trochanter. The   head of the femoral component is in the native acetabular region.      IMPRESSION: The femoral component is no longer within the acetabular   component and the acetabular component overlies the greater   trochanter.      CEASAR BLEDSOE MD      XR Pelvis w Hip Left 1 View   Final Result   IMPRESSION: Bilateral hip hemiarthroplasties. The left   hemiarthroplasty appears to be posterior superiorly dislocated.      KARON ALMONTE MD[CO1.2]          Radiographic findings were communicated with the patient and family who voiced understanding of the findings.[MK1.3]    Laboratory:[MK1.1]  (2124) Glucose by meter: 101 (H)[MK1.5]     Procedures:[MK1.1]  [facility] Procedure Note        Sedation:      Performed by: Dr. Ronal Ryan  Authorized by: Dr. Ronal Ryan    Pre-Procedure Assessment done at[MK1.3] 0930[CO1.1].    Expected Level:[MK1.3]  Moderate Sedation[CO1.3]    Indication:  Sedation is required to allow for[MK1.3] joint reduction[MK1.5]    Consent obtained from patient and relative son after discussing the risks, benefits and alternatives.[CO1.3]    PO Intake:[MK1.3]  Appropriately NPO for procedure[CO1.3]    ASA Class:[MK1.3]  Class 2 - MILD SYSTEMIC DISEASE, NO ACUTE PROBLEMS, NO FUNCTIONAL LIMITATIONS.[CO1.3]    Mallampati:[MK1.3]  Grade 2:  Soft palate, base of uvula, tonsillar pillars, and portion of posterior pharyngeal wall visible[CO1.3]    Lungs:[MK1.3] Lungs Clear with good breath  sounds bilaterally.[CO1.3]     Heart:[MK1.3] Normal heart sounds and rate    History and physical reviewed and no updates needed[CO1.3]. I have reviewed the lab findings, diagnostic data, medications, and the plan for sedation. I have determined this patient to be an appropriate candidate for the planned sedation and procedure and have reassessed the patient IMMEDIATELY PRIOR to sedation and procedure.       Sedation Post Procedure Summary:    Prior to the start of the procedure and with procedural staff participation, I verbally confirmed the patient s identity using two indicators, relevant allergies, that the procedure was appropriate and matched the consent or emergent situation, and that the correct equipment/implants were available. Immediately prior to starting the procedure I conducted the Time Out with the procedural staff and re-confirmed the patient s name, procedure, and site/side. (The Joint Commission universal protocol was followed.)[MK1.3]  Yes[CO1.3]      Sedatives:[MK1.3] Propofol[MK1.6], 40mg[CO1.3]    Vital signs, airway[MK1.3], End Tidal CO2[CO1.3] and pulse oximetry were monitored and remained stable throughout the procedure and sedation was maintained until the procedure was complete.  The patient was monitored by staff until sedation discharge criteria were met.    Patient tolerance:[MK1.3] Patient tolerated the procedure well with no immediate complications.[CO1.3]    Time of sedation in minutes:[MK1.3]  10[CO1.3] minutes from beginning to end of physician one to one monitoring.[MK1.3]        Reduction     SITE: Left hip   PROCEDURE PROVIDER: Dr. Ronal Ryan     CONSENT: Risks (including but not limited to: decreased respirations, oxygen perfusion, aspiration, hypotension), benefits, and alternatives were discussed with patient and consent for procedure was obtained.     MONITORING: Monitoring consisted of heart rate, cardiac monitor, continuous pulse oximetry, continuous capnometry,  frequent blood pressure checks, level of consciousness, IV access, constant attendance by RN until patient recovered, constant attendance by MD until patient stable and intubation and emergency airway management equipment available.    REDUCTION COMMENTS: The patient's[MK1.7] left lower extremity[CO1.3] was held in traction and internally[MK1.7] rotated with traction applied with slight flexion of the knee.  P[CO1.3]ost reductions X-rays were obtained and showed[MK1.7] separation of the femoral and acetabular components with reduction of the femoral component only[MK1.6] into the native acetabulum[CO1.3].[MK1.6]    PATIENT STATUS:[MK1.7] Unsuccessful reduction of left hip dislocation. B[CO1.3]oth sensation and circulation are intact. Vital signs remained stable, airway patent, and O2 saturations remained above 95%. Post-procedure, the patient was alert and responds to verbal stimuli. Patient was monitored during recovery and returned to pre-procedure baseline.[MK1.7]        Interventions:[MK1.1]  (2025) Fentanyl, 50 mcg, IV injection   (2106) Dilaudid, 0.5 mg, IV injection[MK1.3]   (2208) Normal Saline, 1 liter, IV bolus   (2210) Propofol, 40 mg, IV injection     The patient's symptoms were partially improved with parenteral narcotics.[MK1.6]    Emergency Department Course:[MK1.1]  The patient arrived in the emergency department via [facility].   Nursing notes and vitals reviewed.  I performed an exam of the patient as documented above.     A peripheral IV established by EMS was continued.      The patient was sent for a pelvis/hip x-ray while in the emergency department, findings above.[MK1.3]      (2103) I spoke with Dr. Winchester of orthopedic surgery regarding the patient.  He recommends joint reduction in the ED.[MK1.7]    Procedural sedation and joint reduction was done as per the above procedure notes.[MK1.5]  Post-reduction imaging was obtained, as above.[MK1.6]    (2210) I again spoke with Dr. Winchester, updating  him on patient's ED course.[MK1.5]    Findings and plan explained to the patient who consents to admission.   (2224) I discussed the patient with Dr. Collins of the hospitalist service, who will admit the patient to a[MK1.6] medical[MK1.8] bed for further monitoring, evaluation, and treatment.[MK1.6]     Impression & Plan      Medical Decision Making:[MK1.1]  Patient[MK1.8] is a 89-year-old male who presents with left hip pain; history of recent hip replacement as well as recent hip dislocation.[CO1.3]  Patient's[MK1.8] medical history and records[CO1.3] were[MK1.8] reviewed.  Initial consideration for, not limited to, fracture, dislocation, soft tissue injury, among others.  Patient on exam has shortened and internally rotated hip.  Imaging demonstrates hip dislocation as noted above.  Case was discussed with [CO1.3] Ranulfo[MK1.8] of [facility] orthopedics who recommended trial of reduction in the ED.  Patient was provided[CO1.3] F[MK1.8]entanyl and Dilaudid initially for pain control.  During procedural sedation he received[CO1.3] P[MK1.8]ropofol as above (patient was provided a one-time dose of 40 mg).  Reduction unfortunately resulted in acetabular component  from femoral component with femoral component reducing into the native acetabulum.  Patient was neurovascularly intact to his lower extremity both before and after procedure.  Case was again discussed with [CO1.3] Ranulfo[MK1.8] who recommended admission and n.p.o. at midnight for[CO1.3] possible[CO1.1] hip revision tomorrow.  Patient admitted to hospital service for further evaluation and care.[CO1.3]    Diagnosis:[MK1.1]    ICD-10-CM    1. Hip dislocation, left (H) S73.005A[MK1.4]        Disposition:[MK1.1]  Admitted to medical bed[MK1.6]      I, Caro Reed, am serving as a scribe on 10/21/2018 at 7:44 PM to personally document services performed by Dr. Gaurav Ryan based on my observations and the provider's statements to me.      Caro Reed  10/21/2018    EMERGENCY DEPARTMENT[MK1.1]       Gaurav Ryan DO  10/22/18 0005  [CO1.4]     Revision History        User Key Date/Time User Provider Type Action    > CO1.4 10/22/2018 12:05 AM Gaurav Ryan,  Physician Sign     CO1.2 10/21/2018 11:59 PM Gaurav Ryan,  Physician      CO1.1 10/21/2018 11:58 PM Gaurav Ryan,  Physician      [N/A] 10/21/2018 11:09 PM Caro Reed Scribe Share     CO1.3 10/21/2018 11:07 PM Russell GardensGaurav Escalante,  Physician Share     MK1.8 10/21/2018 11:07 PM Brianna, Caro Scribe      MK1.4 10/21/2018 10:35 PM Brianna, Caro Scribe Share     MK1.6 10/21/2018 10:31 PM Kinner, Caro Scribe      MK1.5 10/21/2018 10:12 PM Dickner, Caro Scribe Share     MK1.7 10/21/2018  9:17 PM Kinner, Caro Scribe Share     MK1.3 10/21/2018  9:11 PM Dickner, Caro Scribe Share     MK1.2 10/21/2018  8:01 PM Dickner, Caro Scribe Share     MK1.1 10/21/2018  7:44 PM Brianna, Caro Scribe Share                  Procedure Notes     No notes of this type exist for this encounter.         Progress Notes - Therapies (Notes from 10/22/18 through 10/25/18)      Progress Notes by Marjan Tong PT at 10/23/2018 12:27 PM     Author:  Marjan Tong PT Service:  (none) Author Type:  Physical Therapist    Filed:  10/23/2018 12:27 PM Date of Service:  10/23/2018 12:27 PM Creation Time:  10/23/2018 12:27 PM    Status:  Signed :  Marjan Tong PT (Physical Therapist)          10/23/18 1100   Quick Adds   Type of Visit Initial PT Evaluation   Living Environment   Lives With spouse   Living Arrangements assisted living   Home Accessibility no concerns   Transportation Available family or friend will provide   Living Environment Comment Pt was admitted from TCU.    Self-Care   Usual Activity Tolerance good   Current Activity Tolerance fair   Regular Exercise no   Equipment Currently Used at Home walker, rolling   Functional Level Prior    Ambulation 3-->assistive equipment and person   Transferring 3-->assistive equipment and person   Toileting 2-->assistive person   Bathing 2-->assistive person   Dressing 2-->assistive person   Fall history within last six months yes   Number of times patient has fallen within last six months 3   General Information   Onset of Illness/Injury or Date of Surgery - Date 10/21/18   Referring Physician Marcos Winchester MD   Patient/Family Goals Statement None stated.    Pertinent History of Current Problem (include personal factors and/or comorbidities that impact the POC) 89 y/o male POD # 1 L hemiarthroplasty coverted to a L DELL after dislocating L hip while reaching down to the floor. PMH including R hip ORIF (8/26/18), L hip ORIF 10/4 and subsequent dislocation 10/14 and reduced in OR 10/15.    Precautions/Limitations fall precautions;left hip precautions  (Posterior-lateral)   Weight-Bearing Status - LLE weight-bearing as tolerated   General Observations Pt in supine upon arrival of therapist.    General Info Comments Ambulate with assist.    Cognitive Status Examination   Orientation person;place   Level of Consciousness alert   Follows Commands and Answers Questions 75% of the time   Personal Safety and Judgment at risk behaviors demonstrated   Pain Assessment   Patient Currently in Pain (L hip pain at rest: 10/10)   Integumentary/Edema   Integumentary/Edema Comments L hip incision covered with dressing.    Posture    Posture Comments Noted posterior lean in sitting and standing.    Range of Motion (ROM)   ROM Comment Limited L hip ROM due to pain and hip precautions, otherwise B LEs WFL.    Strength   Strength Comments Not formally assessed, pt demonstrates B LE weakness with mobility.    Bed Mobility   Bed Mobility Comments Supine-sit, modA of 2.    Transfer Skills   Transfer Comments Sit <> stand with FWW and modA of 2.    Gait   Gait Comments Pt amb a couple steps to bedside chair with FWW and modA of 2.   "  Balance   Balance Comments Noted fair sitting balance and poor standing balance at FWW.    Sensory Examination   Sensory Perception Comments Pt denies numbness/tingling in B LEs.    Modality Interventions   Planned Modality Interventions Cryotherapy   Planned Modality Interventions Comments PRN.   General Therapy Interventions   Planned Therapy Interventions bed mobility training;gait training;ROM;strengthening;transfer training   Clinical Impression   Criteria for Skilled Therapeutic Intervention yes, treatment indicated   PT Diagnosis Difficulty with gait.   Influenced by the following impairments Pain, Impaired L hip ROM, Decreased strength, Decreased activity tolerance   Functional limitations due to impairments Limited functional mobility requiring AD and assist   Clinical Presentation Stable/Uncomplicated   Clinical Presentation Rationale Based on PMH, current presentation, and social support.    Clinical Decision Making (Complexity) Low complexity   Therapy Frequency` 2 times/day   Predicted Duration of Therapy Intervention (days/wks) 3 days   Anticipated Discharge Disposition Transitional Care Facility   Risk & Benefits of therapy have been explained Yes   Patient, Family & other staff in agreement with plan of care Yes   [facility] TM \"6 Clicks\"   2016, Trustees of [facility], under license to [employer].  All rights reserved.   6 Clicks Short Forms Basic Mobility Inpatient Short Form   [facility] AM-PAC  \"6 Clicks\" V.2 Basic Mobility Inpatient Short Form   1. Turning from your back to your side while in a flat bed without using bedrails? 3 - A Little   2. Moving from lying on your back to sitting on the side of a flat bed without using bedrails? 3 - A Little   3. Moving to and from a bed to a chair (including a wheelchair)? 2 - A Lot   4. Standing up from a chair using your arms (e.g., wheelchair, or bedside chair)? 2 - A Lot   5. To walk in hospital room? 2 - A Lot   6. " Climbing 3-5 steps with a railing? 1 - Total   Basic Mobility Raw Score (Score out of 24.Lower scores equate to lower levels of function) 13   Total Evaluation Time   Total Evaluation Time (Minutes) 15[JH1.1]        Revision History        User Key Date/Time User Provider Type Action    > JH1.1 10/23/2018 12:27 PM Marjan Tong, PT Physical Therapist Sign

## 2018-10-21 NOTE — IP AVS SNAPSHOT
"[place] [address] [facility]: [phone]                                              INTERAGENCY TRANSFER FORM - PHYSICIAN ORDERS   10/21/2018                    Hospital Admission Date: 10/21/2018  CHERYL HOLLOWAY   : 1924  Sex: Male        Attending Provider: Radha Collins MD     Allergies:  No Known Allergies    Infection:  None   Service:  HOSPITALIST    Ht:  1.753 m (5' 9\")   Wt:  74.5 kg (164 lb 3.2 oz)   Admission Wt:  72.6 kg (160 lb)    BMI:  24.25 kg/m 2   BSA:  1.9 m 2            Patient PCP Information     Provider PCP Type    [facility]      ED Clinical Impression     Diagnosis Description Comment Added By Time Added    Hip dislocation, left (H) [S73.005A] Hip dislocation, left (H) [S73.005A]  Sanjay Diaz 10/21/2018 10:31 PM      Hospital Problems as of 10/25/2018              Priority Class Noted POA    Hip dislocation, left (H) Medium  10/21/2018 Yes      Non-Hospital Problems as of 10/25/2018              Priority Class Noted    Injury of globe of eye Medium  2014    Recent retinal detachment, total or subtotal Medium  2014    Closed right hip fracture (H) Medium  2018    S/P total hip arthroplasty Medium  2018    Type 2 diabetes mellitus with renal complication (H) Medium  2018    Cardiac arrhythmia, unspecified cardiac arrhythmia type Medium  2018    CKD (chronic kidney disease) stage 3, GFR 30-59 ml/min (H) Medium  2018    Benign essential hypertension Medium  2018    Coronary artery disease involving coronary bypass graft of native heart without angina pectoris Medium  2018    Physical deconditioning Medium  2018    Other insomnia Medium  2018    Bladder spasms Medium  2018    Fracture of femoral neck, left (H) Medium  10/4/2018    Hip fracture, left (H) Medium  10/4/2018    Anemia due to blood loss, acute Medium  10/11/2018    Dislocation of hip (H) Medium  10/14/2018      Code Status " History     Date Active Date Inactive Code Status Order ID Comments User Context    10/17/2018  2:31 PM 10/21/2018 11:12 PM DNR/DNI [id number]  Terence Persaud MD Outpatient    10/15/2018  9:52 AM 10/17/2018  2:31 PM DNR/DNI [id number]  Silvano Kumar MD Inpatient    10/14/2018  3:45 PM 10/15/2018  9:52 AM Full Code During Procedure [id number]  Silvano Kumar MD Inpatient    10/8/2018 12:50 PM 10/14/2018  3:45 PM DNR/DNI [id number]  Earl Streeter DO Outpatient    10/4/2018  4:36 AM 10/8/2018 12:50 PM DNR/DNI [id number]  Radha Collins MD Inpatient    8/25/2018  9:30 PM 9/3/2018  4:24 PM DNR/DNI [id number]  Mamie Eubanks MD Inpatient    4/22/2014 12:05 PM 8/25/2018  9:30 PM Full Code [id number]  Ryne Justin PA-C Outpatient         Medication Review      START taking        Dose / Directions Comments    ondansetron 4 MG ODT tab   Commonly known as:  ZOFRAN-ODT   Used for:  Status post total replacement of left hip        Dose:  4 mg   Take 1 tablet (4 mg) by mouth every 6 hours as needed for nausea or vomiting   Quantity:  15 tablet   Refills:  0          CONTINUE these medications which may have CHANGED, or have new prescriptions. If we are uncertain of the size of tablets/capsules you have at home, strength may be listed as something that might have changed.        Dose / Directions Comments    acetaminophen 325 MG tablet   Commonly known as:  TYLENOL   This may have changed:  reasons to take this   Used for:  Status post total replacement of left hip        Dose:  650 mg   Take 2 tablets (650 mg) by mouth every 4 hours as needed for mild pain   Quantity:  40 tablet   Refills:  0        traMADol 50 MG tablet   Commonly known as:  ULTRAM   This may have changed:    - when to take this  - reasons to take this   Used for:  Status post total replacement of left hip        Dose:  50 mg   Take 1 tablet (50 mg) by mouth every 4 hours as needed for moderate pain   Quantity:  30 tablet   Refills:  0           CONTINUE these medications which have NOT CHANGED        Dose / Directions Comments    aspirin 325 MG EC tablet   Used for:  Closed fracture of neck of right femur, initial encounter (H)        Take one Aspirin tab twice daily for 5 weeks.   Quantity:  30 tablet   Refills:  0        atorvastatin 10 MG tablet   Commonly known as:  LIPITOR   Used for:  Coronary artery disease involving native heart without angina pectoris, unspecified vessel or lesion type        Dose:  10 mg   Take 1 tablet (10 mg) by mouth every evening   Refills:  0        ANIBAL PROTECT EX        Apply topically 2 times daily   Refills:  0        diltiazem 120 MG 24 hr capsule   Commonly known as:  DILACOR XR        Dose:  120 mg   Take 120 mg by mouth daily   Refills:  0        DRISDOL 44642 units Caps   Used for:  Fracture of hip, left, closed, initial encounter (H)        Dose:  42591 Units   Take 50,000 Units by mouth every 7 days for 5 doses   Quantity:  5 capsule   Refills:  0        insulin glargine 100 UNIT/ML injection   Commonly known as:  LANTUS        Dose:  7 Units   Inject 7 Units Subcutaneous every morning   Refills:  0        * insulin lispro 100 UNIT/ML injection   Commonly known as:  HumaLOG        Inject Subcutaneous 3 times daily (before meals) Sliding scale:  -189=1 unit -239=2 units -289=3 units -339=4 units -399=5 units -499=6 units +=7 units   Refills:  0        * HumaLOG KWIKpen 100 UNIT/ML injection   Generic drug:  insulin lispro        Inject Subcutaneous At Bedtime Sliding scale: -249=1 unit -299=2 units -349=3 units -399=4 units +=5 units   Refills:  0        * insulin lispro 100 UNIT/ML injection   Commonly known as:  HumaLOG KWIKpen   Used for:  Type 2 diabetes mellitus with complication, with long-term current use of insulin (H)        Dose:  2 Units   Inject 2 Units Subcutaneous 3 times daily (before meals)   Refills:  0        metoprolol  succinate 50 MG 24 hr tablet   Commonly known as:  TOPROL-XL   Used for:  Paroxysmal supraventricular tachycardia (H)        Dose:  50 mg   Take 1 tablet (50 mg) by mouth daily   Quantity:  30 tablet   Refills:  0        polyethylene glycol Packet   Commonly known as:  MIRALAX/GLYCOLAX        Dose:  17 g   Take 17 g by mouth daily   Refills:  0        senna-docusate 8.6-50 MG per tablet   Commonly known as:  SENOKOT-S;PERICOLACE   Used for:  Dislocation of left hip, initial encounter (H)        Dose:  1 tablet   Take 1 tablet by mouth 2 times daily   Quantity:  60 tablet   Refills:  1        tamsulosin 0.4 MG capsule   Commonly known as:  FLOMAX        Dose:  0.4 mg   Take 0.4 mg by mouth every evening   Refills:  0        * Notice:  This list has 3 medication(s) that are the same as other medications prescribed for you. Read the directions carefully, and ask your doctor or other care provider to review them with you.            Summary of Visit     Reason for your hospital stay       Right total hip arthroplasty converted from right hip hemiarthroplasty secondary to dislocations             After Care     Activity - Up with assistive device           Advance Diet as Tolerated       Follow this diet upon discharge: Orders Placed This Encounter      Room Service      Advance Diet as Tolerated: Regular Diet Adult       Encourage PO fluids           Fall precautions           General info for SNF       Length of Stay Estimate: Short Term Care: Estimated # of Days <30  Condition at Discharge: Improving  Level of care:skilled   Rehabilitation Potential: Good  Admission H&P remains valid and up-to-date: Yes  Recent Chemotherapy: N/A  Use Nursing Home Standing Orders: Yes       Hip precautions           Mantoux instructions       Give two-step Mantoux (PPD) Per Facility Policy Yes       Weight bearing status       WBAT       Wound care       Site:   Right hip  Instructions:  Leave dressing intact if Aquacel and remove at  POD #7, remove staples POD #14  Daily dressing changes if gauze and tape             Referrals     Occupational Therapy Adult Consult       Evaluate and treat as clinically indicated.    Reason:  Right total hip arthroplasty       Physical Therapy Adult Consult       Evaluate and treat as clinically indicated.    Reason:  Right total hip arthroplasty             Follow-Up Appointment Instructions     Future Labs/Procedures    Follow Up and recommended labs and tests     Comments:    Follow up with Dr. Winchester in 2 weeks.  No follow up labs or test are needed.  Call [place] for appointment [phone]      Follow-Up Appointment Instructions     Follow Up and recommended labs and tests       Follow up with Dr. Winchester in 2 weeks.  No follow up labs or test are needed.  Call [place] for appointment [phone]             Statement of Approval     Ordered          10/25/18 1321  I have reviewed and agree with all the recommendations and orders detailed in this document.  EFFECTIVE NOW     Approved and electronically signed by:  Silvano Kumar MD

## 2018-10-21 NOTE — IP AVS SNAPSHOT
"` [place] [address] [facility]: [phone]                                              INTERAGENCY TRANSFER FORM - NURSING   10/21/2018                    Hospital Admission Date: 10/21/2018  CHERYL HOLLOWAY   : 1924  Sex: Male        Attending Provider: Radha Collins MD     Allergies:  No Known Allergies    Infection:  None   Service:  HOSPITALIST    Ht:  1.753 m (5' 9\")   Wt:  74.5 kg (164 lb 3.2 oz)   Admission Wt:  72.6 kg (160 lb)    BMI:  24.25 kg/m 2   BSA:  1.9 m 2            Patient PCP Information     Provider PCP Type    RAISSA  [facility]      Current Code Status     Date Active Code Status Order ID Comments User Context       10/21/2018 11:12 PM DNR/DNI [id number]  Radha Collins MD Inpatient       Questions for Current Code Status     Question Answer Comment    Code status determined by: Discussion with patient/legal decision maker       Code Status History     Date Active Date Inactive Code Status Order ID Comments User Context    10/17/2018  2:31 PM 10/21/2018 11:12 PM DNR/DNI [id number]  Terence Persaud MD Outpatient    10/15/2018  9:52 AM 10/17/2018  2:31 PM DNR/DNI [id number]  Silvano Kumar MD Inpatient    10/14/2018  3:45 PM 10/15/2018  9:52 AM Full Code During Procedure [id number]  Silvano Kumar MD Inpatient    10/8/2018 12:50 PM 10/14/2018  3:45 PM DNR/DNI [id number]  Earl Streeter DO Outpatient    10/4/2018  4:36 AM 10/8/2018 12:50 PM DNR/DNI [id number]  Radha Collins MD Inpatient    2018  9:30 PM 9/3/2018  4:24 PM DNR/DNI [id number]  Mamie Eubanks MD Inpatient    2014 12:05 PM 2018  9:30 PM Full Code [id number]  Ryne Justin PA-C Outpatient      Advance Directives        Scanned docmt in ACP Activity?           Yes, scanned ACP docmt        Hospital Problems as of 10/25/2018              Priority Class Noted POA    Hip dislocation, left (H) Medium  10/21/2018 Yes      Non-Hospital Problems as of 10/25/2018     "          Priority Class Noted    Injury of globe of eye Medium  4/21/2014    Recent retinal detachment, total or subtotal Medium  8/5/2014    Closed right hip fracture (H) Medium  8/25/2018    S/P total hip arthroplasty Medium  8/26/2018    Type 2 diabetes mellitus with renal complication (H) Medium  9/4/2018    Cardiac arrhythmia, unspecified cardiac arrhythmia type Medium  9/4/2018    CKD (chronic kidney disease) stage 3, GFR 30-59 ml/min (H) Medium  9/4/2018    Benign essential hypertension Medium  9/4/2018    Coronary artery disease involving coronary bypass graft of native heart without angina pectoris Medium  9/4/2018    Physical deconditioning Medium  9/4/2018    Other insomnia Medium  9/17/2018    Bladder spasms Medium  9/17/2018    Fracture of femoral neck, left (H) Medium  10/4/2018    Hip fracture, left (H) Medium  10/4/2018    Anemia due to blood loss, acute Medium  10/11/2018    Dislocation of hip (H) Medium  10/14/2018      Immunizations     Name Date      Influenza (High Dose) 3 valent vaccine 10/05/18          END      ASSESSMENT     Discharge Profile Flowsheet     EXPECTED DISCHARGE     PAS Number  [id number] 09/03/18 1415    Expected Discharge Date  10/25/18 (TCU) 10/24/18 0848   SAFETY      DISCHARGE NEEDS ASSESSMENT     Safety WDL  WDL 10/25/18 1325    Equipment Currently Used at Home  walker, rolling 10/23/18 1209   All Alarms  alarm(s) activated and audible 10/25/18 1325    Transportation Available  family or friend will provide 10/23/18 1121   SKIN      # of Referrals Placed by CTS  Post Acute Facilities 10/07/18 1433   Inspection of bony prominences  Full 10/25/18 0931    Equipment Used at Home  none 04/22/14 0848   Full except areas not inspected   Buttock, left;Buttock, right;Sacrum;Coccyx 10/23/18 1659    GASTROINTESTINAL (ADULT,PEDIATRIC,OB)     Procedural focused assessment (identify areas inspected)   Hip, left 10/23/18 1659    GI WDL  WDL 10/25/18 0931   Inspection under devices  Full  "10/24/18 0914    All Quadrants Bowel Sounds  audible and normoactive 10/24/18 2339   Skin WDL  ex 10/25/18 1325    Passing flatus  yes 10/24/18 2339   Skin Color/Characteristics  bruised (ecchymotic) 10/25/18 0931    COMMUNICATION ASSESSMENT     Skin Temperature  cool 10/25/18 0518    Patient's communication style  spoken language (English or Bilingual) 10/21/18 1942   Skin Moisture  dry;flaky 10/25/18 0518    FINAL RESOURCES     Skin Elasticity  quick return to original state 10/25/18 0931    Resources List  Skilled Nursing Facility 09/03/18 1415   Skin Integrity  incision(s) 10/25/18 1325                 Assessment WDL (Within Defined Limits) Definitions           Safety WDL     Effective: 09/28/15    Row Information: <b>WDL Definition:</b> Bed in low position, wheels locked; call light in reach; upper side rails up x 2; ID band on<br> <font color=\"gray\"><i>Item=AS safety wdl>>List=AS safety wdl>>Version=F14</i></font>      Skin WDL     Effective: 09/28/15    Row Information: <b>WDL Definition:</b> Warm; dry; intact; elastic; without discoloration; pressure points without redness<br> <font color=\"gray\"><i>Item=AS skin wdl>>List=AS skin wdl>>Version=F14</i></font>      Vitals     Vital Signs Flowsheet     VITAL SIGNS     Side Effects Monitoring: Sedation Level  S 10/24/18 2340    Temp  98.6  F (37  C) 10/25/18 0805   [place] COMA SCALE      Temp src  Oral 10/25/18 0510   Best Eye Response  4-->(E4) spontaneous 10/23/18 2209    Resp  16 10/25/18 0805   Best Motor Response  6-->(M6) obeys commands 10/23/18 2209    Pulse  87 10/24/18 2003   Best Verbal Response  5-->(V5) oriented 10/23/18 2209    Heart Rate  78 10/25/18 0805   [place] Coma Scale Score  15 10/23/18 2209    Pulse/Heart Rate Source  Monitor 10/25/18 0805   HEIGHT AND WEIGHT      BP  118/75 10/25/18 0805   Height  1.753 m (5' 9\") 10/21/18 1944    BP Location  Left arm 10/25/18 0805   Height Method  Stated 10/21/18 1944    Patient Position  Lying 10/22/18 " 1056   Weight  74.5 kg (164 lb 3.2 oz) 10/25/18 0705    OXYGEN THERAPY     Weight Method  Bed scale 10/25/18 0705    SpO2  96 % 10/25/18 0805   Bed Scale  Standard (fitted sheet, draw sheet/ pad, cover/flat sheet, blanket, two pillows);Call light 10/25/18 0705    O2 Device  None (Room air) 10/25/18 0805   BSA (Calculated - sq m)  1.88 10/21/18 1944    Oxygen Delivery  2 LPM 10/21/18 2331   BMI (Calculated)  23.68 10/21/18 1944    PAIN/COMFORT     POINT OF CARE TESTING      Patient Currently in Pain  sleeping: patient not able to self report 10/24/18 2340   Puncture Site  fingertip 10/21/18 2127    Preferred Pain Scale  number (Numeric Rating Pain Scale) 10/24/18 2340   Bedside Glucose (mg/dl )   101 mg/dl 10/21/18 2127    Patient's Stated Pain Goal  9 10/22/18 1056   POSITIONING      0-10 Pain Scale  8 10/25/18 1426   Body Position  turned 10/25/18 0932    Pain Location  Hip 10/24/18 2340   Head of Bed (HOB)  HOB flat 10/25/18 0518    Pain Orientation  Left 10/24/18 2340   Positioning/Transfer Devices  pillows 10/25/18 0518    Pain Descriptors  Burning;Constant 10/23/18 0620   DAILY CARE      Pain Intervention(s)  Medication (See eMAR) 10/24/18 2340   Activity Management  activity adjusted per tolerance 10/25/18 0931    Response to Interventions  Absence of nonverbal indicators of pain 10/24/18 2340   Activity Assistance Provided  assistance, 2 people 10/25/18 0201    ANALGESIA SIDE EFFECTS MONITORING     ECG      Side Effects Monitoring: Respiratory Quality  R 10/24/18 2340   Equipment  electrodes changed;telemetry batteries changed 10/23/18 1919    Side Effects Monitoring: Respiratory Depth  N 10/24/18 2340                 Patient Lines/Drains/Airways Status    Active LINES/DRAINS/AIRWAYS     Name: Placement date: Placement time: Site: Days: Last dressing change:    Peripheral IV 10/23/18 Right Upper forearm 10/23/18   0918   Upper forearm   2     Wound 10/17/18 Mid Coccyx Suspected pressure ulcer  blanchable redness surrounding small opening on coccyx 10/17/18   0900   Coccyx   8     Wound 10/24/18 Right Elbow Skin tear 10/24/18   1157   Elbow   1     Incision/Surgical Site 08/26/18 Right Hip 08/26/18   1441    60     Incision/Surgical Site 10/22/18 Left Hip 10/22/18   1530    2             Patient Lines/Drains/Airways Status    Active PICC/CVC     None            Intake/Output Detail Report     Date Intake         Output   Net    Shift P.O. I.V. IV Piggyback Colloid Red Blood Cells Total Urine Blood Total       Day 10/24/18 0700 - 10/24/18 1459 -- -- -- -- -- -- 150 -- 150 -150    [place] 10/24/18 1500 - 10/24/18 2259 -- -- -- -- -- -- 500 -- 500 -500    Noc 10/24/18 2300 - 10/25/18 0659 360 -- -- -- -- 360 400 -- 400 -40    Day 10/25/18 0700 - 10/25/18 1459 100 -- -- -- -- 100 275 -- 275 -175    [place] 10/25/18 1500 - 10/25/18 2259 -- -- -- -- -- -- -- -- -- 0      Last Void/BM       Most Recent Value    Urine Occurrence 1 at 10/25/2018 1214    Stool Occurrence 1 at 10/25/2018 0500      Case Management/Discharge Planning     Case Management/Discharge Planning Flowsheet     REFERRAL INFORMATION     Equipment Used at Home  none 04/22/14 0848    # of Referrals Placed by CTS  Post Acute Facilities 10/07/18 1433   FINAL RESOURCES      LIVING ENVIRONMENT     Equipment Currently Used at Home  walker, rolling 10/23/18 1209    Lives With  spouse 10/23/18 1121   Resources List  Skilled Nursing Facility 09/03/18 1415    Living Arrangements  assisted living 10/23/18 1121   PAS Number  [id number] 09/03/18 1415    COPING/STRESS     ABUSE RISK SCREEN      Major Change/Loss/Stressor  hospitalization 10/23/18 0121   QUESTION TO PATIENT:  Has a member of your family or a partner(now or in the past) intimidated, hurt, manipulated, or controlled you in any way?  no 10/21/18 1945    EXPECTED DISCHARGE     QUESTION TO PATIENT: Do you feel safe going back to the place where you are living?  yes 10/21/18 1945    Expected Discharge Date   10/25/18 (U) 10/24/18 0848   OBSERVATION: Is there reason to believe there has been maltreatment of a vulnerable adult (ie. Physical/Sexual/Emotional abuse, self neglect, lack of adequate food, shelter, medical care, or financial exploitation)?  no 10/21/18 1945    DISCHARGE PLANNING     OTHER      Transportation Available  family or friend will provide 10/23/18 1121   Are you depressed or being treated for depression?  No 10/23/18 0130

## 2018-10-21 NOTE — IP AVS SNAPSHOT
[address] [facility]    [address] [place] [address]    Phone:  [phone]                                       After Visit Summary   10/21/2018    Abimael Flores    MRN: [id number]           After Visit Summary Signature Page     I have received my discharge instructions, and my questions have been answered. I have discussed any challenges I see with this plan with the nurse or doctor.    ..........................................................................................................................................  Patient/Patient Representative Signature      ..........................................................................................................................................  Patient Representative Print Name and Relationship to Patient    ..................................................               ................................................  Date                                   Time    ..........................................................................................................................................  Reviewed by Signature/Title    ...................................................              ..............................................  Date                                               Time          22EPIC Rev 08/18

## 2018-10-21 NOTE — LETTER
Transition Communication Hand-off for Care Transitions to Next Level of Care Provider    Name: Abimael Flores  : 1924  MRN #: [id number]  Primary Care Provider: RAISSA HILL     Primary Clinic: [facility] [address] [place]   [place] [address]     Reason for Hospitalization:  Hip dislocation, left (H) [S73.005A]  Admit Date/Time: 10/21/2018  7:42 PM  Discharge Date:  10/25/2018  Payor Source: Payor: MEDICARE / Plan: MEDICARE / Product Type: Medicare /     Readmission Assessment Measure (RACHEL) Risk Score/category: High           Reason for Communication Hand-off Referral: Fragility  Avoidable readmission within 30 days    Discharge Plan:  Discharge back to [facility]       Concern for non-adherence with plan of care:   Y/N  NO  Discharge Needs Assessment:  Needs       Most Recent Value    Equipment Currently Used at Home walker, rolling    Transportation Available family or friend will provide             Follow-up specialty is recommended: Yes    Follow-up plan:  Will need to follow-up with his  Orthopedic surgeon in 2 weeks  Date Time Provider Department Center   10/26/2018 10:30 AM Kendell Mueller, PT [facility]       Any outstanding tests or procedures:  no      Referrals     Future Labs/Procedures    Occupational Therapy Adult Consult     Comments:    Evaluate and treat as clinically indicated.    Reason:  Right total hip arthroplasty    Physical Therapy Adult Consult     Comments:    Evaluate and treat as clinically indicated.    Reason:  Right total hip arthroplasty            Key Recommendations:  The patient has a history of left hip hemiarthroplasty on 10/04 after the patient sustained a hip fracture following a fall.  He was readmitted on 10/14 for left hip dislocation for which he underwent closed reduction under general anesthesia on 10/15.   He was discharged to [facility] for rehabilitation.  Earlier today he bent over to get his shoes off and he heard a pop and  subsequently developed severe pain in his left hip. The x-ray in the Emergency Department indicated posterior superiorly dislocation of the left hemiarthroplasty.  An attempt at reduction in the emergency department was partially successful, however, the repeat x-ray revealed the femoral component was no longer in the acetabular component.  The case was communicated to Dr. Winchester, the on-call orthopedic surgeon, who has recommended a revision hemiarthroplasty. He had a arthroplasty revision of left hip on 10/22 and was discharged back to [facility].  He will need to see his PCP after his stay at the TCU.  Thank you for following this patient.      Yolanda Mitchell RN  Care Coordinator    AVS/Discharge Summary is the source of truth; this is a helpful guide for improved communication of patient story

## 2018-10-22 ENCOUNTER — ANESTHESIA (OUTPATIENT)
Dept: SURGERY | Facility: CLINIC | Age: 83
DRG: 467 | End: 2018-10-22
Payer: MEDICARE

## 2018-10-22 ENCOUNTER — APPOINTMENT (OUTPATIENT)
Dept: GENERAL RADIOLOGY | Facility: CLINIC | Age: 83
DRG: 467 | End: 2018-10-22
Attending: ORTHOPAEDIC SURGERY
Payer: MEDICARE

## 2018-10-22 ENCOUNTER — ANESTHESIA EVENT (OUTPATIENT)
Dept: SURGERY | Facility: CLINIC | Age: 83
DRG: 467 | End: 2018-10-22
Payer: MEDICARE

## 2018-10-22 LAB
ABO + RH BLD: NORMAL
ABO + RH BLD: NORMAL
ALBUMIN UR-MCNC: 30 MG/DL
ANION GAP SERPL CALCULATED.3IONS-SCNC: 6 MMOL/L (ref 3–14)
APPEARANCE UR: ABNORMAL
BILIRUB UR QL STRIP: NEGATIVE
BLD GP AB SCN SERPL QL: NORMAL
BLD PROD TYP BPU: NORMAL
BLD PROD TYP BPU: NORMAL
BLD UNIT ID BPU: 0
BLOOD BANK CMNT PATIENT-IMP: NORMAL
BLOOD PRODUCT CODE: NORMAL
BPU ID: NORMAL
BUN SERPL-MCNC: 27 MG/DL (ref 7–30)
CALCIUM SERPL-MCNC: 7.7 MG/DL (ref 8.5–10.1)
CHLORIDE SERPL-SCNC: 105 MMOL/L (ref 94–109)
CO2 SERPL-SCNC: 27 MMOL/L (ref 20–32)
COLOR UR AUTO: YELLOW
CREAT SERPL-MCNC: 1.88 MG/DL (ref 0.66–1.25)
ERYTHROCYTE [DISTWIDTH] IN BLOOD BY AUTOMATED COUNT: 15.8 % (ref 10–15)
GFR SERPL CREATININE-BSD FRML MDRD: 34 ML/MIN/1.7M2
GLUCOSE BLDC GLUCOMTR-MCNC: 112 MG/DL (ref 70–99)
GLUCOSE BLDC GLUCOMTR-MCNC: 119 MG/DL (ref 70–99)
GLUCOSE BLDC GLUCOMTR-MCNC: 126 MG/DL (ref 70–99)
GLUCOSE BLDC GLUCOMTR-MCNC: 131 MG/DL (ref 70–99)
GLUCOSE BLDC GLUCOMTR-MCNC: 144 MG/DL (ref 70–99)
GLUCOSE BLDC GLUCOMTR-MCNC: 146 MG/DL (ref 70–99)
GLUCOSE SERPL-MCNC: 108 MG/DL (ref 70–99)
GLUCOSE UR STRIP-MCNC: NEGATIVE MG/DL
HCT VFR BLD AUTO: 24.4 % (ref 40–53)
HGB BLD-MCNC: 7.9 G/DL (ref 13.3–17.7)
HGB UR QL STRIP: ABNORMAL
KETONES UR STRIP-MCNC: 5 MG/DL
LEUKOCYTE ESTERASE UR QL STRIP: ABNORMAL
MCH RBC QN AUTO: 29.8 PG (ref 26.5–33)
MCHC RBC AUTO-ENTMCNC: 32.4 G/DL (ref 31.5–36.5)
MCV RBC AUTO: 92 FL (ref 78–100)
NITRATE UR QL: NEGATIVE
NUM BPU REQUESTED: 1
PH UR STRIP: 5 PH (ref 5–7)
PLATELET # BLD AUTO: 294 10E9/L (ref 150–450)
POTASSIUM SERPL-SCNC: 4.4 MMOL/L (ref 3.4–5.3)
RBC # BLD AUTO: 2.65 10E12/L (ref 4.4–5.9)
RBC #/AREA URNS AUTO: 62 /HPF (ref 0–2)
SODIUM SERPL-SCNC: 138 MMOL/L (ref 133–144)
SOURCE: ABNORMAL
SP GR UR STRIP: 1.02 (ref 1–1.03)
SPECIMEN EXP DATE BLD: NORMAL
TRANSFUSION STATUS PATIENT QL: NORMAL
TRANSFUSION STATUS PATIENT QL: NORMAL
UROBILINOGEN UR STRIP-MCNC: NORMAL MG/DL (ref 0–2)
WBC # BLD AUTO: 9.4 10E9/L (ref 4–11)
WBC #/AREA URNS AUTO: 2396 /HPF (ref 0–5)
WBC CLUMPS #/AREA URNS HPF: PRESENT /HPF

## 2018-10-22 PROCEDURE — 0SJBXZZ INSPECTION OF LEFT HIP JOINT, EXTERNAL APPROACH: ICD-10-PCS | Performed by: ORTHOPAEDIC SURGERY

## 2018-10-22 PROCEDURE — 25000128 H RX IP 250 OP 636: Performed by: NURSE ANESTHETIST, CERTIFIED REGISTERED

## 2018-10-22 PROCEDURE — 40000169 ZZH STATISTIC PRE-PROCEDURE ASSESSMENT I: Performed by: ORTHOPAEDIC SURGERY

## 2018-10-22 PROCEDURE — 25800025 ZZH RX 258: Performed by: ORTHOPAEDIC SURGERY

## 2018-10-22 PROCEDURE — 86923 COMPATIBILITY TEST ELECTRIC: CPT

## 2018-10-22 PROCEDURE — 25000132 ZZH RX MED GY IP 250 OP 250 PS 637: Mod: GY | Performed by: INTERNAL MEDICINE

## 2018-10-22 PROCEDURE — 71000012 ZZH RECOVERY PHASE 1 LEVEL 1 FIRST HR: Performed by: ORTHOPAEDIC SURGERY

## 2018-10-22 PROCEDURE — 00000146 ZZHCL STATISTIC GLUCOSE BY METER IP

## 2018-10-22 PROCEDURE — 88300 SURGICAL PATH GROSS: CPT | Mod: 26 | Performed by: ORTHOPAEDIC SURGERY

## 2018-10-22 PROCEDURE — 80048 BASIC METABOLIC PNL TOTAL CA: CPT | Performed by: INTERNAL MEDICINE

## 2018-10-22 PROCEDURE — 0SPB0JZ REMOVAL OF SYNTHETIC SUBSTITUTE FROM LEFT HIP JOINT, OPEN APPROACH: ICD-10-PCS | Performed by: ORTHOPAEDIC SURGERY

## 2018-10-22 PROCEDURE — 86850 RBC ANTIBODY SCREEN: CPT

## 2018-10-22 PROCEDURE — 25000128 H RX IP 250 OP 636: Performed by: ANESTHESIOLOGY

## 2018-10-22 PROCEDURE — 81001 URINALYSIS AUTO W/SCOPE: CPT | Performed by: ORTHOPAEDIC SURGERY

## 2018-10-22 PROCEDURE — 37000008 ZZH ANESTHESIA TECHNICAL FEE, 1ST 30 MIN: Performed by: ORTHOPAEDIC SURGERY

## 2018-10-22 PROCEDURE — 25000128 H RX IP 250 OP 636: Performed by: ORTHOPAEDIC SURGERY

## 2018-10-22 PROCEDURE — 37000009 ZZH ANESTHESIA TECHNICAL FEE, EACH ADDTL 15 MIN: Performed by: ORTHOPAEDIC SURGERY

## 2018-10-22 PROCEDURE — 36000067 ZZH SURGERY LEVEL 5 1ST 30 MIN: Performed by: ORTHOPAEDIC SURGERY

## 2018-10-22 PROCEDURE — P9041 ALBUMIN (HUMAN),5%, 50ML: HCPCS | Performed by: NURSE ANESTHETIST, CERTIFIED REGISTERED

## 2018-10-22 PROCEDURE — 93005 ELECTROCARDIOGRAM TRACING: CPT

## 2018-10-22 PROCEDURE — 93010 ELECTROCARDIOGRAM REPORT: CPT | Performed by: INTERNAL MEDICINE

## 2018-10-22 PROCEDURE — C1776 JOINT DEVICE (IMPLANTABLE): HCPCS | Performed by: ORTHOPAEDIC SURGERY

## 2018-10-22 PROCEDURE — A9270 NON-COVERED ITEM OR SERVICE: HCPCS | Mod: GY | Performed by: INTERNAL MEDICINE

## 2018-10-22 PROCEDURE — 87086 URINE CULTURE/COLONY COUNT: CPT | Performed by: INTERNAL MEDICINE

## 2018-10-22 PROCEDURE — 36000069 ZZH SURGERY LEVEL 5 EA 15 ADDTL MIN: Performed by: ORTHOPAEDIC SURGERY

## 2018-10-22 PROCEDURE — 25000125 ZZHC RX 250: Performed by: ORTHOPAEDIC SURGERY

## 2018-10-22 PROCEDURE — 12000007 ZZH R&B INTERMEDIATE

## 2018-10-22 PROCEDURE — 0SRB01A REPLACEMENT OF LEFT HIP JOINT WITH METAL SYNTHETIC SUBSTITUTE, UNCEMENTED, OPEN APPROACH: ICD-10-PCS | Performed by: ORTHOPAEDIC SURGERY

## 2018-10-22 PROCEDURE — 85027 COMPLETE CBC AUTOMATED: CPT | Performed by: INTERNAL MEDICINE

## 2018-10-22 PROCEDURE — 88300 SURGICAL PATH GROSS: CPT | Performed by: ORTHOPAEDIC SURGERY

## 2018-10-22 PROCEDURE — P9016 RBC LEUKOCYTES REDUCED: HCPCS

## 2018-10-22 PROCEDURE — C1713 ANCHOR/SCREW BN/BN,TIS/BN: HCPCS | Performed by: ORTHOPAEDIC SURGERY

## 2018-10-22 PROCEDURE — 25000125 ZZHC RX 250: Performed by: NURSE ANESTHETIST, CERTIFIED REGISTERED

## 2018-10-22 PROCEDURE — 86901 BLOOD TYPING SEROLOGIC RH(D): CPT

## 2018-10-22 PROCEDURE — 25000128 H RX IP 250 OP 636: Performed by: INTERNAL MEDICINE

## 2018-10-22 PROCEDURE — 25000128 H RX IP 250 OP 636: Performed by: PHYSICIAN ASSISTANT

## 2018-10-22 PROCEDURE — 36415 COLL VENOUS BLD VENIPUNCTURE: CPT | Performed by: INTERNAL MEDICINE

## 2018-10-22 PROCEDURE — 25000566 ZZH SEVOFLURANE, EA 15 MIN: Performed by: ORTHOPAEDIC SURGERY

## 2018-10-22 PROCEDURE — 86900 BLOOD TYPING SEROLOGIC ABO: CPT

## 2018-10-22 PROCEDURE — 27210794 ZZH OR GENERAL SUPPLY STERILE: Performed by: ORTHOPAEDIC SURGERY

## 2018-10-22 PROCEDURE — 40000986 XR PELVIS AND HIP PORTABLE LEFT 1 VIEW

## 2018-10-22 DEVICE — IMPLANTABLE DEVICE: Type: IMPLANTABLE DEVICE | Site: HIP | Status: FUNCTIONAL

## 2018-10-22 DEVICE — IMP SCR BIOM G7 ACETABULAR DOME 6.5X25MM LOW PRO 010000998: Type: IMPLANTABLE DEVICE | Site: HIP | Status: FUNCTIONAL

## 2018-10-22 RX ORDER — SODIUM CHLORIDE, SODIUM LACTATE, POTASSIUM CHLORIDE, CALCIUM CHLORIDE 600; 310; 30; 20 MG/100ML; MG/100ML; MG/100ML; MG/100ML
INJECTION, SOLUTION INTRAVENOUS CONTINUOUS
Status: DISCONTINUED | OUTPATIENT
Start: 2018-10-22 | End: 2018-10-22 | Stop reason: HOSPADM

## 2018-10-22 RX ORDER — FENTANYL CITRATE 50 UG/ML
25-50 INJECTION, SOLUTION INTRAMUSCULAR; INTRAVENOUS
Status: DISCONTINUED | OUTPATIENT
Start: 2018-10-22 | End: 2018-10-22 | Stop reason: HOSPADM

## 2018-10-22 RX ORDER — NEOSTIGMINE METHYLSULFATE 1 MG/ML
VIAL (ML) INJECTION PRN
Status: DISCONTINUED | OUTPATIENT
Start: 2018-10-22 | End: 2018-10-22

## 2018-10-22 RX ORDER — NALOXONE HYDROCHLORIDE 0.4 MG/ML
.1-.4 INJECTION, SOLUTION INTRAMUSCULAR; INTRAVENOUS; SUBCUTANEOUS
Status: DISCONTINUED | OUTPATIENT
Start: 2018-10-22 | End: 2018-10-22

## 2018-10-22 RX ORDER — SODIUM CHLORIDE, SODIUM LACTATE, POTASSIUM CHLORIDE, CALCIUM CHLORIDE 600; 310; 30; 20 MG/100ML; MG/100ML; MG/100ML; MG/100ML
INJECTION, SOLUTION INTRAVENOUS CONTINUOUS
Status: DISCONTINUED | OUTPATIENT
Start: 2018-10-22 | End: 2018-10-25 | Stop reason: HOSPADM

## 2018-10-22 RX ORDER — LIDOCAINE 40 MG/G
CREAM TOPICAL
Status: DISCONTINUED | OUTPATIENT
Start: 2018-10-22 | End: 2018-10-25 | Stop reason: HOSPADM

## 2018-10-22 RX ORDER — LIDOCAINE HYDROCHLORIDE 20 MG/ML
INJECTION, SOLUTION INFILTRATION; PERINEURAL PRN
Status: DISCONTINUED | OUTPATIENT
Start: 2018-10-22 | End: 2018-10-22

## 2018-10-22 RX ORDER — PROPOFOL 10 MG/ML
INJECTION, EMULSION INTRAVENOUS PRN
Status: DISCONTINUED | OUTPATIENT
Start: 2018-10-22 | End: 2018-10-22

## 2018-10-22 RX ORDER — FENTANYL CITRATE 50 UG/ML
INJECTION, SOLUTION INTRAMUSCULAR; INTRAVENOUS PRN
Status: DISCONTINUED | OUTPATIENT
Start: 2018-10-22 | End: 2018-10-22

## 2018-10-22 RX ORDER — ONDANSETRON 4 MG/1
4 TABLET, ORALLY DISINTEGRATING ORAL EVERY 30 MIN PRN
Status: DISCONTINUED | OUTPATIENT
Start: 2018-10-22 | End: 2018-10-22 | Stop reason: HOSPADM

## 2018-10-22 RX ORDER — ONDANSETRON 2 MG/ML
4 INJECTION INTRAMUSCULAR; INTRAVENOUS EVERY 30 MIN PRN
Status: DISCONTINUED | OUTPATIENT
Start: 2018-10-22 | End: 2018-10-22 | Stop reason: HOSPADM

## 2018-10-22 RX ORDER — CEFAZOLIN SODIUM 2 G/100ML
2 INJECTION, SOLUTION INTRAVENOUS
Status: COMPLETED | OUTPATIENT
Start: 2018-10-22 | End: 2018-10-22

## 2018-10-22 RX ORDER — CEFAZOLIN SODIUM 1 G/3ML
1 INJECTION, POWDER, FOR SOLUTION INTRAMUSCULAR; INTRAVENOUS EVERY 8 HOURS
Status: COMPLETED | OUTPATIENT
Start: 2018-10-22 | End: 2018-10-23

## 2018-10-22 RX ORDER — DILTIAZEM HYDROCHLORIDE 120 MG/1
120 CAPSULE, EXTENDED RELEASE ORAL DAILY
COMMUNITY
End: 2018-10-31

## 2018-10-22 RX ORDER — NALOXONE HYDROCHLORIDE 0.4 MG/ML
.1-.4 INJECTION, SOLUTION INTRAMUSCULAR; INTRAVENOUS; SUBCUTANEOUS
Status: DISCONTINUED | OUTPATIENT
Start: 2018-10-22 | End: 2018-10-25 | Stop reason: HOSPADM

## 2018-10-22 RX ORDER — GLYCOPYRROLATE 0.2 MG/ML
INJECTION, SOLUTION INTRAMUSCULAR; INTRAVENOUS PRN
Status: DISCONTINUED | OUTPATIENT
Start: 2018-10-22 | End: 2018-10-22

## 2018-10-22 RX ORDER — EPHEDRINE SULFATE 50 MG/ML
INJECTION, SOLUTION INTRAMUSCULAR; INTRAVENOUS; SUBCUTANEOUS PRN
Status: DISCONTINUED | OUTPATIENT
Start: 2018-10-22 | End: 2018-10-22

## 2018-10-22 RX ORDER — SODIUM CHLORIDE 9 MG/ML
INJECTION, SOLUTION INTRAVENOUS CONTINUOUS PRN
Status: DISCONTINUED | OUTPATIENT
Start: 2018-10-22 | End: 2018-10-22

## 2018-10-22 RX ORDER — ALBUMIN, HUMAN INJ 5% 5 %
SOLUTION INTRAVENOUS CONTINUOUS PRN
Status: DISCONTINUED | OUTPATIENT
Start: 2018-10-22 | End: 2018-10-22

## 2018-10-22 RX ORDER — CEFAZOLIN SODIUM 1 G/3ML
1 INJECTION, POWDER, FOR SOLUTION INTRAMUSCULAR; INTRAVENOUS SEE ADMIN INSTRUCTIONS
Status: DISCONTINUED | OUTPATIENT
Start: 2018-10-22 | End: 2018-10-22 | Stop reason: HOSPADM

## 2018-10-22 RX ORDER — ONDANSETRON 2 MG/ML
INJECTION INTRAMUSCULAR; INTRAVENOUS PRN
Status: DISCONTINUED | OUTPATIENT
Start: 2018-10-22 | End: 2018-10-22

## 2018-10-22 RX ORDER — MAGNESIUM HYDROXIDE 1200 MG/15ML
LIQUID ORAL PRN
Status: DISCONTINUED | OUTPATIENT
Start: 2018-10-22 | End: 2018-10-22 | Stop reason: HOSPADM

## 2018-10-22 RX ADMIN — NEOSTIGMINE METHYLSULFATE 3 MG: 1 INJECTION, SOLUTION INTRAVENOUS at 15:55

## 2018-10-22 RX ADMIN — ONDANSETRON 4 MG: 2 INJECTION INTRAMUSCULAR; INTRAVENOUS at 15:27

## 2018-10-22 RX ADMIN — PHENYLEPHRINE HYDROCHLORIDE 100 MCG: 10 INJECTION, SOLUTION INTRAMUSCULAR; INTRAVENOUS; SUBCUTANEOUS at 14:23

## 2018-10-22 RX ADMIN — PHENYLEPHRINE HYDROCHLORIDE 100 MCG: 10 INJECTION, SOLUTION INTRAMUSCULAR; INTRAVENOUS; SUBCUTANEOUS at 14:20

## 2018-10-22 RX ADMIN — CEFAZOLIN SODIUM 2 G: 2 INJECTION, SOLUTION INTRAVENOUS at 14:20

## 2018-10-22 RX ADMIN — Medication 10 MG: at 15:27

## 2018-10-22 RX ADMIN — DEXMEDETOMIDINE HYDROCHLORIDE 4 MCG: 100 INJECTION, SOLUTION INTRAVENOUS at 15:45

## 2018-10-22 RX ADMIN — ALBUMIN (HUMAN): 12.5 SOLUTION INTRAVENOUS at 14:17

## 2018-10-22 RX ADMIN — TAMSULOSIN HYDROCHLORIDE 0.4 MG: 0.4 CAPSULE ORAL at 19:57

## 2018-10-22 RX ADMIN — FENTANYL CITRATE 100 MCG: 50 INJECTION, SOLUTION INTRAMUSCULAR; INTRAVENOUS at 13:57

## 2018-10-22 RX ADMIN — HYDROMORPHONE HYDROCHLORIDE 0.2 MG: 1 INJECTION, SOLUTION INTRAMUSCULAR; INTRAVENOUS; SUBCUTANEOUS at 01:32

## 2018-10-22 RX ADMIN — Medication 10 MG: at 14:06

## 2018-10-22 RX ADMIN — HYDROMORPHONE HYDROCHLORIDE 0.2 MG: 1 INJECTION, SOLUTION INTRAMUSCULAR; INTRAVENOUS; SUBCUTANEOUS at 20:35

## 2018-10-22 RX ADMIN — DEXMEDETOMIDINE HYDROCHLORIDE 8 MCG: 100 INJECTION, SOLUTION INTRAVENOUS at 15:11

## 2018-10-22 RX ADMIN — CEFAZOLIN SODIUM 1 G: 1 INJECTION, POWDER, FOR SOLUTION INTRAMUSCULAR; INTRAVENOUS at 22:10

## 2018-10-22 RX ADMIN — TAMSULOSIN HYDROCHLORIDE 0.4 MG: 0.4 CAPSULE ORAL at 00:18

## 2018-10-22 RX ADMIN — PHENYLEPHRINE HYDROCHLORIDE 100 MCG: 10 INJECTION, SOLUTION INTRAMUSCULAR; INTRAVENOUS; SUBCUTANEOUS at 14:13

## 2018-10-22 RX ADMIN — HYDROMORPHONE HYDROCHLORIDE 0.25 MG: 1 INJECTION, SOLUTION INTRAMUSCULAR; INTRAVENOUS; SUBCUTANEOUS at 15:05

## 2018-10-22 RX ADMIN — SODIUM CHLORIDE, POTASSIUM CHLORIDE, SODIUM LACTATE AND CALCIUM CHLORIDE: 600; 310; 30; 20 INJECTION, SOLUTION INTRAVENOUS at 18:57

## 2018-10-22 RX ADMIN — PHENYLEPHRINE HYDROCHLORIDE 100 MCG: 10 INJECTION, SOLUTION INTRAMUSCULAR; INTRAVENOUS; SUBCUTANEOUS at 14:11

## 2018-10-22 RX ADMIN — SODIUM CHLORIDE: 9 INJECTION, SOLUTION INTRAVENOUS at 14:20

## 2018-10-22 RX ADMIN — PHENYLEPHRINE HYDROCHLORIDE 100 MCG: 10 INJECTION, SOLUTION INTRAMUSCULAR; INTRAVENOUS; SUBCUTANEOUS at 14:15

## 2018-10-22 RX ADMIN — ATORVASTATIN CALCIUM 10 MG: 10 TABLET, FILM COATED ORAL at 00:18

## 2018-10-22 RX ADMIN — SODIUM CHLORIDE, POTASSIUM CHLORIDE, SODIUM LACTATE AND CALCIUM CHLORIDE: 600; 310; 30; 20 INJECTION, SOLUTION INTRAVENOUS at 11:21

## 2018-10-22 RX ADMIN — METOPROLOL SUCCINATE 50 MG: 50 TABLET, EXTENDED RELEASE ORAL at 08:40

## 2018-10-22 RX ADMIN — PHENYLEPHRINE HYDROCHLORIDE 100 MCG: 10 INJECTION, SOLUTION INTRAMUSCULAR; INTRAVENOUS; SUBCUTANEOUS at 14:09

## 2018-10-22 RX ADMIN — Medication 1 MG: at 19:57

## 2018-10-22 RX ADMIN — DILTIAZEM HYDROCHLORIDE 120 MG: 120 CAPSULE, EXTENDED RELEASE ORAL at 08:40

## 2018-10-22 RX ADMIN — PHENYLEPHRINE HYDROCHLORIDE 100 MCG: 10 INJECTION, SOLUTION INTRAMUSCULAR; INTRAVENOUS; SUBCUTANEOUS at 15:39

## 2018-10-22 RX ADMIN — GLYCOPYRROLATE 0.4 MG: 0.2 INJECTION, SOLUTION INTRAMUSCULAR; INTRAVENOUS at 15:55

## 2018-10-22 RX ADMIN — HYDROMORPHONE HYDROCHLORIDE 0.25 MG: 1 INJECTION, SOLUTION INTRAMUSCULAR; INTRAVENOUS; SUBCUTANEOUS at 14:59

## 2018-10-22 RX ADMIN — Medication 10 MG: at 14:03

## 2018-10-22 RX ADMIN — PROPOFOL 50 MG: 10 INJECTION, EMULSION INTRAVENOUS at 13:57

## 2018-10-22 RX ADMIN — ATORVASTATIN CALCIUM 10 MG: 10 TABLET, FILM COATED ORAL at 19:57

## 2018-10-22 RX ADMIN — PHENYLEPHRINE HYDROCHLORIDE 0.25 MCG/KG/MIN: 10 INJECTION, SOLUTION INTRAMUSCULAR; INTRAVENOUS; SUBCUTANEOUS at 14:22

## 2018-10-22 RX ADMIN — ROCURONIUM BROMIDE 50 MG: 10 INJECTION INTRAVENOUS at 13:57

## 2018-10-22 RX ADMIN — PHENYLEPHRINE HYDROCHLORIDE 100 MCG: 10 INJECTION, SOLUTION INTRAMUSCULAR; INTRAVENOUS; SUBCUTANEOUS at 14:06

## 2018-10-22 RX ADMIN — PHENYLEPHRINE HYDROCHLORIDE 100 MCG: 10 INJECTION, SOLUTION INTRAMUSCULAR; INTRAVENOUS; SUBCUTANEOUS at 14:02

## 2018-10-22 RX ADMIN — PHENYLEPHRINE HYDROCHLORIDE 100 MCG: 10 INJECTION, SOLUTION INTRAMUSCULAR; INTRAVENOUS; SUBCUTANEOUS at 14:45

## 2018-10-22 RX ADMIN — HYDROMORPHONE HYDROCHLORIDE 0.2 MG: 1 INJECTION, SOLUTION INTRAMUSCULAR; INTRAVENOUS; SUBCUTANEOUS at 22:53

## 2018-10-22 RX ADMIN — DEXMEDETOMIDINE HYDROCHLORIDE 8 MCG: 100 INJECTION, SOLUTION INTRAVENOUS at 15:05

## 2018-10-22 RX ADMIN — Medication 5 MG: at 14:09

## 2018-10-22 RX ADMIN — LIDOCAINE HYDROCHLORIDE 100 MG: 20 INJECTION, SOLUTION INFILTRATION; PERINEURAL at 13:57

## 2018-10-22 ASSESSMENT — ACTIVITIES OF DAILY LIVING (ADL)
ADLS_ACUITY_SCORE: 12
ADLS_ACUITY_SCORE: 13
ADLS_ACUITY_SCORE: 12
ADLS_ACUITY_SCORE: 12

## 2018-10-22 ASSESSMENT — ENCOUNTER SYMPTOMS: SEIZURES: 0

## 2018-10-22 NOTE — PROGRESS NOTES
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  He is being admitted to the hospital under inpatient status.     1.  Left hip dislocation, recurrent:    The patient has a history of left hip hemiarthroplasty on 10/04 after the patient sustained a hip fracture following a fall.  He was readmitted on 10/14 for left hip dislocation for which he underwent closed reduction under general anesthesia on 10/15.  Subsequently, he was discharged to [place] for rehabilitation.  Earlier today he bent over to get his shoes off and he heard a pop and subsequently developed severe pain in his left hip pain.  The x-ray in the Emergency Department indicated posterior superiorly dislocation of the left hemiarthroplasty.  An attempt at reduction in the emergency department was partially successful, however, the repeat x-ray revealed the femoral component was no longer in the acetabular component.  The case was communicated to Dr. Winchester, the on-call orthopedic surgeon, who has recommended a revision hemiarthroplasty.  Management as below.    - s/p arthroplasty revision of left hip today  - post-op cares for pain and DVT mgmt per ortho  - PT/OT as able    2.  Hypertension:    At home he is on Toprol XL 50 mg p.o. daily as diltiazem 120 mg p.o. daily, which we will continue.  I will have IV hydralazine available p.r.n. for systolic blood pressure more than 170.   - remains well controlled today, no changes     3.  Dyslipidemia:    - He will continue on prior to admission Lipitor.     4.  Benign prostatic  hypertrophy.    - He will continue on prior to admission Flomax.     5.  Chronic kidney disease, stage III-IV.    His baseline creatinine is in the range of 1.7-1.9.  His most recent creatinine was 1.69 on 10/15/2018.  I will order a BMP and monitor his renal function closely during this hospitalization.  We will avoid NSAIDs or other nephrotoxins.   - Cr this AM is 1.88, nothing new today, check BMP in AM    6.  Chronic anemia:   His baseline hemoglobin is around 9.  However, after his hip surgery, his hemoglobin dropped to 7.5 on 10/06/2018, due to acute blood loss.  His most recent hemoglobin was 8.2 before discharge from the hospital.  His hemoglobin will be closely monitored during this hospitalization.   - pre-op HGB today 7.9, will watch very closely, HGB in AM, transfuse if <7    7.  Coronary artery disease:    The patient is status post CABG x4 in 1996.  There is no recent cardiac workup.  At this juncture he reports no chest pain.  I will obtain an EKG for preop evaluation.  He will continue on prior to admission metoprolol and Lipitor.  His aspirin will be held until after surgery.   - I suspect he will be able to resume ASA in AM, may even go on high dose for DVT prophy    8.  History of nonsustained ventricular  tachycardia/question paroxysmal atrial fibrillation/supraventricular tachycardia:   It occurred during the hospitalization in 08/2018, following his right hip fracture.  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  He is not on anticoagulation therapy.  He will continue on prior to admission Toprol XL 50 mg p.o. daily.   - no events overnight, monitor    9.  Diabetes mellitus type 2:    The most recent hemoglobin A1c was 8.4% on 08/25/2018.  At home he is on Lantus 7 units subcutaneous every morning and Humalog 2 units subQ t.i.d.   Due to n.p.o. status, I will reduce the dose of Lantus to 4 units subQ daily and hold his prior to admission Humalog.  I will place the patient on a sliding scale NovoLog.     Recent Labs  Lab 10/22/18  1110 10/22/18  0718 10/22/18  0615 10/21/18  2348 10/21/18  2124 10/17/18  1131 10/17/18  0708  10/16/18  0623   GLC  --  108*  --   --   --   --   --   --  129*   *  --  112* 119* 101* 258* 140*  < >  --    < > = values in this interval not displayed.  - as his diet not likely to increase much today leave on current regimen  - will likely need increase to PTA dose of Lantus in AM    Deep venous thrombosis prophylaxis:  Per ortho   Code status:  It was discussed with the patient.  The patient stated that he wants to continue with DNR/DNI status as established previously.   Disposition:  At least 2-3 days of inpatient management.     Interval History   Unfortunately the patient was gone for surgery by the time I reached the floor today, he is still in the OR as of the end of my shift so this will only be a chart check, no charge note.  My service is on call 24 hours a day for any concerns.  I will ask the PA covering tonight to check up on him if he has time between admissions/consults.    -Data reviewed today: I reviewed all new labs and imaging results over the last 24 hours. I personally reviewed no images or EKG's today.    Physical Exam   Temp: 97.2  F (36.2  C) Temp src: Temporal BP: 137/60 Pulse: 79 Heart Rate: 81 Resp: 16 SpO2: 100 % O2 Device: None (Room air) Oxygen Delivery: 2 LPM  Vitals:    10/21/18 1943   Weight: 72.6 kg (160 lb)     Vital Signs with Ranges  Temp:  [97.2  F (36.2  C)-98.1  F (36.7  C)] 97.2  F (36.2  C)  Pulse:  [76-79] 79  Heart Rate:  [74-83] 81  Resp:  [10-18] 16  BP: (126-149)/(60-90) 137/60  SpO2:  [96 %-100 %] 100 %  I/O last 3 completed shifts:  In: -   Out: 550 [Urine:550]    Unable to do physical exam as patient was in OR    Medications     lactated ringers 25 mL/hr at  10/22/18 1121     sodium chloride 75 mL/hr at 10/21/18 2317       [Auto Hold] atorvastatin  10 mg Oral QPM     ceFAZolin  1 g Intravenous See Admin Instructions     ceFAZolin  2 g Intravenous Pre-Op/Pre-procedure x 1 dose     [Auto Hold] diltiazem  120 mg Oral Daily     [Auto Hold] insulin aspart  1-6 Units Subcutaneous Q4H     [Auto Hold] insulin glargine  4 Units Subcutaneous QAM AC     [Auto Hold] metoprolol succinate  50 mg Oral Daily     [Auto Hold] tamsulosin  0.4 mg Oral QPM       Data     Recent Labs  Lab 10/22/18  0718 10/16/18  1808 10/16/18  0623   WBC 9.4  --   --    HGB 7.9* 8.2* 7.5*   MCV 92  --   --      --   --      --   --    POTASSIUM 4.4  --   --    CHLORIDE 105  --   --    CO2 27  --   --    BUN 27  --   --    CR 1.88*  --   --    ANIONGAP 6  --   --    JOSELINE 7.7*  --   --    *  --  129*       Recent Results (from the past 24 hour(s))   XR Pelvis w Hip Left 1 View    Narrative    XR PELVIS AND HIP LEFT 1 VIEW 10/21/2018 8:51 PM     HISTORY: hip pain; r/o fracture/dislocation;       Impression    IMPRESSION: Bilateral hip hemiarthroplasties. The left  hemiarthroplasty appears to be posterior superiorly dislocated.    KARON ALMONTE MD   XR Hip Port Left 1 View    Narrative    XR HIP PORT LT 1 VW   10/21/2018 10:05 PM     HISTORY: left hip reduction;     COMPARISON: None.      Impression    IMPRESSION: The femoral component is displaced out of the acetabular  component. Acetabular component overlies the greater trochanter. The  head of the femoral component is in the native acetabular region.    IMPRESSION: The femoral component is no longer within the acetabular  component and the acetabular component overlies the greater  trochanter.    CEASAR BLEDSOE MD

## 2018-10-22 NOTE — PROGRESS NOTES
RECEIVING UNIT ED HANDOFF REVIEW    ED Nurse Handoff Report was reviewed by: Aline Vazquez on October 21, 2018 at 10:50 PM

## 2018-10-22 NOTE — ED NOTES
Bed: ED28  Expected date: 10/21/18  Expected time: 7:40 PM  Means of arrival: Ambulance  Comments:  Kate M1 89F hip problem

## 2018-10-22 NOTE — CONSULTS
[facility] Orthopedic Consultation    Abimael Flores MRN# [id number]   Age: 89 year old YOB: 1924     Date of Admission:  10/21/2018    Reason for consult: Recurrent left hip prosthesis dislocation.       Requesting physician: Dennis       Level of consult: Consult, follow and place orders           Assessment and Plan:   Assessment:   Recurrent left hip prosthesis dislocation.  Closed.      Plan:   Open reduction of Recurrent left hip prosthesis dislocation.           Chief Complaint:   Left hip pain.         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  The patient states earlier he bent over to get his shoes off and he heard a pop in his left hip similar to his recent hip dislocation on 10/14/2018.  Following this, he had severe pain in his left hip and was not able to ambulate.  He has been referred to the Emergency Department for further evaluation.  The patient reports no cough, dyspnea, chest pain, palpitation, fever, nausea, vomiting or other complaints.       In the Emergency Department, the patient has been evaluated by Dr. Ryan, the ER attending physician.  The x-ray of the left hip has revealed dislocation of left hemiarthroplasty.  The case was communicated to Dr. Winchester on-call orthopedic surgeon who recommended an attempt for reduction in the Emergency Department.  The patient received propofol and had reduction done by Dr. Ryan, however, it was partially reduced as a repeat x-ray revealed the femoral component no longer within the acetabular component and the acetabular component  overlies the greater trochanter.  Dr. Winchester of Orthopedic Service was called again who recommended that patient be admitted to the hospital for revision of the left hip arthroplasty tomorrow.  The patient was fit with a hip AB duction brace during his last hospitalization, but he did not tolerate it and refused to wear it.          Past Medical History:     Past Medical History:   Diagnosis Date     Benign essential hypertension 9/4/2018     Coronary artery disease      Nonsenile cataract      Recent retinal detachment, total or subtotal 8/5/2014     Type 2 diabetes mellitus without complications (H)              Past Surgical History:     Past Surgical History:   Procedure Laterality Date     CARDIAC SURGERY       CATARACT IOL, RT/LT Bilateral      CLOSED REDUCTION HIP Left 10/14/2018    Procedure: CLOSED REDUCTION HIP;  CLOSED REDUCTION HIP;  Surgeon: Milton Gusman MD;  Location:  OR     lacrimal caruncle removed BE Bilateral ~1989     OPEN REDUCTION INTERNAL FIXATION HIP BIPOLAR Right 8/26/2018    Procedure: OPEN REDUCTION INTERNAL FIXATION HIP BIPOLAR;  Right Hip Bipolar Hemiarthroplasty (Biomet);  Surgeon: Bill Sanders MD;  Location:  OR     OPEN REDUCTION INTERNAL FIXATION HIP BIPOLAR Left 10/4/2018    Procedure: OPEN REDUCTION INTERNAL FIXATION HIP BIPOLAR;  LEFT HIP TONYA ARTHROPLASTY;  Surgeon: Bruno Stewart MD;  Location:  OR     REPAIR RUPTURED GLOBE  4/21/2014    Procedure: Exploration and Repair of Ruptured Globe ;  Surgeon: Mercedes Rivera MD;  Location:  OR             Social History:     Social History   Substance Use Topics     Smoking status: Former Smoker     Smokeless tobacco: Never Used     Alcohol use No             Family History:     Family History   Problem Relation Age of Onset     Diabetes Mother      Diabetes Father      Cancer No family hx of      Glaucoma No family hx of      Macular Degeneration No family hx of              Immunizations:      VACCINE/DOSE   Diptheria   DPT   DTAP   HBIG   Hepatitis A   Hepatitis B   HIB   Influenza   Measles   Meningococcal   MMR   Mumps   Pneumococcal   Polio   Rubella   Small Pox   TDAP   Varicella   Zoster             Allergies:   No Known Allergies          Medications:     Current Facility-Administered Medications   Medication     acetaminophen (TYLENOL) Suppository 650 mg     acetaminophen (TYLENOL) tablet 650 mg     atorvastatin (LIPITOR) tablet 10 mg     glucose gel 15-30 g    Or     dextrose 50 % injection 25-50 mL    Or     glucagon injection 1 mg     diltiazem (DILACOR XR) 24 hr capsule 120 mg     hydrALAZINE (APRESOLINE) injection 10 mg     HYDROmorphone (PF) (DILAUDID) injection 0.2 mg     insulin aspart (NovoLOG) inj (RAPID ACTING)     insulin glargine (LANTUS) injection 4 Units     melatonin tablet 1 mg     metoprolol succinate (TOPROL-XL) 24 hr tablet 50 mg     naloxone (NARCAN) injection 0.1-0.4 mg     ondansetron (ZOFRAN-ODT) ODT tab 4 mg    Or     ondansetron (ZOFRAN) injection 4 mg     oxyCODONE IR (ROXICODONE) tablet 5-10 mg     polyethylene glycol (MIRALAX/GLYCOLAX) Packet 17 g     prochlorperazine (COMPAZINE) injection 5 mg    Or     prochlorperazine (COMPAZINE) tablet 5 mg    Or     prochlorperazine (COMPAZINE) Suppository 12.5 mg     sodium chloride 0.9% infusion     tamsulosin (FLOMAX) capsule 0.4 mg             Review of Systems:   CV: NEGATIVE for chest pain, palpitations or peripheral edema  C: NEGATIVE for fever, chills, change in weight  E/M: NEGATIVE for ear, mouth and throat problems  R: NEGATIVE for significant cough or SOB          Physical Exam:   All vitals have been reviewed  Patient Vitals for the past 24 hrs:   BP Temp Temp src Pulse Heart Rate Resp SpO2 Height Weight   10/22/18 0759 134/68 98.1  F (36.7  C) Oral 79 83 18 100 % - -   10/21/18 2331 149/70 98.1  F (36.7  C) Oral - 81 18 100 % - -   10/21/18 2323 - - - - - 18 - - -   10/21/18 2235 - - - - - - 100 % - -   10/21/18  "2202 126/60 - - - 76 10 100 % - -   10/21/18 2156 146/65 - - - 75 - - - -   10/21/18 2141 142/90 - - - 74 10 100 % - -   10/21/18 2106 - - - - - - 96 % - -   10/21/18 2105 138/67 - - - - - - - -   10/21/18 1943 137/63 97.7  F (36.5  C) - 76 - 18 98 % 1.753 m (5' 9\") 72.6 kg (160 lb)       Intake/Output Summary (Last 24 hours) at 10/22/18 0926  Last data filed at 10/22/18 0806   Gross per 24 hour   Intake                0 ml   Output              800 ml   Net             -800 ml     Examination of the left hip shows the skin in good condition without erythema, ecchymosis, or lymphadenopathy.  Surgical incision healing well.  Stable ligamentous exam.  Strength 5/5.  Circulation, motor, and sensory function intact distally.          Data:   All laboratory data reviewed  Results for orders placed or performed during the hospital encounter of 10/21/18   XR Pelvis w Hip Left 1 View    Narrative    XR PELVIS AND HIP LEFT 1 VIEW 10/21/2018 8:51 PM     HISTORY: hip pain; r/o fracture/dislocation;       Impression    IMPRESSION: Bilateral hip hemiarthroplasties. The left  hemiarthroplasty appears to be posterior superiorly dislocated.    KARON ALMONTE MD   XR Hip Port Left 1 View    Narrative    XR HIP PORT LT 1 VW   10/21/2018 10:05 PM     HISTORY: left hip reduction;     COMPARISON: None.      Impression    IMPRESSION: The femoral component is displaced out of the acetabular  component. Acetabular component overlies the greater trochanter. The  head of the femoral component is in the native acetabular region.    IMPRESSION: The femoral component is no longer within the acetabular  component and the acetabular component overlies the greater  trochanter.    CEASAR BLEDSOE MD   Glucose by meter   Result Value Ref Range    Glucose 101 (H) 70 - 99 mg/dL   Basic metabolic panel   Result Value Ref Range    Sodium 138 133 - 144 mmol/L    Potassium 4.4 3.4 - 5.3 mmol/L    Chloride 105 94 - 109 mmol/L    Carbon Dioxide 27 20 - 32 mmol/L "    Anion Gap 6 3 - 14 mmol/L    Glucose 108 (H) 70 - 99 mg/dL    Urea Nitrogen 27 7 - 30 mg/dL    Creatinine 1.88 (H) 0.66 - 1.25 mg/dL    GFR Estimate 34 (L) >60 mL/min/1.7m2    GFR Estimate If Black 41 (L) >60 mL/min/1.7m2    Calcium 7.7 (L) 8.5 - 10.1 mg/dL   CBC with platelets   Result Value Ref Range    WBC 9.4 4.0 - 11.0 10e9/L    RBC Count 2.65 (L) 4.4 - 5.9 10e12/L    Hemoglobin 7.9 (L) 13.3 - 17.7 g/dL    Hematocrit 24.4 (L) 40.0 - 53.0 %    MCV 92 78 - 100 fl    MCH 29.8 26.5 - 33.0 pg    MCHC 32.4 31.5 - 36.5 g/dL    RDW 15.8 (H) 10.0 - 15.0 %    Platelet Count 294 150 - 450 10e9/L   Glucose by meter   Result Value Ref Range    Glucose 119 (H) 70 - 99 mg/dL   Glucose by meter   Result Value Ref Range    Glucose 112 (H) 70 - 99 mg/dL   EKG 12-lead, tracing only   Result Value Ref Range    Interpretation ECG Click View Image link to view waveform and result           Attestation:  I have reviewed today's vital signs, notes, medications, labs and imaging with our orthopedic trauma surgeon on-call.  Amount of time performed on this consult: 30 minutes.    Edgardo Godfrey PA-C

## 2018-10-22 NOTE — PLAN OF CARE
Problem: Patient Care Overview  Goal: Plan of Care/Patient Progress Review  Pt admit via ED, left hip dislocation, forgetful, has ortho consult, NPO, continue to  monitor

## 2018-10-22 NOTE — ED PROVIDER NOTES
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  The patient previously underwent left hip fracture repair on 10/4/18 and then subsequently dislocated his hip on 10/14/18.  This was successfully reduced the following day in the OR.  This evening, the patient reports feeling his left hip dislocate posteriorly when he bent over.  This feels exactly like his previous dislocation did.  He received 75 mcg of Fentanyl en route with some relief but still describes his pain as severe.  He denies any recent fall or other trauma.    Allergies:  No known drug allergies.     Medications:    Tylenol  Oxycodone   Tramadol   Aspirin  Atorvastatin  Diltiazem  Lantus  Humalog   Vitamin D  Miralax   Senna-docusate  Tamsulosin     Past Medical History:    Benign essential hypertension   Coronary artery disease   Cardiac arrhythmia   Type 2 diabetes   Chronic kidney disease, stage 3  Retinal detachment   Anemia  Insomnia      Past Surgical History:    Closed hip reduction, left 10/14/18  Open reduction with internal fixation, left hip 10/4/18  Open reduction with internal fixation, right hip 8/26/18  Ruptured globe repair 4/21/14  Cataract surgery, bilateral     Family History:    Diabetes - mother, father     Social History:  Marital Status:   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  Tobacco Use: Former smoker  Alcohol Use: No alcohol use.   PCP: RAISSA HILL      Review of Systems   Musculoskeletal: Positive for gait problem.        Positive for hip pain.   All other systems reviewed and are negative.    Physical Exam   First Vitals:  BP: 137/63  Pulse: 76  Heart Rate: 74  Temp: 97.7  F (36.5  C)  Resp: 18  Height: 175.3 cm (5' 9\")  Weight: 72.6 kg (160 lb)  SpO2: 98 %    Physical Exam  General: Patient in no acute distress.  Alert and cooperative with exam. Normal mentation  HEENT: NC/AT. " Conjunctiva without injection or scleral icterus. External ears normal.  Respiratory: Breathing comfortably on room air  CV: Normal rate, all extremities well perfused  GI:  Non-distended abdomen  Skin: Warm, dry, no rashes/open wounds on exposed skin  Musculoskeletal: LLE: left leg is shortened and internally rotated. CMS intact.  Surgical incision C/D/I  Neuro: Alert, answers questions appropriately. No gross motor deficits    Emergency Department Course     Imaging:  XR Hip Port Left 1 View   Final Result   IMPRESSION: The femoral component is displaced out of the acetabular   component. Acetabular component overlies the greater trochanter. The   head of the femoral component is in the native acetabular region.      IMPRESSION: The femoral component is no longer within the acetabular   component and the acetabular component overlies the greater   trochanter.      CEASAR BLEDSOE MD      XR Pelvis w Hip Left 1 View   Final Result   IMPRESSION: Bilateral hip hemiarthroplasties. The left   hemiarthroplasty appears to be posterior superiorly dislocated.      KARON ALMONTE MD          Radiographic findings were communicated with the patient and family who voiced understanding of the findings.    Laboratory:  (2124) Glucose by meter: 101 (H)     Procedures:  [facility] Procedure Note        Sedation:      Performed by: Dr. Ronal Ryan  Authorized by: Dr. Ronal Ryan    Pre-Procedure Assessment done at 0930.    Expected Level:  Moderate Sedation    Indication:  Sedation is required to allow for joint reduction    Consent obtained from patient and relative son after discussing the risks, benefits and alternatives.    PO Intake:  Appropriately NPO for procedure    ASA Class:  Class 2 - MILD SYSTEMIC DISEASE, NO ACUTE PROBLEMS, NO FUNCTIONAL LIMITATIONS.    Mallampati:  Grade 2:  Soft palate, base of uvula, tonsillar pillars, and portion of posterior pharyngeal wall visible    Lungs: Lungs Clear with good breath  sounds bilaterally.     Heart: Normal heart sounds and rate    History and physical reviewed and no updates needed. I have reviewed the lab findings, diagnostic data, medications, and the plan for sedation. I have determined this patient to be an appropriate candidate for the planned sedation and procedure and have reassessed the patient IMMEDIATELY PRIOR to sedation and procedure.       Sedation Post Procedure Summary:    Prior to the start of the procedure and with procedural staff participation, I verbally confirmed the patient s identity using two indicators, relevant allergies, that the procedure was appropriate and matched the consent or emergent situation, and that the correct equipment/implants were available. Immediately prior to starting the procedure I conducted the Time Out with the procedural staff and re-confirmed the patient s name, procedure, and site/side. (The Joint Commission universal protocol was followed.)  Yes      Sedatives: Propofol, 40mg    Vital signs, airway, End Tidal CO2 and pulse oximetry were monitored and remained stable throughout the procedure and sedation was maintained until the procedure was complete.  The patient was monitored by staff until sedation discharge criteria were met.    Patient tolerance: Patient tolerated the procedure well with no immediate complications.    Time of sedation in minutes:  10 minutes from beginning to end of physician one to one monitoring.        Reduction     SITE: Left hip   PROCEDURE PROVIDER: Dr. Ronal Ryan     CONSENT: Risks (including but not limited to: decreased respirations, oxygen perfusion, aspiration, hypotension), benefits, and alternatives were discussed with patient and consent for procedure was obtained.     MONITORING: Monitoring consisted of heart rate, cardiac monitor, continuous pulse oximetry, continuous capnometry, frequent blood pressure checks, level of consciousness, IV access, constant attendance by RN until patient  recovered, constant attendance by MD until patient stable and intubation and emergency airway management equipment available.    REDUCTION COMMENTS: The patient's left lower extremity was held in traction and internally rotated with traction applied with slight flexion of the knee.  Post reductions X-rays were obtained and showed separation of the femoral and acetabular components with reduction of the femoral component only into the native acetabulum.    PATIENT STATUS: Unsuccessful reduction of left hip dislocation. Both sensation and circulation are intact. Vital signs remained stable, airway patent, and O2 saturations remained above 95%. Post-procedure, the patient was alert and responds to verbal stimuli. Patient was monitored during recovery and returned to pre-procedure baseline.        Interventions:  (2025) Fentanyl, 50 mcg, IV injection   (2106) Dilaudid, 0.5 mg, IV injection   (2208) Normal Saline, 1 liter, IV bolus   (2210) Propofol, 40 mg, IV injection     The patient's symptoms were partially improved with parenteral narcotics.    Emergency Department Course:  The patient arrived in the emergency department via [facility].   Nursing notes and vitals reviewed.  I performed an exam of the patient as documented above.     A peripheral IV established by EMS was continued.      The patient was sent for a pelvis/hip x-ray while in the emergency department, findings above.      (2103) I spoke with Dr. Winchester of orthopedic surgery regarding the patient.  He recommends joint reduction in the ED.    Procedural sedation and joint reduction was done as per the above procedure notes.  Post-reduction imaging was obtained, as above.    (2210) I again spoke with Dr. Winchester, updating him on patient's ED course.    Findings and plan explained to the patient who consents to admission.   (2224) I discussed the patient with Dr. Collins of the hospitalist service, who will admit the patient to a medical bed for further  monitoring, evaluation, and treatment.     Impression & Plan      Medical Decision Making:  Patient is a 89-year-old male who presents with left hip pain; history of recent hip replacement as well as recent hip dislocation.  Patient's medical history and records were reviewed.  Initial consideration for, not limited to, fracture, dislocation, soft tissue injury, among others.  Patient on exam has shortened and internally rotated hip.  Imaging demonstrates hip dislocation as noted above.  Case was discussed with Dr. Winchester of [facility] orthopedics who recommended trial of reduction in the ED.  Patient was provided Fentanyl and Dilaudid initially for pain control.  During procedural sedation he received Propofol as above (patient was provided a one-time dose of 40 mg).  Reduction unfortunately resulted in acetabular component  from femoral component with femoral component reducing into the native acetabulum.  Patient was neurovascularly intact to his lower extremity both before and after procedure.  Case was again discussed with Dr. Winchester who recommended admission and n.p.o. at midnight for possible hip revision tomorrow.  Patient admitted to hospital service for further evaluation and care.    Diagnosis:    ICD-10-CM    1. Hip dislocation, left (H) S73.005A        Disposition:  Admitted to medical bed      I, Caro Reed, am serving as a scribe on 10/21/2018 at 7:44 PM to personally document services performed by Dr. Gaurav Ryan based on my observations and the provider's statements to me.     Caro Reed  10/21/2018    EMERGENCY DEPARTMENT       Gaurav Ryan DO  10/22/18 0005

## 2018-10-22 NOTE — H&P
Admitted:     10/21/2018      PRIMARY CARE PHYSICIAN:  Carlitos Bee MD      CHIEF COMPLAINT:  Left hip pain.      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  The patient states earlier he bent over to get his shoes off and he heard a pop in his left hip similar to his recent hip dislocation on 10/14/2018.  Following this, he had severe pain in his left hip and was not able to ambulate.  He has been referred to the Emergency Department for further evaluation.  The patient reports no cough, dyspnea, chest pain, palpitation, fever, nausea, vomiting or other complaints.      In the Emergency Department, the patient has been evaluated by Dr. Ryan, the ER attending physician.  The x-ray of the left hip has revealed dislocation of left hemiarthroplasty.  The case was communicated to Dr. Winchester on-call orthopedic surgeon who recommended an attempt for reduction in the Emergency Department.  The patient received propofol and had reduction done by Dr. Ryan, however, it was partially reduced as a repeat x-ray revealed the femoral component no longer within the acetabular component and the acetabular component overlies the greater trochanter.  Dr. Winchester of Orthopedic Service was called again who recommended that patient be admitted to the hospital for revision of the left hip arthroplasty tomorrow.      PAST MEDICAL HISTORY:   1.  Coronary artery disease, status post CABG x4 in 1996.   2.  Hypertension.   3.  Dyslipidemia.   4.  Diabetes mellitus type 2 with the most recent hemoglobin A1c of 8.4% on 08/25/2018.   5.  Chronic kidney disease stage 3-4 with  the recent creatinine around 1.7-1.9.   6.  History of nonsustained V-tach in 08/19.   7.  Question paroxysmal atrial fibrillation/SVT in 08/2018.  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  The patient did not wish to be on anticoagulation regardless.   8.  Benign prostatic hypertrophy.   9.  Chronic anemia with a recent acute blood loss anemia due to left hip surgery.  The most recent hemoglobin was 8.2 in 10/16/2018.      PAST SURGICAL HISTORY:   Past Surgical History:   Procedure Laterality Date     CARDIAC SURGERY       CATARACT IOL, RT/LT Bilateral      CLOSED REDUCTION HIP Left 10/14/2018    Procedure: CLOSED REDUCTION HIP;  CLOSED REDUCTION HIP;  Surgeon: Milton Gusman MD;  Location: [facility] OR     lacrimal caruncle removed BE Bilateral ~1989     OPEN REDUCTION INTERNAL FIXATION HIP BIPOLAR Right 8/26/2018    Procedure: OPEN REDUCTION INTERNAL FIXATION HIP BIPOLAR;  Right Hip Bipolar Hemiarthroplasty (Biomet);  Surgeon: Bill Sanders MD;  Location:  OR     OPEN REDUCTION INTERNAL FIXATION HIP BIPOLAR Left 10/4/2018    Procedure: OPEN REDUCTION INTERNAL FIXATION HIP BIPOLAR;  LEFT HIP TONYA ARTHROPLASTY;  Surgeon: Bruno Stewart MD;  Location:  OR     REPAIR RUPTURED GLOBE  4/21/2014    Procedure: Exploration and Repair of Ruptured Globe ;  Surgeon: Mercedes Rivera MD;  Location: [facility] OR        FAMILY HISTORY:  Significant for diabetes in his parents.      SOCIAL HISTORY:  The patient is a retired surgeon.  He used to smoke cigars but quit many years ago.  He denies drinking alcohol.  He does not use illicit drugs.  He does use a walker for ambulation.  He is currently undergoing rehab at [place].     MEDICATIONS:    Prior to Admission Medications   Prescriptions Last Dose Informant Patient Reported? Taking?   Ergocalciferol (VITAMIN D) 12511 units [facility] No No   Sig: Take 50,000 Units by mouth every 7 days for 5  doses   Skin Protectants, Misc. (ANIBAL PROTECT EX)  Nursing Home Yes No   Sig: Apply topically 2 times daily   acetaminophen (TYLENOL) 325 MG tablet   No No   Sig: Take 2 tablets (650 mg) by mouth every 4 hours as needed for other (multimodal surgical pain management along with NSAIDS and opioid medication as indicated based on pain control and physical function.)   aspirin 325 MG EC tablet  Nursing Home No No   Sig: Take one Aspirin tab twice daily for 5 weeks.   atorvastatin (LIPITOR) 10 MG tablet  Nursing Home No No   Sig: Take 1 tablet (10 mg) by mouth every evening   diltiazem (TIAZAC) 120 MG 24 hr ER beaded capsule  Nursing Home Yes No   Sig: Take 120 mg by mouth daily   insulin glargine (LANTUS) 100 UNIT/ML injection  Nursing Home Yes No   Sig: Inject 7 Units Subcutaneous every morning   insulin lispro (HUMALOG KWIKPEN) 100 UNIT/ML injection  Nursing Home No No   Sig: Inject 2 Units Subcutaneous 3 times daily (before meals)   insulin lispro (HUMALOG KWIKPEN) 100 UNIT/ML injection  Nursing Home Yes No   Sig: Inject Subcutaneous At Bedtime Sliding scale:  -249=1 unit  -299=2 units  -349=3 units  -399=4 units  +=5 units   insulin lispro (HUMALOG) 100 UNIT/ML injection  Nursing Home Yes No   Sig: Inject Subcutaneous 3 times daily (before meals) Sliding scale:   -189=1 unit  -239=2 units  -289=3 units  -339=4 units  -399=5 units  -499=6 units  +=7 units   metoprolol succinate (TOPROL-XL) 50 MG 24 hr tablet  Nursing Home No No   Sig: Take 1 tablet (50 mg) by mouth daily   oxyCODONE IR (ROXICODONE) 5 MG tablet   No No   Sig: Take 1-2 tablets (5-10 mg) by mouth every 4 hours as needed for severe pain   polyethylene glycol (MIRALAX/GLYCOLAX) Packet  Nursing Home Yes No   Sig: Take 17 g by mouth daily   senna-docusate (SENOKOT-S;PERICOLACE) 8.6-50 MG per tablet   No No   Sig: Take 1 tablet by mouth 2 times daily   tamsulosin (FLOMAX) 0.4 MG capsule   Nursing Home Yes No   Sig: Take 0.4 mg by mouth every evening    traMADol (ULTRAM) 50 MG tablet   No No   Sig: Take 1 tablet (50 mg) by mouth every 6 hours      Facility-Administered Medications: None     ALLERGIES:  NONE.      REVIEW OF SYSTEMS:  A 10-point review of system was performed thoroughly and was negative except as stated in the history of present illness.      PHYSICAL EXAMINATION:   GENERAL:  This patient is a very pleasant elderly gentleman who is in no acute distress.   VITAL SIGNS:  Blood pressure 126/60, heart rate 76, respiratory rate 10, temperature 97.7 degrees Fahrenheit, oxygen saturation 100% on room air.   HEENT:  The pupils are reactive to light bilaterally.  There is mild conjunctival pallor.  The sclerae are anicteric.  The oral mucosa appears moist.   NECK:  Supple.  There is no elevated JVP.   LUNGS:  Clear to auscultation bilaterally.   CARDIOVASCULAR:  There is normal S1 and S2 with regular rate and rhythm.   ABDOMEN:  Soft, nontender, nondistended.  Bowel sounds present.   EXTREMITIES:  There is trace bilateral ankle edema.   SKIN:  There is no jaundice, cyanosis or rash.   NEUROLOGIC:  He is awake, alert and appears to be oriented almost x3.  The speech is normal.  There is no focal deficit on gross examination.      LABORATORY DATA:  Glucose 101.      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  He is being admitted to the hospital under inpatient status.   1.  Left hip dislocation, recurrent:  The patient has a history of left hip hemiarthroplasty on 10/04 after the patient sustained a hip fracture following a fall.  He was readmitted on 10/14 for left hip dislocation for which he underwent closed reduction under general  anesthesia on 10/15.  Subsequently, he was discharged to [place] for rehabilitation.  Earlier today he bent over to get his shoes off and he heard a pop and subsequently developed severe pain in his left hip pain.  The x-ray in the Emergency Department indicated posterior superiorly dislocation of the left hemiarthroplasty.  An attempt at reduction in the emergency department was partially successful, however, the repeat x-ray revealed the femoral component was no longer in the acetabular component.  The case was communicated to Dr. Winchester, the on-call orthopedic surgeon, who has recommended a revision hemiarthroplasty.  Management as below.    A.  His revised cardiac risk index is calculated at more than 11%.     B.  NPO after midnight.   C.  PRN acetaminophen, oxycodone and Dilaudid are available.   D.  IV normal saline at a rate of 75 mL per hour.   E  Formal orthopedic consult.   F.  Fall precautions.   G.  Preop standard labs/EKG.   2.  Hypertension:  The blood pressure is currently controlled.  At home he is on Toprol XL 50 mg p.o. daily as diltiazem 120 mg p.o. daily, which we will continue.  I will have IV hydralazine available p.r.n. for systolic blood pressure more than 170.   3.  Dyslipidemia:  He will continue on prior to admission Lipitor.   4.  Benign prostatic hypertrophy.  He will continue on prior to admission Flomax.   5.  Chronic kidney disease, stage III-IV.  His baseline creatinine is in the range of 1.7-1.9.  His most recent creatinine was 1.69 on 10/15/2018.  I will order a BMP and monitor his renal function closely during this hospitalization.  We will avoid NSAIDs or other nephrotoxins.   6.  Chronic anemia:  His baseline hemoglobin is around 9.  However, after his hip surgery, his hemoglobin dropped to 7.5 on 10/06/2018, due to acute blood loss.  His most recent hemoglobin was 8.2 before discharge from the hospital.  His hemoglobin will be closely monitored during this hospitalization.   7.   Coronary artery disease:  The patient is status post CABG x4 in .  There is no recent cardiac workup.  At this juncture he reports no chest pain.  I will obtain an EKG for preop evaluation.  He will continue on prior to admission metoprolol and Lipitor.  His aspirin will be held until after surgery.   8.  History of nonsustained ventricular  tachycardia/question paroxysmal atrial fibrillation/supraventricular tachycardia:  It occurred during the hospitalization in 2018, following his right hip fracture.  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  He is not on anticoagulation therapy.  He will continue on prior to admission Toprol XL 50 mg p.o. daily.   9.  Diabetes mellitus type 2:  The most recent hemoglobin A1c was 8.4% on 2018.  At home he is on Lantus 7 units subcutaneous every morning and Humalog 2 units subQ t.i.d.  Due to n.p.o. status, I will reduce the dose of Lantus to 4 units subQ daily and hold his prior to admission Humalog.  I will place the patient on a sliding scale NovoLog.   10.  Deep venous thrombosis prophylaxis:  Pneumatic compression device for now.   11.  Code status:  It was discussed with the patient.  The patient stated that he wants to continue with DNR/DNI status as established previously.   12.  Disposition:  At least 2-3 days of inpatient management.      I would like to thank Dr. Carlitos Bee for allowing the Hospitalist Service to participate in the care of this patient.         ABDIFATAH GROSS MD             D: 10/21/2018   T: 10/22/2018   MT: GIN      Name:     CHERYL HOLLOWAY   MRN:      [id number]        Account:      [id number]   :      1924        Admitted:     10/21/2018                   Document: [id number]       cc: Carlitos Bee MD

## 2018-10-22 NOTE — ED NOTES
[facility]  ED Nurse Handoff Report    ED Chief complaint: Hip Pain      ED Diagnosis:   Final diagnoses:   Hip dislocation, left (H)       Code Status: DNR / DNI    Allergies: No Known Allergies    Activity level - Baseline/Home:  Stand with Assist    Activity Level - Current:   Stand with Assist     Needed?: No    Isolation: No  Infection: Not Applicable  Bariatric?: No    Vital Signs:   Vitals:    10/21/18 2106 10/21/18 2141 10/21/18 2156 10/21/18 2202   BP:  142/90 146/65 126/60   Pulse:       Resp:  10  10   Temp:       SpO2: 96% 100%  100%   Weight:       Height:           Cardiac Rhythm: ,        Pain level: 0-10 Pain Scale: 2    Is this patient confused?: Yes  [place] - Suicide Severity Rating Scale Completed?  Yes  If yes, what color did the patient score?  White    Patient Report: Initial Complaint: left hip pain  Focused Assessment: Patient had left hip surgery a couple weeks ago, Pt was bending over tonight and his hip dislocated.  Patient reports that this has happened one other time since the surgery   Tests Performed: Xray  Abnormal Results: Left hip dislocation  Treatments provided: Conscious sedation for hip reduction, IV pain medications    Family Comments: son at bedside    OBS brochure/video discussed/provided to patient/family: N/A              Name of person given brochure if not patient:               Relationship to patient:     ED Medications:   Medications   naloxone (NARCAN) injection 0.1-0.4 mg (not administered)   flumazenil (ROMAZICON) injection 0.2 mg (not administered)   0.9% sodium chloride BOLUS (1,000 mLs Intravenous New Bag 10/21/18 2208)     Followed by   sodium chloride 0.9% infusion (not administered)   propofol (DIPRIVAN) injection 10 mg/mL vial (not administered)   fentaNYL (PF) (SUBLIMAZE) injection 50 mcg (50 mcg Intravenous Given 10/21/18 2025)   HYDROmorphone (PF) (DILAUDID) injection 0.5 mg (0.5 mg Intravenous Given 10/21/18 2106)   propofol  (DIPRIVAN) 200 MG/20ML injection (40 mg Intravenous Given 10/21/18 5400)       Drips infusing?:  No    For the majority of the shift this patient was [place].   Interventions performed were hip reduction.    Severe Sepsis OR Septic Shock Diagnosis Present: No    To be done/followed up on inpatient unit:  none    ED NURSE PHONE NUMBER: [phone]

## 2018-10-22 NOTE — BRIEF OP NOTE
[facility] Brief Operative Note    Pre-operative diagnosis: DISLOCATION OF HIP.   Post-operative diagnosis Dislocation of L hip hemiarthroplasty   Procedure: Procedure(s):  LEFT HIP HEMIARTHROPLASTY CONVERTED TO LEFT TOTAL HIP ARTHROPLASTY   Surgeon(s): Surgeon(s) and Role:     * Marcos Winchester MD - Primary     * Ximena Stephenson PA-C - Assisting   Estimated blood loss: 250 mL    Specimens:   ID Type Source Tests Collected by Time Destination   A : LEFT HIP HEMIARTHROPLASTY EXPLANTS Other (specify in comments) Hip, Left SURGICAL PATHOLOGY EXAM Marcos Winchester MD 10/22/2018  3:00 PM       Findings: See operative dictation    - wbat  - pt/ot/sw  - pain control  - dvt prophylaxis

## 2018-10-22 NOTE — ANESTHESIA PREPROCEDURE EVALUATION
Procedure: Procedure(s):  ARTHROPLASTY REVISION HIP  Preop diagnosis: DISLOCATION OF HIP.    No Known Allergies  Past Medical History:   Diagnosis Date     Benign essential hypertension 9/4/2018     Coronary artery disease      Nonsenile cataract      Recent retinal detachment, total or subtotal 8/5/2014     Type 2 diabetes mellitus without complications (H)      Past Surgical History:   Procedure Laterality Date     CARDIAC SURGERY       CATARACT IOL, RT/LT Bilateral      CLOSED REDUCTION HIP Left 10/14/2018    Procedure: CLOSED REDUCTION HIP;  CLOSED REDUCTION HIP;  Surgeon: Milton Gusman MD;  Location: [facility] OR     lacrimal caruncle removed BE Bilateral ~1989     OPEN REDUCTION INTERNAL FIXATION HIP BIPOLAR Right 8/26/2018    Procedure: OPEN REDUCTION INTERNAL FIXATION HIP BIPOLAR;  Right Hip Bipolar Hemiarthroplasty (Bi[employer]);  Surgeon: Bill Sanders MD;  Location:  OR     OPEN REDUCTION INTERNAL FIXATION HIP BIPOLAR Left 10/4/2018    Procedure: OPEN REDUCTION INTERNAL FIXATION HIP BIPOLAR;  LEFT HIP TONYA ARTHROPLASTY;  Surgeon: Bruno Stewart MD;  Location:  OR     REPAIR RUPTURED GLOBE  4/21/2014    Procedure: Exploration and Repair of Ruptured Globe ;  Surgeon: Mercedes Rivera MD;  Location: [facility] OR     Social History   Substance Use Topics     Smoking status: Former Smoker     Smokeless tobacco: Never Used     Alcohol use No     Prior to Admission medications    Medication Sig Start Date End Date Taking? Authorizing Provider   acetaminophen (TYLENOL) 325 MG tablet Take 2 tablets (650 mg) by mouth every 4 hours as needed for other (multimodal surgical pain management along with NSAIDS and opioid medication as indicated based on pain control and physical function.) 10/16/18  Yes Edgardo Godfrey PA-C   aspirin 325 MG EC tablet Take one Aspirin tab twice daily for 5 weeks. 8/27/18  Yes Bill Sanders MD   atorvastatin (LIPITOR) 10 MG tablet Take 1 tablet (10 mg) by mouth  every evening 9/3/18  Yes Meli Arndt PA-C   diltiazem (DILACOR XR) 120 MG 24 hr capsule Take 120 mg by mouth daily   Yes Unknown, Entered By History   Ergocalciferol (VITAMIN D) 19725 units CAPS Take 50,000 Units by mouth every 7 days for 5 doses 10/8/18 11/6/18 Yes Earl Streeter DO   insulin glargine (LANTUS) 100 UNIT/ML injection Inject 7 Units Subcutaneous every morning   Yes Reported, Patient   insulin lispro (HUMALOG KWIKPEN) 100 UNIT/ML injection Inject Subcutaneous At Bedtime Sliding scale:  -249=1 unit  -299=2 units  -349=3 units  -399=4 units  +=5 units   Yes Unknown, Entered By History   insulin lispro (HUMALOG KWIKPEN) 100 UNIT/ML injection Inject 2 Units Subcutaneous 3 times daily (before meals) 9/3/18  Yes Meli Arndt PA-C   insulin lispro (HUMALOG) 100 UNIT/ML injection Inject Subcutaneous 3 times daily (before meals) Sliding scale:   -189=1 unit  -239=2 units  -289=3 units  -339=4 units  -399=5 units  -499=6 units  +=7 units   Yes Reported, Patient   metoprolol succinate (TOPROL-XL) 50 MG 24 hr tablet Take 1 tablet (50 mg) by mouth daily 9/4/18  Yes Meli Arndt PA-C   polyethylene glycol (MIRALAX/GLYCOLAX) Packet Take 17 g by mouth daily   Yes Reported, Patient   senna-docusate (SENOKOT-S;PERICOLACE) 8.6-50 MG per tablet Take 1 tablet by mouth 2 times daily 10/16/18  Yes Edgardo Godfrey PA-C   Skin Protectants, Misc. (ANIBAL PROTECT EX) Apply topically 2 times daily   Yes Reported, Patient   tamsulosin (FLOMAX) 0.4 MG capsule Take 0.4 mg by mouth every evening    Yes Reported, Patient   traMADol (ULTRAM) 50 MG tablet Take 1 tablet (50 mg) by mouth every 6 hours 10/16/18  Yes Edgardo Godfrey PA-C     Current Facility-Administered Medications Ordered in Epic   Medication Dose Route Frequency Last Rate Last Dose     [Auto Hold] acetaminophen (TYLENOL) Suppository 650 mg  650 mg Rectal Q4H PRN          [Auto Hold] acetaminophen (TYLENOL) tablet 650 mg  650 mg Oral Q4H PRN         [Auto Hold] atorvastatin (LIPITOR) tablet 10 mg  10 mg Oral QPM   10 mg at 10/22/18 0018     ceFAZolin (ANCEF) 1 g vial to attach to  ml bag for ADULT or 50 ml bag for PEDS  1 g Intravenous See Admin Instructions         ceFAZolin (ANCEF) intermittent infusion 2 g in 100 mL dextrose PRE-MIX  2 g Intravenous Pre-Op/Pre-procedure x 1 dose         [Auto Hold] glucose gel 15-30 g  15-30 g Oral Q15 Min PRN        Or     [Auto Hold] dextrose 50 % injection 25-50 mL  25-50 mL Intravenous Q15 Min PRN        Or     [Auto Hold] glucagon injection 1 mg  1 mg Subcutaneous Q15 Min PRN         [Auto Hold] diltiazem (DILACOR XR) 24 hr capsule 120 mg  120 mg Oral Daily   120 mg at 10/22/18 0840     [Auto Hold] hydrALAZINE (APRESOLINE) injection 10 mg  10 mg Intravenous Q4H PRN         [Auto Hold] HYDROmorphone (PF) (DILAUDID) injection 0.2 mg  0.2 mg Intravenous Q2H PRN   0.2 mg at 10/22/18 0132     [Auto Hold] insulin aspart (NovoLOG) inj (RAPID ACTING)  1-6 Units Subcutaneous Q4H         [Auto Hold] insulin glargine (LANTUS) injection 4 Units  4 Units Subcutaneous QAM AC         lactated ringers infusion   Intravenous Continuous 25 mL/hr at 10/22/18 1121       lidocaine 1 % 1 mL  1 mL Other Q1H PRN         [Auto Hold] melatonin tablet 1 mg  1 mg Oral At Bedtime PRN         [Auto Hold] metoprolol succinate (TOPROL-XL) 24 hr tablet 50 mg  50 mg Oral Daily   50 mg at 10/22/18 0840     [Auto Hold] naloxone (NARCAN) injection 0.1-0.4 mg  0.1-0.4 mg Intravenous Q2 Min PRN         [Auto Hold] ondansetron (ZOFRAN-ODT) ODT tab 4 mg  4 mg Oral Q6H PRN        Or     [Auto Hold] ondansetron (ZOFRAN) injection 4 mg  4 mg Intravenous Q6H PRN         [Auto Hold] oxyCODONE IR (ROXICODONE) tablet 5-10 mg  5-10 mg Oral Q3H PRN         [Auto Hold] polyethylene glycol (MIRALAX/GLYCOLAX) Packet 17 g  17 g Oral Daily PRN         [Auto Hold] prochlorperazine  (COMPAZINE) injection 5 mg  5 mg Intravenous Q6H PRN        Or     [Auto Hold] prochlorperazine (COMPAZINE) tablet 5 mg  5 mg Oral Q6H PRN        Or     [Auto Hold] prochlorperazine (COMPAZINE) Suppository 12.5 mg  12.5 mg Rectal Q12H PRN         sodium chloride 0.9% infusion   Intravenous Continuous 75 mL/hr at 10/21/18 2317       [Auto Hold] tamsulosin (FLOMAX) capsule 0.4 mg  0.4 mg Oral QPM   0.4 mg at 10/22/18 0018     No current [facility]-ordered outpatient prescriptions on file.       lactated ringers 25 mL/hr at 10/22/18 1121     sodium chloride 75 mL/hr at 10/21/18 2317     Wt Readings from Last 1 Encounters:   10/21/18 72.6 kg (160 lb)     Temp Readings from Last 1 Encounters:   10/22/18 36.2  C (97.2  F) (Temporal)     BP Readings from Last 6 Encounters:   10/22/18 137/60   10/18/18 102/57   10/17/18 129/65   10/11/18 144/72   10/10/18 135/73   10/03/18 104/57     Pulse Readings from Last 4 Encounters:   10/22/18 79   10/18/18 103   10/16/18 75   10/11/18 88     Resp Readings from Last 1 Encounters:   10/22/18 16     SpO2 Readings from Last 1 Encounters:   10/22/18 100%     Recent Labs   Lab Test  10/22/18   0718  10/16/18   0623  10/15/18   0715   NA  138   --   136   POTASSIUM  4.4   --   4.9   CHLORIDE  105   --   105   CO2  27   --   26   ANIONGAP  6   --   5   GLC  108*  129*  127*   BUN  27   --   31*   CR  1.88*   --   1.69*   JOSELINE  7.7*   --   7.3*     Recent Labs   Lab Test  10/09/18   0703   AST  22   ALT  12   ALKPHOS  72   BILITOTAL  0.6     Recent Labs   Lab Test  10/22/18   0718  10/16/18   1808   10/15/18   0715   WBC  9.4   --    --   8.6   HGB  7.9*  8.2*   < >  7.9*   PLT  294   --    --   316    < > = values in this interval not displayed.     Recent Labs   Lab Test  10/22/18   0718   ABO  A   RH  Pos     Recent Labs   Lab Test  10/14/18   1220  04/21/14   0405   INR  1.08  0.91      Recent Labs   Lab Test  10/10/18   0700  10/09/18   1302  10/09/18   0703   TROPI  0.134*  0.143*  0.157*      No results for input(s): PH, PCO2, PO2, HCO3 in the last 73158 hours.  No results for input(s): HCG in the last 90422 hours.  Recent Results (from the past 744 hour(s))   XR Chest 1 View    Narrative    XR CHEST 1 VW  10/4/2018 3:03 AM     HISTORY: Hip fracture.    COMPARISON: 8/29/2018.    FINDINGS: Supine chest. Sternal wires and mediastinal clips. Mild  probable scarring at the right lung base and in the left lung  laterally. The lungs are otherwise clear. No pneumothorax.      Impression    IMPRESSION: No acute abnormality.    SHARI HERNANDEZ MD   XR Pelvis w Hip Left 1 View    Narrative    XR PELVIS AND HIP LEFT 1 VIEW  10/4/2018 3:04 AM     HISTORY: Pain, fall, left hip pain.    COMPARISON: None.       Impression    IMPRESSION: Angulated left femoral neck fracture.    SHARI HERNANDEZ MD   XR Pelvis w Hip Port Left 1 View    Narrative    XR PELVIS AND HIP PORTABLE LEFT 1 VIEW 10/4/2018 3:43 PM    COMPARISON: Radiographs earlier on the same day.    HISTORY: Arthroplasty.      Impression    IMPRESSION: Bilateral hip hemiarthroplasties, unchanged on the RIGHT  and new on the LEFT. Hardware appears intact. No fractures are seen.    VÍCTOR ISLAS MD   XR Pelvis w Hip Left 1 View    Narrative    PELVIS WITH UNILATERAL HIP ONE VIEW LEFT  10/14/2018 1:23 PM     HISTORY: fall ?dislocation;     COMPARISON: None.    FINDINGS: There is a dislocation of the left hip arthroplasty. The  acetabular and femoral components are dislocated superiorly with  respect to the native acetabulum..      Impression    IMPRESSION: Dislocated left hip arthroplasty.    CEASAR BLEDSOE MD   XR Surgery OSORIO Fluoro L/T 5 Min w Stills    Narrative    SURGERY C-ARM FLUORO LESS THAN 5 MIN W STILLS October 14, 2018 8:15 PM      HISTORY: Closed reduction left hip.     COMPARISON: 10/14/2018 at 1317 hours.    NUMBER OF IMAGES ACQUIRED: Two.    VIEWS: AP.    FLUOROSCOPY TIME: .1      Impression    IMPRESSION: Two intraoperative images of the left hip  demonstrates the  dislocated acetabular component has been appropriately reduced.    HANNAH HOOKER MD   XR Pelvis w Hip Port Left 1 View    Narrative    PELVIS AND HIP PORTABLE LEFT ONE VIEW   10/14/2018 9:12 PM     HISTORY: Postop hip arthroplasty.     COMPARISON: Intraoperative images from 10/14/2018.    FINDINGS: Postoperative change of left total hip prosthesis placement  are demonstrated. Alignment is anatomic. No fractures are identified.  Expected postoperative swelling and staples noted.      Impression    IMPRESSION: Expected postoperative appearance following left total hip  arthroplasty.     NICOLA TAYLOR MD   XR Pelvis w Hip Left 1 View    Narrative    XR PELVIS AND HIP LEFT 1 VIEW 10/21/2018 8:51 PM     HISTORY: hip pain; r/o fracture/dislocation;       Impression    IMPRESSION: Bilateral hip hemiarthroplasties. The left  hemiarthroplasty appears to be posterior superiorly dislocated.    KARON ALMONTE MD   XR Hip Port Left 1 View    Narrative    XR HIP PORT LT 1 VW   10/21/2018 10:05 PM     HISTORY: left hip reduction;     COMPARISON: None.      Impression    IMPRESSION: The femoral component is displaced out of the acetabular  component. Acetabular component overlies the greater trochanter. The  head of the femoral component is in the native acetabular region.    IMPRESSION: The femoral component is no longer within the acetabular  component and the acetabular component overlies the greater  trochanter.    CEASAR BLEDSOE MD           Anesthesia Evaluation     . Pt has had prior anesthetic.     No history of anesthetic complications          ROS/MED HX    ENT/Pulmonary:      (-) sleep apnea   Neurologic:      (-) seizures and CVA   Cardiovascular: Comment: Interpretation Summary     Sinus rhythm was noted.  Left ventricular systolic function is normal. The visual ejection fraction is  estimated at 55-60%. There is mild concentric left ventricular hypertrophy.  There is trace to mild mitral  regurgitation.  Thickened mitral valve anterior leaflet is noted and there is a  prominenet/sclerotic chordal attachment (giving the appearance of an  associated mass). The thickening is most likely degenerative.  There is moderate (2+) tricuspid regurgitation.  Right ventricular systolic pressure is elevated, consistent with moderate to  severe pulmonary hypertension.    (+) hypertension--CAD, --. : . . . :. .       METS/Exercise Tolerance:     Hematologic:         Musculoskeletal:   (+) , fracture lower extremity, -       GI/Hepatic:        (-) GERD   Renal/Genitourinary:     (+) chronic renal disease,       Endo:     (+) type II DM .      Psychiatric:         Infectious Disease:         Malignancy:         Other:                     Physical Exam  Normal systems: cardiovascular, pulmonary and dental    Airway   Mallampati: I  TM distance: >3 FB  Neck ROM: full    Dental     Cardiovascular       Pulmonary    breath sounds clear to auscultation                        Anesthesia Plan      History & Physical Review  History and physical reviewed and following examination; no interval change.    ASA Status:  3 .    NPO Status:  > 8 hours    Plan for General and ETT with Intravenous induction. Maintenance will be Balanced.    PONV prophylaxis:  Ondansetron (or other 5HT-3) and Dexamethasone or Solumedrol       Postoperative Care  Postoperative pain management:  IV analgesics and Oral pain medications.      Consents  Anesthetic plan, risks, benefits and alternatives discussed with:  Patient..

## 2018-10-22 NOTE — ED TRIAGE NOTES
Patient currently at a TCU for recovery from left hip surgery.  Pt was bending over this evening putting his shoes on and his left hip slipped out.  Patient reports that his hip has dislocated one time since the surgery and tonight he feels like the same thing happened.  Pt denies falling or hitting his head.  CMS intact on bilateral lower extremities.  Left leg is rotated in.

## 2018-10-22 NOTE — PHARMACY-ADMISSION MEDICATION HISTORY
Admission medication history interview status for the 10/21/2018  admission is complete. See EPIC admission navigator for prior to admission medications     Medication history source reliability:Good    Actions taken by pharmacist (provider contacted, etc):Reviewed paper chart and [facility] medication list . Verified last doses with nurse at [facility] via phone.      Additional medication history information not noted on PTA med list :    Aspirin 325mg BID ends on 11/14/2018  VItamin D 50,000 units weekly is given on Thursdays- to end on 11/22/2018    Removed Oxycodone from list     Medication reconciliation/reorder completed by provider prior to medication history? Yes    Time spent in this activity: 20 minutes    Prior to Admission medications    Medication Sig Last Dose Taking? Auth Provider   acetaminophen (TYLENOL) 325 MG tablet Take 2 tablets (650 mg) by mouth every 4 hours as needed for other (multimodal surgical pain management along with NSAIDS and opioid medication as indicated based on pain control and physical function.) Past Week at Unknown time Yes Edgardo Godfrey PA-C   aspirin 325 MG EC tablet Take one Aspirin tab twice daily for 5 weeks. 10/21/2018 at ending 11/14 Yes Bill Sanders MD   atorvastatin (LIPITOR) 10 MG tablet Take 1 tablet (10 mg) by mouth every evening 10/21/2018 at 2000 Yes Meli Arndt PA-C   diltiazem (DILACOR XR) 120 MG 24 hr capsule Take 120 mg by mouth daily 10/21/2018 at Unknown time Yes Unknown, Entered By History   Ergocalciferol (VITAMIN D) 53812 units CAPS Take 50,000 Units by mouth every 7 days for 5 doses 10/18/18 at ending 11/22 Yes Earl Streeter,    insulin glargine (LANTUS) 100 UNIT/ML injection Inject 7 Units Subcutaneous every morning 10/21/2018 at Unknown time Yes Reported, Patient   insulin lispro (HUMALOG KWIKPEN) 100 UNIT/ML injection Inject Subcutaneous At Bedtime Sliding scale:  -249=1 unit  -299=2 units  -349=3  units  -399=4 units  +=5 units 10/21/2018 at HS Yes Unknown, Entered By History   insulin lispro (HUMALOG KWIKPEN) 100 UNIT/ML injection Inject 2 Units Subcutaneous 3 times daily (before meals) 10/21/2018 at Unknown time Yes Meli Arndt PA-C   insulin lispro (HUMALOG) 100 UNIT/ML injection Inject Subcutaneous 3 times daily (before meals) Sliding scale:   -189=1 unit  -239=2 units  -289=3 units  -339=4 units  -399=5 units  -499=6 units  +=7 units 10/21/2018 at Unknown time Yes Reported, Patient   metoprolol succinate (TOPROL-XL) 50 MG 24 hr tablet Take 1 tablet (50 mg) by mouth daily 10/21/2018 at Unknown time Yes Meli Arndt PA-C   polyethylene glycol (MIRALAX/GLYCOLAX) Packet Take 17 g by mouth daily 10/21/2018 at Unknown time Yes Reported, Patient   senna-docusate (SENOKOT-S;PERICOLACE) 8.6-50 MG per tablet Take 1 tablet by mouth 2 times daily 10/21/2018 at Unknown time Yes Edgardo Godfrey PA-C   Skin Protectants, Misc. (ANIBAL PROTECT EX) Apply topically 2 times daily Past Week at Unknown time Yes Reported, Patient   tamsulosin (FLOMAX) 0.4 MG capsule Take 0.4 mg by mouth every evening  10/21/2018 at PM Yes Reported, Patient   traMADol (ULTRAM) 50 MG tablet Take 1 tablet (50 mg) by mouth every 6 hours 10/21/2018 at Unknown time Yes Edgardo Godfrey PA-C

## 2018-10-22 NOTE — ANESTHESIA POSTPROCEDURE EVALUATION
Patient: Abimael Reynosoler    Procedure(s):  LEFT HIP HEMIARTHROPLASTY CONVERTED TO LEFT TOTAL HIP ARTHROPLASTY    Diagnosis:DISLOCATION OF HIP.  Diagnosis Additional Information: No value filed.    Anesthesia Type:  General, ETT    Note:  Anesthesia Post Evaluation    Patient location during evaluation: PACU  Patient participation: Able to fully participate in evaluation  Level of consciousness: awake, awake and alert and responsive to verbal stimuli  Pain management: adequate  Airway patency: patent  Cardiovascular status: acceptable  Respiratory status: acceptable  Hydration status: acceptable  PONV: none     Anesthetic complications: None          Last vitals:  Vitals:    10/22/18 1700 10/22/18 1715 10/22/18 1730   BP: 96/44 (!) 97/39 91/40   Pulse:      Resp: 20 16 12   Temp:  36.9  C (98.4  F)    SpO2: 96% 99% 99%         Electronically Signed By: [place] Méndez  October 22, 2018  5:49 PM

## 2018-10-22 NOTE — ANESTHESIA CARE TRANSFER NOTE
Patient: Abimael Reynosoler    Procedure(s):  LEFT HIP HEMIARTHROPLASTY CONVERTED TO LEFT TOTAL HIP ARTHROPLASTY    Diagnosis: DISLOCATION OF HIP.  Diagnosis Additional Information: No value filed.    Anesthesia Type:   General, ETT     Note:  Airway :Face Mask  Patient transferred to:PACU  Comments: Pt to PACU. VSS. Report complete to RNHandoff Report: Identifed the Patient, Identified the Reponsible Provider, Reviewed the pertinent medical history, Discussed the surgical course, Reviewed Intra-OP anesthesia mangement and issues during anesthesia, Set expectations for post-procedure period and Allowed opportunity for questions and acknowledgement of understanding      Vitals: (Last set prior to Anesthesia Care Transfer)    CRNA VITALS  10/22/2018 1530 - 10/22/2018 1611      10/22/2018             Pulse: 94    SpO2: 100 %    Resp Rate (set): 10                Electronically Signed By: Chloe Stack CRNA, WILLIAM HAINES  October 22, 2018  4:11 PM

## 2018-10-22 NOTE — PLAN OF CARE
Problem: Patient Care Overview  Goal: Plan of Care/Patient Progress Review  Outcome: No Change  Pt A/Ox4. VSS. Repo q2h, 2 assist. Denies pain. NPO. Voiding adequately. Sent to pre-op at 1100.

## 2018-10-22 NOTE — TELEPHONE ENCOUNTER
Called by nursing staff at [facility].     Patient has dislocated his hip again and is requesting return to [facility].   Okay to send  Noemi Hogan MD

## 2018-10-23 ENCOUNTER — APPOINTMENT (OUTPATIENT)
Dept: PHYSICAL THERAPY | Facility: CLINIC | Age: 83
DRG: 467 | End: 2018-10-23
Attending: ORTHOPAEDIC SURGERY
Payer: MEDICARE

## 2018-10-23 LAB
ANION GAP SERPL CALCULATED.3IONS-SCNC: 7 MMOL/L (ref 3–14)
BUN SERPL-MCNC: 24 MG/DL (ref 7–30)
CALCIUM SERPL-MCNC: 7.4 MG/DL (ref 8.5–10.1)
CHLORIDE SERPL-SCNC: 104 MMOL/L (ref 94–109)
CO2 SERPL-SCNC: 26 MMOL/L (ref 20–32)
CREAT SERPL-MCNC: 1.58 MG/DL (ref 0.66–1.25)
GFR SERPL CREATININE-BSD FRML MDRD: 41 ML/MIN/1.7M2
GLUCOSE BLDC GLUCOMTR-MCNC: 120 MG/DL (ref 70–99)
GLUCOSE BLDC GLUCOMTR-MCNC: 125 MG/DL (ref 70–99)
GLUCOSE BLDC GLUCOMTR-MCNC: 173 MG/DL (ref 70–99)
GLUCOSE BLDC GLUCOMTR-MCNC: 218 MG/DL (ref 70–99)
GLUCOSE BLDC GLUCOMTR-MCNC: 249 MG/DL (ref 70–99)
GLUCOSE BLDC GLUCOMTR-MCNC: 254 MG/DL (ref 70–99)
GLUCOSE SERPL-MCNC: 140 MG/DL (ref 70–99)
HGB BLD-MCNC: 7.2 G/DL (ref 13.3–17.7)
HGB BLD-MCNC: 7.4 G/DL (ref 13.3–17.7)
POTASSIUM SERPL-SCNC: 4.6 MMOL/L (ref 3.4–5.3)
SODIUM SERPL-SCNC: 137 MMOL/L (ref 133–144)

## 2018-10-23 PROCEDURE — 36415 COLL VENOUS BLD VENIPUNCTURE: CPT | Performed by: INTERNAL MEDICINE

## 2018-10-23 PROCEDURE — 97161 PT EVAL LOW COMPLEX 20 MIN: CPT | Mod: GP | Performed by: PHYSICAL THERAPIST

## 2018-10-23 PROCEDURE — 25000131 ZZH RX MED GY IP 250 OP 636 PS 637: Mod: GY | Performed by: INTERNAL MEDICINE

## 2018-10-23 PROCEDURE — 25000132 ZZH RX MED GY IP 250 OP 250 PS 637: Mod: GY | Performed by: HOSPITALIST

## 2018-10-23 PROCEDURE — 25000128 H RX IP 250 OP 636: Performed by: ORTHOPAEDIC SURGERY

## 2018-10-23 PROCEDURE — 85018 HEMOGLOBIN: CPT | Performed by: ORTHOPAEDIC SURGERY

## 2018-10-23 PROCEDURE — 40000193 ZZH STATISTIC PT WARD VISIT: Performed by: PHYSICAL THERAPIST

## 2018-10-23 PROCEDURE — 25000128 H RX IP 250 OP 636: Performed by: INTERNAL MEDICINE

## 2018-10-23 PROCEDURE — 00000146 ZZHCL STATISTIC GLUCOSE BY METER IP

## 2018-10-23 PROCEDURE — 25000132 ZZH RX MED GY IP 250 OP 250 PS 637: Mod: GY | Performed by: INTERNAL MEDICINE

## 2018-10-23 PROCEDURE — A9270 NON-COVERED ITEM OR SERVICE: HCPCS | Mod: GY | Performed by: INTERNAL MEDICINE

## 2018-10-23 PROCEDURE — 97110 THERAPEUTIC EXERCISES: CPT | Mod: GP | Performed by: PHYSICAL THERAPIST

## 2018-10-23 PROCEDURE — 97530 THERAPEUTIC ACTIVITIES: CPT | Mod: GP | Performed by: PHYSICAL THERAPIST

## 2018-10-23 PROCEDURE — 36415 COLL VENOUS BLD VENIPUNCTURE: CPT | Performed by: ORTHOPAEDIC SURGERY

## 2018-10-23 PROCEDURE — 99232 SBSQ HOSP IP/OBS MODERATE 35: CPT | Performed by: INTERNAL MEDICINE

## 2018-10-23 PROCEDURE — 80048 BASIC METABOLIC PNL TOTAL CA: CPT | Performed by: INTERNAL MEDICINE

## 2018-10-23 PROCEDURE — 99207 ZZC CDG-MDM COMPONENT: MEETS LOW - DOWN CODED: CPT | Performed by: INTERNAL MEDICINE

## 2018-10-23 PROCEDURE — 12000007 ZZH R&B INTERMEDIATE

## 2018-10-23 PROCEDURE — 85018 HEMOGLOBIN: CPT | Performed by: INTERNAL MEDICINE

## 2018-10-23 RX ORDER — ONDANSETRON 4 MG/1
4 TABLET, ORALLY DISINTEGRATING ORAL EVERY 6 HOURS PRN
Qty: 15 TABLET | Refills: 0 | Status: SHIPPED | OUTPATIENT
Start: 2018-10-23 | End: 2018-11-12

## 2018-10-23 RX ORDER — ACETAMINOPHEN 325 MG/1
650 TABLET ORAL EVERY 4 HOURS PRN
Qty: 40 TABLET | Refills: 0 | Status: SHIPPED | OUTPATIENT
Start: 2018-10-23 | End: 2019-01-05

## 2018-10-23 RX ORDER — TRAMADOL HYDROCHLORIDE 50 MG/1
50 TABLET ORAL EVERY 4 HOURS PRN
Qty: 30 TABLET | Refills: 0 | Status: SHIPPED | OUTPATIENT
Start: 2018-10-23 | End: 2019-01-05

## 2018-10-23 RX ADMIN — DILTIAZEM HYDROCHLORIDE 120 MG: 120 CAPSULE, EXTENDED RELEASE ORAL at 08:24

## 2018-10-23 RX ADMIN — ENOXAPARIN SODIUM 30 MG: 30 INJECTION SUBCUTANEOUS at 10:04

## 2018-10-23 RX ADMIN — ATORVASTATIN CALCIUM 10 MG: 10 TABLET, FILM COATED ORAL at 20:58

## 2018-10-23 RX ADMIN — CEFAZOLIN SODIUM 1 G: 1 INJECTION, POWDER, FOR SOLUTION INTRAMUSCULAR; INTRAVENOUS at 05:14

## 2018-10-23 RX ADMIN — HYDROMORPHONE HYDROCHLORIDE 0.2 MG: 1 INJECTION, SOLUTION INTRAMUSCULAR; INTRAVENOUS; SUBCUTANEOUS at 02:43

## 2018-10-23 RX ADMIN — INSULIN GLARGINE 3 UNITS: 100 INJECTION, SOLUTION SUBCUTANEOUS at 14:55

## 2018-10-23 RX ADMIN — HYDROMORPHONE HYDROCHLORIDE 0.2 MG: 1 INJECTION, SOLUTION INTRAMUSCULAR; INTRAVENOUS; SUBCUTANEOUS at 05:15

## 2018-10-23 RX ADMIN — METOPROLOL SUCCINATE 50 MG: 50 TABLET, EXTENDED RELEASE ORAL at 08:24

## 2018-10-23 RX ADMIN — Medication 1 MG: at 20:58

## 2018-10-23 RX ADMIN — INSULIN GLARGINE 4 UNITS: 100 INJECTION, SOLUTION SUBCUTANEOUS at 08:24

## 2018-10-23 RX ADMIN — Medication 25 MG: at 10:03

## 2018-10-23 RX ADMIN — TAMSULOSIN HYDROCHLORIDE 0.4 MG: 0.4 CAPSULE ORAL at 20:58

## 2018-10-23 ASSESSMENT — ACTIVITIES OF DAILY LIVING (ADL)
BATHING: 2-->ASSISTIVE PERSON
COGNITION: 0 - NO COGNITION ISSUES REPORTED
RETIRED_EATING: 0-->INDEPENDENT
TOILETING: 2-->ASSISTIVE PERSON
ADLS_ACUITY_SCORE: 20
NUMBER_OF_TIMES_PATIENT_HAS_FALLEN_WITHIN_LAST_SIX_MONTHS: 2
DRESS: 2-->ASSISTIVE PERSON
WHICH_OF_THE_ABOVE_FUNCTIONAL_RISKS_HAD_A_RECENT_ONSET_OR_CHANGE?: AMBULATION;TOILETING;BATHING;DRESSING;FALL HISTORY
SWALLOWING: 0-->SWALLOWS FOODS/LIQUIDS WITHOUT DIFFICULTY
ADLS_ACUITY_SCORE: 20
FALL_HISTORY_WITHIN_LAST_SIX_MONTHS: YES
ADLS_ACUITY_SCORE: 12
RETIRED_COMMUNICATION: 0-->UNDERSTANDS/COMMUNICATES WITHOUT DIFFICULTY
ADLS_ACUITY_SCORE: 20
ADLS_ACUITY_SCORE: 18
ADLS_ACUITY_SCORE: 18

## 2018-10-23 NOTE — PLAN OF CARE
Problem: Patient Care Overview  Goal: Plan of Care/Patient Progress Review  Outcome: No Change   Denies/minimal pain. Unable to void after collins removed in AM. Bladder scanned for over 300 ml but pt refused to let RN straight cath. MD aware. Refused lunch but BG remained above 200's. Pt given 3 U lantus per MD's order. Up with a ceiling lift.

## 2018-10-23 NOTE — PROVIDER NOTIFICATION
MD Notification    Notified Person: MD    Notified Person Name: Dr. Almanzar    Notification Date/Time: 10/23/18 @ 0531    Notification Interaction: page/phone    Purpose of Notification: pt has had multiple doses of IV Dilaudid and pain is not well controlled, states oxycodone gives him nightmares. Would like 2%hydrocortisone cream for sacral pressure ulcer due to pain. Has some swelling on posterior of L knee with some bruising, states it feels like it's burning, negative for Homans sign; able to dorsi/plantarflex, +2 pulses.    Orders Received: Tramadol 25mg [id number],. Recommend barrier cream for bottom. Continue to monitor swelling on L posterior knee, possible US on days if needed.    Comments:

## 2018-10-23 NOTE — OP NOTE
Procedure Date: 10/22/2018      DATE OF PROCEDURE:  10/22/2018      PREOPERATIVE DIAGNOSIS:  Left hip hemiarthroplasty dislocation and dissociation of bipolar head.      POSTOPERATIVE DIAGNOSIS:  Left hip hemiarthroplasty dislocation and dissociation of bipolar head.      PROCEDURE:  Left hip hemiarthroplasty converted to a left total hip arthroplasty.      SURGEON:  Marcos Winchester MD      FIRST ASSISTANT:  MARIANA Cash      A skilled first assistant was necessary for patient positioning, prepping and draping as well as help with reduction, moving the leg for reduction maneuvers, closing, as well as patient transfer.      ESTIMATED BLOOD LOSS:  250 mL.      COMPLICATIONS:  None apparent.      ANESTHESIA:  General.      IMPLANTS:   1.  Biomet G7 multi-hole acetabular shell, size 56F.   2.  Biomet G7 acetabular screw, size 25 mm in length.   3.  Biomet G7 acetabular liner, 10 degree, vitamin E, head size 40 mm, liner size F.   4.  Biomet modular head component with a -3 mm neck.      INDICATIONS:  The patient is a 89-year-old male who had a fall 2 weeks ago onto his left hip and suffered a displaced left femoral neck fracture.  He subsequently underwent a left hip hemiarthroplasty with one of my partners, Dr. Christensen.  Unfortunately, a week later he suffered another fall and fell onto his left hip and suffered a dislocation of his left hip hemiarthroplasty.  It was then subsequently reduced in the operating room by Dr. Gusman.  He was doing well again until yesterday when he had another fall when he was bending over to tie his shoes and with just a gentle maneuver he redislocated his left hip again.  He was brought to the emergency room.  Closed reduction was attempted and upon closed reduction the bipolar head was dissociated from the inner head and the inner head was reduced; however, the larger bipolar head was left in the soft tissues.  Upon discussion of treatment options with the patient as well as  the son, we decided the best way to move forward would be a conversion to a total hip arthroplasty in order to remove the metal head in the soft tissues as well as to try and prevent future hip dislocations.  They agreed.      DESCRIPTION OF PROCEDURE:  On the date of the procedure, the patient was met in the preoperative area by the surgeon and anesthesia team.  His left hip was marked by the operative surgeon and informed consent was obtained.  Risks and benefits of the surgery were discussed with the patient including risk of bleeding, infection, damage to neurovascular structures, risk of redislocation, risk of infection, needing future surgery for revision as well as blood clots, as well as risk of anesthesia.  They agreed to proceed.  The patient was then brought to the operating room and general anesthesia was administered.  He was then placed on the operating room table in supine position and flipped into the lateral decubitus position with his left hip facing up.  His left hip was prepped and draped in the usual sterile fashion and preoperative antibiotics were given.  A preoperative timeout was performed.      We began the procedure by opening up the old incision and dissecting out through the soft tissues all the way down to the gluteus dieter fascia as well as the IT band.  That was incised and a rush of hematoma and fluid was expelled out along with a large clot.  This was also removed.  The Charnley retractor was placed and the large bipolar head was easily removed which was stuck in the gluteus dieter muscle.  We then visualized the smaller head as well as the prosthesis.  We developed the short external rotators as well as the capsule remnants once again and tagged those with #2 Ethibond for later repair.  We then redislocated the implants and removed the inner head.  Evaluation of the femoral component showed that it had excellent fixation.  It was cemented in and the anteversion seemed  appropriate.  It was not in any retroversion.  In fact, it appeared to have about 10 degrees of anteversion.  Excess soft tissues were removed for greater visualization.  The labrum was then removed as well from the acetabulum and appropriate retractors were placed.        Next, we turned our attention to reaming.  We were able to ream the acetabulum and excess retroversion of approximately 20-30 degrees and abduction of approximately 45 degrees.  We started reaming at 49 and reamed up to a size 55.  We then placed a trial that had excellent fit and a then a true Biomet size 56 G7 shell was impacted into place and had excellent stability.  We did place 1 pelvic screw on of size 25, 6.5 mm cancellous screw for extra stability.  Afterwards, we placed a 56 mm acetabular liner with a 10-degree tilt for a size 40 head.  This was impacted into place.  The wound was then copiously irrigated.  We trialled several different head sizes and we noted that a -3 head demonstrated excellent stability as well as leg length.  The patient was stable with flexion up to 90 degrees and internal rotation to approximately 70 degrees prior to any subluxation.  No impingement was noted.  We then opened up the true head.  This was impacted into place, reduced, trialed once again and had excellent stability.  The wound was then copiously irrigated.  Two drill holes were placed in the greater trochanter and the capsule as well as the short external rotators was repaired to the greater trochanter over bone tunnels.  The wound was again copiously irrigated.  The IT band and gluteus dieter fascia were closed with #1 Vicryls followed by 2-0 Vicryls for deep dermals and staples for the skin.  Sterile dressing was applied.  The patient was placed into a hip abduction pillow, awakened from anesthesia and brought to the PACU for recovery.         KATHY VALENTE MD             D: 10/22/2018   T: 10/23/2018   MT: JESUS      Name:     CHERYL HOLLOWAY    MRN:      [id number]        Account:        [id number]   :      1924           Procedure Date: 10/22/2018      Document: [id number]

## 2018-10-23 NOTE — PLAN OF CARE
Problem: Patient Care Overview  Goal: Plan of Care/Patient Progress Review  Outcome: No Change  Pt back from surgery. Pt alert, disoriented to time, forgetful. VSS on RA (BPs soft). Denies pain. Tele SR with BBB and PACs. . CMS intact. Drsg CDI. Rosales in place. IVF infusing. Refusing capno, pulse ox on. Regular diet. Yet to dangle. WBAT. Continue to monitor.

## 2018-10-23 NOTE — PLAN OF CARE
Problem: Patient Care Overview  Goal: Plan of Care/Patient Progress Review  Discharge Planner PT   Patient plan for discharge: None stated.   Current status: Orders received, evaluation completed, and treatment initiated. Patient is a 89 y/o male POD # 1 L hip hemiarthroplasty converted to L DELL due to recurrent hip dislocations. At baseline, patient lives with spouse in an assisted living. Patient reports at baseline he receives assist for bathing, dressing, and SBA for ambulation with use of FWW. Patient in supine with report of L hip pain of 10/10 at rest. Pt required modA of 2 to complete supine-sit. Sit <> stand and ambulation of a couple steps towards bedside chair with FWW and modA of 2 due to posterior lean. Pt reported L hip pain of 5/10 sitting up in chair.   Barriers to return to prior living situation: Level of assist, Hip Precautions, Pain, Fall risk  Recommendations for discharge: TCU  Rationale for recommendations: Pt will require continued skilled PT intervention at TCU in order to increase independence and safety prior to returning to [facility] with spouse.        Entered by: Marjan Tong 10/23/2018 12:27 PM

## 2018-10-23 NOTE — PROGRESS NOTES
10/23/18 1100   Quick Adds   Type of Visit Initial PT Evaluation   Living Environment   Lives With spouse   Living Arrangements assisted living   Home Accessibility no concerns   Transportation Available family or friend will provide   Living Environment Comment Pt was admitted from TCU.    Self-Care   Usual Activity Tolerance good   Current Activity Tolerance fair   Regular Exercise no   Equipment Currently Used at Home walker, rolling   Functional Level Prior   Ambulation 3-->assistive equipment and person   Transferring 3-->assistive equipment and person   Toileting 2-->assistive person   Bathing 2-->assistive person   Dressing 2-->assistive person   Fall history within last six months yes   Number of times patient has fallen within last six months 3   General Information   Onset of Illness/Injury or Date of Surgery - Date 10/21/18   Referring Physician Marcos Winchester MD   Patient/Family Goals Statement None stated.    Pertinent History of Current Problem (include personal factors and/or comorbidities that impact the POC) 89 y/o male POD # 1 L hemiarthroplasty coverted to a L DELL after dislocating L hip while reaching down to the floor. PMH including R hip ORIF (8/26/18), L hip ORIF 10/4 and subsequent dislocation 10/14 and reduced in OR 10/15.    Precautions/Limitations fall precautions;left hip precautions  (Posterior-lateral)   Weight-Bearing Status - LLE weight-bearing as tolerated   General Observations Pt in supine upon arrival of therapist.    General Info Comments Ambulate with assist.    Cognitive Status Examination   Orientation person;place   Level of Consciousness alert   Follows Commands and Answers Questions 75% of the time   Personal Safety and Judgment at risk behaviors demonstrated   Pain Assessment   Patient Currently in Pain (L hip pain at rest: 10/10)   Integumentary/Edema   Integumentary/Edema Comments L hip incision covered with dressing.    Posture    Posture Comments Noted posterior lean  "in sitting and standing.    Range of Motion (ROM)   ROM Comment Limited L hip ROM due to pain and hip precautions, otherwise B LEs WFL.    Strength   Strength Comments Not formally assessed, pt demonstrates B LE weakness with mobility.    Bed Mobility   Bed Mobility Comments Supine-sit, modA of 2.    Transfer Skills   Transfer Comments Sit <> stand with FWW and modA of 2.    Gait   Gait Comments Pt amb a couple steps to bedside chair with FWW and modA of 2.    Balance   Balance Comments Noted fair sitting balance and poor standing balance at FWW.    Sensory Examination   Sensory Perception Comments Pt denies numbness/tingling in B LEs.    Modality Interventions   Planned Modality Interventions Cryotherapy   Planned Modality Interventions Comments PRN.   General Therapy Interventions   Planned Therapy Interventions bed mobility training;gait training;ROM;strengthening;transfer training   Clinical Impression   Criteria for Skilled Therapeutic Intervention yes, treatment indicated   PT Diagnosis Difficulty with gait.   Influenced by the following impairments Pain, Impaired L hip ROM, Decreased strength, Decreased activity tolerance   Functional limitations due to impairments Limited functional mobility requiring AD and assist   Clinical Presentation Stable/Uncomplicated   Clinical Presentation Rationale Based on PMH, current presentation, and social support.    Clinical Decision Making (Complexity) Low complexity   Therapy Frequency` 2 times/day   Predicted Duration of Therapy Intervention (days/wks) 3 days   Anticipated Discharge Disposition Transitional Care Facility   Risk & Benefits of therapy have been explained Yes   Patient, Family & other staff in agreement with plan of care Yes   [facility] TM \"6 Clicks\"   2016, Trustees of [facility], under license to [employer].  All rights reserved.   6 Clicks Short Forms Basic Mobility Inpatient Short Form   [facility] AM-PAC  \"6 Clicks\" V.2 " Basic Mobility Inpatient Short Form   1. Turning from your back to your side while in a flat bed without using bedrails? 3 - A Little   2. Moving from lying on your back to sitting on the side of a flat bed without using bedrails? 3 - A Little   3. Moving to and from a bed to a chair (including a wheelchair)? 2 - A Lot   4. Standing up from a chair using your arms (e.g., wheelchair, or bedside chair)? 2 - A Lot   5. To walk in hospital room? 2 - A Lot   6. Climbing 3-5 steps with a railing? 1 - Total   Basic Mobility Raw Score (Score out of 24.Lower scores equate to lower levels of function) 13   Total Evaluation Time   Total Evaluation Time (Minutes) 15

## 2018-10-23 NOTE — PROGRESS NOTES
Pt's was bladder scanned for 347 ml. Refused to let RN do straight cath. Didn't eat any lunch. MD notified. Order to watch output and to do anothet BG check now.

## 2018-10-23 NOTE — PROGRESS NOTES
[facility] Orthopedic Progress Note  Abimael Flores is a 89 year old male    Today's Date:10/23/2018  Admission Date: 10/21/2018  Hip dislocation, left (H) [S73.005A]  POD # 1 Left Total hip arthroplasty.  Revision surgery from bipolar hemiarthroplasty due to recurrent dislocation.    Patient Seen.  Doing well.  No CP/SOB.  BP soft.  96% O2 on room air.  Dressings are clean, dry, and intact.  Circulation, motor and sensory function, intact distally.  Medial left knee pain.  Possible pressure from ABduction pillow.      PLAN:  Ice to left hip and medial knee.  Pain control medication: No changes.  Discharge plan: TCU in a couple of days.      Temp:  [97  F (36.1  C)-99.3  F (37.4  C)] 98.6  F (37  C)  Pulse:  [81] 81  Heart Rate:  [] 98  Resp:  [11-20] 16  BP: ()/(35-54) 124/51  SpO2:  [94 %-100 %] 98 %      Results for orders placed or performed during the hospital encounter of 10/21/18 (from the past 24 hour(s))   UA with Microscopic reflex to Culture   Result Value Ref Range    Color Urine Yellow     Appearance Urine Cloudy     Glucose Urine Negative NEG^Negative mg/dL    Bilirubin Urine Negative NEG^Negative    Ketones Urine 5 (A) NEG^Negative mg/dL    Specific Gravity Urine 1.025 1.003 - 1.035    Blood Urine Moderate (A) NEG^Negative    pH Urine 5.0 5.0 - 7.0 pH    Protein Albumin Urine 30 (A) NEG^Negative mg/dL    Urobilinogen mg/dL Normal 0.0 - 2.0 mg/dL    Nitrite Urine Negative NEG^Negative    Leukocyte Esterase Urine Large (A) NEG^Negative    Source Midstream Urine     WBC Urine 2396 (H) 0 - 5 /HPF    RBC Urine 62 (H) 0 - 2 /HPF    WBC Clumps Present (A) NEG^Negative /HPF   Urine Culture Aerobic Bacterial   Result Value Ref Range    Specimen Description Midstream Urine     Special Requests Specimen received in preservative     Culture Micro Culture in progress    Glucose by meter   Result Value Ref Range    Glucose 144 (H) 70 - 99 mg/dL   XR Pelvis w Hip Port Left 1 View    Narrative     XR PELVIS AND HIP PORTABLE LEFT 1 VIEW 10/22/2018 5:16 PM    HISTORY: Postop.     COMPARISON: October 21, 2018.       Impression    IMPRESSION: Status post bilateral total hip arthroplasties. Hardware  is intact. Alignment is anatomic.     ALBERTO GARDINER MD   Glucose by meter   Result Value Ref Range    Glucose 146 (H) 70 - 99 mg/dL   Glucose by meter   Result Value Ref Range    Glucose 126 (H) 70 - 99 mg/dL   Glucose by meter   Result Value Ref Range    Glucose 120 (H) 70 - 99 mg/dL   Glucose by meter   Result Value Ref Range    Glucose 125 (H) 70 - 99 mg/dL   Basic metabolic panel   Result Value Ref Range    Sodium 137 133 - 144 mmol/L    Potassium 4.6 3.4 - 5.3 mmol/L    Chloride 104 94 - 109 mmol/L    Carbon Dioxide 26 20 - 32 mmol/L    Anion Gap 7 3 - 14 mmol/L    Glucose 140 (H) 70 - 99 mg/dL    Urea Nitrogen 24 7 - 30 mg/dL    Creatinine 1.58 (H) 0.66 - 1.25 mg/dL    GFR Estimate 41 (L) >60 mL/min/1.7m2    GFR Estimate If Black 50 (L) >60 mL/min/1.7m2    Calcium 7.4 (L) 8.5 - 10.1 mg/dL   Hemoglobin   Result Value Ref Range    Hemoglobin 7.4 (L) 13.3 - 17.7 g/dL         Edgardo Godfrey

## 2018-10-23 NOTE — PLAN OF CARE
Problem: Patient Care Overview  Goal: Plan of Care/Patient Progress Review  Outcome: No Change  VSS, CMS intact. Adequate pain control with tramadol and tylenol. Pulled IV and tele this morning. Alert and orient x 4. Dressing c/d/i. DTV.

## 2018-10-23 NOTE — PROGRESS NOTES
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  He is being admitted to the hospital under inpatient status.     1.  Left hip dislocation, recurrent:    The patient has a history of left hip hemiarthroplasty on 10/04 after the patient sustained a hip fracture following a fall.  He was readmitted on 10/14 for left hip dislocation for which he underwent closed reduction under general anesthesia on 10/15.  Subsequently, he was discharged to [facility] for rehabilitation.  Earlier today he bent over to get his shoes off and he heard a pop and subsequently developed severe pain in his left hip pain.  The x-ray in the Emergency Department indicated posterior superiorly dislocation of the left hemiarthroplasty.  An attempt at reduction in the emergency department was partially successful, however, the repeat x-ray revealed the femoral component was no longer in the acetabular component.  The case was communicated to Dr. Winchester, the on-call orthopedic surgeon, who has recommended a revision hemiarthroplasty.  Management as below.    - s/p arthroplasty revision of left hip today  - post-op cares for pain and DVT mgmt per ortho  - PT/OT as able  - complains of what sounds like muscle spasms in legs, would consider low dose diazepam but given age of 89 is likely to increase risk of delerium, will hold for now but consider if symptoms worsen    2.  Hypertension:    At home he is on Toprol XL 50 mg p.o. daily as diltiazem 120 mg p.o. daily, which we will continue.  I will have IV hydralazine available p.r.n. for systolic blood  pressure more than 170.   - remains well controlled today, no changes     3.  Dyslipidemia:    - He will continue on prior to admission Lipitor.     4.  Benign prostatic hypertrophy.    - He will continue on prior to admission Flomax.     5.  Chronic kidney disease, stage III-IV.    His baseline creatinine is in the range of 1.7-1.9.  His most recent creatinine was 1.69 on 10/15/2018.  I will order a BMP and monitor his renal function closely during this hospitalization.  We will avoid NSAIDs or other nephrotoxins.   - Cr this AM is dwon to 1.58 from 1.88, nothing new today, is below baseline, dont need to follow BMP anymore    6.  Chronic anemia:   His baseline hemoglobin is around 9.  However, after his hip surgery, his hemoglobin dropped to 7.5 on 10/06/2018, due to acute blood loss.  His most recent hemoglobin was 8.2 before discharge from the hospital.  His hemoglobin will be closely monitored during this hospitalization.   - pre-op HGB today 7.4 down from 7.9, will watch very closely, repeat HGB in AM, transfuse if <7    7.  Coronary artery disease:    The patient is status post CABG x4 in 1996.  There is no recent cardiac workup.  At this juncture he reports no chest pain.  I will obtain an EKG for preop evaluation.  He will continue on prior to admission metoprolol and Lipitor.  His aspirin will be held until after surgery.   - I suspect he will be able to resume ASA in AM, may even go on high dose for DVT prophy    8.  History of nonsustained ventricular  tachycardia/question paroxysmal atrial fibrillation/supraventricular tachycardia:   It occurred during the hospitalization in 08/2018, following his right hip fracture.  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  He is not on anticoagulation therapy.  He will continue on prior to admission  Toprol XL 50 mg p.o. daily.   - no events overnight, monitor    9.  Diabetes mellitus type 2:    The most recent hemoglobin A1c was 8.4% on 08/25/2018.  At home he is on Lantus 7 units subcutaneous every morning and Humalog 2 units subQ t.i.d.  Due to n.p.o. status, I will reduce the dose of Lantus to 4 units subQ daily and hold his prior to admission Humalog.  I will place the patient on a sliding scale NovoLog.     Recent Labs  Lab 10/23/18  1217 10/23/18  0725 10/23/18  0608 10/23/18  0200 10/22/18  2214 10/22/18  1904 10/22/18  1624  10/22/18  0718   GLC  --  140*  --   --   --   --   --   --  108*   *  --  125* 120* 126* 146* 144*  < >  --    < > = values in this interval not displayed.  - resumed diet, will need to increase to his PTA dose  - give 3 more units of lantus now, resume his 7U in AM  - will hold on adding back his PTA prandial insulin for now as his RN reports he isnt eating much yet  - if not eating at least 50% of meals I asked RN to call for dose adjustments on insulin    Deep venous thrombosis prophylaxis:  Per ortho   Code status:  It was discussed with the patient.  The patient stated that he wants to continue with DNR/DNI status as established previously.   Disposition:  At least 2-3 days of inpatient management, likely needs TCU.     Interval History   Today the patient complains that he cant get his legs comfortable and that he is having spasms.  No other complaints.  RN informs me his UO is low today, we bladder scanned and he is retaining, had about 350cc, refusing cath, he feels he will go soon enough.    -Data reviewed today: I reviewed all new labs and imaging results over the last 24 hours. I personally reviewed no images or EKG's today.    Physical Exam   Temp: 98.6  F (37  C) Temp src: Oral BP: 124/51 Pulse: 81 Heart Rate: 98 Resp: 16 SpO2: 98 % O2 Device: None (Room air)    Vitals:    10/21/18 1943   Weight: 72.6 kg (160 lb)     Vital Signs with Ranges  Temp:  [97  F (36.1   C)-99.3  F (37.4  C)] 98.6  F (37  C)  Pulse:  [81] 81  Heart Rate:  [] 98  Resp:  [11-20] 16  BP: ()/(35-54) 124/51  SpO2:  [94 %-100 %] 98 %  I/O last 3 completed shifts:  In: 1850 [I.V.:1300]  Out: 1600 [Urine:1350; Blood:250]    Constitutional: NAD, afebrile, A+Ox4  Respiratory: CTA B, normal efforts  Cardiovascular: RRR, no murmur  GI: S, NT, ND, normal BS  Skin/Integumen: Dry, warm, no edema      Medications     lactated ringers 100 mL/hr at 10/22/18 1857     sodium chloride 75 mL/hr at 10/21/18 2317       atorvastatin  10 mg Oral QPM     diltiazem  120 mg Oral Daily     enoxaparin  30 mg Subcutaneous Q24H     insulin aspart  1-6 Units Subcutaneous Q4H     insulin glargine  4 Units Subcutaneous QAM AC     metoprolol succinate  50 mg Oral Daily     sodium chloride (PF)  3 mL Intracatheter Q8H     tamsulosin  0.4 mg Oral QPM       Data     Recent Labs  Lab 10/23/18  0725 10/22/18  0718 10/16/18  1808   WBC  --  9.4  --    HGB 7.4* 7.9* 8.2*   MCV  --  92  --    PLT  --  294  --     138  --    POTASSIUM 4.6 4.4  --    CHLORIDE 104 105  --    CO2 26 27  --    BUN 24 27  --    CR 1.58* 1.88*  --    ANIONGAP 7 6  --    JOSELINE 7.4* 7.7*  --    * 108*  --        Recent Results (from the past 24 hour(s))   XR Pelvis w Hip Port Left 1 View    Narrative    XR PELVIS AND HIP PORTABLE LEFT 1 VIEW 10/22/2018 5:16 PM    HISTORY: Postop.     COMPARISON: October 21, 2018.       Impression    IMPRESSION: Status post bilateral total hip arthroplasties. Hardware  is intact. Alignment is anatomic.     ALBERTO GARDINER MD

## 2018-10-23 NOTE — PLAN OF CARE
Problem: Surgery Nonspecified (Adult)  Goal: Signs and Symptoms of Listed Potential Problems Will be Absent, Minimized or Managed (Surgery Nonspecified)  Signs and symptoms of listed potential problems will be absent, minimized or managed by discharge/transition of care (reference Surgery Nonspecified (Adult) CPG).   Outcome: No Change  Pt. Confused, denied any pain, dressing CDI, bed rest, iv infusing, voiding adequate per urinal, MD text paged of hemoglobin 7.2, will continue to monitor.

## 2018-10-23 NOTE — PLAN OF CARE
Problem: Patient Care Overview  Goal: Plan of Care/Patient Progress Review  Outcome: No Change  A&Ox4, but forgetful. VSS on RA except soft BP's. Tele SD with BBB. Refused capno, pulled off  x2. Dressing CDI. CMS intact except 3/5 strength LLE, 4/5 RLE. Swelling noted on posterior L knee, bruising, states it feels like burning, no pain on dorsi/plantarflex, MD aware. C/o pain in sacral area, known pressure ulcer, mepilex in place, recommend barrier cream for comfort. Pain poorly managed with IV dilaudid, refused oxycodone-states makes him have nightmares, tramadol as a new order, has yet to take. Denies nausea, SOB. IVF infusing. Plan to continue to monitor.

## 2018-10-24 ENCOUNTER — APPOINTMENT (OUTPATIENT)
Dept: PHYSICAL THERAPY | Facility: CLINIC | Age: 83
DRG: 467 | End: 2018-10-24
Payer: MEDICARE

## 2018-10-24 LAB
BACTERIA SPEC CULT: ABNORMAL
COPATH REPORT: NORMAL
GLUCOSE BLDC GLUCOMTR-MCNC: 116 MG/DL (ref 70–99)
GLUCOSE BLDC GLUCOMTR-MCNC: 135 MG/DL (ref 70–99)
GLUCOSE BLDC GLUCOMTR-MCNC: 211 MG/DL (ref 70–99)
GLUCOSE BLDC GLUCOMTR-MCNC: 258 MG/DL (ref 70–99)
HGB BLD-MCNC: 7.6 G/DL (ref 13.3–17.7)
Lab: ABNORMAL
SPECIMEN SOURCE: ABNORMAL

## 2018-10-24 PROCEDURE — A9270 NON-COVERED ITEM OR SERVICE: HCPCS | Mod: GY | Performed by: INTERNAL MEDICINE

## 2018-10-24 PROCEDURE — 97530 THERAPEUTIC ACTIVITIES: CPT | Mod: GP | Performed by: PHYSICAL THERAPIST

## 2018-10-24 PROCEDURE — 36415 COLL VENOUS BLD VENIPUNCTURE: CPT | Performed by: ORTHOPAEDIC SURGERY

## 2018-10-24 PROCEDURE — 85018 HEMOGLOBIN: CPT | Performed by: ORTHOPAEDIC SURGERY

## 2018-10-24 PROCEDURE — 40000193 ZZH STATISTIC PT WARD VISIT: Performed by: PHYSICAL THERAPIST

## 2018-10-24 PROCEDURE — 25000131 ZZH RX MED GY IP 250 OP 636 PS 637: Mod: GY | Performed by: INTERNAL MEDICINE

## 2018-10-24 PROCEDURE — 25000128 H RX IP 250 OP 636: Performed by: ORTHOPAEDIC SURGERY

## 2018-10-24 PROCEDURE — 25000132 ZZH RX MED GY IP 250 OP 250 PS 637: Mod: GY | Performed by: INTERNAL MEDICINE

## 2018-10-24 PROCEDURE — 99232 SBSQ HOSP IP/OBS MODERATE 35: CPT | Performed by: STUDENT IN AN ORGANIZED HEALTH CARE EDUCATION/TRAINING PROGRAM

## 2018-10-24 PROCEDURE — 97110 THERAPEUTIC EXERCISES: CPT | Mod: GP | Performed by: PHYSICAL THERAPIST

## 2018-10-24 PROCEDURE — 00000146 ZZHCL STATISTIC GLUCOSE BY METER IP

## 2018-10-24 PROCEDURE — 25000132 ZZH RX MED GY IP 250 OP 250 PS 637: Mod: GY | Performed by: HOSPITALIST

## 2018-10-24 PROCEDURE — 12000007 ZZH R&B INTERMEDIATE

## 2018-10-24 RX ADMIN — ENOXAPARIN SODIUM 30 MG: 30 INJECTION SUBCUTANEOUS at 11:24

## 2018-10-24 RX ADMIN — INSULIN ASPART 2 UNITS: 100 INJECTION, SOLUTION INTRAVENOUS; SUBCUTANEOUS at 12:02

## 2018-10-24 RX ADMIN — Medication 1 MG: at 22:49

## 2018-10-24 RX ADMIN — Medication 25 MG: at 13:21

## 2018-10-24 RX ADMIN — ACETAMINOPHEN 650 MG: 325 TABLET, FILM COATED ORAL at 13:21

## 2018-10-24 RX ADMIN — ATORVASTATIN CALCIUM 10 MG: 10 TABLET, FILM COATED ORAL at 20:14

## 2018-10-24 RX ADMIN — TAMSULOSIN HYDROCHLORIDE 0.4 MG: 0.4 CAPSULE ORAL at 20:14

## 2018-10-24 RX ADMIN — DILTIAZEM HYDROCHLORIDE 120 MG: 120 CAPSULE, EXTENDED RELEASE ORAL at 08:37

## 2018-10-24 RX ADMIN — ACETAMINOPHEN 650 MG: 325 TABLET, FILM COATED ORAL at 20:13

## 2018-10-24 RX ADMIN — INSULIN GLARGINE 7 UNITS: 100 INJECTION, SOLUTION SUBCUTANEOUS at 10:37

## 2018-10-24 RX ADMIN — METOPROLOL SUCCINATE 50 MG: 50 TABLET, EXTENDED RELEASE ORAL at 08:37

## 2018-10-24 RX ADMIN — Medication 25 MG: at 22:50

## 2018-10-24 ASSESSMENT — ACTIVITIES OF DAILY LIVING (ADL)
ADLS_ACUITY_SCORE: 25
ADLS_ACUITY_SCORE: 24
ADLS_ACUITY_SCORE: 25

## 2018-10-24 NOTE — PLAN OF CARE
Problem: Patient Care Overview  Goal: Plan of Care/Patient Progress Review  Outcome: Improving  VSS, CMS intact. Up with strong assist of 2. Voiding per urinal. BM this shift. Adequate pain controlled with tylenol and tramadol. Hgb stable at 7.6. Mostly Alert and orient x 3.  Tried  to crawl of  the bed few times this shift and got right elbow s/t/.  Uncooperative and doesn't follow hip precautions. Dressing c/d/i.  Groin raw and red, barrier cream applied. Mepelix to reddened coccyx. Possible discharge to TCU tomorrow.

## 2018-10-24 NOTE — PLAN OF CARE
Problem: Patient Care Overview  Goal: Plan of Care/Patient Progress Review  Discharge Planner OT   Patient plan for discharge: N/A  Current status: Orders rec'd and chart reviewed. Pt admitted due to L hip hemiarthroplasty converted to L DELL due to recurrent hip dislocations. Per chart, plan is for pt to discharge to TCU in the next 1-2 days. Will defer inpt OT to TCU and complete inpt OT orders.   Barriers to return to prior living situation: see PT Note  Recommendations for discharge: per chart, plan is for TCU   Rationale for recommendations: see PT note       Entered by: Graciela Jordan 10/24/2018 1:37 PM

## 2018-10-24 NOTE — PROGRESS NOTES
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  He is being admitted to the hospital under inpatient status.     1.  Left hip dislocation, recurrent:    The patient has a history of left hip hemiarthroplasty on 10/04 after the patient sustained a hip fracture following a fall.  He was readmitted on 10/14 for left hip dislocation for which he underwent closed reduction under general anesthesia on 10/15.  Subsequently, he was discharged to [place] for rehabilitation.  Earlier today he bent over to get his shoes off and he heard a pop and subsequently developed severe pain in his left hip pain.  The x-ray in the Emergency Department indicated posterior superiorly dislocation of the left hemiarthroplasty.  An attempt at reduction in the emergency department was partially successful, however, the repeat x-ray revealed the femoral component was no longer in the acetabular component.  The case was communicated to Dr. Winchester, the on-call orthopedic surgeon, who has recommended a revision hemiarthroplasty.  Management as below. - s/p arthroplasty revision of left hip 10/22   Plan:  - post-op cares for pain and DVT mgmt per ortho  - PT/OT as able  - SW for placement    2.  Hypertension:    At home he is on Toprol XL 50 mg p.o. daily as diltiazem 120 mg p.o. daily, which we will continue.  I will have IV hydralazine available p.r.n. for systolic blood pressure more than 170.   - remains well controlled today, no changes     3.  Dyslipidemia:    - He will continue on prior to admission Lipitor.     4.  Benign prostatic hypertrophy.    - He will continue on prior to admission Flomax.     5.  Chronic  kidney disease, stage III-IV.    His baseline creatinine is in the range of 1.7-1.9.  His most recent creatinine was 1.69 on 10/15/2018.  I will order a BMP and monitor his renal function closely during this hospitalization.  We will avoid NSAIDs or other nephrotoxins.   - Cr this AM is dwon to 1.58, is below baseline, dont need to follow BMP anymore    6.  Chronic anemia:   His baseline hemoglobin is around 9.  However, after his hip surgery, his hemoglobin dropped to 7.5 on 10/06/2018, due to acute blood loss.  His most recent hemoglobin was 8.2 before discharge from the hospital.  His hemoglobin will be closely monitored during this hospitalization.   - pre-op HGB today 7.6, increase from 7.2, will watch very closely, repeat HGB in AM, transfuse if <7    7.  Coronary artery disease:    The patient is status post CABG x4 in 1996.  There is no recent cardiac workup.  At this juncture he reports no chest pain.  I will obtain an EKG for preop evaluation.  He will continue on prior to admission metoprolol and Lipitor.  His aspirin will be held until after surgery.   - I suspect he will be able to resume ASA in AM, may even go on high dose for DVT prophy    8.  History of nonsustained ventricular  tachycardia/question paroxysmal atrial fibrillation/supraventricular tachycardia:   It occurred during the hospitalization in 08/2018, following his right hip fracture.  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  He is not on anticoagulation therapy.  He will continue on prior to admission Toprol XL 50 mg p.o. daily.   - no events overnight, monitor  - No indication for telemetry    9.  Diabetes mellitus type 2:    The most recent hemoglobin A1c was 8.4% on 08/25/2018.  At home he is on Lantus 7 units subcutaneous every morning and Humalog 2 units subQ t.i.d.  "    Recent Labs  Lab 10/24/18  1130 10/24/18  0332 10/23/18  1855 10/23/18  1615 10/23/18  1434 10/23/18  1217 10/23/18  0725  10/22/18  0718   GLC  --   --   --   --   --   --  140*  --  108*   * 135* 173* 218* 254* 249*  --   < >  --    < > = values in this interval not displayed.  - resumed diet  - Lantus 7U in AM    Deep venous thrombosis prophylaxis:  Per ortho   Code status: DNR/DNI  Disposition:  At least 2 of inpatient management, will need TCU.     Interval History      Patient seen and examined  \"Too much pain with moving\"  No new complaints, no worse pain compared to previous day  No new numbness/weakness    -Data reviewed today: I reviewed all new labs and imaging results over the last 24 hours. I personally reviewed no images or EKG's today.    Physical Exam   Temp: 98.2  F (36.8  C) Temp src: Oral BP: 112/51 Pulse: 98 Heart Rate: 86 Resp: 18 SpO2: 99 % O2 Device: None (Room air)    Vitals:    10/21/18 1943   Weight: 72.6 kg (160 lb)     Vital Signs with Ranges  Temp:  [98.2  F (36.8  C)-99.2  F (37.3  C)] 98.2  F (36.8  C)  Pulse:  [85-98] 98  Heart Rate:  [85-87] 86  Resp:  [16-18] 18  BP: (106-128)/(50-58) 112/51  SpO2:  [95 %-99 %] 99 %  I/O last 3 completed shifts:  In: 400 [I.V.:400]  Out: 500 [Urine:500]    Constitutional: NAD, afebrile, A+Ox4  Respiratory: CTA B, normal efforts  Cardiovascular: RRR, no murmur  GI: S, NT, ND, normal BS  Skin/Integumen: Dry, warm, no edema  MSK: mild lateral thigh tenderness to palpation, able to lift left leg about 15-20 degrees off the bed otherwise ROM limited due to pain  Neuro: A/Ox3, moving all extremitiesd      Medications     lactated ringers 100 mL/hr at 10/22/18 1857     sodium chloride 75 mL/hr at 10/21/18 2317       atorvastatin  10 mg Oral QPM     diltiazem  120 mg Oral Daily     enoxaparin  30 mg Subcutaneous Q24H     insulin aspart  1-6 Units Subcutaneous TID w/meals     insulin glargine  7 Units Subcutaneous QAM AC     metoprolol succinate  " 50 mg Oral Daily     sodium chloride (PF)  3 mL Intracatheter Q8H     tamsulosin  0.4 mg Oral QPM       Data     Recent Labs  Lab 10/24/18  1037 10/23/18  1655 10/23/18  0725 10/22/18  0718   WBC  --   --   --  9.4   HGB 7.6* 7.2* 7.4* 7.9*   MCV  --   --   --  92   PLT  --   --   --  294   NA  --   --  137 138   POTASSIUM  --   --  4.6 4.4   CHLORIDE  --   --  104 105   CO2  --   --  26 27   BUN  --   --  24 27   CR  --   --  1.58* 1.88*   ANIONGAP  --   --  7 6   JOSELINE  --   --  7.4* 7.7*   GLC  --   --  140* 108*       No results found for this or any previous visit (from the past 24 hour(s)).

## 2018-10-24 NOTE — PLAN OF CARE
Problem: Patient Care Overview  Goal: Plan of Care/Patient Progress Review  Outcome: No Change  Patient is confused, agitated, and pulling everything out, and tries to get out of bed multiple times. VSS on RA, CMS intact, regular diet, denies pain, and BG checks Q4H. Unable to read Tele because he took off everything. He was dribbling frequently and bladder scan showed 377ml. He refused to be straight cath. Will continue to monitor

## 2018-10-24 NOTE — PLAN OF CARE
Problem: Patient Care Overview  Goal: Plan of Care/Patient Progress Review  Discharge Planner PT   Patient plan for discharge: None stated.   Current status: Patient in supine upon arrival of therapist, confused and agitated. Able to redirect patient to participate in therapy session. Patient required frequent cues to redirect patient to focus on mobility rather than being upset about placement of abductor pillow overnight. Patient performed supine-sit with Joshua of 2, continuous cues and assist to maintain hip precautions. Pt tolerated sitting EOB with close SBA for safety. Sit <> stand with FWW and modA of 2 due to posterior lean and B feet sliding forward. Patient assisted back to supine with Joshua of 2 and dependent for boost up in bed. Patient refused to participate in supine strengthening/ROM exercises. Noted pt become increasingly agitated with blankets placed on his skin, pt reports they are rough/too heavy and hurt. Pt agreeable to sheets covering him up.   Barriers to return to prior living situation: Agitation, Level of assist, Fall risk, Pain  Recommendations for discharge: TCU  Rationale for recommendations: Pt is below baseline in regards to functional mobility and will benefit from continued skilled PT intervention at TCU.        Entered by: Marjan Tong 10/24/2018 8:41 AM

## 2018-10-24 NOTE — PROGRESS NOTES
Orthopedic Surgery  Abimael Flores  10/24/2018  Admit Date:  10/21/2018  POD # 2  S/P Left total hip arthroplasty     Patient resting comfortably in bed.    Pain controlled.  Tolerating oral intake.    Denies nausea or vomiting  Denies chest pain or shortness of breath  No events overnight.     Alert and orient to person, place, and time.  Vital Sign Ranges  Temperature Temp  Av.5  F (36.9  C)  Min: 97.8  F (36.6  C)  Max: 99.2  F (37.3  C)   Blood pressure Systolic (24hrs), Av , Min:101 , Max:128        Diastolic (24hrs), Av, Min:51, Max:58      Pulse Pulse  Av.5  Min: 85  Max: 98   Respirations Resp  Av.4  Min: 16  Max: 18   Pulse oximetry SpO2  Av.6 %  Min: 94 %  Max: 99 %       Dressing is clean, dry, and intact.   Minimal erythema of the surrounding skin.   Bilateral calves are soft, non-tender.  Bilateral lower extremity is NVI.  Sensation intact bilateral lower extremities  5/5 motor with resisted dorsi and plantar flexion bilaterally  +Dp pulse    Labs:  Recent Labs   Lab Test  10/23/18   0725  10/22/18   0718  10/15/18   0715   POTASSIUM  4.6  4.4  4.9     Recent Labs   Lab Test  10/24/18   1037  10/23/18   1655  10/23/18   0725   HGB  7.6*  7.2*  7.4*     Recent Labs   Lab Test  10/14/18   1220  14   0405   INR  1.08  0.91     Recent Labs   Lab Test  10/22/18   0718  10/15/18   0715  10/14/18   1220   PLT  294  316  355       A/P  1. Plan   Continue Lovenox for DVT prophylaxis.  ASA on discharge   Mobilize with PT/OT    WBAT.   Hip precaution     Continue current pain regiment.   Leave dressing intact    2. Disposition   Anticipate d/c to TCU maybe tomorrow - ok to discharge once medically cleared    Sally Clifton PA-C

## 2018-10-25 ENCOUNTER — APPOINTMENT (OUTPATIENT)
Dept: PHYSICAL THERAPY | Facility: CLINIC | Age: 83
DRG: 467 | End: 2018-10-25
Payer: MEDICARE

## 2018-10-25 VITALS
SYSTOLIC BLOOD PRESSURE: 118 MMHG | BODY MASS INDEX: 24.32 KG/M2 | WEIGHT: 164.2 LBS | OXYGEN SATURATION: 96 % | HEIGHT: 69 IN | HEART RATE: 87 BPM | RESPIRATION RATE: 16 BRPM | TEMPERATURE: 98.6 F | DIASTOLIC BLOOD PRESSURE: 75 MMHG

## 2018-10-25 LAB
ANION GAP SERPL CALCULATED.3IONS-SCNC: 7 MMOL/L (ref 3–14)
BUN SERPL-MCNC: 25 MG/DL (ref 7–30)
CALCIUM SERPL-MCNC: 8 MG/DL (ref 8.5–10.1)
CHLORIDE SERPL-SCNC: 104 MMOL/L (ref 94–109)
CO2 SERPL-SCNC: 26 MMOL/L (ref 20–32)
CREAT SERPL-MCNC: 1.49 MG/DL (ref 0.66–1.25)
GFR SERPL CREATININE-BSD FRML MDRD: 44 ML/MIN/1.7M2
GLUCOSE BLDC GLUCOMTR-MCNC: 136 MG/DL (ref 70–99)
GLUCOSE BLDC GLUCOMTR-MCNC: 149 MG/DL (ref 70–99)
GLUCOSE SERPL-MCNC: 165 MG/DL (ref 70–99)
HGB BLD-MCNC: 7.5 G/DL (ref 13.3–17.7)
PLATELET # BLD AUTO: 227 10E9/L (ref 150–450)
POTASSIUM SERPL-SCNC: 3.8 MMOL/L (ref 3.4–5.3)
SODIUM SERPL-SCNC: 137 MMOL/L (ref 133–144)

## 2018-10-25 PROCEDURE — 97110 THERAPEUTIC EXERCISES: CPT | Mod: GP | Performed by: PHYSICAL THERAPIST

## 2018-10-25 PROCEDURE — 25000132 ZZH RX MED GY IP 250 OP 250 PS 637: Mod: GY | Performed by: HOSPITALIST

## 2018-10-25 PROCEDURE — 97530 THERAPEUTIC ACTIVITIES: CPT | Mod: GP | Performed by: PHYSICAL THERAPIST

## 2018-10-25 PROCEDURE — 25000132 ZZH RX MED GY IP 250 OP 250 PS 637: Mod: GY | Performed by: INTERNAL MEDICINE

## 2018-10-25 PROCEDURE — 25000131 ZZH RX MED GY IP 250 OP 636 PS 637: Mod: GY | Performed by: INTERNAL MEDICINE

## 2018-10-25 PROCEDURE — 80048 BASIC METABOLIC PNL TOTAL CA: CPT | Performed by: STUDENT IN AN ORGANIZED HEALTH CARE EDUCATION/TRAINING PROGRAM

## 2018-10-25 PROCEDURE — 00000146 ZZHCL STATISTIC GLUCOSE BY METER IP

## 2018-10-25 PROCEDURE — A9270 NON-COVERED ITEM OR SERVICE: HCPCS | Mod: GY | Performed by: INTERNAL MEDICINE

## 2018-10-25 PROCEDURE — 82565 ASSAY OF CREATININE: CPT | Performed by: STUDENT IN AN ORGANIZED HEALTH CARE EDUCATION/TRAINING PROGRAM

## 2018-10-25 PROCEDURE — 99239 HOSP IP/OBS DSCHRG MGMT >30: CPT | Performed by: STUDENT IN AN ORGANIZED HEALTH CARE EDUCATION/TRAINING PROGRAM

## 2018-10-25 PROCEDURE — 40000193 ZZH STATISTIC PT WARD VISIT: Performed by: PHYSICAL THERAPIST

## 2018-10-25 PROCEDURE — 25000128 H RX IP 250 OP 636: Performed by: ORTHOPAEDIC SURGERY

## 2018-10-25 PROCEDURE — 85018 HEMOGLOBIN: CPT | Performed by: STUDENT IN AN ORGANIZED HEALTH CARE EDUCATION/TRAINING PROGRAM

## 2018-10-25 PROCEDURE — 85049 AUTOMATED PLATELET COUNT: CPT | Performed by: STUDENT IN AN ORGANIZED HEALTH CARE EDUCATION/TRAINING PROGRAM

## 2018-10-25 PROCEDURE — 36415 COLL VENOUS BLD VENIPUNCTURE: CPT | Performed by: STUDENT IN AN ORGANIZED HEALTH CARE EDUCATION/TRAINING PROGRAM

## 2018-10-25 RX ADMIN — DILTIAZEM HYDROCHLORIDE 120 MG: 120 CAPSULE, EXTENDED RELEASE ORAL at 09:09

## 2018-10-25 RX ADMIN — INSULIN GLARGINE 7 UNITS: 100 INJECTION, SOLUTION SUBCUTANEOUS at 09:09

## 2018-10-25 RX ADMIN — Medication 25 MG: at 14:26

## 2018-10-25 RX ADMIN — METOPROLOL SUCCINATE 50 MG: 50 TABLET, EXTENDED RELEASE ORAL at 09:09

## 2018-10-25 RX ADMIN — ENOXAPARIN SODIUM 30 MG: 30 INJECTION SUBCUTANEOUS at 10:35

## 2018-10-25 ASSESSMENT — ACTIVITIES OF DAILY LIVING (ADL)
ADLS_ACUITY_SCORE: 25

## 2018-10-25 NOTE — PROGRESS NOTES
Patient discharged to [facility] per transport. All toiletries sent with patient along with abductor pillow. Saline lock dc'd. Voided prior to discharge. Ready for discharge.

## 2018-10-25 NOTE — PLAN OF CARE
Problem: Patient Care Overview  Goal: Plan of Care/Patient Progress Review  Pt alert, oriented at beginning of shift, however, became increasing confused/uncooperative overnight. VSS. Repositioned in bed with A2. Pain managed with ultram and tylenol. CMS intact. Voiding per urinal with frequent incontinence. Large BM this shift. Right elbow skin tear covered with mepilex. Mepilex to coccyx, unblanchable redness.  Uncooperative with turning in bed doesn't follow hip precautions. Dressing with small drainage. Groin area with redness. Possible discharge to TCU today.

## 2018-10-25 NOTE — DISCHARGE SUMMARY
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     Hospital Course   Abimael Flores was admitted on 10/21/2018.  The following problems were addressed during his hospitalization:     1.  Left hip dislocation, recurrent:    The patient has a history of left hip hemiarthroplasty on 10/04 after the patient sustained a hip fracture following a fall.  He was readmitted on 10/14 for left hip dislocation for which he underwent closed reduction under general anesthesia on 10/15.  Subsequently, he was discharged to [facility] for rehabilitation.  Earlier today he bent over to get his shoes off and he heard a pop and subsequently developed severe pain in his left hip pain.  The x-ray in the Emergency Department indicated posterior superiorly dislocation of the left hemiarthroplasty.  An attempt at reduction in the emergency department was partially successful, however, the repeat x-ray revealed the femoral component was no longer in the acetabular component.  The case was communicated to Dr. Winchester, the on-call orthopedic surgeon, who has recommended a revision hemiarthroplasty. S/p arthroplasty revision of left hip 10/22   Plan:  - PT/OT at TCU  - Tramadol/Tylenol as needed for pain at discharge     2.  Hypertension:    At home he is on Toprol XL 50 mg p.o. daily as diltiazem 120 mg p.o. daily, which we will continue at discharge     3.  Dyslipidemia:    - He will continue on prior to admission Lipitor.   4.  Benign prostatic hypertrophy.     - He will continue on prior to admission Flomax.      5.  Chronic kidney disease, stage III-IV.    His baseline creatinine is in the range of 1.7-1.9.  His most recent creatinine was 1.69 on 10/15/2018.  I will order a BMP and monitor his renal function closely during this hospitalization.  We will avoid NSAIDs or other nephrotoxins.   - Cr below baseline at discharge     6.  Chronic anemia:   His baseline hemoglobin is around 9.  However, after his hip surgery, his hemoglobin dropped to 7.5 on 10/06/2018, due to acute blood loss.  His most recent hemoglobin was 8.2 before discharge from the hospital.  His hemoglobin will be closely monitored during this hospitalization.   - pre-op HGB today 7.6, stable at discharge at 7.5     7.  Coronary artery disease:    The patient is status post CABG x4 in 1996.  There is no recent cardiac workup.  At this juncture he reports no chest pain.  I will obtain an EKG for preop evaluation.  He will continue on prior to admission metoprolol and Lipitor.  His aspirin will be held until after surgery.   - Resume ASA     8.  History of nonsustained ventricular  tachycardia/question paroxysmal atrial fibrillation/supraventricular tachycardia:   It occurred during the hospitalization in 08/2018, following his right hip fracture.  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  He is not on anticoagulation therapy.  He will continue on prior to admission Toprol XL 50 mg p.o. Daily at discharge       9.  Diabetes mellitus type 2:    The most recent hemoglobin A1c was 8.4% on 08/25/2018.  At home he is on Lantus 7 units subcutaneous every morning and Humalog 2 units subQ t.i.d.   - Resume Lantus 7U in AM    # Discharge Pain Plan:  - During his hospitalization, Abimael experienced pain due to hip dislocation.  The pain plan for  discharge was discussed with Abimael and the plan was created in a collaborative fashion.      Silvano Kumar MD    Significant Results and Procedures     None    Pending Results     Unresulted Labs Ordered in the Past 30 Days of this Admission     Date and Time Order Name Status Description    10/8/2018 0535 T4 free In process         Code Status   Full Code       Primary Care Physician   RAISSA HILL    Physical Exam   Temp: 98.6  F (37  C) Temp src: Oral BP: 118/75 Pulse: 87 Heart Rate: 78 Resp: 16 SpO2: 96 % O2 Device: None (Room air)    Vitals:    10/21/18 1943 10/25/18 0704   Weight: 72.6 kg (160 lb) 74.5 kg (164 lb 3.2 oz)     Vital Signs with Ranges  Temp:  [97.6  F (36.4  C)-98.6  F (37  C)] 98.6  F (37  C)  Pulse:  [85-87] 87  Heart Rate:  [65-97] 78  Resp:  [16-18] 16  BP: (101-118)/(45-75) 118/75  SpO2:  [94 %-97 %] 96 %  I/O last 3 completed shifts:  In: 360 [P.O.:360]  Out: 1050 [Urine:1050]    Constitutional: Awake, alert, cooperative, no apparent distress  Respiratory: Clear to auscultation bilaterally, no crackles or wheezing  Cardiovascular: Regular rate and rhythm, normal S1 and S2, and no murmur noted  GI: Normal bowel sounds, soft, non-distended, non-tender  Skin/Integumen: No rashes, no cyanosis, no edema  MSK: limited ROM in LLE due to pain  Other: Normal mood and affect      Discharge Disposition   Discharged to rehabilitation facility  Condition at discharge: Stable    Consultations This Hospital Stay   ORTHOPEDIC SURGERY IP CONSULT  OCCUPATIONAL THERAPY ADULT IP CONSULT  PHYSICAL THERAPY ADULT IP CONSULT  PHYSICAL THERAPY ADULT IP CONSULT  OCCUPATIONAL THERAPY ADULT IP CONSULT    Time Spent on this Encounter   I, Silvano Kumar, personally saw the patient today and spent greater than 30 minutes discharging this patient.    Discharge Orders     General info for SNF   Length of Stay Estimate: Short Term Care: Estimated # of Days <30  Condition at Discharge: Improving  Level of care:skilled    Rehabilitation Potential: Good  Admission H&P remains valid and up-to-date: Yes  Recent Chemotherapy: N/A  Use Nursing Home Standing Orders: Yes     Mantoux instructions   Give two-step Mantoux (PPD) Per Facility Policy Yes     Reason for your hospital stay   Right total hip arthroplasty converted from right hip hemiarthroplasty secondary to dislocations     Wound care   Site:   Right hip  Instructions:  Leave dressing intact if Aquacel and remove at POD #7, remove staples POD #14  Daily dressing changes if gauze and tape     Follow Up and recommended labs and tests   Follow up with Dr. Winchseter in 2 weeks.  No follow up labs or test are needed.  Call [facility] for appointment [phone]     Activity - Up with assistive device     Weight bearing status   WBAT     Encourage PO fluids     Physical Therapy Adult Consult   Evaluate and treat as clinically indicated.    Reason:  Right total hip arthroplasty     Occupational Therapy Adult Consult   Evaluate and treat as clinically indicated.    Reason:  Right total hip arthroplasty     Fall precautions     Hip precautions     Advance Diet as Tolerated   Follow this diet upon discharge: Orders Placed This Encounter     Room Service     Advance Diet as Tolerated: Regular Diet Adult       Discharge Medications   Current Discharge Medication List      START taking these medications    Details   ondansetron (ZOFRAN-ODT) 4 MG ODT tab Take 1 tablet (4 mg) by mouth every 6 hours as needed for nausea or vomiting  Qty: 15 tablet, Refills: 0    Associated Diagnoses: Status post total replacement of left hip         CONTINUE these medications which have CHANGED    Details   acetaminophen (TYLENOL) 325 MG tablet Take 2 tablets (650 mg) by mouth every 4 hours as needed for mild pain  Qty: 40 tablet, Refills: 0    Associated Diagnoses: Status post total replacement of left hip      aspirin 325 MG EC tablet Take one Aspirin tab twice daily for 5 weeks.  Qty: 30 tablet, Refills: 0     Associated Diagnoses: Closed fracture of neck of right femur, initial encounter (H)      traMADol (ULTRAM) 50 MG tablet Take 1 tablet (50 mg) by mouth every 4 hours as needed for moderate pain  Qty: 30 tablet, Refills: 0    Associated Diagnoses: Status post total replacement of left hip         CONTINUE these medications which have NOT CHANGED    Details   atorvastatin (LIPITOR) 10 MG tablet Take 1 tablet (10 mg) by mouth every evening    Associated Diagnoses: Coronary artery disease involving native heart without angina pectoris, unspecified vessel or lesion type      diltiazem (DILACOR XR) 120 MG 24 hr capsule Take 120 mg by mouth daily      Ergocalciferol (VITAMIN D) 40251 units CAPS Take 50,000 Units by mouth every 7 days for 5 doses  Qty: 5 capsule, Refills: 0    Associated Diagnoses: Fracture of hip, left, closed, initial encounter (H)      insulin glargine (LANTUS) 100 UNIT/ML injection Inject 7 Units Subcutaneous every morning      !! insulin lispro (HUMALOG KWIKPEN) 100 UNIT/ML injection Inject Subcutaneous At Bedtime Sliding scale:  -249=1 unit  -299=2 units  -349=3 units  -399=4 units  +=5 units      !! insulin lispro (HUMALOG KWIKPEN) 100 UNIT/ML injection Inject 2 Units Subcutaneous 3 times daily (before meals)    Associated Diagnoses: Type 2 diabetes mellitus with complication, with long-term current use of insulin (H)      insulin lispro (HUMALOG) 100 UNIT/ML injection Inject Subcutaneous 3 times daily (before meals) Sliding scale:   -189=1 unit  -239=2 units  -289=3 units  -339=4 units  -399=5 units  -499=6 units  +=7 units      metoprolol succinate (TOPROL-XL) 50 MG 24 hr tablet Take 1 tablet (50 mg) by mouth daily  Qty: 30 tablet    Associated Diagnoses: Paroxysmal supraventricular tachycardia (H)      polyethylene glycol (MIRALAX/GLYCOLAX) Packet Take 17 g by mouth daily      senna-docusate (SENOKOT-S;PERICOLACE) 8.6-50 MG per  tablet Take 1 tablet by mouth 2 times daily  Qty: 60 tablet, Refills: 1    Associated Diagnoses: Dislocation of left hip, initial encounter (H)      Skin Protectants, Misc. (ANIBAL PROTECT EX) Apply topically 2 times daily      tamsulosin (FLOMAX) 0.4 MG capsule Take 0.4 mg by mouth every evening        !! - Potential duplicate medications found. Please discuss with provider.      STOP taking these medications       diltiazem (TIAZAC) 120 MG 24 hr ER beaded capsule Comments:   Reason for Stopping:             Allergies   No Known Allergies  Data   Most Recent 3 CBC's:  Recent Labs   Lab Test  10/25/18   0705  10/24/18   1037  10/23/18   1655   10/22/18   0718   10/15/18   0715  10/14/18   1220   WBC   --    --    --    --   9.4   --   8.6  10.8   HGB  7.5*  7.6*  7.2*   < >  7.9*   < >  7.9*  8.6*   MCV   --    --    --    --   92   --   91  91   PLT  227   --    --    --   294   --   316  355    < > = values in this interval not displayed.      Most Recent 3 BMP's:  Recent Labs   Lab Test  10/25/18   0705  10/23/18   0725  10/22/18   0718   NA  137  137  138   POTASSIUM  3.8  4.6  4.4   CHLORIDE  104  104  105   CO2  26  26  27   BUN  25  24  27   CR  1.49*  1.58*  1.88*   ANIONGAP  7  7  6   JOSELINE  8.0*  7.4*  7.7*   GLC  165*  140*  108*     Most Recent 2 LFT's:  Recent Labs   Lab Test  10/09/18   0703   AST  22   ALT  12   ALKPHOS  72   BILITOTAL  0.6     Most Recent INR's and Anticoagulation Dosing History:  Anticoagulation Dose History     Recent Dosing and Labs Latest Ref Rng & Units 4/21/2014 10/14/2018    INR 0.86 - 1.14 0.91 1.08        Most Recent 3 Troponin's:  Recent Labs   Lab Test  10/10/18   0700  10/09/18   1302  10/09/18   0703   04/21/14   1656   TROPI  0.134*  0.143*  0.157*   < >   --    TROPONIN   --    --    --    --   0.03    < > = values in this interval not displayed.     Most Recent Cholesterol Panel:No lab results found.  Most Recent 6 Bacteria Isolates From Any Culture (See EPIC Reports for  Culture Details):  Recent Labs   Lab Test  10/22/18   1610  09/06/18   1050  08/27/18   2330   CULT  >100,000 colonies/mL  Candida albicans / dubliniensis  Candida albicans and Candida dubliniensis are not routinely speciated  Susceptibility testing not routinely done  *  No growth  No growth     Most Recent TSH, T4 and A1c Labs:  Recent Labs   Lab Test  10/08/18   0535  08/25/18   1954   TSH  4.57*   --    T4  1.28   --    A1C   --   8.4*     Results for orders placed or performed during the hospital encounter of 10/21/18   XR Pelvis w Hip Left 1 View    Narrative    XR PELVIS AND HIP LEFT 1 VIEW 10/21/2018 8:51 PM     HISTORY: hip pain; r/o fracture/dislocation;       Impression    IMPRESSION: Bilateral hip hemiarthroplasties. The left  hemiarthroplasty appears to be posterior superiorly dislocated.    KARON ALMONTE MD   XR Hip Port Left 1 View    Narrative    XR HIP PORT LT 1 VW   10/21/2018 10:05 PM     HISTORY: left hip reduction;     COMPARISON: None.      Impression    IMPRESSION: The femoral component is displaced out of the acetabular  component. Acetabular component overlies the greater trochanter. The  head of the femoral component is in the native acetabular region.    IMPRESSION: The femoral component is no longer within the acetabular  component and the acetabular component overlies the greater  trochanter.    CEASAR BLEDSOE MD   XR Pelvis w Hip Port Left 1 View    Narrative    XR PELVIS AND HIP PORTABLE LEFT 1 VIEW 10/22/2018 5:16 PM    HISTORY: Postop.     COMPARISON: October 21, 2018.       Impression    IMPRESSION: Status post bilateral total hip arthroplasties. Hardware  is intact. Alignment is anatomic.     ALBERTO GARDINER MD

## 2018-10-25 NOTE — PLAN OF CARE
Problem: Patient Care Overview  Goal: Plan of Care/Patient Progress Review  Discharge Planner PT   Patient plan for discharge: Disch to TCU  Current status: PT: Needs Joshua and vc for DELL exercises, and modA of 2 for bed mobility, supine to sit, and maxA of 2 and vc for transfers sit to stand,WBAT L for stand pivot transfers from EOB to w/c.  Barriers to return to prior living situation: Postop pain, edema, weakness,   Recommendations for discharge: TCU.  Rationale for recommendations: will continue to need skilled PT for recovery of functional mobility and safety for negotiation of chosen residence.     Entered by: Kendell Mueller 10/25/2018 1:15 PM       Physical Therapy Discharge Summary    Reason for therapy discharge:    Discharged to transitional care facility.    Progress towards therapy goal(s). See goals on Care Plan in [facility] health record for goal details.  Goals not met.  Barriers to achieving goals:   limited tolerance for therapy.    Therapy recommendation(s):    Continued therapy is recommended.  Rationale/Recommendations:  Will continue to need skilled PT for recovery of functional mobility and safety for negotiation of chosen residence..  Continue home exercise program.

## 2018-10-25 NOTE — PLAN OF CARE
Problem: Patient Care Overview  Goal: Individualization & Mutuality  Outcome: Improving  Pt doing well today, up with strong SBA of 1 and walker.  Pt able to sit in WC for a while.  Pt does not c/o pain.  Pt able to void in urinal at times, does have depends on for incontinence.  Pt is slowly progressing towards his goals.

## 2018-10-25 NOTE — PROGRESS NOTES
SW  D: Received discharge orders for pt. Called and updated [facility] on discharge. [facility] can accept pt back for admission today. Pt will be transported via transport aide at 1530. Called and updated pt's son, Aleksandar on discharge time. Aleksandar was in agreement for discharge and will meet pt back at [facility] after dinner. Faxed orders, scripts to [facility].     Albina Centeno MSW, LGSW

## 2018-10-25 NOTE — PLAN OF CARE
Problem: Patient Care Overview  Goal: Plan of Care/Patient Progress Review  Discharge Planner PT   Patient plan for discharge: Disch to TCU  Current status: PT: Needs modA and frequent vc for DELL exercises. Needs modA of 2 and vc for bed mobility, supine to sit, and back to supine, and maxA of 2 for transfers, sit to stand and back to sit, first attempted with FWW, then with bilateral elbow-liftA, WBAT L.  Both times disrupted by retro-pulsive tendencies.  Nursing notified.  Barriers to return to prior living situation: Postop pain, edema, weakness, and retro-pulsive balance disruption.  Recommendations for discharge: Disch to TCU.   Rationale for recommendations: Will continue to need skilled PT for recovery of functional mobility and safety for negotiation of chosen residence.       Entered by: Kendell Mueller 10/24/2018 7:52 PM

## 2018-10-26 ENCOUNTER — NURSING HOME VISIT (OUTPATIENT)
Dept: GERIATRICS | Facility: CLINIC | Age: 83
End: 2018-10-26
Payer: MEDICARE

## 2018-10-26 VITALS
WEIGHT: 149 LBS | HEART RATE: 89 BPM | SYSTOLIC BLOOD PRESSURE: 124 MMHG | OXYGEN SATURATION: 98 % | TEMPERATURE: 99.2 F | DIASTOLIC BLOOD PRESSURE: 63 MMHG | BODY MASS INDEX: 22 KG/M2 | RESPIRATION RATE: 18 BRPM

## 2018-10-26 DIAGNOSIS — N18.30 TYPE 2 DIABETES MELLITUS WITH STAGE 3 CHRONIC KIDNEY DISEASE, WITH LONG-TERM CURRENT USE OF INSULIN (H): ICD-10-CM

## 2018-10-26 DIAGNOSIS — I10 BENIGN ESSENTIAL HYPERTENSION: ICD-10-CM

## 2018-10-26 DIAGNOSIS — D62 ANEMIA DUE TO BLOOD LOSS, ACUTE: ICD-10-CM

## 2018-10-26 DIAGNOSIS — N40.1 BENIGN PROSTATIC HYPERPLASIA WITH LOWER URINARY TRACT SYMPTOMS, SYMPTOM DETAILS UNSPECIFIED: ICD-10-CM

## 2018-10-26 DIAGNOSIS — R53.81 PHYSICAL DECONDITIONING: ICD-10-CM

## 2018-10-26 DIAGNOSIS — Z79.4 TYPE 2 DIABETES MELLITUS WITH STAGE 3 CHRONIC KIDNEY DISEASE, WITH LONG-TERM CURRENT USE OF INSULIN (H): ICD-10-CM

## 2018-10-26 DIAGNOSIS — E11.22 TYPE 2 DIABETES MELLITUS WITH STAGE 3 CHRONIC KIDNEY DISEASE, WITH LONG-TERM CURRENT USE OF INSULIN (H): ICD-10-CM

## 2018-10-26 DIAGNOSIS — Z96.642 STATUS POST TOTAL REPLACEMENT OF LEFT HIP: Primary | ICD-10-CM

## 2018-10-26 DIAGNOSIS — I49.9 CARDIAC ARRHYTHMIA, UNSPECIFIED CARDIAC ARRHYTHMIA TYPE: ICD-10-CM

## 2018-10-26 DIAGNOSIS — Z78.9 DEEP VEIN THROMBOSIS (DVT) PROPHYLAXIS PRESCRIBED AT DISCHARGE: ICD-10-CM

## 2018-10-26 DIAGNOSIS — I25.810 CORONARY ARTERY DISEASE INVOLVING CORONARY BYPASS GRAFT OF NATIVE HEART WITHOUT ANGINA PECTORIS: ICD-10-CM

## 2018-10-26 PROCEDURE — 99310 SBSQ NF CARE HIGH MDM 45: CPT | Performed by: NURSE PRACTITIONER

## 2018-10-26 RX ORDER — METHOCARBAMOL 500 MG/1
250 TABLET, FILM COATED ORAL 3 TIMES DAILY
Start: 2018-10-26 | End: 2018-11-07

## 2018-10-26 NOTE — LETTER
10/26/2018        RE: Abimael Flores  [address]  [facility] [address]        [facility]  PRIMARY CARE PROVIDER AND CLINIC:  [place], [facility] [address] / [address] *  Chief Complaint   Patient presents with     Hospital F/U     [facility] Record Number:  [id number]  Place of Service where encounter took place:  [facility] ([facility]) [[id number]]    HPI:    Abimael Flores is a 89 year old  (1/23/1924),admitted to the above facility from  [facility].  Hospital stay 10/21/2018 through 10/25/2015.  Admitted to this facility for  rehab, medical management and nursing care.  HPI information obtained from: facility chart records, facility staff, patient report and [facility] chart review.  Current issues are:      Status post total replacement of left hip  Patient transferred back to TCU after treatment for left hip dislocation. [facility]. He fell at [place] on 10/4 and suffered a left hip fracture. This was repaired at [facility] and he returned to TCU on 10/10. He fell again on 10/14 and he dislocated his left hip. Left hip was reduced in ED and he returned to TCU. He bent over to tie his shoe on 10/21 and left hip dislocated again.   He was evaluated by ortho and underwent a left hip total arthroplasty on 10/22. There were intraoperative complications.   Post operatively he did have some muscle spasms and confusion.  Activity is WBAT with hip precautions. He does report lumbar back pain with some pain down left thigh. He has tramadol for pain.   Deep vein thrombosis (DVT) prophylaxis prescribed at discharge  Per ortho ASA 325mg BID for 5 weeks.   He is getting up in chair.   Anemia due to blood loss, acute   His hemoglobin did drop from 7.6 to 7.2. No transfusions required.   Hemoglobin   Date Value Ref Range Status   10/25/2018 7.5 (L) 13.3 - 17.7 g/dL Final    10/24/2018 7.6 (L) 13.3 - 17.7 g/dL Final       Type 2 diabetes mellitus with stage 3 chronic kidney disease, with long-term current use of insulin (H)  A1C 8.4 on 8/25/18. He has been on lantus 7u daily with humulog 2 unit(s) TID. BG so far in , 191.   Baseline creat 1.7-1.9. Creat at baseline during hospital stay  Coronary artery disease  Cardiac arrhythmia  - s/p CABG x 4 in 1996. He did have some cardiac arrhythmias following first surgery in August. He was started on metop (replacing atenolol) and diltiazem. No issues with arrhythmias during this surgery.   Benign essential hypertension  BP's: 124/63, 120/61  He continues on toprol XL 50mg q day and diltiazem 120mg q day    Benign prostatic hyperplasia with lower urinary tract symptoms, symptom details unspecified  He continues on flomax    Physical deconditioning  Lives with wife in [facility]. Wife has moved to memory care. At baseline he walks with 4WW. He understands he needs more therapy before returning home.       CODE STATUS/ADVANCE DIRECTIVES DISCUSSION:   DNR / DNI  Patient's living condition: lives with spouse    ALLERGIES:Review of patient's allergies indicates no known allergies.  PAST MEDICAL HISTORY:  has a past medical history of Benign essential hypertension (9/4/2018); Coronary artery disease; Nonsenile cataract; Recent retinal detachment, total or subtotal (8/5/2014); and Type 2 diabetes mellitus without complications (H). He also has no past medical history of Diabetic retinopathy (H); Glaucoma; or Macular degeneration.  PAST SURGICAL HISTORY:  has a past surgical history that includes Cardiac surgery; Repair ruptured globe (4/21/2014); cataract iol, rt/lt (Bilateral); lacrimal caruncle removed BE (Bilateral, ~1989); Open reduction internal fixation hip bipolar (Right, 8/26/2018); Open reduction internal fixation hip bipolar (Left, 10/4/2018); Closed reduction hip (Left, 10/14/2018); and Arthroplasty revision hip (Left,  10/22/2018).  FAMILY HISTORY: family history includes Diabetes in his father and mother. There is no history of Cancer, Glaucoma, or Macular Degeneration.  SOCIAL HISTORY:  reports that he has quit smoking. He has never used smokeless tobacco. He reports that he does not drink alcohol or use illicit drugs.    Post Discharge Medication Reconciliation Status: discharge medications reconciled, continue medications without change.  Current Outpatient Prescriptions   Medication Sig Dispense Refill     acetaminophen (TYLENOL) 325 MG tablet Take 2 tablets (650 mg) by mouth every 4 hours as needed for mild pain 40 tablet 0     aspirin 325 MG EC tablet Take one Aspirin tab twice daily for 5 weeks. 30 tablet 0     atorvastatin (LIPITOR) 10 MG tablet Take 1 tablet (10 mg) by mouth every evening       diltiazem (DILACOR XR) 120 MG 24 hr capsule Take 120 mg by mouth daily       Ergocalciferol (VITAMIN D) 67056 units CAPS Take 50,000 Units by mouth every 7 days for 5 doses 5 capsule 0     insulin glargine (LANTUS) 100 UNIT/ML injection Inject 7 Units Subcutaneous every morning       insulin lispro (HUMALOG KWIKPEN) 100 UNIT/ML injection Inject Subcutaneous At Bedtime Sliding scale:  -249=1 unit  -299=2 units  -349=3 units  -399=4 units  +=5 units       insulin lispro (HUMALOG KWIKPEN) 100 UNIT/ML injection Inject 2 Units Subcutaneous 3 times daily (before meals)       insulin lispro (HUMALOG) 100 UNIT/ML injection Inject Subcutaneous 3 times daily (before meals) Sliding scale:   -189=1 unit  -239=2 units  -289=3 units  -339=4 units  -399=5 units  -499=6 units  +=7 units       metoprolol succinate (TOPROL-XL) 50 MG 24 hr tablet Take 1 tablet (50 mg) by mouth daily 30 tablet      ondansetron (ZOFRAN-ODT) 4 MG ODT tab Take 1 tablet (4 mg) by mouth every 6 hours as needed for nausea or vomiting 15 tablet 0     polyethylene glycol (MIRALAX/GLYCOLAX) Packet Take 17  g by mouth daily       senna-docusate (SENOKOT-S;PERICOLACE) 8.6-50 MG per tablet Take 1 tablet by mouth 2 times daily 60 tablet 1     Skin Protectants, Misc. (ANIBAL PROTECT EX) Apply topically 2 times daily       tamsulosin (FLOMAX) 0.4 MG capsule Take 0.4 mg by mouth every evening        traMADol (ULTRAM) 50 MG tablet Take 1 tablet (50 mg) by mouth every 4 hours as needed for moderate pain 30 tablet 0       ROS:  4 point ROS including Respiratory, CV, GI and , other than that noted in the HPI,  is negative    Exam:  /63  Pulse 89  Temp 99.2  F (37.3  C)  Resp 18  Wt 149 lb (67.6 kg)  SpO2 98%  BMI 22 kg/m2  GENERAL APPEARANCE:  Alert, in no distress  ENT:  Mouth and posterior oropharynx normal, moist mucous membranes, hearing acuity adequate   EYES:  EOM, conjunctivae, lids, pupils and irises normal    RESP:  respiratory effort and palpation of chest normal, no respiratory distress, Lung sounds clear  CV:  Palpation and auscultation of heart done , rate and rhythm reg, no murmur, no rub or gallop, Edema none  ABDOMEN:  normal bowel sounds, soft, nontender, no hepatosplenomegaly or other masses  M/S:   Gait and station antalgic, LE weakness, Digits and nails normal   SKIN:  Inspection/Palpation of skin and subcutaneous tissue left hip incision has aquacel dressing  NEURO: 2-12 in normal limits and at patient's baseline  PSYCH:  insight and judgement, memory intact , affect and mood normal      Lab/Diagnostic data:  CBC RESULTS:   Recent Labs   Lab Test  10/25/18   0705  10/24/18   1037   10/22/18   0718   10/15/18   0715   WBC   --    --    --   9.4   --   8.6   RBC   --    --    --   2.65*   --   2.63*   HGB  7.5*  7.6*   < >  7.9*   < >  7.9*   HCT   --    --    --   24.4*   --   24.0*   MCV   --    --    --   92   --   91   MCH   --    --    --   29.8   --   30.0   MCHC   --    --    --   32.4   --   32.9   RDW   --    --    --   15.8*   --   15.5*   PLT  227   --    --   294   --   316    < > =  values in this interval not displayed.       Last Basic Metabolic Panel:  Recent Labs   Lab Test  10/25/18   0705  10/23/18   0725   NA  137  137   POTASSIUM  3.8  4.6   CHLORIDE  104  104   JOSELINE  8.0*  7.4*   CO2  26  26   BUN  25  24   CR  1.49*  1.58*   GLC  165*  140*       Liver Function Studies -   Recent Labs   Lab Test  10/09/18   0703   PROTTOTAL  6.3*   ALBUMIN  2.1*   BILITOTAL  0.6   ALKPHOS  72   AST  22   ALT  12       TSH   Date Value Ref Range Status   10/08/2018 4.57 (H) 0.40 - 4.00 mU/L Final   04/02/2003 2.08 0.4 - 5.0 mU/L Final       Lab Results   Component Value Date    A1C 8.4 08/25/2018    A1C 7.8 04/22/2014       ASSESSMENT/PLAN:  (Z96.642) Status post total replacement of left hip  (primary encounter diagnosis)  Comment: recent hospital stay due to second left hip dislocation following left hip fracture. Patient underwent left hip total arthroplasty. No complications. He does report lumbar spasms with pain down left thigh.   Plan: methocarbamol (ROBAXIN) 250 MG tablet TID    physical therapy         (Z78.9) Deep vein thrombosis (DVT) prophylaxis prescribed at discharge  Comment: per ortho   Plan: ASA 325mg BID for 5 weeks    (D62) Anemia due to blood loss, acute  Comment: hbg has remained stable in 7s.   Plan: CBC 10/30    (E11.22,  N18.3,  Z79.4) Type 2 diabetes mellitus with stage 3 chronic kidney disease, with long-term current use of insulin (H)  Comment: elevated A1C. Creat at baseline  Plan: continue PTA lantus and humulog, BMP 10/30    (I25.810) Coronary artery disease involving coronary bypass graft of native heart without angina pectoris  (I49.9) Cardiac arrhythmia, unspecified cardiac arrhythmia type  Comment: s/p CABG years ago. Had arrhythmias after first surgery in August. Started on metop and diltiazem and no issues since   Plan: monitor HR.     (I10) Benign essential hypertension  Comment: adequate control with metop and diltiazem  Plan: monitor BPs    (N40.1) Benign prostatic  hyperplasia with lower urinary tract symptoms, symptom details unspecified  Comment: asymptomatic   Plan: continue flomax    (R53.81) Physical deconditioning  Comment: due to two hip fractures in past two months. He feels quite deconditioned. He was able to stand and transfer but he was weak  Plan: physical therapy and OCCUPATIONAL THERAPY           Electronically signed by:  WILLIAM Cota CNP                    Sincerely,        WILLIAM Cota CNP

## 2018-10-26 NOTE — PROGRESS NOTES
[place] [facility]  PRIMARY CARE PROVIDER AND CLINIC:  [facility] [address]  / [place] *  Chief Complaint   Patient presents with     Hospital F/U     [facility] Record Number:  [id number]  [facility] [[id number][facility]    HPI:    Abimael Flores is a 89 year old  (1/23/1924),admitted to the above facility from  [facility].  Hospital stay 10/21/2018 through 10/25/2015.  Admitted to this facility for  rehab, medical management and nursing care.  HPI information obtained from: facility chart records, facility staff, patient report and [facility] chart review.  Current issues are:      Status post total replacement of left hip  Patient transferred back to TCU after treatment for left hip dislocation. [facility]. He fell at [place] on 10/4 and suffered a left hip fracture. This was repaired at [facility] and he returned to TCU on 10/10. He fell again on 10/14 and he dislocated his left hip. Left hip was reduced in ED and he returned to TCU. He bent over to tie his shoe on 10/21 and left hip dislocated again.   He was evaluated by ortho and underwent a left hip total arthroplasty on 10/22. There were intraoperative complications.   Post operatively he did have some muscle spasms and confusion.  Activity is WBAT with hip precautions. He does report lumbar back pain with some pain down left thigh. He has tramadol for pain.   Deep vein thrombosis (DVT) prophylaxis prescribed at discharge  Per ortho ASA 325mg BID for 5 weeks.   He is getting up in chair.   Anemia due to blood loss, acute   His hemoglobin did drop from 7.6 to 7.2. No transfusions required.   Hemoglobin   Date Value Ref Range Status   10/25/2018 7.5 (L) 13.3 - 17.7 g/dL Final   10/24/2018 7.6 (L) 13.3 - 17.7 g/dL Final       Type 2 diabetes mellitus with stage 3 chronic  kidney disease, with long-term current use of insulin (H)  A1C 8.4 on 8/25/18. He has been on lantus 7u daily with humulog 2 unit(s) TID. BG so far in , 191.   Baseline creat 1.7-1.9. Creat at baseline during hospital stay  Coronary artery disease  Cardiac arrhythmia  - s/p CABG x 4 in 1996. He did have some cardiac arrhythmias following first surgery in August. He was started on metop (replacing atenolol) and diltiazem. No issues with arrhythmias during this surgery.   Benign essential hypertension  BP's: 124/63, 120/61  He continues on toprol XL 50mg q day and diltiazem 120mg q day    Benign prostatic hyperplasia with lower urinary tract symptoms, symptom details unspecified  He continues on flomax    Physical deconditioning  Lives with wife in [facility]. Wife has moved to memory care. At baseline he walks with 4WW. He understands he needs more therapy before returning home.       CODE STATUS/ADVANCE DIRECTIVES DISCUSSION:   DNR / DNI  Patient's living condition: lives with spouse    ALLERGIES:Review of patient's allergies indicates no known allergies.  PAST MEDICAL HISTORY:  has a past medical history of Benign essential hypertension (9/4/2018); Coronary artery disease; Nonsenile cataract; Recent retinal detachment, total or subtotal (8/5/2014); and Type 2 diabetes mellitus without complications (H). He also has no past medical history of Diabetic retinopathy (H); Glaucoma; or Macular degeneration.  PAST SURGICAL HISTORY:  has a past surgical history that includes Cardiac surgery; Repair ruptured globe (4/21/2014); cataract iol, rt/lt (Bilateral); lacrimal caruncle removed BE (Bilateral, ~1989); Open reduction internal fixation hip bipolar (Right, 8/26/2018); Open reduction internal fixation hip bipolar (Left, 10/4/2018); Closed reduction hip (Left, 10/14/2018); and Arthroplasty revision hip (Left, 10/22/2018).  FAMILY HISTORY: family history includes Diabetes in his father and mother. There is no history of  Cancer, Glaucoma, or Macular Degeneration.  SOCIAL HISTORY:  reports that he has quit smoking. He has never used smokeless tobacco. He reports that he does not drink alcohol or use illicit drugs.    Post Discharge Medication Reconciliation Status: discharge medications reconciled, continue medications without change.  Current Outpatient Prescriptions   Medication Sig Dispense Refill     acetaminophen (TYLENOL) 325 MG tablet Take 2 tablets (650 mg) by mouth every 4 hours as needed for mild pain 40 tablet 0     aspirin 325 MG EC tablet Take one Aspirin tab twice daily for 5 weeks. 30 tablet 0     atorvastatin (LIPITOR) 10 MG tablet Take 1 tablet (10 mg) by mouth every evening       diltiazem (DILACOR XR) 120 MG 24 hr capsule Take 120 mg by mouth daily       Ergocalciferol (VITAMIN D) 58212 units CAPS Take 50,000 Units by mouth every 7 days for 5 doses 5 capsule 0     insulin glargine (LANTUS) 100 UNIT/ML injection Inject 7 Units Subcutaneous every morning       insulin lispro (HUMALOG KWIKPEN) 100 UNIT/ML injection Inject Subcutaneous At Bedtime Sliding scale:  -249=1 unit  -299=2 units  -349=3 units  -399=4 units  +=5 units       insulin lispro (HUMALOG KWIKPEN) 100 UNIT/ML injection Inject 2 Units Subcutaneous 3 times daily (before meals)       insulin lispro (HUMALOG) 100 UNIT/ML injection Inject Subcutaneous 3 times daily (before meals) Sliding scale:   -189=1 unit  -239=2 units  -289=3 units  -339=4 units  -399=5 units  -499=6 units  +=7 units       metoprolol succinate (TOPROL-XL) 50 MG 24 hr tablet Take 1 tablet (50 mg) by mouth daily 30 tablet      ondansetron (ZOFRAN-ODT) 4 MG ODT tab Take 1 tablet (4 mg) by mouth every 6 hours as needed for nausea or vomiting 15 tablet 0     polyethylene glycol (MIRALAX/GLYCOLAX) Packet Take 17 g by mouth daily       senna-docusate (SENOKOT-S;PERICOLACE) 8.6-50 MG per tablet Take 1 tablet by mouth 2  times daily 60 tablet 1     Skin Protectants, Misc. (ANIBAL PROTECT EX) Apply topically 2 times daily       tamsulosin (FLOMAX) 0.4 MG capsule Take 0.4 mg by mouth every evening        traMADol (ULTRAM) 50 MG tablet Take 1 tablet (50 mg) by mouth every 4 hours as needed for moderate pain 30 tablet 0       ROS:  4 point ROS including Respiratory, CV, GI and , other than that noted in the HPI,  is negative    Exam:  /63  Pulse 89  Temp 99.2  F (37.3  C)  Resp 18  Wt 149 lb (67.6 kg)  SpO2 98%  BMI 22 kg/m2  GENERAL APPEARANCE:  Alert, in no distress  ENT:  Mouth and posterior oropharynx normal, moist mucous membranes, hearing acuity adequate   EYES:  EOM, conjunctivae, lids, pupils and irises normal    RESP:  respiratory effort and palpation of chest normal, no respiratory distress, Lung sounds clear  CV:  Palpation and auscultation of heart done , rate and rhythm reg, no murmur, no rub or gallop, Edema none  ABDOMEN:  normal bowel sounds, soft, nontender, no hepatosplenomegaly or other masses  M/S:   Gait and station antalgic, LE weakness, Digits and nails normal   SKIN:  Inspection/Palpation of skin and subcutaneous tissue left hip incision has aquacel dressing  NEURO: 2-12 in normal limits and at patient's baseline  PSYCH:  insight and judgement, memory intact , affect and mood normal      Lab/Diagnostic data:  CBC RESULTS:   Recent Labs   Lab Test  10/25/18   0705  10/24/18   1037   10/22/18   0718   10/15/18   0715   WBC   --    --    --   9.4   --   8.6   RBC   --    --    --   2.65*   --   2.63*   HGB  7.5*  7.6*   < >  7.9*   < >  7.9*   HCT   --    --    --   24.4*   --   24.0*   MCV   --    --    --   92   --   91   MCH   --    --    --   29.8   --   30.0   MCHC   --    --    --   32.4   --   32.9   RDW   --    --    --   15.8*   --   15.5*   PLT  227   --    --   294   --   316    < > = values in this interval not displayed.       Last Basic Metabolic Panel:  Recent Labs   Lab Test  10/25/18    0705  10/23/18   0725   NA  137  137   POTASSIUM  3.8  4.6   CHLORIDE  104  104   JOSELINE  8.0*  7.4*   CO2  26  26   BUN  25  24   CR  1.49*  1.58*   GLC  165*  140*       Liver Function Studies -   Recent Labs   Lab Test  10/09/18   0703   PROTTOTAL  6.3*   ALBUMIN  2.1*   BILITOTAL  0.6   ALKPHOS  72   AST  22   ALT  12       TSH   Date Value Ref Range Status   10/08/2018 4.57 (H) 0.40 - 4.00 mU/L Final   04/02/2003 2.08 0.4 - 5.0 mU/L Final       Lab Results   Component Value Date    A1C 8.4 08/25/2018    A1C 7.8 04/22/2014       ASSESSMENT/PLAN:  (Z96.642) Status post total replacement of left hip  (primary encounter diagnosis)  Comment: recent hospital stay due to second left hip dislocation following left hip fracture. Patient underwent left hip total arthroplasty. No complications. He does report lumbar spasms with pain down left thigh.   Plan: methocarbamol (ROBAXIN) 250 MG tablet TID    physical therapy         (Z78.9) Deep vein thrombosis (DVT) prophylaxis prescribed at discharge  Comment: per ortho   Plan: ASA 325mg BID for 5 weeks    (D62) Anemia due to blood loss, acute  Comment: hbg has remained stable in 7s.   Plan: CBC 10/30    (E11.22,  N18.3,  Z79.4) Type 2 diabetes mellitus with stage 3 chronic kidney disease, with long-term current use of insulin (H)  Comment: elevated A1C. Creat at baseline  Plan: continue PTA lantus and humulog, BMP 10/30    (I25.810) Coronary artery disease involving coronary bypass graft of native heart without angina pectoris  (I49.9) Cardiac arrhythmia, unspecified cardiac arrhythmia type  Comment: s/p CABG years ago. Had arrhythmias after first surgery in August. Started on metop and diltiazem and no issues since   Plan: monitor HR.     (I10) Benign essential hypertension  Comment: adequate control with metop and diltiazem  Plan: monitor BPs    (N40.1) Benign prostatic hyperplasia with lower urinary tract symptoms, symptom details unspecified  Comment: asymptomatic   Plan:  continue flomax    (R53.81) Physical deconditioning  Comment: due to two hip fractures in past two months. He feels quite deconditioned. He was able to stand and transfer but he was weak  Plan: physical therapy and OCCUPATIONAL THERAPY           Electronically signed by:  WILLIAM Cota CNP

## 2018-10-29 VITALS
SYSTOLIC BLOOD PRESSURE: 108 MMHG | BODY MASS INDEX: 22 KG/M2 | WEIGHT: 149 LBS | OXYGEN SATURATION: 96 % | HEART RATE: 104 BPM | RESPIRATION RATE: 18 BRPM | TEMPERATURE: 96.9 F | DIASTOLIC BLOOD PRESSURE: 64 MMHG

## 2018-10-29 LAB — INTERPRETATION ECG - MUSE: NORMAL

## 2018-10-30 ENCOUNTER — NURSING HOME VISIT (OUTPATIENT)
Dept: GERIATRICS | Facility: CLINIC | Age: 83
End: 2018-10-30
Payer: MEDICARE

## 2018-10-30 ENCOUNTER — HOSPITAL LABORATORY (OUTPATIENT)
Dept: OTHER | Facility: CLINIC | Age: 83
End: 2018-10-30

## 2018-10-30 DIAGNOSIS — Z79.4 TYPE 2 DIABETES MELLITUS WITH STAGE 3 CHRONIC KIDNEY DISEASE, WITH LONG-TERM CURRENT USE OF INSULIN (H): ICD-10-CM

## 2018-10-30 DIAGNOSIS — I95.2 HYPOTENSION DUE TO DRUGS: ICD-10-CM

## 2018-10-30 DIAGNOSIS — N18.30 TYPE 2 DIABETES MELLITUS WITH STAGE 3 CHRONIC KIDNEY DISEASE, WITH LONG-TERM CURRENT USE OF INSULIN (H): ICD-10-CM

## 2018-10-30 DIAGNOSIS — S73.005D DISLOCATION OF LEFT HIP, SUBSEQUENT ENCOUNTER: ICD-10-CM

## 2018-10-30 DIAGNOSIS — I10 BENIGN ESSENTIAL HYPERTENSION: ICD-10-CM

## 2018-10-30 DIAGNOSIS — E11.22 TYPE 2 DIABETES MELLITUS WITH STAGE 3 CHRONIC KIDNEY DISEASE, WITH LONG-TERM CURRENT USE OF INSULIN (H): ICD-10-CM

## 2018-10-30 DIAGNOSIS — S72.002G CLOSED FRACTURE OF NECK OF LEFT FEMUR WITH DELAYED HEALING, SUBSEQUENT ENCOUNTER: Primary | ICD-10-CM

## 2018-10-30 DIAGNOSIS — D62 ANEMIA DUE TO BLOOD LOSS, ACUTE: ICD-10-CM

## 2018-10-30 DIAGNOSIS — N40.1 BENIGN PROSTATIC HYPERPLASIA WITH LOWER URINARY TRACT SYMPTOMS, SYMPTOM DETAILS UNSPECIFIED: ICD-10-CM

## 2018-10-30 LAB
ANION GAP SERPL CALCULATED.3IONS-SCNC: 9 MMOL/L (ref 3–14)
BUN SERPL-MCNC: 32 MG/DL (ref 7–30)
CALCIUM SERPL-MCNC: 7.9 MG/DL (ref 8.5–10.1)
CHLORIDE SERPL-SCNC: 103 MMOL/L (ref 94–109)
CO2 SERPL-SCNC: 22 MMOL/L (ref 20–32)
CREAT SERPL-MCNC: 1.86 MG/DL (ref 0.66–1.25)
ERYTHROCYTE [DISTWIDTH] IN BLOOD BY AUTOMATED COUNT: 15.3 % (ref 10–15)
GFR SERPL CREATININE-BSD FRML MDRD: 34 ML/MIN/1.7M2
GLUCOSE SERPL-MCNC: 178 MG/DL (ref 70–99)
HCT VFR BLD AUTO: 23.6 % (ref 40–53)
HGB BLD-MCNC: 7.6 G/DL (ref 13.3–17.7)
MCH RBC QN AUTO: 29 PG (ref 26.5–33)
MCHC RBC AUTO-ENTMCNC: 32.2 G/DL (ref 31.5–36.5)
MCV RBC AUTO: 90 FL (ref 78–100)
PLATELET # BLD AUTO: 327 10E9/L (ref 150–450)
POTASSIUM SERPL-SCNC: 4.2 MMOL/L (ref 3.4–5.3)
RBC # BLD AUTO: 2.62 10E12/L (ref 4.4–5.9)
SODIUM SERPL-SCNC: 134 MMOL/L (ref 133–144)
WBC # BLD AUTO: 9 10E9/L (ref 4–11)

## 2018-10-30 PROCEDURE — 99306 1ST NF CARE HIGH MDM 50: CPT | Performed by: INTERNAL MEDICINE

## 2018-10-30 NOTE — LETTER
10/30/2018        RE: Abimael Flores  [address]  [facility] [address]        Abimael Flores is a 89 year old male seen October 30, 2018 at [facility] where he was readmitted this week after [facility] hospitalizations for left hip dislocations x2, now s/p hip revision.     Patient was initially admitted in August when he took a fall and suffered a right femoral neck fracture, s/p bipolar hemiarthroplasty on 8/26/18.   He came to [facility] and was making progress when he fell again and suffered a left hip fracture, s/p hemiarthroplasty 10/4/18   He had another fall and left hip dislocation with closed reduction on 10/15, and a second dislocation on 10/21   He then had left hip arthroplasty revision on 10/22 and is now back to [facility] for Rehab.   He is WBAT.     Patient is seen in his room, resting abed.   Very fatigued, discouraged that he is not improving.  Has pain in thigh below his hip, and LBP.   Was working with therapies today but found to have bp 78/50   He was started on metoprolol and diltiazem in August when he had cardiac dysrhythmia post op; these did not recur in his subsequent hospitalizations.    Also has ongoing blood loss anemia with baseline hgb about 9, today 7.6.       Past Medical History:   Diagnosis Date     Benign essential hypertension 9/4/2018     Coronary artery disease  S/p CABG x4, 1996      Nonsenile cataract      Recent retinal detachment, total or subtotal 8/5/2014     Type 2 diabetes mellitus without complications (H)    Chronic anemia  CKD stage 3-4  BPH    Past Surgical History:   Procedure Laterality Date     ARTHROPLASTY REVISION HIP Left 10/22/2018    Procedure: LEFT HIP HEMIARTHROPLASTY CONVERTED TO LEFT TOTAL HIP ARTHROPLASTY;  Surgeon: Marcos Winchester MD;  Location: [facility] OR     CARDIAC SURGERY       CATARACT IOL, RT/LT Bilateral      CLOSED REDUCTION HIP Left 10/14/2018    Procedure: CLOSED REDUCTION HIP;  CLOSED REDUCTION HIP;  Surgeon: Milton Gusman MD;   Location: [facility] OR     lacrimal caruncle removed BE Bilateral ~1989     OPEN REDUCTION INTERNAL FIXATION HIP BIPOLAR Right 8/26/2018    Procedure: OPEN REDUCTION INTERNAL FIXATION HIP BIPOLAR;  Right Hip Bipolar Hemiarthroplasty (Biomet);  Surgeon: Bill Sanders MD;  Location: [facility] OR     OPEN REDUCTION INTERNAL FIXATION HIP BIPOLAR Left 10/4/2018    Procedure: OPEN REDUCTION INTERNAL FIXATION HIP BIPOLAR;  LEFT HIP TONYA ARTHROPLASTY;  Surgeon: Bruno Stewart MD;  Location: [facility] OR     REPAIR RUPTURED GLOBE  4/21/2014    Procedure: Exploration and Repair of Ruptured Globe ;  Surgeon: Mercedes Rivera MD;  [facility]       Family History   Problem Relation Age of Onset     Diabetes Mother      Diabetes Father      Cancer No family hx of      Glaucoma No family hx of      Macular Degeneration No family hx of        Social History   Substance Use Topics     Smoking status: Former Smoker     Smokeless tobacco: Never Used     Alcohol use No      SH:  Lived with his wife, AL apartment in [place].  Wife now in Memory Care.     Son Aleksandar.   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     Review Of Systems  Skin: negative   Eyes: impaired vision, glasses  Ears/Nose/Throat: hearing loss  Respiratory: No shortness of breath, dyspnea on exertion, cough, or hemoptysis  Cardiovascular: tachycardia and exercise intolerance  Gastrointestinal: poor appetite, constipation  Genitourinary: nocturia  Musculoskeletal: poor balance, back pain, falls.   Transfers and bed mobility with assist of 2.     Neurologic: negative  Psychiatric: negative  Hematologic/Lymphatic/Immunologic: anemia  Wt Readings from Last 5 Encounters:   10/29/18 149 lb (67.6 kg)   10/26/18 149 lb (67.6 kg)   10/25/18 164 lb 3.2 oz (74.5 kg)   10/18/18 161 lb 12.8 oz (73.4 kg)   10/17/18 156 lb 8 oz (71 kg)    Endocrine: diabetes      GENERAL APPEARANCE: alert and mild distress  /64  Pulse 104  Temp 96.9  F (36.1  C)  Resp 18   Wt 149 lb (67.6 kg)  SpO2 96%  BMI 22 kg/m2   HEENT: normocephalic, no lesion or abnormalities  NECK: no adenopathy, no asymmetry, masses, or scars and thyroid normal to palpation  RESP: decreased BS, no wheeze, few scattered crackles  CV: regular rate and rhythm, normal S1 S2 @80, occ ectopy  ABDOMEN:  soft, nontender, no HSM or masses and bowel sounds normal  MS: extremities normal- no gross deformities noted, no evidence of inflammation in joints  SKIN: no suspicious lesions or rashes; left hip incision covered with Aquacel dressing, small amt of blood visible, not worsening per nursing.   Also has a stage 2 pressure sore on his sacrum, <1cm, no erythema, clean edges.     NEURO: Normal strength and tone, sensory exam grossly normal, and speech normal  PSYCH: affect okay, discouraged  LYMPHATICS: No cervical,  or supraclavicular nodes     Last Comprehensive Metabolic Panel:  Sodium   Date Value Ref Range Status   10/30/2018 134 133 - 144 mmol/L Final     Potassium   Date Value Ref Range Status   10/30/2018 4.2 3.4 - 5.3 mmol/L Final     Chloride   Date Value Ref Range Status   10/30/2018 103 94 - 109 mmol/L Final     Carbon Dioxide   Date Value Ref Range Status   10/30/2018 22 20 - 32 mmol/L Final     Anion Gap   Date Value Ref Range Status   10/30/2018 9 3 - 14 mmol/L Final     Glucose   Date Value Ref Range Status   10/30/2018 178 (H) 70 - 99 mg/dL Final     Urea Nitrogen   Date Value Ref Range Status   10/30/2018 32 (H) 7 - 30 mg/dL Final     Creatinine   Date Value Ref Range Status   10/30/2018 1.86 (H) 0.66 - 1.25 mg/dL Final     GFR Estimate   Date Value Ref Range Status   10/30/2018 34 (L) >60 mL/min/1.7m2 Final     Comment:     Non  GFR Calc     Calcium   Date Value Ref Range Status   10/30/2018 7.9 (L) 8.5 - 10.1 mg/dL Final     Lab Results   Component Value Date    WBC 9.0 10/30/2018      HGB 7.6 10/30/2018      MCV 90 10/30/2018       10/30/2018      ECHO 8/25/18  Interpretation Summary  Sinus rhythm was noted.  Left ventricular systolic function is normal. The visual ejection fraction is  estimated at 55-60%. There is mild concentric left ventricular hypertrophy.  There is trace to mild mitral regurgitation.  Thickened mitral valve anterior leaflet is noted and there is a  prominenet/sclerotic chordal attachment (giving the appearance of an  associated mass). The thickening is most likely degenerative.  There is moderate (2+) tricuspid regurgitation.  Right ventricular systolic pressure is elevated, consistent with moderate to  severe pulmonary hypertension.      IMP/PLAN:   (S72.002G) Closed fracture of neck of left femur with delayed healing, subsequent encounter    (S73.005D) Dislocation of left hip, subsequent encounter  Comment: s/p revision of left hip hemiarthroplasty  Plan: Pain management with prn acetaminophen and tramadol  Monitor incision.   Bid ASA for DVT prophylaxis per Ortho.   PHYSICAL THERAPY / OCCUPATIONAL THERAPY for transfers, balance, gait, ADLs.  Discharge goal is return to his AL apartment.     (D62) Anemia due to blood loss, acute  Comment: baseline hgb about 9     Plan: add Fe and PPI for 2 week, follow hgb   If further drop will need to reconsider ASA       (I95.2) Hypotension due to drugs  Comment: newly on diltiazem and metoprolol, poor appetite     Plan: discontinue diltiazem; hold parameters on metoprolol.   Push fluids, follow bps.       (I10) Benign essential hypertension  Comment:  For many years  BP Readings from Last 3 Encounters:   10/29/18 108/64   10/26/18 124/63   10/25/18 118/75      Plan: as above.       (E11.22,  N18.3,  Z79.4) Type 2 diabetes mellitus with stage 3 chronic kidney disease, with long-term current use of insulin (H)  Comment:   Lab Results   Component Value Date    A1C 8.4 08/25/2018   Plan: Lantus, lispro with meals and sliding scale insulin.   Watch for hypoglycemia while he is eating poorly.   Remains on ASA and a  statin; not on ACEI secondary to renal failure.     (N40.1) Benign prostatic hyperplasia with lower urinary tract symptoms, symptom details unspecified  Comment: voiding okay  Plan: continue tamsulosin     Noemi Hogan MD       Sincerely,        Noemi Hogan MD

## 2018-10-31 ENCOUNTER — NURSING HOME VISIT (OUTPATIENT)
Dept: GERIATRICS | Facility: CLINIC | Age: 83
End: 2018-10-31
Payer: MEDICARE

## 2018-10-31 VITALS
SYSTOLIC BLOOD PRESSURE: 105 MMHG | TEMPERATURE: 97.8 F | WEIGHT: 149.5 LBS | BODY MASS INDEX: 22.08 KG/M2 | OXYGEN SATURATION: 99 % | HEART RATE: 102 BPM | RESPIRATION RATE: 16 BRPM | DIASTOLIC BLOOD PRESSURE: 63 MMHG

## 2018-10-31 DIAGNOSIS — I10 BENIGN ESSENTIAL HYPERTENSION: ICD-10-CM

## 2018-10-31 DIAGNOSIS — I49.9 CARDIAC ARRHYTHMIA, UNSPECIFIED CARDIAC ARRHYTHMIA TYPE: ICD-10-CM

## 2018-10-31 DIAGNOSIS — N18.30 TYPE 2 DIABETES MELLITUS WITH STAGE 3 CHRONIC KIDNEY DISEASE, WITH LONG-TERM CURRENT USE OF INSULIN (H): ICD-10-CM

## 2018-10-31 DIAGNOSIS — N40.1 BENIGN PROSTATIC HYPERPLASIA WITH LOWER URINARY TRACT SYMPTOMS, SYMPTOM DETAILS UNSPECIFIED: ICD-10-CM

## 2018-10-31 DIAGNOSIS — Z79.4 TYPE 2 DIABETES MELLITUS WITH STAGE 3 CHRONIC KIDNEY DISEASE, WITH LONG-TERM CURRENT USE OF INSULIN (H): ICD-10-CM

## 2018-10-31 DIAGNOSIS — Z96.642 STATUS POST TOTAL REPLACEMENT OF LEFT HIP: Primary | ICD-10-CM

## 2018-10-31 DIAGNOSIS — B37.49 CANDIDIASIS OF PERINEUM: ICD-10-CM

## 2018-10-31 DIAGNOSIS — Z78.9 DEEP VEIN THROMBOSIS (DVT) PROPHYLAXIS PRESCRIBED AT DISCHARGE: ICD-10-CM

## 2018-10-31 DIAGNOSIS — I25.810 CORONARY ARTERY DISEASE INVOLVING CORONARY BYPASS GRAFT OF NATIVE HEART WITHOUT ANGINA PECTORIS: ICD-10-CM

## 2018-10-31 DIAGNOSIS — D62 ANEMIA DUE TO BLOOD LOSS, ACUTE: ICD-10-CM

## 2018-10-31 DIAGNOSIS — E11.22 TYPE 2 DIABETES MELLITUS WITH STAGE 3 CHRONIC KIDNEY DISEASE, WITH LONG-TERM CURRENT USE OF INSULIN (H): ICD-10-CM

## 2018-10-31 DIAGNOSIS — R53.81 PHYSICAL DECONDITIONING: ICD-10-CM

## 2018-10-31 PROCEDURE — 99309 SBSQ NF CARE MODERATE MDM 30: CPT | Performed by: NURSE PRACTITIONER

## 2018-10-31 RX ORDER — FERROUS SULFATE 325(65) MG
325 TABLET ORAL
COMMUNITY
End: 2019-01-05

## 2018-10-31 RX ORDER — ERGOCALCIFEROL 1.25 MG/1
50000 CAPSULE, LIQUID FILLED ORAL WEEKLY
COMMUNITY
End: 2019-01-05

## 2018-10-31 NOTE — PROGRESS NOTES
[facility]    Chief Complaint   Patient presents with     JESUS       [facility] Record Number:  [id number]  Place of Service where encounter took place:  [facility] [[id number]]    HPI:    Abimael Flores is a 89 year old  (1/23/1924), who is being seen today for an episodic care visit.  HPI information obtained from: facility chart records, facility staff, patient report and [facility] chart review.  Current issues are:      Status post total replacement of left hip  Patient transferred back to [facility] after treatment for left hip dislocation. He had been at [facility] recovering from right hip fracture since 9/3. He fell at [facility] on 10/4 and suffered a left hip fracture. This was repaired at [facility] and he returned to TCU on 10/10. He fell again on 10/14 and he dislocated his left hip. Left hip was reduced in ED and he returned to [facility]. He bent over to tie his shoe on 10/21 and left hip dislocated again.   He was evaluated by ortho and underwent a left hip total arthroplasty on 10/22. There were intraoperative complications.   Post operatively he did have some muscle spasms and confusion.  Activity is WBAT with hip precautions. He does report lumbar back pain with some pain down left thigh. He has tramadol for pain.   Deep vein thrombosis (DVT) prophylaxis prescribed at discharge  Per ortho ASA 325mg BID for 5 weeks.   He is getting up in chair.   Anemia due to blood loss, acute   His hemoglobin did drop from 7.6 to 7.2. No transfusions required.   Hemoglobin   Date Value Ref Range Status   10/30/2018 7.6 (L) 13.3 - 17.7 g/dL Final   10/25/2018 7.5 (L) 13.3 - 17.7 g/dL Final   iron supp added on 10/30    Type 2 diabetes mellitus with stage 3 chronic kidney disease, with long-term current use of insulin (H)  A1C 8.4 on 8/25/18. He has been on lantus 7u daily with humulog 2 unit(s) TID.   FBG:   Am 205, 170, 193  Prrv286, 223, 206  5pm 90, 114, 250  Hs 191, 194,  318  Baseline creat 1.7-1.9.   Coronary artery disease  Cardiac arrhythmia  - s/p CABG x 4 in 1996. He did have some cardiac arrhythmias following first surgery in August. He was started on metop (replacing atenolol) and diltiazem. No issues with arrhythmias during this surgery.   Benign essential hypertension  BP's: 105/63, 119/55, 142/73  He continues on toprol XL 50mg q day. Diltiazem stopped on 10/30/18 due to SBP in 80s  Benign prostatic hyperplasia with lower urinary tract symptoms, symptom details unspecified  He continues on flomax  Candida of perineum. Bottom is sore and red. Current treatment is barrier cream and mepilex.   Physical deconditioning  Lives with wife in [facility]. Wife has moved to memory care. At baseline he walks with 4WW. He understands he needs more therapy before returning home.       ALLERGIES: Review of patient's allergies indicates no known allergies.  Past Medical, Surgical, Family and Social History reviewed and updated in [facility].    Current Outpatient Prescriptions   Medication Sig Dispense Refill     acetaminophen (TYLENOL) 325 MG tablet Take 2 tablets (650 mg) by mouth every 4 hours as needed for mild pain 40 tablet 0     aspirin 325 MG EC tablet Take one Aspirin tab twice daily for 5 weeks. 30 tablet 0     atorvastatin (LIPITOR) 10 MG tablet Take 1 tablet (10 mg) by mouth every evening       ferrous sulfate (IRON) 325 (65 Fe) MG tablet Take 325 mg by mouth daily (with breakfast)       insulin glargine (LANTUS) 100 UNIT/ML injection Inject 7 Units Subcutaneous every morning       insulin lispro (HUMALOG KWIKPEN) 100 UNIT/ML injection Inject Subcutaneous At Bedtime Sliding scale:  -249=1 unit  -299=2 units  -349=3 units  -399=4 units  +=5 units       insulin lispro (HUMALOG KWIKPEN) 100 UNIT/ML injection Inject 2 Units Subcutaneous 3 times daily (before meals)       insulin lispro (HUMALOG) 100 UNIT/ML injection Inject Subcutaneous 3 times daily (before  meals) Sliding scale:   -189=1 unit  -239=2 units  -289=3 units  -339=4 units  -399=5 units  -499=6 units  +=7 units       methocarbamol (ROBAXIN) 500 MG tablet Take 0.5 tablets (250 mg) by mouth 3 times daily       metoprolol succinate (TOPROL-XL) 50 MG 24 hr tablet Take 1 tablet (50 mg) by mouth daily 30 tablet      ondansetron (ZOFRAN-ODT) 4 MG ODT tab Take 1 tablet (4 mg) by mouth every 6 hours as needed for nausea or vomiting 15 tablet 0     polyethylene glycol (MIRALAX/GLYCOLAX) Packet Take 17 g by mouth daily       senna-docusate (SENOKOT-S;PERICOLACE) 8.6-50 MG per tablet Take 1 tablet by mouth 2 times daily 60 tablet 1     Skin Protectants, Misc. (ANIBAL PROTECT EX) Apply topically 2 times daily       tamsulosin (FLOMAX) 0.4 MG capsule Take 0.4 mg by mouth every evening        traMADol (ULTRAM) 50 MG tablet Take 1 tablet (50 mg) by mouth every 4 hours as needed for moderate pain 30 tablet 0     vitamin D (ERGOCALCIFEROL) 93508 UNIT capsule Take 50,000 Units by mouth once a week       ZOLPIDEM TARTRATE PO Take 5 mg by mouth At Bedtime       Medications reviewed:  Medications reconciled to facility chart and changes were made to reflect current medications as identified as above med list. Below are the changes that were made:   Medications stopped since last EPIC medication reconciliation:   Medications Discontinued During This Encounter   Medication Reason     Ergocalciferol (VITAMIN D) 66862 units CAPS      diltiazem (DILACOR XR) 120 MG 24 hr capsule        Medications started since last [facility] medication reconciliation:  Orders Placed This Encounter   Medications     ZOLPIDEM TARTRATE PO     Sig: Take 5 mg by mouth At Bedtime     ferrous sulfate (IRON) 325 (65 Fe) MG tablet     Sig: Take 325 mg by mouth daily (with breakfast)     vitamin D (ERGOCALCIFEROL) 69276 UNIT capsule     Sig: Take 50,000 Units by mouth once a week           REVIEW OF SYSTEMS:  4 point ROS  including Respiratory, CV, GI and , other than that noted in the HPI,  is negative    Physical Exam:  /63  Pulse 102  Temp 97.8  F (36.6  C)  Resp 16  Wt 149 lb 8 oz (67.8 kg)  SpO2 99%  BMI 22.08 kg/m2  GENERAL APPEARANCE:  Alert, in no distress  ENT:  Mouth and posterior oropharynx normal, moist mucous membranes, hearing acuity adequate   EYES:  EOM, conjunctivae, lids, pupils and irises normal  RESP:  respiratory effort and palpation of chest normal, no respiratory distress, Lung sounds clear  CV:  Palpation and auscultation of heart done , rate and rhythm reg, no murmur, no rub or gallop, Edema none  ABDOMEN:  normal bowel sounds, soft, nontender, no hepatosplenomegaly or other masses  M/S:   Gait and station antalgic, LE weakness, Digits and nails normal   SKIN:  Inspection/Palpation of skin and subcutaneous tissue left hip incision has aquacel dressing, lee ann rectal area with erythematous plaque and scaling.   NEURO: 2-12 in normal limits and at patient's baseline  PSYCH:  insight and judgement, memory intact , affect and mood normal      Recent Labs:   CBC RESULTS:   Recent Labs   Lab Test  10/30/18   1055  10/25/18   0705   10/22/18   0718   WBC  9.0   --    --   9.4   RBC  2.62*   --    --   2.65*   HGB  7.6*  7.5*   < >  7.9*   HCT  23.6*   --    --   24.4*   MCV  90   --    --   92   MCH  29.0   --    --   29.8   MCHC  32.2   --    --   32.4   RDW  15.3*   --    --   15.8*   PLT  327  227   --   294    < > = values in this interval not displayed.       Last Basic Metabolic Panel:  Recent Labs   Lab Test  10/30/18   1055  10/25/18   0705   NA  134  137   POTASSIUM  4.2  3.8   CHLORIDE  103  104   JOSELINE  7.9*  8.0*   CO2  22  26   BUN  32*  25   CR  1.86*  1.49*   GLC  178*  165*       Liver Function Studies -   Recent Labs   Lab Test  10/09/18   0703   PROTTOTAL  6.3*   ALBUMIN  2.1*   BILITOTAL  0.6   ALKPHOS  72   AST  22   ALT  12       TSH   Date Value Ref Range Status   10/08/2018 4.57 (H)  0.40 - 4.00 mU/L Final   04/02/2003 2.08 0.4 - 5.0 mU/L Final       Lab Results   Component Value Date    A1C 8.4 08/25/2018    A1C 7.8 04/22/2014         Assessment/Plan:  (Z96.642) Status post total replacement of left hip  (primary encounter diagnosis)  Comment: recent hospital stay due to second left hip dislocation following left hip fracture. Patient underwent left hip total arthroplasty. No complications. He does report lumbar spasms with pain down left thigh. Robaxin was added on 10/26. He is working with therapy.   Plan:no change  (Z78.9) Deep vein thrombosis (DVT) prophylaxis prescribed at discharge  Comment: per ortho  Plan: asa 325mg BID for 5 weeks    (D62) Anemia due to blood loss, acute  Comment: hgb remains in mid 7s. fe so4 added on 10/30  Plan: cbc next week.     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 Creat at baseline  Plan: continue PTA lantus and humulog,    (I25.810) Coronary artery disease involving coronary bypass graft of native heart without angina pectoris  (I49.9) Cardiac arrhythmia, unspecified cardiac arrhythmia type  Comment: s/p CABG years ago. Had arrhythmias after first surgery in August. Started on metop and diltiazem at that time. 10/30 had SBP in 80s and felt light headed. diltazem was stopped. Today SBP >100. Feels better  Plan: monitor HR and BP.     (I10) Benign essential hypertension  Comment: BPs better without diltiazem  Plan: monitor     (N40.1) Benign prostatic hyperplasia with lower urinary tract symptoms, symptom details unspecified  Comment: asymptomatic   Plan: continue flomax    (B37.49) Candidiasis of perineum  Comment: due to moisture   Plan: DISCONTINUE barrier cream and mepilex. Start nystatin cream to bottom BID    (R53.81) Physical deconditioning  Comment: due to two hip fractures in past two months. He feels quite deconditioned. He was able to  "stand and transfer. He is motivated to work with theraoy  Plan: physical therapy and OCCUPATIONAL THERAPY    Insomnia  Comment\" having trouble sleeping. Has taken ambien in past  Plan: ambien 5mg q hs    Electronically signed by  WILLIAM Cota CNP                  "

## 2018-10-31 NOTE — LETTER
10/31/2018        RE: Abimael Flores  [address]  Formerly named [facility] [id number]        [place] [facility]    Chief Complaint   Patient presents with     RECHECK       [place] Medical Record Number:  [id number]  Place of Service where encounter took place:  [facility] [[id number]]    HPI:    Abimael Flores is a 89 year old  (1/23/1924), who is being seen today for an episodic care visit.  HPI information obtained from: facility chart records, facility staff, patient report and [facility] chart review.  Current issues are:      Status post total replacement of left hip  Patient transferred back to [facility] after treatment for left hip dislocation. He had been at [facility] recovering from right hip fracture since 9/3. He fell at [facility] on 10/4 and suffered a left hip fracture. This was repaired at [facility] and he returned to TCU on 10/10. He fell again on 10/14 and he dislocated his left hip. Left hip was reduced in ED and he returned to [facility]. He bent over to tie his shoe on 10/21 and left hip dislocated again.   He was evaluated by ortho and underwent a left hip total arthroplasty on 10/22. There were intraoperative complications.   Post operatively he did have some muscle spasms and confusion.  Activity is WBAT with hip precautions. He does report lumbar back pain with some pain down left thigh. He has tramadol for pain.   Deep vein thrombosis (DVT) prophylaxis prescribed at discharge  Per ortho ASA 325mg BID for 5 weeks.   He is getting up in chair.   Anemia due to blood loss, acute   His hemoglobin did drop from 7.6 to 7.2. No transfusions required.   Hemoglobin   Date Value Ref Range Status   10/30/2018 7.6 (L) 13.3 - 17.7 g/dL Final   10/25/2018 7.5 (L) 13.3 - 17.7 g/dL Final   iron supp added on 10/30    Type 2 diabetes mellitus with stage 3 chronic kidney disease, with long-term current use of insulin (H)  A1C 8.4 on 8/25/18. He has been on lantus 7u daily with humulog 2  unit(s) TID.   FBG:   Am 205, 170, 193  Bwrc433, 223, 206  5pm 90, 114, 250  Hs 191, 194, 318  Baseline creat 1.7-1.9.   Coronary artery disease  Cardiac arrhythmia  - s/p CABG x 4 in 1996. He did have some cardiac arrhythmias following first surgery in August. He was started on metop (replacing atenolol) and diltiazem. No issues with arrhythmias during this surgery.   Benign essential hypertension  BP's: 105/63, 119/55, 142/73  He continues on toprol XL 50mg q day. Diltiazem stopped on 10/30/18 due to SBP in 80s  Benign prostatic hyperplasia with lower urinary tract symptoms, symptom details unspecified  He continues on flomax  Candida of perineum. Bottom is sore and red. Current treatment is barrier cream and mepilex.   Physical deconditioning  Lives with wife in [facility]. Wife has moved to [facility]. At baseline he walks with 4WW. He understands he needs more therapy before returning home.       ALLERGIES: Review of patient's allergies indicates no known allergies.  Past Medical, Surgical, Family and Social History reviewed and updated in [facility].    Current Outpatient Prescriptions   Medication Sig Dispense Refill     acetaminophen (TYLENOL) 325 MG tablet Take 2 tablets (650 mg) by mouth every 4 hours as needed for mild pain 40 tablet 0     aspirin 325 MG EC tablet Take one Aspirin tab twice daily for 5 weeks. 30 tablet 0     atorvastatin (LIPITOR) 10 MG tablet Take 1 tablet (10 mg) by mouth every evening       ferrous sulfate (IRON) 325 (65 Fe) MG tablet Take 325 mg by mouth daily (with breakfast)       insulin glargine (LANTUS) 100 UNIT/ML injection Inject 7 Units Subcutaneous every morning       insulin lispro (HUMALOG KWIKPEN) 100 UNIT/ML injection Inject Subcutaneous At Bedtime Sliding scale:  -249=1 unit  -299=2 units  -349=3 units  -399=4 units  +=5 units       insulin lispro (HUMALOG KWIKPEN) 100 UNIT/ML injection Inject 2 Units Subcutaneous 3 times daily (before meals)        insulin lispro (HUMALOG) 100 UNIT/ML injection Inject Subcutaneous 3 times daily (before meals) Sliding scale:   -189=1 unit  -239=2 units  -289=3 units  -339=4 units  -399=5 units  -499=6 units  +=7 units       methocarbamol (ROBAXIN) 500 MG tablet Take 0.5 tablets (250 mg) by mouth 3 times daily       metoprolol succinate (TOPROL-XL) 50 MG 24 hr tablet Take 1 tablet (50 mg) by mouth daily 30 tablet      ondansetron (ZOFRAN-ODT) 4 MG ODT tab Take 1 tablet (4 mg) by mouth every 6 hours as needed for nausea or vomiting 15 tablet 0     polyethylene glycol (MIRALAX/GLYCOLAX) Packet Take 17 g by mouth daily       senna-docusate (SENOKOT-S;PERICOLACE) 8.6-50 MG per tablet Take 1 tablet by mouth 2 times daily 60 tablet 1     Skin Protectants, Misc. (ANIBAL PROTECT EX) Apply topically 2 times daily       tamsulosin (FLOMAX) 0.4 MG capsule Take 0.4 mg by mouth every evening        traMADol (ULTRAM) 50 MG tablet Take 1 tablet (50 mg) by mouth every 4 hours as needed for moderate pain 30 tablet 0     vitamin D (ERGOCALCIFEROL) 11405 UNIT capsule Take 50,000 Units by mouth once a week       ZOLPIDEM TARTRATE PO Take 5 mg by mouth At Bedtime       Medications reviewed:  Medications reconciled to facility chart and changes were made to reflect current medications as identified as above med list. Below are the changes that were made:   Medications stopped since last EPIC medication reconciliation:   Medications Discontinued During This Encounter   Medication Reason     Ergocalciferol (VITAMIN D) 72561 units CAPS      diltiazem (DILACOR XR) 120 MG 24 hr capsule        Medications started since last [facility] medication reconciliation:  Orders Placed This Encounter   Medications     ZOLPIDEM TARTRATE PO     Sig: Take 5 mg by mouth At Bedtime     ferrous sulfate (IRON) 325 (65 Fe) MG tablet     Sig: Take 325 mg by mouth daily (with breakfast)     vitamin D (ERGOCALCIFEROL) 47157 UNIT capsule      Sig: Take 50,000 Units by mouth once a week           REVIEW OF SYSTEMS:  4 point ROS including Respiratory, CV, GI and , other than that noted in the HPI,  is negative    Physical Exam:  /63  Pulse 102  Temp 97.8  F (36.6  C)  Resp 16  Wt 149 lb 8 oz (67.8 kg)  SpO2 99%  BMI 22.08 kg/m2  GENERAL APPEARANCE:  Alert, in no distress  ENT:  Mouth and posterior oropharynx normal, moist mucous membranes, hearing acuity adequate   EYES:  EOM, conjunctivae, lids, pupils and irises normal  RESP:  respiratory effort and palpation of chest normal, no respiratory distress, Lung sounds clear  CV:  Palpation and auscultation of heart done , rate and rhythm reg, no murmur, no rub or gallop, Edema none  ABDOMEN:  normal bowel sounds, soft, nontender, no hepatosplenomegaly or other masses  M/S:   Gait and station antalgic, LE weakness, Digits and nails normal   SKIN:  Inspection/Palpation of skin and subcutaneous tissue left hip incision has aquacel dressing, lee ann rectal area with erythematous plaque and scaling.   NEURO: 2-12 in normal limits and at patient's baseline  PSYCH:  insight and judgement, memory intact , affect and mood normal      Recent Labs:   CBC RESULTS:   Recent Labs   Lab Test  10/30/18   1055  10/25/18   0705   10/22/18   0718   WBC  9.0   --    --   9.4   RBC  2.62*   --    --   2.65*   HGB  7.6*  7.5*   < >  7.9*   HCT  23.6*   --    --   24.4*   MCV  90   --    --   92   MCH  29.0   --    --   29.8   MCHC  32.2   --    --   32.4   RDW  15.3*   --    --   15.8*   PLT  327  227   --   294    < > = values in this interval not displayed.       Last Basic Metabolic Panel:  Recent Labs   Lab Test  10/30/18   1055  10/25/18   0705   NA  134  137   POTASSIUM  4.2  3.8   CHLORIDE  103  104   JOSELINE  7.9*  8.0*   CO2  22  26   BUN  32*  25   CR  1.86*  1.49*   GLC  178*  165*       Liver Function Studies -   Recent Labs   Lab Test  10/09/18   0703   PROTTOTAL  6.3*   ALBUMIN  2.1*   BILITOTAL  0.6   ALKPHOS   72   AST  22   ALT  12       TSH   Date Value Ref Range Status   10/08/2018 4.57 (H) 0.40 - 4.00 mU/L Final   04/02/2003 2.08 0.4 - 5.0 mU/L Final       Lab Results   Component Value Date    A1C 8.4 08/25/2018    A1C 7.8 04/22/2014         Assessment/Plan:  (Z96.642) Status post total replacement of left hip  (primary encounter diagnosis)  Comment: recent hospital stay due to second left hip dislocation following left hip fracture. Patient underwent left hip total arthroplasty. No complications. He does report lumbar spasms with pain down left thigh. Robaxin was added on 10/26. He is working with therapy.   Plan:no change  (Z78.9) Deep vein thrombosis (DVT) prophylaxis prescribed at discharge  Comment: per ortho  Plan: asa 325mg BID for 5 weeks    (D62) Anemia due to blood loss, acute  Comment: hgb remains in mid 7s. fe so4 added on 10/30  Plan: cbc next week.     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 Creat at baseline  Plan: continue PTA lantus and humulog,    (I25.810) Coronary artery disease involving coronary bypass graft of native heart without angina pectoris  (I49.9) Cardiac arrhythmia, unspecified cardiac arrhythmia type  Comment: s/p CABG years ago. Had arrhythmias after first surgery in August. Started on metop and diltiazem at that time. 10/30 had SBP in 80s and felt light headed. diltazem was stopped. Today SBP >100. Feels better  Plan: monitor HR and BP.     (I10) Benign essential hypertension  Comment: BPs better without diltiazem  Plan: monitor     (N40.1) Benign prostatic hyperplasia with lower urinary tract symptoms, symptom details unspecified  Comment: asymptomatic   Plan: continue flomax    (B37.49) Candidiasis of perineum  Comment: due to moisture   Plan: DISCONTINUE barrier cream and mepilex. Start nystatin cream to bottom BID    (R53.81) Physical deconditioning  Comment: due to  "two hip fractures in past two months. He feels quite deconditioned. He was able to stand and transfer. He is motivated to work with [employer]  Plan: physical therapy and OCCUPATIONAL THERAPY    Insomnia  Comment\" having trouble sleeping. Has taken ambien in past  Plan: ambien 5mg q hs    Electronically signed by  WILLIAM Cota CNP                      Sincerely,        WILLIAM Cota CNP    "

## 2018-11-01 ENCOUNTER — HOSPITAL LABORATORY (OUTPATIENT)
Dept: OTHER | Facility: CLINIC | Age: 83
End: 2018-11-01

## 2018-11-01 LAB
ANION GAP SERPL CALCULATED.3IONS-SCNC: 7 MMOL/L (ref 3–14)
BUN SERPL-MCNC: 30 MG/DL (ref 7–30)
CALCIUM SERPL-MCNC: 7.8 MG/DL (ref 8.5–10.1)
CHLORIDE SERPL-SCNC: 106 MMOL/L (ref 94–109)
CO2 SERPL-SCNC: 25 MMOL/L (ref 20–32)
CREAT SERPL-MCNC: 1.55 MG/DL (ref 0.66–1.25)
ERYTHROCYTE [DISTWIDTH] IN BLOOD BY AUTOMATED COUNT: 15.2 % (ref 10–15)
GFR SERPL CREATININE-BSD FRML MDRD: 42 ML/MIN/1.7M2
GLUCOSE SERPL-MCNC: 45 MG/DL (ref 70–99)
HCT VFR BLD AUTO: 23.4 % (ref 40–53)
HGB BLD-MCNC: 7.4 G/DL (ref 13.3–17.7)
MCH RBC QN AUTO: 28.4 PG (ref 26.5–33)
MCHC RBC AUTO-ENTMCNC: 31.6 G/DL (ref 31.5–36.5)
MCV RBC AUTO: 90 FL (ref 78–100)
PLATELET # BLD AUTO: 392 10E9/L (ref 150–450)
POTASSIUM SERPL-SCNC: 3.7 MMOL/L (ref 3.4–5.3)
RBC # BLD AUTO: 2.61 10E12/L (ref 4.4–5.9)
SODIUM SERPL-SCNC: 138 MMOL/L (ref 133–144)
WBC # BLD AUTO: 11.5 10E9/L (ref 4–11)

## 2018-11-02 ENCOUNTER — NURSING HOME VISIT (OUTPATIENT)
Dept: GERIATRICS | Facility: CLINIC | Age: 83
End: 2018-11-02
Payer: MEDICARE

## 2018-11-02 ENCOUNTER — HOSPITAL LABORATORY (OUTPATIENT)
Dept: OTHER | Facility: CLINIC | Age: 83
End: 2018-11-02

## 2018-11-02 VITALS
DIASTOLIC BLOOD PRESSURE: 62 MMHG | HEART RATE: 91 BPM | SYSTOLIC BLOOD PRESSURE: 117 MMHG | RESPIRATION RATE: 17 BRPM | OXYGEN SATURATION: 100 % | TEMPERATURE: 98.1 F

## 2018-11-02 DIAGNOSIS — G89.29 OTHER CHRONIC PAIN: ICD-10-CM

## 2018-11-02 DIAGNOSIS — D62 ANEMIA DUE TO BLOOD LOSS, ACUTE: ICD-10-CM

## 2018-11-02 DIAGNOSIS — R52 ACUTE PAIN: ICD-10-CM

## 2018-11-02 DIAGNOSIS — R63.4 WEIGHT LOSS: ICD-10-CM

## 2018-11-02 DIAGNOSIS — Z96.642 STATUS POST TOTAL REPLACEMENT OF LEFT HIP: Primary | ICD-10-CM

## 2018-11-02 DIAGNOSIS — N18.30 TYPE 2 DIABETES MELLITUS WITH STAGE 3 CHRONIC KIDNEY DISEASE, WITH LONG-TERM CURRENT USE OF INSULIN (H): ICD-10-CM

## 2018-11-02 DIAGNOSIS — Z79.4 TYPE 2 DIABETES MELLITUS WITH STAGE 3 CHRONIC KIDNEY DISEASE, WITH LONG-TERM CURRENT USE OF INSULIN (H): ICD-10-CM

## 2018-11-02 DIAGNOSIS — R53.81 DEBILITY: ICD-10-CM

## 2018-11-02 DIAGNOSIS — E11.22 TYPE 2 DIABETES MELLITUS WITH STAGE 3 CHRONIC KIDNEY DISEASE, WITH LONG-TERM CURRENT USE OF INSULIN (H): ICD-10-CM

## 2018-11-02 DIAGNOSIS — I10 ESSENTIAL HYPERTENSION: ICD-10-CM

## 2018-11-02 DIAGNOSIS — Z86.79 H/O CARDIAC ARRHYTHMIA: ICD-10-CM

## 2018-11-02 DIAGNOSIS — N40.1 BENIGN PROSTATIC HYPERPLASIA WITH LOWER URINARY TRACT SYMPTOMS, SYMPTOM DETAILS UNSPECIFIED: ICD-10-CM

## 2018-11-02 LAB — HGB BLD-MCNC: 7.7 G/DL (ref 13.3–17.7)

## 2018-11-02 PROCEDURE — 99309 SBSQ NF CARE MODERATE MDM 30: CPT | Performed by: NURSE PRACTITIONER

## 2018-11-02 RX ORDER — NYSTATIN 100000 U/G
CREAM TOPICAL 2 TIMES DAILY
COMMUNITY
End: 2019-01-05

## 2018-11-02 NOTE — PROGRESS NOTES
[place] [facility]    Chief Complaint   Patient presents with     DAVEMOSES       [place] Medical Record Number:  [id number]  [facility] of [facility] where encounter took place[facility] [[id number]]    HPI:    Abimael Flores is a 89 year old  (1/23/1924), who is being seen today for an episodic care visit.  HPI information obtained from: facility chart records, facility staff, patient report and [facility] chart review.    Met with Dr. Flores today for follow up.  Dr. Flores reports he has been feeling better lately, sleeping better lately.  Reports his acute pain as much improved, minimal lately, endorses ongoing chronic pelvic/low back pain as stable/unchanged.  Reports he still feels weak with low endurance, but otherwise no complaints.     ALLERGIES: Review of patient's allergies indicates no known allergies.  Past Medical, Surgical, Family and Social History reviewed and updated in [facility].    Current Outpatient Prescriptions   Medication Sig Dispense Refill     acetaminophen (TYLENOL) 325 MG tablet Take 2 tablets (650 mg) by mouth every 4 hours as needed for mild pain 40 tablet 0     aspirin 325 MG EC tablet Take one Aspirin tab twice daily for 5 weeks. 30 tablet 0     atorvastatin (LIPITOR) 10 MG tablet Take 1 tablet (10 mg) by mouth every evening       ferrous sulfate (IRON) 325 (65 Fe) MG tablet Take 325 mg by mouth daily (with breakfast)       insulin glargine (LANTUS) 100 UNIT/ML injection Inject 7 Units Subcutaneous every morning       insulin lispro (HUMALOG KWIKPEN) 100 UNIT/ML injection Inject Subcutaneous At Bedtime Sliding scale:  -249=1 unit  -299=2 units  -349=3 units  -399=4 units  +=5 units       insulin lispro (HUMALOG KWIKPEN) 100 UNIT/ML injection Inject 2 Units Subcutaneous 3 times daily (before meals)       insulin lispro (HUMALOG) 100 UNIT/ML injection Inject Subcutaneous 3 times daily (before meals) Sliding scale:   BG  140-189=1 unit  -239=2 units  -289=3 units  -339=4 units  -399=5 units  -499=6 units  +=7 units       methocarbamol (ROBAXIN) 500 MG tablet Take 0.5 tablets (250 mg) by mouth 3 times daily       metoprolol succinate (TOPROL-XL) 50 MG 24 hr tablet Take 1 tablet (50 mg) by mouth daily 30 tablet      nystatin (MYCOSTATIN) cream Apply topically 2 times daily       ondansetron (ZOFRAN-ODT) 4 MG ODT tab Take 1 tablet (4 mg) by mouth every 6 hours as needed for nausea or vomiting 15 tablet 0     polyethylene glycol (MIRALAX/GLYCOLAX) Packet Take 17 g by mouth daily       senna-docusate (SENOKOT-S;PERICOLACE) 8.6-50 MG per tablet Take 1 tablet by mouth 2 times daily 60 tablet 1     tamsulosin (FLOMAX) 0.4 MG capsule Take 0.8 mg by mouth every evening        traMADol (ULTRAM) 50 MG tablet Take 1 tablet (50 mg) by mouth every 4 hours as needed for moderate pain 30 tablet 0     vitamin D (ERGOCALCIFEROL) 41593 UNIT capsule Take 50,000 Units by mouth once a week       ZOLPIDEM TARTRATE PO Take 5 mg by mouth At Bedtime       Medications reviewed:  Medications reconciled to facility chart and changes were made to reflect current medications as identified as above med list. Below are the changes that were made:   Medications stopped since last EPIC medication reconciliation:   Medications Discontinued During This Encounter   Medication Reason     Skin Protectants, Misc. (ANIBAL PROTECT EX)        Medications started since last [facility] medication reconciliation:  Orders Placed This Encounter   Medications     nystatin (MYCOSTATIN) cream     Sig: Apply topically 2 times daily       REVIEW OF SYSTEMS:  7 point ROS done including, light headedness/dizziness, fever/chills, pain, Resp, CV, GI, and  and is negative other than noted in HPI.      Physical Exam:  /62  Pulse 91  Temp 98.1  F (36.7  C)  Resp 17  SpO2 100%     GENERAL APPEARANCE:  Elderly male sitting up in chair, NAD,  "non-toxic.  ENT:  Mouth and oropharynx normal, moist mucous membranes.   RESP:  Lungs CTA.  Regular relaxed breathing effort.  No cough.   CV: 2/6 systolic murmur heard loudest 2 ICS LSB/S2.  Regular rhythm and rate.  No generalized edema.   ABDOMEN:   Flat, soft, non-tender with active bowel sounds.  No guarding, rigidity, or rebound tenderness.  EXTREMITIES:  No lower extremity edema, no calf tenderness.   PSYCH: Alert and orientated, pleasant and cooperative.   WOUND: He declined Left hip wound assessment.  Reported \"It's fine\"      Recent Labs: I have personally reviewed labs, which are in facility chart.    CBC RESULTS:   Recent Labs   Lab Test  11/01/18   1130  10/30/18   1055   WBC  11.5*  9.0   RBC  2.61*  2.62*   HGB  7.4*  7.6*   HCT  23.4*  23.6*   MCV  90  90   MCH  28.4  29.0   MCHC  31.6  32.2   RDW  15.2*  15.3*   PLT  392  327       Last Basic Metabolic Panel:  Recent Labs   Lab Test  11/01/18   1130  10/30/18   1055   NA  138  134   POTASSIUM  3.7  4.2   CHLORIDE  106  103   JOSELINE  7.8*  7.9*   CO2  25  22   BUN  30  32*   CR  1.55*  1.86*   GLC  45*  178*       Liver Function Studies -   Recent Labs   Lab Test  10/09/18   0703   PROTTOTAL  6.3*   ALBUMIN  2.1*   BILITOTAL  0.6   ALKPHOS  72   AST  22   ALT  12       TSH   Date Value Ref Range Status   10/08/2018 4.57 (H) 0.40 - 4.00 mU/L Final   04/02/2003 2.08 0.4 - 5.0 mU/L Final     Lab Results   Component Value Date    A1C 8.4 08/25/2018    A1C 7.8 04/22/2014       Assessment/Plan:  Status post total replacement of left hip 22 Oct 18  Acute pain  Other chronic pain  Reports his acute pain as minimal/tolerable, reports his chronic pain as stable/unchanged.  Reports he's been feeling better lately, but still weak with low endurance.   Per Ortho:  -WBAT.  - mg BID x 5 weeks.   -Continues on scheduled Methocarbamol.   -Continues on PRN Acetaminophen and Tramadol.     Essential hypertension  H/O cardiac arrhythmia  Review of VS's show VSS and " within acceptable range.   No current s/s of clinical CHF.   -No changes, continue to clinically monitor.     Type 2 diabetes mellitus with stage 3 chronic kidney disease, with long-term current use of insulin (H)  A1c was 8.4 in Aug.   Review of QID glucoses show ongoing good daily control.   -Continues on q daily Lantus and TID scheduled Humalog.      Benign prostatic hyperplasia with lower urinary tract symptoms, symptom details unspecified  Denies any symptoms today, continues on recently increased Flomax.   -Flomax at 0.8 mg's q daily.     Anemia due to blood loss, acute  Hgb was done to 7.6 at 'dc, started on Fe supplement, Hgb yesterday was down to 7.4.  No overt s/s of bleeding.   -Continue q daily Fe.   -CBC recheck 8 Nov.     Weight loss  Was seen by nutrition, continues on Supplements.     Debility  -Continues to work with PT/OT.     Orders:  -As noted above.    Electronically signed by  WILLIAM Hernandez CNP

## 2018-11-02 NOTE — LETTER
11/2/2018        RE: Abimael Flores  [address]  [facility] [address]        [place] [facility]    Chief Complaint   Patient presents with     RECHECK       [place] Medical Record Number:  [id number]  [facility] of [facility] where encounter took place[facility] [[id number]]    HPI:    Abimael Flores is a 89 year old  (1/23/1924), who is being seen today for an episodic care visit.  HPI information obtained from: facility chart records, facility staff, patient report and [facility] chart review.    Met with Dr. Flores today for follow up.  Dr. Flores reports he has been feeling better lately, sleeping better lately.  Reports his acute pain as much improved, minimal lately, endorses ongoing chronic pelvic/low back pain as stable/unchanged.  Reports he still feels weak with low endurance, but otherwise no complaints.     ALLERGIES: Review of patient's allergies indicates no known allergies.  Past Medical, Surgical, Family and Social History reviewed and updated in [facility].    Current Outpatient Prescriptions   Medication Sig Dispense Refill     acetaminophen (TYLENOL) 325 MG tablet Take 2 tablets (650 mg) by mouth every 4 hours as needed for mild pain 40 tablet 0     aspirin 325 MG EC tablet Take one Aspirin tab twice daily for 5 weeks. 30 tablet 0     atorvastatin (LIPITOR) 10 MG tablet Take 1 tablet (10 mg) by mouth every evening       ferrous sulfate (IRON) 325 (65 Fe) MG tablet Take 325 mg by mouth daily (with breakfast)       insulin glargine (LANTUS) 100 UNIT/ML injection Inject 7 Units Subcutaneous every morning       insulin lispro (HUMALOG KWIKPEN) 100 UNIT/ML injection Inject Subcutaneous At Bedtime Sliding scale:  -249=1 unit  -299=2 units  -349=3 units  -399=4 units  +=5 units       insulin lispro (HUMALOG KWIKPEN) 100 UNIT/ML injection Inject 2 Units Subcutaneous 3 times daily (before meals)       insulin lispro (HUMALOG) 100  UNIT/ML injection Inject Subcutaneous 3 times daily (before meals) Sliding scale:   -189=1 unit  -239=2 units  -289=3 units  -339=4 units  -399=5 units  -499=6 units  +=7 units       methocarbamol (ROBAXIN) 500 MG tablet Take 0.5 tablets (250 mg) by mouth 3 times daily       metoprolol succinate (TOPROL-XL) 50 MG 24 hr tablet Take 1 tablet (50 mg) by mouth daily 30 tablet      nystatin (MYCOSTATIN) cream Apply topically 2 times daily       ondansetron (ZOFRAN-ODT) 4 MG ODT tab Take 1 tablet (4 mg) by mouth every 6 hours as needed for nausea or vomiting 15 tablet 0     polyethylene glycol (MIRALAX/GLYCOLAX) Packet Take 17 g by mouth daily       senna-docusate (SENOKOT-S;PERICOLACE) 8.6-50 MG per tablet Take 1 tablet by mouth 2 times daily 60 tablet 1     tamsulosin (FLOMAX) 0.4 MG capsule Take 0.8 mg by mouth every evening        traMADol (ULTRAM) 50 MG tablet Take 1 tablet (50 mg) by mouth every 4 hours as needed for moderate pain 30 tablet 0     vitamin D (ERGOCALCIFEROL) 38034 UNIT capsule Take 50,000 Units by mouth once a week       ZOLPIDEM TARTRATE PO Take 5 mg by mouth At Bedtime       Medications reviewed:  Medications reconciled to facility chart and changes were made to reflect current medications as identified as above med list. Below are the changes that were made:   Medications stopped since last EPIC medication reconciliation:   Medications Discontinued During This Encounter   Medication Reason     Skin Protectants, Misc. (ANIBAL PROTECT EX)        Medications started since last [facility] medication reconciliation:  Orders Placed This Encounter   Medications     nystatin (MYCOSTATIN) cream     Sig: Apply topically 2 times daily       REVIEW OF SYSTEMS:  7 point ROS done including, light headedness/dizziness, fever/chills, pain, Resp, CV, GI, and  and is negative other than noted in HPI.      Physical Exam:  /62  Pulse 91  Temp 98.1  F (36.7  C)  Resp 17   "SpO2 100%     GENERAL APPEARANCE:  Elderly male sitting up in chair, NAD, non-toxic.  ENT:  Mouth and oropharynx normal, moist mucous membranes.   RESP:  Lungs CTA.  Regular relaxed breathing effort.  No cough.   CV: 2/6 systolic murmur heard loudest 2 ICS LSB/S2.  Regular rhythm and rate.  No generalized edema.   ABDOMEN:   Flat, soft, non-tender with active bowel sounds.  No guarding, rigidity, or rebound tenderness.  EXTREMITIES:  No lower extremity edema, no calf tenderness.   PSYCH: Alert and orientated, pleasant and cooperative.   WOUND: He declined Left hip wound assessment.  Reported \"It's fine\"      Recent Labs: I have personally reviewed labs, which are in facility chart.    CBC RESULTS:   Recent Labs   Lab Test  11/01/18   1130  10/30/18   1055   WBC  11.5*  9.0   RBC  2.61*  2.62*   HGB  7.4*  7.6*   HCT  23.4*  23.6*   MCV  90  90   MCH  28.4  29.0   MCHC  31.6  32.2   RDW  15.2*  15.3*   PLT  392  327       Last Basic Metabolic Panel:  Recent Labs   Lab Test  11/01/18   1130  10/30/18   1055   NA  138  134   POTASSIUM  3.7  4.2   CHLORIDE  106  103   JOSELINE  7.8*  7.9*   CO2  25  22   BUN  30  32*   CR  1.55*  1.86*   GLC  45*  178*       Liver Function Studies -   Recent Labs   Lab Test  10/09/18   0703   PROTTOTAL  6.3*   ALBUMIN  2.1*   BILITOTAL  0.6   ALKPHOS  72   AST  22   ALT  12       TSH   Date Value Ref Range Status   10/08/2018 4.57 (H) 0.40 - 4.00 mU/L Final   04/02/2003 2.08 0.4 - 5.0 mU/L Final     Lab Results   Component Value Date    A1C 8.4 08/25/2018    A1C 7.8 04/22/2014       Assessment/Plan:  Status post total replacement of left hip 22 Oct 18  Acute pain  Other chronic pain  Reports his acute pain as minimal/tolerable, reports his chronic pain as stable/unchanged.  Reports he's been feeling better lately, but still weak with low endurance.   Per Ortho:  -WBAT.  - mg BID x 5 weeks.   -Continues on scheduled Methocarbamol.   -Continues on PRN Acetaminophen and Tramadol. "     Essential hypertension  H/O cardiac arrhythmia  Review of VS's show VSS and within acceptable range.   No current s/s of clinical CHF.   -No changes, continue to clinically monitor.     Type 2 diabetes mellitus with stage 3 chronic kidney disease, with long-term current use of insulin (H)  A1c was 8.4 in Aug.   Review of QID glucoses show ongoing good daily control.   -Continues on q daily Lantus and TID scheduled Humalog.      Benign prostatic hyperplasia with lower urinary tract symptoms, symptom details unspecified  Denies any symptoms today, continues on recently increased Flomax.   -Flomax at 0.8 mg's q daily.     Anemia due to blood loss, acute  Hgb was done to 7.6 at 'dc, started on Fe supplement, Hgb yesterday was down to 7.4.  No overt s/s of bleeding.   -Continue q daily Fe.   -CBC recheck 8 Nov.     Weight loss  Was seen by nutrition, continues on Supplements.     Debility  -Continues to work with PT/OT.     Orders:  -As noted above.    Electronically signed by  WILLIAM Hernandez CNP                      Sincerely,        WILLIAM Hernandez CNP

## 2018-11-07 ENCOUNTER — NURSING HOME VISIT (OUTPATIENT)
Dept: GERIATRICS | Facility: CLINIC | Age: 83
End: 2018-11-07
Payer: MEDICARE

## 2018-11-07 VITALS
BODY MASS INDEX: 21.83 KG/M2 | WEIGHT: 147.8 LBS | RESPIRATION RATE: 16 BRPM | SYSTOLIC BLOOD PRESSURE: 108 MMHG | DIASTOLIC BLOOD PRESSURE: 62 MMHG | HEART RATE: 102 BPM | OXYGEN SATURATION: 94 % | TEMPERATURE: 96.7 F

## 2018-11-07 DIAGNOSIS — I49.9 CARDIAC ARRHYTHMIA, UNSPECIFIED CARDIAC ARRHYTHMIA TYPE: ICD-10-CM

## 2018-11-07 DIAGNOSIS — Z78.9 DEEP VEIN THROMBOSIS (DVT) PROPHYLAXIS PRESCRIBED AT DISCHARGE: ICD-10-CM

## 2018-11-07 DIAGNOSIS — N18.30 TYPE 2 DIABETES MELLITUS WITH STAGE 3 CHRONIC KIDNEY DISEASE, WITH LONG-TERM CURRENT USE OF INSULIN (H): ICD-10-CM

## 2018-11-07 DIAGNOSIS — R53.81 PHYSICAL DECONDITIONING: ICD-10-CM

## 2018-11-07 DIAGNOSIS — I10 BENIGN ESSENTIAL HYPERTENSION: ICD-10-CM

## 2018-11-07 DIAGNOSIS — I25.810 CORONARY ARTERY DISEASE INVOLVING CORONARY BYPASS GRAFT OF NATIVE HEART WITHOUT ANGINA PECTORIS: ICD-10-CM

## 2018-11-07 DIAGNOSIS — Z79.4 TYPE 2 DIABETES MELLITUS WITH STAGE 3 CHRONIC KIDNEY DISEASE, WITH LONG-TERM CURRENT USE OF INSULIN (H): ICD-10-CM

## 2018-11-07 DIAGNOSIS — D62 ANEMIA DUE TO BLOOD LOSS, ACUTE: ICD-10-CM

## 2018-11-07 DIAGNOSIS — E11.22 TYPE 2 DIABETES MELLITUS WITH STAGE 3 CHRONIC KIDNEY DISEASE, WITH LONG-TERM CURRENT USE OF INSULIN (H): ICD-10-CM

## 2018-11-07 DIAGNOSIS — Z96.642 STATUS POST TOTAL REPLACEMENT OF LEFT HIP: Primary | ICD-10-CM

## 2018-11-07 DIAGNOSIS — N40.1 BENIGN PROSTATIC HYPERPLASIA WITH LOWER URINARY TRACT SYMPTOMS, SYMPTOM DETAILS UNSPECIFIED: ICD-10-CM

## 2018-11-07 DIAGNOSIS — Z96.642 STATUS POST TOTAL REPLACEMENT OF LEFT HIP: ICD-10-CM

## 2018-11-07 PROCEDURE — 99309 SBSQ NF CARE MODERATE MDM 30: CPT | Performed by: NURSE PRACTITIONER

## 2018-11-07 RX ORDER — METHOCARBAMOL 500 MG/1
250 TABLET, FILM COATED ORAL 3 TIMES DAILY PRN
Qty: 120 TABLET
Start: 2018-11-07 | End: 2018-11-16

## 2018-11-07 NOTE — LETTER
11/7/2018        RE: Abimael Flores  [address]  [facility] [id number]        [place] [facility]    Chief Complaint   Patient presents with     RECHECK       [place] [facility] Record Number:  [id number]  Place of Service where encounter took place:  [facility] [[id number]]    HPI:    Abimael Flores is a 89 year old  (1/23/1924), who is being seen today for an episodic care visit.  HPI information obtained from: facility chart records, facility staff, patient report and [facility] chart review.  Current issues are:      Status post total replacement of left hip  Patient transferred back to [facility] after treatment for left hip dislocation. He had been at [facility] recovering from right hip fracture since 9/3. He fell at [employer] on 10/4 and suffered a left hip fracture. This was repaired at [facility] and he returned to TCU on 10/10. He fell again on 10/14 and he dislocated his left hip. Left hip was reduced in ED and he returned to [facility]. He bent over to tie his shoe on 10/21 and left hip dislocated again.   He was evaluated by ortho and underwent a left hip total arthroplasty on 10/22. There were intraoperative complications.   Post operatively he did have some muscle spasms and confusion.  Activity is WBAT with hip precautions. He does report lumbar back pain with some pain down left thigh. He has tramadol for pain.   Deep vein thrombosis (DVT) prophylaxis prescribed at discharge  Per ortho ASA 325mg BID for 5 weeks.   He is getting up in chair.   Anemia due to blood loss, acute   His hemoglobin did drop from 7.6 to 7.2. No transfusions required.   Hemoglobin   Date Value Ref Range Status   11/02/2018 7.7 (L) 13.3 - 17.7 g/dL Final   11/01/2018 7.4 (L) 13.3 - 17.7 g/dL Final   iron supp added on 10/30    Type 2 diabetes mellitus with stage 3 chronic kidney disease, with long-term current use of insulin (H)  A1C 8.4 on 8/25/18. He has been on lantus 7u daily with humulog 2  unit(s) TID.   FBG:   Am 182, 149. 172  Noon 544, 191, 316  5pm 303, 237, 122  Hs 180, 203, 325  Baseline creat 1.7-1.9.   Coronary artery disease  Cardiac arrhythmia  - s/p CABG x 4 in 1996. He did have some cardiac arrhythmias following first surgery in August. He was started on metop (replacing atenolol) and diltiazem. No issues with arrhythmias during this surgery.   Benign essential hypertension  BP's: 108/62, 137/72, 121/53  He continues on toprol XL 50mg q day. Diltiazem stopped on 10/30/18 due to SBP in 80s  Benign prostatic hyperplasia with lower urinary tract symptoms, symptom details unspecified  He continues on flomax  Candida of perineum. Bottom is sore and red. Current treatment is barrier cream and mepilex.   Physical deconditioning  Lives with wife in [facility]. Wife has moved to [facility]. At baseline he walks with 4WW. He understands he needs more therapy before returning home.       ALLERGIES: Review of patient's allergies indicates no known allergies.  Past Medical, Surgical, Family and Social History reviewed and updated in [facility].    Current Outpatient Prescriptions   Medication Sig Dispense Refill     acetaminophen (TYLENOL) 325 MG tablet Take 2 tablets (650 mg) by mouth every 4 hours as needed for mild pain 40 tablet 0     aspirin 325 MG EC tablet Take one Aspirin tab twice daily for 5 weeks. 30 tablet 0     atorvastatin (LIPITOR) 10 MG tablet Take 1 tablet (10 mg) by mouth every evening       ferrous sulfate (IRON) 325 (65 Fe) MG tablet Take 325 mg by mouth daily (with breakfast)       insulin glargine (LANTUS) 100 UNIT/ML injection Inject 7 Units Subcutaneous every morning       insulin lispro (HUMALOG KWIKPEN) 100 UNIT/ML injection Inject Subcutaneous At Bedtime Sliding scale:  -249=1 unit  -299=2 units  -349=3 units  -399=4 units  +=5 units       insulin lispro (HUMALOG KWIKPEN) 100 UNIT/ML injection Inject 2 Units Subcutaneous 3 times daily (before meals)        insulin lispro (HUMALOG) 100 UNIT/ML injection Inject Subcutaneous 3 times daily (before meals) Sliding scale:   -189=1 unit  -239=2 units  -289=3 units  -339=4 units  -399=5 units  -499=6 units  +=7 units       methocarbamol (ROBAXIN) 500 MG tablet Take 0.5 tablets (250 mg) by mouth 3 times daily       metoprolol succinate (TOPROL-XL) 50 MG 24 hr tablet Take 1 tablet (50 mg) by mouth daily 30 tablet      nystatin (MYCOSTATIN) cream Apply topically 2 times daily       ondansetron (ZOFRAN-ODT) 4 MG ODT tab Take 1 tablet (4 mg) by mouth every 6 hours as needed for nausea or vomiting 15 tablet 0     polyethylene glycol (MIRALAX/GLYCOLAX) Packet Take 17 g by mouth daily       senna-docusate (SENOKOT-S;PERICOLACE) 8.6-50 MG per tablet Take 1 tablet by mouth 2 times daily 60 tablet 1     tamsulosin (FLOMAX) 0.4 MG capsule Take 0.8 mg by mouth every evening        traMADol (ULTRAM) 50 MG tablet Take 1 tablet (50 mg) by mouth every 4 hours as needed for moderate pain 30 tablet 0     vitamin D (ERGOCALCIFEROL) 43347 UNIT capsule Take 50,000 Units by mouth once a week       ZOLPIDEM TARTRATE PO Take 5 mg by mouth At Bedtime       Medications reviewed:  Medications reconciled to facility chart and changes were made to reflect current medications as identified as above med list. Below are the changes that were made:   Medications stopped since last EPIC medication reconciliation:   There are no discontinued medications.    Medications started since last [facility] medication reconciliation:  No orders of the defined types were placed in this encounter.          REVIEW OF SYSTEMS:  4 point ROS including Respiratory, CV, GI and , other than that noted in the HPI,  is negative    Physical Exam:  /62  Pulse 102  Temp 96.7  F (35.9  C)  Resp 16  Wt 147 lb 12.8 oz (67 kg)  SpO2 94%  BMI 21.83 kg/m2  GENERAL APPEARANCE:  Alert, in no distress  ENT:  Mouth and posterior oropharynx  normal, moist mucous membranes, hearing acuity adequate   EYES:  EOM, conjunctivae, lids, pupils and irises normal  RESP:  respiratory effort and palpation of chest normal, no respiratory distress, Lung sounds clear  CV:  Palpation and auscultation of heart done , rate and rhythm reg, no murmur, no rub or gallop, Edema none  ABDOMEN:  normal bowel sounds, soft, nontender, no hepatosplenomegaly or other masses  M/S:   Gait and station antalgic, LE weakness, Digits and nails normal   SKIN:  Inspection/Palpation of skin and subcutaneous tissue left hip incision has aquacel dressing, lee ann rectal area with erythematous plaque and scaling.   NEURO: 2-12 in normal limits and at patient's baseline  PSYCH:  insight and judgement, memory intact , affect and mood normal      Recent Labs:   CBC RESULTS:   Recent Labs   Lab Test  11/02/18   0630  11/01/18   1130  10/30/18   1055   WBC   --   11.5*  9.0   RBC   --   2.61*  2.62*   HGB  7.7*  7.4*  7.6*   HCT   --   23.4*  23.6*   MCV   --   90  90   MCH   --   28.4  29.0   MCHC   --   31.6  32.2   RDW   --   15.2*  15.3*   PLT   --   392  327       Last Basic Metabolic Panel:  Recent Labs   Lab Test  11/01/18   1130  10/30/18   1055   NA  138  134   POTASSIUM  3.7  4.2   CHLORIDE  106  103   JOSELINE  7.8*  7.9*   CO2  25  22   BUN  30  32*   CR  1.55*  1.86*   GLC  45*  178*       Liver Function Studies -   Recent Labs   Lab Test  10/09/18   0703   PROTTOTAL  6.3*   ALBUMIN  2.1*   BILITOTAL  0.6   ALKPHOS  72   AST  22   ALT  12       TSH   Date Value Ref Range Status   10/08/2018 4.57 (H) 0.40 - 4.00 mU/L Final   04/02/2003 2.08 0.4 - 5.0 mU/L Final   ]    Lab Results   Component Value Date    A1C 8.4 08/25/2018    A1C 7.8 04/22/2014     Assessment/Plan:  (Z96.642) Status post total replacement of left hip  (primary encounter diagnosis)  Comment: recent hospital stay due to second left hip dislocation following left hip fracture. Patient underwent left hip total arthroplasty. No  complications. He does report lumbar spasms with pain down left thigh. Robaxin was added on 10/26. He is working with therapy. He is making progress but expects to need a wheelchair once he is discharged  Plan: change robaxin to prn.   (Z78.9) Deep vein thrombosis (DVT) prophylaxis prescribed at discharge  Comment: per ortho  Plan: asa 325mg BID for total of 5 weeks    (D62) Anemia due to blood loss, acute  Comment: hgb remains in mid 7s. fe so4 added on 10/30  Plan: continue plan     (E11.22,  N18.3,  Z79.4) Type 2 diabetes mellitus with stage 3 chronic kidney disease, with long-term current use of insulin (H)  Comment: elevated A1C. Creat at baseline, FBG elevated.   Plan: increase lantus 10 unit(s) q am.  and humulog,    (I25.810) Coronary artery disease involving coronary bypass graft of native heart without angina pectoris  (I49.9) Cardiac arrhythmia, unspecified cardiac arrhythmia type  Comment: s/p CABG years ago. Had arrhythmias after first surgery in August. Started on metop and diltiazem at that time. 10/30 had SBP in 80s and felt light headed. diltazem was stopped. Today SBP >100. Feels better  Plan: monitor HR and BP.     (I10) Benign essential hypertension  Comment: BPs better without diltiazem  Plan: monitor     (N40.1) Benign prostatic hyperplasia with lower urinary tract symptoms, symptom details unspecified  Comment: asymptomatic   Plan: continue flomax      (R53.81) Physical deconditioning  Comment: due to two hip fractures in past two months. He feels quite deconditioned. He was able to stand and transfer. He is motivated to work with theraoy  Plan: physical therapy and OCCUPATIONAL THERAPY    Insomnia  Comment:  having trouble sleeping. He is taking ambien. Took melatonin last night and slept better.   Plan: add melatonin 5mg q hs prn.    Electronically signed by  WILLIAM Cota CNP                      Sincerely,        WILLIAM Cota CNP

## 2018-11-07 NOTE — PROGRESS NOTES
[facility]    Chief Complaint   Patient presents with     JESUS       [facility] Record Number:  [id number]  Place of Service where encounter took place:  [facility] [[id number]]    HPI:    Abimael Flores is a 89 year old  (1/23/1924), who is being seen today for an episodic care visit.  HPI information obtained from: facility chart records, facility staff, patient report and [facility] chart review.  Current issues are:      Status post total replacement of left hip  Patient transferred back to [facility] after treatment for left hip dislocation. He had been at [facility] recovering from right hip fracture since 9/3. He fell at [place] on 10/4 and suffered a left hip fracture. This was repaired at [facility] and he returned to TCU on 10/10. He fell again on 10/14 and he dislocated his left hip. Left hip was reduced in ED and he returned to [facility]. He bent over to tie his shoe on 10/21 and left hip dislocated again.   He was evaluated by ortho and underwent a left hip total arthroplasty on 10/22. There were intraoperative complications.   Post operatively he did have some muscle spasms and confusion.  Activity is WBAT with hip precautions. He does report lumbar back pain with some pain down left thigh. He has tramadol for pain.   Deep vein thrombosis (DVT) prophylaxis prescribed at discharge  Per ortho ASA 325mg BID for 5 weeks.   He is getting up in chair.   Anemia due to blood loss, acute   His hemoglobin did drop from 7.6 to 7.2. No transfusions required.   Hemoglobin   Date Value Ref Range Status   11/02/2018 7.7 (L) 13.3 - 17.7 g/dL Final   11/01/2018 7.4 (L) 13.3 - 17.7 g/dL Final   iron supp added on 10/30    Type 2 diabetes mellitus with stage 3 chronic kidney disease, with long-term current use of insulin (H)  A1C 8.4 on 8/25/18. He has been on lantus 7u daily with humulog 2 unit(s) TID.   FBG:   Am 182, 149. 172  Noon 544, 191, 316  5pm 303, 237, 122  Hs 180, 203,  325  Baseline creat 1.7-1.9.   Coronary artery disease  Cardiac arrhythmia  - s/p CABG x 4 in 1996. He did have some cardiac arrhythmias following first surgery in August. He was started on metop (replacing atenolol) and diltiazem. No issues with arrhythmias during this surgery.   Benign essential hypertension  BP's: 108/62, 137/72, 121/53  He continues on toprol XL 50mg q day. Diltiazem stopped on 10/30/18 due to SBP in 80s  Benign prostatic hyperplasia with lower urinary tract symptoms, symptom details unspecified  He continues on flomax  Candida of perineum. Bottom is sore and red. Current treatment is barrier cream and mepilex.   Physical deconditioning  Lives with wife in [facility]. Wife has moved to memory care. At baseline he walks with 4WW. He understands he needs more therapy before returning home.       ALLERGIES: Review of patient's allergies indicates no known allergies.  Past Medical, Surgical, Family and Social History reviewed and updated in [facility].    Current Outpatient Prescriptions   Medication Sig Dispense Refill     acetaminophen (TYLENOL) 325 MG tablet Take 2 tablets (650 mg) by mouth every 4 hours as needed for mild pain 40 tablet 0     aspirin 325 MG EC tablet Take one Aspirin tab twice daily for 5 weeks. 30 tablet 0     atorvastatin (LIPITOR) 10 MG tablet Take 1 tablet (10 mg) by mouth every evening       ferrous sulfate (IRON) 325 (65 Fe) MG tablet Take 325 mg by mouth daily (with breakfast)       insulin glargine (LANTUS) 100 UNIT/ML injection Inject 7 Units Subcutaneous every morning       insulin lispro (HUMALOG KWIKPEN) 100 UNIT/ML injection Inject Subcutaneous At Bedtime Sliding scale:  -249=1 unit  -299=2 units  -349=3 units  -399=4 units  +=5 units       insulin lispro (HUMALOG KWIKPEN) 100 UNIT/ML injection Inject 2 Units Subcutaneous 3 times daily (before meals)       insulin lispro (HUMALOG) 100 UNIT/ML injection Inject Subcutaneous 3 times daily (before  meals) Sliding scale:   -189=1 unit  -239=2 units  -289=3 units  -339=4 units  -399=5 units  -499=6 units  +=7 units       methocarbamol (ROBAXIN) 500 MG tablet Take 0.5 tablets (250 mg) by mouth 3 times daily       metoprolol succinate (TOPROL-XL) 50 MG 24 hr tablet Take 1 tablet (50 mg) by mouth daily 30 tablet      nystatin (MYCOSTATIN) cream Apply topically 2 times daily       ondansetron (ZOFRAN-ODT) 4 MG ODT tab Take 1 tablet (4 mg) by mouth every 6 hours as needed for nausea or vomiting 15 tablet 0     polyethylene glycol (MIRALAX/GLYCOLAX) Packet Take 17 g by mouth daily       senna-docusate (SENOKOT-S;PERICOLACE) 8.6-50 MG per tablet Take 1 tablet by mouth 2 times daily 60 tablet 1     tamsulosin (FLOMAX) 0.4 MG capsule Take 0.8 mg by mouth every evening        traMADol (ULTRAM) 50 MG tablet Take 1 tablet (50 mg) by mouth every 4 hours as needed for moderate pain 30 tablet 0     vitamin D (ERGOCALCIFEROL) 21306 UNIT capsule Take 50,000 Units by mouth once a week       ZOLPIDEM TARTRATE PO Take 5 mg by mouth At Bedtime       Medications reviewed:  Medications reconciled to facility chart and changes were made to reflect current medications as identified as above med list. Below are the changes that were made:   Medications stopped since last EPIC medication reconciliation:   There are no discontinued medications.    Medications started since last [facility] medication reconciliation:  No orders of the defined types were placed in this encounter.          REVIEW OF SYSTEMS:  4 point ROS including Respiratory, CV, GI and , other than that noted in the HPI,  is negative    Physical Exam:  /62  Pulse 102  Temp 96.7  F (35.9  C)  Resp 16  Wt 147 lb 12.8 oz (67 kg)  SpO2 94%  BMI 21.83 kg/m2  GENERAL APPEARANCE:  Alert, in no distress  ENT:  Mouth and posterior oropharynx normal, moist mucous membranes, hearing acuity adequate   EYES:  EOM, conjunctivae, lids,  pupils and irises normal  RESP:  respiratory effort and palpation of chest normal, no respiratory distress, Lung sounds clear  CV:  Palpation and auscultation of heart done , rate and rhythm reg, no murmur, no rub or gallop, Edema none  ABDOMEN:  normal bowel sounds, soft, nontender, no hepatosplenomegaly or other masses  M/S:   Gait and station antalgic, LE weakness, Digits and nails normal   SKIN:  Inspection/Palpation of skin and subcutaneous tissue left hip incision has aquacel dressing, lee ann rectal area with erythematous plaque and scaling.   NEURO: 2-12 in normal limits and at patient's baseline  PSYCH:  insight and judgement, memory intact , affect and mood normal      Recent Labs:   CBC RESULTS:   Recent Labs   Lab Test  11/02/18   0630  11/01/18   1130  10/30/18   1055   WBC   --   11.5*  9.0   RBC   --   2.61*  2.62*   HGB  7.7*  7.4*  7.6*   HCT   --   23.4*  23.6*   MCV   --   90  90   MCH   --   28.4  29.0   MCHC   --   31.6  32.2   RDW   --   15.2*  15.3*   PLT   --   392  327       Last Basic Metabolic Panel:  Recent Labs   Lab Test  11/01/18   1130  10/30/18   1055   NA  138  134   POTASSIUM  3.7  4.2   CHLORIDE  106  103   JOSELINE  7.8*  7.9*   CO2  25  22   BUN  30  32*   CR  1.55*  1.86*   GLC  45*  178*       Liver Function Studies -   Recent Labs   Lab Test  10/09/18   0703   PROTTOTAL  6.3*   ALBUMIN  2.1*   BILITOTAL  0.6   ALKPHOS  72   AST  22   ALT  12       TSH   Date Value Ref Range Status   10/08/2018 4.57 (H) 0.40 - 4.00 mU/L Final   04/02/2003 2.08 0.4 - 5.0 mU/L Final   ]    Lab Results   Component Value Date    A1C 8.4 08/25/2018    A1C 7.8 04/22/2014     Assessment/Plan:  (Z96.642) Status post total replacement of left hip  (primary encounter diagnosis)  Comment: recent hospital stay due to second left hip dislocation following left hip fracture. Patient underwent left hip total arthroplasty. No complications. He does report lumbar spasms with pain down left thigh. Robaxin was added on  10/26. He is working with therapy. He is making progress but expects to need a wheelchair once he is discharged  Plan: change robaxin to prn.   (Z78.9) Deep vein thrombosis (DVT) prophylaxis prescribed at discharge  Comment: per ortho  Plan: asa 325mg BID for total of 5 weeks    (D62) Anemia due to blood loss, acute  Comment: hgb remains in mid 7s. fe so4 added on 10/30  Plan: continue plan     (E11.22,  N18.3,  Z79.4) Type 2 diabetes mellitus with stage 3 chronic kidney disease, with long-term current use of insulin (H)  Comment: elevated A1C. Creat at baseline, FBG elevated.   Plan: increase lantus 10 unit(s) q am.  and humulog,    (I25.810) Coronary artery disease involving coronary bypass graft of native heart without angina pectoris  (I49.9) Cardiac arrhythmia, unspecified cardiac arrhythmia type  Comment: s/p CABG years ago. Had arrhythmias after first surgery in August. Started on metop and diltiazem at that time. 10/30 had SBP in 80s and felt light headed. diltazem was stopped. Today SBP >100. Feels better  Plan: monitor HR and BP.     (I10) Benign essential hypertension  Comment: BPs better without diltiazem  Plan: monitor     (N40.1) Benign prostatic hyperplasia with lower urinary tract symptoms, symptom details unspecified  Comment: asymptomatic   Plan: continue flomax      (R53.81) Physical deconditioning  Comment: due to two hip fractures in past two months. He feels quite deconditioned. He was able to stand and transfer. He is motivated to work with [employer]  Plan: physical therapy and OCCUPATIONAL THERAPY    Insomnia  Comment:  having trouble sleeping. He is taking ambien. Took melatonin last night and slept better.   Plan: add melatonin 5mg q hs prn.    Electronically signed by  WILLIAM Cota CNP

## 2018-11-08 ENCOUNTER — HOSPITAL LABORATORY (OUTPATIENT)
Dept: OTHER | Facility: CLINIC | Age: 83
End: 2018-11-08

## 2018-11-08 LAB
ERYTHROCYTE [DISTWIDTH] IN BLOOD BY AUTOMATED COUNT: 16.2 % (ref 10–15)
HCT VFR BLD AUTO: 24.6 % (ref 40–53)
HGB BLD-MCNC: 7.9 G/DL (ref 13.3–17.7)
MCH RBC QN AUTO: 29.7 PG (ref 26.5–33)
MCHC RBC AUTO-ENTMCNC: 32.1 G/DL (ref 31.5–36.5)
MCV RBC AUTO: 93 FL (ref 78–100)
PLATELET # BLD AUTO: 382 10E9/L (ref 150–450)
RBC # BLD AUTO: 2.66 10E12/L (ref 4.4–5.9)
WBC # BLD AUTO: 10.6 10E9/L (ref 4–11)

## 2018-11-12 ENCOUNTER — NURSING HOME VISIT (OUTPATIENT)
Dept: GERIATRICS | Facility: CLINIC | Age: 83
End: 2018-11-12
Payer: MEDICARE

## 2018-11-12 VITALS
WEIGHT: 147.8 LBS | BODY MASS INDEX: 21.83 KG/M2 | DIASTOLIC BLOOD PRESSURE: 43 MMHG | OXYGEN SATURATION: 99 % | HEART RATE: 90 BPM | RESPIRATION RATE: 16 BRPM | SYSTOLIC BLOOD PRESSURE: 116 MMHG | TEMPERATURE: 97.5 F

## 2018-11-12 DIAGNOSIS — Z96.642 STATUS POST TOTAL REPLACEMENT OF LEFT HIP: Primary | ICD-10-CM

## 2018-11-12 DIAGNOSIS — R53.81 PHYSICAL DECONDITIONING: ICD-10-CM

## 2018-11-12 DIAGNOSIS — E11.22 TYPE 2 DIABETES MELLITUS WITH STAGE 3 CHRONIC KIDNEY DISEASE, WITH LONG-TERM CURRENT USE OF INSULIN (H): ICD-10-CM

## 2018-11-12 DIAGNOSIS — I25.810 CORONARY ARTERY DISEASE INVOLVING CORONARY BYPASS GRAFT OF NATIVE HEART WITHOUT ANGINA PECTORIS: ICD-10-CM

## 2018-11-12 DIAGNOSIS — D62 ANEMIA DUE TO BLOOD LOSS, ACUTE: ICD-10-CM

## 2018-11-12 DIAGNOSIS — N18.30 TYPE 2 DIABETES MELLITUS WITH STAGE 3 CHRONIC KIDNEY DISEASE, WITH LONG-TERM CURRENT USE OF INSULIN (H): ICD-10-CM

## 2018-11-12 DIAGNOSIS — I10 BENIGN ESSENTIAL HYPERTENSION: ICD-10-CM

## 2018-11-12 DIAGNOSIS — Z79.4 TYPE 2 DIABETES MELLITUS WITH STAGE 3 CHRONIC KIDNEY DISEASE, WITH LONG-TERM CURRENT USE OF INSULIN (H): ICD-10-CM

## 2018-11-12 DIAGNOSIS — I49.9 CARDIAC ARRHYTHMIA, UNSPECIFIED CARDIAC ARRHYTHMIA TYPE: ICD-10-CM

## 2018-11-12 DIAGNOSIS — N40.1 BENIGN PROSTATIC HYPERPLASIA WITH LOWER URINARY TRACT SYMPTOMS, SYMPTOM DETAILS UNSPECIFIED: ICD-10-CM

## 2018-11-12 DIAGNOSIS — Z78.9 DEEP VEIN THROMBOSIS (DVT) PROPHYLAXIS PRESCRIBED AT DISCHARGE: ICD-10-CM

## 2018-11-12 PROCEDURE — 99309 SBSQ NF CARE MODERATE MDM 30: CPT | Performed by: NURSE PRACTITIONER

## 2018-11-12 NOTE — LETTER
11/12/2018        RE: Abimael Flores  [address]  [facility] [address]        [facility]    Chief Complaint   Patient presents with     RECHECK       [facility] Record Number:  [id number]  Place of Service where encounter took place:  [facility] [[id number]]    HPI:    Abimael Flores is a 89 year old  (1/23/1924), who is being seen today for an episodic care visit.  HPI information obtained from: facility chart records, facility staff, patient report and [facility] chart review.  Current issues are:      Status post total replacement of left hip  Patient transferred back to [facility] after treatment for left hip dislocation. He had been at [facility] recovering from right hip fracture since 9/3. He fell at [place] on 10/4 and suffered a left hip fracture. This was repaired at [facility] and he returned to TCU on 10/10. He fell again on 10/14 and he dislocated his left hip. Left hip was reduced in ED and he returned to [facility]. He bent over to tie his shoe on 10/21 and left hip dislocated again.   He was evaluated by ortho and underwent a left hip total arthroplasty on 10/22. There were intraoperative complications.   Post operatively he did have some muscle spasms and confusion.  Activity is WBAT with hip precautions. He does report lumbar back pain with some pain down left thigh. He has tramadol for pain.   Deep vein thrombosis (DVT) prophylaxis prescribed at discharge  Per ortho ASA 325mg BID for 5 weeks.   He is getting up in chair.   Anemia due to blood loss, acute   His hemoglobin did drop from 7.6 to 7.2. No transfusions required.   Hemoglobin   Date Value Ref Range Status   11/08/2018 7.9 (L) 13.3 - 17.7 g/dL Final   11/02/2018 7.7 (L) 13.3 - 17.7 g/dL Final   iron supp added on 10/30    Type 2 diabetes mellitus with stage 3 chronic kidney disease, with long-term current use of insulin (H)  A1C 8.4 on 8/25/18. He has been on lantus 7u daily with humulog 2  unit(s) TID.   FBG:   Am 120, 99, 166  Noon 264, 217, 350  5pm 115, 115, 230  Hs 208, 257, 237  Baseline creat 1.7-1.9.   Coronary artery disease  Cardiac arrhythmia  - s/p CABG x 4 in 1996. He did have some cardiac arrhythmias following first surgery in August. He was started on metop (replacing atenolol) and diltiazem. No issues with arrhythmias during this surgery.   Benign essential hypertension  BP's: 116/3, 125/57, 130/69  He continues on toprol XL 50mg q day. Diltiazem stopped on 10/30/18 due to SBP in 80s  Benign prostatic hyperplasia with lower urinary tract symptoms, symptom details unspecified  He continues on flomax  Candida of perineum. Bottom is sore and red. Current treatment is barrier cream and mepilex.   Physical deconditioning  Lives with wife in [facility]. Wife has moved to [facility]. At baseline he walks with 4WW. He understands he needs more therapy before returning home.       ALLERGIES: Review of patient's allergies indicates no known allergies.  Past Medical, Surgical, Family and Social History reviewed and updated in [facility].    Current Outpatient Prescriptions   Medication Sig Dispense Refill     acetaminophen (TYLENOL) 325 MG tablet Take 2 tablets (650 mg) by mouth every 4 hours as needed for mild pain 40 tablet 0     aspirin 325 MG EC tablet Take one Aspirin tab twice daily for 5 weeks. 30 tablet 0     atorvastatin (LIPITOR) 10 MG tablet Take 1 tablet (10 mg) by mouth every evening       ferrous sulfate (IRON) 325 (65 Fe) MG tablet Take 325 mg by mouth daily (with breakfast)       insulin glargine (LANTUS) 100 UNIT/ML injection Inject 10 Units Subcutaneous every morning       insulin lispro (HUMALOG KWIKPEN) 100 UNIT/ML injection Inject Subcutaneous At Bedtime Sliding scale:  -249=1 unit  -299=2 units  -349=3 units  -399=4 units  +=5 units       insulin lispro (HUMALOG KWIKPEN) 100 UNIT/ML injection Inject 2 Units Subcutaneous 3 times daily (before meals)        insulin lispro (HUMALOG) 100 UNIT/ML injection Inject Subcutaneous 3 times daily (before meals) Sliding scale:   -189=1 unit  -239=2 units  -289=3 units  -339=4 units  -399=5 units  -499=6 units  +=7 units       MELATONIN PO Take 5 mg by mouth At Bedtime       methocarbamol (ROBAXIN) 500 MG tablet Take 0.5 tablets (250 mg) by mouth 3 times daily as needed for muscle spasms 120 tablet      metoprolol succinate (TOPROL-XL) 50 MG 24 hr tablet Take 1 tablet (50 mg) by mouth daily 30 tablet      nystatin (MYCOSTATIN) cream Apply topically 2 times daily       ondansetron (ZOFRAN-ODT) 4 MG ODT tab Take 1 tablet (4 mg) by mouth every 6 hours as needed for nausea or vomiting 15 tablet 0     polyethylene glycol (MIRALAX/GLYCOLAX) Packet Take 17 g by mouth daily       senna-docusate (SENOKOT-S;PERICOLACE) 8.6-50 MG per tablet Take 1 tablet by mouth 2 times daily 60 tablet 1     tamsulosin (FLOMAX) 0.4 MG capsule Take 0.8 mg by mouth every evening        traMADol (ULTRAM) 50 MG tablet Take 1 tablet (50 mg) by mouth every 4 hours as needed for moderate pain 30 tablet 0     vitamin D (ERGOCALCIFEROL) 41121 UNIT capsule Take 50,000 Units by mouth once a week       ZOLPIDEM TARTRATE PO Take 5 mg by mouth At Bedtime       Medications reviewed:  Medications reconciled to facility chart and changes were made to reflect current medications as identified as above med list. Below are the changes that were made:   Medications stopped since last EPIC medication reconciliation:   There are no discontinued medications.    Medications started since last [facility] medication reconciliation:  Orders Placed This Encounter   Medications     MELATONIN PO     Sig: Take 5 mg by mouth At Bedtime           REVIEW OF SYSTEMS:  4 point ROS including Respiratory, CV, GI and , other than that noted in the HPI,  is negative    Physical Exam:  /43  Pulse 90  Temp 97.5  F (36.4  C)  Resp 16  Wt 147 lb 12.8 oz  (67 kg)  SpO2 99%  BMI 21.83 kg/m2  GENERAL APPEARANCE:  Alert, in no distress  ENT:  Mouth and posterior oropharynx normal, moist mucous membranes, hearing acuity adequate   EYES:  EOM, conjunctivae, lids, pupils and irises normal  RESP:  respiratory effort and palpation of chest normal, no respiratory distress, Lung sounds clear  CV:  Palpation and auscultation of heart done , rate and rhythm reg, no murmur, no rub or gallop, Edema none  ABDOMEN:  normal bowel sounds, soft, nontender, no hepatosplenomegaly or other masses  M/S:   Gait and station antalgic, LE weakness, Digits and nails normal   SKIN:  Inspection/Palpation of skin and subcutaneous tissue left hip incision has aquacel dressing, lee ann rectal area with erythematous plaque and scaling.   NEURO: 2-12 in normal limits and at patient's baseline  PSYCH:  insight and judgement, memory intact , affect and mood normal      Recent Labs:   CBC RESULTS:   Recent Labs   Lab Test  11/08/18   0630  11/02/18   0630  11/01/18   1130   WBC  10.6   --   11.5*   RBC  2.66*   --   2.61*   HGB  7.9*  7.7*  7.4*   HCT  24.6*   --   23.4*   MCV  93   --   90   MCH  29.7   --   28.4   MCHC  32.1   --   31.6   RDW  16.2*   --   15.2*   PLT  382   --   392       Last Basic Metabolic Panel:  Recent Labs   Lab Test  11/01/18   1130  10/30/18   1055   NA  138  134   POTASSIUM  3.7  4.2   CHLORIDE  106  103   JOSELINE  7.8*  7.9*   CO2  25  22   BUN  30  32*   CR  1.55*  1.86*   GLC  45*  178*       Liver Function Studies -   Recent Labs   Lab Test  10/09/18   0703   PROTTOTAL  6.3*   ALBUMIN  2.1*   BILITOTAL  0.6   ALKPHOS  72   AST  22   ALT  12       TSH   Date Value Ref Range Status   10/08/2018 4.57 (H) 0.40 - 4.00 mU/L Final   04/02/2003 2.08 0.4 - 5.0 mU/L Final     Lab Results   Component Value Date    A1C 8.4 08/25/2018    A1C 7.8 04/22/2014     Assessment/Plan:  (Z96.642) Status post total replacement of left hip  (primary encounter diagnosis)  Comment: recent hospital stay  due to second left hip dislocation following left hip fracture. Patient underwent left hip total arthroplasty. No complications. He does report lumbar spasms with pain down left thigh. Robaxin was added on 10/26 and changed to prn on 11/9. He is working with therapy. He is making progress but expects to need a wheelchair once he is discharged due to unreliability of leg strengh  Plan: continue with PT for now.   (Z78.9) Deep vein thrombosis (DVT) prophylaxis prescribed at discharge  Comment: per ortho  Plan: asa 325mg BID for total of 5 weeks    (D62) Anemia due to blood loss, acute  Comment: hgb remains in mid 7s. fe so4 added on 10/30  Plan: continue plan     (E11.22,  N18.3,  Z79.4) Type 2 diabetes mellitus with stage 3 chronic kidney disease, with long-term current use of insulin (H)  Comment: elevated A1C. Creat at baseline, FBG better since increase in lantus.   Plan: continue current insulin    (I25.810) Coronary artery disease involving coronary bypass graft of native heart without angina pectoris  (I49.9) Cardiac arrhythmia, unspecified cardiac arrhythmia type  Comment: s/p CABG years ago. Had arrhythmias after first surgery in August. Started on metop and diltiazem at that time. 10/30 had SBP in 80s and felt light headed. diltazem was stopped. Today SBP >100. Feels better  Plan: monitor HR and BP.     (I10) Benign essential hypertension  Comment: BPs better without diltiazem  Plan: monitor     (N40.1) Benign prostatic hyperplasia with lower urinary tract symptoms, symptom details unspecified  Comment: asymptomatic   Plan: continue flomax      (R53.81) Physical deconditioning  Comment: due to two hip fractures in past two months. He feels quite deconditioned. He was able to stand and transfer. He is motivated to work with [employer] and [employer]  Plan: physical therapy and OCCUPATIONAL THERAPY    Insomnia  Comment:  having trouble sleeping. He is taking ambien and melatonin  Plan: continue current meds.    Electronically  signed by  WILLIAM Cota CNP          Face to Face and Medical Necessity Statement for DME Provider visit    Demographic Information on Abimael Flores:  Gender: male  : 1924  [address]   [facility] [address]  [phone] (home) NONE (work)    Medical Record: [id number]  Social Security Number: [id number]  Primary Care Provider: Carlitos Bee  Insurance: Payor: MEDICARE / Plan: MEDICARE / Product Type: Medicare /     HPI:   Abimael Flores is a 89 year old  (1924), who is being seen today for a face to face provider visit at [facility]; medical necessity statement for DME included. This patient requires the following:  DME Ordered and Medical Necessity Statement   Mechanical Wheelchair  Size: 18 x 18  Corresponding cushion: Yes:   Standard foot rests: No  Elevating leg rests: Yes  Arm rests: Yes:   Lap tray: No  Dose patient use oxygen? No   Able to propel w/c? YesMobility related ADL that are affected in the home:    The wheelchair is suitable and necessary for use in the patient's home.  Reason why a cane or walker will not meet the patient's needs: able to walk with walker and SBA about 150' but unsafe to walk alone due to LE weakness due to multiple fractures   The patient has expressed willingness to use the wheelchair in the home and does have the physical and cognitive ability to maneuver the equipment or has a caregiver who is available, willing, and able to provide assistance in the home with the wheelchair.   Patients functional mobility deficit can be sufficiently resolved by the use of the above wheelchair      Pt needing above DME with expected length of need of 99 months  months  due to medical necessity associated with following diagnosis:     Status post total replacement of left hip  Deep vein thrombosis (DVT) prophylaxis prescribed at discharge  Anemia due to blood loss, acute  Type 2 diabetes mellitus with stage 3 chronic kidney disease, with long-term current  use of insulin (H)  Coronary artery disease involving coronary bypass graft of native heart without angina pectoris  Cardiac arrhythmia, unspecified cardiac arrhythmia type  Benign essential hypertension  Benign prostatic hyperplasia with lower urinary tract symptoms, symptom details unspecified  Physical deconditioning      PMH   has a past medical history of Benign essential hypertension (9/4/2018); Coronary artery disease; Nonsenile cataract; Recent retinal detachment, total or subtotal (8/5/2014); and Type 2 diabetes mellitus without complications (H). He also has no past medical history of Diabetic retinopathy (H); Glaucoma; or Macular degeneration.    ROS:4 point ROS including Respiratory, CV, GI and , other than that noted in the HPI,  is negative    EXAM  Vitals: /43  Pulse 90  Temp 97.5  F (36.4  C)  Resp 16  Wt 147 lb 12.8 oz (67 kg)  SpO2 99%  BMI 21.83 kg/m2;BMI= Body mass index is 21.83 kg/(m^2).   GENERAL APPEARANCE:  Alert, in no distress  ENT:  Mouth and posterior oropharynx normal, moist mucous membranes, hearing acuity adequate   EYES:  EOM, conjunctivae, lids, pupils and irises normal  RESP:  respiratory effort and palpation of chest normal, no respiratory distress, Lung sounds clear  CV:  Palpation and auscultation of heart done , rate and rhythm reg, no murmur, no rub or gallop, Edema none  ABDOMEN:  normal bowel sounds, soft, nontender, no hepatosplenomegaly or other masses  M/S:   Gait and station antalgic, LE weakness, Digits and nails normal   SKIN:  Inspection/Palpation of skin and subcutaneous tissue left hip incision has aquacel dressing, lee ann rectal area with erythematous plaque and scaling.   NEURO: 2-12 in normal limits and at patient's baseline  PSYCH:  insight and judgement, memory intact , affect and mood normal    ASSESSMENT/PLAN:  1. Status post total replacement of left hip 22 Oct 18    2. Deep vein thrombosis (DVT) prophylaxis prescribed at discharge    3. Anemia  due to blood loss, acute    4. Type 2 diabetes mellitus with stage 3 chronic kidney disease, with long-term current use of insulin (H)    5. Coronary artery disease involving coronary bypass graft of native heart without angina pectoris    6. Cardiac arrhythmia, unspecified cardiac arrhythmia type    7. Benign essential hypertension    8. Benign prostatic hyperplasia with lower urinary tract symptoms, symptom details unspecified    9. Physical deconditioning        Orders:  1 one 18x 18 wheel chair with seat back and cushion and padded elevated foot rests.     ELECTRONICALLY SIGNED BY DANIELLA CERTIFIED PROVIDER:  WILLIAM Cota CNP   NPI: [id number]  [facility]  [address] [place] [address], [address]  [place] [address]                Sincerely,        WILLIAM Cota CNP

## 2018-11-12 NOTE — PROGRESS NOTES
[facility]    Chief Complaint   Patient presents with     JESUS       [facility] Medical Record Number:  [id number]  Place of Service where encounter took place:  [facility] [[id number]]    HPI:    Abimael Flores is a 89 year old  (1/23/1924), who is being seen today for an episodic care visit.  HPI information obtained from: facility chart records, facility staff, patient report and [facility] chart review.  Current issues are:      Status post total replacement of left hip  Patient transferred back to [facility] after treatment for left hip dislocation. He had been at [facility] recovering from right hip fracture since 9/3. He fell at [facility] on 10/4 and suffered a left hip fracture. This was repaired at [facility] and he returned to TCU on 10/10. He fell again on 10/14 and he dislocated his left hip. Left hip was reduced in ED and he returned to [facility]. He bent over to tie his shoe on 10/21 and left hip dislocated again.   He was evaluated by ortho and underwent a left hip total arthroplasty on 10/22. There were intraoperative complications.   Post operatively he did have some muscle spasms and confusion.  Activity is WBAT with hip precautions. He does report lumbar back pain with some pain down left thigh. He has tramadol for pain.   Deep vein thrombosis (DVT) prophylaxis prescribed at discharge  Per ortho ASA 325mg BID for 5 weeks.   He is getting up in chair.   Anemia due to blood loss, acute   His hemoglobin did drop from 7.6 to 7.2. No transfusions required.   Hemoglobin   Date Value Ref Range Status   11/08/2018 7.9 (L) 13.3 - 17.7 g/dL Final   11/02/2018 7.7 (L) 13.3 - 17.7 g/dL Final   iron supp added on 10/30    Type 2 diabetes mellitus with stage 3 chronic kidney disease, with long-term current use of insulin (H)  A1C 8.4 on 8/25/18. He has been on lantus 7u daily with humulog 2 unit(s) TID.   FBG:   Am 120, 99, 166  Noon 264, 217, 350  5pm 115, 115, 230  Hs 208, 257,  237  Baseline creat 1.7-1.9.   Coronary artery disease  Cardiac arrhythmia  - s/p CABG x 4 in 1996. He did have some cardiac arrhythmias following first surgery in August. He was started on metop (replacing atenolol) and diltiazem. No issues with arrhythmias during this surgery.   Benign essential hypertension  BP's: 116/3, 125/57, 130/69  He continues on toprol XL 50mg q day. Diltiazem stopped on 10/30/18 due to SBP in 80s  Benign prostatic hyperplasia with lower urinary tract symptoms, symptom details unspecified  He continues on flomax  Candida of perineum. Bottom is sore and red. Current treatment is barrier cream and mepilex.   Physical deconditioning  Lives with wife in [facility]. Wife has moved to memory care. At baseline he walks with 4WW. He understands he needs more therapy before returning home.       ALLERGIES: Review of patient's allergies indicates no known allergies.  Past Medical, Surgical, Family and Social History reviewed and updated in [facility].    Current Outpatient Prescriptions   Medication Sig Dispense Refill     acetaminophen (TYLENOL) 325 MG tablet Take 2 tablets (650 mg) by mouth every 4 hours as needed for mild pain 40 tablet 0     aspirin 325 MG EC tablet Take one Aspirin tab twice daily for 5 weeks. 30 tablet 0     atorvastatin (LIPITOR) 10 MG tablet Take 1 tablet (10 mg) by mouth every evening       ferrous sulfate (IRON) 325 (65 Fe) MG tablet Take 325 mg by mouth daily (with breakfast)       insulin glargine (LANTUS) 100 UNIT/ML injection Inject 10 Units Subcutaneous every morning       insulin lispro (HUMALOG KWIKPEN) 100 UNIT/ML injection Inject Subcutaneous At Bedtime Sliding scale:  -249=1 unit  -299=2 units  -349=3 units  -399=4 units  +=5 units       insulin lispro (HUMALOG KWIKPEN) 100 UNIT/ML injection Inject 2 Units Subcutaneous 3 times daily (before meals)       insulin lispro (HUMALOG) 100 UNIT/ML injection Inject Subcutaneous 3 times daily (before  meals) Sliding scale:   -189=1 unit  -239=2 units  -289=3 units  -339=4 units  -399=5 units  -499=6 units  +=7 units       MELATONIN PO Take 5 mg by mouth At Bedtime       methocarbamol (ROBAXIN) 500 MG tablet Take 0.5 tablets (250 mg) by mouth 3 times daily as needed for muscle spasms 120 tablet      metoprolol succinate (TOPROL-XL) 50 MG 24 hr tablet Take 1 tablet (50 mg) by mouth daily 30 tablet      nystatin (MYCOSTATIN) cream Apply topically 2 times daily       ondansetron (ZOFRAN-ODT) 4 MG ODT tab Take 1 tablet (4 mg) by mouth every 6 hours as needed for nausea or vomiting 15 tablet 0     polyethylene glycol (MIRALAX/GLYCOLAX) Packet Take 17 g by mouth daily       senna-docusate (SENOKOT-S;PERICOLACE) 8.6-50 MG per tablet Take 1 tablet by mouth 2 times daily 60 tablet 1     tamsulosin (FLOMAX) 0.4 MG capsule Take 0.8 mg by mouth every evening        traMADol (ULTRAM) 50 MG tablet Take 1 tablet (50 mg) by mouth every 4 hours as needed for moderate pain 30 tablet 0     vitamin D (ERGOCALCIFEROL) 80757 UNIT capsule Take 50,000 Units by mouth once a week       ZOLPIDEM TARTRATE PO Take 5 mg by mouth At Bedtime       Medications reviewed:  Medications reconciled to facility chart and changes were made to reflect current medications as identified as above med list. Below are the changes that were made:   Medications stopped since last EPIC medication reconciliation:   There are no discontinued medications.    Medications started since last [facility] medication reconciliation:  Orders Placed This Encounter   Medications     MELATONIN PO     Sig: Take 5 mg by mouth At Bedtime           REVIEW OF SYSTEMS:  4 point ROS including Respiratory, CV, GI and , other than that noted in the HPI,  is negative    Physical Exam:  /43  Pulse 90  Temp 97.5  F (36.4  C)  Resp 16  Wt 147 lb 12.8 oz (67 kg)  SpO2 99%  BMI 21.83 kg/m2  GENERAL APPEARANCE:  Alert, in no distress  ENT:   Mouth and posterior oropharynx normal, moist mucous membranes, hearing acuity adequate   EYES:  EOM, conjunctivae, lids, pupils and irises normal  RESP:  respiratory effort and palpation of chest normal, no respiratory distress, Lung sounds clear  CV:  Palpation and auscultation of heart done , rate and rhythm reg, no murmur, no rub or gallop, Edema none  ABDOMEN:  normal bowel sounds, soft, nontender, no hepatosplenomegaly or other masses  M/S:   Gait and station antalgic, LE weakness, Digits and nails normal   SKIN:  Inspection/Palpation of skin and subcutaneous tissue left hip incision has aquacel dressing, lee ann rectal area with erythematous plaque and scaling.   NEURO: 2-12 in normal limits and at patient's baseline  PSYCH:  insight and judgement, memory intact , affect and mood normal      Recent Labs:   CBC RESULTS:   Recent Labs   Lab Test  11/08/18   0630  11/02/18   0630  11/01/18   1130   WBC  10.6   --   11.5*   RBC  2.66*   --   2.61*   HGB  7.9*  7.7*  7.4*   HCT  24.6*   --   23.4*   MCV  93   --   90   MCH  29.7   --   28.4   MCHC  32.1   --   31.6   RDW  16.2*   --   15.2*   PLT  382   --   392       Last Basic Metabolic Panel:  Recent Labs   Lab Test  11/01/18   1130  10/30/18   1055   NA  138  134   POTASSIUM  3.7  4.2   CHLORIDE  106  103   JOSELINE  7.8*  7.9*   CO2  25  22   BUN  30  32*   CR  1.55*  1.86*   GLC  45*  178*       Liver Function Studies -   Recent Labs   Lab Test  10/09/18   0703   PROTTOTAL  6.3*   ALBUMIN  2.1*   BILITOTAL  0.6   ALKPHOS  72   AST  22   ALT  12       TSH   Date Value Ref Range Status   10/08/2018 4.57 (H) 0.40 - 4.00 mU/L Final   04/02/2003 2.08 0.4 - 5.0 mU/L Final     Lab Results   Component Value Date    A1C 8.4 08/25/2018    A1C 7.8 04/22/2014     Assessment/Plan:  (Z96.642) Status post total replacement of left hip  (primary encounter diagnosis)  Comment: recent hospital stay due to second left hip dislocation following left hip fracture. Patient underwent left  hip total arthroplasty. No complications. He does report lumbar spasms with pain down left thigh. Robaxin was added on 10/26 and changed to prn on 11/9. He is working with therapy. He is making progress but expects to need a wheelchair once he is discharged due to unreliability of leg strengh  Plan: continue with PT for now.   (Z78.9) Deep vein thrombosis (DVT) prophylaxis prescribed at discharge  Comment: per ortho  Plan: asa 325mg BID for total of 5 weeks    (D62) Anemia due to blood loss, acute  Comment: hgb remains in mid 7s. fe so4 added on 10/30  Plan: continue plan     (E11.22,  N18.3,  Z79.4) Type 2 diabetes mellitus with stage 3 chronic kidney disease, with long-term current use of insulin (H)  Comment: elevated A1C. Creat at baseline, FBG better since increase in lantus.   Plan: continue current insulin    (I25.810) Coronary artery disease involving coronary bypass graft of native heart without angina pectoris  (I49.9) Cardiac arrhythmia, unspecified cardiac arrhythmia type  Comment: s/p CABG years ago. Had arrhythmias after first surgery in August. Started on metop and diltiazem at that time. 10/30 had SBP in 80s and felt light headed. diltazem was stopped. Today SBP >100. Feels better  Plan: monitor HR and BP.     (I10) Benign essential hypertension  Comment: BPs better without diltiazem  Plan: monitor     (N40.1) Benign prostatic hyperplasia with lower urinary tract symptoms, symptom details unspecified  Comment: asymptomatic   Plan: continue flomax      (R53.81) Physical deconditioning  Comment: due to two hip fractures in past two months. He feels quite deconditioned. He was able to stand and transfer. He is motivated to work with [employer]  Plan: physical therapy and OCCUPATIONAL THERAPY    Insomnia  Comment:  having trouble sleeping. He is taking ambien and melatonin  Plan: continue current meds.    Electronically signed by  WILLIAM Cota CNP          Face to Face and Medical Necessity  Statement for DME Provider visit    Demographic Information on Abimael Flores:  Gender: male  : 1924  [address]   [facility] [address]  [phone] (home) NONE (work)    Medical Record: [id number]  Social Security Number: [id number]  Primary Care Provider: Carlitos Bee  Insurance: Payor: MEDICARE / Plan: MEDICARE / Product Type: Medicare /     HPI:   Abimael Flores is a 89 year old  (1924), who is being seen today for a face to face provider visit at [facility]; medical necessity statement for DME included. This patient requires the following:  DME Ordered and Medical Necessity Statement   Mechanical Wheelchair  Size: 18 x 18  Corresponding cushion: Yes:   Standard foot rests: No  Elevating leg rests: Yes  Arm rests: Yes:   Lap tray: No  Dose patient use oxygen? No   Able to propel w/c? YesMobility related ADL that are affected in the home:    The wheelchair is suitable and necessary for use in the patient's home.  Reason why a cane or walker will not meet the patient's needs: able to walk with walker and SBA about 150' but unsafe to walk alone due to LE weakness due to multiple fractures   The patient has expressed willingness to use the wheelchair in the home and does have the physical and cognitive ability to maneuver the equipment or has a caregiver who is available, willing, and able to provide assistance in the home with the wheelchair.   Patients functional mobility deficit can be sufficiently resolved by the use of the above wheelchair      Pt needing above DME with expected length of need of 99 months  months  due to medical necessity associated with following diagnosis:     Status post total replacement of left hip  Deep vein thrombosis (DVT) prophylaxis prescribed at discharge  Anemia due to blood loss, acute  Type 2 diabetes mellitus with stage 3 chronic kidney disease, with long-term current use of insulin (H)  Coronary artery disease involving coronary bypass graft of  native heart without angina pectoris  Cardiac arrhythmia, unspecified cardiac arrhythmia type  Benign essential hypertension  Benign prostatic hyperplasia with lower urinary tract symptoms, symptom details unspecified  Physical deconditioning      PMH   has a past medical history of Benign essential hypertension (9/4/2018); Coronary artery disease; Nonsenile cataract; Recent retinal detachment, total or subtotal (8/5/2014); and Type 2 diabetes mellitus without complications (H). He also has no past medical history of Diabetic retinopathy (H); Glaucoma; or Macular degeneration.    ROS:4 point ROS including Respiratory, CV, GI and , other than that noted in the HPI,  is negative    EXAM  Vitals: /43  Pulse 90  Temp 97.5  F (36.4  C)  Resp 16  Wt 147 lb 12.8 oz (67 kg)  SpO2 99%  BMI 21.83 kg/m2;BMI= Body mass index is 21.83 kg/(m^2).   GENERAL APPEARANCE:  Alert, in no distress  ENT:  Mouth and posterior oropharynx normal, moist mucous membranes, hearing acuity adequate   EYES:  EOM, conjunctivae, lids, pupils and irises normal  RESP:  respiratory effort and palpation of chest normal, no respiratory distress, Lung sounds clear  CV:  Palpation and auscultation of heart done , rate and rhythm reg, no murmur, no rub or gallop, Edema none  ABDOMEN:  normal bowel sounds, soft, nontender, no hepatosplenomegaly or other masses  M/S:   Gait and station antalgic, LE weakness, Digits and nails normal   SKIN:  Inspection/Palpation of skin and subcutaneous tissue left hip incision has aquacel dressing, lee ann rectal area with erythematous plaque and scaling.   NEURO: 2-12 in normal limits and at patient's baseline  PSYCH:  insight and judgement, memory intact , affect and mood normal    ASSESSMENT/PLAN:  1. Status post total replacement of left hip 22 Oct 18    2. Deep vein thrombosis (DVT) prophylaxis prescribed at discharge    3. Anemia due to blood loss, acute    4. Type 2 diabetes mellitus with stage 3 chronic  kidney disease, with long-term current use of insulin (H)    5. Coronary artery disease involving coronary bypass graft of native heart without angina pectoris    6. Cardiac arrhythmia, unspecified cardiac arrhythmia type    7. Benign essential hypertension    8. Benign prostatic hyperplasia with lower urinary tract symptoms, symptom details unspecified    9. Physical deconditioning        Orders:  1 one 18x 18 wheel chair with seat back and cushion and padded elevated foot rests.     ELECTRONICALLY SIGNED BY DANIELLA CERTIFIED PROVIDER:  WILLIAM Cota CNP   NPI: [id number]  [facility]  [address] [place] [address], [address]  [place] [address]

## 2018-11-14 ENCOUNTER — NURSING HOME VISIT (OUTPATIENT)
Dept: GERIATRICS | Facility: CLINIC | Age: 83
End: 2018-11-14
Payer: MEDICARE

## 2018-11-14 VITALS
SYSTOLIC BLOOD PRESSURE: 105 MMHG | DIASTOLIC BLOOD PRESSURE: 58 MMHG | RESPIRATION RATE: 16 BRPM | BODY MASS INDEX: 22.4 KG/M2 | HEART RATE: 86 BPM | HEIGHT: 68 IN | TEMPERATURE: 97.4 F | OXYGEN SATURATION: 98 % | WEIGHT: 147.8 LBS

## 2018-11-14 DIAGNOSIS — R42 VERTIGO: Primary | ICD-10-CM

## 2018-11-14 DIAGNOSIS — N18.30 TYPE 2 DIABETES MELLITUS WITH STAGE 3 CHRONIC KIDNEY DISEASE, WITH LONG-TERM CURRENT USE OF INSULIN (H): ICD-10-CM

## 2018-11-14 DIAGNOSIS — G47.00 INSOMNIA, UNSPECIFIED TYPE: ICD-10-CM

## 2018-11-14 DIAGNOSIS — I10 ESSENTIAL HYPERTENSION: ICD-10-CM
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
## 2018-11-14 DIAGNOSIS — E11.22 TYPE 2 DIABETES MELLITUS WITH STAGE 3 CHRONIC KIDNEY DISEASE, WITH LONG-TERM CURRENT USE OF INSULIN (H): ICD-10-CM

## 2018-11-14 DIAGNOSIS — R53.81 DEBILITY: ICD-10-CM

## 2018-11-14 DIAGNOSIS — Z96.642 STATUS POST TOTAL REPLACEMENT OF LEFT HIP: ICD-10-CM

## 2018-11-14 DIAGNOSIS — Z79.4 TYPE 2 DIABETES MELLITUS WITH STAGE 3 CHRONIC KIDNEY DISEASE, WITH LONG-TERM CURRENT USE OF INSULIN (H): ICD-10-CM

## 2018-11-14 DIAGNOSIS — N40.0 BENIGN PROSTATIC HYPERPLASIA WITHOUT LOWER URINARY TRACT SYMPTOMS: ICD-10-CM

## 2018-11-14 DIAGNOSIS — I49.9 CARDIAC ARRHYTHMIA, UNSPECIFIED CARDIAC ARRHYTHMIA TYPE: ICD-10-CM

## 2018-11-14 DIAGNOSIS — D62 ANEMIA DUE TO BLOOD LOSS, ACUTE: ICD-10-CM

## 2018-11-14 PROBLEM — D64.9 ANEMIA, UNSPECIFIED TYPE: Status: ACTIVE | Noted: 2018-11-14

## 2018-11-14 PROCEDURE — 99309 SBSQ NF CARE MODERATE MDM 30: CPT | Performed by: NURSE PRACTITIONER

## 2018-11-14 NOTE — LETTER
11/14/2018        RE: Abimael Flores  [address]  [facility] [address]        [facility]    Chief Complaint   Patient presents with     RECHECK       [facility] Medical Record Number:  [id number]  [facility] of [facility] where encounter took place[facility] [[id number][facility]    HPI:    Abimael Flores is a 89 year old  (1/23/1924), who is being seen today for an episodic care visit.  HPI information obtained from: facility chart records, facility staff, patient report and [facility] chart review.    Met with Dr. Flores today for follow up.  Dr. Flores reports he has developed vertigo with brief episodes of dizziness that correlate with head movement, no recent falls.  Reports going on for 4-5 days now, episodes are sporadic and resolve quickly, usually when he's up and moving.   No vision changes, no N/V.  He reports pain as tolerable, has no other complaints.      ALLERGIES: Review of patient's allergies indicates no known allergies.  Past Medical, Surgical, Family and Social History reviewed and updated in [facility].    Current Outpatient Prescriptions   Medication Sig Dispense Refill     acetaminophen (TYLENOL) 325 MG tablet Take 2 tablets (650 mg) by mouth every 4 hours as needed for mild pain 40 tablet 0     aspirin 325 MG EC tablet Take one Aspirin tab twice daily for 5 weeks. 30 tablet 0     atorvastatin (LIPITOR) 10 MG tablet Take 1 tablet (10 mg) by mouth every evening       ferrous sulfate (IRON) 325 (65 Fe) MG tablet Take 325 mg by mouth daily (with breakfast)       insulin glargine (LANTUS) 100 UNIT/ML injection Inject 10 Units Subcutaneous every morning       insulin lispro (HUMALOG KWIKPEN) 100 UNIT/ML injection Inject Subcutaneous At Bedtime Sliding scale:  -249=1 unit  -299=2 units  -349=3 units  -399=4 units  +=5 units       insulin lispro (HUMALOG KWIKPEN) 100 UNIT/ML injection Inject 2 Units Subcutaneous 3 times daily  "(before meals)       insulin lispro (HUMALOG) 100 UNIT/ML injection Inject Subcutaneous 3 times daily (before meals) Sliding scale:   -189=1 unit  -239=2 units  -289=3 units  -339=4 units  -399=5 units  -499=6 units  +=7 units       MELATONIN PO Take 5 mg by mouth At Bedtime       methocarbamol (ROBAXIN) 500 MG tablet Take 0.5 tablets (250 mg) by mouth 3 times daily as needed for muscle spasms 120 tablet      metoprolol succinate (TOPROL-XL) 50 MG 24 hr tablet Take 1 tablet (50 mg) by mouth daily 30 tablet      nystatin (MYCOSTATIN) cream Apply topically 2 times daily       polyethylene glycol (MIRALAX/GLYCOLAX) Packet Take 17 g by mouth daily       senna-docusate (SENOKOT-S;PERICOLACE) 8.6-50 MG per tablet Take 1 tablet by mouth 2 times daily 60 tablet 1     tamsulosin (FLOMAX) 0.4 MG capsule Take 0.8 mg by mouth every evening        traMADol (ULTRAM) 50 MG tablet Take 1 tablet (50 mg) by mouth every 4 hours as needed for moderate pain 30 tablet 0     vitamin D (ERGOCALCIFEROL) 40329 UNIT capsule Take 50,000 Units by mouth once a week       ZOLPIDEM TARTRATE PO Take 5 mg by mouth At Bedtime       Medications reviewed:  Medications reconciled to facility chart and changes were made to reflect current medications as identified as above med list. Below are the changes that were made:   Medications stopped since last EPIC medication reconciliation:   There are no discontinued medications.    Medications started since last [facility] medication reconciliation:  No orders of the defined types were placed in this encounter.      REVIEW OF SYSTEMS:  7 point ROS done including, light headedness/dizziness, fever/chills, pain, Resp, CV, GI, and  and is negative other than noted in HPI.      Physical Exam:  /58  Pulse 86  Temp 97.4  F (36.3  C)  Resp 16  Ht 5' 8\" (1.727 m)  Wt 147 lb 12.8 oz (67 kg)  SpO2 98%  BMI 22.47 kg/m2     GENERAL APPEARANCE:  Elderly male sitting up in " chair, NAD, non-toxic.  ENT:  Mouth and oropharynx normal, moist mucous membranes.   RESP:  Lungs  mild coarseness in bases, otherwise CTA.  Regular relaxed breathing effort.  No cough.   CV: 2/6 systolic murmur heard loudest 2 ICS LSB/S2.  Regular rhythm and rate.  No generalized edema.   ABDOMEN:   Flat, soft, non-tender with active bowel sounds.  No guarding, rigidity, or rebound tenderness.  EXTREMITIES:  No lower extremity edema, no calf tenderness.   PSYCH: Alert and orientated, pleasant and cooperative.     Recent Labs: I have personally reviewed labs, which are in facility chart.     CBC RESULTS:   Recent Labs   Lab Test  11/08/18   0630  11/02/18   0630  11/01/18   1130   WBC  10.6   --   11.5*   RBC  2.66*   --   2.61*   HGB  7.9*  7.7*  7.4*   HCT  24.6*   --   23.4*   MCV  93   --   90   MCH  29.7   --   28.4   MCHC  32.1   --   31.6   RDW  16.2*   --   15.2*   PLT  382   --   392       Last Basic Metabolic Panel:  Recent Labs   Lab Test  11/01/18   1130  10/30/18   1055   NA  138  134   POTASSIUM  3.7  4.2   CHLORIDE  106  103   JOSELINE  7.8*  7.9*   CO2  25  22   BUN  30  32*   CR  1.55*  1.86*   GLC  45*  178*       Liver Function Studies -   Recent Labs   Lab Test  10/09/18   0703   PROTTOTAL  6.3*   ALBUMIN  2.1*   BILITOTAL  0.6   ALKPHOS  72   AST  22   ALT  12       TSH   Date Value Ref Range Status   10/08/2018 4.57 (H) 0.40 - 4.00 mU/L Final   04/02/2003 2.08 0.4 - 5.0 mU/L Final     Lab Results   Component Value Date    A1C 8.4 08/25/2018    A1C 7.8 04/22/2014       Assessment/Plan:  Vertigo  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  Has developed 4-5 days of intermittent brisk resolving vertigo.  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  After reviewing etiologies he thinks it's BPPV and HPI would be consistent with that.  Reports he has not had issues in the past, is requesting  Meclizine.    We did discuss other etiologies, we know he had blood loss anemia, last Hgb only 7.7.  We also discussed his Ambien, Methocarbamol, Tramadol.  He reports significant insomnia, does not want to adjust those medications, reports he's been taking those before with no vertigo.  VSS and glucoses are not extreme thus low suspicion for those etiologies.  He does have a h/o cardiac arrhythmia, no CP, no SOB, thus low suspicion as well but not ruled out.    Plan:  -PT/OT to do [place] p[place] and Epley maneuvers.  -Meclizine 25 mg's TID x 4 days then q8h PRN after.   -Will recheck Hgb to monitor anemia.     --If not improved, consider ECG and dropping above medications.       Type 2 diabetes mellitus with stage 3 chronic kidney disease, with long-term current use of insulin (H)  A1c was 8.4 in Aug.    Review of glucoses are erratic ranging from , besides the Vertigo, no other symptoms.   -No changes, continue to clinically monitor.     Essential hypertension  Cardiac arrhythmia, unspecified cardiac arrhythmia type  Review of VS's show VSS and within acceptable range.   No current s/s of clinical CHF.   -As noted above on ECG.   -No changes, continue to clinically monitor.     Benign prostatic hyperplasia without lower urinary tract symptoms  -Continues Flomax.     Anemia due to blood loss, acute  -As noted above.     Insomnia, unspecified type  He is adamant on wanting to keep Ambien, Melatonin and we also note he takes his PRN Tramadol and Methocarbamol at night as well.  He reports he does not want that changed.  Reports he's been on there long before the vertigo, which is not unreasonable.  Plan as above, if Vertigo resolves, then good, if not we may need to reconsider reduction of medications.   -No changes at this time, continue to clinically monitor.     Debility  -Continues to work with PT/OT.     Orders:  -As noted above.    Electronically signed by  WILLIAM Hernandez  CNP                      Sincerely,        Edgardo Bustos, APRN CNP

## 2018-11-14 NOTE — PROGRESS NOTES
[facility]    Chief Complaint   Patient presents with     DAVEECK       [facility] Record Number:  [id number]  [facility] where encounter took place[facility] [[id number]]    HPI:    Abimael Flores is a 89 year old  (1/23/1924), who is being seen today for an episodic care visit.  HPI information obtained from: facility chart records, facility staff, patient report and [facility] chart review.    Met with Dr. Flores today for follow up.  Dr. Flores reports he has developed vertigo with brief episodes of dizziness that correlate with head movement, no recent falls.  Reports going on for 4-5 days now, episodes are sporadic and resolve quickly, usually when he's up and moving.   No vision changes, no N/V.  He reports pain as tolerable, has no other complaints.      ALLERGIES: Review of patient's allergies indicates no known allergies.  Past Medical, Surgical, Family and Social History reviewed and updated in [facility].    Current Outpatient Prescriptions   Medication Sig Dispense Refill     acetaminophen (TYLENOL) 325 MG tablet Take 2 tablets (650 mg) by mouth every 4 hours as needed for mild pain 40 tablet 0     aspirin 325 MG EC tablet Take one Aspirin tab twice daily for 5 weeks. 30 tablet 0     atorvastatin (LIPITOR) 10 MG tablet Take 1 tablet (10 mg) by mouth every evening       ferrous sulfate (IRON) 325 (65 Fe) MG tablet Take 325 mg by mouth daily (with breakfast)       insulin glargine (LANTUS) 100 UNIT/ML injection Inject 10 Units Subcutaneous every morning       insulin lispro (HUMALOG KWIKPEN) 100 UNIT/ML injection Inject Subcutaneous At Bedtime Sliding scale:  -249=1 unit  -299=2 units  -349=3 units  -399=4 units  +=5 units       insulin lispro (HUMALOG KWIKPEN) 100 UNIT/ML injection Inject 2 Units Subcutaneous 3 times daily (before meals)       insulin lispro (HUMALOG) 100 UNIT/ML injection Inject Subcutaneous 3 times  "daily (before meals) Sliding scale:   -189=1 unit  -239=2 units  -289=3 units  -339=4 units  -399=5 units  -499=6 units  +=7 units       MELATONIN PO Take 5 mg by mouth At Bedtime       methocarbamol (ROBAXIN) 500 MG tablet Take 0.5 tablets (250 mg) by mouth 3 times daily as needed for muscle spasms 120 tablet      metoprolol succinate (TOPROL-XL) 50 MG 24 hr tablet Take 1 tablet (50 mg) by mouth daily 30 tablet      nystatin (MYCOSTATIN) cream Apply topically 2 times daily       polyethylene glycol (MIRALAX/GLYCOLAX) Packet Take 17 g by mouth daily       senna-docusate (SENOKOT-S;PERICOLACE) 8.6-50 MG per tablet Take 1 tablet by mouth 2 times daily 60 tablet 1     tamsulosin (FLOMAX) 0.4 MG capsule Take 0.8 mg by mouth every evening        traMADol (ULTRAM) 50 MG tablet Take 1 tablet (50 mg) by mouth every 4 hours as needed for moderate pain 30 tablet 0     vitamin D (ERGOCALCIFEROL) 00899 UNIT capsule Take 50,000 Units by mouth once a week       ZOLPIDEM TARTRATE PO Take 5 mg by mouth At Bedtime       Medications reviewed:  Medications reconciled to facility chart and changes were made to reflect current medications as identified as above med list. Below are the changes that were made:   Medications stopped since last EPIC medication reconciliation:   There are no discontinued medications.    Medications started since last [facility] medication reconciliation:  No orders of the defined types were placed in this encounter.      REVIEW OF SYSTEMS:  7 point ROS done including, light headedness/dizziness, fever/chills, pain, Resp, CV, GI, and  and is negative other than noted in HPI.      Physical Exam:  /58  Pulse 86  Temp 97.4  F (36.3  C)  Resp 16  Ht 5' 8\" (1.727 m)  Wt 147 lb 12.8 oz (67 kg)  SpO2 98%  BMI 22.47 kg/m2     GENERAL APPEARANCE:  Elderly male sitting up in chair, NAD, non-toxic.  ENT:  Mouth and oropharynx normal, moist mucous membranes.   RESP:  " Lungs mild coarseness in bases, otherwise CTA.  Regular relaxed breathing effort.  No cough.   CV: 2/6 systolic murmur heard loudest 2 ICS LSB/S2.  Regular rhythm and rate.  No generalized edema.   ABDOMEN:   Flat, soft, non-tender with active bowel sounds.  No guarding, rigidity, or rebound tenderness.  EXTREMITIES:  No lower extremity edema, no calf tenderness.   PSYCH: Alert and orientated, pleasant and cooperative.     Recent Labs: I have personally reviewed labs, which are in facility chart.     CBC RESULTS:   Recent Labs   Lab Test  11/08/18   0630  11/02/18   0630  11/01/18   1130   WBC  10.6   --   11.5*   RBC  2.66*   --   2.61*   HGB  7.9*  7.7*  7.4*   HCT  24.6*   --   23.4*   MCV  93   --   90   MCH  29.7   --   28.4   MCHC  32.1   --   31.6   RDW  16.2*   --   15.2*   PLT  382   --   392       Last Basic Metabolic Panel:  Recent Labs   Lab Test  11/01/18   1130  10/30/18   1055   NA  138  134   POTASSIUM  3.7  4.2   CHLORIDE  106  103   JOSELINE  7.8*  7.9*   CO2  25  22   BUN  30  32*   CR  1.55*  1.86*   GLC  45*  178*       Liver Function Studies -   Recent Labs   Lab Test  10/09/18   0703   PROTTOTAL  6.3*   ALBUMIN  2.1*   BILITOTAL  0.6   ALKPHOS  72   AST  22   ALT  12       TSH   Date Value Ref Range Status   10/08/2018 4.57 (H) 0.40 - 4.00 mU/L Final   04/02/2003 2.08 0.4 - 5.0 mU/L Final     Lab Results   Component Value Date    A1C 8.4 08/25/2018    A1C 7.8 04/22/2014       Assessment/Plan:  Vertigo  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  Has developed 4-5 days of intermittent brisk resolving vertigo.  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  After reviewing etiologies he thinks it's BPPV and HPI would be consistent with that.  Reports he has not had issues in the past, is requesting Meclizine.    We did discuss other etiologies, we know he had blood loss anemia, last Hgb only  7.7.  We also discussed his Ambien, Methocarbamol, Tramadol.  He reports significant insomnia, does not want to adjust those medications, reports he's been taking those before with no vertigo.  VSS and glucoses are not extreme thus low suspicion for those etiologies.  He does have a h/o cardiac arrhythmia, no CP, no SOB, thus low suspicion as well but not ruled out.    Plan:  -PT/OT to do [place] pike and Epley maneuvers.  -Meclizine 25 mg's TID x 4 days then q8h PRN after.   -Will recheck Hgb to monitor anemia.     --If not improved, consider ECG and dropping above medications.       Type 2 diabetes mellitus with stage 3 chronic kidney disease, with long-term current use of insulin (H)  A1c was 8.4 in Aug.    Review of glucoses are erratic ranging from , besides the Vertigo, no other symptoms.   -No changes, continue to clinically monitor.     Essential hypertension  Cardiac arrhythmia, unspecified cardiac arrhythmia type  Review of VS's show VSS and within acceptable range.   No current s/s of clinical CHF.   -As noted above on ECG.   -No changes, continue to clinically monitor.     Benign prostatic hyperplasia without lower urinary tract symptoms  -Continues Flomax.     Anemia due to blood loss, acute  -As noted above.     Insomnia, unspecified type  He is adamant on wanting to keep Ambien, Melatonin and we also note he takes his PRN Tramadol and Methocarbamol at night as well.  He reports he does not want that changed.  Reports he's been on there long before the vertigo, which is not unreasonable.  Plan as above, if Vertigo resolves, then good, if not we may need to reconsider reduction of medications.   -No changes at this time, continue to clinically monitor.     Debility  -Continues to work with PT/OT.     Orders:  -As noted above.    Electronically signed by  WILLIAM Hernandez CNP

## 2018-11-15 ENCOUNTER — HOSPITAL LABORATORY (OUTPATIENT)
Dept: OTHER | Facility: CLINIC | Age: 83
End: 2018-11-15

## 2018-11-15 LAB
ERYTHROCYTE [DISTWIDTH] IN BLOOD BY AUTOMATED COUNT: 16.3 % (ref 10–15)
HCT VFR BLD AUTO: 27.7 % (ref 40–53)
HGB BLD-MCNC: 8.7 G/DL (ref 13.3–17.7)
MCH RBC QN AUTO: 29 PG (ref 26.5–33)
MCHC RBC AUTO-ENTMCNC: 31.4 G/DL (ref 31.5–36.5)
MCV RBC AUTO: 92 FL (ref 78–100)
PLATELET # BLD AUTO: 298 10E9/L (ref 150–450)
RBC # BLD AUTO: 3 10E12/L (ref 4.4–5.9)
WBC # BLD AUTO: 10.4 10E9/L (ref 4–11)

## 2018-11-21 ENCOUNTER — NURSING HOME VISIT (OUTPATIENT)
Dept: GERIATRICS | Facility: CLINIC | Age: 83
End: 2018-11-21
Payer: MEDICARE

## 2018-11-21 VITALS
BODY MASS INDEX: 21.59 KG/M2 | TEMPERATURE: 97.9 F | SYSTOLIC BLOOD PRESSURE: 89 MMHG | WEIGHT: 142 LBS | RESPIRATION RATE: 16 BRPM | HEART RATE: 101 BPM | OXYGEN SATURATION: 99 % | DIASTOLIC BLOOD PRESSURE: 48 MMHG

## 2018-11-21 DIAGNOSIS — E11.22 TYPE 2 DIABETES MELLITUS WITH STAGE 3 CHRONIC KIDNEY DISEASE, WITH LONG-TERM CURRENT USE OF INSULIN (H): ICD-10-CM

## 2018-11-21 DIAGNOSIS — D62 ANEMIA DUE TO BLOOD LOSS, ACUTE: ICD-10-CM

## 2018-11-21 DIAGNOSIS — Z79.4 TYPE 2 DIABETES MELLITUS WITH STAGE 3 CHRONIC KIDNEY DISEASE, WITH LONG-TERM CURRENT USE OF INSULIN (H): ICD-10-CM

## 2018-11-21 DIAGNOSIS — N18.30 TYPE 2 DIABETES MELLITUS WITH STAGE 3 CHRONIC KIDNEY DISEASE, WITH LONG-TERM CURRENT USE OF INSULIN (H): ICD-10-CM

## 2018-11-21 DIAGNOSIS — Z96.642 STATUS POST TOTAL REPLACEMENT OF LEFT HIP: ICD-10-CM

## 2018-11-21 DIAGNOSIS — N40.1 BENIGN PROSTATIC HYPERPLASIA WITH LOWER URINARY TRACT SYMPTOMS, SYMPTOM DETAILS UNSPECIFIED: ICD-10-CM

## 2018-11-21 DIAGNOSIS — I10 ESSENTIAL HYPERTENSION: ICD-10-CM

## 2018-11-21 DIAGNOSIS — Z96.649 S/P HIP HEMIARTHROPLASTY: ICD-10-CM

## 2018-11-21 DIAGNOSIS — Z86.79 H/O CARDIAC ARRHYTHMIA: ICD-10-CM

## 2018-11-21 DIAGNOSIS — R42 VERTIGO: ICD-10-CM
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
## 2018-11-21 DIAGNOSIS — Z98.890 S/P CLOSED REDUCTION OF DISLOCATED TOTAL HIP PROSTHESIS: ICD-10-CM

## 2018-11-21 DIAGNOSIS — R53.81 DEBILITY: ICD-10-CM

## 2018-11-21 DIAGNOSIS — G89.29 OTHER CHRONIC PAIN: ICD-10-CM

## 2018-11-21 DIAGNOSIS — Z95.1 H/O FOUR VESSEL CORONARY ARTERY BYPASS GRAFT: ICD-10-CM

## 2018-11-21 DIAGNOSIS — N18.30 CKD (CHRONIC KIDNEY DISEASE) STAGE 3, GFR 30-59 ML/MIN (H): ICD-10-CM

## 2018-11-21 PROCEDURE — 99316 NF DSCHRG MGMT 30 MIN+: CPT | Performed by: NURSE PRACTITIONER

## 2018-11-21 NOTE — LETTER
11/21/2018        RE: Abimael Flores  [address]  [facility] [address]          [facility] DISCHARGE SUMMARY    PATIENT'S NAME: Abimael Flores  YOB: 1924  MEDICAL RECORD NUMBER:  [id number]  [facility] where encounter took place[facility] [[id number]]    PRIMARY CARE PROVIDER AND CLINIC RESPONSIBLE AFTER TRANSFER: [place] [id number]  / [place]     TRANSFERRING PROVIDERS: WILLIAM Hernandez CNP, Noemi Hogan MD  DATE OF SNF ADMISSION:  October / 25 / 2018  DATE OF SNF (anticipated) DISCHARGE: November / 25 / 2018  DISCHARGE DISPOSITION: [facility]   RECENT HOSPITALIZATION/ED:  [facility] Hospital stay 10/21/2018 to 10/25/2018.     CODE STATUS/ADVANCE DIRECTIVES DISCUSSION:   DNR / DNI   No Known Allergies     Condition on Discharge:  Stable.  Function:  2ww and W/C level  Cognitive Scores: SLUMS 23/30 and CPT 5.1/5.6    Equipment: walker and wheelchair    DISCHARGE DIAGNOSIS:   1. Fall, subsequent encounter    2. 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 S/P closed reduction of dislocated total hip [id number] Oct 18    4. S/P hip hemiarthroplasty, Left 4 Oct 18    5. Vertigo, intermittent, unclear etiology    6. Essential hypertension    7. H/O coronary artery bypass graft, 1996    8. H/O cardiac arrhythmia    9. CKD (chronic kidney disease) stage 3, GFR 30-59 ml/min (H)    10. Type 2 diabetes mellitus with stage 3 chronic kidney disease, with long-term current use of insulin (H)    11. Benign prostatic hyperplasia with lower urinary tract symptoms, symptom details unspecified    12. Anemia due to blood loss, acute    13. Debility        HPI Nursing Facility Course:  HPI information obtained from: facility chart records, facility staff, patient report and [facility] chart review.    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 Due to  "a mechanical fall resulting in a Left femoral neck fracture, and was s/p Left hip gracia-arthroplasty.  He was readmitted after bending over and hearing a pop and instant severe Left hip pain, found to have a dislocated Left hip arthroplasty and was s/p closed reduction.  Later it was noted the reduction was only partially successful, thus he now is s/p total DELL on 22 Oct.      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  Is able to take some steps with his walker but still unsteady at times, otherwise mobilizes around in his WC.  He has developed vertigo which is intermittent in nature, brief episodes and only with movement.  Has been improving but not resolved.  He reports his pain as improved but not resolved, notes ongoing insomnia issues.     In meeting Dr. Flores he endorses the above.  He does not know if he's ready to return home but reports he's going to \"Give it a try\".  He reports his son is a physician and relies on him for input and assistance.      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   Continues to endorse minimal Left hip pain but ongoing chronic low back pain.  We stopped the scheduled Methocarbamol, went to PRN.    -Continues PRN Methocarbamol, Tramadol, Acetaminophen.     Vertigo, intermittent, unclear etiology  Started couple weeks prior to Discharge. Etiology unclear PT/OT unable to perform Kilo hallpike/Epley maneuvers.  Suspicion is for BPPV and he did note improvement on scheduled Meclizine.  Notes episodes are brief and only with movement.      Concern for polypharmacy as he is on PRN Methocarbamol, Tramadol, and takes Ambien and Melatonin notes insomnia issues and endorses/adamant about keeping these cause they help him sleep.      He also has a h/o CABG, " prior cardiac arrhythmia but no chest pain/SOB or other cardiac symptoms thus suspicion is low.     Lastly we do note soft SBP's at times, see below.   --PCP to consider polypharmacy and maneuvers.     Essential hypertension  H/O coronary artery bypass graft, 1996  H/O cardiac arrhythmia  SBP's 90's to 130's, HR's 's thus soft SBP's but slightly high HR's.   In on Toprol and statin.  Reluctant to lower beta blocker, but with intermittent light headedness/vertigo, we may need to consider in further.   -No changes, continue to clinically monitor.     CKD (chronic kidney disease) stage 3, GFR 30-59 ml/min (H)  Benign prostatic hyperplasia with lower urinary tract symptoms, symptom details unspecified  Creatine baseline around 1.5-1.8, unclear, last 1.55 on 1 Nov.   Denies any retention issues, no s/s of retention, but can take some time to get stream to flow.   -Continues Flomax.     Type 2 diabetes mellitus with stage 3 chronic kidney disease, with long-term current use of insulin (H)  A1c 8.4 in Aug.    Glucoses range 133-315 last few weeks.   -Continues Lantus, scheduled and correction Humalog.     Anemia due to blood loss, acute  Anemia noted post procedures, Hgb was down to 7.4, is on Fe supplement.  Hgb was up to 8.7 and trending up when checked 15 Nov.  Thus likely not etiology of vertigo.   -Continues on Fe.     Debility  -Will 'DC with [facility].  -See below for F2F.     PAST MEDICAL HISTORY:  has a past medical history of Benign essential hypertension (9/4/2018); Coronary artery disease; Nonsenile cataract; Recent retinal detachment, total or subtotal (8/5/2014); and Type 2 diabetes mellitus without complications (H). He also has no past medical history of Diabetic retinopathy (H); Glaucoma; or Macular degeneration.    DISCHARGE MEDICATIONS:  Current Outpatient Prescriptions   Medication Sig Dispense Refill     acetaminophen (TYLENOL) 325 MG tablet Take 2 tablets (650 mg) by mouth every 4 hours as needed for  mild pain 40 tablet 0     aspirin 325 MG EC tablet Take one Aspirin tab twice daily for 5 weeks. Stop on 11/27. 30 tablet 0     atorvastatin (LIPITOR) 10 MG tablet Take 1 tablet (10 mg) by mouth every evening       ferrous sulfate (IRON) 325 (65 Fe) MG tablet Take 325 mg by mouth daily (with breakfast)       insulin glargine (LANTUS) 100 UNIT/ML injection Inject 10 Units Subcutaneous every morning       insulin lispro (HUMALOG KWIKPEN) 100 UNIT/ML injection Inject Subcutaneous At Bedtime Sliding scale:  -249=1 unit  -299=2 units  -349=3 units  -399=4 units  +=5 units       insulin lispro (HUMALOG KWIKPEN) 100 UNIT/ML injection Inject 2 Units Subcutaneous 3 times daily (before meals)       insulin lispro (HUMALOG) 100 UNIT/ML injection Inject Subcutaneous 3 times daily (before meals) Sliding scale:   -189=1 unit  -239=2 units  -289=3 units  -339=4 units  -399=5 units  -499=6 units  +=7 units       MECLIZINE HCL PO Take 25 mg by mouth 3 times daily       MELATONIN PO Take 5 mg by mouth At Bedtime       METHOCARBAMOL PO Take 250 mg by mouth 3 times daily as needed for muscle spasms       metoprolol succinate (TOPROL-XL) 50 MG 24 hr tablet Take 1 tablet (50 mg) by mouth daily 30 tablet      nystatin (MYCOSTATIN) cream Apply topically 2 times daily       polyethylene glycol (MIRALAX/GLYCOLAX) Packet Take 17 g by mouth daily       senna-docusate (SENOKOT-S;PERICOLACE) 8.6-50 MG per tablet Take 1 tablet by mouth 2 times daily 60 tablet 1     tamsulosin (FLOMAX) 0.4 MG capsule Take 0.8 mg by mouth every evening        traMADol (ULTRAM) 50 MG tablet Take 1 tablet (50 mg) by mouth every 4 hours as needed for moderate pain 30 tablet 0     vitamin D (ERGOCALCIFEROL) 14861 UNIT capsule Take 50,000 Units by mouth once a week       ZOLPIDEM TARTRATE PO Take 5 mg by mouth At Bedtime         MEDICATION CHANGES/RATIONALE:     Controlled medications sent with  patient:   A script for PRN Methocarbamol and Tramadol medication for a two week supply was given to patient at dischage to have them fill at their out patient pharmacy    ROS:    7 point ROS done including, light headedness/dizziness, fever/chills, pain, Resp, CV, GI, and  and is negative other than noted in HPI.     Physical Exam:   Vitals: BP (!) 89/48  Pulse 101  Temp 97.9  F (36.6  C)  Resp 16  Wt 142 lb (64.4 kg)  SpO2 99%  BMI 21.59 kg/m2  BMI= Body mass index is 21.59 kg/(m^2).    GENERAL APPEARANCE:  Elderly male sitting up in chair, NAD, non-toxic.  ENT:  Mouth and oropharynx normal, moist mucous membranes.   RESP:  Lungs mild coarseness in bases, otherwise CTA, unchanged.  Regular relaxed breathing effort.  No cough.   CV: 2/6 systolic murmur heard loudest 2 ICS LSB/S2.  Regular rhythm and rate.  No generalized edema.   ABDOMEN:   Flat, soft, non-tender with active bowel sounds.  No guarding, rigidity, or rebound tenderness.  EXTREMITIES:  No lower extremity edema, no calf tenderness.   PSYCH: Alert and orientated, pleasant and cooperative.     DISCHARGE PLAN:  Occupational Therapy, Physical Therapy, Registered Nurse, Home Health Aide and From:  [facility]  Patient instructed to follow-up with:  PCP in 7 days      Current [facility] scheduled appointments:  No future appointments.    MTM referral needed and placed by this provider: No    Pending labs: None  SNF labs   CBC RESULTS:   Recent Labs   Lab Test  11/15/18   0817  11/08/18   0630   WBC  10.4  10.6   RBC  3.00*  2.66*   HGB  8.7*  7.9*   HCT  27.7*  24.6*   MCV  92  93   MCH  29.0  29.7   MCHC  31.4*  32.1   RDW  16.3*  16.2*   PLT  298  382       Last Basic Metabolic Panel:  Recent Labs   Lab Test  11/01/18   1130  10/30/18   1055   NA  138  134   POTASSIUM  3.7  4.2   CHLORIDE  106  103   JOSELINE  7.8*  7.9*   CO2  25  22   BUN  30  32*   CR  1.55*  1.86*   GLC  45*  178*       Liver Function Studies -   Recent Labs   Lab Test  10/09/18   0703    PROTTOTAL  6.3*   ALBUMIN  2.1*   BILITOTAL  0.6   ALKPHOS  72   AST  22   ALT  12       TSH   Date Value Ref Range Status   10/08/2018 4.57 (H) 0.40 - 4.00 mU/L Final   04/02/2003 2.08 0.4 - 5.0 mU/L Final     Lab Results   Component Value Date    A1C 8.4 08/25/2018    A1C 7.8 04/22/2014     Discharge Treatments: None    TOTAL DISCHARGE TIME:   Greater than 30 minutes  Electronically signed by:  WILLIAM Hernandez CNP     Documentation of Face to Face and Certification for Home Health Services    I certify that patient: Abimael Flores is under my care and that I, or a nurse practitioner or physician's assistant working with me, had a face-to-face encounter that meets the physician face-to-face encounter requirements with this patient on: 22 Nov 18.    This encounter with the patient was in whole, or in part, for the following medical condition, which is the primary reason for home health care: Fall with injury, s/p Left DELL, Vertigo.    I certify that, based on my findings, the following services are medically necessary home health services: Nursing, Occupational Therapy, Physical Therapy and HHA.    My clinical findings support the need for the above services because: Nurse is needed: To provide assessment and oversight required in the home to assure adherence to the medical plan due to:  Fall with injury, s/p Left DELL, Vertigo..., Occupational Therapy Services are needed to assess and treat cognitive ability and address ADL safety due to impairment in  Fall with injury, s/p Left DELL, Vertigo.. and Physical Therapy Services are needed to assess and treat the following functional impairments:  Fall with injury, s/p Left DELL, Vertigo..    Further, I certify that my clinical findings support that this patient is homebound (i.e. absences from home require considerable and taxing effort and are for medical reasons or Hoah[employer] services or infrequently or of short duration when for other reasons) because: Requires  assistance of another person or specialized equipment to access medical services because patient: Requires supervision of another for safe transfer...    Based on the above findings. I certify that this patient is confined to the home and needs intermittent skilled nursing care, physical therapy and/or speech therapy.  The patient is under my care, and I have initiated the establishment of the plan of care.  This patient will be followed by a physician who will periodically review the plan of care.  Physician/Provider to provide follow up care: Carlitos Bee    Attending hospital physician (the Medicare certified [facility] provider):   Electronically signed by  WILLIAM Miranda, CNP  [facility]    Physician Signature: See electronic signature associated with these discharge orders.  Date: 11/22/2018        Sincerely,        WILLIAM Hernandez CNP

## 2018-11-21 NOTE — PROGRESS NOTES
[place] [facility] DISCHARGE SUMMARY    PATIENT'S NAME: Abimael Flores  YOB: 1924  MEDICAL RECORD NUMBER:  [id number]  [facility] of Service where encounter took place:  [facility] [[id number]]    PRIMARY CARE PROVIDER AND CLINIC RESPONSIBLE AFTER TRANSFER: Carlitos Bee [facility] [address]  [place]     TRANSFERRING PROVIDERS: WILLIAM Hernandez CNP, Noemi Hogan MD  DATE OF SNF ADMISSION:  October / 25 / 2018  DATE OF SNF (anticipated) DISCHARGE: November / 25 / 2018  DISCHARGE DISPOSITION: [facility]   RECENT HOSPITALIZATION/ED:  [facility] stay 10/21/2018 to 10/25/2018.     CODE STATUS/ADVANCE DIRECTIVES DISCUSSION:   DNR / DNI   No Known Allergies     Condition on Discharge:  Stable.  Function:  2ww and W/C level  Cognitive Scores: SLUMS 23/30 and CPT 5.1/5.6    Equipment: walker and wheelchair    DISCHARGE DIAGNOSIS:   1. Fall, subsequent encounter    2. 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 S/P closed reduction of dislocated total hip [id number] Oct 18    4. S/P hip hemiarthroplasty, Left 4 Oct 18    5. Vertigo, intermittent, unclear etiology    6. Essential hypertension    7. H/O coronary artery bypass graft, 1996    8. H/O cardiac arrhythmia    9. CKD (chronic kidney disease) stage 3, GFR 30-59 ml/min (H)    10. Type 2 diabetes mellitus with stage 3 chronic kidney disease, with long-term current use of insulin (H)    11. Benign prostatic hyperplasia with lower urinary tract symptoms, symptom details unspecified    12. Anemia due to blood loss, acute    13. Debility        HPI Nursing Facility Course:  HPI information obtained from: facility chart records, facility staff, patient report and [facility] chart review.    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 Due to a mechanical fall resulting in a Left femoral neck fracture, and was s/p Left hip  "gracia-arthroplasty.  He was readmitted after bending over and hearing a pop and instant severe Left hip pain, found to have a dislocated Left hip arthroplasty and was s/p closed reduction.  Later it was noted the reduction was only partially successful, thus he now is s/p total DELL on 22 Oct.      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  Is able to take some steps with his walker but still unsteady at times, otherwise mobilizes around in his WC.  He has developed vertigo which is intermittent in nature, brief episodes and only with movement.  Has been improving but not resolved.  He reports his pain as improved but not resolved, notes ongoing insomnia issues.     In meeting Dr. Flores he endorses the above.  He does not know if he's ready to return home but reports he's going to \"Give it a try\".  He reports his son is a physician and relies on him for input and assistance.      Fall, subsequent encounter  Status post total replacement of left hip 22 Oct 18  S/P closed reduction of dislocated total hip [id number] Oct 18  S/P hip hemiarthroplasty, Left 4 Oct 18  Other chronic pain  Continues on BID  for prophylaxis: Last day 29 Nov.   Continues to endorse minimal Left hip pain but ongoing chronic low back pain.  We stopped the scheduled Methocarbamol, went to PRN.    -Continues PRN Methocarbamol, Tramadol, Acetaminophen.     Vertigo, intermittent, unclear etiology  Started couple weeks prior to Discharge. Etiology unclear PT/OT unable to perform [place] hallpike/Epley maneuvers.  Suspicion is for BPPV and he did note improvement on scheduled Meclizine.  Notes episodes are brief and only with movement.      Concern for polypharmacy as he is on PRN Methocarbamol, Tramadol, and takes Ambien and Melatonin notes insomnia issues and endorses/adamant about keeping these cause they help him sleep.      He also has a h/o CABG, prior cardiac arrhythmia but no chest pain/SOB or other cardiac symptoms thus " suspicion is low.     Lastly we do note soft SBP's at times, see below.   --PCP to consider polypharmacy and maneuvers.     Essential hypertension  H/O coronary artery bypass graft, 1996  H/O cardiac arrhythmia  SBP's 90's to 130's, HR's 's thus soft SBP's but slightly high HR's.   In on Toprol and statin.  Reluctant to lower beta blocker, but with intermittent light headedness/vertigo, we may need to consider in further.   -No changes, continue to clinically monitor.     CKD (chronic kidney disease) stage 3, GFR 30-59 ml/min (H)  Benign prostatic hyperplasia with lower urinary tract symptoms, symptom details unspecified  Creatine baseline around 1.5-1.8, unclear, last 1.55 on 1 Nov.   Denies any retention issues, no s/s of retention, but can take some time to get stream to flow.   -Continues Flomax.     Type 2 diabetes mellitus with stage 3 chronic kidney disease, with long-term current use of insulin (H)  A1c 8.4 in Aug.    Glucoses range 133-315 last few weeks.   -Continues Lantus, scheduled and correction Humalog.     Anemia due to blood loss, acute  Anemia noted post procedures, Hgb was down to 7.4, is on Fe supplement.  Hgb was up to 8.7 and trending up when checked 15 Nov.  Thus likely not etiology of vertigo.   -Continues on Fe.     Debility  -Will 'DC with [facility].  -See below for F2F.     PAST MEDICAL HISTORY:  has a past medical history of Benign essential hypertension (9/4/2018); Coronary artery disease; Nonsenile cataract; Recent retinal detachment, total or subtotal (8/5/2014); and Type 2 diabetes mellitus without complications (H). He also has no past medical history of Diabetic retinopathy (H); Glaucoma; or Macular degeneration.    DISCHARGE MEDICATIONS:  Current Outpatient Prescriptions   Medication Sig Dispense Refill     acetaminophen (TYLENOL) 325 MG tablet Take 2 tablets (650 mg) by mouth every 4 hours as needed for mild pain 40 tablet 0     aspirin 325 MG EC tablet Take one Aspirin tab twice  daily for 5 weeks. Stop on 11/27. 30 tablet 0     atorvastatin (LIPITOR) 10 MG tablet Take 1 tablet (10 mg) by mouth every evening       ferrous sulfate (IRON) 325 (65 Fe) MG tablet Take 325 mg by mouth daily (with breakfast)       insulin glargine (LANTUS) 100 UNIT/ML injection Inject 10 Units Subcutaneous every morning       insulin lispro (HUMALOG KWIKPEN) 100 UNIT/ML injection Inject Subcutaneous At Bedtime Sliding scale:  -249=1 unit  -299=2 units  -349=3 units  -399=4 units  +=5 units       insulin lispro (HUMALOG KWIKPEN) 100 UNIT/ML injection Inject 2 Units Subcutaneous 3 times daily (before meals)       insulin lispro (HUMALOG) 100 UNIT/ML injection Inject Subcutaneous 3 times daily (before meals) Sliding scale:   -189=1 unit  -239=2 units  -289=3 units  -339=4 units  -399=5 units  -499=6 units  +=7 units       MECLIZINE HCL PO Take 25 mg by mouth 3 times daily       MELATONIN PO Take 5 mg by mouth At Bedtime       METHOCARBAMOL PO Take 250 mg by mouth 3 times daily as needed for muscle spasms       metoprolol succinate (TOPROL-XL) 50 MG 24 hr tablet Take 1 tablet (50 mg) by mouth daily 30 tablet      nystatin (MYCOSTATIN) cream Apply topically 2 times daily       polyethylene glycol (MIRALAX/GLYCOLAX) Packet Take 17 g by mouth daily       senna-docusate (SENOKOT-S;PERICOLACE) 8.6-50 MG per tablet Take 1 tablet by mouth 2 times daily 60 tablet 1     tamsulosin (FLOMAX) 0.4 MG capsule Take 0.8 mg by mouth every evening        traMADol (ULTRAM) 50 MG tablet Take 1 tablet (50 mg) by mouth every 4 hours as needed for moderate pain 30 tablet 0     vitamin D (ERGOCALCIFEROL) 19303 UNIT capsule Take 50,000 Units by mouth once a week       ZOLPIDEM TARTRATE PO Take 5 mg by mouth At Bedtime         MEDICATION CHANGES/RATIONALE:     Controlled medications sent with patient:   A script for PRN Methocarbamol and Tramadol medication for a two week  supply was given to patient at dischage to have them fill at their out patient pharmacy    ROS:    7 point ROS done including, light headedness/dizziness, fever/chills, pain, Resp, CV, GI, and  and is negative other than noted in HPI.     Physical Exam:   Vitals: BP (!) 89/48  Pulse 101  Temp 97.9  F (36.6  C)  Resp 16  Wt 142 lb (64.4 kg)  SpO2 99%  BMI 21.59 kg/m2  BMI= Body mass index is 21.59 kg/(m^2).    GENERAL APPEARANCE:  Elderly male sitting up in chair, NAD, non-toxic.  ENT:  Mouth and oropharynx normal, moist mucous membranes.   RESP:  Lungs mild coarseness in bases, otherwise CTA, unchanged.  Regular relaxed breathing effort.  No cough.   CV: 2/6 systolic murmur heard loudest 2 ICS LSB/S2.  Regular rhythm and rate.  No generalized edema.   ABDOMEN:   Flat, soft, non-tender with active bowel sounds.  No guarding, rigidity, or rebound tenderness.  EXTREMITIES:  No lower extremity edema, no calf tenderness.   PSYCH: Alert and orientated, pleasant and cooperative.     DISCHARGE PLAN:  Occupational Therapy, Physical Therapy, Registered Nurse, Home Health Aide and From:  [facility]  Patient instructed to follow-up with:  PCP in 7 days      Current [place] scheduled appointments:  No future appointments.    MTM referral needed and placed by this provider: No    Pending labs: None  SNF labs   CBC RESULTS:   Recent Labs   Lab Test  11/15/18   0817  11/08/18   0630   WBC  10.4  10.6   RBC  3.00*  2.66*   HGB  8.7*  7.9*   HCT  27.7*  24.6*   MCV  92  93   MCH  29.0  29.7   MCHC  31.4*  32.1   RDW  16.3*  16.2*   PLT  298  382       Last Basic Metabolic Panel:  Recent Labs   Lab Test  11/01/18   1130  10/30/18   1055   NA  138  134   POTASSIUM  3.7  4.2   CHLORIDE  106  103   JOSELINE  7.8*  7.9*   CO2  25  22   BUN  30  32*   CR  1.55*  1.86*   GLC  45*  178*       Liver Function Studies -   Recent Labs   Lab Test  10/09/18   0703   PROTTOTAL  6.3*   ALBUMIN  2.1*   BILITOTAL  0.6   ALKPHOS  72   AST  22   ALT   12       TSH   Date Value Ref Range Status   10/08/2018 4.57 (H) 0.40 - 4.00 mU/L Final   04/02/2003 2.08 0.4 - 5.0 mU/L Final     Lab Results   Component Value Date    A1C 8.4 08/25/2018    A1C 7.8 04/22/2014     Discharge Treatments: None    TOTAL DISCHARGE TIME:   Greater than 30 minutes  Electronically signed by:  WILLIAM Hernandez CNP     Documentation of Face to Face and Certification for Home Health Services    I certify that patient: Abimael Flores is under my care and that I, or a nurse practitioner or physician's assistant working with me, had a face-to-face encounter that meets the physician face-to-face encounter requirements with this patient on: 22 Nov 18.    This encounter with the patient was in whole, or in part, for the following medical condition, which is the primary reason for home health care: Fall with injury, s/p Left DELL, Vertigo.    I certify that, based on my findings, the following services are medically necessary home health services: Nursing, Occupational Therapy, Physical Therapy and HHA.    My clinical findings support the need for the above services because: Nurse is needed: To provide assessment and oversight required in the home to assure adherence to the medical plan due to:  Fall with injury, s/p Left DELL, Vertigo..., Occupational Therapy Services are needed to assess and treat cognitive ability and address ADL safety due to impairment in  Fall with injury, s/p Left DELL, Vertigo.. and Physical Therapy Services are needed to assess and treat the following functional impairments:  Fall with injury, s/p Left DELL, Vertigo..    Further, I certify that my clinical findings support that this patient is homebound (i.e. absences from home require considerable and taxing effort and are for medical reasons or Islam services or infrequently or of short duration when for other reasons) because: Requires assistance of another person or specialized equipment to access medical services  because patient: Requires supervision of another for safe transfer...    Based on the above findings. I certify that this patient is confined to the home and needs intermittent skilled nursing care, physical therapy and/or speech therapy.  The patient is under my care, and I have initiated the establishment of the plan of care.  This patient will be followed by a physician who will periodically review the plan of care.  Physician/Provider to provide follow up care: Carlitos Bee    [facility] physician (the Medicare certified [facility] provider):   Electronically signed by  WILLIAM Miranda CNP  [facility]    Physician Signature: See electronic signature associated with these discharge orders.  Date: 11/22/2018

## 2018-11-22 PROBLEM — Z98.890 S/P CLOSED REDUCTION OF DISLOCATED TOTAL HIP PROSTHESIS: Status: ACTIVE | Noted: 2018-11-22

## 2018-11-22 PROBLEM — Z96.649 S/P HIP HEMIARTHROPLASTY: Status: ACTIVE | Noted: 2018-11-22

## 2018-11-22 PROBLEM — Z95.1 H/O FOUR VESSEL CORONARY ARTERY BYPASS GRAFT: Status: ACTIVE | Noted: 2018-11-22

## 2018-12-16 ENCOUNTER — APPOINTMENT (OUTPATIENT)
Dept: CT IMAGING | Facility: CLINIC | Age: 83
End: 2018-12-16
Attending: EMERGENCY MEDICINE
Payer: MEDICARE

## 2018-12-16 ENCOUNTER — HOSPITAL ENCOUNTER (EMERGENCY)
Facility: CLINIC | Age: 83
Discharge: HOME | End: 2018-12-16
Attending: EMERGENCY MEDICINE | Admitting: EMERGENCY MEDICINE
Payer: MEDICARE

## 2018-12-16 ENCOUNTER — APPOINTMENT (OUTPATIENT)
Dept: GENERAL RADIOLOGY | Facility: CLINIC | Age: 83
End: 2018-12-16
Attending: EMERGENCY MEDICINE
Payer: MEDICARE

## 2018-12-16 VITALS
RESPIRATION RATE: 18 BRPM | TEMPERATURE: 98.6 F | HEIGHT: 69 IN | DIASTOLIC BLOOD PRESSURE: 58 MMHG | SYSTOLIC BLOOD PRESSURE: 117 MMHG | BODY MASS INDEX: 22.22 KG/M2 | OXYGEN SATURATION: 97 % | WEIGHT: 150 LBS
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
## 2018-12-16 DIAGNOSIS — S73.005A DISLOCATION OF LEFT HIP, INITIAL ENCOUNTER (H): ICD-10-CM

## 2018-12-16 PROCEDURE — 40000986 XR HIP PORT LT 1 VW

## 2018-12-16 PROCEDURE — 99285 EMERGENCY DEPT VISIT HI MDM: CPT | Mod: 25

## 2018-12-16 PROCEDURE — 96374 THER/PROPH/DIAG INJ IV PUSH: CPT | Mod: 59

## 2018-12-16 PROCEDURE — 73502 X-RAY EXAM HIP UNI 2-3 VIEWS: CPT

## 2018-12-16 PROCEDURE — 70450 CT HEAD/BRAIN W/O DYE: CPT

## 2018-12-16 PROCEDURE — 27250 TREAT HIP DISLOCATION: CPT | Mod: LT

## 2018-12-16 PROCEDURE — A9270 NON-COVERED ITEM OR SERVICE: HCPCS | Mod: GY | Performed by: EMERGENCY MEDICINE

## 2018-12-16 PROCEDURE — 25000128 H RX IP 250 OP 636: Performed by: EMERGENCY MEDICINE

## 2018-12-16 PROCEDURE — 99153 MOD SED SAME PHYS/QHP EA: CPT

## 2018-12-16 PROCEDURE — 25000132 ZZH RX MED GY IP 250 OP 250 PS 637: Mod: GY | Performed by: EMERGENCY MEDICINE

## 2018-12-16 PROCEDURE — 99152 MOD SED SAME PHYS/QHP 5/>YRS: CPT

## 2018-12-16 PROCEDURE — 25000128 H RX IP 250 OP 636

## 2018-12-16 RX ORDER — PROPOFOL 10 MG/ML
40 INJECTION, EMULSION INTRAVENOUS ONCE
Status: COMPLETED | OUTPATIENT
Start: 2018-12-16 | End: 2018-12-16

## 2018-12-16 RX ORDER — MORPHINE SULFATE 2 MG/ML
2 INJECTION, SOLUTION INTRAMUSCULAR; INTRAVENOUS ONCE
Status: COMPLETED | OUTPATIENT
Start: 2018-12-16 | End: 2018-12-16

## 2018-12-16 RX ORDER — PROPOFOL 10 MG/ML
INJECTION, EMULSION INTRAVENOUS
Status: COMPLETED
Start: 2018-12-16 | End: 2018-12-16

## 2018-12-16 RX ORDER — IBUPROFEN 200 MG
200 TABLET ORAL ONCE
Status: COMPLETED | OUTPATIENT
Start: 2018-12-16 | End: 2018-12-16

## 2018-12-16 RX ADMIN — PROPOFOL 40 MG: 10 INJECTION, EMULSION INTRAVENOUS at 04:59

## 2018-12-16 RX ADMIN — IBUPROFEN 200 MG: 200 TABLET, FILM COATED ORAL at 08:18

## 2018-12-16 RX ADMIN — MORPHINE SULFATE 2 MG: 2 INJECTION, SOLUTION INTRAMUSCULAR; INTRAVENOUS at 03:48

## 2018-12-16 ASSESSMENT — ENCOUNTER SYMPTOMS: ARTHRALGIAS: 0

## 2018-12-16 ASSESSMENT — MIFFLIN-ST. JEOR: SCORE: 1310.78

## 2018-12-16 NOTE — ED NOTES
"[facility]  ED Nurse Handoff Report    ED Chief complaint: Hip Pain (Left hip deformity/shortening s/p fall tonight; slipped after getting out of bed.  No LOC/blood thinners.  Recent left hip surgery 10 days ago.)      ED Diagnosis:   Final diagnoses:   Dislocation of left hip, initial encounter (H)       Code Status: DNR / DNI    Allergies: No Known Allergies    Activity level - Baseline/Home:  Typically independent with walker and wheelchair    Activity Level - Current:   Able to ambulate with walker and assist of 1-2     Needed?: No    Isolation: No  Infection: Not Applicable  Bariatric?: No    Vital Signs:   Vitals:    18 0600 18 0615 18 0645 18 0700   BP:   93/51 117/58   Resp: 13 10  18   Temp:       TempSrc:       SpO2:    97%   Weight:       Height:           Cardiac Rhythm: ,        Pain level: 0-10 Pain Scale: 1    Is this patient confused?: No   Does this patient have a guardian?  No         If yes, is there guardianship documents in the Epic \"Code/ACP\" activity?  No         Guardian Notified?  N/A  [place] - Suicide Severity Rating Scale Completed?  Yes  If yes, what color did the patient score?  White    Patient Report: 89yo male. Lives in some type of nursing facility with very limited services as he is fairly independent. Uses walker and wheelchair at home. Fell overnight without and dislocated left hip. Hip was reduced and placed in knee immobilizer. We were planning to discharge the pt back home, but he was unable to ambulate safely with a walker and the immobilizer. The living facility was unable to increase services as they have on-call nursing staff (phone: [phone]); therefore, he needs admitted until he can find additional resources. CBC & BMP sent per hospitalist staff. Pending results. Of note, pt is a retired general surgeon; and, according to the report I received, his wife  10 days ago.    Family Comments: Son notified and plans " to present to [facility] by 0800. On-call nursing staff aware pt is being admitted.    OBS brochure/video discussed/provided to patient/family: Yes                ED Medications:   Medications   propofol (DIPRIVAN) injection 10 mg/mL vial (not administered)   morphine (PF) injection 2 mg (2 mg Intravenous Given 12/16/18 0348)   propofol (DIPRIVAN) 200 MG/20ML injection (40 mg  Given 12/16/18 0459)       Drips infusing?:  No    For the majority of the shift this patient was Green.   Interventions performed were N/A.    Severe Sepsis OR Septic Shock Diagnosis Present: No    To be done/followed up on inpatient unit:  Evaluate labs that are pending.    ED NURSE PHONE NUMBER: [phone]

## 2018-12-16 NOTE — ED NOTES
Patient discharged in stable condition. Patient received follow-up instructions and 0RXs. No questions at time of discharge. [facility] ([facility]) also updated about pt's discharge.

## 2018-12-16 NOTE — ED PROVIDER NOTES
History     Chief Complaint:  Fall    HPI   Abimael Flores is a 89 year old male with a history of HTN, DM2, hip surgery who presents to the emergency department for evaluation of fall. The patient reports he slipped out of bed at home tonight and fell trying to get back in bed, landing on his left knee. He denies any head trauma or LOC, but he has experienced left hip deformity since the fall. He notes he had left hip surgery at the end of August, partial hip surgery, and total hip arthroplasty on the right 10 days later. He denies being in pain currently. The patient recently (10 days ago) moved to a full service home.    Allergies:  NKDA     Medications:    Tylenol  Aspirin  Lipitor  Insulin  Meclizine  Melatonin  Methocarbamol  Toprol  Nystatin  Miralax  Senokot  Flomax  Ultram  Zolpidem     Past Medical History:    HTN  CAD  Nonsenile cataract  Retinal detachment  DM2  CKD stage 3  Left hip dislocation    Past Surgical History:    Arthroplasty revision hip  Cardiac surgery  Cataract IOL  Closed reduction hip  Lacrimal caruncle removed BE  ORIF hip bipolar  Repair ruptured globe    Family History:    DM    Social History:  Presents alone.  Former smoker.  Negative for alcohol use.  Marital Status:   [2]     Review of Systems   Musculoskeletal: Positive for gait problem. Negative for arthralgias.        Left hip deformity   Neurological: Negative for syncope.   All other systems reviewed and are negative.      Physical Exam     Patient Vitals for the past 24 hrs:   BP Temp Temp src Heart Rate Resp SpO2 Height Weight   12/16/18 0515 122/61 -- -- 68 12 96 % -- --   12/16/18 0506 129/68 -- -- -- 15 100 % -- --   12/16/18 0500 -- -- -- 72 15 100 % -- --   12/16/18 0459 -- -- -- -- 15 100 % -- --   12/16/18 0458 142/72 -- -- -- 14 100 % -- --   12/16/18 0455 156/76 -- -- 71 13 100 % -- --   12/16/18 0450 165/73 -- -- -- 20 -- -- --   12/16/18 0435 -- -- -- -- -- 98 % -- --   12/16/18 0430 -- -- -- -- -- 94  "% -- --   12/16/18 0400 -- -- -- -- -- 97 % -- --   12/16/18 0350 140/71 -- -- -- -- 98 % -- --   12/16/18 0347 140/71 -- -- 70 18 99 % -- --   12/16/18 0208 (!) 135/93 98.6  F (37  C) Oral 73 16 98 % 1.753 m (5' 9\") 68 kg (150 lb)     Physical Exam  General: Appears well-developed and well-nourished.   Head: No signs of trauma.   CV: Normal rate and regular rhythm.    Resp: Effort normal and breath sounds normal. No respiratory distress.   GI: Soft. There is no tenderness.  No rebound or guarding.  Normal bowel sounds.    MSK: Deformity and rotation of left hip.  +neurovascularly intact distally.  No other bony injuries noted.    Neuro: The patient is alert and oriented to person, place, and time.  PERRLA, EOMI, strength in upper/lower extremities normal and symmetrical.   Sensation normal. Speech normal.  GCS 15  Skin: Skin is warm and dry. Superficial skin tear to left elbow.  Psych: normal mood and affect. behavior is normal.       Emergency Department Course     Imaging:  Radiographic findings were communicated with the patient who voiced understanding of the findings.    XR Pelvis and Hip Left 1 View:  Bilateral hip arthroplasty. Posterior and superior  dislocation of the left hip arthroplasty. As per radiology.    CT Head without contrast:   No acute intracranial abnormality. As per radiology.    Procedures:    [facility] Procedure Note        Sedation:      Performed by: Dale Coles MD  Authorized by: Dale Coles MD    Pre-Procedure Assessment done at 0430.    Expected Level:  Moderate Sedation    Indication:  Sedation is required to allow for joint reduction    Consent obtained from patient after discussing the risks, benefits and alternatives.    PO Intake:  Appropriately NPO for procedure    ASA Class:  Class 2 - MILD SYSTEMIC DISEASE, NO ACUTE PROBLEMS, NO FUNCTIONAL LIMITATIONS.    Mallampati:  Grade 2:  Soft palate, base of uvula, tonsillar pillars, and portion of posterior pharyngeal " wall visible    Lungs: Lungs Clear with good breath sounds bilaterally.     Heart: Normal heart sounds and rate    History and physical reviewed and no updates needed. I have reviewed the lab findings, diagnostic data, medications, and the plan for sedation. I have determined this patient to be an appropriate candidate for the planned sedation and procedure and have reassessed the patient IMMEDIATELY PRIOR to sedation and procedure.      Sedation Post Procedure Summary:    Prior to the start of the procedure and with procedural staff participation, I verbally confirmed the patient s identity using two indicators, relevant allergies, that the procedure was appropriate and matched the consent or emergent situation, and that the correct equipment/implants were available. Immediately prior to starting the procedure I conducted the Time Out with the procedural staff and re-confirmed the patient s name, procedure, and site/side. (The Joint Commission universal protocol was followed.)  Yes      Sedatives: Propofol    Vital signs, airway, End Tidal CO2 and pulse oximetry were monitored and remained stable throughout the procedure and sedation was maintained until the procedure was complete.  The patient was monitored by staff until sedation discharge criteria were met.    Patient tolerance: Patient tolerated the procedure well with no immediate complications.    Time of sedation in minutes:  20 minutes from beginning to end of physician one to one monitoring.    Procedure:  Left Hip Reduction    Indication: Left hip dislocation as seen on x-ray.    Consent: Risks, benefits, and alternatives were discussed with the patient and consent for procedure was obtained.      Timeout: Universal protocol was followed. TIME OUT conducted just prior to starting procedure confirmed patient identity, site/side, procedure, patient position, and availability of correct equipment and implants.      Medication: The patient was sedated using  IV Propofol in moderate sedation as noted in above procedure note.    Technique: The affected hip was placed into 90 degrees flexion, using the  Jim reduction technique (my knee under the patient's flexed knee, with counter traction effected by ERT's hands on the affected side ASIS), inline traction was placed gently until a palpable pop was noted and clinical reduction apparent.  Leg was demonstrated to have equal length and lie to the contralateral side.  Sensation, motor function, and circulation are intact after reduction, and a post-procedural X-ray shows successful reduction of the left hip. There were no complications. The patient tolerated the procedure well.        Interventions:  0348 Morphine 2 mg IV  0459 Propofol 40 mg IV    Emergency Department Course:  Nursing notes and vitals reviewed. 0225 I performed an exam of the patient as documented above.     IV inserted. Medicine administered as documented above. Blood drawn. This was sent to the lab for further testing, results above.    The patient was sent for a XR Hip, CT Head while in the emergency department, findings above.     0428 I spoke with Dr. Cottrell, orthopedics, regarding the patient.    0500 I performed a sedation and hip reduction, details above.    0525 I rechecked the patient and discussed the results of his workup thus far.     Findings and plan explained to the Patient. Patient discharged home with instructions regarding supportive care, medications, and reasons to return. The importance of close follow-up was reviewed.     I personally reviewed the laboratory results with the Patient and answered all related questions prior to discharge.     Impression & Plan      Medical Decision Making:  Abimael Flores is a 89-year-old gentleman who presents after a fall with left hip discomfort.  He has a history of hip fracture in October with subsequent surgery.  He has had a few dislocations since and actually had a revision done at the  end of October.  He apparently fell out of bed this evening.  He denies any other injuries.  He had a couple of small skin tears, which were not amenable to suturing, and these were cleaned and dressed.  X-ray did confirm a hip dislocation.  I did speak with Dr. Cottrell with orthopedics to confirm that this would be appropriate to attempt reduction this close post surgery, and he felt it would be appropriate.  Reduction was accomplished without difficulty, and patient tolerated quite well.  He will be discharged back to nursing home where he has full care and physical therapy per patient, and he will be given instructions to follow-up with his orthopedic doctor.      Diagnosis:    ICD-10-CM   1. Dislocation of left hip, initial encounter (H) S73.005A       Disposition:  discharged to home    IJose L, am serving as a scribe on 12/16/2018 at 4:49 AM to personally document services performed by Dale Coles MD based on my observations and the provider's statements to me.     Jose L Gerard  12/16/2018    EMERGENCY DEPARTMENT       Dale Coles MD  12/17/18 1259

## 2018-12-16 NOTE — ED NOTES
Ambulated patient with stand-by assist. Pt used walker to ambulate 10 feet. Denied pain during this. Educated patient on wearing shoes while ambulating and use of knee immobilizer. Pt verbalized understanding.

## 2018-12-16 NOTE — ED NOTES
Spoke with JENNIFER Phillip, remote nurse with [facility]. Reviewed discharge with her. Explained to her that patient ambulated 10 feet with walker and stated he was ok with this and denied pain.

## 2018-12-16 NOTE — ED NOTES
Bed: ED04  Expected date: 12/16/18  Expected time: 2:05 AM  Means of arrival:   Comments:  HEMS 438  89M  Right hip pain

## 2018-12-16 NOTE — PROGRESS NOTES
RECEIVING UNIT ED HANDOFF REVIEW    ED Nurse Handoff Report was reviewed by: Jasmyn Savage on December 16, 2018 at 8:02 AM

## 2018-12-16 NOTE — ED AVS SNAPSHOT
Emergency Department  [address] [place] [address]  Phone:  [phone]  Fax:  [phone]                                    Abimael Flores   MRN: [id number]    Department:   Emergency Department   Date of Visit:  12/16/2018           After Visit Summary Signature Page    I have received my discharge instructions, and my questions have been answered. I have discussed any challenges I see with this plan with the nurse or doctor.    ..........................................................................................................................................  Patient/Patient Representative Signature      ..........................................................................................................................................  Patient Representative Print Name and Relationship to Patient    ..................................................               ................................................  Date                                   Time    ..........................................................................................................................................  Reviewed by Signature/Title    ...................................................              ..............................................  Date                                               Time          22EPIC Rev 08/18

## 2018-12-16 NOTE — ED NOTES
Wounds cleansed with soap and NS. Steri strips applied to right forearm and left forearm. Non-occlusive gauze applied and wrapped with coban.

## 2018-12-16 NOTE — DISCHARGE INSTRUCTIONS
Call your Orthopedic surgeon on Monday for follow up.  Wear the knee immobilizer at all times unless showering. Try to avoid bending at your hip while you shower.  Wear the immobilizer until your see your orthopedic doctor.

## 2018-12-16 NOTE — ED NOTES
Pt's son arrived. He states that they have appropriate cares to take care of his father. After a discussion with the hospitalist, ED MD (Dr. Perez), the pt, and his son, it was decided that the pt could be discharged home. PPM and Obs Unit Charge RN made aware.

## 2018-12-16 NOTE — ED PROVIDER NOTES
Patient was seen by my partner overnight.  History of total hip arthroplasty on the right and overnight he fell without significant injury other than dislocating his hip.  It was reduced.  A knee immobilizer was placed and he was going to go back to the assisted living.  However on road testing, that he was incredibly unstable and would have fallen had my staff not been there.  He is unable to ambulate with the knee immobilizer on safely.  The care facility that he is at he does not have full nursing home capabilities.  The patient initially did not want to come in the hospital but I encouraged him to come in for PT OT evaluation and social service consultation for placement in a more skilled nursing facility until he can rehab enough to be safe.  He agreed.  I talked with Dr. Roland who will be admitting him on the observation bed.  I have ordered some basic labs.      ICD-10-CM    1. Dislocation of left hip, initial encounter (H) S73.005A    2. Fall, initial encounter W19.XXXA      Addendum: Patient's son arrived who is a physician.  He states that the patient has assisted weightbearing ordered already full-time and wants him to go back to the care center.  He will go to the care center to make sure that he is safe and has proper nursing care and physical therapy services.  I explained to him the situation with his father being unsteady on his feet with a walker and he understands.  He is willing to make an attempt to get him back to the care center today so he will be discharged with his son who will take him back in his minivan with the knee immobilizer on and follow-up with orthopedics this week.  I told him if it is not working at the care center with the nursing staff, he should return to the ER as I do not want him to fall.  He understands that there is always that risk.     Nevin Perez MD  12/16/18 9978       Nevin Perez MD  12/16/18 1115

## 2018-12-16 NOTE — ED NOTES
Pt sitting in chair. EMS here for transport. Patient used walker but needed 1 person assistance to stand up. He took 4 steps on his own and then started to fall backwards and unable to catch his balance. I was able to hold him up and get him to sit on the ambulance cot.  Explained to patient multiple times that he is a high fall risk with the knee immobilizer at falling again. Explained to him that it is not appropriate to send him back to the assisted living like this. Patient insisted on going back to the assisted living. Stated that he will use his walker and have someone present. Tried explaining to him that this isn't always an option while at the assisted living. Reported situation to Dr. Perez. Dr. Perez spoke with patient and patient agreed to stay. Called JENNIFER Phillip with assisted living on call and she verbalized understanding.

## 2019-01-04 ENCOUNTER — APPOINTMENT (OUTPATIENT)
Dept: GENERAL RADIOLOGY | Facility: CLINIC | Age: 84
End: 2019-01-04
Payer: MEDICARE

## 2019-01-04 ENCOUNTER — HOSPITAL ENCOUNTER (OUTPATIENT)
Facility: CLINIC | Age: 84
Setting detail: OBSERVATION
Discharge: HOME | End: 2019-01-05
Attending: EMERGENCY MEDICINE | Admitting: INTERNAL MEDICINE
Payer: MEDICARE
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
## 2019-01-04 DIAGNOSIS — S72.122A: ICD-10-CM

## 2019-01-04 DIAGNOSIS — R26.81 GAIT INSTABILITY: ICD-10-CM

## 2019-01-04 DIAGNOSIS — S73.005A CLOSED DISLOCATION OF LEFT HIP, INITIAL ENCOUNTER (H): ICD-10-CM

## 2019-01-04 PROCEDURE — 96375 TX/PRO/DX INJ NEW DRUG ADDON: CPT

## 2019-01-04 PROCEDURE — 71101 X-RAY EXAM UNILAT RIBS/CHEST: CPT | Mod: LT

## 2019-01-04 PROCEDURE — 99285 EMERGENCY DEPT VISIT HI MDM: CPT | Mod: 25

## 2019-01-04 PROCEDURE — 25000128 H RX IP 250 OP 636: Performed by: EMERGENCY MEDICINE

## 2019-01-04 PROCEDURE — 73502 X-RAY EXAM HIP UNI 2-3 VIEWS: CPT

## 2019-01-04 PROCEDURE — 96374 THER/PROPH/DIAG INJ IV PUSH: CPT

## 2019-01-04 RX ORDER — MORPHINE SULFATE 4 MG/ML
4 INJECTION, SOLUTION INTRAMUSCULAR; INTRAVENOUS ONCE
Status: COMPLETED | OUTPATIENT
Start: 2019-01-04 | End: 2019-01-04

## 2019-01-04 RX ORDER — ONDANSETRON 2 MG/ML
4 INJECTION INTRAMUSCULAR; INTRAVENOUS ONCE
Status: COMPLETED | OUTPATIENT
Start: 2019-01-04 | End: 2019-01-04

## 2019-01-04 RX ADMIN — ONDANSETRON 4 MG: 2 INJECTION INTRAMUSCULAR; INTRAVENOUS at 23:33

## 2019-01-04 RX ADMIN — MORPHINE SULFATE 4 MG: 4 INJECTION INTRAVENOUS at 23:35

## 2019-01-04 ASSESSMENT — MIFFLIN-ST. JEOR: SCORE: 1331.18

## 2019-01-05 ENCOUNTER — APPOINTMENT (OUTPATIENT)
Dept: GENERAL RADIOLOGY | Facility: CLINIC | Age: 84
End: 2019-01-05
Payer: MEDICARE

## 2019-01-05 ENCOUNTER — APPOINTMENT (OUTPATIENT)
Dept: PHYSICAL THERAPY | Facility: CLINIC | Age: 84
End: 2019-01-05
Payer: MEDICARE

## 2019-01-05 VITALS
TEMPERATURE: 98 F | HEART RATE: 84 BPM | SYSTOLIC BLOOD PRESSURE: 127 MMHG | RESPIRATION RATE: 16 BRPM | OXYGEN SATURATION: 99 % | BODY MASS INDEX: 23.95 KG/M2 | WEIGHT: 158 LBS | DIASTOLIC BLOOD PRESSURE: 66 MMHG | HEIGHT: 68 IN

## 2019-01-05 PROBLEM — S73.005A CLOSED DISLOCATION OF LEFT HIP, INITIAL ENCOUNTER (H): Status: ACTIVE | Noted: 2019-01-05

## 2019-01-05 LAB
ALBUMIN UR-MCNC: 10 MG/DL
APPEARANCE UR: ABNORMAL
BILIRUB UR QL STRIP: NEGATIVE
COLOR UR AUTO: YELLOW
GLUCOSE BLDC GLUCOMTR-MCNC: 116 MG/DL (ref 70–99)
GLUCOSE BLDC GLUCOMTR-MCNC: 147 MG/DL (ref 70–99)
GLUCOSE UR STRIP-MCNC: NEGATIVE MG/DL
HGB UR QL STRIP: ABNORMAL
HYALINE CASTS #/AREA URNS LPF: 1 /LPF (ref 0–2)
KETONES UR STRIP-MCNC: NEGATIVE MG/DL
LEUKOCYTE ESTERASE UR QL STRIP: ABNORMAL
NITRATE UR QL: NEGATIVE
PH UR STRIP: 5 PH (ref 5–7)
RBC #/AREA URNS AUTO: 5 /HPF (ref 0–2)
SOURCE: ABNORMAL
SP GR UR STRIP: 1.02 (ref 1–1.03)
UROBILINOGEN UR STRIP-MCNC: NORMAL MG/DL (ref 0–2)
WBC #/AREA URNS AUTO: 144 /HPF (ref 0–5)
YEAST #/AREA URNS HPF: ABNORMAL /HPF

## 2019-01-05 PROCEDURE — A9270 NON-COVERED ITEM OR SERVICE: HCPCS | Mod: GY | Performed by: INTERNAL MEDICINE

## 2019-01-05 PROCEDURE — 96375 TX/PRO/DX INJ NEW DRUG ADDON: CPT | Mod: 59

## 2019-01-05 PROCEDURE — 99207 ZZC APP CREDIT; MD BILLING SHARED VISIT: CPT | Performed by: HOSPITALIST

## 2019-01-05 PROCEDURE — 27265 TREAT HIP DISLOCATION: CPT | Mod: LT

## 2019-01-05 PROCEDURE — 99207 ZZC CDG-HISTORY COMP: MEETS EXP. PROB FOCUSED- DOWN CODED LACK OF PFSH: CPT | Performed by: INTERNAL MEDICINE

## 2019-01-05 PROCEDURE — 99235 HOSP IP/OBS SAME DATE MOD 70: CPT | Performed by: INTERNAL MEDICINE

## 2019-01-05 PROCEDURE — 40000193 ZZH STATISTIC PT WARD VISIT

## 2019-01-05 PROCEDURE — 25000132 ZZH RX MED GY IP 250 OP 250 PS 637: Mod: GY | Performed by: INTERNAL MEDICINE

## 2019-01-05 PROCEDURE — 25000128 H RX IP 250 OP 636

## 2019-01-05 PROCEDURE — 25000128 H RX IP 250 OP 636: Performed by: EMERGENCY MEDICINE

## 2019-01-05 PROCEDURE — 97161 PT EVAL LOW COMPLEX 20 MIN: CPT | Mod: GP

## 2019-01-05 PROCEDURE — G0378 HOSPITAL OBSERVATION PER HR: HCPCS

## 2019-01-05 PROCEDURE — 00000146 ZZHCL STATISTIC GLUCOSE BY METER IP

## 2019-01-05 PROCEDURE — 40000986 XR PELVIS PORT 1/2 VW

## 2019-01-05 PROCEDURE — 81001 URINALYSIS AUTO W/SCOPE: CPT | Performed by: EMERGENCY MEDICINE

## 2019-01-05 RX ORDER — DEXTROSE MONOHYDRATE 25 G/50ML
25-50 INJECTION, SOLUTION INTRAVENOUS
Status: DISCONTINUED | OUTPATIENT
Start: 2019-01-05 | End: 2019-01-05 | Stop reason: HOSPADM

## 2019-01-05 RX ORDER — AMOXICILLIN 250 MG
1 CAPSULE ORAL 2 TIMES DAILY PRN
Status: DISCONTINUED | OUTPATIENT
Start: 2019-01-05 | End: 2019-01-05 | Stop reason: HOSPADM

## 2019-01-05 RX ORDER — ASPIRIN 81 MG/1
81 TABLET ORAL DAILY
Status: ON HOLD | COMMUNITY
End: 2019-01-17

## 2019-01-05 RX ORDER — ACETAMINOPHEN 325 MG/1
650 TABLET ORAL EVERY 4 HOURS PRN
Status: DISCONTINUED | OUTPATIENT
Start: 2019-01-05 | End: 2019-01-05 | Stop reason: HOSPADM

## 2019-01-05 RX ORDER — HYDROCODONE BITARTRATE AND ACETAMINOPHEN 5; 325 MG/1; MG/1
1-2 TABLET ORAL EVERY 4 HOURS PRN
Status: DISCONTINUED | OUTPATIENT
Start: 2019-01-05 | End: 2019-01-05 | Stop reason: HOSPADM

## 2019-01-05 RX ORDER — PROPOFOL 10 MG/ML
40 INJECTION, EMULSION INTRAVENOUS ONCE
Status: COMPLETED | OUTPATIENT
Start: 2019-01-05 | End: 2019-01-05

## 2019-01-05 RX ORDER — ONDANSETRON 4 MG/1
4 TABLET, ORALLY DISINTEGRATING ORAL EVERY 6 HOURS PRN
Status: DISCONTINUED | OUTPATIENT
Start: 2019-01-05 | End: 2019-01-05 | Stop reason: HOSPADM

## 2019-01-05 RX ORDER — PROPOFOL 10 MG/ML
INJECTION, EMULSION INTRAVENOUS
Status: COMPLETED
Start: 2019-01-05 | End: 2019-01-05

## 2019-01-05 RX ORDER — POLYETHYLENE GLYCOL 3350 17 G/17G
17 POWDER, FOR SOLUTION ORAL DAILY PRN
Status: DISCONTINUED | OUTPATIENT
Start: 2019-01-05 | End: 2019-01-05 | Stop reason: HOSPADM

## 2019-01-05 RX ORDER — INSULIN GLARGINE 100 [IU]/ML
6-8 INJECTION, SOLUTION SUBCUTANEOUS EVERY EVENING
COMMUNITY
End: 2019-01-17

## 2019-01-05 RX ORDER — PROPOFOL 10 MG/ML
20 INJECTION, EMULSION INTRAVENOUS ONCE
Status: COMPLETED | OUTPATIENT
Start: 2019-01-05 | End: 2019-01-05

## 2019-01-05 RX ORDER — ONDANSETRON 2 MG/ML
4 INJECTION INTRAMUSCULAR; INTRAVENOUS EVERY 6 HOURS PRN
Status: DISCONTINUED | OUTPATIENT
Start: 2019-01-05 | End: 2019-01-05 | Stop reason: HOSPADM

## 2019-01-05 RX ORDER — ZOLPIDEM TARTRATE 5 MG/1
5 TABLET ORAL AT BEDTIME
Status: ON HOLD | COMMUNITY
End: 2019-01-17

## 2019-01-05 RX ORDER — AMOXICILLIN 250 MG
2 CAPSULE ORAL 2 TIMES DAILY PRN
Status: DISCONTINUED | OUTPATIENT
Start: 2019-01-05 | End: 2019-01-05 | Stop reason: HOSPADM

## 2019-01-05 RX ORDER — NALOXONE HYDROCHLORIDE 0.4 MG/ML
.1-.4 INJECTION, SOLUTION INTRAMUSCULAR; INTRAVENOUS; SUBCUTANEOUS
Status: DISCONTINUED | OUTPATIENT
Start: 2019-01-05 | End: 2019-01-05 | Stop reason: HOSPADM

## 2019-01-05 RX ORDER — NICOTINE POLACRILEX 4 MG
15-30 LOZENGE BUCCAL
Status: DISCONTINUED | OUTPATIENT
Start: 2019-01-05 | End: 2019-01-05 | Stop reason: HOSPADM

## 2019-01-05 RX ORDER — TAMSULOSIN HYDROCHLORIDE 0.4 MG/1
0.8 CAPSULE ORAL EVERY EVENING
Status: DISCONTINUED | OUTPATIENT
Start: 2019-01-05 | End: 2019-01-05 | Stop reason: HOSPADM

## 2019-01-05 RX ORDER — METOPROLOL SUCCINATE 50 MG/1
50 TABLET, EXTENDED RELEASE ORAL DAILY
Status: DISCONTINUED | OUTPATIENT
Start: 2019-01-05 | End: 2019-01-05 | Stop reason: HOSPADM

## 2019-01-05 RX ORDER — ACETAMINOPHEN 650 MG/1
650 SUPPOSITORY RECTAL EVERY 4 HOURS PRN
Status: DISCONTINUED | OUTPATIENT
Start: 2019-01-05 | End: 2019-01-05 | Stop reason: HOSPADM

## 2019-01-05 RX ADMIN — METOPROLOL SUCCINATE 50 MG: 50 TABLET, EXTENDED RELEASE ORAL at 08:42

## 2019-01-05 RX ADMIN — PROPOFOL 20 MG: 10 INJECTION, EMULSION INTRAVENOUS at 02:06

## 2019-01-05 RX ADMIN — PROPOFOL 40 MG: 10 INJECTION, EMULSION INTRAVENOUS at 02:05

## 2019-01-05 RX ADMIN — ACETAMINOPHEN 650 MG: 325 TABLET, FILM COATED ORAL at 11:22

## 2019-01-05 ASSESSMENT — ACTIVITIES OF DAILY LIVING (ADL)
TOILETING: 0-->INDEPENDENT
AMBULATION: 1-->ASSISTIVE EQUIPMENT
DRESS: 0-->INDEPENDENT
FALL_HISTORY_WITHIN_LAST_SIX_MONTHS: YES
WHICH_OF_THE_ABOVE_FUNCTIONAL_RISKS_HAD_A_RECENT_ONSET_OR_CHANGE?: AMBULATION;COGNITION;FALL HISTORY
NUMBER_OF_TIMES_PATIENT_HAS_FALLEN_WITHIN_LAST_SIX_MONTHS: 1
SWALLOWING: 0-->SWALLOWS FOODS/LIQUIDS WITHOUT DIFFICULTY
COGNITION: 1 - ATTENTION OR MEMORY DEFICITS
RETIRED_EATING: 0-->INDEPENDENT
BATHING: 0-->INDEPENDENT
RETIRED_COMMUNICATION: 0-->UNDERSTANDS/COMMUNICATES WITHOUT DIFFICULTY
TRANSFERRING: 1-->ASSISTIVE EQUIPMENT

## 2019-01-05 NOTE — H&P
[facility]  History and Physical  Hospitalist       Date of Admission:  1/5/2019    Assessment & Plan   Abimael Flores is a very pleasant 89 year old male with hypertension, insulin-dependent diabetes, BPH, chronic kidney disease stage 3-4, history of left hip replacement who was admitted on 1/5/2019 after a fall from standing resulting in left hip dislocation, followed by post-reduction avulsion fracture from lesser trochanter of proximal left femur.    Left hip dislocation, closed, initial encounter  Avulsion Fracture from lesser trochanter of proximal left femur  Fall from standing  -admit to obs  -pain control with prn acetaminophen, norco  -PT, OT assessments  -orthopedics consultation    Hypertension  Normotensive at present  -continue PTA toprol XL 50 mg daily    Insulin Dependent Diabetes  -mod carb controlled diet  -continue glargine 8 units q evening starting 1/5  -lispro 2 units before meals    BPH  -Continue PTA tamsulosin    Chronic kidney disease stage 3-4  Stable    DVT prophylaxis: Pneumatic Compression Devices and Ambulate every shift  Code Status:  DNR/DNI    Disposition: Expected discharge later today after ortho consult, PT, OT consults, pain controlled, if no surgery needed.    Amelia Garrett MD  Text Page    ~~~~~~~~~~~~~~~~~~~~~~~~~~~~~~~~~~~~~~~~~~~~~~~~~~~~~  Primary Care Physician   RAISSA HILL    Chief Complaint   Left hip pain after fall    History is obtained from the patient and medical records.    History of Present Illness   Abimael Flores is a pleasant 89 year old male with hypertension, insulin-dependent diabetes, h/o hip replacement, BPH, CKD stage 3-4 who presents after he fell at his care facility and developed left hip pain. He has had previous left hip dislocation so this was again suspected. Confirmed on pelvis and hip x-rays.fell from standing.  Did not describe any dizziness, presyncope. He is insistent upon discharging today and informs me his son  will pick him up at noon to bring him back to his care facility ([facility]) where he will resume ongoing therapies.    Discussed with Dr. Ospina from the ED. Hip reduced in ED. Post reduction film notes an avulsion fracture from the lesser trochanter of the proximal left femur.    Past Medical History    I have reviewed this patient's medical history and updated it with pertinent information if needed.   Past Medical History:   Diagnosis Date     Benign essential hypertension 9/4/2018     Coronary artery disease      Nonsenile cataract      Recent retinal detachment, total or subtotal 8/5/2014     Type 2 diabetes mellitus without complications (H)        Past Surgical History   I have reviewed this patient's surgical history and updated it with pertinent information if needed.  Past Surgical History:   Procedure Laterality Date     ARTHROPLASTY REVISION HIP Left 10/22/2018    Procedure: LEFT HIP HEMIARTHROPLASTY CONVERTED TO LEFT TOTAL HIP ARTHROPLASTY;  Surgeon: Marcos Winchester MD;  Location:  OR     CARDIAC SURGERY       CATARACT IOL, RT/LT Bilateral      CLOSED REDUCTION HIP Left 10/14/2018    Procedure: CLOSED REDUCTION HIP;  CLOSED REDUCTION HIP;  Surgeon: Milton Gusman MD;  Location:  OR     lacrimal caruncle removed BE Bilateral ~1989     OPEN REDUCTION INTERNAL FIXATION HIP BIPOLAR Right 8/26/2018    Procedure: OPEN REDUCTION INTERNAL FIXATION HIP BIPOLAR;  Right Hip Bipolar Hemiarthroplasty (Biomet);  Surgeon: Bill Sanders MD;  Location:  OR     OPEN REDUCTION INTERNAL FIXATION HIP BIPOLAR Left 10/4/2018    Procedure: OPEN REDUCTION INTERNAL FIXATION HIP BIPOLAR;  LEFT HIP TONYA ARTHROPLASTY;  Surgeon: Bruno Stewart MD;  Location:  OR     REPAIR RUPTURED GLOBE  4/21/2014    Procedure: Exploration and Repair of Ruptured Globe ;  Surgeon: Mercedes Rivera MD;  Location: [facility] OR       Prior to Admission Medications   Prior to Admission Medications   Prescriptions Last Dose  Informant Patient Reported? Taking?   ASPIRIN 81 PO 1/4/2019 at Unknown time  Yes Yes   Sig: Take 81 mg by mouth daily   ZOLPIDEM TARTRATE PO Past Week at Unknown time  Yes Yes   Sig: Take 5 mg by mouth At Bedtime   insulin glargine (LANTUS) 100 UNIT/ML injection 1/4/2019 at Unknown time Nursing Home Yes Yes   Sig: Inject 8 Units Subcutaneous At Bedtime    insulin lispro (HUMALOG KWIKPEN) 100 UNIT/ML injection 1/4/2019 at Unknown time senior living No Yes   Sig: Inject 2 Units Subcutaneous 3 times daily (before meals)   metoprolol succinate (TOPROL-XL) 50 MG 24 hr tablet 1/4/2019 at Unknown time senior living No Yes   Sig: Take 1 tablet (50 mg) by mouth daily   tamsulosin (FLOMAX) 0.4 MG capsule 1/4/2019 at Unknown time Nursing Home Yes Yes   Sig: Take 0.8 mg by mouth every evening       Facility-Administered Medications: None     Allergies   No Known Allergies    Social History   I have reviewed this patient's social history and updated it with pertinent information if needed. Abimael Flores  reports that he has quit smoking. he has never used smokeless tobacco. He reports that he does not drink alcohol or use drugs.    Family History   I have reviewed this patient's family history and updated it with pertinent information if needed.   Family History   Problem Relation Age of Onset     Diabetes Mother      Diabetes Father      Cancer No family hx of      Glaucoma No family hx of      Macular Degeneration No family hx of        Review of Systems   The 10 point Review of Systems is negative other than noted in the HPI or here.     Physical Exam   Temp: 98.4  F (36.9  C) Temp src: Oral BP: 132/69 Pulse: 84 Heart Rate: 86 Resp: 16 SpO2: 99 % O2 Device: None (Room air)    Vital Signs with Ranges  Temp:  [98.4  F (36.9  C)] 98.4  F (36.9  C)  Pulse:  [76-89] 84  Heart Rate:  [84-95] 86  Resp:  [16] 16  BP: (114-151)/(59-84) 132/69  SpO2:  [97 %-100 %] 99 %  158 lbs 0 oz    Constitutional: Alert, NAD, pleasant and  interactive  Eyes: PERRL, sclerae anicteric  HEENT: mmm, atraumatic  Respiratory: Lungs CTAB, no wheezes or crackles  Cardiovascular: RRR, no murmurs  no LE edema  GI: soft, non-tender, nondistended  Skin/Integument: warm, dry, no acute rashes  Lymph/Hematologic: no supra or infraclavicular lymphadenopathy, no petechiae   Genitourinary: not examined  Musc: BURGER, normal limb strength x 4  Neuro: AOx3, no focal deficits, no tremors  Psych: not anxious, not confused      Data   Data reviewed today:  I personally reviewed the hip x-rays image(s) showing left hip dislocation.  No lab results found in last 7 days.    Imaging:  Recent Results (from the past 24 hour(s))   Ribs XR, unilat 3 views + PA chest,  left    Narrative    XR RIBS & CHEST LT 3VW  1/5/2019 12:02 AM     HISTORY: Fall last week, left lower lateral rib pain.    COMPARISON: None.    FINDINGS: Heart size is normal. No lobar consolidation, pneumothorax  or pleural effusion. Fibrotic abnormalities in the lungs appear  chronic. No evidence of displaced left-sided rib fracture.      Impression    IMPRESSION: No acute abnormality.    HANNAH HOOKER MD   XR Pelvis w Hip Left 1 View    Narrative    PELVIS WITH LEFT HIP LATERAL TWO VIEWS  1/5/2019 12:02 AM     HISTORY: Likely hip dislocation post fall.    COMPARISON: 12/16/2018.      Impression    IMPRESSION:  There is dislocation of the prosthetic left hip. No  fracture.    HANNAH HOOKER MD   XR Pelvis Port 1/2 Views    Narrative    XR PELVIS PORT 1/2 VW  1/5/2019 2:29 AM     HISTORY: Post reduction.    COMPARISON: Prereduction films.      Impression    IMPRESSION: Left hip dislocation has been reduced. There appears to be  an avulsion fracture from the lesser trochanter of the proximal left  femur.    HANNAH HOOKER MD

## 2019-01-05 NOTE — ED PROVIDER NOTES
History     Chief Complaint:  Hip pain    HPI   Abimael Flores is a 89 year old male with a history of hypertension, diabetes, and coronary artery disease who presents to the emergency department today for evaluation of hip pain. Patient states he was walking and pushing his walker to his bed to put new batteries in his hearing aids, when he tripped on the carpet and fell on his left hip. He endorses that he had his left hip surgery in October 2018 and last dislocated it one month ago, stating he believes it is dislocated again from his fall. Patient denies head trauma, or any other pain.    Allergies:  No Known Drug Allergies    Medications:    Aspirin  Atorvastatin  Lantuss  Humalog kwikpen  Meclizine  Methocarbamol  Metoprolol succinate  Senokot  Flomax  Tramadol  Zolpidem tartrate    Past Medical History:    Hypertension  Coronary artery disease  Nonsenile cataract  Diabetes  Cardiac arrhythmia    Past Surgical History:    Arthroplasty revision left hip  Cardiac surgery  Bilateral cataract  Closed reduction left hip  Lacrimal caruncle removed bilateral  Right open reduction internal fixation hip bipolar  Left open reduction internal fixation hip bipolar  Repair ruptured globe    Family History:    Mother: diabetes  Father: diabetes    Social History:  The patient was accompanied to the ED by himself.  Smoking Status: Former smoker  Smokeless Tobacco: Never Used  Alcohol Use: Negative  Drug Use: Negative  Marital Status:       Review of Systems   Musculoskeletal:        Left hip pain   Neurological:        Negative head trauma   All other systems reviewed and are negative.      Physical Exam     Patient Vitals for the past 24 hrs:   BP Temp Temp src Pulse Heart Rate Resp SpO2 Height Weight   01/05/19 0348 140/85 98.7  F (37.1  C) -- -- 84 17 99 % -- --   01/05/19 0243 -- -- -- -- -- -- 99 % -- --   01/05/19 0240 132/69 -- -- -- -- 16 97 % -- --   01/05/19 0230 130/72 -- -- 84 86 -- 100 % -- --  "  01/05/19 0210 114/59 -- -- 76 84 -- 100 % -- --   01/05/19 0205 150/64 -- -- 85 86 -- 100 % -- --   01/05/19 0151 125/74 -- -- 86 86 -- 100 % -- --   01/05/19 0020 151/84 -- -- 89 -- -- 97 % -- --   01/04/19 2340 -- -- -- -- -- -- 98 % -- --   01/04/19 2242 120/67 98.4  F (36.9  C) Oral -- 95 16 98 % 1.727 m (5' 8\") 71.7 kg (158 lb)       Physical Exam    Constitutional:  Appears well-developed and well-nourished. Cooperative.   HENT:   Head:    Atraumatic.   Mouth/Throat:   Oropharynx is without erythema or exudate and mucous membranes are moist.   Eyes:    Conjunctivae normal and EOM are normal.      Pupils are equal, round, and reactive to light.   Neck:    Normal range of motion. Neck supple.   Cardiovascular:  Normal rate, regular rhythm, normal heart sounds and radial and dorsalis pedis pulses are 2+ and symmetric.    Pulmonary/Chest:  Effort normal and breath sounds normal.   Abdominal:   Soft. Bowel sounds are normal.      No splenomegaly or hepatomegaly. No tenderness. No rebound.   Musculoskeletal:  No edema and no tenderness. Left lower extremity is shortened and internally rotated. He is mildly diffusely tender of the hip. Sensation, DP pulse and cap refill are normal in both legs.  Neurological:  Alert. Normal strength. No cranial nerve deficit.   Skin:    Skin is warm and dry. Multiple healing bruises and tears on both forearms.   Psychiatric:   Normal mood and affect.     Emergency Department Course     Imaging:  Radiology findings were communicated with the patient who voiced understanding of the findings.    XR Pelvis Port 1/2 Views   Left hip dislocation has been reduced. There appears to be  an avulsion fracture from the lesser trochanter of the proximal left  femur.  HANNAH HOOKER MD  Reading per radiology    Ribs XR, unilat 3 views + PA chest,  left  No acute abnormality.  HANNAH HOOKER MD  Reading per radiology    XR Pelvis w Hip Left 1 View  There is dislocation of the prosthetic left " hip. No  fracture.  HANNAH HOOKER MD  Reading per radiology    Procedures:  Reduction of left hip dislocation  Risks and benefits were discussed Verbally and Written consent was obtained  Patient's pain was controlled with propofol  Pt tolerated the procedure well.  Post reduction film was obtained. It showed dislocation has been reduced  No complications.  Post procedure distal function was intact.    Interventions:  2333 Zofran 4mg IV  2333 morphine 4mg IV  0205 propofol 40mg IV  0206 propofol 20mg IV    Emergency Department Course:    2248 Nursing notes and vitals reviewed.    2258 I performed an exam of the patient as documented above.     2325 The patient was sent for a XR of ribs and pelvis with left hip while in the emergency department, results above.     0028 Recheck and update.    0155 I performed the hip reduction procedure as documented above.    0251 I spoke with Dr. Garrett of the hospitalist service from [facility] regarding patient's presentation, findings, and plan of care.    0332 I personally reviewed the lab and image results with the patient and answered all related questions prior to admission.    Impression & Plan      Medical Decision Making:    Abimael Flores is a 89 year old male with previous left total hip arthroplasty, who presents to the emergency department for evaluation of left hip pain. Patient had a slip and fall, stretching out his left hip, resulting in what feels like a redislocation.  He states that he also dislocation this hip has a month ago. Upon presentation in the ED, the patient is non-toxic appearing. Vitals are within normal limits and stable. On exam, he is well appearing. The patient is alert, oriented, and neurologic exam is non focal. Cardiopulmonary exam is unremarkable. Abdomen is soft and non tender throughout. On exam of the left lower extremity, the leg is held in internal rotation. He has decreased ROM secondary to pain. The rest of the exam is as  mentioned above.      The patient also reported a fall last week and some anterolateral lower rib pain.  X-ray of the rib and chest does not show evidence for displaced fracture or other acute process.  Urinalysis does not suggest hematuria.  There is yeast and leukocyte esterase, but no nitrate.  Patient is not having dysuria.  I will not treat for infection at this time.     X-ray of the pelvis was obtained and demonstrates a dislocation of the left prosthetic hip. These results were discussed with the patient and he notes understanding. I did discuss procedural sedation with hip reduction with the patient and he consented to this procedure. This was performed, please refer to the procedure notes above for details. The patient tolerated this well.     The post-reduction X-ray did demonstrate that the left hip has been reduced and is in good alignment.   Found on the repeat X-ray post reduction is likely an avulsion fracture of the lesser trochanter. I can't see this portion of the bone on the initial film, so it is uncertain if this was present before the reduction. The reduction went smoothly and no excessive force was required for the reduction. I suspect that the avulsion happened with the fall.    Following reduction, the patient reported improvement in pain. Unfortunately he is quite unsteady on his feet and remains a fall risk. Because he is still somewhat sleepy from sedation we will bring him in to an observation bed for further monitoring. He will be reassessed for fall risk in the morning. Dr. Garrett agreed to accept for admission.  Ortho consult will likely be sought, due to the new finding of avulsion fracture.       Diagnosis:    ICD-10-CM    1. Fall, initial encounter W19.XXXA Glucose by meter     Glucose by meter   2. Closed dislocation of left hip, initial encounter (H) S73.005A    3. Closed avulsion fracture of lesser trochanter of femur, left, initial encounter (H) S72.122A    4. Gait instability  R26.81      Disposition:   The patient is admitted into the care of Dr. Garrett.    Scribe Disclosure:  I, Kelly Currie, am serving as a scribe at 1:07 AM on 1/5/2019 to document services personally performed by Carlos Gandhi MD based on my observations and the provider's statements to me.     EMERGENCY DEPARTMENT       Carlos Gandhi MD  01/06/19 0813       Carlos Gandhi MD  01/06/19 0816

## 2019-01-05 NOTE — PROGRESS NOTES
01/05/19 0900   Quick Adds   Type of Visit Initial PT Evaluation   Living Environment   Lives With alone;facility resident   Living Arrangements assisted living   Home Accessibility no concerns   Self-Care   Usual Activity Tolerance moderate   Current Activity Tolerance moderate   Equipment Currently Used at Home walker, rolling   Functional Level Prior   Ambulation 1-->assistive equipment   Transferring 1-->assistive equipment   Fall history within last six months yes   Number of times patient has fallen within last six months 1   Which of the above functional risks had a recent onset or change? ambulation;transferring;fall history   Prior Functional Level Comment Pt reports that he lives in an assist living facility, received assist with bathing and walking to/from dining room for meals.   General Information   Onset of Illness/Injury or Date of Surgery - Date 01/04/19   Referring Physician Dr. Garrett   Patient/Family Goals Statement To go home   Pertinent History of Current Problem (include personal factors and/or comorbidities that impact the POC) Pt is a 89 year old male under OBS for a fall in his apartment.   Precautions/Limitations fall precautions   Cognitive Status Examination   Level of Consciousness alert   Pain Assessment   Patient Currently in Pain No   Range of Motion (ROM)   ROM Quick Adds No deficits were identified   Strength   Manual Muscle Testing Quick Adds No deficits were identified   Bed Mobility   Bed Mobility Comments Supine to/from sit independent   Transfer Skills   Transfer Comments Sit to/from stand independent   Gait   Gait Comments 150' FWW SBA 2/2 EB management and unfamiliar obstacles.   Balance   Balance Comments Good in sitting, fair in standing   Clinical Impression   Criteria for Skilled Therapeutic Intervention evaluation only   Clinical Presentation Stable/Uncomplicated   Clinical Presentation Rationale VSS, pain controlled   Clinical Decision Making (Complexity) Low  "complexity   Anticipated Discharge Disposition Home with Assist;Home with Home Therapy   Risk & Benefits of therapy have been explained Yes   Patient, Family & other staff in agreement with plan of care Yes   [facility] TM \"6 Clicks\"   2016, Trustees of [facility], under license to [employer].  All rights reserved.   6 Clicks Short Forms Basic Mobility Inpatient Short Form   [facility] AM-PAC  \"6 Clicks\" V.2 Basic Mobility Inpatient Short Form   1. Turning from your back to your side while in a flat bed without using bedrails? 4 - None   2. Moving from lying on your back to sitting on the side of a flat bed without using bedrails? 4 - None   3. Moving to and from a bed to a chair (including a wheelchair)? 4 - None   4. Standing up from a chair using your arms (e.g., wheelchair, or bedside chair)? 4 - None   5. To walk in hospital room? 3 - A Little   6. Climbing 3-5 steps with a railing? 3 - A Little   Basic Mobility Raw Score (Score out of 24.Lower scores equate to lower levels of function) 22   Total Evaluation Time   Total Evaluation Time (Minutes) 25     "

## 2019-01-05 NOTE — PLAN OF CARE
Pt A/Ox4, VSS on RA, denies pain, CMS intact, Assist of 1, admitted at 3:30 am, will continue to monitor

## 2019-01-05 NOTE — ED NOTES
Pt fully awake and conversing easily. Post reduction x-ray completed. Son at bedside. Pt tolerated procedure well.

## 2019-01-05 NOTE — PLAN OF CARE
Physical Therapy: Order received, chart reviewed and evaluation completed. Pt is a 89 year old male s/p fall in his apartment resulting in a left hip dislocation. Pt had hip reduced in ED, orders for WBAT. Pt reports that he lives in an assisted living apartment with no mobility concerns. Pt reports use of a FWW for all ambulation and receives assist with bathing and walking to/from dining room. Pt reports that he is able to have increased assistance at home if needed.    Discharge Planner PT   Patient plan for discharge: Home  Current status: Pt demonstrates ability to perform bed mobility independently.  Pt performs sit to/from stand to FWW independently. Pt ambulates 150' with FWW and SBA, no overt LOB noted.  Barriers to return to prior living situation: Fall history  Recommendations for discharge: Home with assist, home health PT  Rationale for recommendations: Pt currently independent with bed mobility and transfers. Pt would benefit from SBA with ambulation in home setting for increased safety. Pt would benefit from home health PT to increase endurance and balance and safety within home environment.       Entered by: Albania Hunt 01/05/2019 9:56 AM     Will plan to discharge patient from PT and complete PT order as patient is under OBS status and discharge recommendations are listed above.

## 2019-01-05 NOTE — ED NOTES
Bed: ED05  Expected date: 1/4/19  Expected time: 10:43 PM  Means of arrival:   Comments:  HEMS 447 89M Left hip dislocated hip

## 2019-01-05 NOTE — ED NOTES
"[facility]  ED Nurse Handoff Report    ED Chief complaint: fall with probable left hip dislocation    ED Diagnosis:   Final diagnoses:   None       Code Status: DNR / DNI    Allergies: No Known Allergies    Activity level - Baseline/Home:  Stand with Assist    Activity Level - Current:   Stand with Assist of 2     Needed?: No    Isolation: No  Infection: Not Applicable  Bariatric?: No    Vital Signs:   Vitals:    01/05/19 0151 01/05/19 0205 01/05/19 0210 01/05/19 0230   BP: 125/74 150/64 114/59 130/72   Pulse: 86 85 76 84   Resp:       Temp:       TempSrc:       SpO2: 100% 100% 100% 100%   Weight:       Height:           Cardiac Rhythm: ,        Pain level: 0-10 Pain Scale: 0    Is this patient confused?: No   Does this patient have a guardian?  No         If yes, is there guardianship documents in the Epic \"Code/ACP\" activity?  N/A         Guardian Notified?  N/A  [place] - Suicide Severity Rating Scale Completed?  Yes  If yes, what color did the patient score?  White    Patient Report: Initial Complaint: left hip pain  Focused Assessment: Abimael Flores is a 89 year old male with a history of hypertension, diabetes, and coronary artery disease who presents to the emergency department today for evaluation of hip pain. Patient states he was walking and pushing his walker to his bed to put new batteries in his hearing aids, when he tripped on the carpet and fell on his left hip. He endorses that he had his left hip surgery in October 2018 and last dislocated it one month ago, stating he believes it is dislocated again from his fall. Patient denies head trauma, or any other pain. Hip has been reduced and patient is feeling well. His son will come back to pick him up tomorrow.       Tests Performed: urine, xrays  Abnormal Results: dislocation of left hip  Treatments provided: closed reduction    Family Comments: son was here but has gone home for the night    OBS brochure/video " discussed/provided to patient/family: Yes              Name of person given brochure if not patient: n/a              Relationship to patient: n/a    ED Medications:   Medications   morphine (PF) injection 4 mg (4 mg Intravenous Given 1/4/19 2335)   ondansetron (ZOFRAN) injection 4 mg (4 mg Intravenous Given 1/4/19 2333)   propofol (DIPRIVAN) injection 10 mg/mL vial (40 mg Intravenous Given 1/5/19 0205)   propofol (DIPRIVAN) injection 10 mg/mL vial (20 mg Intravenous Given 1/5/19 0206)       Drips infusing?:  No    For the majority of the shift this patient was Green.   Interventions performed were n/a.    Severe Sepsis OR Septic Shock Diagnosis Present: No    To be done/followed up on inpatient unit:  none    ED NURSE PHONE NUMBER: *[phone]

## 2019-01-05 NOTE — PHARMACY-ADMISSION MEDICATION HISTORY
Admission medication history interview status for the 1/4/2019  admission is complete. See EPIC admission navigator for prior to admission medications     Medication history source reliability:Good    Actions taken by pharmacist (provider contacted, etc):  PTA list verified per Care Everywhere     Additional medication history information not noted on PTA med list :  1. Patient reported that he stopped taking atenolol and metoprolol succinate due to low blood pressure about 2 weeks ago. His MD was notified.   2. Other medications were discontinued:  Tamsulosin 0.4mg capsule, take 2 capsules by mouth every evening  Glipizide XL 5mg tablet, take 1 tablet by mouth 2 times daily  Simvastatin 20mg tablet, take 1 tablet by mouth 1 times daily  3. Patient reported that Zolpidem was not helpful for his sleep disorder.     Medication reconciliation/reorder completed by provider prior to medication history? No    Time spent in this activity:  15 minutes    Prior to Admission medications    Medication Sig Last Dose Taking? Auth Provider   aspirin 81 MG EC tablet Take 81 mg by mouth daily 1/4/2019 Yes Unknown, Entered By History   insulin aspart (NOVOLOG FLEXPEN) 100 UNIT/ML pen Inject 4 Units Subcutaneous 3 times daily (with meals) 1/4/2019 at dinner Yes Unknown, Entered By History   insulin glargine (BASAGLAR KWIKPEN) 100 UNIT/ML pen Inject 6-8 Units Subcutaneous every evening 1/4/2019 at 2100 Yes Unknown, Entered By History   zolpidem (AMBIEN) 5 MG tablet Take 10 mg by mouth At Bedtime 2 tabs x 5mg=10mg 1/4/2019 at 2100 Yes Unknown, Entered By History     Quynh Lo, Pharm.D IV Student

## 2019-01-05 NOTE — PROGRESS NOTES
RECEIVING UNIT ED HANDOFF REVIEW    ED Nurse Handoff Report was reviewed by: Mason Valdovinos on January 5, 2019 at 3:31 AM

## 2019-01-05 NOTE — DISCHARGE SUMMARY
[facility]  Hospitalist Discharge Summary       Date of Admission:  1/4/2019  Date of Discharge:  1/5/2019 12:54 PM  Discharging Provider: Gaurav Reagan MD      Discharge Diagnoses   Dislocation of prosthetic left hip    Follow-ups Needed After Discharge   Follow-up Appointments     Follow-up and recommended labs and tests       Orthopedic clinic next week               Unresulted Labs Ordered in the Past 30 Days of this Admission     No orders found for last 61 day(s).      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  He fell prior to admission and dislocated the hip.  It was reduced in the emergency room and he was admitted for observation overnight.  He is ambulating well and his son wants to take him back to his assisted living facility today.  He can follow-up as an outpatient in the orthopedic clinic.    Consultations This Hospital Stay   PHYSICAL THERAPY ADULT IP CONSULT  OCCUPATIONAL THERAPY ADULT IP CONSULT  CARE COORDINATOR IP CONSULT  ORTHOPEDIC SURGERY IP CONSULT    Code Status   DNR/DNI    Time Spent on this Encounter   I, Gaurav Reagan, personally saw the patient today and spent less than or equal to 30 minutes discharging this patient.       Gaurav Reagan MD  [facility]  ______________________________________________________________________    Physical Exam   Vital Signs: Temp: 98  F (36.7  C) Temp src: Oral BP: 127/66 Pulse: 84 Heart Rate: 65 Resp: 16 SpO2: 99 % O2 Device: None (Room air)    Weight: 158 lbs 0 oz  Constitutional: awake, alert, cooperative, no apparent distress, and appears stated age  No gross left hip deformity       Primary Care Physician   RAISSA HILL    Discharge Disposition   Discharged to assisted living  Condition at discharge: Stable    Significant Results and Procedures   Most Recent 3 CBC's:  Recent Labs   Lab Test 11/15/18  0817  11/08/18  0630 11/02/18  0630 11/01/18  1130   WBC 10.4 10.6  --  11.5*   HGB 8.7* 7.9* 7.7* 7.4*   MCV 92 93  --  90    382  --  392     Most Recent 3 BMP's:  Recent Labs   Lab Test 11/01/18  1130 10/30/18  1055 10/25/18  0705    134 137   POTASSIUM 3.7 4.2 3.8   CHLORIDE 106 103 104   CO2 25 22 26   BUN 30 32* 25   CR 1.55* 1.86* 1.49*   ANIONGAP 7 9 7   JOSELINE 7.8* 7.9* 8.0*   GLC 45* 178* 165*   ,   Results for orders placed or performed during the hospital encounter of 01/04/19   XR Pelvis w Hip Left 1 View    Narrative    PELVIS WITH LEFT HIP LATERAL TWO VIEWS  1/5/2019 12:02 AM     HISTORY: Likely hip dislocation post fall.    COMPARISON: 12/16/2018.      Impression    IMPRESSION:  There is dislocation of the prosthetic left hip. No  fracture.    HANNAH HOOKER MD   Ribs XR, unilat 3 views + PA chest,  left    Narrative    XR RIBS & CHEST LT 3VW  1/5/2019 12:02 AM     HISTORY: Fall last week, left lower lateral rib pain.    COMPARISON: None.    FINDINGS: Heart size is normal. No lobar consolidation, pneumothorax  or pleural effusion. Fibrotic abnormalities in the lungs appear  chronic. No evidence of displaced left-sided rib fracture.      Impression    IMPRESSION: No acute abnormality.    HANNAH HOOKER MD   XR Pelvis Port 1/2 Views    Narrative    XR PELVIS PORT 1/2 VW  1/5/2019 2:29 AM     HISTORY: Post reduction.    COMPARISON: Prereduction films.      Impression    IMPRESSION: Left hip dislocation has been reduced. There appears to be  an avulsion fracture from the lesser trochanter of the proximal left  femur.    HANNAH HOOKER MD       Discharge Orders      Reason for your hospital stay    Hip dislocation     Follow-up and recommended labs and tests     Orthopedic clinic next week     Activity    Your activity upon discharge: activity as tolerated     Discharge Instructions    Resume home physcial therapy and occupational therapy     Diet    Follow this diet upon discharge: Orders  Placed This Encounter      Moderate Consistent CHO Diet     Discharge Medications   Discharge Medication List as of 1/5/2019 12:13 PM      CONTINUE these medications which have NOT CHANGED    Details   aspirin 81 MG EC tablet Take 81 mg by mouth daily, Historical      insulin aspart (NOVOLOG FLEXPEN) 100 UNIT/ML pen Inject 4 Units Subcutaneous 3 times daily (with meals), Historical      insulin glargine (BASAGLAR KWIKPEN) 100 UNIT/ML pen Inject 6-8 Units Subcutaneous every evening, Historical      zolpidem (AMBIEN) 5 MG tablet Take 10 mg by mouth At Bedtime 2 tabs x 5mg=10mg, Historical         STOP taking these medications       insulin glargine (LANTUS) 100 UNIT/ML injection Comments:   Reason for Stopping:         insulin lispro (HUMALOG KWIKPEN) 100 UNIT/ML injection Comments:   Reason for Stopping:         metoprolol succinate (TOPROL-XL) 50 MG 24 hr tablet Comments:   Reason for Stopping:         tamsulosin (FLOMAX) 0.4 MG capsule Comments:   Reason for Stopping:             Allergies   No Known Allergies

## 2019-01-05 NOTE — PLAN OF CARE
"Nursing note  Pt doing ok, up with assist of 1/walker/gb in the room. Pt denies any dizziness or lightheadedness. Pt denies any n/v. Cms intact. Voiding with urinal. Pt c/o mild back pain, offered pt narcotic pain medication, pt declined and asked for tylenol. VSS./66   Pulse 84   Temp 98  F (36.7  C) (Oral)   Resp 16   Ht 1.727 m (5' 8\")   Wt 71.7 kg (158 lb)   SpO2 99%   BMI 24.02 kg/m    Pt was discharged home this afternoon with son, all the necessary information was given to pt. Pt verbalized understanding and signed.   "

## 2019-01-05 NOTE — PROGRESS NOTES
" I met wt pt and his son  Aleksandar Atwood an FP. Pt is very upset he's been waiting for the MD for \"two hours!\" and he wants to D/C back home \"NOW.\"   Per Aleksandar pt lives at [facility] [place] [facility] and is already receiving home care PT/OT through [facility].  Per Aleksandar there are no new med changes and he would like to take pt home now.  Pt demanded for help with transport.  Relayed info to Dr Reagan.  Orders were placed and pt was d/rei back to [facility] with son Aleksandar.  They have no other concerns at d/c.   "

## 2019-01-08 ENCOUNTER — APPOINTMENT (OUTPATIENT)
Dept: GENERAL RADIOLOGY | Facility: CLINIC | Age: 84
End: 2019-01-08
Payer: MEDICARE

## 2019-01-08 ENCOUNTER — HOSPITAL ENCOUNTER (EMERGENCY)
Facility: CLINIC | Age: 84
Discharge: HOME | End: 2019-01-09
Attending: EMERGENCY MEDICINE | Admitting: EMERGENCY MEDICINE
Payer: MEDICARE

## 2019-01-08 ENCOUNTER — HOSPITAL ENCOUNTER (EMERGENCY)
Facility: CLINIC | Age: 84
Discharge: HOME | End: 2019-01-08
Attending: EMERGENCY MEDICINE | Admitting: EMERGENCY MEDICINE
Payer: MEDICARE

## 2019-01-08 VITALS
WEIGHT: 150 LBS | RESPIRATION RATE: 18 BRPM | BODY MASS INDEX: 22.81 KG/M2 | DIASTOLIC BLOOD PRESSURE: 78 MMHG | SYSTOLIC BLOOD PRESSURE: 136 MMHG | HEART RATE: 86 BPM | OXYGEN SATURATION: 100 % | TEMPERATURE: 97.3 F

## 2019-01-08 DIAGNOSIS — S73.005A DISLOCATION OF LEFT HIP, INITIAL ENCOUNTER (H): ICD-10-CM

## 2019-01-08 DIAGNOSIS — I48.0 PAROXYSMAL ATRIAL FIBRILLATION (H): ICD-10-CM

## 2019-01-08 LAB
ANION GAP SERPL CALCULATED.3IONS-SCNC: 7 MMOL/L (ref 3–14)
BUN SERPL-MCNC: 37 MG/DL (ref 7–30)
CALCIUM SERPL-MCNC: 7.7 MG/DL (ref 8.5–10.1)
CHLORIDE SERPL-SCNC: 110 MMOL/L (ref 94–109)
CO2 SERPL-SCNC: 23 MMOL/L (ref 20–32)
CREAT SERPL-MCNC: 1.54 MG/DL (ref 0.66–1.25)
GFR SERPL CREATININE-BSD FRML MDRD: 38 ML/MIN/{1.73_M2}
GLUCOSE SERPL-MCNC: 107 MG/DL (ref 70–99)
INTERPRETATION ECG - MUSE: NORMAL
POTASSIUM SERPL-SCNC: 4.3 MMOL/L (ref 3.4–5.3)
SODIUM SERPL-SCNC: 140 MMOL/L (ref 133–144)

## 2019-01-08 PROCEDURE — 40000986 XR PELVIS AND HIP LEFT 1 VIEW

## 2019-01-08 PROCEDURE — 96375 TX/PRO/DX INJ NEW DRUG ADDON: CPT

## 2019-01-08 PROCEDURE — 25000125 ZZHC RX 250: Performed by: EMERGENCY MEDICINE

## 2019-01-08 PROCEDURE — 27265 TREAT HIP DISLOCATION: CPT | Mod: LT

## 2019-01-08 PROCEDURE — 93005 ELECTROCARDIOGRAM TRACING: CPT | Mod: XU

## 2019-01-08 PROCEDURE — 99285 EMERGENCY DEPT VISIT HI MDM: CPT | Mod: 25,27

## 2019-01-08 PROCEDURE — 99285 EMERGENCY DEPT VISIT HI MDM: CPT | Mod: 25

## 2019-01-08 PROCEDURE — 72170 X-RAY EXAM OF PELVIS: CPT | Mod: XU

## 2019-01-08 PROCEDURE — 25000128 H RX IP 250 OP 636

## 2019-01-08 PROCEDURE — 87106 FUNGI IDENTIFICATION YEAST: CPT | Performed by: EMERGENCY MEDICINE

## 2019-01-08 PROCEDURE — 96374 THER/PROPH/DIAG INJ IV PUSH: CPT | Mod: 59

## 2019-01-08 PROCEDURE — 40000986 XR HIP PORT LT 1 VW

## 2019-01-08 PROCEDURE — 99152 MOD SED SAME PHYS/QHP 5/>YRS: CPT

## 2019-01-08 PROCEDURE — 40000275 ZZH STATISTIC RCP TIME EA 10 MIN

## 2019-01-08 PROCEDURE — 93005 ELECTROCARDIOGRAM TRACING: CPT | Mod: 76

## 2019-01-08 PROCEDURE — 25000128 H RX IP 250 OP 636: Performed by: EMERGENCY MEDICINE

## 2019-01-08 PROCEDURE — 87086 URINE CULTURE/COLONY COUNT: CPT | Performed by: EMERGENCY MEDICINE

## 2019-01-08 PROCEDURE — 80048 BASIC METABOLIC PNL TOTAL CA: CPT | Performed by: EMERGENCY MEDICINE

## 2019-01-08 PROCEDURE — 81001 URINALYSIS AUTO W/SCOPE: CPT | Performed by: EMERGENCY MEDICINE

## 2019-01-08 PROCEDURE — 96374 THER/PROPH/DIAG INJ IV PUSH: CPT | Mod: XU

## 2019-01-08 PROCEDURE — 73502 X-RAY EXAM HIP UNI 2-3 VIEWS: CPT

## 2019-01-08 RX ORDER — FENTANYL CITRATE 50 UG/ML
50 INJECTION, SOLUTION INTRAMUSCULAR; INTRAVENOUS ONCE
Status: COMPLETED | OUTPATIENT
Start: 2019-01-08 | End: 2019-01-08

## 2019-01-08 RX ORDER — PROPOFOL 10 MG/ML
INJECTION, EMULSION INTRAVENOUS
Status: COMPLETED
Start: 2019-01-08 | End: 2019-01-08

## 2019-01-08 RX ORDER — PROPOFOL 10 MG/ML
60 INJECTION, EMULSION INTRAVENOUS ONCE
Status: COMPLETED | OUTPATIENT
Start: 2019-01-08 | End: 2019-01-08

## 2019-01-08 RX ORDER — METOPROLOL TARTRATE 1 MG/ML
5 INJECTION, SOLUTION INTRAVENOUS ONCE
Status: COMPLETED | OUTPATIENT
Start: 2019-01-08 | End: 2019-01-08

## 2019-01-08 RX ORDER — MORPHINE SULFATE 4 MG/ML
4 INJECTION, SOLUTION INTRAMUSCULAR; INTRAVENOUS ONCE
Status: COMPLETED | OUTPATIENT
Start: 2019-01-08 | End: 2019-01-08

## 2019-01-08 RX ADMIN — PROPOFOL 60 MG: 10 INJECTION, EMULSION INTRAVENOUS at 23:10

## 2019-01-08 RX ADMIN — PROPOFOL 60 MG: 10 INJECTION, EMULSION INTRAVENOUS at 02:29

## 2019-01-08 RX ADMIN — FENTANYL CITRATE 50 MCG: 50 INJECTION, SOLUTION INTRAMUSCULAR; INTRAVENOUS at 22:11

## 2019-01-08 RX ADMIN — MORPHINE SULFATE 4 MG: 4 INJECTION INTRAVENOUS at 00:49

## 2019-01-08 RX ADMIN — METOPROLOL TARTRATE 5 MG: 5 INJECTION, SOLUTION INTRAVENOUS at 00:49

## 2019-01-08 NOTE — ED AVS SNAPSHOT
Emergency Department  [address] [place] [address]  Phone:  [phone]  Fax:  [phone]                                    Abimael Flores   MRN: [id number]    Department:   Emergency Department   Date of Visit:  1/8/2019           After Visit Summary Signature Page    I have received my discharge instructions, and my questions have been answered. I have discussed any challenges I see with this plan with the nurse or doctor.    ..........................................................................................................................................  Patient/Patient Representative Signature      ..........................................................................................................................................  Patient Representative Print Name and Relationship to Patient    ..................................................               ................................................  Date                                   Time    ..........................................................................................................................................  Reviewed by Signature/Title    ...................................................              ..............................................  Date                                               Time          22EPIC Rev 08/18

## 2019-01-08 NOTE — ED TRIAGE NOTES
Pt stated he was not feeling well tonight and went to get out of bed and fell. Multiple previous L hip dislocations. L leg internal rotation. C/o pain. Tachycardic in 130s upon arrival.

## 2019-01-08 NOTE — ED NOTES
[facility] RN got nursing facility # that called 911 for transport. # is [phone]. Called this number and Jaja answered. She is the nurse. Gave report about patient and that he will be returning to the facility. She verbalized understanding.

## 2019-01-08 NOTE — ED NOTES
Ambulated patient with walker and 2 person stand by assist. Pt ambulated approx. 15 feet without difficulty. Reported to Dr. Ness.

## 2019-01-08 NOTE — ED PROVIDER NOTES
History     Chief Complaint:  Fall     HPI   Abimael Flores is a 89 year old male with a history of multiple hip dislocations, who presents to the emergency department today for evaluation of fall. Per chart review, patient was in the ED on 01/04/19 for a previous incident of his left hip dislocation. Procedure went well and XR showed that the dislocation had been reduced. Tonight, patient states he was sitting on the edge of his bed, trying to put his socks on, when he slipped off the bed. He endorses that his left leg went out from underneath him and he thinks his left hip is dislocated again. The patient denies any pain.    Allergies:  No Known Drug Allergies    Medications:    Aspirin  Novolog flexpen  Basaglar kwikpen  Ambien    Past Medical History:    Hypertension  Coronary artery disease  Non senile artery disease  Diabetes    Past Surgical History:    Arthroplasty revision left hip  Cardiac surgery  Bilateral cataract  Left closed reduction hip  Open reduction internal fixation right hip bipolar  Open reduction internal fixation left hip bipolar  Repair rupture globe    Family History:    Mother: diabetes  Father: diabetes     Social History:  The patient was accompanied to the ED by himself.  Smoking Status: Former smoker  Smokeless Tobacco: Never Used  Alcohol Use: Negative  Drug Use: Negative  Marital Status:       Review of Systems   Musculoskeletal:        Left hip dislocation  No hip pain   All other systems reviewed and are negative.      Physical Exam     Patient Vitals for the past 24 hrs:   BP Pulse Heart Rate Resp SpO2 Weight   01/08/19 0240 94/56 84 81 14 100 % --   01/08/19 0237 93/55 84 89 21 100 % --   01/08/19 0130 118/57 81 80 12 98 % --   01/08/19 0101 -- -- 82 14 97 % --   01/08/19 0100 -- -- 123 17 96 % --   01/08/19 0059 -- -- 123 17 96 % --   01/08/19 0058 123/75 -- -- -- -- --   01/08/19 0057 -- -- 124 13 96 % --   01/08/19 0056 -- -- 125 13 96 % --   01/08/19 0055 -- --  130 12 98 % --   01/08/19 0054 -- -- 134 8 99 % --   01/08/19 0052 -- -- 141 10 99 % --   01/08/19 0051 -- -- 141 14 99 % --   01/08/19 0050 -- -- 140 17 99 % --   01/08/19 0049 123/75 140 137 11 98 % --   01/08/19 0047 -- -- 140 21 98 % --   01/08/19 0046 -- -- 140 19 98 % --   01/08/19 0045 -- -- 140 12 99 % --   01/08/19 0044 -- -- 137 20 98 % --   01/08/19 0043 -- -- 137 24 98 % --   01/08/19 0041 -- -- 138 22 98 % --   01/08/19 0040 -- -- 137 12 99 % --   01/08/19 0039 -- -- 142 14 98 % --   01/08/19 0038 -- -- 133 10 99 % --   01/08/19 0036 -- -- 137 14 98 % --   01/08/19 0035 -- -- 138 27 98 % --   01/08/19 0034 -- -- 138 24 99 % --   01/08/19 0033 -- -- 141 17 98 % --   01/08/19 0031 -- -- 140 30 -- --   01/08/19 0030 -- -- -- -- 98 % --   01/08/19 0029 -- -- -- -- 98 % --   01/08/19 0028 -- -- -- -- 98 % --   01/08/19 0027 131/72 -- 138 18 98 % 68 kg (150 lb)   01/08/19 0026 -- -- -- -- 98 % --       Physical Exam  Constitutional: The patient is oriented to person, place, and time.   HENT:   Head: Atraumatic  Right Ear: Normal  Left Ear: Normal  Nose: Nose normal.   Mouth/Throat: Oropharynx is clear and moist. No erythema or exudate.   Eyes: Conjunctivae and EOM are normal. Pupils are equal, round, and reactive to light. No discharge  Neck: Normal range of motion. Neck supple.   Cardiovascular: tachycardic rate, regular rhythm, no murmur gallops or rubs. Intact distal pulses.    Pulmonary/Chest: CTA bilaterally. No wheezes rale or rhonchi.  Abdominal: Soft. Non tender.  No masses   Musculoskeletal: No edema. No bony deformity. Normal range of motion. Tender over left hip.  Lymphadenopathy:     The patient has no cervical adenopathy.   Neurological: The patient is alert and oriented to person, place, and time. The patient has normal strength and normal reflexes. No cranial nerve deficit. Coordination normal.   Skin: Skin is warm and dry. No rash noted. The patient is not diaphoretic.   Psychiatric: The  patient has a normal mood and affect.    Emergency Department Course     ECG:  ECG taken at 0033, ECG read at 0048  Supraventricular tachycardia with occasional premature ventricular complexes  Incomplete left bundle branch block  ST & T wave abnormality, consider lateral ischemia  Abnormal ECG  Rate 140 bpm. WV interval * ms. QRS duration 112 ms. QT/QTc 312/476 ms. P-R-T axes * 26 121.    ECG #2:  ECG taken at 0111, ECG read at 0115  Sinus rhythm with sinus arrhythmia with 1st degree AV block  Incomplete left bundle branch block  Borderline ECG  Rate 87 bpm. WV interval 264 ms. QRS duration 118 ms. QT/QTc 384/462 ms. P-R-T axes * 47 72.      Imaging:  Radiology findings were communicated with the patient who voiced understanding of the findings.    XR Hip Port Left 1 View post reduction  1. Interval reduction of what was a dislocated left hip arthroplasty.  2. No visualized acute fracture of the left hip.  LUIS STEVENS MD  Reading per radiology    XR Pelvis and Hip Left 1 View  1. Superior and lateral dislocation of the femoral component of a left  hip arthroplasty.  2. No visualized acute fracture of the pelvis or left hip.  3. Unremarkable appearance of a right hip arthroplasty.  LUIS STEVENS MD  Reading per radiology    Laboratory:  Laboratory findings were communicated with the patient who voiced understanding of the findings.    BMP: chloride 110(H), glucose 107(H), urea nitrogen (H), creatinine 1.57(H), GFR estimate 38(L), calcium 7.7(L) o/w WNL     Procedures:  Procedure: Sedation.    Expected Level: Deep Sedation.    Indication: Sedation is required to allow for hip reduction.    Consent:  Risks, benefits and alternatives were discussed with patient and consent for  procedure was obtained.    Timeout:  Universal protocol was followed. TIME OUT conducted just prior to starting  procedure confirmed patient identity, site/side, procedure, patient position, and availability of correct equipment and  implants.? Yes    Medication: IV propofol    Monitoring: Monitoring consisted of: heart rate, cardiac monitor, continuous pulse oximetry, frequent blood pressure checks, level of consciousness, IV access, constant attendance by RN until patient recovered.    Response: Vital signs stable.    Patient Status: Post procedure patient was alert.    Total Physician Drug Administration / Monitoring Time: 15 minutes.    Patient was monitored during recovery and returned to pre-procedure baseline.      Reduction of left hip dislocation  Risks and benefits were discussed verbally and written consent was obtained  Hip reduced using Captain Jim technique  Patient tolerated procedure well.  Post reduction film was obtained. It showed dislocation has been reduced.   No complications  Post procedure distal function was intact.    Interventions:  0049 lopressor 5mg IV  0049 morphine 4mg IV  0229 propofol 60mg IV    Emergency Department Course:    0031 Nursing notes and vitals reviewed.    0040 I performed an exam of the patient as documented above.     0051 IV was inserted and blood was drawn for laboratory testing, results above.    0129 The patient was sent for a XR pelvis and hip left while in the emergency department, results above.     0228 I performed the reduction of left hip procedure as documented above.    0248 The patient had a port XR left hip post reduction while in the emergency department, results above.     I personally reviewed the lab and image results with the patient and answered all related questions prior to discharge back to his nursing facility.    Impression & Plan      Medical Decision Making:  Abimael Flores is a 89 year old male who presents to the emergency department today for evaluation of left hip pain after bending over to put some socks on at his nursing facility. On exam, he has obvious left hip dislocation, this is the second time this has occurred within the last week or two. Also noted,  patient was in rapid supraventricular tachycardia on arrival and remained in A fib and was given a single dose of lopressor and converted back into a normal sinus rhythm. X-ray confirmed anterior left hip dislocation without any evidence of acute fracture. Hip was reduced, per the above procedure note without complication. He was observed for another hour while sedation cleared. He was able to ambulate with his walker. [place] he could be safely returned to his nursing facility. Continue his regular medications. We discussed the atrial fibrillation, which in review of his chart he's had paroxysmally in the past and will speak with his physician and cardiologist about any medication changes.     Diagnosis:    ICD-10-CM    1. Dislocation of left hip, initial encounter (H) S73.005A    2. Paroxysmal atrial fibrillation (H) I48.0      Disposition:   The patient is discharged to home.    Discharge Medications:  No discharge medications.    Scribe Disclosure:  Kelly NINO, am serving as a scribe at 2:22 AM on 1/8/2019 to document services personally performed by Doug Ness MD based on my observations and the provider's statements to me.     EMERGENCY DEPARTMENT       Doug Ness MD  01/08/19 0605

## 2019-01-08 NOTE — ED AVS SNAPSHOT
Emergency Department  [address] [place] [address]  Phone:  [phone]  Fax:  [phone]                                    Abimael Flores   MRN: [id number]    Department:   Emergency Department   Date of Visit:  1/8/2019           After Visit Summary Signature Page    I have received my discharge instructions, and my questions have been answered. I have discussed any challenges I see with this plan with the nurse or doctor.    ..........................................................................................................................................  Patient/Patient Representative Signature      ..........................................................................................................................................  Patient Representative Print Name and Relationship to Patient    ..................................................               ................................................  Date                                   Time    ..........................................................................................................................................  Reviewed by Signature/Title    ...................................................              ..............................................  Date                                               Time          22EPIC Rev 08/18

## 2019-01-09 VITALS
HEART RATE: 89 BPM | RESPIRATION RATE: 16 BRPM | OXYGEN SATURATION: 96 % | SYSTOLIC BLOOD PRESSURE: 115 MMHG | TEMPERATURE: 98.4 F | DIASTOLIC BLOOD PRESSURE: 94 MMHG

## 2019-01-09 LAB
ALBUMIN UR-MCNC: 30 MG/DL
APPEARANCE UR: ABNORMAL
BILIRUB UR QL STRIP: NEGATIVE
COLOR UR AUTO: YELLOW
GLUCOSE UR STRIP-MCNC: 30 MG/DL
HGB UR QL STRIP: ABNORMAL
KETONES UR STRIP-MCNC: NEGATIVE MG/DL
LEUKOCYTE ESTERASE UR QL STRIP: ABNORMAL
MUCOUS THREADS #/AREA URNS LPF: PRESENT /LPF
NITRATE UR QL: NEGATIVE
PH UR STRIP: 5 PH (ref 5–7)
RBC #/AREA URNS AUTO: 26 /HPF (ref 0–2)
SOURCE: ABNORMAL
SP GR UR STRIP: 1.02 (ref 1–1.03)
SQUAMOUS #/AREA URNS AUTO: <1 /HPF (ref 0–1)
UROBILINOGEN UR STRIP-MCNC: NORMAL MG/DL (ref 0–2)
WBC #/AREA URNS AUTO: >182 /HPF (ref 0–5)
WBC CLUMPS #/AREA URNS HPF: PRESENT /HPF

## 2019-01-09 ASSESSMENT — ENCOUNTER SYMPTOMS: ARTHRALGIAS: 1

## 2019-01-09 NOTE — ED PROVIDER NOTES
History     Chief Complaint:  Left Hip Dislocation    HPI   Abimael Flores is a 89 year old male with a history of multiple hip dislocations s/p left hip arthroplasty revision by Dr. Winchester on 10/22/18 who presents with a left hip dislocation. The patient reports that he turned to sit down on his bed tonight when his left hip dislocated. The patient was seen in the ED this morning for a left hip dislocation as well and has had multiple hip dislocations in the past week.    Allergies:  No known drug allergies     Medications:    Aspirin 81 mg EC  Novolog Flexpen  Basaglar Kwikpen  Ambien    Past Medical History:    Benign Essential Hypertension  CAD  Non senile cataract  Recent retinal detachment  Type 2 diabetes  Closed right hip fracture  Physical deconditioning  Stage 3 CKD  Cardiac arrhythmia  Fracture of left femoral neck  Anemia  Recurrent hip dislocations  DVT  Candidiasis of perineum  Chronic Pain  Insomnia  Vertigo  CABG    Past Surgical History:    Arthoplasty Revision Hip, left  Cardiac Surgery  Cataract IOL, bilateral  Left Closed hip reduction, multiple  Lacrimal caruncle removed BE  Open reduction internal fixation Hip Bipolar, right  Open reduction internal fixation Hip Bipolar, left  Repair ruptured globe    Family History:    Diabetes    Social History:  Smoking Status: Former Smoker  Alcohol Use: No  Patient presents with his son.  Marital Status:       Review of Systems   Musculoskeletal: Positive for arthralgias.   All other systems reviewed and are negative.    Physical Exam     Patient Vitals for the past 24 hrs:   BP Temp Temp src Pulse Heart Rate Resp SpO2   01/09/19 0009 (!) 115/94 -- -- -- 90 16 96 %   01/08/19 2315 97/61 -- -- 89 -- -- --   01/08/19 2305 169/67 -- -- 87 85 13 100 %   01/08/19 2300 159/69 -- -- 83 -- 10 99 %   01/08/19 2146 133/78 98.4  F (36.9  C) Oral -- 89 16 98 %     Physical Exam  Constitutional: Elderly white male, supine  HENT: No signs of trauma.   Eyes:  EOM are normal. Pupils are equal, round, and reactive to light.   Neck: Normal range of motion. No JVD present. No cervical adenopathy.  Cardiovascular: Regular rapid.  Exam reveals no gallop and no friction rub.    No murmur heard.  Pulmonary/Chest: Bilateral breath sounds normal. No wheezes, rhonchi or rales. Sternotomy incision  Abdominal: Soft. No tenderness. No rebound or guarding.   Musculoskeletal: No edema. No tenderness. Left leg shortened, internally rotated and flexed. Normal strength and sensation of foot.  Lymphadenopathy: No lymphadenopathy.   Neurological: Alert and oriented to person, place, and time. Normal strength. Coordination normal.   Skin: Skin is warm and dry. No rash noted. No erythema.     Emergency Department Course     Imaging:  Radiographic findings were communicated with the patient and family who voiced understanding of the findings.    XR Pelvis 1/2 Views at 0003  1. Interval reduction of what was a dislocated left hip arthroplasty.  2. No other interval change.  As read by radiology.     XR Pelvis w Hip Left 1 View at 2301  Superior dislocation of the left hip arthroplasty. No  Fracture.  As read by radiology.     Laboratory:    UA: Glucose Urine 30, Blood Urine Trace, Protein Albumin Urine 30, Leukocyte Esterase Urine Large, WBC urine >182, RBC Urine 26, WBC Clumps Present, Mucous Urine Present, o/w Negative    Urine Culture Aerobic: Pending    Procedures:  [facility] Procedure Note        Sedation:      Performed by: Kenneth Hdz MD  Authorized by: Kenneth Hdz MD    Pre-Procedure Assessment done at 2230.    Expected Level:  Deep Sedation    Indication:  Sedation is required to allow for joint reduction    Consent obtained from patient after discussing the risks, benefits and alternatives.    PO Intake:  Appropriately NPO for procedure    ASA Class:  Class 3 - SEVERE SYSTEMIC DISEASE, DEFINITE FUNCTIONAL LIMITATIONS.    Mallampati:  Grade 1:  Soft palate,  uvula, tonsillar pillars, and posterior pharyngeal wall visible    Lungs: Lungs Clear with good breath sounds bilaterally.     Heart: normal heart sounds with tachycardia    History and physical reviewed and no updates needed. I have reviewed the lab findings, diagnostic data, medications, and the plan for sedation. I have determined this patient to be an appropriate candidate for the planned sedation and procedure and have reassessed the patient IMMEDIATELY PRIOR to sedation and procedure.    Sedation Post Procedure Summary:    Prior to the start of the procedure and with procedural staff participation, I verbally confirmed the patient s identity using two indicators, relevant allergies, that the procedure was appropriate and matched the consent or emergent situation, and that the correct equipment/implants were available. Immediately prior to starting the procedure I conducted the Time Out with the procedural staff and re-confirmed the patient s name, procedure, and site/side. (The Joint Commission universal protocol was followed.)  Yes      Sedatives: Propofol    Vital signs, airway, End Tidal CO2 and pulse oximetry were monitored and remained stable throughout the procedure and sedation was maintained until the procedure was complete.  The patient was monitored by staff until sedation discharge criteria were met.    Patient tolerance: Patient tolerated the procedure well with no immediate complications.    Time of sedation in minutes:  15 minutes from beginning to end of physician one to one monitoring.      Left Hip Dislocation Reduction  Risk benefits and alternatives discussed. Patient was brought to stabilization room, put on monitors and had IV propofol and with this I was able to reduce his hip with standard technique. Unfortunately it was rather easily reduced. Patient tolerated the procedure well. Total sedation time 15 minutes. Patient is Mallampatti 1 and had not eaten for at least 5 hours.  Post-reduction shows hip in good place.     Interventions:    2211: PF Sublimaze 50 mcg IV  2310: Diprivan 60 mg IV    Emergency Department Course:  Past medical records, nursing notes, and vitals reviewed.  2154: I performed an exam of the patient and obtained history, as documented above.    The patient was sent for an X-ray while in the emergency department, findings above.    Patient was moved to a stabilization room, sedated and left hip was reduced as noted above.    The patient was sent for an X-ray while in the emergency department, findings above.    0001: I rechecked the patient. Findings and plan explained to the Patient and son. Patient discharged home with instructions regarding supportive care, medications, and reasons to return. The importance of close follow-up was reviewed.     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 He was admitted to the hospital 4 days ago with a hip dislocation. He was actually seen almost 24 hours ago by Dr. Ness with another hip dislocation and had it reduced. He states today he was just simply turning and it came out again. On exam, he has a obvious hip dislocation confirmed by X-ray. Patient's son is here and we have discussed ideas for trying to keep this hip in. Orthopedics have said a knee immobilizer would not work and would probably make him fall easier. We have talked about possibly a splint of some type around the hip and we have talked about re-doing this. Patient's son who is a physician will work on these options.     Diagnosis:    ICD-10-CM    1. Recurrent Dislocation of left prosthetic hip, initial encounter (H) S73.005A UA with Microscopic reflex to Culture       Disposition:  Discharged to home.      Adrianna Bolaños  1/8/2019    EMERGENCY DEPARTMENT  I, Adrianna Bolaños, am serving as a scribe at 9:54 PM on 1/8/2019 to document services personally performed by  Kenneth Hdz MD based on my observations and the provider's statements to me.        Kenneth Hdz MD  01/09/19 0037

## 2019-01-09 NOTE — ED NOTES
Bed: ED29  Expected date:   Expected time:   Means of arrival:   Comments:  [id number] hip dislocation

## 2019-01-10 NOTE — RESULT ENCOUNTER NOTE
Emergency Dept/Urgent Care discharge antibiotic (if prescribed): None  No changes in treatment per Urine culture protocol.

## 2019-01-11 LAB
BACTERIA SPEC CULT: ABNORMAL
Lab: ABNORMAL
SPECIMEN SOURCE: ABNORMAL

## 2019-01-11 NOTE — RESULT ENCOUNTER NOTE
Final urine culture report is NEGATIVE per [facility] ED Lab Result protocol.    If NEGATIVE result, no change in treatment, per [facility] ED Lab Result protocol.

## 2019-01-16 ENCOUNTER — APPOINTMENT (OUTPATIENT)
Dept: GENERAL RADIOLOGY | Facility: CLINIC | Age: 84
End: 2019-01-16
Payer: MEDICARE

## 2019-01-16 ENCOUNTER — HOSPITAL ENCOUNTER (OUTPATIENT)
Facility: CLINIC | Age: 84
Setting detail: OBSERVATION
Discharge: HOME | End: 2019-01-17
Attending: EMERGENCY MEDICINE | Admitting: HOSPITALIST
Payer: MEDICARE

## 2019-01-16 DIAGNOSIS — N17.9 ACUTE RENAL FAILURE, UNSPECIFIED ACUTE RENAL FAILURE TYPE (H): ICD-10-CM

## 2019-01-16 DIAGNOSIS — S73.005A CLOSED DISLOCATION OF LEFT HIP, INITIAL ENCOUNTER (H): ICD-10-CM

## 2019-01-16 DIAGNOSIS — R53.81 PHYSICAL DECONDITIONING: Primary | ICD-10-CM

## 2019-01-16 LAB
ANION GAP SERPL CALCULATED.3IONS-SCNC: 8 MMOL/L (ref 3–14)
BASOPHILS # BLD AUTO: 0 10E9/L (ref 0–0.2)
BASOPHILS NFR BLD AUTO: 0.3 %
BUN SERPL-MCNC: 38 MG/DL (ref 7–30)
CALCIUM SERPL-MCNC: 8.1 MG/DL (ref 8.5–10.1)
CHLORIDE SERPL-SCNC: 107 MMOL/L (ref 94–109)
CO2 SERPL-SCNC: 20 MMOL/L (ref 20–32)
CREAT SERPL-MCNC: 2.4 MG/DL (ref 0.66–1.25)
DIFFERENTIAL METHOD BLD: ABNORMAL
EOSINOPHIL # BLD AUTO: 0 10E9/L (ref 0–0.7)
EOSINOPHIL NFR BLD AUTO: 0.5 %
ERYTHROCYTE [DISTWIDTH] IN BLOOD BY AUTOMATED COUNT: 15.8 % (ref 10–15)
GFR SERPL CREATININE-BSD FRML MDRD: 22 ML/MIN/{1.73_M2}
GLUCOSE SERPL-MCNC: 126 MG/DL (ref 70–99)
HCT VFR BLD AUTO: 28.6 % (ref 40–53)
HGB BLD-MCNC: 9.3 G/DL (ref 13.3–17.7)
IMM GRANULOCYTES # BLD: 0 10E9/L (ref 0–0.4)
IMM GRANULOCYTES NFR BLD: 0.1 %
LYMPHOCYTES # BLD AUTO: 2 10E9/L (ref 0.8–5.3)
LYMPHOCYTES NFR BLD AUTO: 25.8 %
MCH RBC QN AUTO: 29.1 PG (ref 26.5–33)
MCHC RBC AUTO-ENTMCNC: 32.5 G/DL (ref 31.5–36.5)
MCV RBC AUTO: 89 FL (ref 78–100)
MONOCYTES # BLD AUTO: 1.3 10E9/L (ref 0–1.3)
MONOCYTES NFR BLD AUTO: 17.2 %
NEUTROPHILS # BLD AUTO: 4.3 10E9/L (ref 1.6–8.3)
NEUTROPHILS NFR BLD AUTO: 56.1 %
NRBC # BLD AUTO: 0 10*3/UL
NRBC BLD AUTO-RTO: 0 /100
PLATELET # BLD AUTO: 212 10E9/L (ref 150–450)
POTASSIUM SERPL-SCNC: 4.9 MMOL/L (ref 3.4–5.3)
RBC # BLD AUTO: 3.2 10E12/L (ref 4.4–5.9)
SODIUM SERPL-SCNC: 135 MMOL/L (ref 133–144)
WBC # BLD AUTO: 7.7 10E9/L (ref 4–11)

## 2019-01-16 PROCEDURE — 73502 X-RAY EXAM HIP UNI 2-3 VIEWS: CPT | Mod: LT

## 2019-01-16 PROCEDURE — 27265 TREAT HIP DISLOCATION: CPT | Mod: LT

## 2019-01-16 PROCEDURE — 99152 MOD SED SAME PHYS/QHP 5/>YRS: CPT

## 2019-01-16 PROCEDURE — 96374 THER/PROPH/DIAG INJ IV PUSH: CPT | Mod: 59

## 2019-01-16 PROCEDURE — 25000128 H RX IP 250 OP 636: Performed by: EMERGENCY MEDICINE

## 2019-01-16 PROCEDURE — 80048 BASIC METABOLIC PNL TOTAL CA: CPT | Performed by: EMERGENCY MEDICINE

## 2019-01-16 PROCEDURE — 99285 EMERGENCY DEPT VISIT HI MDM: CPT | Mod: 25

## 2019-01-16 PROCEDURE — 85025 COMPLETE CBC W/AUTO DIFF WBC: CPT | Performed by: EMERGENCY MEDICINE

## 2019-01-16 PROCEDURE — 96376 TX/PRO/DX INJ SAME DRUG ADON: CPT

## 2019-01-16 RX ORDER — FENTANYL CITRATE 50 UG/ML
50 INJECTION, SOLUTION INTRAMUSCULAR; INTRAVENOUS ONCE
Status: COMPLETED | OUTPATIENT
Start: 2019-01-16 | End: 2019-01-16

## 2019-01-16 RX ADMIN — FENTANYL CITRATE 50 MCG: 50 INJECTION, SOLUTION INTRAMUSCULAR; INTRAVENOUS at 23:40

## 2019-01-16 RX ADMIN — FENTANYL CITRATE 50 MCG: 50 INJECTION, SOLUTION INTRAMUSCULAR; INTRAVENOUS at 22:57

## 2019-01-16 ASSESSMENT — MIFFLIN-ST. JEOR: SCORE: 1285.83

## 2019-01-17 ENCOUNTER — APPOINTMENT (OUTPATIENT)
Dept: GENERAL RADIOLOGY | Facility: CLINIC | Age: 84
End: 2019-01-17
Payer: MEDICARE

## 2019-01-17 VITALS
WEIGHT: 155 LBS | RESPIRATION RATE: 14 BRPM | TEMPERATURE: 97.7 F | HEART RATE: 80 BPM | DIASTOLIC BLOOD PRESSURE: 71 MMHG | SYSTOLIC BLOOD PRESSURE: 147 MMHG | BODY MASS INDEX: 24.91 KG/M2 | OXYGEN SATURATION: 98 % | HEIGHT: 66 IN

## 2019-01-17 PROBLEM — N17.9 ACUTE KIDNEY INJURY (H): Status: ACTIVE | Noted: 2019-01-17

## 2019-01-17 LAB
ANION GAP SERPL CALCULATED.3IONS-SCNC: 6 MMOL/L (ref 3–14)
BUN SERPL-MCNC: 33 MG/DL (ref 7–30)
CALCIUM SERPL-MCNC: 7.4 MG/DL (ref 8.5–10.1)
CHLORIDE SERPL-SCNC: 110 MMOL/L (ref 94–109)
CO2 SERPL-SCNC: 23 MMOL/L (ref 20–32)
CREAT SERPL-MCNC: 1.92 MG/DL (ref 0.66–1.25)
GFR SERPL CREATININE-BSD FRML MDRD: 29 ML/MIN/{1.73_M2}
GLUCOSE BLDC GLUCOMTR-MCNC: 101 MG/DL (ref 70–99)
GLUCOSE BLDC GLUCOMTR-MCNC: 131 MG/DL (ref 70–99)
GLUCOSE BLDC GLUCOMTR-MCNC: 95 MG/DL (ref 70–99)
GLUCOSE SERPL-MCNC: 133 MG/DL (ref 70–99)
POTASSIUM SERPL-SCNC: 4.7 MMOL/L (ref 3.4–5.3)
SODIUM SERPL-SCNC: 139 MMOL/L (ref 133–144)

## 2019-01-17 PROCEDURE — 25000128 H RX IP 250 OP 636: Performed by: HOSPITALIST

## 2019-01-17 PROCEDURE — 96361 HYDRATE IV INFUSION ADD-ON: CPT

## 2019-01-17 PROCEDURE — 40000986 XR HIP PORT LT 1 VW

## 2019-01-17 PROCEDURE — 40000893 ZZH STATISTIC PT IP EVAL DEFER

## 2019-01-17 PROCEDURE — 99207 ZZC CDG-CODE CATEGORY CHANGED: CPT | Performed by: HOSPITALIST

## 2019-01-17 PROCEDURE — 36415 COLL VENOUS BLD VENIPUNCTURE: CPT | Performed by: HOSPITALIST

## 2019-01-17 PROCEDURE — 96372 THER/PROPH/DIAG INJ SC/IM: CPT

## 2019-01-17 PROCEDURE — 00000146 ZZHCL STATISTIC GLUCOSE BY METER IP

## 2019-01-17 PROCEDURE — 25000131 ZZH RX MED GY IP 250 OP 636 PS 637: Mod: GY | Performed by: HOSPITALIST

## 2019-01-17 PROCEDURE — 99236 HOSP IP/OBS SAME DATE HI 85: CPT | Performed by: HOSPITALIST

## 2019-01-17 PROCEDURE — 25000128 H RX IP 250 OP 636

## 2019-01-17 PROCEDURE — 25000128 H RX IP 250 OP 636: Performed by: EMERGENCY MEDICINE

## 2019-01-17 PROCEDURE — 80048 BASIC METABOLIC PNL TOTAL CA: CPT | Performed by: HOSPITALIST

## 2019-01-17 PROCEDURE — G0378 HOSPITAL OBSERVATION PER HR: HCPCS

## 2019-01-17 RX ORDER — NICOTINE POLACRILEX 4 MG
15-30 LOZENGE BUCCAL
Status: DISCONTINUED | OUTPATIENT
Start: 2019-01-17 | End: 2019-01-17 | Stop reason: HOSPADM

## 2019-01-17 RX ORDER — FLUCONAZOLE 150 MG/1
150 TABLET ORAL DAILY
Status: ON HOLD | COMMUNITY
End: 2019-01-17

## 2019-01-17 RX ORDER — FINASTERIDE 5 MG/1
5 TABLET, FILM COATED ORAL AT BEDTIME
Status: DISCONTINUED | OUTPATIENT
Start: 2019-01-17 | End: 2019-01-17 | Stop reason: HOSPADM

## 2019-01-17 RX ORDER — FINASTERIDE 5 MG/1
5 TABLET, FILM COATED ORAL AT BEDTIME
Status: ON HOLD | COMMUNITY
End: 2019-01-17

## 2019-01-17 RX ORDER — ONDANSETRON 2 MG/ML
4 INJECTION INTRAMUSCULAR; INTRAVENOUS EVERY 6 HOURS PRN
Status: DISCONTINUED | OUTPATIENT
Start: 2019-01-17 | End: 2019-01-17 | Stop reason: HOSPADM

## 2019-01-17 RX ORDER — ONDANSETRON 4 MG/1
4 TABLET, ORALLY DISINTEGRATING ORAL EVERY 6 HOURS PRN
Status: DISCONTINUED | OUTPATIENT
Start: 2019-01-17 | End: 2019-01-17 | Stop reason: HOSPADM

## 2019-01-17 RX ORDER — ZOLPIDEM TARTRATE 5 MG/1
5 TABLET ORAL AT BEDTIME
Status: DISCONTINUED | OUTPATIENT
Start: 2019-01-17 | End: 2019-01-17

## 2019-01-17 RX ORDER — NALOXONE HYDROCHLORIDE 0.4 MG/ML
.1-.4 INJECTION, SOLUTION INTRAMUSCULAR; INTRAVENOUS; SUBCUTANEOUS
Status: DISCONTINUED | OUTPATIENT
Start: 2019-01-17 | End: 2019-01-17 | Stop reason: HOSPADM

## 2019-01-17 RX ORDER — DEXTROSE MONOHYDRATE 25 G/50ML
25-50 INJECTION, SOLUTION INTRAVENOUS
Status: DISCONTINUED | OUTPATIENT
Start: 2019-01-17 | End: 2019-01-17 | Stop reason: HOSPADM

## 2019-01-17 RX ORDER — ACETAMINOPHEN 650 MG/1
650 SUPPOSITORY RECTAL EVERY 4 HOURS PRN
Status: DISCONTINUED | OUTPATIENT
Start: 2019-01-17 | End: 2019-01-17 | Stop reason: HOSPADM

## 2019-01-17 RX ORDER — ASPIRIN 81 MG/1
81 TABLET ORAL DAILY
Status: DISCONTINUED | OUTPATIENT
Start: 2019-01-17 | End: 2019-01-17 | Stop reason: HOSPADM

## 2019-01-17 RX ORDER — TAMSULOSIN HYDROCHLORIDE 0.4 MG/1
0.8 CAPSULE ORAL AT BEDTIME
Status: ON HOLD | COMMUNITY
End: 2019-01-17

## 2019-01-17 RX ORDER — AMOXICILLIN 250 MG
1 CAPSULE ORAL 2 TIMES DAILY PRN
Status: DISCONTINUED | OUTPATIENT
Start: 2019-01-17 | End: 2019-01-17 | Stop reason: HOSPADM

## 2019-01-17 RX ORDER — ACETAMINOPHEN 325 MG/1
650 TABLET ORAL EVERY 4 HOURS PRN
Status: DISCONTINUED | OUTPATIENT
Start: 2019-01-17 | End: 2019-01-17 | Stop reason: HOSPADM

## 2019-01-17 RX ORDER — AMOXICILLIN 250 MG
2 CAPSULE ORAL 2 TIMES DAILY PRN
Status: DISCONTINUED | OUTPATIENT
Start: 2019-01-17 | End: 2019-01-17 | Stop reason: HOSPADM

## 2019-01-17 RX ORDER — HYDROCODONE BITARTRATE AND ACETAMINOPHEN 5; 325 MG/1; MG/1
1-2 TABLET ORAL EVERY 4 HOURS PRN
Status: DISCONTINUED | OUTPATIENT
Start: 2019-01-17 | End: 2019-01-17 | Stop reason: HOSPADM

## 2019-01-17 RX ORDER — PROPOFOL 10 MG/ML
INJECTION, EMULSION INTRAVENOUS
Status: COMPLETED
Start: 2019-01-17 | End: 2019-01-17

## 2019-01-17 RX ORDER — TAMSULOSIN HYDROCHLORIDE 0.4 MG/1
0.8 CAPSULE ORAL AT BEDTIME
Status: DISCONTINUED | OUTPATIENT
Start: 2019-01-17 | End: 2019-01-17 | Stop reason: HOSPADM

## 2019-01-17 RX ORDER — FLUCONAZOLE 150 MG/1
150 TABLET ORAL DAILY
Status: CANCELLED | OUTPATIENT
Start: 2019-01-17

## 2019-01-17 RX ORDER — SODIUM CHLORIDE 9 MG/ML
INJECTION, SOLUTION INTRAVENOUS CONTINUOUS
Status: DISCONTINUED | OUTPATIENT
Start: 2019-01-17 | End: 2019-01-17

## 2019-01-17 RX ADMIN — PROPOFOL 70 MG: 10 INJECTION, EMULSION INTRAVENOUS at 01:32

## 2019-01-17 RX ADMIN — INSULIN ASPART 2 UNITS: 100 INJECTION, SOLUTION INTRAVENOUS; SUBCUTANEOUS at 09:59

## 2019-01-17 RX ADMIN — SODIUM CHLORIDE: 9 INJECTION, SOLUTION INTRAVENOUS at 02:49

## 2019-01-17 RX ADMIN — SODIUM CHLORIDE 1000 ML: 9 INJECTION, SOLUTION INTRAVENOUS at 00:56

## 2019-01-17 ASSESSMENT — ENCOUNTER SYMPTOMS
FEVER: 0
CHILLS: 0
VOMITING: 1
DIARRHEA: 1

## 2019-01-17 NOTE — ED NOTES
Bed: ED01  Expected date: 1/16/19  Expected time: 10:40 PM  Means of arrival: Ambulance  Comments:  HEMS 441 89M prob. Hip dislocation

## 2019-01-17 NOTE — ED PROVIDER NOTES
History     Chief Complaint:    Hip Pain    HPI   Abimael Flores is a 89 year old male with a history of hypertension, coronary artery disease, type II diabetes, and bilateral hip replacement amongst others, who presents with hip pain via EMS. The patient reports that tonight he bent over more than 90 degrees to take his socks off when he felt his left hip pop out, of which he has had a hip replacement and it has popped out twice in the past 2 weeks already. The patient recently had vomiting and diarrhea, but details that it recently has improved. He denies fever, chills, cold symptoms, falls, or head injures. He has had both hips replaced but is unsure the surgeon.     Allergies:  No known drug allergies.       Medications:    Aspirin  Novolog Flexpen  Basaglar Kwikpen  Ambien    Past Medical History:    Hypertension  Coronary artery disease  Nonsenile cataract  Retinal detachment  Type II diabetes     Past Surgical History:    Arthoplasty revision hip  Cardiac surgery  Cataract  Closed reduction hip  Lacrimal caruncle removed  Open reduction internal fixation hip bipolar  Repair ruptured globe    Family History:    Mother: Diabetes  Father: Diabetes     Social History:  The patient was accompanied to the ED by EMS.  Smoking Status: Former Smoker  Smokeless Tobacco: Never Used  Alcohol Use: Negativ  Drug Use: Negative   Marital Status:       Review of Systems   Constitutional: Negative for chills and fever.   Gastrointestinal: Positive for diarrhea and vomiting.   Musculoskeletal:        Left hip pain   All other systems reviewed and are negative.    Physical Exam     Patient Vitals for the past 24 hrs:   BP Temp Temp src Pulse Heart Rate Resp SpO2 Height Weight   01/17/19 0252 157/77 97.1  F (36.2  C) Oral -- 85 14 98 % -- --   01/17/19 0145 133/68 -- -- 80 82 12 100 % -- --   01/17/19 0130 167/87 -- -- 104 89 9 100 % -- --   01/17/19 0115 151/75 -- -- 79 85 13 100 % -- --   01/17/19 0057 -- -- -- -- --  "17 100 % -- --   01/16/19 2350 156/74 -- -- -- 86 16 99 % -- --   01/16/19 2245 145/77 98.6  F (37  C) Oral -- 85 16 98 % 1.676 m (5' 6\") 70.3 kg (155 lb)      Physical Exam    Nursing note and vitals reviewed.  Constitutional:  Appears well-developed and well-nourished.   HENT:   Head:    Atraumatic.   Mouth/Throat:   Oropharynx is clear and moist. No oropharyngeal exudate.   Eyes:    Pupils are equal, round, and reactive to light.   Neck:    Normal range of motion. Neck supple.      No tracheal deviation present. No thyromegaly present.   Cardiovascular:  Normal rate, regular rhythm, no murmur   Pulmonary/Chest: Breath sounds are clear and equal without wheezes or crackles.  Abdominal:   Soft. Bowel sounds are normal. Exhibits no distension and      no mass. There is no tenderness.      There is no rebound and no guarding.   Musculoskeletal:  Exhibits no edema. Left leg internally rotated and shortened, good dorsal pedal pulse.  Lymphadenopathy:  No cervical adenopathy.   Neurological:   Alert and oriented to person, place, and time. GCS 15  Skin:    Skin is warm and dry. No rash noted. No pallor.       Emergency Department Course     Imaging:  Radiology findings were communicated with the patient who voiced understanding of the findings.    XR Hip Port Left 1 View   Preliminary Result   IMPRESSION: The left hip arthroplasty has been successfully reduced.   No fracture.      XR Pelvis w Hip Left 1 View   Preliminary Result   IMPRESSION: Superior dislocation of the left hip arthroplasty. No   fracture.        Reading per radiology     Laboratory:  Laboratory findings were communicated with the patient who voiced understanding of the findings.    CBC: WBC 7.7, HGB 9.3 (L),   BMP: Glucose 126 (H), BUN 38 (H), GFR 22 (L), Calcium 8.1 (L) o/w WNL (Creatinine 2.40 (H))    Procedures:    [facility] Procedure Note        Sedation:      Performed by: Liz Arriaza MD    Pre-Procedure Assessment done at " 0130.    Expected Level:  Deep Sedation    Indication:  Sedation is required to allow for joint reduction    Consent obtained from patient after discussing the risks, benefits and alternatives.    PO Intake:  Appropriately NPO for procedure    ASA Class:  Class 2 - MILD SYSTEMIC DISEASE, NO ACUTE PROBLEMS, NO FUNCTIONAL LIMITATIONS.    Mallampati:  Grade 3:  Soft palate visible, posterior pharyngeal wall not visible    Lungs: Lungs Clear with good breath sounds bilaterally.     Heart: Normal heart sounds and rate    Focused history and physical completed prior to procedure. I have reviewed the lab findings, diagnostic data, medications, and the plan for sedation. I have determined this patient to be an appropriate candidate for the planned sedation and procedure and have reassessed the patient IMMEDIATELY PRIOR to sedation and procedure.      Sedation Post Procedure Summary:    Prior to the start of the procedure and with procedural staff participation, I verbally confirmed the patient s identity using two indicators, relevant allergies, that the procedure was appropriate and matched the consent or emergent situation, and that the correct equipment/implants were available. Immediately prior to starting the procedure I conducted the Time Out with the procedural staff and re-confirmed the patient s name, procedure, and site/side. (The Joint Commission universal protocol was followed.)  Yes      Sedatives: Propofol 70 mg    Vital signs, airway, End Tidal CO2 and pulse oximetry were monitored and remained stable throughout the procedure and sedation was maintained until the procedure was complete.  The patient was monitored by staff until sedation discharge criteria were met.    Patient tolerance: Patient tolerated the procedure well with no immediate complications.    Time of sedation in minutes:  15 minutes from beginning to end of physician one to one monitoring.     Procedure:  Left Hip Reduction    Indication: Left  hip dislocation as seen on x-ray.    Consent: Risks, benefits, and alternatives were discussed with the patient and consent for procedure was obtained.      Timeout: Universal protocol was followed. TIME OUT conducted just prior to starting procedure confirmed patient identity, site/side, procedure, patient position, and availability of correct equipment and implants.      Medication: The patient was sedated using IV Propofol in deep sedation as noted in above procedure note.    Technique: The affected hip was placed into 90 degrees flexion, using the using traction and internal and external rotation, inline traction was placed gently until a palpable pop was noted and clinical reduction apparent.  Leg was demonstrated to have equal length and lie to the contralateral side.  Sensation, motor function, and circulation are intact after reduction, and a post-procedural X-ray shows successful reduction of the left hip. There were no complications. The patient tolerated the procedure well.    Interventions:  2257 Fentanyl 50 mcg IV  2340 Fentanyl 50 mcg IV  0056 NS 1000 mL IV  0132 Diprivan 70 mg IV      Emergency Department Course:     Nursing notes and vitals reviewed.    2242 I performed an exam of the patient as documented above.     2258 IV was inserted and blood was drawn for laboratory testing, results above.     2313 The patient was sent for a XR while in the emergency department, results above.      0013 I spoke with Dr. Mazariegos the orthopedics service from [facility] regarding patient's presentation, findings, and plan of care.     0132 Procedure started.     0154 I spoke with Dr. Forrester of the hospitalist service from [facility] regarding patient's presentation, findings, and plan of care.     0200 The patient was sent for a XR while in the emergency department, results above.      0232 I personally reviewed the lab and imaging results with the patient and answered all related questions prior to  admission.    Impression & Plan      Medical Decision Making:  Abimael Flores is a 89 year old male who has a recurrent hip dislocation which I was able to reduce under procedural sedation. However, he also found to have acute super imposed chronic renal failure likely due to dehydration to his vomiting and diarrhea illness. He has a benign abdominal exam and his vomit and diarrhea improved so I do not imaging was indicated. He is admitted to the observation unit under the care of Dr. Forrester for further IV fluid hydration because of his acute renal failure. I consulted with orthopedic surgery regarding his recurrent hip dislocation.      Diagnosis:    ICD-10-CM    1. Acute renal failure, unspecified acute renal failure type (H) N17.9 Glucose by meter     Glucose by meter   2. Closed dislocation of left hip, initial encounter (H) S73.005A         Disposition:   The patient is admitted into the care of Dr. Forrester.     Scribe Disclosure:  I, Orla Severson, am serving as a scribe at 10:44 PM on 1/16/2019 to document services personally performed by Liz Arriaza MD based on my observations and the provider's statements to me.      EMERGENCY DEPARTMENT       Liz Arriaza MD  01/17/19 0613

## 2019-01-17 NOTE — PROGRESS NOTES
Observation goals PRIOR TO DISCHARGE     Comments:   -diagnostic tests and consults completed and resulted: not met.  Waiting for BMP to be resulted.   -vital signs normal or at patient baseline: Met.  -tolerating oral intake to maintain hydration:  Partially met.  Continuing to assess.

## 2019-01-17 NOTE — PROGRESS NOTES
RECEIVING UNIT ED HANDOFF REVIEW    ED Nurse Handoff Report was reviewed by: Ruben Garcia on January 17, 2019 at 2:09 AM

## 2019-01-17 NOTE — PLAN OF CARE
Discharge Planner PT   Patient plan for discharge: HOme  Current status: Eval orders received, pt recently admitted to OBS earlier this month. Pt with recurrent dislocations. Per RN, pt and son declining PT evaluation as pt recently seen by PT during previous admission  Barriers to return to prior living situation: Medical stability  Recommendations for discharge: Defer to medical team  Rationale for recommendations: Defer to medical team        Entered by: Kaity Fuentes 01/17/2019 2:31 PM

## 2019-01-17 NOTE — DISCHARGE SUMMARY
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  The following labs/tests are recommended: CBC/BMP.   Follow-up with your orthopedic surgeon as previous schedule               Unresulted Labs Ordered in the Past 30 Days of this Admission     No orders found for last 61 day(s).          Hospital Course   Abimael Flores is a 89 year old male who presents with dislocated left hip, reduced in ED and found to have acute kidney injury     Closed Dislocation or prosthetic hip (left) s/p reduction in ED  Hx L hip replacement  Hx frequent dislocations over the past 2 weeks. Has been admitted once overnight for monitoring (), and was seen in ED for reduction on . Again presented with hip dislocation and was reduced in the ED.  -Patient is to follow-up with Dr Gusman to discuss cage procedure, patient and family prefers to wait to schedule this until after  for patient's late wife(2019). They have contact information for physician and would not like him to be consulted during this admission.  Patient also refused evaluation by PT OT mentioning that he already sees physical therapy     Acute on chronic kidney injury  Baseline Cr near 1.5.  Presented with a creatinine of 2.4.  Likely prerenal due to recent GI illness (suspected food poisoning) lasted 24 hours on 1/15 with associated vomiting/diarrhea. Intake has otherwise been good.  Improved with hydration and tolerated good  p.o. intake while in-house.  Creatinine before discharge was 1.9     Type 2 Diabetes Mellitus  Chronic, BGs run 150-250 at home. PTA lantus 8 units in evening, novolog 4-6units with each meal  -PTA medication continued at the time of discharge     BPH  Chronic, used to be on PTA tamsulosin and finasteride.  Per pharmacy documentation it has been stopped    Anemia  Chronic, stable. Hgb at presentation 9.3, was 8.7 in Nov 2018   Baseline appears to be between 7-9. No signs active bleeding at this time.  -Outpatient follow-up      Consultations This Hospital Stay   PHYSICAL THERAPY ADULT IP CONSULT  OCCUPATIONAL THERAPY ADULT IP CONSULT    Code Status   DNR/DNI    Time Spent on this Encounter   I, Delaney Adler, personally saw the patient today and spent greater than 30 minutes discharging this patient.       Delaney Adler MD  [facility]  ______________________________________________________________________    Physical Exam   Vital Signs: Temp: 97.7  F (36.5  C) Temp src: Oral BP: 130/53 Pulse: 80 Heart Rate: 72 Resp: 14 SpO2: 97 % O2 Device: None (Room air) Oxygen Delivery: 2 LPM  Weight: 155 lbs 0 oz  Exam:  Constitutional: Awake, alert and no distress. Appears comfortable  Head: Normocephalic. No masses, lesions, tenderness or abnormalities  ENT: ENT exam normal, no neck nodes or sinus tenderness  Cardiovascular: RRR.  No murmurs, no rubs or JVD  Respiratory: Normal WOB,b/l equal air entry, no wheezes or crackles   Gastrointestinal: Abdomen soft, non-tender. BS normal. No masses, organomegaly  : Deferred   extremities : No edema , no clubbing or cyanosis         Primary Care Physician   RAISSA HILL    Discharge Disposition   Discharged to home  Condition at discharge: Stable    Significant Results and Procedures   Most Recent 3 CBC's:  Recent Labs   Lab Test 01/16/19  2258 11/15/18  0817 11/08/18  0630   WBC 7.7 10.4 10.6   HGB 9.3* 8.7* 7.9*   MCV 89 92 93    298 382     Most Recent 3  BMP's:  Recent Labs   Lab Test 01/17/19  1132 01/16/19  2258 01/08/19  0051    135 140   POTASSIUM 4.7 4.9 4.3   CHLORIDE 110* 107 110*   CO2 23 20 23   BUN 33* 38* 37*   CR 1.92* 2.40* 1.54*   ANIONGAP 6 8 7   JOSELINE 7.4* 8.1* 7.7*   * 126* 107*     Most Recent 2 LFT's:  Recent Labs   Lab Test 10/09/18  0703   AST 22   ALT 12   ALKPHOS 72   BILITOTAL 0.6   ,   Results for orders placed or performed during the hospital encounter of 01/16/19   XR Pelvis w Hip Left 1 View    Narrative    XR PELVIS AND HIP LEFT 1 VIEW   1/16/2019 11:13 PM     HISTORY: Check for dislocation.    COMPARISON: None.       Impression    IMPRESSION: Superior dislocation of the left hip arthroplasty. No  fracture.    SHARI HERNANDEZ MD   XR Hip Port Left 1 View    Narrative    XR HIP PORTABLE LEFT 1 VIEW  1/17/2019 2:01 AM      HISTORY: Dislocation reduction, left hip.    COMPARISON: 1/16/2019.      Impression    IMPRESSION: The left hip arthroplasty has been successfully reduced.  No fracture.    SHARI HERNANDEZ MD       Discharge Orders      Home Care PT Referral for Hospital Discharge      Home Care OT Referral for Hospital Discharge      Reason for your hospital stay    Acute kidney injury     Follow-up and recommended labs and tests     Follow up with primary care provider, RAISSA HILL, within 7 days for hospital follow- up.  The following labs/tests are recommended: CBC/BMP.   Follow-up with your orthopedic surgeon as previous schedule     Activity    Your activity upon discharge: Hip precaution until seen by your orthopedic surgeon     Discharge Instructions    Drink plenty of fluid  Avoid nephrotoxins     DNR/DNI     Diet    Follow this diet upon discharge: Orders Placed This Encounter      Moderate Consistent CHO Diet     Discharge Medications   Current Discharge Medication List      CONTINUE these medications which have NOT CHANGED    Details   insulin aspart (NOVOLOG FLEXPEN) 100 UNIT/ML pen Inject 4-6 Units  Subcutaneous 3 times daily (with meals)       insulin glargine (LANTUS SOLOSTAR PEN) 100 UNIT/ML pen Inject 8 Units Subcutaneous At Bedtime         STOP taking these medications       aspirin 81 MG EC tablet Comments:   Reason for Stopping:         finasteride (PROSCAR) 5 MG tablet Comments:   Reason for Stopping:         tamsulosin (FLOMAX) 0.4 MG capsule Comments:   Reason for Stopping:             Allergies   No Known Allergies

## 2019-01-17 NOTE — PLAN OF CARE
RN:  Patient A/O x4.  VSS on RA.  Denies pain.   Able to call and state needs.  Blood sugar levels stable.  Tolerating mod carb diet.  Up with SBA/walker.  Fall risk.  Up in chair for breakfast.  IV saline locked.  Creatinine improved to 1.92 on recheck.  Patient plans on discharging back to his prior facility for continuation of care.  His son Patrice is to bring clothes and transfer him later this afternoon.

## 2019-01-17 NOTE — PLAN OF CARE
Pt discharging to [facility] accompanied by pt's son. AVS and education given. Questions answered.

## 2019-01-17 NOTE — PROGRESS NOTES
Observation goals PRIOR TO DISCHARGE     Comments:   -diagnostic tests and consults completed and resulted: not met   -vital signs normal or at patient baseline: Partially met.  -tolerating oral intake to maintain hydration:  Partially met.  Continuing to assess.

## 2019-01-17 NOTE — H&P
[facility]    History and Physical  Hospitalist       Date of Admission:  2019    Assessment & Plan   Abimael Flores is a 89 year old male who presents with dislocated left hip, reduced in ED and found to have acute kidney injury    Closed Dislocation or prosthetic hip (left) s/p reduction in ED  Hx L hip replacement  Hx frequent dislocations over the past 2 weeks. Has been admitted once overnight for monitoring (), and was seen in ED for reduction on . Again today his hip was out of place and was reduced in the ED.  - Recommended for cage procedure with Dr Gusman, family prefers to wait to schedule this until after  for patient's late wife. They have contact information for physician and would not like him to be consulted during this admission.  - Follow up orthopedic surgery as outpatient  - PT/OT  - PRN tylenol and norco for pain    Acute on chronic kidney injury  Baseline Cr near 1.5. Recent illness with GI bug (suspected food poisoning) lasted 24 hours on 1/15 with associated vomiting/diarrhea. Intake has otherwise been good. Likely pre-renal.  - Received IVF in ED, continue IVF @ 75ml/hr.  - Repeat BMP at 1000    Type 2 Diabetes Mellitus  Chronic, BGs run 150-250 at home. PTA lantus 8 units in evening, novolog 4-6units with each meal  - sliding scale insulin, insulin aspart 2 units with each meal, lantus 8 units to begin tonight  - Moderate consistent carbohydrate diet    Candiduria  - Found to have candida infection in urine few days ago, + burning with urination  - Started on diflucan 150mg daily, first dose --continue    BPH  Chronic, PTA tamsulosin and finasteride. Family in talks for consideration of long term [facility] placement. Continue PTA medications.    Anemia  Chronic, stable. Hgb currently 9.3, was 8.7 in 2018--likely secondary to hemoconcentration. Baseline appears to be between 7-9. No signs active bleeding at this time.    DVT Prophylaxis: Low  Risk/Ambulatory with no VTE prophylaxis indicated  Code Status: DNR / DNI  Expected discharge: 24-48 hours pending resolution of CHAD    Eboni Forrester DO    Primary Care Physician   [facility]    Chief Complaint   Hip dislocation    History is obtained from the patient and patient's son    History of Present Illness   Abimael Flores is a 89 year old male who presents with hip dislocation after bending forward to remove socks. This has occurred twice more in the past 2 weeks with same mechanism. He has had the hip reduced each time. Hip was initially 8/10 pain, after reduction came down to 3/10 pain. In talks for further surgery to help decrease risk of dislocation. Patient's wife recently passed away and family is holding off on pursuing surgery until after her /viewing is over. Patient recently had vomiting/diarrhea on Tuesday that occurred over the course of 24 hours and then resolved. He has had a normal bowel movement since then and reports regular intake. Denies shortness of breath, chest pain, abdominal pain, nausea, vomiting, numbness, tingling.    Past Medical History    I have reviewed this patient's medical history and updated it with pertinent information if needed.   Past Medical History:   Diagnosis Date     Benign essential hypertension 2018     Coronary artery disease      Nonsenile cataract      Recent retinal detachment, total or subtotal 2014     Type 2 diabetes mellitus without complications (H)        Past Surgical History   I have reviewed this patient's surgical history and updated it with pertinent information if needed.  Past Surgical History:   Procedure Laterality Date     ARTHROPLASTY REVISION HIP Left 10/22/2018    Procedure: LEFT HIP HEMIARTHROPLASTY CONVERTED TO LEFT TOTAL HIP ARTHROPLASTY;  Surgeon: Marcos Winchester MD;  Location: [facility] OR     CARDIAC SURGERY       CATARACT IOL, RT/LT Bilateral      CLOSED REDUCTION HIP Left 10/14/2018    Procedure: CLOSED REDUCTION  HIP;  CLOSED REDUCTION HIP;  Surgeon: Milton Gusman MD;  Location: [facility] OR     lacrimal caruncle removed BE Bilateral ~1989     OPEN REDUCTION INTERNAL FIXATION HIP BIPOLAR Right 8/26/2018    Procedure: OPEN REDUCTION INTERNAL FIXATION HIP BIPOLAR;  Right Hip Bipolar Hemiarthroplasty (Biomet);  Surgeon: Bill Sanders MD;  Location: [facility] OR     OPEN REDUCTION INTERNAL FIXATION HIP BIPOLAR Left 10/4/2018    Procedure: OPEN REDUCTION INTERNAL FIXATION HIP BIPOLAR;  LEFT HIP TONYA ARTHROPLASTY;  Surgeon: Bruno Stewart MD;  Location: [facility] OR     REPAIR RUPTURED GLOBE  4/21/2014    Procedure: Exploration and Repair of Ruptured Globe ;  Surgeon: Mercedes Rivera MD;  Location: [facility] OR       Prior to Admission Medications   Prior to Admission Medications   Prescriptions Last Dose Informant Patient Reported? Taking?   aspirin 81 MG EC tablet   Yes No   Sig: Take 81 mg by mouth daily   finasteride (PROSCAR) 5 MG tablet   Yes Yes   Sig: Take 5 mg by mouth At Bedtime   fluconazole (DIFLUCAN) 150 MG tablet 1/16/2019 at Unknown time  Yes Yes   Sig: Take 150 mg by mouth daily    insulin aspart (NOVOLOG FLEXPEN) 100 UNIT/ML pen   Yes No   Sig: Inject 4-6 Units Subcutaneous 3 times daily (with meals)    insulin glargine (LANTUS SOLOSTAR PEN) 100 UNIT/ML pen   Yes Yes   Sig: Inject 8 Units Subcutaneous At Bedtime   tamsulosin (FLOMAX) 0.4 MG capsule   Yes Yes   Sig: Take 0.8 mg by mouth At Bedtime   zolpidem (AMBIEN) 5 MG tablet   Yes No   Sig: Take 5 mg by mouth At Bedtime 2 tabs x 5mg=10mg       Facility-Administered Medications: None     Allergies   No Known Allergies    Social History   I have reviewed this patient's social history and updated it with pertinent information if needed. Abimael Flores  reports that he has quit smoking. he has never used smokeless tobacco. He reports that he does not drink alcohol or use drugs.    Family History   I have reviewed this patient's family history and updated it with  pertinent information if needed.   Family History   Problem Relation Age of Onset     Diabetes Mother      Diabetes Father      Cancer No family hx of      Glaucoma No family hx of      Macular Degeneration No family hx of        Review of Systems   The 10 point Review of Systems is negative other than noted in the HPI    Physical Exam   Temp: 97.1  F (36.2  C) Temp src: Oral BP: 157/77 Pulse: 80 Heart Rate: 85 Resp: 14 SpO2: 98 % O2 Device: None (Room air) Oxygen Delivery: 2 LPM  Vital Signs with Ranges  Temp:  [97.1  F (36.2  C)-98.6  F (37  C)] 97.1  F (36.2  C)  Pulse:  [] 80  Heart Rate:  [82-89] 85  Resp:  [9-17] 14  BP: (133-167)/(68-87) 157/77  SpO2:  [98 %-100 %] 98 %  155 lbs 0 oz    Constitutional: Awake, alert, cooperative, no apparent distress.  Eyes: Conjunctiva and pupils examined and normal.  HEENT: Moist mucous membranes, normal dentition.  Respiratory: Clear to auscultation bilaterally, no crackles or wheezing.  Cardiovascular: Regular rate and rhythm, normal S1 and S2, and no murmur noted.  GI: Soft, non-distended, non-tender, normal bowel sounds..  Skin: No rashes, no cyanosis, no edema.  Musculoskeletal: initially left leg was shortened, internally rotated. Now both legs are the same length and no disturbance in rotation is noted. Patient able to move both extremities equally, limb strength is equal bilaterally.  Neurologic: Cranial nerves 2-12 intact, normal strength and sensation.  Psychiatric: Alert, oriented to person, place. No anxiety or depression.    Data   Data reviewed today:  I personally reviewed Xrays hips. Initially shows superiorly dislocated left hip without fracture, second xray shows reduced hip.  Recent Labs   Lab 01/16/19  2258   WBC 7.7   HGB 9.3*   MCV 89         POTASSIUM 4.9   CHLORIDE 107   CO2 20   BUN 38*   CR 2.40*   ANIONGAP 8   JOSELINE 8.1*   *       Recent Results (from the past 24 hour(s))   XR Pelvis w Hip Left 1 View    Narrative    XR  PELVIS AND HIP LEFT 1 VIEW   1/16/2019 11:13 PM     HISTORY: Check for dislocation.    COMPARISON: None.       Impression    IMPRESSION: Superior dislocation of the left hip arthroplasty. No  fracture.   XR Hip Port Left 1 View    Narrative    XR HIP PORTABLE LEFT 1 VIEW  1/17/2019 2:01 AM      HISTORY: Dislocation reduction, left hip.    COMPARISON: 1/16/2019.      Impression    IMPRESSION: The left hip arthroplasty has been successfully reduced.  No fracture.

## 2019-01-17 NOTE — PLAN OF CARE
Arrive to floor at 3am. AVSS on RA except HTN. CMS intact. Pain rated 1/10, declined intervention, rest and reposition encouraged. BG 95. Mod carb diet. IV infusing NS 75ml. PT/OT consult. Will continue to monitor.

## 2019-01-17 NOTE — ED NOTES
"[facility]  ED Nurse Handoff Report    ED Chief complaint: Hip left (left hip replacement one month ago, bent down to take off socks and felt left hip \"pop out\")      ED Diagnosis:   Final diagnoses:   None       Code Status: DNR / DNI    Allergies: No Known Allergies    Activity level - Baseline/Home:  Stand by assist    Activity Level - Current:   Stand by assist     Needed?: No    Isolation: No  Infection: Not Applicable  Bariatric?: No    Vital Signs:   Vitals:    01/16/19 2245 01/16/19 2350 01/17/19 0057   BP: 145/77 156/74    Resp: 16 16 17   Temp: 98.6  F (37  C)     TempSrc: Oral     SpO2: 98% 99% 100%   Weight: 70.3 kg (155 lb)     Height: 1.676 m (5' 6\")         Cardiac Rhythm: ,        Pain level: 0-10 Pain Scale: 8    Is this patient confused?: No   Does this patient have a guardian?  No         If yes, is there guardianship documents in the Epic \"Code/ACP\" activity?  N/A         Guardian Notified?  N/A  [place] - Suicide Severity Rating Scale Completed?  Yes  If yes, what color did the patient score?  White    Patient Report: Initial Complaint: left hip pain  Focused Assessment: Abimael Flores is a 89 year old male who presents with hip pain via EMS. The patient reports that tonight he bent over more than 90 degrees to take his socks off when he felt his left hip pop out, of which he has had a hip replacement and it has popped out twice in the past 2 weeks already. He denies fever, chills, cold symptoms, falls, or head injures. He has had both hips replaced but is unsure of the surgeon. The left hip has dislocated several times.       Tests Performed: xrays, labs  Abnormal Results: see epic  Treatments provided: pain meds, IV fluids    Family Comments: son is at bedside    OBS brochure/video discussed/provided to patient/family: Yes              Name of person given brochure if not patient: n/a              Relationship to patient: n/a    ED Medications:   Medications "   0.9% sodium chloride BOLUS (1,000 mLs Intravenous New Bag 1/17/19 0056)   fentaNYL (PF) (SUBLIMAZE) injection 50 mcg (50 mcg Intravenous Given 1/16/19 9507)   fentaNYL (PF) (SUBLIMAZE) injection 50 mcg (50 mcg Intravenous Given 1/16/19 9030)       Drips infusing?:  No    For the majority of the shift this patient was Green.   Interventions performed were n/a.    Severe Sepsis OR Septic Shock Diagnosis Present: No    To be done/followed up on inpatient unit:  none    ED NURSE PHONE NUMBER: *[phone]

## 2019-01-17 NOTE — PROGRESS NOTES
Observation goals PRIOR TO DISCHARGE     Comments:   -diagnostic tests and consults completed and resulted: not met   -vital signs normal or at patient baseline: not met  -tolerating oral intake to maintain hydration: not met     Nurse to notify provider when observation goals have been met and patient is ready for discharge.

## 2019-01-17 NOTE — ED NOTES
Bed: ED01  Expected date: 1/17/19  Expected time: 12:51 AM  Means of arrival:   Comments:  IN STAB 3     Penny Davidson, RN  01/17/19 0157

## 2019-01-17 NOTE — ED TRIAGE NOTES
"Pt had left hip replacement one month ago  - was bending over to take off sock and felt L hip \"pop out\". Pt states this is the third time this has happened    "

## 2019-01-29 ENCOUNTER — APPOINTMENT (OUTPATIENT)
Dept: GENERAL RADIOLOGY | Facility: CLINIC | Age: 84
End: 2019-01-29
Payer: MEDICARE

## 2019-01-29 ENCOUNTER — HOSPITAL ENCOUNTER (EMERGENCY)
Facility: CLINIC | Age: 84
Discharge: HOME | End: 2019-01-29
Attending: EMERGENCY MEDICINE | Admitting: EMERGENCY MEDICINE
Payer: MEDICARE

## 2019-01-29 VITALS
RESPIRATION RATE: 14 BRPM | OXYGEN SATURATION: 100 % | HEIGHT: 69 IN | TEMPERATURE: 98 F | DIASTOLIC BLOOD PRESSURE: 78 MMHG | BODY MASS INDEX: 18.51 KG/M2 | HEART RATE: 73 BPM | WEIGHT: 125 LBS | SYSTOLIC BLOOD PRESSURE: 134 MMHG

## 2019-01-29 DIAGNOSIS — S73.005A DISLOCATION OF LEFT HIP, INITIAL ENCOUNTER (H): ICD-10-CM

## 2019-01-29 PROCEDURE — 27265 TREAT HIP DISLOCATION: CPT | Mod: LT

## 2019-01-29 PROCEDURE — 99285 EMERGENCY DEPT VISIT HI MDM: CPT | Mod: 25

## 2019-01-29 PROCEDURE — 40000986 XR HIP PORT LT 1 VW

## 2019-01-29 PROCEDURE — 99152 MOD SED SAME PHYS/QHP 5/>YRS: CPT

## 2019-01-29 PROCEDURE — 25000128 H RX IP 250 OP 636

## 2019-01-29 PROCEDURE — 73502 X-RAY EXAM HIP UNI 2-3 VIEWS: CPT

## 2019-01-29 RX ORDER — PROPOFOL 10 MG/ML
40 INJECTION, EMULSION INTRAVENOUS ONCE
Status: COMPLETED | OUTPATIENT
Start: 2019-01-29 | End: 2019-01-29

## 2019-01-29 RX ORDER — PROPOFOL 10 MG/ML
INJECTION, EMULSION INTRAVENOUS
Status: COMPLETED
Start: 2019-01-29 | End: 2019-01-29

## 2019-01-29 RX ADMIN — PROPOFOL 40 MG: 10 INJECTION, EMULSION INTRAVENOUS at 02:05

## 2019-01-29 ASSESSMENT — ENCOUNTER SYMPTOMS
NUMBNESS: 0
ARTHRALGIAS: 1
WEAKNESS: 0

## 2019-01-29 ASSESSMENT — MIFFLIN-ST. JEOR: SCORE: 1184.44

## 2019-01-29 NOTE — ED AVS SNAPSHOT
Emergency Department  [address] [place] [address]  Phone:  [phone]  Fax:  [phone]                                    Abimael Flores   MRN: [id number]    Department:   Emergency Department   Date of Visit:  1/29/2019           After Visit Summary Signature Page    I have received my discharge instructions, and my questions have been answered. I have discussed any challenges I see with this plan with the nurse or doctor.    ..........................................................................................................................................  Patient/Patient Representative Signature      ..........................................................................................................................................  Patient Representative Print Name and Relationship to Patient    ..................................................               ................................................  Date                                   Time    ..........................................................................................................................................  Reviewed by Signature/Title    ...................................................              ..............................................  Date                                               Time          22EPIC Rev 08/18

## 2019-01-29 NOTE — ED PROVIDER NOTES
"  History     Chief Complaint:  Hip Pain    HPI   Abimael Flores is a 89 year old male with a history of hip dislocation who presents to the emergency department for evaluation of hip pain. The patient reports he was bending over around 2245 when he felt and heard a pop from his left hip, and he has since experienced left hip pain. He states he has dislocated his hip in the past, and he indicates this feels similar. Of note, the patient's son is a family practice physician, and he tried at home to reduce the dislocation without any success. The patient states his pain is currently around 2/10.    Allergies:  NKDA     Medications:    Insulin  Basaglar  Aspirin  Ambien     Past Medical History:    HTN  CAD  Cataract  Retinal detachment  DM2  CKD stage 3    Past Surgical History:    Arthroplasty revision hip  Cardiac surgery  Cataract IOL bilateral   Closed reduction hip  Lacrimal caruncle removed BE  ORIF hip bipolar  Repair ruptured globe    Family History:    DM    Social History:  Presents with son.  Former smoker.  Negative for alcohol use.  Marital Status:   [5]     Review of Systems   Musculoskeletal: Positive for arthralgias.   Neurological: Negative for weakness and numbness.   All other systems reviewed and are negative.      Physical Exam     Patient Vitals for the past 24 hrs:   BP Temp Temp src Pulse Heart Rate Resp SpO2 Height Weight   01/29/19 0157 -- -- -- -- 79 14 100 % -- --   01/29/19 0156 -- -- -- -- 71 14 100 % -- --   01/29/19 0155 134/78 -- -- -- 75 13 100 % -- --   01/29/19 0150 151/77 -- -- 73 81 13 100 % -- --   01/29/19 0145 147/74 -- -- 75 80 14 100 % -- --   01/29/19 0032 148/76 98  F (36.7  C) Oral 74 74 18 98 % 1.74 m (5' 8.5\") 56.7 kg (125 lb)     Physical Exam  General: Appears well-developed and well-nourished.   Head: No signs of trauma.   CV: Normal rate and regular rhythm.    Resp: Effort normal and breath sounds normal. No respiratory distress.   GI: Soft. There is no " tenderness.  No rebound or guarding.  Normal bowel sounds.    MSK: +tenderness at left hip with shortening. no edema. No Calf tenderness.  +neurovascularly intact distally.  Neuro: The patient is alert and oriented.  Strength in upper/lower extremities normal and symmetrical.   Sensation normal. Speech normal.  GCS 15  Skin: Skin is warm and dry. No rash noted.   Psych: normal mood and affect. behavior is normal.       Emergency Department Course     Imaging:  Radiographic findings were communicated with the patient and family who voiced understanding of the findings.    XR Pelvis and Hip Left 1 View:  1. Superior and lateral dislocation of the femoral head component of a  left hip arthroplasty.  2. No visualized acute fracture of the pelvis or left hip.  3. A right hip arthroplasty is in place. As per radiology.    Procedures:    [facility] Procedure Note        Sedation:      Performed by: Dale Coles MD  Authorized by: Dale Coles MD    Pre-Procedure Assessment done at 0130.    Expected Level:  Procedural Sedation    Indication:  Sedation is required to allow for joint reduction    Consent obtained from patient after discussing the risks, benefits and alternatives.    PO Intake:  Appropriately NPO for procedure    ASA Class:  Class 2 - MILD SYSTEMIC DISEASE, NO ACUTE PROBLEMS, NO FUNCTIONAL LIMITATIONS.    Mallampati:  Grade 1:  Soft palate, uvula, tonsillar pillars, and posterior pharyngeal wall visible    Lungs: Lungs Clear with good breath sounds bilaterally.     Heart: Normal heart sounds and rate    History and physical reviewed and no updates needed. I have reviewed the lab findings, diagnostic data, medications, and the plan for sedation. I have determined this patient to be an appropriate candidate for the planned sedation and procedure and have reassessed the patient IMMEDIATELY PRIOR to sedation and procedure.      Sedation Post Procedure Summary:    Prior to the start of the  procedure and with procedural staff participation, I verbally confirmed the patient s identity using two indicators, relevant allergies, that the procedure was appropriate and matched the consent or emergent situation, and that the correct equipment/implants were available. Immediately prior to starting the procedure I conducted the Time Out with the procedural staff and re-confirmed the patient s name, procedure, and site/side. (The Joint Commission universal protocol was followed.)  Yes      Sedatives: Propofol    Vital signs, airway, End Tidal CO2 and pulse oximetry were monitored and remained stable throughout the procedure and sedation was maintained until the procedure was complete.  The patient was monitored by staff until sedation discharge criteria were met.    Patient tolerance: Patient tolerated the procedure well with no immediate complications.    Time of sedation in minutes:  15 minutes from beginning to end of physician one to one monitoring.      Procedure:  Left Hip Reduction    Indication: Left hip dislocation as seen on x-ray.    Consent: Risks, benefits, and alternatives were discussed with the patient and consent for procedure was obtained.      Timeout: Universal protocol was followed. TIME OUT conducted just prior to starting procedure confirmed patient identity, site/side, procedure, patient position, and availability of correct equipment and implants.      Medication: The patient was sedated as noted in above procedure note.    Technique: The affected hip was placed into 90 degrees flexion, using the Captain Jim reduction technique (my knee under the patient's flexed knee, with counter traction effected by ERT's hands on the affected side ASIS), inline traction was placed gently until a palpable pop was noted and clinical reduction apparent.  Leg was demonstrated to have equal length and lie to the contralateral side.  Sensation, motor function, and circulation are intact after reduction,  and a post-procedural X-ray shows successful reduction of the left hip. There were no complications. The patient tolerated the procedure well.    Emergency Department Course:  Nursing notes and vitals reviewed. 0047 I performed an exam of the patient as documented above.     IV inserted. Medicine administered as documented above. Blood drawn. This was sent to the lab for further testing, results above.    The patient was sent for a  while in the emergency department, findings above.     0145 I performed a sedation and reduction, details above. I discussed the results of his workup thus far.     Findings and plan explained to the Patient and son. Patient discharged home with instructions regarding supportive care, medications, and reasons to return. The importance of close follow-up was reviewed.     Impression & Plan      Medical Decision Making:  Abimael Flores is a 89-year-old gentleman who presents due to pain to the left hip.  He has had multiple dislocations recently, and he was bending over tonight and felt the hip pop out similar to prior dislocations.  On my evaluation, his exam was consistent with a dislocation, and this was confirmed on x-ray.  I discussed risks and benefits of sedation with the patient and his son, and he did consent to it, as he is tolerated this quite well in the past.  Sedation was performed, and the hip was very easily reduced.  He was neurologically intact following.  Patient and son preferred to take the patient home.  Patient apparently has been hesitant to discuss follow-up surgery with orthopedic surgeon, as he does not desire to go back to a TCU, although it seems that his son is at least having the patient speak with the surgeon.  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  Patient was discharged home with regards to return for any further concerns and instructions to limit significant bending.    Diagnosis:    ICD-10-CM   1.  Dislocation of left hip, initial encounter (H) S73.005A       Disposition:  discharged to home    I, Jose L Gerard, am serving as a scribe on 1/29/2019 at 12:49 AM to personally document services performed by Dale Coles MD based on my observations and the provider's statements to me.     Jose L Gerard  1/29/2019    EMERGENCY DEPARTMENT       Dale Coles MD  01/29/19 0641

## 2019-01-29 NOTE — ED NOTES
Bed: ED06  Expected date:   Expected time:   Means of arrival:   Comments:  690-5M left hip Dislocation

## 2019-09-28 ENCOUNTER — HEALTH MAINTENANCE LETTER (OUTPATIENT)
Age: 84
End: 2019-09-28

## 2019-11-17 ENCOUNTER — HOSPITAL ENCOUNTER (INPATIENT)
Facility: CLINIC | Age: 84
LOS: 2 days | Discharge: HOME HEALTH | DRG: 282 | End: 2019-11-19
Attending: EMERGENCY MEDICINE | Admitting: STUDENT IN AN ORGANIZED HEALTH CARE EDUCATION/TRAINING PROGRAM
Payer: MEDICARE

## 2019-11-17 ENCOUNTER — APPOINTMENT (OUTPATIENT)
Dept: GENERAL RADIOLOGY | Facility: CLINIC | Age: 84
DRG: 282 | End: 2019-11-17
Attending: EMERGENCY MEDICINE
Payer: MEDICARE

## 2019-11-17 DIAGNOSIS — I24.9 ACUTE CORONARY SYNDROME (H): ICD-10-CM

## 2019-11-17 DIAGNOSIS — I21.4 NSTEMI (NON-ST ELEVATED MYOCARDIAL INFARCTION) (H): Primary | ICD-10-CM

## 2019-11-17 PROBLEM — M24.459: Status: ACTIVE | Noted: 2018-12-04

## 2019-11-17 PROBLEM — I47.10 SUPRAVENTRICULAR TACHYCARDIA (H): Status: ACTIVE | Noted: 2019-02-09

## 2019-11-17 PROBLEM — Z79.4 LONG TERM CURRENT USE OF INSULIN (H): Status: ACTIVE | Noted: 2018-11-30

## 2019-11-17 PROBLEM — N17.9 ACUTE KIDNEY INJURY (H): Status: RESOLVED | Noted: 2019-01-17 | Resolved: 2019-11-17

## 2019-11-17 PROBLEM — I13.10 HYPERTENSIVE HEART AND KIDNEY DISEASE: Status: ACTIVE | Noted: 2018-12-04

## 2019-11-17 LAB
ALBUMIN SERPL-MCNC: 2.5 G/DL (ref 3.4–5)
ALP SERPL-CCNC: 70 U/L (ref 40–150)
ALT SERPL W P-5'-P-CCNC: 12 U/L (ref 0–70)
ANION GAP SERPL CALCULATED.3IONS-SCNC: 3 MMOL/L (ref 3–14)
AST SERPL W P-5'-P-CCNC: 17 U/L (ref 0–45)
BASOPHILS # BLD AUTO: 0 10E9/L (ref 0–0.2)
BASOPHILS NFR BLD AUTO: 0.1 %
BILIRUB SERPL-MCNC: 0.3 MG/DL (ref 0.2–1.3)
BUN SERPL-MCNC: 37 MG/DL (ref 7–30)
CALCIUM SERPL-MCNC: 7.8 MG/DL (ref 8.5–10.1)
CHLORIDE SERPL-SCNC: 110 MMOL/L (ref 94–109)
CO2 SERPL-SCNC: 24 MMOL/L (ref 20–32)
CREAT SERPL-MCNC: 1.64 MG/DL (ref 0.66–1.25)
DIFFERENTIAL METHOD BLD: ABNORMAL
EOSINOPHIL # BLD AUTO: 0.1 10E9/L (ref 0–0.7)
EOSINOPHIL NFR BLD AUTO: 0.7 %
ERYTHROCYTE [DISTWIDTH] IN BLOOD BY AUTOMATED COUNT: 15.4 % (ref 10–15)
GFR SERPL CREATININE-BSD FRML MDRD: 35 ML/MIN/{1.73_M2}
GLUCOSE BLDC GLUCOMTR-MCNC: 240 MG/DL (ref 70–99)
GLUCOSE SERPL-MCNC: 226 MG/DL (ref 70–99)
HCT VFR BLD AUTO: 31.7 % (ref 40–53)
HGB BLD-MCNC: 10 G/DL (ref 13.3–17.7)
IMM GRANULOCYTES # BLD: 0 10E9/L (ref 0–0.4)
IMM GRANULOCYTES NFR BLD: 0.2 %
LMWH PPP CHRO-ACNC: 0.12 IU/ML
LYMPHOCYTES # BLD AUTO: 1.4 10E9/L (ref 0.8–5.3)
LYMPHOCYTES NFR BLD AUTO: 13.1 %
MCH RBC QN AUTO: 28.6 PG (ref 26.5–33)
MCHC RBC AUTO-ENTMCNC: 31.5 G/DL (ref 31.5–36.5)
MCV RBC AUTO: 91 FL (ref 78–100)
MONOCYTES # BLD AUTO: 1.3 10E9/L (ref 0–1.3)
MONOCYTES NFR BLD AUTO: 11.7 %
NEUTROPHILS # BLD AUTO: 8 10E9/L (ref 1.6–8.3)
NEUTROPHILS NFR BLD AUTO: 74.2 %
NRBC # BLD AUTO: 0 10*3/UL
NRBC BLD AUTO-RTO: 0 /100
PLATELET # BLD AUTO: 243 10E9/L (ref 150–450)
POTASSIUM SERPL-SCNC: 4.5 MMOL/L (ref 3.4–5.3)
PROT SERPL-MCNC: 7.1 G/DL (ref 6.8–8.8)
RBC # BLD AUTO: 3.5 10E12/L (ref 4.4–5.9)
SODIUM SERPL-SCNC: 137 MMOL/L (ref 133–144)
TROPONIN I BLD-MCNC: 0.08 UG/L (ref 0–0.08)
TROPONIN I SERPL-MCNC: 0.1 UG/L (ref 0–0.04)
TROPONIN I SERPL-MCNC: 14.88 UG/L (ref 0–0.04)
WBC # BLD AUTO: 10.8 10E9/L (ref 4–11)

## 2019-11-17 PROCEDURE — 99223 1ST HOSP IP/OBS HIGH 75: CPT | Mod: AI | Performed by: STUDENT IN AN ORGANIZED HEALTH CARE EDUCATION/TRAINING PROGRAM

## 2019-11-17 PROCEDURE — 96366 THER/PROPH/DIAG IV INF ADDON: CPT

## 2019-11-17 PROCEDURE — 36415 COLL VENOUS BLD VENIPUNCTURE: CPT | Performed by: STUDENT IN AN ORGANIZED HEALTH CARE EDUCATION/TRAINING PROGRAM

## 2019-11-17 PROCEDURE — 25000128 H RX IP 250 OP 636

## 2019-11-17 PROCEDURE — 85025 COMPLETE CBC W/AUTO DIFF WBC: CPT | Performed by: EMERGENCY MEDICINE

## 2019-11-17 PROCEDURE — 96365 THER/PROPH/DIAG IV INF INIT: CPT

## 2019-11-17 PROCEDURE — 99285 EMERGENCY DEPT VISIT HI MDM: CPT | Mod: 25

## 2019-11-17 PROCEDURE — 84484 ASSAY OF TROPONIN QUANT: CPT | Performed by: EMERGENCY MEDICINE

## 2019-11-17 PROCEDURE — 25800030 ZZH RX IP 258 OP 636: Performed by: EMERGENCY MEDICINE

## 2019-11-17 PROCEDURE — 25000132 ZZH RX MED GY IP 250 OP 250 PS 637: Mod: GY | Performed by: STUDENT IN AN ORGANIZED HEALTH CARE EDUCATION/TRAINING PROGRAM

## 2019-11-17 PROCEDURE — 25000131 ZZH RX MED GY IP 250 OP 636 PS 637: Mod: GY | Performed by: STUDENT IN AN ORGANIZED HEALTH CARE EDUCATION/TRAINING PROGRAM

## 2019-11-17 PROCEDURE — 68300005 ZZH TRAUMA EVALUATION W/O CC LEVEL III

## 2019-11-17 PROCEDURE — 21000001 ZZH R&B HEART CARE

## 2019-11-17 PROCEDURE — 00000146 ZZHCL STATISTIC GLUCOSE BY METER IP

## 2019-11-17 PROCEDURE — 96376 TX/PRO/DX INJ SAME DRUG ADON: CPT

## 2019-11-17 PROCEDURE — 71045 X-RAY EXAM CHEST 1 VIEW: CPT

## 2019-11-17 PROCEDURE — 85520 HEPARIN ASSAY: CPT | Performed by: STUDENT IN AN ORGANIZED HEALTH CARE EDUCATION/TRAINING PROGRAM

## 2019-11-17 PROCEDURE — 80053 COMPREHEN METABOLIC PANEL: CPT | Performed by: EMERGENCY MEDICINE

## 2019-11-17 PROCEDURE — 84484 ASSAY OF TROPONIN QUANT: CPT

## 2019-11-17 PROCEDURE — 84484 ASSAY OF TROPONIN QUANT: CPT | Performed by: STUDENT IN AN ORGANIZED HEALTH CARE EDUCATION/TRAINING PROGRAM

## 2019-11-17 PROCEDURE — 25000132 ZZH RX MED GY IP 250 OP 250 PS 637: Mod: GY | Performed by: EMERGENCY MEDICINE

## 2019-11-17 PROCEDURE — 96375 TX/PRO/DX INJ NEW DRUG ADDON: CPT

## 2019-11-17 PROCEDURE — 25000128 H RX IP 250 OP 636: Performed by: EMERGENCY MEDICINE

## 2019-11-17 PROCEDURE — 93005 ELECTROCARDIOGRAM TRACING: CPT

## 2019-11-17 RX ORDER — HEPARIN SODIUM 10000 [USP'U]/100ML
750 INJECTION, SOLUTION INTRAVENOUS CONTINUOUS
Status: DISCONTINUED | OUTPATIENT
Start: 2019-11-17 | End: 2019-11-19

## 2019-11-17 RX ORDER — METOPROLOL SUCCINATE 50 MG/1
50 TABLET, EXTENDED RELEASE ORAL DAILY
COMMUNITY

## 2019-11-17 RX ORDER — ASPIRIN 81 MG/1
81 TABLET, CHEWABLE ORAL DAILY
Status: DISCONTINUED | OUTPATIENT
Start: 2019-11-18 | End: 2019-11-19 | Stop reason: HOSPADM

## 2019-11-17 RX ORDER — PANTOPRAZOLE SODIUM 40 MG/1
40 TABLET, DELAYED RELEASE ORAL
Status: DISCONTINUED | OUTPATIENT
Start: 2019-11-18 | End: 2019-11-19 | Stop reason: HOSPADM

## 2019-11-17 RX ORDER — DEXTROSE MONOHYDRATE 25 G/50ML
25-50 INJECTION, SOLUTION INTRAVENOUS
Status: DISCONTINUED | OUTPATIENT
Start: 2019-11-17 | End: 2019-11-19 | Stop reason: HOSPADM

## 2019-11-17 RX ORDER — ONDANSETRON 2 MG/ML
4 INJECTION INTRAMUSCULAR; INTRAVENOUS EVERY 6 HOURS PRN
Status: DISCONTINUED | OUTPATIENT
Start: 2019-11-17 | End: 2019-11-19 | Stop reason: HOSPADM

## 2019-11-17 RX ORDER — ACETAMINOPHEN 325 MG/1
650 TABLET ORAL EVERY 4 HOURS PRN
Status: DISCONTINUED | OUTPATIENT
Start: 2019-11-17 | End: 2019-11-19 | Stop reason: HOSPADM

## 2019-11-17 RX ORDER — MIRABEGRON 25 MG/1
25 TABLET, FILM COATED, EXTENDED RELEASE ORAL DAILY
Status: ON HOLD | COMMUNITY
End: 2020-05-17

## 2019-11-17 RX ORDER — NICOTINE POLACRILEX 4 MG
15-30 LOZENGE BUCCAL
Status: DISCONTINUED | OUTPATIENT
Start: 2019-11-17 | End: 2019-11-19 | Stop reason: HOSPADM

## 2019-11-17 RX ORDER — PROCHLORPERAZINE 25 MG
12.5 SUPPOSITORY, RECTAL RECTAL EVERY 12 HOURS PRN
Status: DISCONTINUED | OUTPATIENT
Start: 2019-11-17 | End: 2019-11-19 | Stop reason: HOSPADM

## 2019-11-17 RX ORDER — NALOXONE HYDROCHLORIDE 0.4 MG/ML
.1-.4 INJECTION, SOLUTION INTRAMUSCULAR; INTRAVENOUS; SUBCUTANEOUS
Status: DISCONTINUED | OUTPATIENT
Start: 2019-11-17 | End: 2019-11-19 | Stop reason: HOSPADM

## 2019-11-17 RX ORDER — TAMSULOSIN HYDROCHLORIDE 0.4 MG/1
0.8 CAPSULE ORAL AT BEDTIME
COMMUNITY

## 2019-11-17 RX ORDER — SODIUM CHLORIDE, SODIUM LACTATE, POTASSIUM CHLORIDE, CALCIUM CHLORIDE 600; 310; 30; 20 MG/100ML; MG/100ML; MG/100ML; MG/100ML
INJECTION, SOLUTION INTRAVENOUS CONTINUOUS
Status: ACTIVE | OUTPATIENT
Start: 2019-11-17 | End: 2019-11-18

## 2019-11-17 RX ORDER — MECLIZINE HYDROCHLORIDE 25 MG/1
25 TABLET ORAL 2 TIMES DAILY PRN
Status: DISCONTINUED | OUTPATIENT
Start: 2019-11-17 | End: 2019-11-19 | Stop reason: HOSPADM

## 2019-11-17 RX ORDER — TAMSULOSIN HYDROCHLORIDE 0.4 MG/1
0.8 CAPSULE ORAL AT BEDTIME
Status: DISCONTINUED | OUTPATIENT
Start: 2019-11-17 | End: 2019-11-19 | Stop reason: HOSPADM

## 2019-11-17 RX ORDER — LIDOCAINE 40 MG/G
CREAM TOPICAL
Status: DISCONTINUED | OUTPATIENT
Start: 2019-11-17 | End: 2019-11-19 | Stop reason: HOSPADM

## 2019-11-17 RX ORDER — ONDANSETRON 4 MG/1
4 TABLET, ORALLY DISINTEGRATING ORAL EVERY 6 HOURS PRN
Status: DISCONTINUED | OUTPATIENT
Start: 2019-11-17 | End: 2019-11-19 | Stop reason: HOSPADM

## 2019-11-17 RX ORDER — ONDANSETRON 2 MG/ML
4 INJECTION INTRAMUSCULAR; INTRAVENOUS EVERY 30 MIN PRN
Status: DISCONTINUED | OUTPATIENT
Start: 2019-11-17 | End: 2019-11-17

## 2019-11-17 RX ORDER — ONDANSETRON 2 MG/ML
INJECTION INTRAMUSCULAR; INTRAVENOUS
Status: COMPLETED
Start: 2019-11-17 | End: 2019-11-17

## 2019-11-17 RX ORDER — FINASTERIDE 5 MG/1
5 TABLET, FILM COATED ORAL DAILY
Status: ON HOLD | COMMUNITY
End: 2020-05-17

## 2019-11-17 RX ORDER — PROCHLORPERAZINE MALEATE 5 MG
5 TABLET ORAL EVERY 6 HOURS PRN
Status: DISCONTINUED | OUTPATIENT
Start: 2019-11-17 | End: 2019-11-19 | Stop reason: HOSPADM

## 2019-11-17 RX ORDER — MECLIZINE HYDROCHLORIDE 25 MG/1
25 TABLET ORAL 2 TIMES DAILY PRN
COMMUNITY

## 2019-11-17 RX ORDER — ACETAMINOPHEN 325 MG/1
650 TABLET ORAL EVERY 4 HOURS PRN
Status: ON HOLD | COMMUNITY
End: 2020-05-17

## 2019-11-17 RX ORDER — METOPROLOL SUCCINATE 50 MG/1
50 TABLET, EXTENDED RELEASE ORAL DAILY
Status: DISCONTINUED | OUTPATIENT
Start: 2019-11-17 | End: 2019-11-19 | Stop reason: HOSPADM

## 2019-11-17 RX ADMIN — Medication 3700 UNITS: at 15:33

## 2019-11-17 RX ADMIN — Medication 1800 UNITS: at 22:32

## 2019-11-17 RX ADMIN — ONDANSETRON 4 MG: 2 INJECTION INTRAMUSCULAR; INTRAVENOUS at 16:39

## 2019-11-17 RX ADMIN — INSULIN GLARGINE 8 UNITS: 100 INJECTION, SOLUTION SUBCUTANEOUS at 21:12

## 2019-11-17 RX ADMIN — SODIUM CHLORIDE 500 ML: 9 INJECTION, SOLUTION INTRAVENOUS at 16:39

## 2019-11-17 RX ADMIN — TAMSULOSIN HYDROCHLORIDE 0.8 MG: 0.4 CAPSULE ORAL at 20:18

## 2019-11-17 RX ADMIN — METOPROLOL SUCCINATE 50 MG: 50 TABLET, EXTENDED RELEASE ORAL at 18:52

## 2019-11-17 RX ADMIN — HEPARIN SODIUM 750 UNITS/HR: 10000 INJECTION, SOLUTION INTRAVENOUS at 15:33

## 2019-11-17 RX ADMIN — TICAGRELOR 180 MG: 90 TABLET ORAL at 15:20

## 2019-11-17 ASSESSMENT — ACTIVITIES OF DAILY LIVING (ADL)
DRESS: 0-->INDEPENDENT
RETIRED_COMMUNICATION: 0-->UNDERSTANDS/COMMUNICATES WITHOUT DIFFICULTY
NUMBER_OF_TIMES_PATIENT_HAS_FALLEN_WITHIN_LAST_SIX_MONTHS: 2
COGNITION: 0 - NO COGNITION ISSUES REPORTED
SWALLOWING: 0-->SWALLOWS FOODS/LIQUIDS WITHOUT DIFFICULTY
AMBULATION: 1-->ASSISTIVE EQUIPMENT
BATHING: 0-->INDEPENDENT
RETIRED_EATING: 0-->INDEPENDENT
ADLS_ACUITY_SCORE: 19
TOILETING: 1-->ASSISTIVE EQUIPMENT
TRANSFERRING: 1-->ASSISTIVE EQUIPMENT
FALL_HISTORY_WITHIN_LAST_SIX_MONTHS: YES

## 2019-11-17 ASSESSMENT — ENCOUNTER SYMPTOMS
VOMITING: 0
LIGHT-HEADEDNESS: 0
PALPITATIONS: 0
NAUSEA: 0

## 2019-11-17 ASSESSMENT — MIFFLIN-ST. JEOR
SCORE: 1376.08
SCORE: 1237.74

## 2019-11-17 NOTE — H&P
[facility]    History and Physical - Hospitalist Service       Date of Admission:  11/17/2019    Assessment & Plan   Abimael Flores is a 89 year old male admitted on 11/17/2019. He presents with CP.     NSTEMI  Assessment: Patient presents with onset of left-sided chest pain.  Initial work-up on admission is pertinent for a troponin of 0.101.  His EKG did show possible ST elevation seen in the inferior leads.  Code STEMI was activated and initial plan was to bring the patient to the Cath Lab, however patient and his son who is a physician opted to forego cardiac catheterization this time.  Cardiology has opted to start the patient on Brilinta and heparin infusion.  At this time patient family is hoping for medical management of what appears to be an NSTEMI.  Plan:   - admit to inpatient  - Trend troponin  - Cardiology consult  - Heparin gtt  - ECHO  - Check Lipid panel, A1c  - Telemetry  - ASA daily  - Continue PTA BB      CKD (chronic kidney disease) stage 3, GFR 30-59 ml/min (H)    Assessment: Creatinine baseline around 1.5-1.9    Plan:   -Avoid NSAIDs/nephrotoxins  -follow creatinine/electrolytes      Benign essential hypertension    Assessment: PTA patient is on metoprolol 50 mg daily    Plan:   -Continue beta-blocker once verified      Type 2 diabetes mellitus with stage 3 chronic kidney disease, with long-term current use of insulin (H)    Assessment: PTA on 8 units of Lantus at bedtime    Plan:   -Continue Lantus at 8 units at bedtime  -Sliding scale insulin as needed  -Hypoglycemia protocol      Hyperlipidemia    Assessment/Plan: Not on statin, check lipid panel         Diet: Cardiac- Mod CHO diet  DVT Prophylaxis: Heparin  Rosales Catheter: not present  Code Status: DNR/DNI    Disposition Plan   Expected discharge: 2 - 3 days, recommended to prior living arrangement once Cardiology work up completed.  Entered: Silvano Kumar MD 11/17/2019, 3:53 PM     The patient's care was discussed with the  Patient, Patient's Family and ED Provide.    Silvano Kumar MD  [facility]    ______________________________________________________________________    Chief Complaint     Chest Pain    History is obtained from the patient    History of Present Illness   Abimael Flores is a 89 year old male with PMH of coronary artery disease, type 2 diabetes mellitus, hypertension, hyperlipidemia who presents for evaluation of his pain.    Patient reports that he was in his usual state of health until this morning when he woke up he felt left-sided chest discomfort with no radiation.  He began to ambulate and noticed that his chest pain continued to persist.  He reports associated diaphoresis, and seeing halos.  Otherwise he denied any presyncope or syncopal symptoms.  He did sustain a fall that he reports was from weakness, there was no loss of consciousness, there was no head trauma.  He denies any slurred speech, no headaches, no numbness or tingling in his extremities.  He denies any nausea/vomiting or abdominal pain.  He denies any recent blood in stool, weight loss or night sweats.  He reports a mild intermittent productive cough, but otherwise no fevers or chills.  He denies any PND/orthopnea.  He reports having very mild lower extremity edema that is at baseline. No recent sick contacts that he is aware of.  He denies any urinary complaints.  He otherwise has no other complaints time he is currently chest pain-free.  Reports that he has not had any cardiac issues since his bypass surgery 35 years ago.  He reports that he is no longer on a statin.    Review of Systems    The 10 point Review of Systems is negative other than noted in the HPI or here.     Past Medical History    I have reviewed this patient's medical history and updated it with pertinent information if needed.   Past Medical History:   Diagnosis Date     Benign essential hypertension 9/4/2018     Coronary artery disease      Nonsenile cataract       Recent retinal detachment, total or subtotal 8/5/2014     Type 2 diabetes mellitus without complications (H)        Past Surgical History   I have reviewed this patient's surgical history and updated it with pertinent information if needed.  Past Surgical History:   Procedure Laterality Date     ARTHROPLASTY REVISION HIP Left 10/22/2018    Procedure: LEFT HIP HEMIARTHROPLASTY CONVERTED TO LEFT TOTAL HIP ARTHROPLASTY;  Surgeon: Marcos Winchester MD;  Location:  OR     CARDIAC SURGERY       CATARACT IOL, RT/LT Bilateral      CLOSED REDUCTION HIP Left 10/14/2018    Procedure: CLOSED REDUCTION HIP;  CLOSED REDUCTION HIP;  Surgeon: Milton Gusman MD;  Location:  OR     lacrimal caruncle removed BE Bilateral ~1989     OPEN REDUCTION INTERNAL FIXATION HIP BIPOLAR Right 8/26/2018    Procedure: OPEN REDUCTION INTERNAL FIXATION HIP BIPOLAR;  Right Hip Bipolar Hemiarthroplasty (Biomet);  Surgeon: Bill Sanders MD;  Location:  OR     OPEN REDUCTION INTERNAL FIXATION HIP BIPOLAR Left 10/4/2018    Procedure: OPEN REDUCTION INTERNAL FIXATION HIP BIPOLAR;  LEFT HIP TONYA ARTHROPLASTY;  Surgeon: Bruno Stewart MD;  Location:  OR     REPAIR RUPTURED GLOBE  4/21/2014    Procedure: Exploration and Repair of Ruptured Globe ;  Surgeon: Mercedes Rivera MD;  Location: [facility] OR       Social History   I have reviewed this patient's social history and updated it with pertinent information if needed.  Social History     Tobacco Use     Smoking status: Former Smoker     Smokeless tobacco: Never Used   Substance Use Topics     Alcohol use: No     Drug use: No       Family History   I have reviewed this patient's family history and updated it with pertinent information if needed.   Family History   Problem Relation Age of Onset     Diabetes Mother      Diabetes Father      Cancer No family hx of      Glaucoma No family hx of      Macular Degeneration No family hx of      Prior to Admission Medications   Prior to  Admission Medications   Prescriptions Last Dose Informant Patient Reported? Taking?   acetaminophen (TYLENOL) 325 MG tablet  at PRN Son Yes Yes   Sig: Take 650 mg by mouth every 4 hours as needed   finasteride (PROSCAR) 5 MG tablet 11/16/2019 at Unknown time Son Yes Yes   Sig: Take 5 mg by mouth daily   insulin aspart (NOVOLOG FLEXPEN) 100 UNIT/ML pen 11/16/2019 at Unknown time Son Yes Yes   Sig: Inject 8 Units Subcutaneous 3 times daily (with meals)    insulin glargine (LANTUS SOLOSTAR PEN) 100 UNIT/ML pen 11/16/2019 at Unknown time Son Yes Yes   Sig: Inject 8 Units Subcutaneous At Bedtime   meclizine (ANTIVERT) 25 MG tablet  at PRN Son Yes Yes   Sig: Take 25 mg by mouth 2 times daily as needed for dizziness   metoprolol succinate ER (TOPROL-XL) 50 MG 24 hr tablet 11/16/2019 at Unknown time Son Yes Yes   Sig: Take 50 mg by mouth daily   mirabegron (MYRBETRIQ) 25 MG 24 hr tablet 11/16/2019 at Unknown time Son Yes Yes   Sig: Take 25 mg by mouth daily   tamsulosin (FLOMAX) 0.4 MG capsule 11/16/2019 at Unknown time Son Yes Yes   Sig: Take 0.8 mg by mouth At Bedtime      Facility-Administered Medications: None     Allergies   No Known Allergies    Physical Exam   Vital Signs: Temp: 97.2  F (36.2  C) Temp src: Temporal BP: 110/69 Pulse: 87 Heart Rate: 86 Resp: 15 SpO2: 97 % O2 Device: None (Room air)    Weight: 135 lbs 0 oz    Constitutional: awake, alert, cooperative, no apparent distress.   Eyes: Lids and lashes normal, pupils equal, round and reactive to light   ENT: Normocephalic, without obvious abnormality, atraumatic, sinuses nontender on palpation   Hematologic / Lymphatic: no cervical lymphadenopathy   Respiratory: CTABL   Cardiovascular: RRR with no m/r/g   GI: Normal bowel sounds, soft, non-distended, non-tender.   Skin: normal skin color, texture, turgor   Musculoskeletal: There is no redness, warmth, or swelling of the joints. Full range of motion noted.   Neurologic: Awake, alert, oriented to name, place  and time. Cranial nerves II-XII are grossly intact. Motor is 5 out of 5 bilaterally. Sensory is intact.   Neuropsychiatric: normal mood and affect      Data   Data reviewed today: I reviewed all medications, new labs and imaging results over the last 24 hours. I personally reviewed the EKG tracing showing ST elevation in inferior leads and the chest x-ray image(s) showing see below.    CXR  IMPRESSION: No significant change of ill-defined opacity lateral right  midlung that is indeterminant. Neoplasm not excluded. Some patchy  opacities at the left lateral mid and lower lung also appear stable.  No new airspace disease. Stable cardiac silhouette.    Most Recent 3 CBC's:  Recent Labs   Lab Test 11/17/19  1439 01/16/19  2258 11/15/18  0817   WBC 10.8 7.7 10.4   HGB 10.0* 9.3* 8.7*   MCV 91 89 92    212 298     Most Recent 3 BMP's:  Recent Labs   Lab Test 11/17/19  1439 01/17/19  1132 01/16/19  2258    139 135   POTASSIUM 4.5 4.7 4.9   CHLORIDE 110* 110* 107   CO2 24 23 20   BUN 37* 33* 38*   CR 1.64* 1.92* 2.40*   ANIONGAP 3 6 8   JOSELINE 7.8* 7.4* 8.1*   * 133* 126*     Most Recent 3 Troponin's:  Recent Labs   Lab Test 11/17/19  1439 10/10/18  0700 10/09/18  1302  04/21/14  1656   TROPI 0.101* 0.134* 0.143*   < >  --    TROPONIN 0.08  --   --   --  0.03    < > = values in this interval not displayed.     Most Recent 3 BNP's:  Recent Labs   Lab Test 10/09/18  0703 08/29/18  1625   NTBNPI 7,613* 8,601*     Recent Results (from the past 24 hour(s))   XR Chest Port 1 View    Narrative    CHEST PORTABLE ONE VIEW   11/17/2019 3:17 PM     HISTORY: Chest pain.    COMPARISON: 1/4/2019.      Impression    IMPRESSION: No significant change of ill-defined opacity lateral right  midlung that is indeterminant. Neoplasm not excluded. Some patchy  opacities at the left lateral mid and lower lung also appear stable.  No new airspace disease. Stable cardiac silhouette.    STEVEN STOVER MD

## 2019-11-17 NOTE — ED PROVIDER NOTES
"  History     Chief Complaint:    Chest Pain      HPI   Abimael Flores is a 89 year old male with a history of type II diabetes, coronary disease with bypass surgery and left bundle branch who presents with chest pressure that began a few hours ago. At onset, the patient was walking with his walker and felt weak when he fell to the floor. The patient states the pressure doesn't radiate and it  \"felt like his chest was going to burst open.\" This was accompanied with \"seeing halos\" and diaphoresis. The patient did not tell the staff at his care facility about his pressure until an hour after onset. En route, the patient received aspirin and nitroglycerin which he reports didn't help his pain. His last pressure from EMS was 88 systolic. Now, the patient reports the pressure is still bothering him but is about a quarter of what it was. He denies nausea, lightheadedness, palpations, abdominal pain, or pulmonary fibrosis. The patient had a bypass 35 years ago and denies recent angiograms, stent placement, or any cardiac problems since them.       Allergies:  No Known Allergies     Medications:    Insulin     Past Medical History:    Hypertension   Coronary artery disease  non senile cataract  Recent retinal detachment  Type II diabetes  Cardiac arrhythmia     Past Surgical History:    Left hip arthroplasty revision.   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  1 of his sons is a family practice physician.    Tobacco Use: former smoker  Alcohol Use: no  Drug Use: no  Marital Status:   [5]     Review of Systems   Cardiovascular: Positive for chest pain. Negative for palpitations. " "  Gastrointestinal: Negative for nausea and vomiting.   Neurological: Negative for light-headedness.   All other systems reviewed and are negative.      Physical Exam     Patient Vitals for the past 24 hrs:   BP Temp Temp src Pulse Heart Rate Resp SpO2 Height Weight   11/17/19 1518 -- -- -- -- -- -- -- 1.753 m (5' 9\") 61.2 kg (135 lb)   11/17/19 1515 110/69 -- -- 87 86 15 -- -- --   11/17/19 1500 115/70 -- -- 88 89 19 97 % -- --   11/17/19 1445 103/68 -- -- -- 91 13 94 % -- --   11/17/19 1436 -- 97.2  F (36.2  C) Temporal -- 92 17 100 % -- --   11/17/19 1435 113/78 -- -- 73 -- -- -- -- --         Physical Exam  General: Well-nourished, appears to be resting comfortably when I enter the room  Eyes: PERRL, conjunctivae pink no scleral icterus or conjunctival injection  ENT:  Moist mucus membranes, posterior oropharynx clear without erythema or exudates  Respiratory:  Lungs clear to auscultation bilaterally, no crackles/rubs/wheezes.  Good air movement  CV: Normal rate and rhythm, no murmurs/rubs/gallops.  Well-healed median sternotomy scar.  GI:  Abdomen soft and non-distended.  Normoactive BS.  No tenderness, guarding or rebound  Skin: Warm, dry.  No rashes or petechiae  Musculoskeletal: No peripheral edema or calf tenderness  Neuro: Alert and oriented to person/place/time  Psychiatric: Normal affect    Emergency Department Course     ECG:  Indication: chest pain  Time: 1438  Vent. Rate 91 bpm. WA interval 264. QRS duration 148. QT/QTc 368/452. P-R-T axis 41 5 108.  Sinus rhythm with 1st degree AV block. Left bundle branch block. Abnormal EKG. Read time: 1439.     Imaging:  Radiology findings were communicated with the patient who voiced understanding of the findings.    XR Chest Port 1 view:  No significant change of ill-defined opacity lateral right  midlung that is indeterminant. Neoplasm not excluded. Some patchy  opacities at the left lateral mid and lower lung also appear stable.  No new airspace disease. " Stable cardiac silhouette, as per radiology.     Laboratory:  Laboratory findings were communicated with the patient who voiced understanding of the findings.    CBC: WBC: 10.8, HGB: 10.0 (L), PLT: 243  CMP: Chloride 110 (H), Glucose 226 (H), BUN 37 (H), Creatinine 1.64 (H), GFR Est 35 (L), Calcium 7.8 (L), Albumin 2.5 (L) o/w WNL     Troponin (1439): 0.101  Troponin POCT (1439):0.08    Interventions:  1520 Brilinta 180 mg Gabriella   1533 Heparin Anticoagulant 3,700 units IV  1533 Heparin Infusion 750 units/hr IV    Emergency Department Course:  Past medical records, nursing notes, and vitals reviewed.    EKG obtained in the ED, see results above.   IV was inserted and blood was drawn for laboratory testing, results above.  The patient was sent for an xray while in the emergency department, results above.     1431 Patient arrived via EMS.   1432 I arrived in the room.   1435 I performed an exam of the patient as documented above.   1435 Nurse draws blood and an EKG is taken.   1454 Cardiology arrived in room.   1508 Patient rechecked and updated. I consulted with interventional fellow and cardiology fellow.   1522 I consulted with Dr. Mariscal, cardiology.   1541 I consulted with Dr. Kumar of the hospitalist services who is in agreement to accept the patient for admission.    Findings and plan explained to the Patient who consents to admission. Discussed the patient with Dr. Kumar, who will admit the patient to a cardiac telemetry bed for further monitoring, evaluation, and treatment.    I personally reviewed the laboratory and imaging results with the Patient and answered all related questions prior to admission.      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  EKG is concerning.  Though there is an pre-existing left bundle branch block, the inferior leads appear to have ST elevation and change from previously.  He became  hypotensive in route 1 given nitroglycerin.  I am concerned that this was a STEMI and I did activate the Cath Lab when he arrived.  When his son, Dr. Flores, arrived we discussed again and  they decided that they would like to deactivate the Cath Lab and instead after medical management.  I did have the opportunity speak with both the interventional fellow as well as the general cardiology fellow.  They recommended gentle IV fluid boluses as well as morphine for pain control.  He did have some ongoing nausea and was given Zofran for this.  He was loaded with Brilinta.  He had already taken aspirin.  Heparin drip was started.  [place] [facility] [place].  Dr. Kumar of the hospitalist service graciously agreed to manage.    Critical Care Time: was 35 minutes for this patient excluding procedures    Discharge Diagnosis:    ICD-10-CM    1. Acute coronary syndrome (H) I24.9        Disposition:  Admission to cardiac telemetry bed.     Scribe Disclosure:  I, Marie Howard, am serving as a scribe at 2:44 PM on 11/17/2019 to document services personally performed by Kala Hamilton MD based on my observations and the provider's statements to me.     11/17/2019    EMERGENCY DEPARTMENT       Kala Hamilton MD  11/17/19 1741

## 2019-11-17 NOTE — ED NOTES
"[facility]  ED Nurse Handoff Report    ED Chief complaint: Chest Pain      ED Diagnosis:   Final diagnoses:   Acute coronary syndrome (H)       Code Status: DNR / DNI    Allergies: No Known Allergies    Activity level - Baseline/Home:  Stand with Assist  Activity Level - Current:   Stand with Assist    Patient's Preferred language: english   Needed?: No    Isolation: No  Infection: Not Applicable  Bariatric?: No    Vital Signs:   Vitals:    11/17/19 1445 11/17/19 1500 11/17/19 1515 11/17/19 1518   BP: 103/68 115/70 110/69    Pulse:  88 87    Resp: 13 19 15    Temp:       TempSrc:       SpO2: 94% 97%     Weight:    61.2 kg (135 lb)   Height:    1.753 m (5' 9\")       Cardiac Rhythm: ,   Cardiac  Cardiac Rhythm: (S) Normal sinus rhythm(L BBB)    Pain level:      Is this patient confused?: No   Does this patient have a guardian?  No         If yes, is there guardianship documents in the Epic \"Code/ACP\" activity?  N/A         Guardian Notified?  N/A  [place] - Suicide Severity Rating Scale Completed?  No, secondary to PSS3  If yes, what color did the patient score?  N/A (negative)    Patient Report: Initial Complaint: chest pain  Focused Assessment: transported to ED via EMS after experiencing chest pain and diaphoresis at home. midsternal and non-radiating, but pt also c/o nausea with onset, ASA given per EMS. Nitroglycerin SL x1 per EMS brought SBP from 130 to 80's. Cath lab activated, then cancelled 2/2 pt's goals of care. VSS on R/A. Cardiac exam exhibits SR with possibly new L BBB (see EKG), chest pain 3-5/10 \"pressure\", non-radiating. Respiratory and Neuro exams WNL. Heparin gtt initiated in ED. Plan to admit.  Tests Performed: labs, EKG, Istat troponin, CXR  Abnormal Results:   Abnormal Labs Reviewed   CBC WITH PLATELETS DIFFERENTIAL - Abnormal; Notable for the following components:       Result Value    RBC Count 3.50 (*)     Hemoglobin 10.0 (*)     Hematocrit 31.7 (*)     RDW 15.4 " (*)     All other components within normal limits   COMPREHENSIVE METABOLIC PANEL - Abnormal; Notable for the following components:    Chloride 110 (*)     Glucose 226 (*)     Urea Nitrogen 37 (*)     Creatinine 1.64 (*)     GFR Estimate 35 (*)     GFR Estimate If Black 40 (*)     Calcium 7.8 (*)     Albumin 2.5 (*)     All other components within normal limits   TROPONIN I - Abnormal; Notable for the following components:    Troponin I ES 0.101 (*)     All other components within normal limits       Treatments provided: heparin gtt initiated (with bolus given), brilinta 180 mg PO    Family Comments: sons at bedside, MD's    OBS brochure/video discussed/provided to patient/family: N/A              Name of person given brochure if not patient: NA              Relationship to patient: NA    ED Medications:   Medications   heparin 25,000 units in 0.45% NaCl 250 mL infusion (750 Units/hr Intravenous New Bag 11/17/19 1533)   ticagrelor (BRILINTA) tablet 180 mg (180 mg Oral Given 11/17/19 1520)   heparin ANTICOAGULANT  Loading Dose bolus dose from infusion pump 3,700 Units (3,700 Units Intravenous Given 11/17/19 1533)       Drips infusing?:  Yes    For the majority of the shift this patient was Green.   Interventions performed were NA.    Severe Sepsis OR Septic Shock Diagnosis Present: No    To be done/followed up on inpatient unit:  Heparin 10A    ED NURSE PHONE NUMBER: [phone]

## 2019-11-17 NOTE — PHARMACY-ADMISSION MEDICATION HISTORY
Pharmacy Medication History  Admission medication history interview status for the 11/17/2019  admission is complete. See EPIC admission navigator for prior to admission medications     Medication history sources: Patient's family/friend (son) and [facility]  Medication history source reliability: Good  Adherence assessment: Moderate    Significant changes made to the medication list:  Medications added: metoprolol succinate, mirabegron, meclizine, finasteride, tamsulosin, acetaminophen    Additional medication history information:   None    Medication reconciliation completed by provider prior to medication history? No    Time spent in this activity: 15 minutes      Prior to Admission medications    Medication Sig Last Dose Taking? Auth Provider   acetaminophen (TYLENOL) 325 MG tablet Take 650 mg by mouth every 4 hours as needed  at PRN Yes Unknown, Entered By History   finasteride (PROSCAR) 5 MG tablet Take 5 mg by mouth daily 11/16/2019 at Unknown time Yes Unknown, Entered By History   insulin aspart (NOVOLOG FLEXPEN) 100 UNIT/ML pen Inject 8 Units Subcutaneous 3 times daily (with meals)  11/16/2019 at Unknown time Yes Unknown, Entered By History   insulin glargine (LANTUS SOLOSTAR PEN) 100 UNIT/ML pen Inject 8 Units Subcutaneous At Bedtime 11/16/2019 at Unknown time Yes Reported, Patient   meclizine (ANTIVERT) 25 MG tablet Take 25 mg by mouth 2 times daily as needed for dizziness  at PRN Yes Unknown, Entered By History   metoprolol succinate ER (TOPROL-XL) 50 MG 24 hr tablet Take 50 mg by mouth daily 11/16/2019 at Unknown time Yes Unknown, Entered By History   mirabegron (MYRBETRIQ) 25 MG 24 hr tablet Take 25 mg by mouth daily 11/16/2019 at Unknown time Yes Unknown, Entered By History   tamsulosin (FLOMAX) 0.4 MG capsule Take 0.8 mg by mouth At Bedtime 11/16/2019 at Unknown time Yes Unknown, Entered By History

## 2019-11-18 ENCOUNTER — APPOINTMENT (OUTPATIENT)
Dept: CARDIOLOGY | Facility: CLINIC | Age: 84
DRG: 282 | End: 2019-11-18
Attending: STUDENT IN AN ORGANIZED HEALTH CARE EDUCATION/TRAINING PROGRAM
Payer: MEDICARE

## 2019-11-18 LAB
ANION GAP SERPL CALCULATED.3IONS-SCNC: 4 MMOL/L (ref 3–14)
BUN SERPL-MCNC: 31 MG/DL (ref 7–30)
CALCIUM SERPL-MCNC: 8.2 MG/DL (ref 8.5–10.1)
CHLORIDE SERPL-SCNC: 109 MMOL/L (ref 94–109)
CHOLEST SERPL-MCNC: 149 MG/DL
CO2 SERPL-SCNC: 24 MMOL/L (ref 20–32)
CREAT SERPL-MCNC: 1.44 MG/DL (ref 0.66–1.25)
ERYTHROCYTE [DISTWIDTH] IN BLOOD BY AUTOMATED COUNT: 15.3 % (ref 10–15)
GFR SERPL CREATININE-BSD FRML MDRD: 41 ML/MIN/{1.73_M2}
GLUCOSE BLDC GLUCOMTR-MCNC: 117 MG/DL (ref 70–99)
GLUCOSE BLDC GLUCOMTR-MCNC: 158 MG/DL (ref 70–99)
GLUCOSE BLDC GLUCOMTR-MCNC: 168 MG/DL (ref 70–99)
GLUCOSE BLDC GLUCOMTR-MCNC: 171 MG/DL (ref 70–99)
GLUCOSE BLDC GLUCOMTR-MCNC: 207 MG/DL (ref 70–99)
GLUCOSE BLDC GLUCOMTR-MCNC: 228 MG/DL (ref 70–99)
GLUCOSE SERPL-MCNC: 142 MG/DL (ref 70–99)
HBA1C MFR BLD: 8.3 % (ref 0–5.6)
HCT VFR BLD AUTO: 31.8 % (ref 40–53)
HDLC SERPL-MCNC: 29 MG/DL
HGB BLD-MCNC: 10.1 G/DL (ref 13.3–17.7)
INTERPRETATION ECG - MUSE: NORMAL
LDLC SERPL CALC-MCNC: 96 MG/DL
LMWH PPP CHRO-ACNC: 0.27 IU/ML
MCH RBC QN AUTO: 28.6 PG (ref 26.5–33)
MCHC RBC AUTO-ENTMCNC: 31.8 G/DL (ref 31.5–36.5)
MCV RBC AUTO: 90 FL (ref 78–100)
NONHDLC SERPL-MCNC: 120 MG/DL
PLATELET # BLD AUTO: 239 10E9/L (ref 150–450)
POTASSIUM SERPL-SCNC: 4.4 MMOL/L (ref 3.4–5.3)
RBC # BLD AUTO: 3.53 10E12/L (ref 4.4–5.9)
SODIUM SERPL-SCNC: 137 MMOL/L (ref 133–144)
TRIGL SERPL-MCNC: 119 MG/DL
TROPONIN I SERPL-MCNC: 79.75 UG/L (ref 0–0.04)
TROPONIN I SERPL-MCNC: 87.24 UG/L (ref 0–0.04)
WBC # BLD AUTO: 9.5 10E9/L (ref 4–11)

## 2019-11-18 PROCEDURE — 99222 1ST HOSP IP/OBS MODERATE 55: CPT | Mod: 25 | Performed by: INTERNAL MEDICINE

## 2019-11-18 PROCEDURE — 25000128 H RX IP 250 OP 636: Performed by: STUDENT IN AN ORGANIZED HEALTH CARE EDUCATION/TRAINING PROGRAM

## 2019-11-18 PROCEDURE — 36415 COLL VENOUS BLD VENIPUNCTURE: CPT | Performed by: STUDENT IN AN ORGANIZED HEALTH CARE EDUCATION/TRAINING PROGRAM

## 2019-11-18 PROCEDURE — 83036 HEMOGLOBIN GLYCOSYLATED A1C: CPT | Performed by: STUDENT IN AN ORGANIZED HEALTH CARE EDUCATION/TRAINING PROGRAM

## 2019-11-18 PROCEDURE — 40000264 ECHOCARDIOGRAM COMPLETE

## 2019-11-18 PROCEDURE — 99232 SBSQ HOSP IP/OBS MODERATE 35: CPT | Performed by: STUDENT IN AN ORGANIZED HEALTH CARE EDUCATION/TRAINING PROGRAM

## 2019-11-18 PROCEDURE — 93306 TTE W/DOPPLER COMPLETE: CPT | Mod: 26 | Performed by: INTERNAL MEDICINE

## 2019-11-18 PROCEDURE — 85027 COMPLETE CBC AUTOMATED: CPT | Performed by: STUDENT IN AN ORGANIZED HEALTH CARE EDUCATION/TRAINING PROGRAM

## 2019-11-18 PROCEDURE — 25000131 ZZH RX MED GY IP 250 OP 636 PS 637: Mod: GY | Performed by: STUDENT IN AN ORGANIZED HEALTH CARE EDUCATION/TRAINING PROGRAM

## 2019-11-18 PROCEDURE — 84484 ASSAY OF TROPONIN QUANT: CPT | Performed by: STUDENT IN AN ORGANIZED HEALTH CARE EDUCATION/TRAINING PROGRAM

## 2019-11-18 PROCEDURE — 00000146 ZZHCL STATISTIC GLUCOSE BY METER IP

## 2019-11-18 PROCEDURE — 25500064 ZZH RX 255 OP 636: Performed by: STUDENT IN AN ORGANIZED HEALTH CARE EDUCATION/TRAINING PROGRAM

## 2019-11-18 PROCEDURE — 85520 HEPARIN ASSAY: CPT | Performed by: STUDENT IN AN ORGANIZED HEALTH CARE EDUCATION/TRAINING PROGRAM

## 2019-11-18 PROCEDURE — 80048 BASIC METABOLIC PNL TOTAL CA: CPT | Performed by: STUDENT IN AN ORGANIZED HEALTH CARE EDUCATION/TRAINING PROGRAM

## 2019-11-18 PROCEDURE — 25000132 ZZH RX MED GY IP 250 OP 250 PS 637: Mod: GY | Performed by: INTERNAL MEDICINE

## 2019-11-18 PROCEDURE — 80061 LIPID PANEL: CPT | Performed by: STUDENT IN AN ORGANIZED HEALTH CARE EDUCATION/TRAINING PROGRAM

## 2019-11-18 PROCEDURE — 21000001 ZZH R&B HEART CARE

## 2019-11-18 PROCEDURE — 25000132 ZZH RX MED GY IP 250 OP 250 PS 637: Mod: GY | Performed by: STUDENT IN AN ORGANIZED HEALTH CARE EDUCATION/TRAINING PROGRAM

## 2019-11-18 RX ORDER — ATORVASTATIN CALCIUM 40 MG/1
40 TABLET, FILM COATED ORAL EVERY EVENING
Status: DISCONTINUED | OUTPATIENT
Start: 2019-11-18 | End: 2019-11-19 | Stop reason: HOSPADM

## 2019-11-18 RX ADMIN — INSULIN GLARGINE 8 UNITS: 100 INJECTION, SOLUTION SUBCUTANEOUS at 21:10

## 2019-11-18 RX ADMIN — METOPROLOL SUCCINATE 50 MG: 50 TABLET, EXTENDED RELEASE ORAL at 08:21

## 2019-11-18 RX ADMIN — TAMSULOSIN HYDROCHLORIDE 0.8 MG: 0.4 CAPSULE ORAL at 21:10

## 2019-11-18 RX ADMIN — TICAGRELOR 90 MG: 90 TABLET ORAL at 09:31

## 2019-11-18 RX ADMIN — INSULIN ASPART 1 UNITS: 100 INJECTION, SOLUTION INTRAVENOUS; SUBCUTANEOUS at 17:46

## 2019-11-18 RX ADMIN — ATORVASTATIN CALCIUM 40 MG: 40 TABLET, FILM COATED ORAL at 21:10

## 2019-11-18 RX ADMIN — INSULIN ASPART 4 UNITS: 100 INJECTION, SOLUTION INTRAVENOUS; SUBCUTANEOUS at 08:29

## 2019-11-18 RX ADMIN — PANTOPRAZOLE SODIUM 40 MG: 40 TABLET, DELAYED RELEASE ORAL at 08:21

## 2019-11-18 RX ADMIN — HUMAN ALBUMIN MICROSPHERES AND PERFLUTREN 9 ML: 10; .22 INJECTION, SOLUTION INTRAVENOUS at 12:45

## 2019-11-18 RX ADMIN — ASPIRIN 81 MG 81 MG: 81 TABLET ORAL at 08:21

## 2019-11-18 RX ADMIN — HEPARIN SODIUM 850 UNITS/HR: 10000 INJECTION, SOLUTION INTRAVENOUS at 14:00

## 2019-11-18 RX ADMIN — INSULIN ASPART 1 UNITS: 100 INJECTION, SOLUTION INTRAVENOUS; SUBCUTANEOUS at 14:03

## 2019-11-18 ASSESSMENT — ACTIVITIES OF DAILY LIVING (ADL)
ADLS_ACUITY_SCORE: 19
ADLS_ACUITY_SCORE: 20
ADLS_ACUITY_SCORE: 20
ADLS_ACUITY_SCORE: 19
ADLS_ACUITY_SCORE: 20
ADLS_ACUITY_SCORE: 19

## 2019-11-18 ASSESSMENT — MIFFLIN-ST. JEOR: SCORE: 1367.46

## 2019-11-18 NOTE — CONSULTS
[facility]    Cardiology Consultation     Date of Admission:  2019    Assessment & Plan   Abimael Flores is a 89 year old male who was admitted on 2019.    1.  Acute myocardial infarction    Patient is now pain-free.  Peak troponin was 87.  He is on intravenous heparin.  He is DNR and DNI.  Cardiac catheterization was deferred as patient and son apparently were not interested.  He tells me that he is 89 years of age, his wife has  and he is happy with medical therapy.  If he wants to go home today.  I discussed with him importance of cardiac rehab, getting echo done to assess his LV function and give him at least 48 hours of intravenous heparin.  He is not sure he will stay.  I will discuss that with his son when he is here later in the day.  EKGs were reviewed by me and revealed sinus rhythm with left branch block and first-degree AV block.  He has had previous conduction delay of left bundle branch type.    Will start cardiac rehab.  He is on baby aspirin.  He likely will need dual antiplatelet therapy.  He is on metoprolol.  We will get echocardiogram today.       2.  Type 2 diabetes, on insulin, defer to hospitalist for management.    3.  Anemia, per hospitalist.  Likely anemia of chronic disease.    4.  Mixed hyperlipidemia, HDL is 26.  LDL 96.  Will start statin.    5.  Chronic renal insufficiency, creatinine stable at 1.4 - 1.6.  Has been 1.9 in January.    Raf Garcia MD    Primary Care Physician   Aleksandar Flores    Reason for Consult   Reason for consult: I was asked by hospitalist to evaluate this patient for acute myocardial infarction.    History of Present Illness   Abimael Flores is a 89 year old male who presents with episode of lightheadedness, blackness in front of his eyes followed by retrosternal chest pressure without radiation.  This lasted for hours before he sought medical attention.  There was associated diaphoresis.  He was brought to the  emergency room and was noted to have EKG changes of acute myocardial infraction with elevated troponin of 0.1.  Subsequently troponin is increased to 87 and now decreased to 79.  He is DNR/DNI.  Discussion with interventional cardiologist followed in cardiac catheterization was deferred because of his and his son's preference as well as the fact that he was pain-free.    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 He had three-vessel bypass surgery several years ago.  He has had paroxysmal atrial fibrillation and SVT.  He used to be on statin but has not been taking it recently.    Today, he is pain-free.  He is a bit frustrated as he could not sleep last night and is here to go to the bathroom several times and does not like the bed.  He is on intravenous heparin.    His chest x-ray reveals a ill-defined right lung mass.    Patient Active Problem List   Diagnosis     Injury of globe of eye     Recent retinal detachment, total or subtotal     Closed right hip fracture (H)     S/P total hip arthroplasty     Type 2 diabetes mellitus with renal complication (H)     Cardiac arrhythmia, unspecified cardiac arrhythmia type     CKD (chronic kidney disease) stage 3, GFR 30-59 ml/min (H)     Benign essential hypertension     Coronary artery disease involving coronary bypass graft of native heart without angina pectoris     Physical deconditioning     Other insomnia     Bladder spasms     Fracture of femoral neck, left (H)     Hip fracture, left (H)     Anemia due to blood loss, acute     Dislocation of hip (H)     Hip dislocation, left (H)     Deep vein thrombosis (DVT) prophylaxis prescribed at discharge     Benign prostatic hyperplasia with lower urinary tract symptoms     Candidiasis of perineum     Acute pain     Essential hypertension     H/O cardiac arrhythmia     Weight loss     Debility     Other chronic pain     Vertigo     Status post total replacement of left hip 22 Oct 18      Fall, subsequent encounter     Type 2 diabetes mellitus with stage 3 chronic kidney disease, with long-term current use of insulin (H)     Benign prostatic hyperplasia without lower urinary tract symptoms     Anemia, unspecified type     Insomnia, unspecified type     S/P closed reduction of dislocated total hip prosthesis     S/P hip hemiarthroplasty, Left 4 Oct 18     H/O coronary artery bypass graft, 1996     Closed dislocation of left hip, initial encounter (H)     Hyperlipidemia     Hypertensive heart and kidney disease     Long term current use of insulin (H)     Supraventricular tachycardia (H)     Senile nuclear sclerosis     Recurrent subluxation of hip joint     Microalbuminuria     Borderline glaucoma with ocular hypertension     Allergic rhinitis     NSTEMI (non-ST elevated myocardial infarction) (H)       Past Medical History   I have reviewed this patient's medical history and updated it with pertinent information if needed.   Past Medical History:   Diagnosis Date     Benign essential hypertension 9/4/2018     Coronary artery disease      Nonsenile cataract      Recent retinal detachment, total or subtotal 8/5/2014     Type 2 diabetes mellitus without complications (H)        Past Surgical History   I have reviewed this patient's surgical history and updated it with pertinent information if needed.  Past Surgical History:   Procedure Laterality Date     ARTHROPLASTY REVISION HIP Left 10/22/2018    Procedure: LEFT HIP HEMIARTHROPLASTY CONVERTED TO LEFT TOTAL HIP ARTHROPLASTY;  Surgeon: Marcos Winchester MD;  Location:  OR     CARDIAC SURGERY       CATARACT IOL, RT/LT Bilateral      CLOSED REDUCTION HIP Left 10/14/2018    Procedure: CLOSED REDUCTION HIP;  CLOSED REDUCTION HIP;  Surgeon: Milton Gusman MD;  Location:  OR     lacrimal caruncle removed BE Bilateral ~1989     OPEN REDUCTION INTERNAL FIXATION HIP BIPOLAR Right 8/26/2018    Procedure: OPEN REDUCTION INTERNAL FIXATION HIP BIPOLAR;   Right Hip Bipolar Hemiarthroplasty (Biomet);  Surgeon: Bill Sanders MD;  Location: [facility] OR     OPEN REDUCTION INTERNAL FIXATION HIP BIPOLAR Left 10/4/2018    Procedure: OPEN REDUCTION INTERNAL FIXATION HIP BIPOLAR;  LEFT HIP TONYA ARTHROPLASTY;  Surgeon: Bruno Stewart MD;  Location: [facility] OR     REPAIR RUPTURED GLOBE  4/21/2014    Procedure: Exploration and Repair of Ruptured Globe ;  Surgeon: Mercedes Rivera MD;  Location: [facility] OR       Prior to Admission Medications   Prior to Admission Medications   Prescriptions Last Dose Informant Patient Reported? Taking?   acetaminophen (TYLENOL) 325 MG tablet  at PRN Son Yes Yes   Sig: Take 650 mg by mouth every 4 hours as needed   finasteride (PROSCAR) 5 MG tablet 11/16/2019 at Unknown time Son Yes Yes   Sig: Take 5 mg by mouth daily   insulin aspart (NOVOLOG FLEXPEN) 100 UNIT/ML pen 11/16/2019 at Unknown time Son Yes Yes   Sig: Inject 8 Units Subcutaneous 3 times daily (with meals)    insulin glargine (LANTUS SOLOSTAR PEN) 100 UNIT/ML pen 11/16/2019 at Unknown time Son Yes Yes   Sig: Inject 8 Units Subcutaneous At Bedtime   meclizine (ANTIVERT) 25 MG tablet  at PRN Son Yes Yes   Sig: Take 25 mg by mouth 2 times daily as needed for dizziness   metoprolol succinate ER (TOPROL-XL) 50 MG 24 hr tablet 11/16/2019 at Unknown time Son Yes Yes   Sig: Take 50 mg by mouth daily   mirabegron (MYRBETRIQ) 25 MG 24 hr tablet 11/16/2019 at Unknown time Son Yes Yes   Sig: Take 25 mg by mouth daily   tamsulosin (FLOMAX) 0.4 MG capsule 11/16/2019 at Unknown time Son Yes Yes   Sig: Take 0.8 mg by mouth At Bedtime      Facility-Administered Medications: None     Current Facility-Administered Medications   Medication Dose Route Frequency     aspirin  81 mg Oral Daily     influenza Vac Split High-Dose  0.5 mL Intramuscular Prior to discharge     insulin aspart  1-3 Units Subcutaneous TID AC     insulin aspart  1-3 Units Subcutaneous At Bedtime     insulin glargine  8 Units  "Subcutaneous At Bedtime     metoprolol succinate ER  50 mg Oral Daily     pantoprazole  40 mg Oral QAM AC     sodium chloride (PF)  3 mL Intracatheter Q8H     tamsulosin  0.8 mg Oral At Bedtime     Current Facility-Administered Medications   Medication Last Rate     HEParin 850 Units/hr (11/18/19 0607)     lactated ringers Stopped (11/17/19 2021)     - MEDICATION INSTRUCTIONS -       Allergies   No Known Allergies    Social History    reports that he has quit smoking. He has never used smokeless tobacco. He reports that he does not drink alcohol or use drugs.    Family History   Family History   Problem Relation Age of Onset     Diabetes Mother      Diabetes Father      Cancer No family hx of      Glaucoma No family hx of      Macular Degeneration No family hx of        Review of Systems   The comprehensive 10 point Review of Systems is negative other than noted in the HPI or here.     Physical Exam   Vital Signs with Ranges  Temp:  [97.2  F (36.2  C)-98.3  F (36.8  C)] (P) 98.3  F (36.8  C)  Pulse:  [66-88] 79  Heart Rate:  [68-92] (P) 73  Resp:  [12-20] (P) 16  BP: ()/(51-87) (P) 127/59  SpO2:  [94 %-100 %] (P) 97 %  Wt Readings from Last 4 Encounters:   11/18/19 74.2 kg (163 lb 9.6 oz)   01/29/19 56.7 kg (125 lb)   01/16/19 70.3 kg (155 lb)   01/08/19 68 kg (150 lb)     I/O last 3 completed shifts:  In: 187.5 [P.O.:50; I.V.:137.5]  Out: 675 [Urine:675]      Vitals: BP (P) 127/59 (BP Location: Right arm)   Pulse 79   Temp (P) 98.3  F (36.8  C) (Axillary)   Resp (P) 16   Ht 1.753 m (5' 9\")   Wt 74.2 kg (163 lb 9.6 oz)   SpO2 (P) 97%   BMI 24.16 kg/m      Constitutional:   alert   Eyes:   extra-ocular muscles intact   ENT:   normocepalic, without obvious abnormality   Neck:   8-10   Hematologic / Lymphatic:   no cervical lymphadenopathy   Back:   symmetric   Lungs:   clear to auscultation   Cardiovascular:   normal S1 and S2 and murmurs include systolic murmur II/VI located at right upper sternal border " with radiation to the carotids   Abdomen:   non-distended and non-tender     Musculoskeletal:   full range of motion noted   Neurologic:   Motor Exam:  moves all extremities well and symmetrically   Neuropsychiatric:   Orientation: oriented to self, place, time and situation   Skin:   no lesions       Recent Labs   Lab 11/18/19 0606 11/18/19  0044 11/17/19  1811 11/17/19  1439   TROPI 79.751* 87.244* 14.881* 0.101*       Recent Labs   Lab 11/18/19 0606 11/18/19 0044 11/17/19 1811 11/17/19  1439   WBC 9.5  --   --  10.8   HGB 10.1*  --   --  10.0*   MCV 90  --   --  91     --   --  243     --   --  137   POTASSIUM 4.4  --   --  4.5   CHLORIDE 109  --   --  110*   CO2 24  --   --  24   BUN 31*  --   --  37*   CR 1.44*  --   --  1.64*   GFRESTIMATED 41*  --   --  35*   GFRESTBLACK 47*  --   --  40*   ANIONGAP 4  --   --  3   JOSELINE 8.2*  --   --  7.8*   *  --   --  226*   ALBUMIN  --   --   --  2.5*   PROTTOTAL  --   --   --  7.1   BILITOTAL  --   --   --  0.3   ALKPHOS  --   --   --  70   ALT  --   --   --  12   AST  --   --   --  17   TROPI 79.751* 87.244* 14.881* 0.101*     Recent Labs   Lab Test 11/18/19  0606   CHOL 149   HDL 29*   LDL 96   TRIG 119     Recent Labs   Lab 11/18/19 0606 11/17/19  1439   WBC 9.5 10.8   HGB 10.1* 10.0*   HCT 31.8* 31.7*   MCV 90 91    243     No results for input(s): PH, PHV, PO2, PO2V, SAT, PCO2, PCO2V, HCO3, HCO3V in the last 168 hours.  No results for input(s): NTBNPI, NTBNP in the last 168 hours.  No results for input(s): DD in the last 168 hours.  No results for input(s): SED, CRP in the last 168 hours.  Recent Labs   Lab 11/18/19  0606 11/17/19  1439    243     No results for input(s): TSH in the last 168 hours.  No results for input(s): COLOR, APPEARANCE, URINEGLC, URINEBILI, URINEKETONE, SG, UBLD, URINEPH, PROTEIN, UROBILINOGEN, NITRITE, LEUKEST, RBCU, WBCU in the last 168 hours.    Imaging:  Recent Results (from the past 48 hour(s))   XR  Chest Port 1 View    Narrative    CHEST PORTABLE ONE VIEW   11/17/2019 3:17 PM     HISTORY: Chest pain.    COMPARISON: 1/4/2019.      Impression    IMPRESSION: No significant change of ill-defined opacity lateral right  midlung that is indeterminant. Neoplasm not excluded. Some patchy  opacities at the left lateral mid and lower lung also appear stable.  No new airspace disease. Stable cardiac silhouette.    STEVEN STOVER MD       Echo:  No results found for this or any previous visit (from the past 4320 hour(s)).

## 2019-11-18 NOTE — CONSULTS
Medication coverage check for Brilinta. $97.83 monthly.     (Note that if patient has a deductible as part of his plan in 2020, he will have to meet that again come January.)    Angely Key CphT  [facility] Liaison  Liaison Cell: [phone]

## 2019-11-18 NOTE — PLAN OF CARE
A&Ox4. Very [place]. R eye blind. VSS on RA. Tele: SD with BBB. Mod CHO diet, . Up with A1. Denies pain, chest pain. LS wheeze expiratory to upper lobes and crackles to lower lobes. Declined cath lab, doing medical mangementt. Heparin infusing @8.5. Voiding urinal @bedside. Trace edema to BLE. Trops 0.101, 14.881 & 87. 244. Plan for echo today. Cardio following. Continue to monitor

## 2019-11-18 NOTE — PLAN OF CARE
A&O, VSS on room air. Tele: NSR, HR 60's. Denies CP and SOB. [place] w/ bilateral hearing aid use. Blood sugar 168, no insulin pen available with dinner. Plan to medically manage STEMI. Plan for echo and cardiology consult tomorrow.

## 2019-11-18 NOTE — PROGRESS NOTES
MD Notification    Notified Person: MD    Notified Person Name: Dr. Kumar     Notification Date/Time: 11/17 @ 6:45 PM    Notification Interaction: Text-paged      Purpose of Notification:  FYI critical trop of 14.881, no new CP     Orders Received: Per Dr. Kumar, patient is going to have elevated troponins, the third will be even greater. Only need to call MD if new onset of chest pain.     Comments: Cath lab was activated but patient refused

## 2019-11-18 NOTE — PROGRESS NOTES
[facility]    Medicine Progress Note - Hospitalist Service       Date of Admission:  11/17/2019  Date of Service: 11/18/2019    Assessment & Plan      Abimael Flores is a 89 year old male admitted on 11/17/2019. He presents with CP.     ACS  Assessment: Patient presents with onset of left-sided chest pain.  Initial work-up on admission is pertinent for a troponin of 0.101.  His EKG did show possible ST elevation seen in the inferior leads.  Code STEMI was activated and initial plan was to bring the patient to the Cath Lab, however patient and his son who is a physician opted to forego cardiac catheterization this time.  Cardiology has opted to load patient with Brilinta and started heparin infusion.  At this time patient family is hoping for medical management of NSTEMI. Trop peaked at 79. ECHO shows left ventricle is normal in size. There is concentric remodeling present. Left ventricular systolic function is normal. The ejection fraction is estimated at 55-60%. The right ventricle is normal in size and function.  Plan:   - Cardiac Rehab  - Cardiology following  - Heparin gtt for total 48 hours  - Start statin  - ASA daily  - Will need DAPT  - Continue PTA BB      CKD (chronic kidney disease) stage 3, GFR 30-59 ml/min (H)    Assessment: Creatinine baseline around 1.5-1.9. Cr 1.64 -> 1.44 since admission.     Plan:   -Avoid NSAIDs/nephrotoxins  -follow creatinine/electrolytes      Benign essential hypertension    Assessment: PTA patient is on metoprolol 50 mg daily    Plan:   -Continue beta-blocker       Type 2 diabetes mellitus with stage 3 chronic kidney disease, with long-term current use of insulin (H)    Assessment: PTA on 8 units of Lantus at bedtime    Plan:   -Continue Lantus at 10 units at bedtime  -Sliding scale insulin as needed  -Hypoglycemia protocol      Hyperlipidemia    Assessment/Plan: Not on statin, check lipid panel         Diet: Moderate Consistent CHO Diet    DVT Prophylaxis:  Heparin gtt  [place]ales Catheter: not present  Code Status: DNR/DNI      Disposition Plan   Expected discharge: Tomorrow, recommended to prior living arrangement once Cardiology work up completed.  Entered: Silvano Kumar MD 11/18/2019, 3:29 PM       The patient's care was discussed with the Bedside Nurse, Patient and Patient's Family.    Silvano Kumar MD  Hospitalist Service  [facility]    ______________________________________________________________________    Interval History     No CP/SOB today despite troponin increase  Patient wants to go home, reports sleeping very difficult in hospital. No nausea/vomiting or abdominal pain. No fevers or chills. No new complaints otherwise.     Data reviewed today: I reviewed all medications, new labs and imaging results over the last 24 hours. I personally reviewed no images or EKG's today.    Physical Exam   Vital Signs: Temp: 98.3  F (36.8  C) Temp src: Axillary BP: 127/59 Pulse: 79 Heart Rate: 73 Resp: 16 SpO2: 97 % O2 Device: None (Room air)    Weight: 163 lbs 9.6 oz    Constitutional: awake, alert, cooperative, no apparent distress.   Hematologic / Lymphatic: no cervical lymphadenopathy   Respiratory: CTABL   Cardiovascular: RRR with no m/r/g   GI: NT. ND. BS+  Musculoskeletal: There is no redness, warmth, or swelling of the joints. Full range of motion noted.   Neurologic: Awake, alert, oriented to name, place and time. Motor is 5 out of 5 bilaterally. Sensory is intact.  Neuropsychiatric: normal mood and affect    Data   Recent Labs   Lab 11/18/19  0606 11/18/19  0044 11/17/19  1811 11/17/19  1439   WBC 9.5  --   --  10.8   HGB 10.1*  --   --  10.0*   MCV 90  --   --  91     --   --  243     --   --  137   POTASSIUM 4.4  --   --  4.5   CHLORIDE 109  --   --  110*   CO2 24  --   --  24   BUN 31*  --   --  37*   CR 1.44*  --   --  1.64*   ANIONGAP 4  --   --  3   JOSELINE 8.2*  --   --  7.8*   *  --   --  226*   ALBUMIN  --   --   --  2.5*    PROTTOTAL  --   --   --  7.1   BILITOTAL  --   --   --  0.3   ALKPHOS  --   --   --  70   ALT  --   --   --  12   AST  --   --   --  17   TROPI 79.751* 87.244* 14.881* 0.101*   TROPONIN  --   --   --  0.08     Recent Results (from the past 24 hour(s))   Echocardiogram Complete    Narrative    [id number]  [id number]  [id number]  [id number]^ALLY^PAXTON           [facility]  Echocardiography Laboratory  [address] [place] [address]        Name: CHERYL HOLLOWAY  MRN: [id number]  : 1924  Study Date: 2019 11:53 AM  Age: 89 yrs  Gender: Male  Patient Location: [facility]  Reason For Study: Chest Pain  Ordering Physician: PAXTON CANALES  Referring Physician: JAMI HOLLOWAY  Performed By: Moose Brasher RDCS     BSA: 1.8 m2  Height: 69 in  Weight: 136 lb  HR: 69  BP: 130/64 mmHg  _____________________________________________________________________________  __        Procedure  Complete Portable Echo Adult. Optison (NDC #[id number]) given intravenously.  _____________________________________________________________________________  __        Interpretation Summary     The left ventricle is normal in size. There is concentric remodeling present.  Left ventricular systolic function is normal. The ejection fraction is  estimated at 55-60%.  The right ventricle is normal in size and function.  The left atrium is mildly dilated.  There is mild (1+) mitral and pulmonary regurgitation.  Mild valvular aortic stenosis. The mean AoV pressure gradient is 9.5 mmHg. The  calculated aortic valve are is 1.5 cm^2.  Compared to the prior echo on 2018, TR has decreased on severity. The  PASP could not be assessed on today's study due to poor TR jet.  _____________________________________________________________________________  __        Left Ventricle  The left ventricle is normal in size. There is concentric remodeling present.  Left ventricular systolic function is normal. The visual ejection fraction  is  estimated at 55-60%. Left ventricular diastolic function is indeterminate. No  regional wall motion abnormalities noted.     Right Ventricle  The right ventricle is normal in size and function.     Atria  The left atrium is mildly dilated. Right atrial size is normal.     Mitral Valve  The mitral valve leaflets are mildly thickened. There is mild (1+) mitral  regurgitation.        Tricuspid Valve  Normal tricuspid valve.     Aortic Valve  There is trace aortic regurgitation. Mild valvular aortic stenosis. The peak  AoV pressure gradient is 17.0 mmHg. The mean AoV pressure gradient is 9.5  mmHg. The calculated aortic valve are is 1.5 cm^2.     Pulmonic Valve  The pulmonic valve is not well visualized. There is mild (1+) pulmonic  valvular regurgitation.     Vessels  Normal size aorta. Normal size ascending aorta. IVC diameter <2.1 cm  collapsing >50% with sniff suggests a normal RA pressure of 3 mmHg.     Pericardium  There is no pericardial effusion.        Rhythm  Sinus rhythm was noted.  _____________________________________________________________________________  __  MMode/2D Measurements & Calculations  IVSd: 1.3 cm     LVIDd: 4.0 cm  LVIDs: 3.0 cm  LVPWd: 1.1 cm  FS: 25.6 %  LV mass(C)d: 164.8 grams  LV mass(C)dI: 94.0 grams/m2  Ao root diam: 3.5 cm  LA dimension: 4.8 cm  asc Aorta Diam: 3.2 cm  LA/Ao: 1.4  LVOT diam: 2.2 cm  LVOT area: 3.9 cm2  LA Volume (BP): 69.9 ml  LA Volume Index (BP): 39.9 ml/m2  RWT: 0.54           Doppler Measurements & Calculations  MV E max michael: 96.0 cm/sec  MV A max michael: 84.9 cm/sec  MV E/A: 1.1  MV dec time: 0.26 sec  Ao V2 max: 205.7 cm/sec  Ao max P.0 mmHg  Ao V2 mean: 144.0 cm/sec  Ao mean P.5 mmHg  Ao V2 VTI: 44.8 cm  MICHELLE(I,D): 1.5 cm2  MICHELLE(V,D): 1.6 cm2  LV V1 max P.9 mmHg  LV V1 max: 85.2 cm/sec  LV V1 VTI: 17.4 cm  SV(LVOT): 67.6 ml  SI(LVOT): 38.6 ml/m2  PA acc time: 0.12 sec  PI end-d michael: 139.0 cm/sec  AV Michael Ratio (DI): 0.41  MICHELLE Index (cm2/m2):  0.86  E/E' avg: 15.2  Lateral E/e': 11.9  Medial E/e': 18.4              _____________________________________________________________________________  __        Report approved by: Yanet Brito 11/18/2019 01:50 PM        Medications     HEParin 850 Units/hr (11/18/19 1400)     - MEDICATION INSTRUCTIONS -         aspirin  81 mg Oral Daily     atorvastatin  40 mg Oral QPM     influenza Vac Split High-Dose  0.5 mL Intramuscular Prior to discharge     insulin aspart  8 Units Subcutaneous Daily with supper     insulin aspart  1-3 Units Subcutaneous TID AC     insulin aspart  1-3 Units Subcutaneous At Bedtime     insulin glargine  8 Units Subcutaneous At Bedtime     metoprolol succinate ER  50 mg Oral Daily     pantoprazole  40 mg Oral QAM AC     sodium chloride (PF)  3 mL Intracatheter Q8H     tamsulosin  0.8 mg Oral At Bedtime     [START ON 11/19/2019] ticagrelor  90 mg Oral BID

## 2019-11-18 NOTE — PLAN OF CARE
[id number]: A&Ox4. Very [place]. R eye blind. VSS on RA. Denies CP. LS crackles. BLE trace edema. Declined cath lab, doing medical mgmt. On heparin gtt @ 8.5, hep10a @ 0430. Refused IV fluids.Tele SR BBB. Up 1 assist. Uses urinal at bedside.

## 2019-11-19 ENCOUNTER — APPOINTMENT (OUTPATIENT)
Dept: OCCUPATIONAL THERAPY | Facility: CLINIC | Age: 84
DRG: 282 | End: 2019-11-19
Attending: INTERNAL MEDICINE
Payer: MEDICARE

## 2019-11-19 VITALS
TEMPERATURE: 98.1 F | SYSTOLIC BLOOD PRESSURE: 95 MMHG | OXYGEN SATURATION: 98 % | WEIGHT: 163.6 LBS | HEIGHT: 69 IN | BODY MASS INDEX: 24.23 KG/M2 | HEART RATE: 79 BPM | DIASTOLIC BLOOD PRESSURE: 50 MMHG | RESPIRATION RATE: 20 BRPM

## 2019-11-19 LAB
ANION GAP SERPL CALCULATED.3IONS-SCNC: 3 MMOL/L (ref 3–14)
BUN SERPL-MCNC: 32 MG/DL (ref 7–30)
CALCIUM SERPL-MCNC: 8.3 MG/DL (ref 8.5–10.1)
CHLORIDE SERPL-SCNC: 108 MMOL/L (ref 94–109)
CO2 SERPL-SCNC: 26 MMOL/L (ref 20–32)
CREAT SERPL-MCNC: 1.58 MG/DL (ref 0.66–1.25)
ERYTHROCYTE [DISTWIDTH] IN BLOOD BY AUTOMATED COUNT: 15.2 % (ref 10–15)
GFR SERPL CREATININE-BSD FRML MDRD: 36 ML/MIN/{1.73_M2}
GLUCOSE BLDC GLUCOMTR-MCNC: 101 MG/DL (ref 70–99)
GLUCOSE BLDC GLUCOMTR-MCNC: 119 MG/DL (ref 70–99)
GLUCOSE SERPL-MCNC: 101 MG/DL (ref 70–99)
HCT VFR BLD AUTO: 33.3 % (ref 40–53)
HGB BLD-MCNC: 10.5 G/DL (ref 13.3–17.7)
LMWH PPP CHRO-ACNC: 0.48 IU/ML
MCH RBC QN AUTO: 28.5 PG (ref 26.5–33)
MCHC RBC AUTO-ENTMCNC: 31.5 G/DL (ref 31.5–36.5)
MCV RBC AUTO: 91 FL (ref 78–100)
PLATELET # BLD AUTO: 265 10E9/L (ref 150–450)
POTASSIUM SERPL-SCNC: 4.3 MMOL/L (ref 3.4–5.3)
RBC # BLD AUTO: 3.68 10E12/L (ref 4.4–5.9)
SODIUM SERPL-SCNC: 137 MMOL/L (ref 133–144)
TROPONIN I SERPL-MCNC: 27.82 UG/L (ref 0–0.04)
WBC # BLD AUTO: 11 10E9/L (ref 4–11)

## 2019-11-19 PROCEDURE — 97166 OT EVAL MOD COMPLEX 45 MIN: CPT | Mod: GO | Performed by: OCCUPATIONAL THERAPIST

## 2019-11-19 PROCEDURE — 85520 HEPARIN ASSAY: CPT | Performed by: STUDENT IN AN ORGANIZED HEALTH CARE EDUCATION/TRAINING PROGRAM

## 2019-11-19 PROCEDURE — 85027 COMPLETE CBC AUTOMATED: CPT | Performed by: STUDENT IN AN ORGANIZED HEALTH CARE EDUCATION/TRAINING PROGRAM

## 2019-11-19 PROCEDURE — 85610 PROTHROMBIN TIME: CPT | Performed by: STUDENT IN AN ORGANIZED HEALTH CARE EDUCATION/TRAINING PROGRAM

## 2019-11-19 PROCEDURE — 36415 COLL VENOUS BLD VENIPUNCTURE: CPT | Performed by: STUDENT IN AN ORGANIZED HEALTH CARE EDUCATION/TRAINING PROGRAM

## 2019-11-19 PROCEDURE — 97530 THERAPEUTIC ACTIVITIES: CPT | Mod: GO | Performed by: OCCUPATIONAL THERAPIST

## 2019-11-19 PROCEDURE — 99239 HOSP IP/OBS DSCHRG MGMT >30: CPT | Performed by: STUDENT IN AN ORGANIZED HEALTH CARE EDUCATION/TRAINING PROGRAM

## 2019-11-19 PROCEDURE — 80048 BASIC METABOLIC PNL TOTAL CA: CPT | Performed by: STUDENT IN AN ORGANIZED HEALTH CARE EDUCATION/TRAINING PROGRAM

## 2019-11-19 PROCEDURE — 90662 IIV NO PRSV INCREASED AG IM: CPT | Performed by: STUDENT IN AN ORGANIZED HEALTH CARE EDUCATION/TRAINING PROGRAM

## 2019-11-19 PROCEDURE — 99232 SBSQ HOSP IP/OBS MODERATE 35: CPT | Performed by: INTERNAL MEDICINE

## 2019-11-19 PROCEDURE — 25000128 H RX IP 250 OP 636: Performed by: STUDENT IN AN ORGANIZED HEALTH CARE EDUCATION/TRAINING PROGRAM

## 2019-11-19 PROCEDURE — 84484 ASSAY OF TROPONIN QUANT: CPT | Performed by: STUDENT IN AN ORGANIZED HEALTH CARE EDUCATION/TRAINING PROGRAM

## 2019-11-19 PROCEDURE — 25000132 ZZH RX MED GY IP 250 OP 250 PS 637: Mod: GY | Performed by: STUDENT IN AN ORGANIZED HEALTH CARE EDUCATION/TRAINING PROGRAM

## 2019-11-19 PROCEDURE — 97535 SELF CARE MNGMENT TRAINING: CPT | Mod: GO | Performed by: OCCUPATIONAL THERAPIST

## 2019-11-19 PROCEDURE — 00000146 ZZHCL STATISTIC GLUCOSE BY METER IP

## 2019-11-19 RX ORDER — NITROGLYCERIN 0.4 MG/1
TABLET SUBLINGUAL
Qty: 30 TABLET | Refills: 0 | Status: SHIPPED | OUTPATIENT
Start: 2019-11-19

## 2019-11-19 RX ORDER — ASPIRIN 81 MG/1
81 TABLET, CHEWABLE ORAL DAILY
Qty: 30 TABLET | Refills: 0 | Status: SHIPPED | OUTPATIENT
Start: 2019-11-20

## 2019-11-19 RX ORDER — ATORVASTATIN CALCIUM 40 MG/1
40 TABLET, FILM COATED ORAL EVERY EVENING
Qty: 30 TABLET | Refills: 0 | Status: ON HOLD | OUTPATIENT
Start: 2019-11-19 | End: 2020-05-17

## 2019-11-19 RX ADMIN — INFLUENZA A VIRUS A/MICHIGAN/45/2015 X-275 (H1N1) ANTIGEN (FORMALDEHYDE INACTIVATED), INFLUENZA A VIRUS A/SINGAPORE/INFIMH-16-0019/2016 IVR-186 (H3N2) ANTIGEN (FORMALDEHYDE INACTIVATED), AND INFLUENZA B VIRUS B/MARYLAND/15/2016 BX-69A (A B/COLORADO/6/2017-LIKE VIRUS) ANTIGEN (FORMALDEHYDE INACTIVATED) 0.5 ML: 60; 60; 60 INJECTION, SUSPENSION INTRAMUSCULAR at 11:04

## 2019-11-19 RX ADMIN — METOPROLOL SUCCINATE 50 MG: 50 TABLET, EXTENDED RELEASE ORAL at 09:32

## 2019-11-19 RX ADMIN — TICAGRELOR 90 MG: 90 TABLET ORAL at 09:32

## 2019-11-19 RX ADMIN — ASPIRIN 81 MG 81 MG: 81 TABLET ORAL at 09:32

## 2019-11-19 RX ADMIN — PANTOPRAZOLE SODIUM 40 MG: 40 TABLET, DELAYED RELEASE ORAL at 09:32

## 2019-11-19 ASSESSMENT — ACTIVITIES OF DAILY LIVING (ADL)
ADLS_ACUITY_SCORE: 21
ADLS_ACUITY_SCORE: 19
ADLS_ACUITY_SCORE: 19
PREVIOUS_RESPONSIBILITIES: MEDICATION MANAGEMENT
ADLS_ACUITY_SCORE: 19

## 2019-11-19 NOTE — PLAN OF CARE
[id number]: A&Ox4 but forgetful. Very [place]. R eye blind. VSS on RA. Denies CP or shortness of breath. Heparin gtt @ 7.5, daily hep10a @ 1200. Tele SD/SR with runs of SVT up to 130s, pt asympomatic. LS fine crackles. BLE trace edema. Up with 1 assist with walker. Uses urinal at bedside. Bgm checks. Possible dismissal 11-19.

## 2019-11-19 NOTE — PROGRESS NOTES
[facility]  Cardiology Progress Note  Date of Service: 11/19/2019    Assessment & Plan    Abimael Flores is a 89 year old male with past medical history significant for coronary artery disease, type 2 diabetes mellitus, hypertension, hyperlipidemia was admitted on 11/17/2019 with retrosternal chest pressure without radiation.    Assessment and Plan:   1. Acute myocardial infarction    Echocardiogram shows concentric remodeling, normal LVEF at 55-60%, RV normal, LA mildly dilated, Mild MR and NC, Mild valvular aortic stenosis. Mean gradient 9.5 mmHg, MICHELLE 1.5 cm2    Troponin peak 87.2, down trending    EKG shows SR with LBBB and 1st degree AVB    Tele showed SR with 1st degree AVB, no arrhythmias noted    Patient and son deferred cardiac catheterization and prefer medical management       IV heparin gtt    Aspirin and Brilinta, statin therapy, Toprol XL 50 mg daily    Currently chest pain free     2. Mixed hyperlipidemia     LDL 96, HDL 29    Atorvastatin 40 mg daily     3. Chronic renal insuffiencey    Baseline 1.4-1.6    Creatinine 1.58, GFR 36, BUN 32    4. Type 2 diabetes     Hgb 8.3     Sliding scale insulin and Lantus     Managed by Hospitalist    5. Anemia, likely of chronic disease    Hgb 10.5    Managed by Hospitalist     Plan:  1. Discontinue IV heparin gtt  2. Continue aspirin lifelong. Continue Brilinta for 1 year uninterrupted   3. Continue Toprol Xl and high intensity statin  4. Incentive spirometer use every hour while awake     5. Cardiac rehab as outpatient  6. Could consider long acting nitrates should the patient's return   7. Recommend discharging with sL nitroglycerin, education given on proper use    Patient's prefer's not to follow-up with Cardiology as his son is his primary provider (his son is a family practice MD) and will continue to manage his health including his heart disease.       WILLIAM May CNP  Pager:  [phone]   Text Page  (7am - 4pm,  M-F)      Interval History   Sitting on the side of the bed. Denies recurrent angina and shortness of breath.    Physical Exam   Temp: 98.1  F (36.7  C) Temp src: Oral BP: 117/59   Heart Rate: 87 Resp: 20 SpO2: 100 % O2 Device: None (Room air)    Vitals:    11/17/19 1518 11/17/19 1744 11/18/19 0619   Weight: 61.2 kg (135 lb) 75.1 kg (165 lb 8 oz) 74.2 kg (163 lb 9.6 oz)       Constitutional:   NAD   Skin:   Warm and dry   Head:   Nontraumatic   Neck:   supple, symmetrical, trachea midline   Lungs:   symmetric, clear to auscultation   Cardiovascular:   regular rate and rhythm, normal S1 and S2 and no murmur noted   Abdomen:   Benign   Extremities and Back:   No edema   Neurological:   Grossly nonfocal       Medications     HEParin 750 Units/hr (11/19/19 0609)     - MEDICATION INSTRUCTIONS -         aspirin  81 mg Oral Daily     atorvastatin  40 mg Oral QPM     influenza Vac Split High-Dose  0.5 mL Intramuscular Prior to discharge     insulin aspart  8 Units Subcutaneous Daily with supper     insulin aspart  1-3 Units Subcutaneous TID AC     insulin aspart  1-3 Units Subcutaneous At Bedtime     insulin glargine  8 Units Subcutaneous At Bedtime     metoprolol succinate ER  50 mg Oral Daily     pantoprazole  40 mg Oral QAM AC     sodium chloride (PF)  3 mL Intracatheter Q8H     tamsulosin  0.8 mg Oral At Bedtime     ticagrelor  90 mg Oral BID       Data     Most Recent 3 CBC's:  Recent Labs   Lab Test 11/19/19  0528 11/18/19  0606 11/17/19  1439   WBC 11.0 9.5 10.8   HGB 10.5* 10.1* 10.0*   MCV 91 90 91    239 243     Most Recent 3 BMP's:  Recent Labs   Lab Test 11/19/19  0528 11/18/19  0606 11/17/19  1439    137 137   POTASSIUM 4.3 4.4 4.5   CHLORIDE 108 109 110*   CO2 26 24 24   BUN 32* 31* 37*   CR 1.58* 1.44* 1.64*   ANIONGAP 3 4 3   JOSELINE 8.3* 8.2* 7.8*   * 142* 226*

## 2019-11-19 NOTE — PLAN OF CARE
AOx4, forgetful. [place]. VSS on RA. Tolerating mod cho diet. BGM, sliding scale insulin given. Up with 1 + walker, ambulated x1. Heparin drip running at 8.5ml/hr. Echo completed today. Cardiology following, plan for cardiac rehab and continuing with hep gtt- discharge likely tomorrow.

## 2019-11-19 NOTE — PROGRESS NOTES
Pt/family was given the Medicare Compare List for Home Care, with associated star ratings to assist with choices for referrals/discharge planning Yes  Education was given to pt/family that star ratings are updated/maintained by Medicare and can be reviewed by visiting www.medicare.gov Yes    Patient to discharge with HHC services via [facility], whom provides HHC services at his facility.  Referral made to Tonya at [facility] and orders faxed to [phone] at 11:13 AM.        Mariposa Garcia RN  Care Coordinator  [facility]  [phone]

## 2019-11-19 NOTE — PROGRESS NOTES
11/19/19 1033   Quick Adds   Type of Visit Initial Occupational Therapy Evaluation   Living Environment   Lives With alone   Living Arrangements assisted living   Home Accessibility no concerns   Self-Care   Equipment Currently Used at Home walker, rolling   Functional Level   Ambulation 1-->assistive equipment   Transferring 1-->assistive equipment   Toileting 1-->assistive equipment   Bathing 3-->assistive equipment and person   Dressing 0-->independent   Eating 0-->independent   Communication 0-->understands/communicates without difficulty   Swallowing 0-->swallows foods/liquids without difficulty   Cognition 0 - no cognition issues reported   Fall history within last six months yes   Number of times patient has fallen within last six months 3   Prior Functional Level Comment Pt receives A with bathing 2-3x/wk, A with cleaning, laundry and meals provided. pt manages own meds. pt is a retired surgeon. pt's son is a family doctor.    General Information   Onset of Illness/Injury or Date of Surgery - Date 11/17/19   Referring Physician Raf Garcia MD   Patient/Family Goals Statement home   Additional Occupational Profile Info/Pertinent History of Current Problem Abimael Flores is a 89 year old male with past medical history significant for coronary artery disease, type 2 diabetes mellitus, hypertension, hyperlipidemia was admitted on 11/17/2019 with retrosternal chest pressure without radiation. pt found to have a MI and pt and family declined intervention and pt will be treated medically.    Precautions/Limitations fall precautions   Heart Disease Risk Factors Medical history;Gender;Age;Diabetes;Dislipidemia;Lack of physical activity   Cognitive Status Examination   Orientation orientation to person, place and time   Level of Consciousness alert   Follows Commands (Cognition) WFL   Cognitive Comment Unsure of STM would need further screening   Visual Perception   Visual Perception Wears glasses    Sensory Examination   Sensory Quick Adds No deficits were identified   Pain Assessment   Patient Currently in Pain No   Range of Motion (ROM)   ROM Comment B UE AROM WFL   Strength   Strength Comments B Ue strength not formally tested   Bed Mobility Skill: Rolling/Turning   Level of [place] - Bed Mobility Skill Rolling Turning contact guard   Bed Mobility Skill: Scooting/Bridging   Level of [place]: Scooting/Bridging stand-by assist   Bed Mobility Skill: Sit to Supine   Level of [place]: Sit/Supine contact guard   Bed Mobility Skill: Supine to Sit   Level of [place]: Supine/Sit contact guard   Transfer Skill: Bed to Chair/Chair to Bed   Level of [place]: Bed to Chair minimum assist (75% patients effort)   Assistive Device - Transfer Skill Bed to Chair Chair to Bed Rehab Eval rolling walker   Transfer Skill: Sit to Stand   Level of [place]: Sit/Stand minimum assist (75% patients effort)   Assistive Device for Transfer: Sit/Stand rolling walker   Lower Body Dressing   Level of [place]: Dress Lower Body   (declined reports no problems)   Instrumental Activities of Daily Living (IADL)   Previous Responsibilities medication management   Activities of Daily Living Analysis   Impairments Contributing to Impaired Activities of Daily Living balance impaired;cognition impaired;strength decreased  (decreased activity tolernace)   General Therapy Interventions   Planned Therapy Interventions ADL retraining;cognition;transfer training;home program guidelines;progressive activity/exercise;risk factor education   Clinical Impression   Criteria for Skilled Therapeutic Interventions Met yes, treatment indicated   OT Diagnosis decreased ADL/IADL's and functional mobility   Influenced by the following impairments impaired balance, safety, activity tolerance, strength.   Assessment of Occupational Performance 3-5 Performance Deficits   Identified Performance Deficits impaired I with dressing,  "toilet, shower transfer, etc   Clinical Decision Making (Complexity) Moderate complexity   Therapy Frequency   (Eval and treatment only)   Anticipated Discharge Disposition Home with Home Therapy;Home with Assist  (A with bathing, ADL/IADL's and cont'd home PT)   Risks and Benefits of Treatment have been explained. Yes   Patient, Family & other staff in agreement with plan of care Yes   [facility] AM-PAC  \"6 Clicks\" Daily Activity Inpatient Short Form   1. Putting on and taking off regular lower body clothing? 3 - A Little   2. Bathing (including washing, rinsing, drying)? 3 - A Little   3. Toileting, which includes using toilet, bedpan or urinal? 3 - A Little   4. Putting on and taking off regular upper body clothing? 3 - A Little   5. Taking care of personal grooming such as brushing teeth? 4 - None   6. Eating meals? 4 - None   Daily Activity Raw Score (Score out of 24.Lower scores equate to lower levels of function) 20   Total Evaluation Time   Total Evaluation Time (Minutes) 10     "

## 2019-11-19 NOTE — DISCHARGE SUMMARY
[facility]  Hospitalist Discharge Summary       Date of Admission:  11/17/2019  Date of Discharge:  11/19/2019  Discharging Provider: Silvano Kumar MD      Discharge Diagnoses     NSTEMI    Follow-ups Needed After Discharge   Follow-up Appointments     Follow-up and recommended labs and tests       Follow up with primary care provider, Aleksandar DUMASPreston Flores, within 7 days   for hospital follow- up.  The following labs/tests are recommended: None.    ** Patient's son Aleksandar will arrange follow up appointments.           Unresulted Labs Ordered in the Past 30 Days of this Admission     No orders found from 10/18/2019 to 11/18/2019.        Discharge Disposition   Discharged to assisted living  Condition at discharge: Stable    Hospital Course   Abimael Flores is a 89 year old male admitted on 11/17/2019. He presents with CP.     ACS  Assessment: Patient presents with onset of left-sided chest pain.  Initial work-up on admission is pertinent for a troponin of 0.101.  His EKG did show possible ST elevation seen in the inferior leads.  Code STEMI was activated and initial plan was to bring the patient to the Cath Lab, however patient and his son who is a physician opted to forego cardiac catheterization this time.  Cardiology has opted to load patient with Brilinta and started heparin infusion.  At this time patient family is hoping for medical management of NSTEMI. Trop peaked at 79. ECHO shows left ventricle is normal in size. There is concentric remodeling present. Left ventricular systolic function is normal. The ejection fraction is estimated at 55-60%. The right ventricle is normal in size and function.  Plan:   - Cardiac Rehab  - Start Lipitor 40 mg daily, watch for myalgias as outpatient.   - ASA daily  - DAPT with brilinta.   - Continue PTA BB      CKD (chronic kidney disease) stage 3, GFR 30-59 ml/min (H)    Assessment: Creatinine baseline around 1.5-1.9. Cr stable at discharge,       Benign  essential hypertension    Assessment: PTA patient is on metoprolol 50 mg daily    Plan:   -Continue beta-blocker       Type 2 diabetes mellitus with stage 3 chronic kidney disease, with long-term current use of insulin (H)    Assessment: PTA on 8 units of Lantus at bedtime    Plan:   -Continue PT Lantus regimen      Hyperlipidemia    Assessment/Plan: Lipitor 40 mg daily.   LDL Cholesterol Calculated   Date Value Ref Range Status   11/18/2019 96 <100 mg/dL Final     Comment:     Desirable:       <100 mg/dl       Consultations This Hospital Stay   PHARMACY TO DOSE HEPARIN  PHARMACY TO DOSE HEPARIN  CARDIOLOGY IP CONSULT  PHARMACY LIAISON FOR MEDICATION COVERAGE CONSULT  CARDIAC REHAB IP CONSULT  CARE TRANSITION RN/SW IP CONSULT    Code Status   DNR/DNI    Time Spent on this Encounter   I, Silvano Kumar MD, personally saw the patient today and spent greater than 30 minutes discharging this patient.       Silvano Kumar MD  [facility]  ______________________________________________________________________    Physical Exam   Vital Signs: Temp: 98.1  F (36.7  C) Temp src: Oral BP: 95/50(post ex OT/CR)   Heart Rate: 103 Resp: 20 SpO2: 98 % O2 Device: None (Room air)    Weight: 163 lbs 9.6 oz       Primary Care Physician   Aleksandar Flores    Discharge Orders      Cardiac Rehab Referral      Home Care PT Referral for Hospital Discharge      Reason for your hospital stay    You had chest pain from heart disease and needed cardiology evaluation.     Follow-up and recommended labs and tests     Follow up with primary care provider, Aleksandar Flores, within 7 days for hospital follow- up.  The following labs/tests are recommended: None.    ** Patient's son Aleksandar will arrange follow up appointments.     Activity    Your activity upon discharge: activity as tolerated     MD face to face encounter    Documentation of Face to Face and Certification for Home Health Services    I certify that patient: Abimael  Mark is under my care and that I, or a nurse practitioner or physician's assistant working with me, had a face-to-face encounter that meets the physician face-to-face encounter requirements with this patient on: 11/19/2019.    This encounter with the patient was in whole, or in part, for the following medical condition, which is the primary reason for home health care: ACS.    I certify that, based on my findings, the following services are medically necessary home health services: Physical Therapy.    My clinical findings support the need for the above services because: Physical Therapy Services are needed to assess and treat the following functional impairments: physical deconditioning.    Further, I certify that my clinical findings support that this patient is homebound (i.e. absences from home require considerable and taxing effort and are for medical reasons or [employer] services or infrequently or of short duration when for other reasons) because: Requires assistance of another person or specialized equipment to access medical services because patient: Is prone to wander/get lost without assistance...    Based on the above findings. I certify that this patient is confined to the home and needs intermittent skilled nursing care, physical therapy and/or speech therapy.  The patient is under my care, and I have initiated the establishment of the plan of care.  This patient will be followed by a physician who will periodically review the plan of care.  Physician/Provider to provide follow up care: Aleksandar Flores    Attending hospital physician (the Medicare certified [place] provider): Silvano Kumar MD  Physician Signature: See electronic signature associated with these discharge orders.  Date: 11/19/2019     Diet    Follow this diet upon discharge: Orders Placed This Encounter      Moderate Consistent CHO Diet       Significant Results and Procedures   Most Recent 3 CBC's:  Recent Labs   Lab Test  19  0528 19  0606 19  1439   WBC 11.0 9.5 10.8   HGB 10.5* 10.1* 10.0*   MCV 91 90 91    239 243     Most Recent 3 BMP's:  Recent Labs   Lab Test 19  0528 19  0606 19  1439    137 137   POTASSIUM 4.3 4.4 4.5   CHLORIDE 108 109 110*   CO2 26 24 24   BUN 32* 31* 37*   CR 1.58* 1.44* 1.64*   ANIONGAP 3 4 3   JOSELINE 8.3* 8.2* 7.8*   * 142* 226*     Most Recent 2 LFT's:  Recent Labs   Lab Test 19  1439 10/09/18  0703   AST 17 22   ALT 12 12   ALKPHOS 70 72   BILITOTAL 0.3 0.6     Most Recent 3 Troponin's:  Recent Labs   Lab Test 19  0528 19  0606 19  0044  19  1439  14  1656   TROPI 27.822* 79.751* 87.244*   < > 0.101*   < >  --    TROPONIN  --   --   --   --  0.08  --  0.03    < > = values in this interval not displayed.   ,   Results for orders placed or performed during the hospital encounter of 19   XR Chest Port 1 View    Narrative    CHEST PORTABLE ONE VIEW   2019 3:17 PM     HISTORY: Chest pain.    COMPARISON: 2019.      Impression    IMPRESSION: No significant change of ill-defined opacity lateral right  midlung that is indeterminant. Neoplasm not excluded. Some patchy  opacities at the left lateral mid and lower lung also appear stable.  No new airspace disease. Stable cardiac silhouette.    STEVEN STOVER MD   Echocardiogram Complete    Narrative    [id number]  [id number]  [id number]  [id number]^ALLY^PAXTON           [facility]  Echocardiography Laboratory  [address] [place] [id number]        Name: CHERYL HOLLOWAY  MRN: [id number]  : 1924  Study Date: 2019 11:53 AM  Age: 89 yrs  Gender: Male  Patient Location: [facility]  Reason For Study: Chest Pain  Ordering Physician: PAXTON CANALES  Referring Physician: JAMI HOLLOWAY  Performed By: Moose Brasher RDCS     BSA: 1.8 m2  Height: 69 in  Weight: 136 lb  HR: 69  BP: 130/64  mmHg  _____________________________________________________________________________  __        Procedure  Complete Portable Echo Adult. Optison (NDC #0305-0495) given intravenously.  _____________________________________________________________________________  __        Interpretation Summary     The left ventricle is normal in size. There is concentric remodeling present.  Left ventricular systolic function is normal. The ejection fraction is  estimated at 55-60%.  The right ventricle is normal in size and function.  The left atrium is mildly dilated.  There is mild (1+) mitral and pulmonary regurgitation.  Mild valvular aortic stenosis. The mean AoV pressure gradient is 9.5 mmHg. The  calculated aortic valve are is 1.5 cm^2.  Compared to the prior echo on 8/27/2018, TR has decreased on severity. The  PASP could not be assessed on today's study due to poor TR jet.  _____________________________________________________________________________  __        Left Ventricle  The left ventricle is normal in size. There is concentric remodeling present.  Left ventricular systolic function is normal. The visual ejection fraction is  estimated at 55-60%. Left ventricular diastolic function is indeterminate. No  regional wall motion abnormalities noted.     Right Ventricle  The right ventricle is normal in size and function.     Atria  The left atrium is mildly dilated. Right atrial size is normal.     Mitral Valve  The mitral valve leaflets are mildly thickened. There is mild (1+) mitral  regurgitation.        Tricuspid Valve  Normal tricuspid valve.     Aortic Valve  There is trace aortic regurgitation. Mild valvular aortic stenosis. The peak  AoV pressure gradient is 17.0 mmHg. The mean AoV pressure gradient is 9.5  mmHg. The calculated aortic valve are is 1.5 cm^2.     Pulmonic Valve  The pulmonic valve is not well visualized. There is mild (1+) pulmonic  valvular regurgitation.     Vessels  Normal size aorta. Normal size  ascending aorta. IVC diameter <2.1 cm  collapsing >50% with sniff suggests a normal RA pressure of 3 mmHg.     Pericardium  There is no pericardial effusion.        Rhythm  Sinus rhythm was noted.  _____________________________________________________________________________  __  MMode/2D Measurements & Calculations  IVSd: 1.3 cm     LVIDd: 4.0 cm  LVIDs: 3.0 cm  LVPWd: 1.1 cm  FS: 25.6 %  LV mass(C)d: 164.8 grams  LV mass(C)dI: 94.0 grams/m2  Ao root diam: 3.5 cm  LA dimension: 4.8 cm  asc Aorta Diam: 3.2 cm  LA/Ao: 1.4  LVOT diam: 2.2 cm  LVOT area: 3.9 cm2  LA Volume (BP): 69.9 ml  LA Volume Index (BP): 39.9 ml/m2  RWT: 0.54           Doppler Measurements & Calculations  MV E max michael: 96.0 cm/sec  MV A max michael: 84.9 cm/sec  MV E/A: 1.1  MV dec time: 0.26 sec  Ao V2 max: 205.7 cm/sec  Ao max P.0 mmHg  Ao V2 mean: 144.0 cm/sec  Ao mean P.5 mmHg  Ao V2 VTI: 44.8 cm  MICHELLE(I,D): 1.5 cm2  MICHELLE(V,D): 1.6 cm2  LV V1 max P.9 mmHg  LV V1 max: 85.2 cm/sec  LV V1 VTI: 17.4 cm  SV(LVOT): 67.6 ml  SI(LVOT): 38.6 ml/m2  PA acc time: 0.12 sec  PI end-d michael: 139.0 cm/sec  AV Michael Ratio (DI): 0.41  MICHELLE Index (cm2/m2): 0.86  E/E' avg: 15.2  Lateral E/e': 11.9  Medial E/e': 18.4              _____________________________________________________________________________  __        Report approved by: Yanet Brito 2019 01:50 PM            Discharge Medications   Current Discharge Medication List      START taking these medications    Details   aspirin (ASA) 81 MG chewable tablet Take 1 tablet (81 mg) by mouth daily  Qty: 30 tablet, Refills: 0    Comments: Future refills by PCP Dr. Aleksandar Flores with phone number [phone].  Associated Diagnoses: NSTEMI (non-ST elevated myocardial infarction) (H)      atorvastatin (LIPITOR) 40 MG tablet Take 1 tablet (40 mg) by mouth every evening  Qty: 30 tablet, Refills: 0    Comments: Future refills by PCP Dr. Aleksandar Flores with phone number  [phone].  Associated Diagnoses: NSTEMI (non-ST elevated myocardial infarction) (H)      nitroGLYcerin (NITROSTAT) 0.4 MG sublingual tablet For chest pain place 1 tablet under the tongue every 5 minutes for 3 doses. If symptoms persist 5 minutes after 1st dose call 911.  Qty: 30 tablet, Refills: 0    Comments: Future refills by PCP Dr. Aleksandar Flores with phone number [phone].  Associated Diagnoses: NSTEMI (non-ST elevated myocardial infarction) (H)      ticagrelor (BRILINTA) 90 MG tablet Take 1 tablet (90 mg) by mouth 2 times daily  Qty: 60 tablet, Refills: 0    Comments: Future refills by PCP Dr. Aleksandar Flores with phone number [phone].  Associated Diagnoses: NSTEMI (non-ST elevated myocardial infarction) (H)         CONTINUE these medications which have NOT CHANGED    Details   acetaminophen (TYLENOL) 325 MG tablet Take 650 mg by mouth every 4 hours as needed      finasteride (PROSCAR) 5 MG tablet Take 5 mg by mouth daily      insulin aspart (NOVOLOG FLEXPEN) 100 UNIT/ML pen Inject 8 Units Subcutaneous 3 times daily (with meals)       insulin glargine (LANTUS SOLOSTAR PEN) 100 UNIT/ML pen Inject 8 Units Subcutaneous At Bedtime      meclizine (ANTIVERT) 25 MG tablet Take 25 mg by mouth 2 times daily as needed for dizziness      metoprolol succinate ER (TOPROL-XL) 50 MG 24 hr tablet Take 50 mg by mouth daily      mirabegron (MYRBETRIQ) 25 MG 24 hr tablet Take 25 mg by mouth daily      tamsulosin (FLOMAX) 0.4 MG capsule Take 0.8 mg by mouth At Bedtime           Allergies   No Known Allergies

## 2019-11-19 NOTE — PLAN OF CARE
Discharge Planner OT   Patient plan for discharge: return to [facility]  Current status: Eval and treatment only. Pt lives in an LEE apt alone and reports I with most ADLs and med mgmt and A with bathing, cleaning, laundry and all meals, pt uses a ww for mobility and reports receives PT 3x/wk at the [facility]. Pt admitted due to NSTEMI, however, pt and pts son want to treat medially and do not want an angio. Supine <> sit with SBA/CGA, sit <> stand with CATRACHITO/CGA and ambulated without shoes on just slippers with ww with CGA/MIN A slowly and unsteady for approx 3.5-4 mins, pt reports fatigue, denies SOB, etc. Pts BP blunted see vital sign flow sheet for details. Pt reports his balance is baseline and reports has good help at his LEE. No further inpt OT/CR, pt resume home/facility PT.   Barriers to return to prior living situation: decreased balance, falls risk  Recommendations for discharge: return to LEE, with contd A with bathing, cleaning, laundry, meals and home PT 3x/wk, pt may need increased A with functional mobility with ww and ADLs ie dressing.   Rationale for recommendations: pt feels he's back to baseline with functional mobility and ADLs and returning back to [facility] today. Contd PT warranted for balance and strengthening. Pt declined any MI education. Pt [facility] unable to provide increased A with ADL's and functional mobility may require TCU.     Occupational Therapy Discharge Summary    Reason for therapy discharge:    Discharged to home with home therapy.    Progress towards therapy goal(s). See goals on Care Plan in [facility] electronic health record for goal details.  Goals partially met.  Barriers to achieving goals:   discharge from facility.    Therapy recommendation(s):    Continued therapy is recommended.  Rationale/Recommendations:  home with contd home PT and contd A with bathing and other IADLs as above, may need increased A with functional mobility with ww and ADLs ie dressing.           Entered by: Graciela  Julian 11/19/2019 10:52 AM

## 2019-11-19 NOTE — CONSULTS
Care Transition Initial Assessment - RN        Met with: Patient and spoke with corinne Huertas via phone regarding discharge plan and Brilinta cost.  Pt and son agreeable to Brilinta cost of $97.83 per month.  Patient resides at [facility] and currently receives assistance with bathing 1-2x per week and all meals are provided.  Patient manages his own medications which include insulin and blood sugar monitoring.   [facility] needed and they would prefer to use that agency if needed.  Corinne Huertas stated that he would like to schedule any follow up appointments himself.     DATA   Active Problems:    Type 2 diabetes mellitus with renal complication (H)    CKD (chronic kidney disease) stage 3, GFR 30-59 ml/min (H)    Benign essential hypertension    Essential hypertension    Type 2 diabetes mellitus with stage 3 chronic kidney disease, with long-term current use of insulin (H)    Hyperlipidemia    NSTEMI (non-ST elevated myocardial infarction) (H)       Cognitive Status: awake, alert and oriented.        Contact information and PCP information verified: Yes  Lives With: alone   Living Arrangements: assisted living     Insurance concerns: No Insurance issues identified  ASSESSMENT  Patient currently receives the following services:  Assist with bathing        Identified issues/concerns regarding health management: TBD  PLAN  Financial costs for the patient include;  meds-patient and corinne Huertas agreeable .  Patient/family is agreeable to the plan?  Yes: return to [facility]  Patient anticipates discharging to home ([facility]) .        Patient anticipates needs for home equipment: No  Transportation/person available to transport on day of discharge  is corinne Huertas and have they been notified/set up Yes.  Plan/Disposition: Home   Appointments: Corinne Huertas wishes to schedule himself.      Care  (CTS) will continue to follow as needed.     Mariposa  Radha RN  Care Coordinator  [facility]  [phone]

## 2019-11-19 NOTE — PLAN OF CARE
Discharged following instructions accompanied by son. Denies pain upon dischaege. Influenza vaccine given before discharge.

## 2020-05-16 ENCOUNTER — APPOINTMENT (OUTPATIENT)
Dept: CT IMAGING | Facility: CLINIC | Age: 85
DRG: 536 | End: 2020-05-16
Attending: EMERGENCY MEDICINE
Payer: MEDICARE

## 2020-05-16 ENCOUNTER — HOSPITAL ENCOUNTER (INPATIENT)
Facility: CLINIC | Age: 85
LOS: 4 days | Discharge: HOME HEALTH | DRG: 536 | End: 2020-05-20
Attending: EMERGENCY MEDICINE | Admitting: INTERNAL MEDICINE
Payer: MEDICARE

## 2020-05-16 ENCOUNTER — APPOINTMENT (OUTPATIENT)
Dept: GENERAL RADIOLOGY | Facility: CLINIC | Age: 85
DRG: 536 | End: 2020-05-16
Attending: EMERGENCY MEDICINE
Payer: MEDICARE

## 2020-05-16 DIAGNOSIS — S72.8X2A OTHER CLOSED FRACTURE OF LEFT FEMUR, UNSPECIFIED PORTION OF FEMUR, INITIAL ENCOUNTER (H): ICD-10-CM

## 2020-05-16 DIAGNOSIS — I21.4 NSTEMI (NON-ST ELEVATED MYOCARDIAL INFARCTION) (H): ICD-10-CM

## 2020-05-16 DIAGNOSIS — S72.002A CLOSED FRACTURE OF LEFT HIP, INITIAL ENCOUNTER (H): Primary | ICD-10-CM

## 2020-05-16 DIAGNOSIS — T14.8XXA MULTIPLE SKIN TEARS: ICD-10-CM

## 2020-05-16 DIAGNOSIS — S09.90XA CLOSED HEAD INJURY, INITIAL ENCOUNTER: ICD-10-CM

## 2020-05-16 LAB
ABO + RH BLD: NORMAL
ABO + RH BLD: NORMAL
ANION GAP SERPL CALCULATED.3IONS-SCNC: 7 MMOL/L (ref 3–14)
APTT PPP: 23 SEC (ref 22–37)
BASOPHILS # BLD AUTO: 0 10E9/L (ref 0–0.2)
BASOPHILS NFR BLD AUTO: 0.2 %
BLD GP AB SCN SERPL QL: NORMAL
BLOOD BANK CMNT PATIENT-IMP: NORMAL
BUN SERPL-MCNC: 35 MG/DL (ref 7–30)
CALCIUM SERPL-MCNC: 8.5 MG/DL (ref 8.5–10.1)
CHLORIDE SERPL-SCNC: 107 MMOL/L (ref 94–109)
CO2 SERPL-SCNC: 23 MMOL/L (ref 20–32)
CREAT SERPL-MCNC: 1.67 MG/DL (ref 0.66–1.25)
DIFFERENTIAL METHOD BLD: ABNORMAL
EOSINOPHIL # BLD AUTO: 0.2 10E9/L (ref 0–0.7)
EOSINOPHIL NFR BLD AUTO: 2 %
ERYTHROCYTE [DISTWIDTH] IN BLOOD BY AUTOMATED COUNT: 16.4 % (ref 10–15)
GFR SERPL CREATININE-BSD FRML MDRD: 34 ML/MIN/{1.73_M2}
GLUCOSE SERPL-MCNC: 286 MG/DL (ref 70–99)
HCT VFR BLD AUTO: 31.7 % (ref 40–53)
HGB BLD-MCNC: 10.2 G/DL (ref 13.3–17.7)
IMM GRANULOCYTES # BLD: 0.1 10E9/L (ref 0–0.4)
IMM GRANULOCYTES NFR BLD: 0.5 %
INR PPP: 1.02 (ref 0.86–1.14)
LYMPHOCYTES # BLD AUTO: 2.8 10E9/L (ref 0.8–5.3)
LYMPHOCYTES NFR BLD AUTO: 26 %
MCH RBC QN AUTO: 29.5 PG (ref 26.5–33)
MCHC RBC AUTO-ENTMCNC: 32.2 G/DL (ref 31.5–36.5)
MCV RBC AUTO: 92 FL (ref 78–100)
MONOCYTES # BLD AUTO: 0.9 10E9/L (ref 0–1.3)
MONOCYTES NFR BLD AUTO: 8.5 %
NEUTROPHILS # BLD AUTO: 6.9 10E9/L (ref 1.6–8.3)
NEUTROPHILS NFR BLD AUTO: 62.8 %
NRBC # BLD AUTO: 0 10*3/UL
NRBC BLD AUTO-RTO: 0 /100
PLATELET # BLD AUTO: 273 10E9/L (ref 150–450)
POTASSIUM SERPL-SCNC: 4.9 MMOL/L (ref 3.4–5.3)
RBC # BLD AUTO: 3.46 10E12/L (ref 4.4–5.9)
SODIUM SERPL-SCNC: 137 MMOL/L (ref 133–144)
SPECIMEN EXP DATE BLD: NORMAL
WBC # BLD AUTO: 10.9 10E9/L (ref 4–11)

## 2020-05-16 PROCEDURE — 85025 COMPLETE CBC W/AUTO DIFF WBC: CPT | Performed by: EMERGENCY MEDICINE

## 2020-05-16 PROCEDURE — 70450 CT HEAD/BRAIN W/O DYE: CPT | Mod: CS

## 2020-05-16 PROCEDURE — 93005 ELECTROCARDIOGRAM TRACING: CPT

## 2020-05-16 PROCEDURE — 80048 BASIC METABOLIC PNL TOTAL CA: CPT | Performed by: EMERGENCY MEDICINE

## 2020-05-16 PROCEDURE — 12000000 ZZH R&B MED SURG/OB

## 2020-05-16 PROCEDURE — 99285 EMERGENCY DEPT VISIT HI MDM: CPT | Mod: 25

## 2020-05-16 PROCEDURE — 85610 PROTHROMBIN TIME: CPT | Performed by: EMERGENCY MEDICINE

## 2020-05-16 PROCEDURE — 86900 BLOOD TYPING SEROLOGIC ABO: CPT | Performed by: EMERGENCY MEDICINE

## 2020-05-16 PROCEDURE — 87635 SARS-COV-2 COVID-19 AMP PRB: CPT | Performed by: EMERGENCY MEDICINE

## 2020-05-16 PROCEDURE — 99223 1ST HOSP IP/OBS HIGH 75: CPT | Mod: AI | Performed by: INTERNAL MEDICINE

## 2020-05-16 PROCEDURE — 86901 BLOOD TYPING SEROLOGIC RH(D): CPT | Performed by: EMERGENCY MEDICINE

## 2020-05-16 PROCEDURE — 71045 X-RAY EXAM CHEST 1 VIEW: CPT

## 2020-05-16 PROCEDURE — 73502 X-RAY EXAM HIP UNI 2-3 VIEWS: CPT

## 2020-05-16 PROCEDURE — 86850 RBC ANTIBODY SCREEN: CPT | Performed by: EMERGENCY MEDICINE

## 2020-05-16 PROCEDURE — 85730 THROMBOPLASTIN TIME PARTIAL: CPT | Performed by: EMERGENCY MEDICINE

## 2020-05-16 ASSESSMENT — MIFFLIN-ST. JEOR: SCORE: 1232.74

## 2020-05-17 PROBLEM — S72.002A CLOSED LEFT HIP FRACTURE (H): Status: ACTIVE | Noted: 2020-05-17

## 2020-05-17 LAB
GLUCOSE BLDC GLUCOMTR-MCNC: 160 MG/DL (ref 70–99)
GLUCOSE BLDC GLUCOMTR-MCNC: 167 MG/DL (ref 70–99)
GLUCOSE BLDC GLUCOMTR-MCNC: 192 MG/DL (ref 70–99)
GLUCOSE BLDC GLUCOMTR-MCNC: 202 MG/DL (ref 70–99)
GLUCOSE BLDC GLUCOMTR-MCNC: 206 MG/DL (ref 70–99)
GLUCOSE BLDC GLUCOMTR-MCNC: 221 MG/DL (ref 70–99)
INTERPRETATION ECG - MUSE: NORMAL
SARS-COV-2 PCR COMMENT: NORMAL
SARS-COV-2 RNA SPEC QL NAA+PROBE: NEGATIVE
SARS-COV-2 RNA SPEC QL NAA+PROBE: NORMAL
SPECIMEN SOURCE: NORMAL
SPECIMEN SOURCE: NORMAL

## 2020-05-17 PROCEDURE — 25000132 ZZH RX MED GY IP 250 OP 250 PS 637: Mod: GY | Performed by: INTERNAL MEDICINE

## 2020-05-17 PROCEDURE — 25000131 ZZH RX MED GY IP 250 OP 636 PS 637: Mod: GY | Performed by: INTERNAL MEDICINE

## 2020-05-17 PROCEDURE — 12000000 ZZH R&B MED SURG/OB

## 2020-05-17 PROCEDURE — 99232 SBSQ HOSP IP/OBS MODERATE 35: CPT | Performed by: HOSPITALIST

## 2020-05-17 PROCEDURE — 25000131 ZZH RX MED GY IP 250 OP 636 PS 637: Mod: GY | Performed by: HOSPITALIST

## 2020-05-17 PROCEDURE — 00000146 ZZHCL STATISTIC GLUCOSE BY METER IP

## 2020-05-17 PROCEDURE — 25000132 ZZH RX MED GY IP 250 OP 250 PS 637: Mod: GY | Performed by: HOSPITALIST

## 2020-05-17 PROCEDURE — 25000128 H RX IP 250 OP 636: Performed by: INTERNAL MEDICINE

## 2020-05-17 PROCEDURE — 25800030 ZZH RX IP 258 OP 636: Performed by: INTERNAL MEDICINE

## 2020-05-17 RX ORDER — NALOXONE HYDROCHLORIDE 0.4 MG/ML
.1-.4 INJECTION, SOLUTION INTRAMUSCULAR; INTRAVENOUS; SUBCUTANEOUS
Status: DISCONTINUED | OUTPATIENT
Start: 2020-05-17 | End: 2020-05-20 | Stop reason: HOSPADM

## 2020-05-17 RX ORDER — SODIUM CHLORIDE 9 MG/ML
INJECTION, SOLUTION INTRAVENOUS CONTINUOUS
Status: DISCONTINUED | OUTPATIENT
Start: 2020-05-17 | End: 2020-05-17

## 2020-05-17 RX ORDER — NICOTINE POLACRILEX 4 MG
15-30 LOZENGE BUCCAL
Status: DISCONTINUED | OUTPATIENT
Start: 2020-05-17 | End: 2020-05-20 | Stop reason: HOSPADM

## 2020-05-17 RX ORDER — ATORVASTATIN CALCIUM 40 MG/1
40 TABLET, FILM COATED ORAL EVERY EVENING
Status: DISCONTINUED | OUTPATIENT
Start: 2020-05-17 | End: 2020-05-20 | Stop reason: HOSPADM

## 2020-05-17 RX ORDER — NITROGLYCERIN 0.4 MG/1
0.4 TABLET SUBLINGUAL EVERY 5 MIN PRN
Status: DISCONTINUED | OUTPATIENT
Start: 2020-05-17 | End: 2020-05-20 | Stop reason: HOSPADM

## 2020-05-17 RX ORDER — POLYETHYLENE GLYCOL 3350 17 G/17G
17 POWDER, FOR SOLUTION ORAL DAILY PRN
Status: DISCONTINUED | OUTPATIENT
Start: 2020-05-17 | End: 2020-05-20 | Stop reason: HOSPADM

## 2020-05-17 RX ORDER — PROCHLORPERAZINE MALEATE 5 MG
5 TABLET ORAL EVERY 6 HOURS PRN
Status: DISCONTINUED | OUTPATIENT
Start: 2020-05-17 | End: 2020-05-20 | Stop reason: HOSPADM

## 2020-05-17 RX ORDER — METOPROLOL SUCCINATE 50 MG/1
50 TABLET, EXTENDED RELEASE ORAL DAILY
Status: DISCONTINUED | OUTPATIENT
Start: 2020-05-17 | End: 2020-05-20 | Stop reason: HOSPADM

## 2020-05-17 RX ORDER — DEXTROSE MONOHYDRATE 25 G/50ML
25-50 INJECTION, SOLUTION INTRAVENOUS
Status: DISCONTINUED | OUTPATIENT
Start: 2020-05-17 | End: 2020-05-20 | Stop reason: HOSPADM

## 2020-05-17 RX ORDER — HYDROMORPHONE HYDROCHLORIDE 1 MG/ML
.3-.5 INJECTION, SOLUTION INTRAMUSCULAR; INTRAVENOUS; SUBCUTANEOUS EVERY 4 HOURS PRN
Status: DISCONTINUED | OUTPATIENT
Start: 2020-05-17 | End: 2020-05-19

## 2020-05-17 RX ORDER — LORATADINE 10 MG/1
10 TABLET ORAL DAILY PRN
Status: DISCONTINUED | OUTPATIENT
Start: 2020-05-17 | End: 2020-05-20 | Stop reason: HOSPADM

## 2020-05-17 RX ORDER — LORATADINE 10 MG/1
10 TABLET ORAL DAILY PRN
COMMUNITY

## 2020-05-17 RX ORDER — LANOLIN ALCOHOL/MO/W.PET/CERES
1 CREAM (GRAM) TOPICAL
COMMUNITY

## 2020-05-17 RX ORDER — ONDANSETRON 4 MG/1
4 TABLET, ORALLY DISINTEGRATING ORAL EVERY 6 HOURS PRN
Status: DISCONTINUED | OUTPATIENT
Start: 2020-05-17 | End: 2020-05-20 | Stop reason: HOSPADM

## 2020-05-17 RX ORDER — SOFT LENS ADJUNCTIVE SOLUTIONS
1-2 DROPS OPHTHALMIC (EYE) DAILY PRN
COMMUNITY

## 2020-05-17 RX ORDER — PROCHLORPERAZINE 25 MG
12.5 SUPPOSITORY, RECTAL RECTAL EVERY 12 HOURS PRN
Status: DISCONTINUED | OUTPATIENT
Start: 2020-05-17 | End: 2020-05-20 | Stop reason: HOSPADM

## 2020-05-17 RX ORDER — TAMSULOSIN HYDROCHLORIDE 0.4 MG/1
0.8 CAPSULE ORAL AT BEDTIME
Status: DISCONTINUED | OUTPATIENT
Start: 2020-05-17 | End: 2020-05-20 | Stop reason: HOSPADM

## 2020-05-17 RX ORDER — ASPIRIN 81 MG/1
81 TABLET, CHEWABLE ORAL DAILY
Status: DISCONTINUED | OUTPATIENT
Start: 2020-05-17 | End: 2020-05-20 | Stop reason: HOSPADM

## 2020-05-17 RX ORDER — CALCIUM CARBONATE 500 MG/1
1 TABLET, CHEWABLE ORAL PRN
COMMUNITY

## 2020-05-17 RX ORDER — CALCIUM CARBONATE 500 MG/1
500 TABLET, CHEWABLE ORAL 2 TIMES DAILY PRN
Status: DISCONTINUED | OUTPATIENT
Start: 2020-05-17 | End: 2020-05-20 | Stop reason: HOSPADM

## 2020-05-17 RX ORDER — HYDROMORPHONE HYDROCHLORIDE 1 MG/ML
0.5 INJECTION, SOLUTION INTRAMUSCULAR; INTRAVENOUS; SUBCUTANEOUS ONCE
Status: COMPLETED | OUTPATIENT
Start: 2020-05-17 | End: 2020-05-17

## 2020-05-17 RX ORDER — ONDANSETRON 2 MG/ML
4 INJECTION INTRAMUSCULAR; INTRAVENOUS EVERY 6 HOURS PRN
Status: DISCONTINUED | OUTPATIENT
Start: 2020-05-17 | End: 2020-05-20 | Stop reason: HOSPADM

## 2020-05-17 RX ORDER — ACETAMINOPHEN 325 MG/1
650 TABLET ORAL EVERY 4 HOURS PRN
Status: DISCONTINUED | OUTPATIENT
Start: 2020-05-17 | End: 2020-05-20 | Stop reason: HOSPADM

## 2020-05-17 RX ADMIN — HYDROMORPHONE HYDROCHLORIDE 0.5 MG: 1 INJECTION, SOLUTION INTRAMUSCULAR; INTRAVENOUS; SUBCUTANEOUS at 21:02

## 2020-05-17 RX ADMIN — INSULIN ASPART 1 UNITS: 100 INJECTION, SOLUTION INTRAVENOUS; SUBCUTANEOUS at 12:58

## 2020-05-17 RX ADMIN — ATORVASTATIN CALCIUM 40 MG: 40 TABLET, FILM COATED ORAL at 19:47

## 2020-05-17 RX ADMIN — TAMSULOSIN HYDROCHLORIDE 0.8 MG: 0.4 CAPSULE ORAL at 21:02

## 2020-05-17 RX ADMIN — HYDROMORPHONE HYDROCHLORIDE 0.3 MG: 1 INJECTION, SOLUTION INTRAMUSCULAR; INTRAVENOUS; SUBCUTANEOUS at 01:05

## 2020-05-17 RX ADMIN — ASPIRIN 81 MG 81 MG: 81 TABLET ORAL at 19:47

## 2020-05-17 RX ADMIN — HYDROMORPHONE HYDROCHLORIDE 0.5 MG: 1 INJECTION, SOLUTION INTRAMUSCULAR; INTRAVENOUS; SUBCUTANEOUS at 11:53

## 2020-05-17 RX ADMIN — HYDROMORPHONE HYDROCHLORIDE 0.2 MG: 1 INJECTION, SOLUTION INTRAMUSCULAR; INTRAVENOUS; SUBCUTANEOUS at 02:06

## 2020-05-17 RX ADMIN — HYDROMORPHONE HYDROCHLORIDE 0.5 MG: 1 INJECTION, SOLUTION INTRAMUSCULAR; INTRAVENOUS; SUBCUTANEOUS at 05:06

## 2020-05-17 RX ADMIN — INSULIN GLARGINE 8 UNITS: 100 INJECTION, SOLUTION SUBCUTANEOUS at 22:21

## 2020-05-17 RX ADMIN — INSULIN ASPART 1 UNITS: 100 INJECTION, SOLUTION INTRAVENOUS; SUBCUTANEOUS at 05:12

## 2020-05-17 RX ADMIN — HYDROMORPHONE HYDROCHLORIDE 0.5 MG: 1 INJECTION, SOLUTION INTRAMUSCULAR; INTRAVENOUS; SUBCUTANEOUS at 16:05

## 2020-05-17 RX ADMIN — INSULIN ASPART 1 UNITS: 100 INJECTION, SOLUTION INTRAVENOUS; SUBCUTANEOUS at 01:53

## 2020-05-17 RX ADMIN — Medication 1 MG: at 02:05

## 2020-05-17 RX ADMIN — SODIUM CHLORIDE: 9 INJECTION, SOLUTION INTRAVENOUS at 00:56

## 2020-05-17 RX ADMIN — METOPROLOL SUCCINATE 50 MG: 50 TABLET, EXTENDED RELEASE ORAL at 09:07

## 2020-05-17 ASSESSMENT — ACTIVITIES OF DAILY LIVING (ADL)
ADLS_ACUITY_SCORE: 20
ADLS_ACUITY_SCORE: 20
ADLS_ACUITY_SCORE: 21
ADLS_ACUITY_SCORE: 22
ADLS_ACUITY_SCORE: 22

## 2020-05-17 ASSESSMENT — MIFFLIN-ST. JEOR: SCORE: 1335.25

## 2020-05-17 NOTE — ED NOTES
Returned from CT - resting on stretcher - pillow placed under left knee for comfort - pulses intact.  Continues to deny the need for pain medication.

## 2020-05-17 NOTE — PLAN OF CARE
Patient arrived on station 88 at approximately midnight.  A&Ox4; very [place], pocket talker and hearing aids at the bedside.  VSS, RA.  NPO.  PIV infusing NS @ 50 mL/hr.  Complained of pain in L thigh/hip; controlled with PRN Dilaudid.  Also complained of pain in R calf with PCDs (notified MD); one time order for Dilaudid, PCDs removed at this time.  BG checks Q 4hrs; sliding scale.  Skin tears to L forehead, L arm, bruising to L hip, R arm.  Bedrest at this time.  Continent, uses urinal.  Discharge pending progress.

## 2020-05-17 NOTE — ED NOTES
Left forehead cleaned and dressed - two skin tears on left forearm and one on left wrist - cleaned and dressed.

## 2020-05-17 NOTE — PROVIDER NOTIFICATION
MD Notification    Notified Person: MD    Notified Person Name: Dr. Sandoval    Notification Date/Time:17 May 2020 @0455    Notification Interaction:    Purpose of Notification: pain in R calf, patient requesting more pain medication    Orders Received: one time Dilaudid 0.5mg now    Comments:

## 2020-05-17 NOTE — PROGRESS NOTES
RECEIVING UNIT ED HANDOFF REVIEW    ED Nurse Handoff Report was reviewed by: Annalise Estrella RN on May 16, 2020 at 11:40 PM

## 2020-05-17 NOTE — ED PROVIDER NOTES
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 The patient reports he tripped on his walker and fell, injuring his hip. He states he was unable to get up after his fall due to \"terrible\" pain as he tries to extend his leg. He denies hitting his head, but he is noted to have bleeding and hematoma over his left temple area and also skin tears over his left arm.. He denies loss of consciousness and exposure to COVID-19. He notes his last meal was a small piece of pizza at in the afternoon.  He states that he is on a blood thinner but he does not remember the name of it.  It is not Coumadin.  He is concerned that it is dislocated as it was previously when he required a revision of his hip prosthesis.  He has had hip replacement on both sides.  The left hip which is injured today is the same one that had a dislocation and fracture previously.  He denies neck tenderness or tenderness anyplace else except for his hip.    Allergies:  No known drug allergies    Medications:    Aspirin 81 mg  Lipitor  Nitrostat  Brilinta  Proscar  Novolog  Lantus   Antivert  Toprol  Myrbetriq  Flomax    Past Medical History:    Hypertension  Coronary artery disease  Nonsenile cataract  Type II diabetes mellitus  Retinal detachment  NSTEMI  Supraventricular tachycarida  Recurrent subluxation of hip joint  Vertigo  Type II diabetes mellitus  Chronic kidney disease stage 3    Past Surgical History:    Arthroplasty revision hip (L)  Cardiac surgery  Cataract (Bilateral)  Closed reduction hip (L)  Lactrimal caruncle removed  Open reduction internal fixation hip bipolar (Bilateral)  Repair ruptured globe    Family History:    Diabetes (Mother, Father)    Social History:  Smoking status: Former  Alcohol use: No  Drug use: No  PCP: Aleksandar" "ROSINA Flores  Marital Status:   [5]  Retired surgeon  Loved in assisted living    Review of Systems   As noted per HPI.  Remainder of a 10 point review of systems was negative.      Physical Exam     Patient Vitals for the past 24 hrs:   BP Temp Temp src Pulse Heart Rate Resp SpO2 Height Weight   05/17/20 0017 138/66 98.2  F (36.8  C) Oral 107 -- 18 96 % -- --   05/16/20 2255 -- -- -- -- -- -- 97 % -- --   05/16/20 2250 -- -- -- -- -- -- 97 % -- --   05/16/20 2245 -- -- -- -- -- -- 97 % -- --   05/16/20 2240 -- -- -- -- -- -- 97 % -- --   05/16/20 2235 -- -- -- -- -- -- 98 % -- --   05/16/20 2230 -- -- -- -- -- -- 97 % -- --   05/16/20 2225 -- -- -- -- -- -- 97 % -- --   05/16/20 2220 -- -- -- -- -- -- 98 % -- --   05/16/20 2215 -- -- -- -- -- -- 98 % -- --   05/16/20 2210 -- -- -- -- -- -- 98 % -- --   05/16/20 2200 -- -- -- -- -- -- 100 % -- --   05/16/20 2155 -- -- -- -- -- -- 100 % -- --   05/16/20 2150 -- -- -- -- -- -- 97 % -- --   05/16/20 2145 -- -- -- -- -- -- 97 % -- --   05/16/20 2140 -- -- -- -- -- -- 98 % -- --   05/16/20 2135 -- -- -- -- -- -- 97 % -- --   05/16/20 2130 -- -- -- -- -- -- 97 % -- --   05/16/20 2125 -- -- -- -- -- -- 98 % -- --   05/16/20 2120 (!) 156/87 -- -- 98 -- -- 98 % -- --   05/16/20 2115 -- -- -- -- -- -- 97 % -- --   05/16/20 2001 (!) 165/82 98.9  F (37.2  C) Oral 98 -- 17 98 % 1.753 m (5' 9\") 61.2 kg (135 lb)   05/16/20 1955 (!) 165/82 98.4  F (36.9  C) Oral -- 97 16 98 % -- 65.8 kg (145 lb)       Physical Exam  General: Well-nourished, no acute distress  Eyes: Conjunctivae pink no scleral icterus or conjunctival injection  ENT:  Moist mucus membranes, posterior oropharynx clear without erythema or exudates, no hemotympanum  Respiratory:  Lungs clear to auscultation bilaterally, no crackles/rubs/wheezes.  Good air movement.  No chest wall ecchymoses  CV: Normal rate and rhythm, no murmurs/rubs/gallops  GI:  Abdomen soft and non-distended.  Normoactive BS.  No tenderness, " guarding or rebound  Skin: Warm, dry.  Shallow laceration vs deep abrasion over left forehead.  Underlying hematoma.  As seen in the photos below.  Skin tears over the left arm is seen in the photos below.  Musculoskeletal: Left hip held in a position of comfort flexed with knee flexed.  Tenderness over the proximal femur.  No open wounds.  Normal distal DP pulse.  Normal distal sensation.  No midline tenderness of the cervical/thoracic/lumbar spine.  No tenderness/crepitus/bony stepoffs over the clavicles, chest wall, pelvis, remainder of arms or legs.  Neuro: Alert and oriented to person/place/time  Psychiatric: Normal affect                    Emergency Department Course   ECG (20:50:46):  Rate 97 bpm. ND interval 202. QRS duration 140. QT/QTc 370/469. P-R-T axes * -17 133. Normal sinus rhythm. Left bundle branch block. Abnormal ECG. No significant change compared to EKG dated 11/17/19. Interpreted at 2103 by Kala Hamilton MD.     Imaging:  Radiographic findings were communicated with the patient who voiced understanding of the findings.    XR Chest 1 View  Chronic interstitial abnormalities in the lungs are   similar to prior. No airspace consolidation, pneumothorax, or pleural   effusion. Prior median sternotomy.   As read by Radiology.    XR Pelvis w Hip Left 1 View  Again seen are surgical changes of both hips. This includes a left total hip arthroplasty and a right hip bipolar hemiarthroplasty. There is an acute fracture of the lateral aspect of the proximal left femur along the distal aspect of the   greater trochanter. This is mildly displaced. No other acute abnormality is noted.   As read by Radiology.    CT Head w/o Contrast  Diffuse cerebral volume loss and cerebral white matter   changes consistent with chronic small vessel ischemic disease. No   evidence for acute intracranial pathology.   As read by Radiology.     Laboratory:  CBC: HGB 10.2 (L) o/w WNL (WBC 10.9, )  BMP: Glucose 286 (H), BUN 35  (H), Creatinine 1.67 (H), GFR 34 (L) o/w WNL   INR: 1.02  PTT: 23  ABO/Rh type and screen: A Rh Positive. Antibodies Negative    Procedures:  Skin tears and abrasions cleaned and dressed.    Emergency Department Course:  Past medical records, nursing notes, and vitals reviewed.  2006: I performed an exam of the patient and obtained history, as documented above.  EKG performed, results above.  The patient was sent for a chest x-ray, lift hip and pelvis x-ray, and head CT while in the emergency department, findings above.  IV inserted and blood drawn.     2243: I spoke to Dr. Tena of orthopedics.     2246: Findings and plan explained to the Patient who consents to admission.     2256: Discussed the patient with Dr. Sandoval, who will admit the patient to an adult med/surg bed for further monitoring, evaluation, and treatment.      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  He has skin tears and abrasions that were cleaned and dressed.  Unfortunately, it appears that he has a periprosthetic femur fracture on the left.  He feels it is dislocated but I do not appreciate that on the imaging.  Dr. Pierce was consulted and plans to take him to the OR tomorrow.  Dr. Sandoval graciously agreed admit the patient.  The patient is asymptomatic from a COVID standpoint and I think it is unlikely that he has an active infection but per protocol COVID testing was obtained.  I spoke with Dr. Flores son who also serves as his personal physician.  The Seth Dr. Flores indicated that his dad no longer needs to take Brilinta and they were actually planning to stop this medication in the next few days.  He has been on a limited regimen of medications including aspirin, metoprolol, insulin and Flomax.  He has not been taking Lipitor or other medications.  Dr. Flores was updated as to his condition and was in agreement  with the plan.    Diagnosis:    ICD-10-CM    1. Other closed fracture of left femur, unspecified portion of femur, initial encounter (H)  S72.8X2A    2. Multiple skin tears  T14.8XXA    3. Closed head injury, initial encounter  S09.90XA        Disposition:  Admitted to adult med/surg    Олег Clark  5/16/2020    EMERGENCY DEPARTMENT  Олег NINO, am serving as a scribe at 8:06 PM on 5/16/2020 to document services personally performed by Kala Hamilton MD based on my observations and the provider's statements to me.        Kala Hamilton MD  05/17/20 0253

## 2020-05-17 NOTE — ED TRIAGE NOTES
Mechanical fall with no LOC.  Left hip pain.  Hx of dislocations.  Laceration above left eye.  Skin tears.  From assisted living

## 2020-05-17 NOTE — PROGRESS NOTES
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      Fall with periprosthetic left hip fracture    Initially was n.p.o. in case of surgery, however orthopedics has evaluated the patient and imaging and felt that it is nonsurgical.  PT has been consulted as well as social work.  Pain control remains adequate.     Asymptomatic COVID-19 screening for surgery - NEGATIVE  - COVID-19 PCR negative  - The patient is asymptomatic and no precautions are necessary.      Diabetes mellitus type 2    Hemoglobin A1c was 8.3 in 11/2009.  The patient is on quite a low dose of Lantus.  - PTA Lantus 10 units nightly will be substituted with 8 units nightly  - Sliding scale insulin with 4 times daily CML and at bedtime sugar checks     Benign essential hypertension  Coronary artery disease with history of CABG and recent MI (11/2019), managed medically    * History of 4-vessel CABG in 1996.  History of MI in 11/2019 with peak troponin in the 70s.  The plan was for cardiac catheterization, but ultimately the patient declined any invasive workup, and he was managed medically and started on aspirin and ticagrelor.  He had echocardiogram at that time showed left ventricular LVEF of 55-60%, mild AS.   * Per admitting MD discussion with patient's son (and primary MD), Dr. Aleksandar Flores, he has been stable from a cardiac standpoint since his MI last year.  -Discussed with patient's son who is his primary care physician Dr. Aleksandar Flores, the plan was to stop Brilinta after his current prescription was completed and he only had a few  days left PTA; as such, will not plan to restart Brilinta.  Given no surgery, aspirin is resumed.  - Continue atorvastatin and metoprolol XL as well as PRN nitroglycerin.      Anemia, chronic component  It appears his hemoglobin has mostly been in the 9-10 range in the past year or so.  Hemoglobin 10.2 on admit.  - Consider prbc transfusion if hgb </= 7.0 or if significant bleeding with hemodynamic instability or if symptomatic.  - No signs of bleeding  - will check CBC again tmrw     Chronic kidney disease    Baseline creatinine noted to be around the mid to upper 1 range.  Creatinine 1.67 on admit.  - appears at baseline  - prior IVF have been stopped given that he is now eating/drinking     Benign prostatic hypertrophy   - Continue tamsulosin.       Diet: Moderate Consistent CHO Diet    DVT Prophylaxis: Pneumatic Compression Devices  Rosales Catheter: not present  Code Status: DNR/DNI           Disposition Plan   Expected discharge: pending ortho, PT, and SW consults - may need rehab stay  Entered: Demarcus Wagner MD 05/17/2020, 4:01 PM       The patient's care was discussed with the Bedside Nurse, Patient and patient's PCP whom is his son.    Demarcus Wagner MD  Hospitalist Service  [facility]    ______________________________________________________________________    Interval History   Seen and examined.  Patient is very hard of hearing.  Pleasant, denies any pain.  After I saw him orthopedics evaluated him and decided that he is non- surgical.  PT has been consulted.     Data reviewed today: I reviewed all medications, new labs and imaging results over the last 24 hours. I personally reviewed no images or EKG's today.    Physical Exam   Vital Signs: Temp: 97.7  F (36.5  C) Temp src: Oral BP: 118/52 Pulse: 107 Heart Rate: 85 Resp: 16 SpO2: 97 % O2 Device: None (Room air)    Weight: 157 lbs 9.6 oz    Gen: NAD, pleasant, elderly, frail  HEENT: Normocephalic, EOMI, MMM  Resp: no crackles,  no  wheezes, no increased work of resp  CV: S1S2 heard, reg rhythm, reg rate, no pedal edema  Abdo: soft, nontender, nondistended, bowel sounds present  Ext: calves nontender, well perfused  Neuro: AAOx3, CN grossly intact, no facial asymmetry, quite [place]      Data   Recent Labs   Lab 05/16/20 2010   WBC 10.9   HGB 10.2*   MCV 92      INR 1.02      POTASSIUM 4.9   CHLORIDE 107   CO2 23   BUN 35*   CR 1.67*   ANIONGAP 7   JOSELINE 8.5   *     Recent Results (from the past 24 hour(s))   Head CT w/o contrast    Narrative    CT OF THE HEAD WITHOUT CONTRAST 5/16/2020 8:35 PM     COMPARISON: Head CT 12/16/2018.    HISTORY: Fall, head injury, on coumadin.    TECHNIQUE: 5 mm thick axial CT images of the head were acquired  without IV contrast material.    FINDINGS: There is moderate diffuse cerebral volume loss. There are  subtle patchy areas of decreased density in the cerebral white matter  bilaterally that are consistent with sequela of chronic small vessel  ischemic disease.    The ventricles and basal cisterns are within normal limits in  configuration given the degree of cerebral volume loss.  There is no  midline shift. There are no extra-axial fluid collections.    No intracranial hemorrhage, mass or recent infarct.    There is near complete opacification of the right maxillary sinus that  may represent acute sinusitis. There is an air-fluid level in the left  maxillary sinus that may represent acute sinusitis. There is no  mastoiditis. There are no fractures of the visualized bones. Right  phthisis bulbi again noted.      Impression    IMPRESSION: Diffuse cerebral volume loss and cerebral white matter  changes consistent with chronic small vessel ischemic disease. No  evidence for acute intracranial pathology.      Radiation dose for this scan was reduced using automated exposure  control, adjustment of the mA and/or kV according to patient size, or  iterative reconstruction technique    WILMA OLIVARES  MD   XR Pelvis w Hip Left 1 View    Narrative    EXAM: XR PELVIS AND HIP LEFT 1 VIEW  LOCATION: [facility]  DATE/TIME: 5/16/2020 8:21 PM    INDICATION: Left hip pain.  COMPARISON: A radiograph of the left hip on 01/29/2019 and a radiograph of the pelvis on 01/08/2019.      Impression    IMPRESSION: Again seen are surgical changes of both hips. This includes a left total hip arthroplasty and a right hip bipolar hemiarthroplasty. There is an acute fracture of the lateral aspect of the proximal left femur along the distal aspect of the   greater trochanter. This is mildly displaced. No other acute abnormality is noted.   XR Chest 1 View    Narrative    CHEST ONE VIEW  5/16/2020 8:46 PM     HISTORY: Fall, hip fracture.    COMPARISON: 11/17/2019.      Impression    IMPRESSION: Chronic interstitial abnormalities in the lungs are  similar to prior. No airspace consolidation, pneumothorax, or pleural  effusion. Prior median sternotomy.    HANNAH HOOKER MD

## 2020-05-17 NOTE — PLAN OF CARE
Patient was NPO this morning for orhor consult, he had lunch after surgeon meet with him and indicated that his case is non surgical. Iv dilaudid was administered for pain, pt is on blood glucose every 4 hours, 192 and 206, a unit of insulin was administered.Patient is alert and oriented x4, [place], hearing aid in place, some bruises and skin tear on the face and arm. Physical therapy and social consult for discharge plan.

## 2020-05-17 NOTE — H&P
Admitted:     05/16/2020      PRIMARY CARE PHYSICIAN:  Dr. Aleksandar Flores      CHIEF COMPLAINT:  Fall with left hip pain.      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  The patient reportedly tripped on his walker and fell and injured his left hip.  He was unable to get up after the fall due to severe pain when he tried to extend his left lower extremity.  He denies hitting his head.  He denies losing consciousness.  He was brought to [facility] for further evaluation.      The patient seen in the ER and seen by Dr. Hamilton.  He had vital signs checked that showed temperature 98.4, heart rate 97, blood pressure 165/82, respiratory rate 16, O2 saturations 98% on room air.  He had laboratory evaluation that showed creatinine was 1.67, about in line with chronic kidney disease.  He had a CBC that shows his hemoglobin 10.2, which is stable from previous.  He had a head CT which did not show any acute findings.  He had x-rays of his hip which showed an acute fracture of the lateral aspect of the proximal left femur along the distal aspect of the greater trochanter, which is mildly displaced.  He had a chest x-ray, which did not show any acute findings.  Overall, given these issues, request for admission was made.      The patient is very hard of hearing and therefore, the history is a bit limited.  However, he did deny chest pain or shortness of breath.  No fevers or chills.  Per my discussion with patient's son (and primary MD), Dr. Aleksandar Flores, he has been stable from a cardiac standpoint since his MI last year (for details, see below).     PAST MEDICAL HISTORY:   1.  Diabetes mellitus type 2.  Hemoglobin A1c 8.3 in 11/2019.   2.  Hypertension.   3.  Coronary artery  disease.  History of 4-vessel CABG in 1996.  History of MI in 11/2019 with peak troponin in the 70s.  The plan was for cardiac catheterization, but ultimately the patient declined any invasive workup, and he was managed medically and started on aspirin and ticagrelor.  He had echocardiogram at that time showed left ventricular LVEF of 55-60%.   4.  Hyperlipidemia.   5.  Mild aortic stenosis.  Echocardiogram from 11/2019 showed mild aortic stenosis.   6.  BPH.  7.  Chronic anemia.  It appears his hemoglobin has mostly been in the 9-10 range in the past year or so.   8.  Chronic kidney disease.  Baseline creatinine noted to be around the mid to upper 1 range.   9.  Left bundle branch block.   10.  History of nonsustained VT.   11.  History of supraventricular tachycardia.        PAST SURGICAL HISTORY:   Procedure  Laterality Date  Age Comment Src. Chart   CORONARY ARTERY BYPASS  1996 71 - 72y 4v CABG 1996.     REPAIR RUPTURED GLOBE  4/21/2014 89y Procedure: Exploration and Repair of Ruptured Globe ;  Surgeon: Mercedes Rivera MD;  Location:  OR     OPEN REDUCTION INTERNAL FIXATION HIP BIPOLAR Right 8/26/2018 89y Procedure: OPEN REDUCTION INTERNAL FIXATION HIP BIPOLAR;  Right Hip Bipolar Hemiarthroplasty (Biomet);  Surgeon: Bill Sanders MD;  Location:  OR     OPEN REDUCTION INTERNAL FIXATION HIP BIPOLAR Left 10/4/2018 89y Procedure: OPEN REDUCTION INTERNAL FIXATION HIP BIPOLAR;  LEFT HIP TONYA ARTHROPLASTY;  Surgeon: Bruno Stewart MD;  Location:  OR     CLOSED REDUCTION HIP Left 10/14/2018 89y Procedure: CLOSED REDUCTION HIP;  CLOSED REDUCTION HIP;  Surgeon: Milton Gusman MD;  Location:  OR     ARTHROPLASTY REVISION HIP Left 10/22/2018 89y Procedure: LEFT HIP HEMIARTHROPLASTY CONVERTED TO LEFT TOTAL HIP ARTHROPLASTY;  Surgeon: Marcos Winchester MD;  Location:  OR     CARDIAC SURGERY         CATARACT IOL, RT/LT Bilateral        lacrimal caruncle removed BE Bilateral ~1989               ALLERGIES:  NO KNOWN DRUG ALLERGIES.      HOME MEDICATIONS (not reconciled yet):    Prior to Admission Medications   Prescriptions Last Dose Informant Patient Reported? Taking?   acetaminophen (TYLENOL) 325 MG tablet  Son Yes No   Sig: Take 650 mg by mouth every 4 hours as needed   aspirin (ASA) 81 MG chewable tablet   No No   Sig: Take 1 tablet (81 mg) by mouth daily   atorvastatin (LIPITOR) 40 MG tablet   No No   Sig: Take 1 tablet (40 mg) by mouth every evening   finasteride (PROSCAR) 5 MG tablet  Son Yes No   Sig: Take 5 mg by mouth daily   insulin aspart (NOVOLOG FLEXPEN) 100 UNIT/ML pen  Son Yes No   Sig: Inject 8 Units Subcutaneous 3 times daily (with meals)    insulin glargine (LANTUS SOLOSTAR PEN) 100 UNIT/ML pen  Son Yes No   Sig: Inject 8 Units Subcutaneous At Bedtime   meclizine (ANTIVERT) 25 MG tablet  Son Yes No   Sig: Take 25 mg by mouth 2 times daily as needed for dizziness   metoprolol succinate ER (TOPROL-XL) 50 MG 24 hr tablet  Son Yes No   Sig: Take 50 mg by mouth daily   mirabegron (MYRBETRIQ) 25 MG 24 hr tablet  Son Yes No   Sig: Take 25 mg by mouth daily   nitroGLYcerin (NITROSTAT) 0.4 MG sublingual tablet   No No   Sig: For chest pain place 1 tablet under the tongue every 5 minutes for 3 doses. If symptoms persist 5 minutes after 1st dose call 911.   tamsulosin (FLOMAX) 0.4 MG capsule  Son Yes No   Sig: Take 0.8 mg by mouth At Bedtime   ticagrelor (BRILINTA) 90 MG tablet   No No   Sig: Take 1 tablet (90 mg) by mouth 2 times daily      Facility-Administered Medications: None        SOCIAL HISTORY:  The patient does not smoke or drink.  He is a retired general surgeon.  He is .  Has least 1 son.  His son is his primary care provider, Dr. Aleksandar Flores.  He lives in assisted living.      FAMILY HISTORY:  Reviewed and not felt to be contributory.      REVIEW OF SYSTEMS:  As in HPI, otherwise 10-point systems negative.      PHYSICAL EXAMINATION:   VITAL SIGNS:  Temperature 98.9, heart  rate 98, blood pressure 156/87, respiratory rate 19, O2 saturation 97% on room air.   GENERAL:  This is a 89-year-old male patient lying in bed.  He is very hard of hearing.  Otherwise, appears to be appropriate and follows commands.   HEENT:  Pupils equal, round, reactive.  No scleral icterus.  Some conjunctival injection.  Oropharynx reveals no gross erythema or exudate.  Dry mucous membranes.   NECK:  No bruits, JVD or adenopathy.   HEART:  Regular rate and rhythm without significant murmurs, rubs, gallops.   LUNGS:  Diminished at the bases, no crackles or wheezes.   ABDOMEN:  Soft, nontender, nondistended.  Positive bowel sounds, no femoral bruits.   EXTREMITIES:  No significant leg edema.     NEUROLOGIC:  Reveals no gross focal motor or sensory deficits.        LABORATORY DATA AND IMAGING:  Results for orders placed or performed during the hospital encounter of 05/16/20   Head CT w/o contrast     Status: None    Narrative    CT OF THE HEAD WITHOUT CONTRAST 5/16/2020 8:35 PM     COMPARISON: Head CT 12/16/2018.    HISTORY: Fall, head injury, on coumadin.    TECHNIQUE: 5 mm thick axial CT images of the head were acquired  without IV contrast material.    FINDINGS: There is moderate diffuse cerebral volume loss. There are  subtle patchy areas of decreased density in the cerebral white matter  bilaterally that are consistent with sequela of chronic small vessel  ischemic disease.    The ventricles and basal cisterns are within normal limits in  configuration given the degree of cerebral volume loss.  There is no  midline shift. There are no extra-axial fluid collections.    No intracranial hemorrhage, mass or recent infarct.    There is near complete opacification of the right maxillary sinus that  may represent acute sinusitis. There is an air-fluid level in the left  maxillary sinus that may represent acute sinusitis. There is no  mastoiditis. There are no fractures of the visualized bones. Right  phthisis bulbi  again noted.      Impression    IMPRESSION: Diffuse cerebral volume loss and cerebral white matter  changes consistent with chronic small vessel ischemic disease. No  evidence for acute intracranial pathology.      Radiation dose for this scan was reduced using automated exposure  control, adjustment of the mA and/or kV according to patient size, or  iterative reconstruction technique    WILMA OLIVARES MD   XR Pelvis w Hip Left 1 View     Status: None    Narrative    EXAM: XR PELVIS AND HIP LEFT 1 VIEW  LOCATION: [facility]  DATE/TIME: 5/16/2020 8:21 PM    INDICATION: Left hip pain.  COMPARISON: A radiograph of the left hip on 01/29/2019 and a radiograph of the pelvis on 01/08/2019.      Impression    IMPRESSION: Again seen are surgical changes of both hips. This includes a left total hip arthroplasty and a right hip bipolar hemiarthroplasty. There is an acute fracture of the lateral aspect of the proximal left femur along the distal aspect of the   greater trochanter. This is mildly displaced. No other acute abnormality is noted.   XR Chest 1 View     Status: None    Narrative    CHEST ONE VIEW  5/16/2020 8:46 PM     HISTORY: Fall, hip fracture.    COMPARISON: 11/17/2019.      Impression    IMPRESSION: Chronic interstitial abnormalities in the lungs are  similar to prior. No airspace consolidation, pneumothorax, or pleural  effusion. Prior median sternotomy.    HANNAH HOOKER MD   CBC with platelets differential     Status: Abnormal   Result Value Ref Range    WBC 10.9 4.0 - 11.0 10e9/L    RBC Count 3.46 (L) 4.4 - 5.9 10e12/L    Hemoglobin 10.2 (L) 13.3 - 17.7 g/dL    Hematocrit 31.7 (L) 40.0 - 53.0 %    MCV 92 78 - 100 fl    MCH 29.5 26.5 - 33.0 pg    MCHC 32.2 31.5 - 36.5 g/dL    RDW 16.4 (H) 10.0 - 15.0 %    Platelet Count 273 150 - 450 10e9/L    Diff Method Automated Method     % Neutrophils 62.8 %    % Lymphocytes 26.0 %    % Monocytes 8.5 %    % Eosinophils 2.0 %    % Basophils 0.2 %    %  Immature Granulocytes 0.5 %    Nucleated RBCs 0 0 /100    Absolute Neutrophil 6.9 1.6 - 8.3 10e9/L    Absolute Lymphocytes 2.8 0.8 - 5.3 10e9/L    Absolute Monocytes 0.9 0.0 - 1.3 10e9/L    Absolute Eosinophils 0.2 0.0 - 0.7 10e9/L    Absolute Basophils 0.0 0.0 - 0.2 10e9/L    Abs Immature Granulocytes 0.1 0 - 0.4 10e9/L    Absolute Nucleated RBC 0.0    INR     Status: None   Result Value Ref Range    INR 1.02 0.86 - 1.14   Partial thromboplastin time     Status: None   Result Value Ref Range    PTT 23 22 - 37 sec   Basic metabolic panel     Status: Abnormal   Result Value Ref Range    Sodium 137 133 - 144 mmol/L    Potassium 4.9 3.4 - 5.3 mmol/L    Chloride 107 94 - 109 mmol/L    Carbon Dioxide 23 20 - 32 mmol/L    Anion Gap 7 3 - 14 mmol/L    Glucose 286 (H) 70 - 99 mg/dL    Urea Nitrogen 35 (H) 7 - 30 mg/dL    Creatinine 1.67 (H) 0.66 - 1.25 mg/dL    GFR Estimate 34 (L) >60 mL/min/[1.73_m2]    GFR Estimate If Black 39 (L) >60 mL/min/[1.73_m2]    Calcium 8.5 8.5 - 10.1 mg/dL   EKG 12-lead, tracing only     Status: None (Preliminary result)   Result Value Ref Range    Interpretation ECG Click View Image link to view waveform and result    ABO/Rh type and screen     Status: None   Result Value Ref Range    ABO A     RH(D) Pos     Antibody Screen Neg     Test Valid Only At [facility]        Specimen Expires 05/19/2020        Recent Results (from the past 24 hour(s))   Head CT w/o contrast    Narrative    CT OF THE HEAD WITHOUT CONTRAST 5/16/2020 8:35 PM     COMPARISON: Head CT 12/16/2018.    HISTORY: Fall, head injury, on coumadin.    TECHNIQUE: 5 mm thick axial CT images of the head were acquired  without IV contrast material.    FINDINGS: There is moderate diffuse cerebral volume loss. There are  subtle patchy areas of decreased density in the cerebral white matter  bilaterally that are consistent with sequela of chronic small vessel  ischemic disease.    The ventricles and basal cisterns are  within normal limits in  configuration given the degree of cerebral volume loss.  There is no  midline shift. There are no extra-axial fluid collections.    No intracranial hemorrhage, mass or recent infarct.    There is near complete opacification of the right maxillary sinus that  may represent acute sinusitis. There is an air-fluid level in the left  maxillary sinus that may represent acute sinusitis. There is no  mastoiditis. There are no fractures of the visualized bones. Right  phthisis bulbi again noted.      Impression    IMPRESSION: Diffuse cerebral volume loss and cerebral white matter  changes consistent with chronic small vessel ischemic disease. No  evidence for acute intracranial pathology.      Radiation dose for this scan was reduced using automated exposure  control, adjustment of the mA and/or kV according to patient size, or  iterative reconstruction technique    WILMA OLIVARES MD   XR Pelvis w Hip Left 1 View    Narrative    EXAM: XR PELVIS AND HIP LEFT 1 VIEW  LOCATION: [facility] [place]  DATE/TIME: 5/16/2020 8:21 PM    INDICATION: Left hip pain.  COMPARISON: A radiograph of the left hip on 01/29/2019 and a radiograph of the pelvis on 01/08/2019.      Impression    IMPRESSION: Again seen are surgical changes of both hips. This includes a left total hip arthroplasty and a right hip bipolar hemiarthroplasty. There is an acute fracture of the lateral aspect of the proximal left femur along the distal aspect of the   greater trochanter. This is mildly displaced. No other acute abnormality is noted.   XR Chest 1 View    Narrative    CHEST ONE VIEW  5/16/2020 8:46 PM     HISTORY: Fall, hip fracture.    COMPARISON: 11/17/2019.      Impression    IMPRESSION: Chronic interstitial abnormalities in the lungs are  similar to prior. No airspace consolidation, pneumothorax, or pleural  effusion. Prior median sternotomy.    HANNAH HOOKER MD       EKG, which I personally reviewed, showed sinus rhythm  with left bundle branch block, nothing acutely ischemic.      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      Fall with periprosthetic left hip fracture.    - Keep NPO for now.   - We will order strict bed rest.  - We will order PRN acetaminophen and PRN IV Dilaudid; minimize opioids as able given patients advanced age.  - Orthopedic Surgery will be consulted if they have not been contacted already.  I did discuss briefly with Dr. Pierce.  The plan is for surgery or early tomorrow morning.    - Overall, I feel he is a low to moderate risk for perioperative cardiopulmonary complications for surgery for the hip fracture.  OK to proceed.    Asymptomatic COVID-19 screening for surgery.   - COVID-19 PCR will be checked.    - The patient is asymptomatic and no precautions are necessary.     Diabetes mellitus type 2.    Hemoglobin A1c was 8.3 in 11/2009.  The patient is on quite a low dose of Lantus.  - I will hold [id number] Lantus for now.   - Order insulin correction scale.     Benign essential hypertension.  Coronary artery disease with history of CABG and recent MI (11/2019), managed medically.    * History of 4-vessel CABG in 1996.  History of MI in 11/2019 with peak troponin in the 70s.  The plan was for cardiac catheterization, but ultimately the patient declined any invasive workup, and he was managed medically and started on aspirin and ticagrelor.  He had echocardiogram at that time showed left ventricular LVEF of 55-60%, mild AS.   * Per my discussion with patient's son (and primary MD), Dr. Aleksandar Flores, he has been stable from a cardiac standpoint since his MI last year.  - Hold aspirin and  Brilinta for now; restart aspirin when able. Per my discussion with pt's son (and primary MD) Dr. Aleksandar Flores, the plan was to stop Brilinta after his current prescription was completed and he only had a few days left PTA; as such, will not plan to restart Brilinta.  - Continue atorvastatin and metoprolol XL as well as PRN nitroglycerin.     Anemia, chronic component.  It appears his hemoglobin has mostly been in the 9-10 range in the past year or so.  Hemoglobin 10.2 on admit.   - Monitor CBC.  - Consider prbc transfusion if hgb </= 7.0 or if significant bleeding with hemodynamic instability or if symptomatic.    Chronic kidney disease.    Baseline creatinine noted to be around the mid to upper 1 range.  Creatinine 1.67 on admit.  - Monitor BMP.  - Avoid nephrotoxic medications.    Benign prostatic hypertrophy.    - Continue tamsulosin.     Prophylaxis.  - PCD's.     CODE STATUS:  Per prior hospitalizations, he is DNR/DNI; confirmed with patient's son on admission.         NEWTON WELLS JR., MD             D: 2020   T: 2020   MT: ANGELICA      Name:     CHERYL FLORES   MRN:      [id number]        Account:      [id number]   :      1924        Admitted:     2020                   Document: [id number]       cc: Aleksandar Flores MD

## 2020-05-17 NOTE — PHARMACY-ADMISSION MEDICATION HISTORY
Pharmacy Medication History  Admission medication history interview status for the 5/16/2020  admission is complete. See EPIC admission navigator for prior to admission medications     Medication history sources: Patient's family/friend (Patrice (son)) and [employer]  Medication history source reliability: Good  Adherence assessment: Good    Significant changes made to the medication list:  Removed: Tylenol PRN, atorvastatin 40mg daily, finasteride 5mg daily, mirabegron 25mg daily, and Brilinta 90 mg daily.     Added: Opcon-A eye drops, Tums, melatonin, and Claritin    Changed: Insulin TID with meals from 8 to 6 units, and Insulin glargine from 8 to 10 units.       Additional medication history information:   Patient's son stated he was taking Brilinta until he came into the hospital, but he was to stop taking the medication today 5/17 (medication was removed from PTA med list).     Son also stated the patient takes care of his insulin aspart before meals and the units can widely range depending on what the patient does. The son stated it is ~6 units before each meal.     Medication reconciliation completed by provider prior to medication history? Yes    Time spent in this activity: 30      Prior to Admission medications    Medication Sig Last Dose Taking? Auth Provider   aspirin (ASA) 81 MG chewable tablet Take 1 tablet (81 mg) by mouth daily 5/16/2020 at AM Yes Silvano Kumar MD   calcium carbonate (TUMS) 500 MG chewable tablet Take 1 chew tab by mouth as needed for heartburn  at PRN Yes Unknown, Entered By History   insulin aspart (NOVOLOG FLEXPEN) 100 UNIT/ML pen Inject 6 Units Subcutaneous 3 times daily (with meals)  5/16/2020 at Unknown time Yes Unknown, Entered By History   insulin glargine (LANTUS SOLOSTAR PEN) 100 UNIT/ML pen Inject 10 Units Subcutaneous At Bedtime  5/15/2020 at Unknown time Yes Reported, Patient   loratadine (CLARITIN) 10 MG tablet Take 10 mg by mouth daily as needed for allergies  at PRN Yes  Unknown, Entered By History   meclizine (ANTIVERT) 25 MG tablet Take 25 mg by mouth 2 times daily as needed for dizziness  at PRN Yes Unknown, Entered By History   melatonin 3 MG tablet Take 1 mg by mouth nightly as needed for sleep  at PRN Yes Unknown, Entered By History   metoprolol succinate ER (TOPROL-XL) 50 MG 24 hr tablet Take 50 mg by mouth daily 5/16/2020 at AM Yes Unknown, Entered By History   naphazoline-pheniramine (OPCON-A) SOLN ophthalmic solution Place 1-2 drops into both eyes daily as needed for irritation  at PRN Yes Unknown, Entered By History   nitroGLYcerin (NITROSTAT) 0.4 MG sublingual tablet For chest pain place 1 tablet under the tongue every 5 minutes for 3 doses. If symptoms persist 5 minutes after 1st dose call 911.  at PRN Yes Silvano Kumar MD   tamsulosin (FLOMAX) 0.4 MG capsule Take 0.8 mg by mouth At Bedtime 5/15/2020 at HS Yes Unknown, Entered By History

## 2020-05-17 NOTE — ED NOTES
[facility]  ED Nurse Handoff Report    ED Chief complaint: Hip Pain      ED Diagnosis:   Final diagnoses:   None       Code Status: as per hospitalist    Allergies: No Known Allergies    Patient Story: mechanical fall on left side - c/o left hip pain - multiple skin tears left forearm.  Denies LOC.  Assisted living.    Focused Assessment:  Usually independent - obvious deformity to left hip - pulses intact.  [place] but awake/alert/oriented.  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    Treatments and/or interventions provided: cleaned and dressed multiple skin tears left forearm - small skin tear above left eyebrow also cleaned and dressed.  Patient's response to treatments and/or interventions: tolerated well    To be done/followed up on inpatient unit:  surgical ortho consult    Does this patient have any cognitive concerns?: Forgetful    Activity level - Baseline/Home:  Independent  Activity Level - Current:   Total Care    Patient's Preferred language: English   Needed?: No    Isolation: None  Infection: Not Applicable  Bariatric?: No    Vital Signs:   Vitals:    05/16/20 2240 05/16/20 2245 05/16/20 2250 05/16/20 2255   BP:       Pulse:       Resp:       Temp:       TempSrc:       SpO2: 97% 97% 97% 97%   Weight:       Height:           Cardiac Rhythm:     Was the PSS-3 completed:   Yes  What interventions are required if any?               Family Comments: son is a family practice physician and pt's own doctor.  Dr. Aleksandar Flores  OBS brochure/video discussed/provided to patient/family: No              Name of person given brochure if not patient: NA              Relationship to patient: NA    For the majority of the shift this patient's behavior was Green.   Behavioral interventions performed were NA.    ED NURSE PHONE NUMBER: [phone]

## 2020-05-18 ENCOUNTER — APPOINTMENT (OUTPATIENT)
Dept: PHYSICAL THERAPY | Facility: CLINIC | Age: 85
DRG: 536 | End: 2020-05-18
Attending: PHYSICIAN ASSISTANT
Payer: MEDICARE

## 2020-05-18 LAB
ANION GAP SERPL CALCULATED.3IONS-SCNC: 6 MMOL/L (ref 3–14)
BASOPHILS # BLD AUTO: 0 10E9/L (ref 0–0.2)
BASOPHILS NFR BLD AUTO: 0.1 %
BUN SERPL-MCNC: 36 MG/DL (ref 7–30)
CALCIUM SERPL-MCNC: 8.4 MG/DL (ref 8.5–10.1)
CHLORIDE SERPL-SCNC: 107 MMOL/L (ref 94–109)
CO2 SERPL-SCNC: 23 MMOL/L (ref 20–32)
CREAT SERPL-MCNC: 1.66 MG/DL (ref 0.66–1.25)
DIFFERENTIAL METHOD BLD: ABNORMAL
EOSINOPHIL # BLD AUTO: 0.1 10E9/L (ref 0–0.7)
EOSINOPHIL NFR BLD AUTO: 0.9 %
ERYTHROCYTE [DISTWIDTH] IN BLOOD BY AUTOMATED COUNT: 15.9 % (ref 10–15)
GFR SERPL CREATININE-BSD FRML MDRD: 34 ML/MIN/{1.73_M2}
GLUCOSE BLDC GLUCOMTR-MCNC: 154 MG/DL (ref 70–99)
GLUCOSE BLDC GLUCOMTR-MCNC: 167 MG/DL (ref 70–99)
GLUCOSE BLDC GLUCOMTR-MCNC: 175 MG/DL (ref 70–99)
GLUCOSE BLDC GLUCOMTR-MCNC: 212 MG/DL (ref 70–99)
GLUCOSE BLDC GLUCOMTR-MCNC: 224 MG/DL (ref 70–99)
GLUCOSE SERPL-MCNC: 195 MG/DL (ref 70–99)
HBA1C MFR BLD: 7.7 % (ref 0–5.6)
HCT VFR BLD AUTO: 27 % (ref 40–53)
HGB BLD-MCNC: 8.9 G/DL (ref 13.3–17.7)
IMM GRANULOCYTES # BLD: 0 10E9/L (ref 0–0.4)
IMM GRANULOCYTES NFR BLD: 0.2 %
LACTATE BLD-SCNC: 1 MMOL/L (ref 0.7–2)
LYMPHOCYTES # BLD AUTO: 1.3 10E9/L (ref 0.8–5.3)
LYMPHOCYTES NFR BLD AUTO: 10.2 %
MCH RBC QN AUTO: 30.1 PG (ref 26.5–33)
MCHC RBC AUTO-ENTMCNC: 33 G/DL (ref 31.5–36.5)
MCV RBC AUTO: 91 FL (ref 78–100)
MONOCYTES # BLD AUTO: 1.1 10E9/L (ref 0–1.3)
MONOCYTES NFR BLD AUTO: 8.7 %
NEUTROPHILS # BLD AUTO: 10.4 10E9/L (ref 1.6–8.3)
NEUTROPHILS NFR BLD AUTO: 79.9 %
NRBC # BLD AUTO: 0 10*3/UL
NRBC BLD AUTO-RTO: 0 /100
PLATELET # BLD AUTO: 217 10E9/L (ref 150–450)
POTASSIUM SERPL-SCNC: 4.7 MMOL/L (ref 3.4–5.3)
RBC # BLD AUTO: 2.96 10E12/L (ref 4.4–5.9)
SODIUM SERPL-SCNC: 136 MMOL/L (ref 133–144)
WBC # BLD AUTO: 13 10E9/L (ref 4–11)

## 2020-05-18 PROCEDURE — 12000000 ZZH R&B MED SURG/OB

## 2020-05-18 PROCEDURE — 25000132 ZZH RX MED GY IP 250 OP 250 PS 637: Mod: GY | Performed by: INTERNAL MEDICINE

## 2020-05-18 PROCEDURE — 83605 ASSAY OF LACTIC ACID: CPT | Performed by: INTERNAL MEDICINE

## 2020-05-18 PROCEDURE — 25000132 ZZH RX MED GY IP 250 OP 250 PS 637: Mod: GY | Performed by: HOSPITALIST

## 2020-05-18 PROCEDURE — 00000146 ZZHCL STATISTIC GLUCOSE BY METER IP

## 2020-05-18 PROCEDURE — 97162 PT EVAL MOD COMPLEX 30 MIN: CPT | Mod: GP | Performed by: PHYSICAL THERAPIST

## 2020-05-18 PROCEDURE — 99232 SBSQ HOSP IP/OBS MODERATE 35: CPT | Performed by: HOSPITALIST

## 2020-05-18 PROCEDURE — 80048 BASIC METABOLIC PNL TOTAL CA: CPT | Performed by: INTERNAL MEDICINE

## 2020-05-18 PROCEDURE — 25000131 ZZH RX MED GY IP 250 OP 636 PS 637: Mod: GY | Performed by: HOSPITALIST

## 2020-05-18 PROCEDURE — 36415 COLL VENOUS BLD VENIPUNCTURE: CPT | Performed by: INTERNAL MEDICINE

## 2020-05-18 PROCEDURE — 85025 COMPLETE CBC W/AUTO DIFF WBC: CPT | Performed by: INTERNAL MEDICINE

## 2020-05-18 PROCEDURE — 97530 THERAPEUTIC ACTIVITIES: CPT | Mod: GP | Performed by: PHYSICAL THERAPIST

## 2020-05-18 PROCEDURE — 25000128 H RX IP 250 OP 636: Performed by: INTERNAL MEDICINE

## 2020-05-18 PROCEDURE — 83036 HEMOGLOBIN GLYCOSYLATED A1C: CPT | Performed by: INTERNAL MEDICINE

## 2020-05-18 RX ADMIN — METOPROLOL SUCCINATE 50 MG: 50 TABLET, EXTENDED RELEASE ORAL at 10:03

## 2020-05-18 RX ADMIN — HYDROMORPHONE HYDROCHLORIDE 0.5 MG: 1 INJECTION, SOLUTION INTRAMUSCULAR; INTRAVENOUS; SUBCUTANEOUS at 11:20

## 2020-05-18 RX ADMIN — ASPIRIN 81 MG 81 MG: 81 TABLET ORAL at 09:56

## 2020-05-18 RX ADMIN — HYDROMORPHONE HYDROCHLORIDE 0.5 MG: 1 INJECTION, SOLUTION INTRAMUSCULAR; INTRAVENOUS; SUBCUTANEOUS at 20:08

## 2020-05-18 RX ADMIN — TAMSULOSIN HYDROCHLORIDE 0.8 MG: 0.4 CAPSULE ORAL at 21:59

## 2020-05-18 RX ADMIN — INSULIN GLARGINE 8 UNITS: 100 INJECTION, SOLUTION SUBCUTANEOUS at 22:00

## 2020-05-18 RX ADMIN — HYDROMORPHONE HYDROCHLORIDE 0.5 MG: 1 INJECTION, SOLUTION INTRAMUSCULAR; INTRAVENOUS; SUBCUTANEOUS at 02:34

## 2020-05-18 ASSESSMENT — ACTIVITIES OF DAILY LIVING (ADL)
ADLS_ACUITY_SCORE: 20
ADLS_ACUITY_SCORE: 21

## 2020-05-18 ASSESSMENT — MIFFLIN-ST. JEOR: SCORE: 1323.46

## 2020-05-18 NOTE — PLAN OF CARE
A &O but [place].  Using urinal at bedside. C/o pain, gave prn IV dilaudid x 1. Skin tear to L forehead and arm covered w/dressing CDI. R PIV is SL. Has not been OOB, awaiting PT consult.

## 2020-05-18 NOTE — PLAN OF CARE
[id number]: A&Ox4. [place]. VSS on RA. C/o L hip pain, given PRN IV dilaudid with decrease in pain. Bedrest. Dressing to L forehead, C/D/I. Dressings to LUE, intact. CMS intact. Moderate carb diet, BG checks. Voiding via urinal. IV SL. Awaiting PT consult. Nursing will continue to monitor.

## 2020-05-18 NOTE — PLAN OF CARE
"Discharge Planner PT   Patient plan for discharge: none stated  Current status: PT eval completed, pt admitted from [facility] after fall with non displaced periprosthetic fx, non surgical, WBAT.  Pt previously I in apt with FWW, states was receiving no services.      Pt very [place] and requires multiple atempts at education and instructions.  pt wanting dilaudid prior to mob, RN in to provide. bed mob with mod A  x 2 at trunk and LE, pt leans to R, unable to center self with cues and A, leans away from painful L hip.,  sit to stand x 2 with mod A x 2 with FWW, pt unable to advance feet or correct balance to mid line, c/o of \"cardiac episode\"  \"tacky cardia\"  RN called and pt sitting on EOB to rest, cues for breathing, Vital signs stable per RN, pt tried to stand again but returned to sitting quickly, stating to painful, unable to trial miranda steady due to pain and pt needing to rest supine to decrease pain,  mod A x 2 sit to supine and dep A for scooting in bed.  bed alarm on pt declines PCD's  Barriers to return to prior living situation:lives alone in apt, level of A for mob, unable to amb, level of pain, unable to care for self, limited act yanira  Recommendations for discharge: TCU  Rationale for recommendations: Continue PT to maximize functional outcome and increase activity level and decrease level of A.         Entered by: ANGIE SALMERON 05/18/2020 12:07 PM       "

## 2020-05-18 NOTE — PLAN OF CARE
A&Ox4, very [place] (hearing aids at bedside).  VSS, ex slight HTN, RA.  PIV SL.  BG checks with sliding scale.  Complained of pain to L hip; controlled with PRN Dilaudid.  Skin tears to L forehead and L arm.  Patient has not been OOB, awaiting PT consult.  Uses urinal at the bedside.  Discharge pending progress.

## 2020-05-18 NOTE — CONSULTS
Consult Date:  05/17/2020      REASON FOR CONSULTATION:  I was asked by Dr. Sandoval to provide orthopedic consultation for this 89-year-old male who fell and sustained a nondisplaced periprosthetic femur fracture of the left hip involving the greater trochanter.  The patient is a 89-year-old male who lives in an assisted living situation.  He is a retired general surgeon.  He had a left total hip done many years ago and has had 2 dislocations since that time.  Recently, he has had no difficulties with the hip.  He slipped and fell.  There was no syncopal episode.  He is otherwise relatively healthy.  You can refer to Dr. Sandoval's admission history and physical for the specifics of his past medical history, medications and review of systems.      REVIEW OF SYSTEMS:  He denies recent fever, chills, nausea, vomiting, diarrhea, chest pain, shortness of breath.      PHYSICAL EXAMINATION:     GENERAL:  He is a healthy-appearing 89-year-old who is alert and oriented x3.  He is pleasant and in no acute distress, afebrile.     VITAL SIGNS:  Stable.  His COVID test was negative.   EXTREMITIES:  He has painless range of motion of his upper extremities, painless range of motion of his neck.  He has pain with range of motion of the left hip.  He is tender over the left greater trochanter.  Painless range of motion of the right hip.  Skin is intact in both lower extremities.  Both lower extremities are perfused.  He is able to flex and extend the ankle and toes bilaterally.      IMAGING:  X-rays were reviewed, AP pelvis and lateral of the left hip, which show a nondisplaced periprosthetic femur fracture involving the greater trochanter.  The stem was well fixed.  It is a cemented stem and the joint is reduced.      ASSESSMENT:  A 89-year-old male with a left periprosthetic femur fracture that is stable.  I discussed treatment options, recommended nonoperative treatment.  The plan is to make him be weightbearing as tolerated with a  walker for the next 8 weeks.  We will begin mobilization with therapy.         TODD RAPHAEL MD             D: 2020   T: 2020   MT: BECKI      Name:     CHERYL HOLLOWAY   MRN:      [id number]        Account:       [id number]   :      1924           Consult Date:  2020      Document: [id number]

## 2020-05-18 NOTE — PROGRESS NOTES
MAULIK  D: Consult acknowledged. Writer attempted to see patient, but they were asleep. Writer could not arouse patient by name. Attempted to call patient via room phone. Patient's phone was busy.     P: SW to continue to follow     RORY Pérez     [facility]

## 2020-05-18 NOTE — PROGRESS NOTES
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      Fall with periprosthetic left hip fracture    Initially was n.p.o. in case of surgery, however orthopedics has evaluated the patient and imaging and felt that it is nonsurgical.  PT has been consulted as well as social work.  Pain control remains adequate.  - TCU anticipated, SW following     Asymptomatic COVID-19 screening for surgery - NEGATIVE  - COVID-19 PCR negative  - The patient is asymptomatic and no precautions are necessary.      Diabetes mellitus type 2    Hemoglobin A1c was 8.3 in 11/2009.  The patient is on quite a low dose of Lantus.  - PTA Lantus 10 units nightly will be substituted with 8 units nightly  - Sliding scale insulin with 4 times daily CML and at bedtime sugar checks     Benign essential hypertension  Coronary artery disease with history of CABG and recent MI (11/2019), managed medically    * History of 4-vessel CABG in 1996.  History of MI in 11/2019 with peak troponin in the 70s.  The plan was for cardiac catheterization, but ultimately the patient declined any invasive workup, and he was managed medically and started on aspirin and ticagrelor.  He had echocardiogram at that time showed left ventricular LVEF of 55-60%, mild AS.   * Per admitting MD discussion with patient's son (and primary MD), Dr. Aleksandar Flores, he has been stable from a cardiac standpoint since his MI last year.  -Discussed with patient's son who is his primary care physician Dr. Aleksandar Flores, the plan was to stop Brilinta after his current prescription was  completed and he only had a few days left PTA; as such, will not plan to restart Brilinta.  Given no surgery, aspirin is resumed.  - Continue atorvastatin and metoprolol XL as well as PRN nitroglycerin.      Anemia, chronic component  It appears his hemoglobin has mostly been in the 9-10 range in the past year or so.  Hemoglobin 10.2 on admit.  - Consider prbc transfusion if hgb </= 7.0 or if significant bleeding with hemodynamic instability or if symptomatic.  - No signs of bleeding  - will check CBC again tmrw     Chronic kidney disease    Baseline creatinine noted to be around the mid to upper 1 range.  Creatinine 1.67 on admit.  - appears at baseline  - prior IVF have been stopped given that he is now eating/drinking     Benign prostatic hypertrophy   - Continue tamsulosin.       Diet: Moderate Consistent CHO Diet    DVT Prophylaxis: Pneumatic Compression Devices  Rosales Catheter: not present  Code Status: DNR/DNI           Disposition Plan   Expected discharge: pending progress with PT and TCU placement  Entered: Demarcus Wagner MD 05/18/2020, 9:08 AM       The patient's care was discussed with the Bedside Nurse and Patient.  Offered to contact patient's son (his PCP) but he said there was no need to call him today.    Demarcus Wagner MD  Hospitalist Service  [facility]    ______________________________________________________________________    Interval History   Seen and examined.  No new complaints.  Had considerable difficulty working with PT.  TCU recommended.    Data reviewed today: I reviewed all medications, new labs and imaging results over the last 24 hours. I personally reviewed no images or EKG's today.    Physical Exam   Vital Signs: Temp: 96.9  F (36.1  C) Temp src: Oral BP: (!) 84/44   Heart Rate: 104 Resp: 16 SpO2: 94 % O2 Device: None (Room air)    Weight: 155 lbs 0 oz    Gen: NAD, pleasant, elderly, very [place]  HEENT: Normocephalic, EOMI, MMM  Resp: no crackles,  no wheezes,  no increased work of resp  CV: S1S2 heard, reg rhythm, reg rate  Abdo: soft, nontender, nondistended, bowel sounds present  Ext: calves nontender, well perfused  Neuro: AAOx3, CN grossly intact, no facial asymmetry      Data   Recent Labs   Lab 05/18/20  0713 05/16/20 2010   WBC 13.0* 10.9   HGB 8.9* 10.2*   MCV 91 92    273   INR  --  1.02    137   POTASSIUM 4.7 4.9   CHLORIDE 107 107   CO2 23 23   BUN 36* 35*   CR 1.66* 1.67*   ANIONGAP 6 7   JOSELINE 8.4* 8.5   * 286*     No results found for this or any previous visit (from the past 24 hour(s)).

## 2020-05-18 NOTE — PROGRESS NOTES
05/18/20 1100   Quick Adds   Type of Visit Initial PT Evaluation   Living Environment   Lives With facility resident;alone   Living Arrangements assisted living   Home Accessibility no concerns   Self-Care   Equipment Currently Used at Home glucometer;walker, rolling   Functional Level Prior   Ambulation 1-->assistive equipment   Transferring 1-->assistive equipment   Toileting 1-->assistive equipment   Bathing 1-->assistive equipment   Communication 0-->understands/communicates without difficulty   Swallowing 0-->swallows foods/liquids without difficulty   Cognition 0 - no cognition issues reported   Fall history within last six months yes   Number of times patient has fallen within last six months 1   Which of the above functional risks had a recent onset or change? none   Prior Functional Level Comment Pt states with this PT that he does his own bathing.  A with cleaning, laundry and meals provided. pt manages own meds. pt is a retired surgeon. pt's son is a family doctor.    General Information   Onset of Illness/Injury or Date of Surgery - Date 05/17/20   Referring Physician Brisa Pierce   Patient/Family Goals Statement none stated   Pertinent History of Current Problem (include personal factors and/or comorbidities that impact the POC) pt s/p fall with preiprosthetic fx, non displaced, non  surgical   Precautions/Limitations fall precautions   Weight-Bearing Status - LUE full weight-bearing   Weight-Bearing Status - RUE full weight-bearing   Weight-Bearing Status - LLE weight-bearing as tolerated   Weight-Bearing Status - RLE full weight-bearing   Cognitive Status Examination   Orientation orientation to person, place and time   Level of Consciousness alert   Follows Commands and Answers Questions able to follow single-step instructions   Personal Safety and Judgment intact   Cognitive Comment very [place], diificulty providing directions, appears to understand once he can hear   Pain Assessment   Patient  "Currently in Pain Yes, see Vital Sign flowsheet  (none at rest but untol with activity)   Range of Motion (ROM)   ROM Comment R LE limited bu pain in all planes   Strength   Strength Comments L LE limited by pain, unable to use effectively   Bed Mobility   Bed Mobility Comments bed mob with mod A x 2   Transfer Skills   Transfer Comments sit to stand with mod A x 2 and FWW   Gait   Gait Comments unable   Balance   Balance Comments impaired sitting balance, requires min A, leans to R, same in standing but requires mod A, unable to advance feet    General Therapy Interventions   Planned Therapy Interventions bed mobility training;gait training;ROM;transfer training;strengthening   Clinical Impression   Criteria for Skilled Therapeutic Intervention yes, treatment indicated   PT Diagnosis difficulty with all mob   Influenced by the following impairments pain, decreased ROM, strength, balance, act yanira, [place], c/o \"cardiac event\" dring sitting with c/o tacycardia   Functional limitations due to impairments impaired functional mob I and safety   Clinical Presentation Evolving/Changing   Clinical Presentation Rationale 3-5 deficits   Clinical Decision Making (Complexity) Moderate complexity   Therapy Frequency Daily   Predicted Duration of Therapy Intervention (days/wks) 3 days   Anticipated Discharge Disposition Transitional Care Facility   Risk & Benefits of therapy have been explained Yes   Patient, Family & other staff in agreement with plan of care Yes   [facility] [employer]AC TM \"6 Clicks\"   2016, Trustees of [facility], under license to [employer].  All rights reserved.   6 Clicks Short Forms Basic Mobility Inpatient Short Form   [facility] AM-PAC  \"6 Clicks\" V.2 Basic Mobility Inpatient Short Form   1. Turning from your back to your side while in a flat bed without using bedrails? 2 - A Lot   2. Moving from lying on your back to sitting on the side of a flat bed without using bedrails? 2 - A Lot   3. " Moving to and from a bed to a chair (including a wheelchair)? 2 - A Lot   4. Standing up from a chair using your arms (e.g., wheelchair, or bedside chair)? 2 - A Lot   5. To walk in hospital room? 1 - Total   6. Climbing 3-5 steps with a railing? 1 - Total   Basic Mobility Raw Score (Score out of 24.Lower scores equate to lower levels of function) 10   Total Evaluation Time   Total Evaluation Time (Minutes) 12

## 2020-05-19 ENCOUNTER — APPOINTMENT (OUTPATIENT)
Dept: PHYSICAL THERAPY | Facility: CLINIC | Age: 85
DRG: 536 | End: 2020-05-19
Payer: MEDICARE

## 2020-05-19 LAB
ERYTHROCYTE [DISTWIDTH] IN BLOOD BY AUTOMATED COUNT: 15.4 % (ref 10–15)
GLUCOSE BLDC GLUCOMTR-MCNC: 157 MG/DL (ref 70–99)
GLUCOSE BLDC GLUCOMTR-MCNC: 165 MG/DL (ref 70–99)
GLUCOSE BLDC GLUCOMTR-MCNC: 178 MG/DL (ref 70–99)
GLUCOSE BLDC GLUCOMTR-MCNC: 225 MG/DL (ref 70–99)
HCT VFR BLD AUTO: 25.4 % (ref 40–53)
HGB BLD-MCNC: 8.4 G/DL (ref 13.3–17.7)
IRON SATN MFR SERPL: 18 % (ref 15–46)
IRON SERPL-MCNC: 29 UG/DL (ref 35–180)
MCH RBC QN AUTO: 29.9 PG (ref 26.5–33)
MCHC RBC AUTO-ENTMCNC: 33.1 G/DL (ref 31.5–36.5)
MCV RBC AUTO: 90 FL (ref 78–100)
PLATELET # BLD AUTO: 190 10E9/L (ref 150–450)
RBC # BLD AUTO: 2.81 10E12/L (ref 4.4–5.9)
TIBC SERPL-MCNC: 162 UG/DL (ref 240–430)
WBC # BLD AUTO: 11.3 10E9/L (ref 4–11)

## 2020-05-19 PROCEDURE — 12000000 ZZH R&B MED SURG/OB

## 2020-05-19 PROCEDURE — 99232 SBSQ HOSP IP/OBS MODERATE 35: CPT | Performed by: INTERNAL MEDICINE

## 2020-05-19 PROCEDURE — 25000132 ZZH RX MED GY IP 250 OP 250 PS 637: Mod: GY | Performed by: INTERNAL MEDICINE

## 2020-05-19 PROCEDURE — 25000131 ZZH RX MED GY IP 250 OP 636 PS 637: Mod: GY | Performed by: INTERNAL MEDICINE

## 2020-05-19 PROCEDURE — 25000128 H RX IP 250 OP 636: Performed by: HOSPITALIST

## 2020-05-19 PROCEDURE — 83550 IRON BINDING TEST: CPT | Performed by: HOSPITALIST

## 2020-05-19 PROCEDURE — 25000128 H RX IP 250 OP 636: Performed by: INTERNAL MEDICINE

## 2020-05-19 PROCEDURE — 25000132 ZZH RX MED GY IP 250 OP 250 PS 637: Mod: GY | Performed by: HOSPITALIST

## 2020-05-19 PROCEDURE — 97530 THERAPEUTIC ACTIVITIES: CPT | Mod: GP | Performed by: PHYSICAL THERAPIST

## 2020-05-19 PROCEDURE — 85027 COMPLETE CBC AUTOMATED: CPT | Performed by: HOSPITALIST

## 2020-05-19 PROCEDURE — 83540 ASSAY OF IRON: CPT | Performed by: HOSPITALIST

## 2020-05-19 PROCEDURE — 00000146 ZZHCL STATISTIC GLUCOSE BY METER IP

## 2020-05-19 PROCEDURE — 36415 COLL VENOUS BLD VENIPUNCTURE: CPT | Performed by: HOSPITALIST

## 2020-05-19 RX ORDER — HYDROMORPHONE HYDROCHLORIDE 2 MG/1
2 TABLET ORAL
Qty: 30 TABLET | Refills: 0 | Status: SHIPPED | OUTPATIENT
Start: 2020-05-19

## 2020-05-19 RX ORDER — HYDROMORPHONE HYDROCHLORIDE 1 MG/ML
0.1 INJECTION, SOLUTION INTRAMUSCULAR; INTRAVENOUS; SUBCUTANEOUS ONCE
Status: COMPLETED | OUTPATIENT
Start: 2020-05-19 | End: 2020-05-19

## 2020-05-19 RX ORDER — ATORVASTATIN CALCIUM 40 MG/1
40 TABLET, FILM COATED ORAL EVERY EVENING
Qty: 30 TABLET | Refills: 0 | Status: SHIPPED | OUTPATIENT
Start: 2020-05-19

## 2020-05-19 RX ORDER — AMOXICILLIN 250 MG
2 CAPSULE ORAL 2 TIMES DAILY PRN
Status: DISCONTINUED | OUTPATIENT
Start: 2020-05-19 | End: 2020-05-20 | Stop reason: HOSPADM

## 2020-05-19 RX ORDER — BISACODYL 10 MG
10 SUPPOSITORY, RECTAL RECTAL DAILY PRN
Status: DISCONTINUED | OUTPATIENT
Start: 2020-05-19 | End: 2020-05-20 | Stop reason: HOSPADM

## 2020-05-19 RX ORDER — HYDROMORPHONE HYDROCHLORIDE 2 MG/1
2 TABLET ORAL
Status: DISCONTINUED | OUTPATIENT
Start: 2020-05-19 | End: 2020-05-20 | Stop reason: HOSPADM

## 2020-05-19 RX ORDER — AMOXICILLIN 250 MG
2 CAPSULE ORAL 2 TIMES DAILY PRN
Qty: 100 TABLET | Refills: 0 | Status: SHIPPED | OUTPATIENT
Start: 2020-05-19

## 2020-05-19 RX ADMIN — METOPROLOL SUCCINATE 50 MG: 50 TABLET, EXTENDED RELEASE ORAL at 09:11

## 2020-05-19 RX ADMIN — HYDROMORPHONE HYDROCHLORIDE 2 MG: 2 TABLET ORAL at 20:16

## 2020-05-19 RX ADMIN — HYDROMORPHONE HYDROCHLORIDE 0.5 MG: 1 INJECTION, SOLUTION INTRAMUSCULAR; INTRAVENOUS; SUBCUTANEOUS at 13:01

## 2020-05-19 RX ADMIN — HYDROMORPHONE HYDROCHLORIDE 0.1 MG: 1 INJECTION, SOLUTION INTRAMUSCULAR; INTRAVENOUS; SUBCUTANEOUS at 22:25

## 2020-05-19 RX ADMIN — SENNOSIDES AND DOCUSATE SODIUM 2 TABLET: 8.6; 5 TABLET ORAL at 22:06

## 2020-05-19 RX ADMIN — HYDROMORPHONE HYDROCHLORIDE 0.3 MG: 1 INJECTION, SOLUTION INTRAMUSCULAR; INTRAVENOUS; SUBCUTANEOUS at 08:01

## 2020-05-19 RX ADMIN — TAMSULOSIN HYDROCHLORIDE 0.8 MG: 0.4 CAPSULE ORAL at 22:06

## 2020-05-19 RX ADMIN — HYDROMORPHONE HYDROCHLORIDE 0.5 MG: 1 INJECTION, SOLUTION INTRAMUSCULAR; INTRAVENOUS; SUBCUTANEOUS at 00:58

## 2020-05-19 RX ADMIN — HYDROMORPHONE HYDROCHLORIDE 2 MG: 2 TABLET ORAL at 16:43

## 2020-05-19 RX ADMIN — ASPIRIN 81 MG 81 MG: 81 TABLET ORAL at 09:12

## 2020-05-19 RX ADMIN — INSULIN GLARGINE 10 UNITS: 100 INJECTION, SOLUTION SUBCUTANEOUS at 22:06

## 2020-05-19 ASSESSMENT — ACTIVITIES OF DAILY LIVING (ADL)
ADLS_ACUITY_SCORE: 20

## 2020-05-19 ASSESSMENT — MIFFLIN-ST. JEOR: SCORE: 1309.39

## 2020-05-19 NOTE — PROGRESS NOTES
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      Fall with periprosthetic left hip fracture    Initially was n.p.o. in case of surgery, however orthopedics has evaluated the patient and imaging and felt that it is nonsurgical.  PT has been consulted as well as social work.  Pain control remains adequate.  - patient is refusing TCU so will be discharge back home with increased home cares  - pain control is ok but he has been only getting iv dilaudid.  Change to PO   - doesn't feel like he is ready to go back today     Asymptomatic COVID-19 screening for surgery - NEGATIVE  - COVID-19 PCR negative  - The patient is asymptomatic and no precautions are necessary.      Diabetes mellitus type 2    Hemoglobin A1c was 8.3 in 11/2009.  The patient is on quite a low dose of Lantus.  - PTA Lantus 10 units nightly, getting only 8 units, will go back to 10 with elevated BG  - Sliding scale insulin with 4 times daily CML and at bedtime sugar checks  - his A1c is 7.7     Benign essential hypertension  Coronary artery disease with history of CABG and recent MI (11/2019), managed medically    * History of 4-vessel CABG in 1996.  History of MI in 11/2019 with peak troponin in the 70s.  The plan was for cardiac catheterization, but ultimately the patient declined any invasive workup, and he was managed medically and started on aspirin and ticagrelor.  He had echocardiogram at that time showed left ventricular LVEF of 55-60%, mild AS.   * Per admitting MD discussion with patient's son (and primary MD), Dr. Aleksandar Flores, he has  been stable from a cardiac standpoint since his MI last year.  -Discussed with patient's son who is his primary care physician Dr. Aleksandar Flores, the plan was to stop Brilinta after his current prescription was completed and he only had a few days left PTA; as such, will not plan to restart Brilinta.  Given no surgery, aspirin is resumed.  - Continue atorvastatin and metoprolol XL as well as PRN nitroglycerin.   - he currently denies any chest pain or sob     Anemia, chronic component  It appears his hemoglobin has mostly been in the 9-10 range in the past year or so.  Hemoglobin 10.2 on admit.  - Consider prbc transfusion if hgb </= 7.0 or if significant bleeding with hemodynamic instability or if symptomatic.  - No signs of bleeding  - recheck hgb 8.4, will check Fe, Ferritin levels    Chronic kidney disease    Baseline creatinine noted to be around the mid to upper 1 range.  Creatinine 1.67 on admit.  - appears at baseline  - prior IVF have been stopped given that he is now eating/drinking     Benign prostatic hypertrophy   - Continue tamsulosin.     Diet: Moderate Consistent CHO Diet    DVT Prophylaxis: Pneumatic Compression Devices  Rosales Catheter: not present  Code Status: DNR/DNI           Disposition Plan   - patient refusing TCU  - plan to go back to his AL with increased cares (RN, PT, OT, HHA)  on 5/20       The patient's care was discussed with the Bedside Nurse and Patient.  Offered to contact patient's son (his PCP) but he said there was no need to call him today.    Pérez Paige MD  Hospitalist Service  [facility]      Interval History   - chart reviewed  - discussed with RN, SW  - refusing TCU    Data reviewed today: I reviewed all medications, new labs and imaging results over the last 24 hours. I personally reviewed no images or EKG's today.    Physical Exam   Vital Signs: Temp: 97.9  F (36.6  C) Temp src: Oral BP: 105/53   Heart Rate: 100 Resp: 18 SpO2: 97 % O2 Device:  None (Room air)    Weight: 151 lbs 14.4 oz    Gen: NAD, pleasant, elderly, very [place]  HEENT: Normocephalic   Resp: no crackles,  no wheezes, no increased work of resp  CV: RRR  Abdo: soft, nontender, nondistended, bowel sounds present  Ext: calves nontender, well perfused  Neuro: [place]      Data   Recent Labs   Lab 05/19/20  0751 05/18/20  0713 05/16/20 2010   WBC 11.3* 13.0* 10.9   HGB 8.4* 8.9* 10.2*   MCV 90 91 92    217 273   INR  --   --  1.02   NA  --  136 137   POTASSIUM  --  4.7 4.9   CHLORIDE  --  107 107   CO2  --  23 23   BUN  --  36* 35*   CR  --  1.66* 1.67*   ANIONGAP  --  6 7   JOSELINE  --  8.4* 8.5   GLC  --  195* 286*     No results found for this or any previous visit (from the past 24 hour(s)).

## 2020-05-19 NOTE — PROGRESS NOTES
"Writer met with the patient at the bedside.  Patient is A+Ox3 (not to situation). Writer discussed scope and role of safe discharge planning.  Patient is being assessed by PT/OT and they are currently recommending TCU.  Per Bedside patient is currently bedbound and did stand at the bedside but was unable to do any further work.      Patient resides at [facility].  Called Melonie RODRIGUEZ [phone] per Melonie the patient currently does not receive any services.  Per Melonie they are able to increase services for the patient and would have equipment available she also suggested that the patient may benefit from staying in their \"care suites\" while he recovers and have homecare available as well.    Writer went over current recommendation of TCU with the patient to which patient is declining.  Patient informed writer to call his son Aleksandar to discuss further. Aleksandar is aware of the recommendation but does state his father will not go to TCU so they will plan on discharging him back to his [facility] most likely in the Care Suites with homecare.  Aleksandar did state that he would be available to transport his dad if he is able otherwise we discussed private transport w/c if able base rate ~75.00 with a per reynaldo/mile 5.00.  Per Aleksandar they have used [facility] in the past per Shilpi at [facility] [phone] fax [phone] they are able to provide homecare at discharge (RN/PT/OT/HHA).    Writer will continue to follow for further discharging plans as needed and updated Melonie/Santos for further planning of discharge regarding date/transportation.     Addendum: 5/19 13:30  Per discharging provider Dr. Paige patient will be discharged to [facility] 5/20 with homecare and he is able to identify this patient as a before 11 a.m. discharge.  [facility] RN Melonie is aware and they are able to accommodate this early discharge.  Per Bedside patient will require stretcher so SW will arrange this for discharge.    "

## 2020-05-19 NOTE — PLAN OF CARE
Discharge Planner PT   Patient plan for discharge: Didn't state.  Current status: Patient needs mod assist of 2 for bed mobility and transfers. Unable to take a step with ww due to left LE giving way from pain. Left supine with needs close.   Barriers to return to prior living situation: Current level of assist, unable to tolerate amb due to left LE pain  Recommendations for discharge: TCU  Rationale for recommendations: Pain is significantly limiting patient current functional mobility. Will need continued skilled PT to progress activity tolerance and independence as pain decreases.        Entered by: Lynnette Iyer 05/19/2020 9:09 AM

## 2020-05-19 NOTE — PLAN OF CARE
Shift Note: A&Ox4, very [place] w-out hearing aides.  VSS, ex slight HTN, RA.  PIV SL.  BG checks with sliding scale.  No pain at rest, unable to do much w-PT today, did stand, but severe pain w-weight bearing. Will likely need TCU at discharge. Heavy assist 2 and belt+walker to stand/pivot if able at all to get to chair. Needs assist to repo in bed, and assist w-emptying urinal. Calls for needs, good PO, assist w-ordering and set-up.

## 2020-05-19 NOTE — PLAN OF CARE
Pt. A/Ox4. VSS on RA. Very [place], has hearing aids. C/o L hip pain with movement, managed with IV dilaudid. Skin bruised, skin tears on arm and forehead. Discharge pending probable TCU placement.

## 2020-05-19 NOTE — PROVIDER NOTIFICATION
MD Notification    Notified Person: MD    Notified Person Name: Lori    Notification Date/Time: 12:06    Notification Interaction: FYI page that the patient is refusing TCU and wishes to return to [facility] with increase in cares and homecare when medically stable    Purpose of Notification: FYI for update on refusal of TCU and plan for return to [facility] with increase in services with homecare.     Orders Received: FYI only or c/b with further clarification if needed.     Comments:

## 2020-05-19 NOTE — CONSULTS
SW Consult Note:    D/I:  SW placed call to [facility] to arrange for stretcher transport for patient secondary to hip fracture and being unable to bend his knee due to pain.  Transport is arranged for patient on 5/20 at 12:00.  Placed call to patient's son Aleksandar and left message discussing transportation and that we can not guarantee insurance coverage.  Requested return call if there are questions.      P: SW will remain available to assist as needed.    ESTEFANI Rodgers, [facility]  [phone]  [facility]

## 2020-05-20 VITALS
SYSTOLIC BLOOD PRESSURE: 100 MMHG | BODY MASS INDEX: 22.5 KG/M2 | TEMPERATURE: 96.9 F | DIASTOLIC BLOOD PRESSURE: 58 MMHG | OXYGEN SATURATION: 97 % | RESPIRATION RATE: 16 BRPM | WEIGHT: 151.9 LBS | HEART RATE: 91 BPM | HEIGHT: 69 IN

## 2020-05-20 LAB
GLUCOSE BLDC GLUCOMTR-MCNC: 116 MG/DL (ref 70–99)
GLUCOSE BLDC GLUCOMTR-MCNC: 126 MG/DL (ref 70–99)
HGB BLD-MCNC: 8.3 G/DL (ref 13.3–17.7)

## 2020-05-20 PROCEDURE — 99239 HOSP IP/OBS DSCHRG MGMT >30: CPT | Performed by: INTERNAL MEDICINE

## 2020-05-20 PROCEDURE — 25000132 ZZH RX MED GY IP 250 OP 250 PS 637: Mod: GY | Performed by: INTERNAL MEDICINE

## 2020-05-20 PROCEDURE — 25000132 ZZH RX MED GY IP 250 OP 250 PS 637: Mod: GY | Performed by: HOSPITALIST

## 2020-05-20 PROCEDURE — 36415 COLL VENOUS BLD VENIPUNCTURE: CPT | Performed by: INTERNAL MEDICINE

## 2020-05-20 PROCEDURE — 00000146 ZZHCL STATISTIC GLUCOSE BY METER IP

## 2020-05-20 PROCEDURE — 85018 HEMOGLOBIN: CPT | Performed by: INTERNAL MEDICINE

## 2020-05-20 RX ADMIN — HYDROMORPHONE HYDROCHLORIDE 2 MG: 2 TABLET ORAL at 11:22

## 2020-05-20 RX ADMIN — METOPROLOL SUCCINATE 50 MG: 50 TABLET, EXTENDED RELEASE ORAL at 08:13

## 2020-05-20 RX ADMIN — ASPIRIN 81 MG 81 MG: 81 TABLET ORAL at 08:13

## 2020-05-20 RX ADMIN — HYDROMORPHONE HYDROCHLORIDE 2 MG: 2 TABLET ORAL at 08:13

## 2020-05-20 ASSESSMENT — ACTIVITIES OF DAILY LIVING (ADL)
ADLS_ACUITY_SCORE: 20
ADLS_ACUITY_SCORE: 20
ADLS_ACUITY_SCORE: 22
ADLS_ACUITY_SCORE: 20

## 2020-05-20 NOTE — PROGRESS NOTES
Writer called [facility] Phone [phone] regarding planned discharge to [facility] for today.   Faxed orders to Melonie at [phone]. Writer left another message to let her know that I misspoke and the ride is for 12:00 not 10:30.    SW did leave a message with the patient's son Aleksadnar 5/19 regarding this date/time.  Patient is aware of discharge for today.    Writer faxed discharge orders to [facility] at [phone].  Meds to be filled at our discharge RX beside RN updated

## 2020-05-20 NOTE — PLAN OF CARE
VSS on RA. A&O x3-4. Reports pain in left leg/hip, PO dilaudid given x1 along with 1X IV dilaudid. Pain increases with activity. LS clear. BS present, senna-s given for constipation, no results yet. Using urinal in bed, some incontinence noted. Plan for discharge today @ 1030 back to [facility] with increased services.

## 2020-05-20 NOTE — PROGRESS NOTES
Pt is A/Ox4. VSS, on RA. Pain L hip/leg-PO dilaudid given x2-effective. Mod CHO diet, good appetite, blood glucose monitoring w/ sliding scale insulin. Pt feels constipated (last BM 5/19) but declined any intervention today since he received senna on previous shift. Uses the urinal at bedside but can be incontinent at times. Skin tears to L arm and forehead-covered, CDI. Up w/ assist x2 + GB/walker, WBAT. Discharged to [facility] today at 12pm. Discharge meds filled at [facility] and handed to transport. PIV removed prior to discharge.

## 2020-05-20 NOTE — PROVIDER NOTIFICATION
MD Notification    Notified Person: MD    Notified Person Name: Grecia    Notification Date/Time: 5/20/2020 08:24    Notification Interaction: FYI page for completion of orders for discharge back to patient's [facility] via [facility] at 10:30 a.m.  Need discharge order and electronic signature if stable for dishcharge    Purpose of Notification: FYI page for completion of discharge orders if medically stable    Orders Received: await orders with electronic signature or call back for clarification    Comments:

## 2020-05-20 NOTE — DISCHARGE SUMMARY
[facility]  Discharge Summary        Abimael Flores MRN# [id number]   YOB: 1924 Age: 89 year old     Date of Admission:  5/16/2020  Date of Discharge:  5/20/2020  Admitting Physician:  Leonel Sandoval MD  Discharge Physician: Pérez Paige MD  Discharging Service: Hospitalist     Primary Provider:  Aleksandar Flores  Primary Care Physician Phone Number: [phone]         Discharge Diagnoses/Problem Oriented Hospital Course (Providers):    Abimael Flores was admitted on 5/16/2020 by Leonel Sandoval MD and I would refer you to their history and physical.  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      Fall with periprosthetic left hip fracture    Initially was n.p.o. in case of surgery, however orthopedics has evaluated the patient and imaging and felt that it is nonsurgical.  PT has been consulted as well as social work.  Pain control remains adequate.  - patient is refusing TCU so will be discharge back home with increased home cares  - pain control is ok but he has been only getting iv dilaudid.  Change to PO   - doesn't feel like he is ready to go back today     Asymptomatic COVID-19 screening for surgery - NEGATIVE  - COVID-19 PCR negative  - The patient is asymptomatic and no precautions are necessary.      Diabetes mellitus type 2    Hemoglobin A1c was 8.3 in 11/2009.  The patient is on quite a low dose of Lantus.  - PTA Lantus 10 units nightly, getting only 8 units, will go back to 10 with elevated BG  - Sliding scale insulin with 4 times daily CML and at bedtime sugar checks  - his A1c  is 7.7     Benign essential hypertension  Coronary artery disease with history of CABG and recent MI (11/2019), managed medically    * History of 4-vessel CABG in 1996.  History of MI in 11/2019 with peak troponin in the 70s.  The plan was for cardiac catheterization, but ultimately the patient declined any invasive workup, and he was managed medically and started on aspirin and ticagrelor.  He had echocardiogram at that time showed left ventricular LVEF of 55-60%, mild AS.   * Per admitting MD discussion with patient's son (and primary MD), Dr. Aleksandar Flores, he has been stable from a cardiac standpoint since his MI last year.  -Discussed with patient's son who is his primary care physician Dr. Aleksandar Flores, the plan was to stop Brilinta after his current prescription was completed and he only had a few days left PTA; as such, will not plan to restart Brilinta.  Given no surgery, aspirin is resumed.  - Continue atorvastatin and metoprolol XL as well as PRN nitroglycerin.   - he currently denies any chest pain or sob     Acute blood loss Anemia secondary to hip fx in setting of chronic anemia d/t CKD  It appears his hemoglobin has mostly been in the 9-10 range in the past year or so.  Hemoglobin 10.2 on admit.  - Consider prbc transfusion if hgb </= 7.0 or if significant bleeding with hemodynamic instability or if symptomatic.  - No signs of bleeding  - recheck hgb 8.4 and 8.3 will check Fe and Fe saturation is low     Chronic kidney disease    Baseline creatinine noted to be around the mid to upper 1 range.  Creatinine 1.67 on admit.  - appears at baseline  - prior IVF have been stopped given that he is now eating/drinking     Benign prostatic hypertrophy   - Continue tamsulosin.      Disposition Plan  - patient refusing TCU  - plan to go back to his [facility] with increased cares (RN, PT, OT, HHA)  on 5/20         Code Status:      DNR / DNI         Important Results:      NAD, [place]  HEENT NCAT  PULM CTA  CV  RRR  GI SOFT  MS NON EDEMA  NEURO NON FOCAL  DERM WARM AND DRY         Pending Results:        Unresulted Labs Ordered in the Past 30 Days of this Admission     No orders found from 4/16/2020 to 5/17/2020.               Discharge Instructions and Follow-Up:      Follow-up Appointments     Follow-up and recommended labs and tests       Follow up with primary care provider, Aleksandar Flores, in 2 weeks or   earlier as needed    Follow up with orthopedics as directed                  Discharge Disposition:      Discharged to rehabilitation facility         Discharge Medications:        Current Discharge Medication List      START taking these medications    Details   HYDROmorphone (DILAUDID) 2 MG tablet Take 1 tablet (2 mg) by mouth every 3 hours as needed for moderate to severe pain  Qty: 30 tablet, Refills: 0    Associated Diagnoses: Closed fracture of left hip, initial encounter (H)      senna-docusate (SENOKOT-S/PERICOLACE) 8.6-50 MG tablet Take 2 tablets by mouth 2 times daily as needed for constipation  Qty: 100 tablet, Refills: 0    Associated Diagnoses: Closed fracture of left hip, initial encounter (H)         CONTINUE these medications which have CHANGED    Details   atorvastatin (LIPITOR) 40 MG tablet Take 1 tablet (40 mg) by mouth every evening  Qty: 30 tablet, Refills: 0    Associated Diagnoses: NSTEMI (non-ST elevated myocardial infarction) (H)         CONTINUE these medications which have NOT CHANGED    Details   aspirin (ASA) 81 MG chewable tablet Take 1 tablet (81 mg) by mouth daily  Qty: 30 tablet, Refills: 0    Comments: Future refills by PCP Dr. Aleksandar Flores with phone number [phone].  Associated Diagnoses: NSTEMI (non-ST elevated myocardial infarction) (H)      calcium carbonate (TUMS) 500 MG chewable tablet Take 1 chew tab by mouth as needed for heartburn      insulin aspart (NOVOLOG FLEXPEN) 100 UNIT/ML pen Inject 6 Units Subcutaneous 3 times daily (with meals)       insulin  glargine (LANTUS SOLOSTAR PEN) 100 UNIT/ML pen Inject 10 Units Subcutaneous At Bedtime       loratadine (CLARITIN) 10 MG tablet Take 10 mg by mouth daily as needed for allergies      meclizine (ANTIVERT) 25 MG tablet Take 25 mg by mouth 2 times daily as needed for dizziness      melatonin 3 MG tablet Take 1 mg by mouth nightly as needed for sleep      metoprolol succinate ER (TOPROL-XL) 50 MG 24 hr tablet Take 50 mg by mouth daily      naphazoline-pheniramine (OPCON-A) SOLN ophthalmic solution Place 1-2 drops into both eyes daily as needed for irritation      nitroGLYcerin (NITROSTAT) 0.4 MG sublingual tablet For chest pain place 1 tablet under the tongue every 5 minutes for 3 doses. If symptoms persist 5 minutes after 1st dose call 911.  Qty: 30 tablet, Refills: 0    Comments: Future refills by PCP Dr. Aleksandar Flores with phone number [phone].  Associated Diagnoses: NSTEMI (non-ST elevated myocardial infarction) (H)      tamsulosin (FLOMAX) 0.4 MG capsule Take 0.8 mg by mouth At Bedtime                  Allergies:       No Known Allergies         Consultations This Hospital Stay:      Consultation during this admission received from orthopedics          Discharge Orders for Skilled Facility (from Discharge Orders):        After Care Instructions     Activity      Your activity upon discharge: activity as tolerated         Diet      Follow this diet upon discharge: Orders Placed This Encounter      Moderate Consistent CHO Diet         Discharge Instructions      left periprosthetic femur fracture that is stable. nonoperative treatment.  The plan is to make him be weightbearing as tolerated with a walker for the next 8 weeks.  We will begin mobilization with therapy.   Follow-up in one month at [facility] with Dr. Pierce or original treating surgeon  [phone]                    Rehab orders for Skilled Facility (from Discharge Orders):      Referrals     Future Labs/Procedures    Home care nursing referral      Comments:    RN extended hours visit. RN to assess respiratory and cardiac status.    Your provider has ordered home care nursing services. If you have not been   contacted within 2 days of your discharge please call the inpatient   department phone number at [phone] .    [facility] [phone]     Home Health Aide for assist with bathing    Home Care OT Referral for Hospital Discharge     Comments:    OT to eval and treat    Your provider has ordered home care - occupational therapy. If you have   not been contacted within 2 days of your discharge please call the   department phone number listed on the top of this document.    Home Care PT Referral for Hospital Discharge     Comments:    PT to eval and treat    Your provider has ordered home care - physical therapy. If you have not   been contacted within 2 days of your discharge please call the department   phone number listed on the top of this document.             Discharge Time:       Greater than 30 minutes.        Image Results From This Hospital Stay (For Non-EPIC Providers):        Results for orders placed or performed during the hospital encounter of 05/16/20   Head CT w/o contrast    Narrative    CT OF THE HEAD WITHOUT CONTRAST 5/16/2020 8:35 PM     COMPARISON: Head CT 12/16/2018.    HISTORY: Fall, head injury, on coumadin.    TECHNIQUE: 5 mm thick axial CT images of the head were acquired  without IV contrast material.    FINDINGS: There is moderate diffuse cerebral volume loss. There are  subtle patchy areas of decreased density in the cerebral white matter  bilaterally that are consistent with sequela of chronic small vessel  ischemic disease.    The ventricles and basal cisterns are within normal limits in  configuration given the degree of cerebral volume loss.  There is no  midline shift. There are no extra-axial fluid collections.    No intracranial hemorrhage, mass or recent infarct.    There is near complete opacification  of the right maxillary sinus that  may represent acute sinusitis. There is an air-fluid level in the left  maxillary sinus that may represent acute sinusitis. There is no  mastoiditis. There are no fractures of the visualized bones. Right  phthisis bulbi again noted.      Impression    IMPRESSION: Diffuse cerebral volume loss and cerebral white matter  changes consistent with chronic small vessel ischemic disease. No  evidence for acute intracranial pathology.      Radiation dose for this scan was reduced using automated exposure  control, adjustment of the mA and/or kV according to patient size, or  iterative reconstruction technique    WILMA OLIVARES MD   XR Pelvis w Hip Left 1 View    Narrative    EXAM: XR PELVIS AND HIP LEFT 1 VIEW  LOCATION: [facility]  DATE/TIME: 5/16/2020 8:21 PM    INDICATION: Left hip pain.  COMPARISON: A radiograph of the left hip on 01/29/2019 and a radiograph of the pelvis on 01/08/2019.      Impression    IMPRESSION: Again seen are surgical changes of both hips. This includes a left total hip arthroplasty and a right hip bipolar hemiarthroplasty. There is an acute fracture of the lateral aspect of the proximal left femur along the distal aspect of the   greater trochanter. This is mildly displaced. No other acute abnormality is noted.   XR Chest 1 View    Narrative    CHEST ONE VIEW  5/16/2020 8:46 PM     HISTORY: Fall, hip fracture.    COMPARISON: 11/17/2019.      Impression    IMPRESSION: Chronic interstitial abnormalities in the lungs are  similar to prior. No airspace consolidation, pneumothorax, or pleural  effusion. Prior median sternotomy.    HANNAH HOOKER MD           Most Recent Lab Results In EPIC (For Non-EPIC Providers):    Most Recent 3 CBC's:  Recent Labs   Lab Test 05/20/20  0716 05/19/20  0751 05/18/20  0713 05/16/20 2010   WBC  --  11.3* 13.0* 10.9   HGB 8.3* 8.4* 8.9* 10.2*   MCV  --  90 91 92   PLT  --  190 217 273      Most Recent 3 BMP's:  Recent  Labs   Lab Test 05/18/20  0713 05/16/20 2010 11/19/19  0528    137 137   POTASSIUM 4.7 4.9 4.3   CHLORIDE 107 107 108   CO2 23 23 26   BUN 36* 35* 32*   CR 1.66* 1.67* 1.58*   ANIONGAP 6 7 3   JOSELINE 8.4* 8.5 8.3*   * 286* 101*     Most Recent 3 Troponin's:  Recent Labs   Lab Test 11/19/19  0528 11/18/19  0606 11/18/19  0044  11/17/19  1439  04/21/14  1656   TROPI 27.822* 79.751* 87.244*   < > 0.101*   < >  --    TROPONIN  --   --   --   --  0.08  --  0.03    < > = values in this interval not displayed.     Most Recent 3 INR's:  Recent Labs   Lab Test 05/16/20  2010 10/14/18  1220 04/21/14  0405   INR 1.02 1.08 0.91     Most Recent 2 LFT's:  Recent Labs   Lab Test 11/17/19  1439 10/09/18  0703   AST 17 22   ALT 12 12   ALKPHOS 70 72   BILITOTAL 0.3 0.6     Most Recent Cholesterol Panel:  Recent Labs   Lab Test 11/18/19  0606   CHOL 149   LDL 96   HDL 29*   TRIG 119     Most Recent 6 Bacteria Isolates From Any Culture (See EPIC Reports for Culture Details):  Recent Labs   Lab Test 01/08/19  2359 10/22/18  1610 09/06/18  1050 08/27/18  2330   CULT 10,000 to 50,000 colonies/mL  Candida albicans / dubliniensis  Candida albicans and Candida dubliniensis are not routinely speciated  Susceptibility testing not routinely done  * >100,000 colonies/mL  Candida albicans / dubliniensis  Candida albicans and Candida dubliniensis are not routinely speciated  Susceptibility testing not routinely done  * No growth No growth     Most Recent TSH, T4 and HgbA1c:  Recent Labs   Lab Test 05/18/20  0713  10/08/18  0535   TSH  --   --  4.57*   T4  --   --  1.28   A1C 7.7*   < >  --     < > = values in this interval not displayed.

## 2020-05-20 NOTE — PROGRESS NOTES
MAULIK Note:    D/I:  PCS form completed, faxed to [facility] and provided to [facility].    P: SW will remain available to assist as needed.    ESTEFANI Rodgers, [facility]  [phone]  [facility]

## 2020-05-20 NOTE — PLAN OF CARE
Pt is being discharged soon.    Physical Therapy Discharge Summary    Reason for therapy discharge:    Discharged to [facility] with increased services    Progress towards therapy goal(s). See goals on Care Plan in [facility] electronic health record for goal details.  Goals not met.  Barriers to achieving goals:   discharge from facility.    Therapy recommendation(s):    Continued therapy is recommended.  Rationale/Recommendations:  PT recommended TCU to maximize foucntional outcome and decreased the level of assistnce needed with fucntional mobility.

## 2020-06-04 NOTE — PLAN OF CARE
"Problem: Patient Care Overview  Goal: Plan of Care/Patient Progress Review  Outcome: Improving  Pt A/O. VSS. Up with lift. Pt transferred to floor early last night. Tele shows SD with runs of Tachyarrhythmias x3. RRT called during 1st episode of tachycardia, pt asymptomatic with it. First two episodes resolved spontaneously. However, metoprolol was given afterwards to prevent reoccurrence. Last episode was at 0600 lasting 10-15 min, SBP dropped into 80s with elevated HR. Metoprolol was given during episode this time which brought HR down. SBP increased back into 130s. Timing between occurrences is approximately 4 hrs. Pt denies any CP or SOB. Crackles in lower lungs. Pain in rt hip when moving. CMS intact. Pt has no new complaints.    BP (!) 126/91  Pulse 90  Temp 100  F (37.8  C) (Oral)  Resp 20  Ht 1.727 m (5' 8\")  Wt 77.1 kg (170 lb)  SpO2 96%  BMI 25.85 kg/m2        " 04-Jun-2020 22:30

## 2021-06-23 NOTE — ED NOTES
Bed: ED08  Expected date: 1/8/19  Expected time: 2:24 AM  Means of arrival:   Comments:  stabe 3   none

## 2022-08-05 ENCOUNTER — HOSPITAL ENCOUNTER (EMERGENCY)
Facility: CLINIC | Age: 87
Discharge: HOME | End: 2022-08-05
Attending: EMERGENCY MEDICINE | Admitting: EMERGENCY MEDICINE
Payer: MEDICARE

## 2022-08-05 VITALS
HEIGHT: 68 IN | SYSTOLIC BLOOD PRESSURE: 126 MMHG | TEMPERATURE: 97.5 F | RESPIRATION RATE: 18 BRPM | OXYGEN SATURATION: 99 % | HEART RATE: 71 BPM | BODY MASS INDEX: 21.98 KG/M2 | DIASTOLIC BLOOD PRESSURE: 108 MMHG | WEIGHT: 145 LBS

## 2022-08-05 DIAGNOSIS — N18.9 CHRONIC RENAL FAILURE, UNSPECIFIED CKD STAGE: ICD-10-CM

## 2022-08-05 DIAGNOSIS — R30.9 PAINFUL URINATION: ICD-10-CM

## 2022-08-05 DIAGNOSIS — N39.0 URINARY TRACT INFECTION WITHOUT HEMATURIA, SITE UNSPECIFIED: ICD-10-CM

## 2022-08-05 DIAGNOSIS — R30.9 PAINFUL URINATION: Primary | ICD-10-CM

## 2022-08-05 DIAGNOSIS — R33.8 ACUTE URINARY RETENTION: ICD-10-CM

## 2022-08-05 LAB
ALBUMIN UR-MCNC: 70 MG/DL
ANION GAP SERPL CALCULATED.3IONS-SCNC: 4 MMOL/L (ref 3–14)
APPEARANCE UR: ABNORMAL
BASOPHILS # BLD AUTO: 0 10E3/UL (ref 0–0.2)
BASOPHILS NFR BLD AUTO: 0 %
BILIRUB UR QL STRIP: NEGATIVE
BUN SERPL-MCNC: 30 MG/DL (ref 7–30)
CALCIUM SERPL-MCNC: 9 MG/DL (ref 8.5–10.1)
CHLORIDE BLD-SCNC: 106 MMOL/L (ref 94–109)
CO2 SERPL-SCNC: 27 MMOL/L (ref 20–32)
COLOR UR AUTO: ABNORMAL
CREAT SERPL-MCNC: 2.03 MG/DL (ref 0.66–1.25)
EOSINOPHIL # BLD AUTO: 0 10E3/UL (ref 0–0.7)
EOSINOPHIL NFR BLD AUTO: 0 %
ERYTHROCYTE [DISTWIDTH] IN BLOOD BY AUTOMATED COUNT: 15.1 % (ref 10–15)
GFR SERPL CREATININE-BSD FRML MDRD: 29 ML/MIN/1.73M2
GLUCOSE BLD-MCNC: 154 MG/DL (ref 70–99)
GLUCOSE UR STRIP-MCNC: NEGATIVE MG/DL
HCT VFR BLD AUTO: 38.5 % (ref 40–53)
HGB BLD-MCNC: 12 G/DL (ref 13.3–17.7)
HGB UR QL STRIP: ABNORMAL
IMM GRANULOCYTES # BLD: 0 10E3/UL
IMM GRANULOCYTES NFR BLD: 0 %
KETONES UR STRIP-MCNC: NEGATIVE MG/DL
LEUKOCYTE ESTERASE UR QL STRIP: ABNORMAL
LYMPHOCYTES # BLD AUTO: 2.1 10E3/UL (ref 0.8–5.3)
LYMPHOCYTES NFR BLD AUTO: 22 %
MCH RBC QN AUTO: 30.1 PG (ref 26.5–33)
MCHC RBC AUTO-ENTMCNC: 31.2 G/DL (ref 31.5–36.5)
MCV RBC AUTO: 97 FL (ref 78–100)
MONOCYTES # BLD AUTO: 1.3 10E3/UL (ref 0–1.3)
MONOCYTES NFR BLD AUTO: 14 %
NEUTROPHILS # BLD AUTO: 6.3 10E3/UL (ref 1.6–8.3)
NEUTROPHILS NFR BLD AUTO: 64 %
NITRATE UR QL: POSITIVE
NRBC # BLD AUTO: 0 10E3/UL
NRBC BLD AUTO-RTO: 0 /100
PH UR STRIP: 6.5 [PH] (ref 5–7)
PLATELET # BLD AUTO: 213 10E3/UL (ref 150–450)
POTASSIUM BLD-SCNC: 4.7 MMOL/L (ref 3.4–5.3)
RBC # BLD AUTO: 3.99 10E6/UL (ref 4.4–5.9)
RBC URINE: 3 /HPF
SODIUM SERPL-SCNC: 137 MMOL/L (ref 133–144)
SP GR UR STRIP: 1.01 (ref 1–1.03)
UROBILINOGEN UR STRIP-MCNC: NORMAL MG/DL
WBC # BLD AUTO: 9.8 10E3/UL (ref 4–11)
WBC CLUMPS #/AREA URNS HPF: PRESENT /HPF
WBC URINE: >182 /HPF

## 2022-08-05 PROCEDURE — 51702 INSERT TEMP BLADDER CATH: CPT

## 2022-08-05 PROCEDURE — 81001 URINALYSIS AUTO W/SCOPE: CPT | Performed by: EMERGENCY MEDICINE

## 2022-08-05 PROCEDURE — 250N000009 HC RX 250: Performed by: EMERGENCY MEDICINE

## 2022-08-05 PROCEDURE — 258N000003 HC RX IP 258 OP 636: Performed by: EMERGENCY MEDICINE

## 2022-08-05 PROCEDURE — 87086 URINE CULTURE/COLONY COUNT: CPT | Performed by: EMERGENCY MEDICINE

## 2022-08-05 PROCEDURE — 36415 COLL VENOUS BLD VENIPUNCTURE: CPT | Performed by: EMERGENCY MEDICINE

## 2022-08-05 PROCEDURE — 96365 THER/PROPH/DIAG IV INF INIT: CPT | Mod: 59

## 2022-08-05 PROCEDURE — 85025 COMPLETE CBC W/AUTO DIFF WBC: CPT | Performed by: EMERGENCY MEDICINE

## 2022-08-05 PROCEDURE — 99284 EMERGENCY DEPT VISIT MOD MDM: CPT | Mod: 25

## 2022-08-05 PROCEDURE — 82310 ASSAY OF CALCIUM: CPT | Performed by: EMERGENCY MEDICINE

## 2022-08-05 PROCEDURE — 250N000011 HC RX IP 250 OP 636: Performed by: EMERGENCY MEDICINE

## 2022-08-05 PROCEDURE — 51798 US URINE CAPACITY MEASURE: CPT

## 2022-08-05 RX ORDER — CEFTRIAXONE 1 G/1
1 INJECTION, POWDER, FOR SOLUTION INTRAMUSCULAR; INTRAVENOUS ONCE
Status: COMPLETED | OUTPATIENT
Start: 2022-08-05 | End: 2022-08-05

## 2022-08-05 RX ORDER — LIDOCAINE HYDROCHLORIDE 20 MG/ML
10 JELLY TOPICAL ONCE
Status: COMPLETED | OUTPATIENT
Start: 2022-08-05 | End: 2022-08-05

## 2022-08-05 RX ORDER — CEFDINIR 300 MG/1
300 CAPSULE ORAL DAILY
Qty: 7 CAPSULE | Refills: 0 | Status: SHIPPED | OUTPATIENT
Start: 2022-08-05 | End: 2022-08-12

## 2022-08-05 RX ADMIN — CEFTRIAXONE SODIUM 1 G: 1 INJECTION, POWDER, FOR SOLUTION INTRAMUSCULAR; INTRAVENOUS at 18:17

## 2022-08-05 RX ADMIN — SODIUM CHLORIDE 500 ML: 9 INJECTION, SOLUTION INTRAVENOUS at 18:16

## 2022-08-05 RX ADMIN — LIDOCAINE HYDROCHLORIDE 10 ML: 20 JELLY TOPICAL at 17:18

## 2022-08-05 ASSESSMENT — ENCOUNTER SYMPTOMS
DIFFICULTY URINATING: 1
FEVER: 0
DYSURIA: 1
CHILLS: 0
ABDOMINAL PAIN: 0
BACK PAIN: 1

## 2022-08-05 NOTE — ED NOTES
Rapid Assessment Note    History:   Abimael Flores is a 89 year old male who presents stating that he has been urinating frequently and it has been painful. Patient reports urinating every hour about 10-15 CC's. Patients son placed a catheter before, but when it was removed they are concerned of infection due to the catheter. He denies fevers or abd pain.    Exam:   General:  Alert, interactive  Cardiovascular:  Well perfused  Lungs:  No respiratory distress, no accessory muscle use  Neuro:  Moving all 4 extremities  Abd: no ttp  Skin:  Warm, dry  Psych:  Normal affect    Plan of Care:   I evaluated the patient and developed an initial plan of care. I discussed this plan and explained that I, or one of my partners, would be returning to complete the evaluation.     I, Maritza Burks MD, am serving as a scribe to document services personally performed by No att. providers found , based on my observations and the provider's statements to me.    08/05/2022  [employer]    Portions of this medical record were completed by a scribe. UPON MY REVIEW AND AUTHENTICATION BY ELECTRONIC SIGNATURE, this confirms (a) I performed the applicable clinical services, and (b) the record is accurate.        Maritza Burks MD  08/05/22 [id number]

## 2022-08-05 NOTE — ED TRIAGE NOTES
Triage Assessment     Row Name 08/05/22 [id number]       Triage Assessment (Adult)    Airway WDL WDL       Respiratory WDL    Respiratory WDL WDL       Skin Circulation/Temperature WDL    Skin Circulation/Temperature WDL WDL       Cardiac WDL    Cardiac WDL WDL       Peripheral/Neurovascular WDL    Peripheral Neurovascular WDL WDL       Cognitive/Neuro/Behavioral WDL    Cognitive/Neuro/Behavioral WDL WDL

## 2022-08-05 NOTE — DISCHARGE INSTRUCTIONS
Leave the catheter in until you are seen by the urologist for follow-up next week.  Start cefdinir once a day tomorrow morning and for a total of 7 days.  You will be contacted if we need to change the antibiotic.  If you get worse with high fevers and chills and vomiting, return to the emergency room.

## 2022-08-05 NOTE — ED PROVIDER NOTES
History   Chief Complaint:  Urinary Retention        HPI   Abimael Flores is a 89 year old male with history of chronic kidney disease, diabetes mellitus, hypertension and benign prostatic hyperplasia who presents with urinary retention. The patient states he developed urinary retention last night and was attempting to urinate every 2 hours which was very painful. He is experiencing intermittent lower back pain. He last urinated about 10 seconds ago but does not feel he was able to empty. He has had a catheter before and he believes his last one was about 2 months ago for a few weeks. Denies abdominal pain, fever or chills. No new medications. He has not recently had a urinary tract infection.     Review of Systems   Constitutional: Negative for chills and fever.   Gastrointestinal: Negative for abdominal pain.   Genitourinary: Positive for decreased urine volume, difficulty urinating and dysuria.   Musculoskeletal: Positive for back pain.   All other systems reviewed and are negative.        Allergies:  No Known Drug Allergies    Medications:  Flomax  Antivert  Toprol XL  Insulin    Past Medical History:     Diabetes mellitus, type II  Chronic kidney disease, stage III  Insomnia  Bladder spasms  Anemia  Deep venous thrombosis   Benign prostatic hyperplasia  Hypertension   Vertigo  Hyperlipidemia   Supraventricular tachycardia  Senile nuclear sclerosis  NSTEMI  Allergic rhinitis  Coronary artery disease     Past Surgical History:    Arthroplasty revision hip (L)  Closed reduction hip (L)  Coronary artery bypass  ORIF hip bipolar bilateral   Repair ruptured globe    Family History:    Mother- diabetes mellitus  Father- diabetes mellitus     Social History:  The patient presents to the ED with his son  Occupation: retired surgeon    Physical Exam     Patient Vitals for the past 24 hrs:   BP Temp Temp src Pulse Resp SpO2 Height Weight   08/05/22 1900 (!) 126/108 -- -- -- -- -- -- --   08/05/22 1403 133/58 97.5  F  "(36.4  C) Temporal 71 18 99 % 1.727 m (5' 8\") 65.8 kg (145 lb)       Physical Exam  Nursing note and vitals reviewed.    Constitutional:  Appears comfortable.  Hard of hearing.  Eyes:    Conjunctivae are normal without injection.  Pupils are equal.  Cardiovascular:  Normal rate, regular rhythm with normal S1 and S2.      Normal heart sounds and peripheral pulses 2+ and equal.       No murmur or carmen.  Pulmonary:  Effort normal and breath sounds clear to auscultation bilaterally.     No respiratory distress.  No stridor.     No wheezes. No rales.     GI:    Some distention, mild tenderness in the suprapubic area.      No rebound and no guarding. No flank pain.    Skin:    Skin is warm and dry. No rash noted.   Psychiatric:   Behavior is normal. Appropriate mood and affect.     Judgment and thought content normal.     Emergency Department Course     Laboratory:  Labs Ordered and Resulted from Time of ED Arrival to Time of ED Departure   ROUTINE UA WITH MICROSCOPIC REFLEX TO CULTURE - Abnormal       Result Value    Color Urine Light Brown (*)     Appearance Urine Cloudy (*)     Glucose Urine Negative      Bilirubin Urine Negative      Ketones Urine Negative      Specific Gravity Urine 1.012      Blood Urine Small (*)     pH Urine 6.5      Protein Albumin Urine 70  (*)     Urobilinogen Urine Normal      Nitrite Urine Positive (*)     Leukocyte Esterase Urine Large (*)     WBC Clumps Urine Present (*)     RBC Urine 3 (*)     WBC Urine >182 (*)    BASIC METABOLIC PANEL - Abnormal    Sodium 137      Potassium 4.7      Chloride 106      Carbon Dioxide (CO2) 27      Anion Gap 4      Urea Nitrogen 30      Creatinine 2.03 (*)     Calcium 9.0      Glucose 154 (*)     GFR Estimate 29 (*)    CBC WITH PLATELETS AND DIFFERENTIAL - Abnormal    WBC Count 9.8      RBC Count 3.99 (*)     Hemoglobin 12.0 (*)     Hematocrit 38.5 (*)     MCV 97      MCH 30.1      MCHC 31.2 (*)     RDW 15.1 (*)     Platelet Count 213      % Neutrophils " 64      % Lymphocytes 22      % Monocytes 14      % Eosinophils 0      % Basophils 0      % Immature Granulocytes 0      NRBCs per 100 WBC 0      Absolute Neutrophils 6.3      Absolute Lymphocytes 2.1      Absolute Monocytes 1.3      Absolute Eosinophils 0.0      Absolute Basophils 0.0      Absolute Immature Granulocytes 0.0      Absolute NRBCs 0.0     URINE CULTURE      Emergency Department Course:       Reviewed:  I reviewed nursing notes, vitals, past medical history and Care Everywhere    Assessments:  1628 I obtained history and examined the patient as noted above.   1803 I rechecked the patient and explained findings.   1847 I rechecked the patient again and explained the plan for discharge.     Interventions:  1816: Bolus 500 mL IV  1817: Rocephin 1 g in 100 mL NS IV    Disposition:  The patient was discharged to home.     Impression & Plan     Medical Decision Making:  Patient comes in with inability to empty his urine today and with dysuria.  Rosales catheter was placed and over 500 cc of urine was drained and it was cloudy.  UA came back positive for urinary tract infection.  A culture is ordered.  He was given 1 g Rocephin IV.  He does have renal failure which is chronic but worse with a creatinine of 2.03.  His electrolytes are normal, white count is normal and is not febrile.  Blood pressure is okay.  At this point I feel the patient can go home on cefdinir and because of his renal failure once a day dosing should be adequate for 7 more days.  He will be called to change the antibiotic if the culture comes back showing a different antibiotic is needed.  He will leave the catheter in until he can see his urologist in 5 to 6 days.  Any complications or obstruction of the catheter he will return to the ER.      Leave the catheter in until you are seen by the urologist for follow-up next week.  Start cefdinir once a day tomorrow morning and for a total of 7 days.  You will be contacted if we need to change  the antibiotic.  If you get worse with high fevers and chills and vomiting, return to the emergency room.    Diagnosis:    ICD-10-CM    1. Painful urination  R30.9 CANCELED: Urine Culture     CANCELED: Urine Culture   2. Acute urinary retention  R33.8    3. Urinary tract infection without hematuria, site unspecified  N39.0    4. Chronic renal failure, unspecified CKD stage  N18.9        Discharge Medications:  Discharge Medication List as of 8/5/2022  6:50 PM      START taking these medications    Details   cefdinir (OMNICEF) 300 MG capsule Take 1 capsule (300 mg) by mouth daily for 7 days, Disp-7 capsule, R-0, E-Prescribe             Scribe Disclosure:  I, Marie Bueno, am serving as a scribe at 4:23 PM on 8/5/2022 to document services personally performed by Nevin Perez MD based on my observations and the provider's statements to me.            Nevin Perez MD  08/05/22 9334

## 2022-08-07 LAB — BACTERIA UR CULT: NORMAL

## 2022-08-08 ENCOUNTER — HOSPITAL ENCOUNTER (EMERGENCY)
Facility: CLINIC | Age: 87
Discharge: HOME | End: 2022-08-08
Attending: EMERGENCY MEDICINE | Admitting: EMERGENCY MEDICINE
Payer: MEDICARE

## 2022-08-08 VITALS
WEIGHT: 140 LBS | HEART RATE: 76 BPM | TEMPERATURE: 97.8 F | HEIGHT: 68 IN | OXYGEN SATURATION: 100 % | BODY MASS INDEX: 21.22 KG/M2 | SYSTOLIC BLOOD PRESSURE: 150 MMHG | DIASTOLIC BLOOD PRESSURE: 67 MMHG | RESPIRATION RATE: 16 BRPM

## 2022-08-08 DIAGNOSIS — Z46.6 URINARY CATHETER (FOLEY) CHANGE REQUIRED: ICD-10-CM

## 2022-08-08 PROCEDURE — 99282 EMERGENCY DEPT VISIT SF MDM: CPT

## 2022-08-08 NOTE — ED TRIAGE NOTES
Catheter placed on Friday, started leaking yesterday. Pt reports pain 8/10 with urination and describes a burning sensation     Triage Assessment     Row Name 08/08/22 1136       Triage Assessment (Adult)    Airway WDL WDL       Skin Circulation/Temperature WDL    Skin Circulation/Temperature WDL WDL       Cardiac WDL    Cardiac WDL WDL       Peripheral/Neurovascular WDL    Peripheral Neurovascular WDL all

## 2022-08-08 NOTE — ED PROVIDER NOTES
History     Chief Complaint:  No chief complaint on file.       PAULINE Flores is a 89 year old male who presents with concerns for a Rosales catheter issue.  He was recently diagnosed with a urinary tract infection with urinary retention.  He had a catheter placed.  While it initially drained the first 2 days, he now feels as though at times he feels a bladder spasm and urine will leak around the tube from his urethra.  With this, he presents to the ER to have this evaluated.  He denies fevers.  He denies other complaints at this juncture.    ROS:  Review of Systems  Pertinent positives and negatives as above.  A 14-point review of systems was performed with all other systems reviewed as negative.       Allergies:  No Known Allergies     Medications:    aspirin (ASA) 81 MG chewable tablet  atorvastatin (LIPITOR) 40 MG tablet  calcium carbonate (TUMS) 500 MG chewable tablet  cefdinir (OMNICEF) 300 MG capsule  HYDROmorphone (DILAUDID) 2 MG tablet  insulin aspart (NOVOLOG FLEXPEN) 100 UNIT/ML pen  insulin glargine (LANTUS SOLOSTAR PEN) 100 UNIT/ML pen  loratadine (CLARITIN) 10 MG tablet  meclizine (ANTIVERT) 25 MG tablet  melatonin 3 MG tablet  metoprolol succinate ER (TOPROL-XL) 50 MG 24 hr tablet  naphazoline-pheniramine (OPCON-A) SOLN ophthalmic solution  nitroGLYcerin (NITROSTAT) 0.4 MG sublingual tablet  senna-docusate (SENOKOT-S/PERICOLACE) 8.6-50 MG tablet  tamsulosin (FLOMAX) 0.4 MG capsule        Past Medical History:    Past Medical History:   Diagnosis Date     Benign essential hypertension 9/4/2018     Coronary artery disease      Nonsenile cataract      Recent retinal detachment, total or subtotal 8/5/2014     Type 2 diabetes mellitus without complications (H)        Past Surgical History:    Past Surgical History:   Procedure Laterality Date     ARTHROPLASTY REVISION HIP Left 10/22/2018    Procedure: LEFT HIP HEMIARTHROPLASTY CONVERTED TO LEFT TOTAL HIP ARTHROPLASTY;  Surgeon: Marcos Winchester MD;  " Location:  OR     CARDIAC SURGERY       CATARACT IOL, RT/LT Bilateral      CLOSED REDUCTION HIP Left 10/14/2018    Procedure: CLOSED REDUCTION HIP;  CLOSED REDUCTION HIP;  Surgeon: Milton Gusman MD;  Location:  OR     CORONARY ARTERY BYPASS  1996    4v CABG 1996.     lacrimal caruncle removed BE Bilateral ~1989     OPEN REDUCTION INTERNAL FIXATION HIP BIPOLAR Right 8/26/2018    Procedure: OPEN REDUCTION INTERNAL FIXATION HIP BIPOLAR;  Right Hip Bipolar Hemiarthroplasty (Biomet);  Surgeon: Bill Sanders MD;  Location:  OR     OPEN REDUCTION INTERNAL FIXATION HIP BIPOLAR Left 10/4/2018    Procedure: OPEN REDUCTION INTERNAL FIXATION HIP BIPOLAR;  LEFT HIP TONYA ARTHROPLASTY;  Surgeon: Bruno Stewart MD;  Location:  OR     REPAIR RUPTURED GLOBE  4/21/2014    Procedure: Exploration and Repair of Ruptured Globe ;  Surgeon: Mercedes Rivera MD;  Location: [facility]        Family History:    family history includes Diabetes in his father and mother.    Social History:   reports that he has quit smoking. He has never used smokeless tobacco. He reports that he does not drink alcohol and does not use drugs.  PCP: Aleksandar Flores     Physical Exam     Patient Vitals for the past 24 hrs:   BP Temp Temp src Pulse Resp SpO2 Height Weight   08/08/22 1133 (!) 150/67 97.8  F (36.6  C) Oral 76 16 100 % 1.727 m (5' 8\") 63.5 kg (140 lb)        Physical Exam  Eye:  Pupils are equal, round, and reactive.  Extraocular movements intact.    ENT:  No rhinorrhea.  Moist mucus membranes.  Normal tongue and tonsil.    Cardiac:  Regular rate and rhythm.  No murmurs, gallops, or rubs.    Pulmonary:  Clear to auscultation bilaterally.  No wheezes, rales, or rhonchi.    Abdomen:  Positive bowel sounds.  Abdomen is soft and non-distended, without focal tenderness.    Musculoskeletal:  Normal movement of all extremities without evidence for deficit.    Skin:  Warm and dry without rashes.    Neurologic:  Non-focal exam " "without asymmetric weakness or numbness.     Psychiatric:  Normal affect with appropriate interaction with examiner.      Emergency Department Course   Emergency Department Course:    Reviewed:  I reviewed nursing notes, vitals and past medical history       Disposition:  The patient was discharged to home.     Impression & Plan        Medical Decision Making:  This delightful 89-year-old man presents to us for Rosales catheter related issues.  I reviewed his urinary tract infection results and he is on appropriate antibiotics.  I ordered nursing to remove the catheter and place a new one and to obtain a specimen.  However, the patient is adamant he does not want the specimen sent as \"the antibiotics have not had time to take effect.\"  I do not feel this is unreasonable to hold off on further urinalysis in this scenario.  It is unclear whether his catheter was actually clogged.  I favor more of an issue that the catheter was likely pushed too high in the bladder, allowing some leakage around the tubing.  Nonetheless, I see no indication for imaging or further lab work at this time.  He has close follow-up with his son who is his physician.  He was otherwise advised to return to us immediately for any worsening of condition or other emergent concerns.    Diagnosis:    ICD-10-CM    1. Urinary catheter (Rosales) change required  Z46.6           8/8/2022   Trierweiler, Chad A, MD Trierweiler, Chad A, MD  08/08/22 1846    "

## 2022-08-09 ENCOUNTER — PATIENT OUTREACH (OUTPATIENT)
Dept: CARE COORDINATION | Facility: CLINIC | Age: 87
End: 2022-08-09

## 2022-08-09 DIAGNOSIS — Z71.89 OTHER SPECIFIED COUNSELING: ICD-10-CM

## 2022-08-09 NOTE — PROGRESS NOTES
"Clinic Care Coordination Contact  [facility]: Post-Discharge Note  SITUATION                                                      Admission:    Admission Date: 08/08/22   Reason for Admission: Catheter problem  Discharge:   Discharge Date: 08/08/22  Discharge Diagnosis: Urinary catheter (Rosales) change required    BACKGROUND                                                      Per hospital discharge summary and inpatient provider notes:    Abimael Flores is a 89 year old male who presents with concerns for a Rosales catheter issue.  He was recently diagnosed with a urinary tract infection with urinary retention.  He had a catheter placed.  While it initially drained the first 2 days, he now feels as though at times he feels a bladder spasm and urine will leak around the tube from his urethra.  With this, he presents to the ER to have this evaluated.  He denies fevers.  He denies other complaints at this juncture.    ASSESSMENT      Enrollment  Primary Care Care Coordination Status: Not a Candidate    Discharge Assessment  How are you doing now that you are home?: \"The catheter is working just great thank you for calling.\"  How are your symptoms? (Red Flag symptoms escalate to triage hotline per guidelines): Improved  Do you feel your condition is stable enough to be safe at home until your provider visit?: Yes  Does the patient have their discharge instructions? : Yes  Does the patient have questions regarding their discharge instructions? : No  Were you started on any new medications or were there changes to any of your previous medications? : No  Do you have questions regarding any of your medications? : No  Do you have all of your needed medical supplies or equipment (DME)?  (i.e. oxygen tank, CPAP, cane, etc.): Yes  Discharge follow-up appointment scheduled within 14 calendar days? : Yes  Discharge Follow Up Appointment Date: 08/15/22  Discharge Follow Up Appointment Scheduled with?: Primary Care " Provider    Post-op ([facility] CTA Only)  If the patient had a surgery or procedure, do they have any questions for a nurse?: No    PLAN                                                      Outpatient Plan:       Follow-Ups     1 Schedule an appointment with Aleksandar Flores MD (Family Medicine)  2 Follow up with [facility] (EMERGENCY MEDICINE); If symptoms worsen      No future appointments.      For any urgent concerns, please contact our 24 hour nurse triage line: [phone] ([phone])         CLAUDINE Ryan  [phone]  [facility]  [facility]

## 2022-09-20 ENCOUNTER — APPOINTMENT (OUTPATIENT)
Dept: CT IMAGING | Facility: CLINIC | Age: 87
End: 2022-09-20
Attending: EMERGENCY MEDICINE
Payer: MEDICARE

## 2022-09-20 ENCOUNTER — HOSPITAL ENCOUNTER (EMERGENCY)
Facility: CLINIC | Age: 87
Discharge: HOME | End: 2022-09-20
Attending: EMERGENCY MEDICINE | Admitting: EMERGENCY MEDICINE
Payer: MEDICARE

## 2022-09-20 VITALS
HEART RATE: 85 BPM | RESPIRATION RATE: 18 BRPM | BODY MASS INDEX: 20.73 KG/M2 | WEIGHT: 140 LBS | TEMPERATURE: 97.4 F | SYSTOLIC BLOOD PRESSURE: 120 MMHG | DIASTOLIC BLOOD PRESSURE: 78 MMHG | HEIGHT: 69 IN | OXYGEN SATURATION: 99 %

## 2022-09-20 DIAGNOSIS — S01.01XA LACERATION OF SCALP, INITIAL ENCOUNTER: ICD-10-CM

## 2022-09-20 DIAGNOSIS — S09.90XA CLOSED HEAD INJURY, INITIAL ENCOUNTER: ICD-10-CM

## 2022-09-20 PROCEDURE — 12002 RPR S/N/AX/GEN/TRNK2.6-7.5CM: CPT

## 2022-09-20 PROCEDURE — G1010 CDSM STANSON: HCPCS

## 2022-09-20 PROCEDURE — 99284 EMERGENCY DEPT VISIT MOD MDM: CPT | Mod: 25

## 2022-09-20 ASSESSMENT — ACTIVITIES OF DAILY LIVING (ADL): ADLS_ACUITY_SCORE: 35

## 2022-09-20 NOTE — ED NOTES
Bed: ED03  Expected date:   Expected time:   Means of arrival:   Comments:  [facility] - 432 - 89 M fall head lac eta 0748

## 2022-09-20 NOTE — ED NOTES
Called son Aleksandar to give update, and son aware that sutures need to come out in 5-7 days.   Son okay with pt going home via wheelchair transfer.

## 2022-09-20 NOTE — ED TRIAGE NOTES
Pt presents to ed via ems from assisted living to be evaluated for head laceration after mechanical fall.   Pt reports trying to pull up his pants and lost his balance.  Pt denies having LOC, is not on blood thinners, and is not currently in any pain.          Triage Assessment     Row Name 09/20/22 0948       Skin Circulation/Temperature WDL    Skin Circulation/Temperature WDL --  head laceration on back of head. not actively bleeding    Row Name 09/20/22 0940       Respiratory WDL    Rhythm/Pattern, Respiratory depth regular;pattern regular;unlabored    Expansion/Accessory Muscles/Retractions expansion symmetric;no retractions;no use of accessory muscles       Cardiac WDL    Cardiac WDL --  denies chest pain        Aaron Coma Scale    Best Eye Response 4-->(E4) spontaneous    Best Motor Response 6-->(M6) obeys commands    Best Verbal Response 5-->(V5) oriented    [place]erio Coma Scale Score 15

## 2022-09-20 NOTE — ED PROVIDER NOTES
History     Chief Complaint:    Fall and Head Laceration      HPI   Abimael Flores is a 89 year old male who presents with fall and head injury.  He lives in assisted living and was pulling on his pants and fell over due to chronic imbalance and suffered a laceration to his head.  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  He denies loss of consciousness, neck pain, or other injuries.  He lives in assisted living.      Review of Systems  + scalp laceration, head injury  - chest pain, neck pain, loss of consciousness, headache, vomiting  All other systems negative      Allergies:      No Known Allergies      Medications:      aspirin (ASA) 81 MG chewable tablet  atorvastatin (LIPITOR) 40 MG tablet  calcium carbonate (TUMS) 500 MG chewable tablet  HYDROmorphone (DILAUDID) 2 MG tablet  insulin aspart (NOVOLOG FLEXPEN) 100 UNIT/ML pen  insulin glargine (LANTUS SOLOSTAR PEN) 100 UNIT/ML pen  loratadine (CLARITIN) 10 MG tablet  meclizine (ANTIVERT) 25 MG tablet  melatonin 3 MG tablet  metoprolol succinate ER (TOPROL-XL) 50 MG 24 hr tablet  naphazoline-pheniramine (OPCON-A) SOLN ophthalmic solution  nitroGLYcerin (NITROSTAT) 0.4 MG sublingual tablet  senna-docusate (SENOKOT-S/PERICOLACE) 8.6-50 MG tablet  tamsulosin (FLOMAX) 0.4 MG capsule        Past Medical History:        Past Medical History:   Diagnosis Date     Benign essential hypertension 9/4/2018     Coronary artery disease      Nonsenile cataract      Recent retinal detachment, total or subtotal 8/5/2014     Type 2 diabetes mellitus without complications (H)      Patient Active Problem List    Diagnosis Date Noted     Closed left hip fracture (H) 05/17/2020     Priority: Medium     NSTEMI (non-ST elevated myocardial infarction) (H) 11/17/2019     Priority: Medium     Supraventricular tachycardia (H) 02/09/2019     Priority: Medium     Closed dislocation of left hip, initial encounter (H) 01/05/2019     Priority: Medium      Hypertensive heart and kidney disease 12/04/2018     Priority: Medium     Recurrent subluxation of hip joint 12/04/2018     Priority: Medium     Long term current use of insulin (H) 11/30/2018     Priority: Medium     S/P closed reduction of dislocated total hip prosthesis 11/22/2018     Priority: Medium     S/P hip hemiarthroplasty, Left 4 Oct 18 11/22/2018     Priority: Medium     H/O coronary artery bypass graft, 1996 11/22/2018     Priority: Medium     Vertigo 11/14/2018     Priority: Medium     Status post total replacement of left hip 22 Oct 18 11/14/2018     Priority: Medium     Fall, subsequent encounter 11/14/2018     Priority: Medium     Type 2 diabetes mellitus with stage 3 chronic kidney disease, with long-term current use of insulin (H) 11/14/2018     Priority: Medium     Benign prostatic hyperplasia without lower urinary tract symptoms 11/14/2018     Priority: Medium     Anemia, unspecified type 11/14/2018     Priority: Medium     Insomnia, unspecified type 11/14/2018     Priority: Medium     Acute pain 11/02/2018     Priority: Medium     Essential hypertension 11/02/2018     Priority: Medium     H/O cardiac arrhythmia 11/02/2018     Priority: Medium     Weight loss 11/02/2018     Priority: Medium     Debility 11/02/2018     Priority: Medium     Other chronic pain 11/02/2018     Priority: Medium     Candidiasis of perineum 10/31/2018     Priority: Medium     Deep vein thrombosis (DVT) prophylaxis prescribed at discharge 10/26/2018     Priority: Medium     Benign prostatic hyperplasia with lower urinary tract symptoms 10/26/2018     Priority: Medium     Hip dislocation, left (H) 10/21/2018     Priority: Medium     Dislocation of hip (H) 10/14/2018     Priority: Medium     Anemia due to blood loss, acute 10/11/2018     Priority: Medium     Fracture of femoral neck, left (H) 10/04/2018     Priority: Medium     Hip fracture, left (H) 10/04/2018     Priority: Medium     Other insomnia 09/17/2018      Priority: Medium     Bladder spasms 09/17/2018     Priority: Medium     Type 2 diabetes mellitus with renal complication (H) 09/04/2018     Priority: Medium     Cardiac arrhythmia, unspecified cardiac arrhythmia type 09/04/2018     Priority: Medium     CKD (chronic kidney disease) stage 3, GFR 30-59 ml/min (H) 09/04/2018     Priority: Medium     Benign essential hypertension 09/04/2018     Priority: Medium     Coronary artery disease involving coronary bypass graft of native heart without angina pectoris 09/04/2018     Priority: Medium     Physical deconditioning 09/04/2018     Priority: Medium     S/P total hip arthroplasty 08/26/2018     Priority: Medium     Closed right hip fracture (H) 08/25/2018     Priority: Medium     Recent retinal detachment, total or subtotal 08/05/2014     Priority: Medium     Injury of globe of eye 04/21/2014     Priority: Medium     Senile nuclear sclerosis 01/24/2008     Priority: Medium     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     Priority: Medium     Hyperlipidemia 11/11/2003     Priority: Medium     Microalbuminuria 11/11/2003     Priority: Medium     Allergic rhinitis 11/11/2003     Priority: Medium        Past Surgical History:        Past Surgical History:   Procedure Laterality Date     ARTHROPLASTY REVISION HIP Left 10/22/2018    Procedure: LEFT HIP HEMIARTHROPLASTY CONVERTED TO LEFT TOTAL HIP ARTHROPLASTY;  Surgeon: Marcos Winchester MD;  Location:  OR     CARDIAC SURGERY       CATARACT IOL, RT/LT Bilateral      CLOSED REDUCTION HIP Left 10/14/2018    Procedure: CLOSED REDUCTION HIP;  CLOSED REDUCTION HIP;  Surgeon: Mitlon Gusman MD;  Location:  OR     CORONARY ARTERY BYPASS  1996    4v CABG 1996.     lacrimal caruncle removed BE Bilateral ~1989     OPEN REDUCTION INTERNAL FIXATION HIP BIPOLAR Right 8/26/2018    Procedure: OPEN REDUCTION INTERNAL FIXATION HIP BIPOLAR;  Right Hip Bipolar Hemiarthroplasty (Biomet);  Surgeon: Bill Sanders,  "MD;  Location:  OR     OPEN REDUCTION INTERNAL FIXATION HIP BIPOLAR Left 10/4/2018    Procedure: OPEN REDUCTION INTERNAL FIXATION HIP BIPOLAR;  LEFT HIP TONYA ARTHROPLASTY;  Surgeon: Bruno Stewart MD;  Location:  OR     REPAIR RUPTURED GLOBE  4/21/2014    Procedure: Exploration and Repair of Ruptured Globe ;  Surgeon: Mercedes Rivera MD;  Location:  OR       Family History:        Family History   Problem Relation Age of Onset     Diabetes Mother      Diabetes Father      Cancer No family hx of      Glaucoma No family hx of      Macular Degeneration No family hx of        Social History:    Aleksandar Flores  The patient presents to the ED with alone    Physical Exam     Patient Vitals for the past 24 hrs:   BP Temp Temp src Pulse Resp SpO2 Height Weight   09/20/22 1115 -- -- -- -- -- 99 % -- --   09/20/22 1100 120/78 -- -- 85 -- 98 % -- --   09/20/22 1000 135/71 -- -- 76 -- 99 % -- --   09/20/22 0946 (!) 149/69 97.4  F (36.3  C) Temporal 82 18 99 % 1.753 m (5' 9\") 63.5 kg (140 lb)       Physical Exam  GENERAL: well developed, pleasant  HEAD: occipital scalp laceration, fairly shallow  EYES: pupils reactive, extraocular muscles intact, conjunctivae normal  ENT:  mucus membranes moist  NECK:  trachea midline, normal range of motion, no cervical spine tenderness  RESPIRATORY: no tachypnea, breath sounds clear to auscultation   CVS: normal S1/S2, no murmurs, intact distal pulses  ABDOMEN: soft, nontender, nondistention  MUSCULOSKELETAL: no deformities  SKIN: warm and dry, no acute rashes or ulceration  NEURO: GCS 15, cranial nerves intact, alert and oriented x3  PSYCH:  Mood/affect normal        Emergency Department Course     Imaging:  CT Head w/o Contrast   Final Result   IMPRESSION: Right parietal scalp contusion/laceration. Right phthisis   bulbi again noted. Bilateral mastoid effusions again noted. Diffuse   cerebral volume loss and cerebral white matter changes consistent with   chronic small " vessel ischemic disease. No evidence for acute   intracranial pathology.             Radiation dose for this scan was reduced using automated exposure   control, adjustment of the mA and/or kV according to patient size, or   iterative reconstruction technique.      WILMA OLIVARES MD            SYSTEM ID:  [id number]        Report per radiology    Laboratory:  Labs Ordered and Resulted from Time of ED Arrival to Time of ED Departure - No data to display    [facility]    -Laceration Repair    Date/Time: 9/20/2022 9:59 AM  Performed by: Julius Chen MD  Authorized by: Julius Chen MD     Risks, benefits and alternatives discussed.      ANESTHESIA (see MAR for exact dosages):     Anesthesia method:  Local infiltration    Local anesthetic:  Lidocaine 1% WITH epi  LACERATION DETAILS     Location:  Scalp    Scalp location:  Crown    Length (cm):  3    REPAIR TYPE:     Repair type:  Simple      EXPLORATION:     Wound exploration: wound explored through full range of motion and entire depth of wound probed and visualized      TREATMENT:     Area cleansed with:  Shur-Clens    Amount of cleaning:  Standard    Irrigation solution:  Sterile saline    Irrigation method:  Syringe    SKIN REPAIR     Repair method:  Sutures    Suture size:  4-0    Suture material:  Nylon    Suture technique:  Simple interrupted    APPROXIMATION     Approximation:  Close    POST-PROCEDURE DETAILS     Dressing:  Antibiotic ointment        PROCEDURE    Patient Tolerance:  Patient tolerated the procedure well with no immediate complications  :      Emergency Department Course:             Reviewed:    I reviewed nursing notes, vitals and past medical history    Assessments:   I obtained history and examined the patient as noted above.    I rechecked the patient and explained findings.       Consults:   na         Interventions:    Medications - No data to display    Disposition:  The patient was discharged to home.      Impression & Plan            CMS Diagnoses:        Medical Decision Making:  Patient presents with mechanical fall, head injury, scalp laceration.  Imaging is normal.  He is not anticoagulated.  He has no c-spine pain or other injuries from the fall.  He has no other complaints.  Scalp was cleaned and closed as above with 5 x 4-O nylon sutures.  He was transferred back to his assisted living home.  He notes he has plans to review old photographs with his son today.    Critical Care time:  none    Covid-19  Abimael Flores was evaluated during a global COVID-19 pandemic, which necessitated consideration that the patient might be at risk for infection with the SARS-CoV-2 virus that causes COVID-19.   Applicable protocols for evaluation were followed during the patient's care.   COVID-19 was considered as part of the patient's evaluation.    Diagnosis:    ICD-10-CM    1. Closed head injury, initial encounter  S09.90XA    2. Laceration of scalp, initial encounter  S01.01XA        Discharge Medications:  Discharge Medication List as of 9/20/2022 11:11 AM            Scribe Disclosure:  Julius NINO MD, am serving as a scribe at 9:49 AM on 9/20/2022 to document services personally performed by Julius Chen MD based on my observations and the provider's statements to me.      Julius Chen MD  09/20/22 0200

## 2022-09-20 NOTE — ED NOTES
Writer RN called [facility] and gave update to Adeline RODRIGUEZ.   Facility RN reported that someone will be there when patient arrives.

## 2025-05-09 NOTE — IP AVS SNAPSHOT
` ` Patient Information     Patient Name Sex     Abimael Flores ([id number]) Male 1924       Room Bed    0259 0259-01      Patient Demographics     Address Phone    [address]  [facility] [address] [phone] (Home) *Preferred*  NONE (Work)  [phone] (Mobile)      Patient Ethnicity & Race     Ethnic Group Patient Race    American Choose not to answer      Emergency Contact(s)     Name Relation Home Work Mobile    Aleksandar Flores Son [phone] NONE [phone]    Jaey Flores Spouse [phone] NONE       Documents on File        Status Date Received Description       Documents for the Patient    Privacy Notice - [place] Received 14     Face Sheet  04/15/03     Insurance Card  04/15/03     Consent for Services - Hospital/Clinic Received () 14     Consent for EHR Access Received 14     External Medication Information Consent       Patient ID Received 18     [facility] Specified Other       HIM JUDD Authorization - File Only  14 [facility] AUTHORIZATION FOR RELEASE OF PROTECTED HEALTH INFORMATION    HIM JUDD Authorization - File Only  14 AUTHORIZATION FOR RELEASE OF PROTECTED HEALTH INFORMATION    Business/Insurance/Care Coordination/Health Form - Patient  14 CARE COORDINATION LETTER    HIM JUDD Authorization  07/03/15 [facility]    Consent for Services - Hospital and Clinic Received 18     HIE Auth Received 18     Insurance Card Received 18 new medicare    Insurance Card Received 18        Documents for the Encounter    CMS IM for Patient Signature Received 18     EMS/Ambulance Record  18 [facility] MEDICAL TRANSPORTATION    CMS IM for Patient Signature Received 18 2MM    Discharge Attachment   DILTIAZEM EXTENDED-RELEASE CAPSULES OR TABLETS (ENGLISH)    Discharge Attachment   FEMUR FRACTURE OPEN REDUCTION AND INTERNAL FIXATION (ORIF), UNDERSTANDING (ENGLISH)    Discharge Attachment  (Deleted)   DILTIAZEM TABLETS (ENGLISH)      Admission Information     Attending Provider Admitting Provider Admission Type Admission Date/Time    Bill Sanders MD Nemanich, Joseph Patrick, MD Emergency 08/25/18  1851    Discharge Date Hospital Service Auth/Cert Status Service Area     Cardiology Incomplete [facility]    Unit Room/Bed Admission Status        CARDIAC SPEC CARE 0259/0259-01 Admission (Confirmed)       Admission     Complaint    Closed right hip fracture (H), unknown, S/P total hip arthroplasty      Hospital Account     Name Acct ID Class Status Primary Coverage    Abimael Flores [id number] Inpatient Open MEDICARE - MEDICARE            Guarantor Account (for Hospital Account #[id number])     Name Relation to Pt Service Area Active? Acct Type    Abimael Flores  FCS Yes Personal/Family    Address Phone          [address]  [place] [address] [phone](H)  NONE(O)              Coverage Information (for Hospital Account #[id number])     1. MEDICARE/MEDICARE     F/O Payor/Plan Precert #    MEDICARE/MEDICARE     Subscriber Subscriber #    Abimael Flores [id number]    Address Phone    [employer]  [address]  [place] [address] [phone]          2. [place]     F/O Payor/Plan Precert #    [place]     Subscriber Subscriber #    Abimael Flores [id number]    Address Phone    [address] [address]  [place] [address] [phone]             DISPLAY PLAN FREE TEXT

## (undated) DEVICE — PAD FOAM MCGUIRE KIT 0814-0150

## (undated) DEVICE — SU VICRYL 2-0 CT 36" J957H

## (undated) DEVICE — CEMENT PRESSURIZER FEMORAL CANAL MED 0206-546-000

## (undated) DEVICE — SU VICRYL 1 CTX CR 8X18" J765D

## (undated) DEVICE — ESU PENCIL W/SMOKE EVAC NEPTUNE STRYKER 0703-046-000

## (undated) DEVICE — SU ETHIBOND 0 CTX CR  8X18" CX31D

## (undated) DEVICE — GLOVE PROTEXIS W/NEU-THERA 7.0  2D73TE70

## (undated) DEVICE — PREP DURAPREP 26ML APL 8630

## (undated) DEVICE — MANIFOLD NEPTUNE 4 PORT 700-20

## (undated) DEVICE — SU MONOCRYL 3-0 PS-2 18" UND Y497G

## (undated) DEVICE — IMM PILLOW ABDUCT HIP MED 31143061

## (undated) DEVICE — SU ETHIBOND 0 CT-1 CR 8X18" CX21D

## (undated) DEVICE — BLADE SAW SAGITTAL STRK 25X90X1.37MM 4H SYS 6 6125-137-090

## (undated) DEVICE — SOL BENZOIN 0.5OZ

## (undated) DEVICE — SU MONOCRYL 3-0 PS-2 27" Y427H

## (undated) DEVICE — SOLUTION WOUND CLEANSING 3/4OZ 10% PVP EA-L3011FB-50

## (undated) DEVICE — WIPES FOLEY CARE SURESTEP PROVON DFC100

## (undated) DEVICE — SU ETHIBOND 5 V-37 4X30" MB66G

## (undated) DEVICE — BLADE SAW SAGITTAL STRK 25X79.5X1.24MM 4/2000 2108-318-000

## (undated) DEVICE — DRSG KERLIX FLUFFS X5

## (undated) DEVICE — GLOVE PROTEXIS BLUE W/NEU-THERA 8.0  2D73EB80

## (undated) DEVICE — SPONGE LAP 18X18" X8435

## (undated) DEVICE — DRAPE IOBAN INCISE 23X17" 6650EZ

## (undated) DEVICE — LINEN TOWEL PACK X5 5464

## (undated) DEVICE — CATH TRAY FOLEY COUDE SURESTEP 16FR W/URNE MTR STLK A304716A

## (undated) DEVICE — GLOVE PROTEXIS W/NEU-THERA 7.5  2D73TE75

## (undated) DEVICE — SU VICRYL 0 CT-1 27" J340H

## (undated) DEVICE — SOL NACL 0.9% INJ 1000ML BAG 2B1324X

## (undated) DEVICE — PREP CHLORAPREP 26ML TINTED ORANGE  260815

## (undated) DEVICE — DRAPE SHEET REV FOLD 3/4 9349

## (undated) DEVICE — SU ETHIBOND 2 V-37 4X30" MX69G

## (undated) DEVICE — GLOVE PROTEXIS POWDER FREE 7.5 ORTHOPEDIC 2D73ET75

## (undated) DEVICE — BRUSH INTRAMEDULLARY

## (undated) DEVICE — GLOVE PROTEXIS BLUE W/NEU-THERA 7.0  2D73EB70

## (undated) DEVICE — HOOD FLYTE W/PEELAWAY 408-800-100

## (undated) DEVICE — DRSG AQUACEL AG 3.5X9.75" HYDROFIBER 412011

## (undated) DEVICE — GLOVE PROTEXIS POWDER FREE 8.0 ORTHOPEDIC 2D73ET80

## (undated) DEVICE — GLOVE PROTEXIS W/NEU-THERA 8.0  2D73TE80

## (undated) DEVICE — NDL SPINAL 18GA 3.5" 405184

## (undated) DEVICE — SOL WATER IRRIG 1000ML BOTTLE 2F7114

## (undated) DEVICE — GLOVE PROTEXIS POWDER FREE 6.5 ORTHOPEDIC 2D73ET65

## (undated) DEVICE — SU VICRYL 2-0 CP-1 27" UND J266H

## (undated) DEVICE — SOL NACL 0.9% IRRIG 1000ML BOTTLE 07138-09

## (undated) DEVICE — SU VICRYL 0 CTX 36" J370H

## (undated) DEVICE — LIGHT FLEX 3034A

## (undated) DEVICE — ESU ELEC BLADE 6" COATED E1450-6

## (undated) DEVICE — SU VICRYL 0 CT-1 CR 8X18" J740D

## (undated) DEVICE — SUCTION IRR SYSTEM W/O TIP INTERPULSE HANDPIECE 0210-100-000

## (undated) DEVICE — SU VICRYL 2-0 CT-1 27" J339H

## (undated) DEVICE — ESU GROUND PAD UNIVERSAL W/O CORD

## (undated) DEVICE — BLADE SAW SAGITTAL STRK 20.7X85X0.89MM 2108-109-000S13

## (undated) DEVICE — PACK TOTAL HIP W/POUCH SOP15HPFSM

## (undated) DEVICE — DRSG XEROFORM 5X9" 8884431605

## (undated) DEVICE — SYR 30ML LL W/O NDL

## (undated) DEVICE — PACK TOTAL HIP W/U DRAPE SOP15HUFSC

## (undated) DEVICE — BONE CEMENT MIXEVAC HI VAC W/CARTRIDGE 0306-563-000

## (undated) DEVICE — SU VICRYL 0 CT 36" J358H

## (undated) DEVICE — PIN STEINMAN 1/8"

## (undated) DEVICE — DRAPE STERI TOWEL LG 1010

## (undated) DEVICE — DRILL BIT BIOM QUICK CONNECTING RING LOCK 3.2X30MM 31-323230

## (undated) DEVICE — SU DERMABOND ADVANCED .7ML DNX12

## (undated) DEVICE — SU VICRYL 2-0 CT-1 27" UND J259H

## (undated) DEVICE — PREP SKIN SCRUB TRAY 4461A

## (undated) DEVICE — BONE CLEANING TIP INTERPULSE FEMORAL CANAL 0210-008-000

## (undated) DEVICE — GLOVE PROTEXIS MICRO 8.0  2D73PM80

## (undated) RX ORDER — ONDANSETRON 2 MG/ML
INJECTION INTRAMUSCULAR; INTRAVENOUS
Status: DISPENSED
Start: 2018-08-26

## (undated) RX ORDER — PROPOFOL 10 MG/ML
INJECTION, EMULSION INTRAVENOUS
Status: DISPENSED
Start: 2018-10-22

## (undated) RX ORDER — GLYCOPYRROLATE 0.2 MG/ML
INJECTION, SOLUTION INTRAMUSCULAR; INTRAVENOUS
Status: DISPENSED
Start: 2018-08-26

## (undated) RX ORDER — ALBUMIN, HUMAN INJ 5% 5 %
SOLUTION INTRAVENOUS
Status: DISPENSED
Start: 2018-10-14

## (undated) RX ORDER — EPINEPHRINE 1 MG/ML
INJECTION, SOLUTION INTRAMUSCULAR; SUBCUTANEOUS
Status: DISPENSED
Start: 2018-10-22

## (undated) RX ORDER — NEOSTIGMINE METHYLSULFATE 1 MG/ML
VIAL (ML) INJECTION
Status: DISPENSED
Start: 2018-08-26

## (undated) RX ORDER — HYDROMORPHONE HYDROCHLORIDE 1 MG/ML
INJECTION, SOLUTION INTRAMUSCULAR; INTRAVENOUS; SUBCUTANEOUS
Status: DISPENSED
Start: 2018-10-22

## (undated) RX ORDER — DEXAMETHASONE SODIUM PHOSPHATE 4 MG/ML
INJECTION, SOLUTION INTRA-ARTICULAR; INTRALESIONAL; INTRAMUSCULAR; INTRAVENOUS; SOFT TISSUE
Status: DISPENSED
Start: 2018-10-04

## (undated) RX ORDER — FENTANYL CITRATE 50 UG/ML
INJECTION, SOLUTION INTRAMUSCULAR; INTRAVENOUS
Status: DISPENSED
Start: 2018-10-14

## (undated) RX ORDER — LIDOCAINE HYDROCHLORIDE 20 MG/ML
INJECTION, SOLUTION EPIDURAL; INFILTRATION; INTRACAUDAL; PERINEURAL
Status: DISPENSED
Start: 2018-10-22

## (undated) RX ORDER — HYDROMORPHONE HYDROCHLORIDE 1 MG/ML
INJECTION, SOLUTION INTRAMUSCULAR; INTRAVENOUS; SUBCUTANEOUS
Status: DISPENSED
Start: 2018-08-26

## (undated) RX ORDER — ONDANSETRON 2 MG/ML
INJECTION INTRAMUSCULAR; INTRAVENOUS
Status: DISPENSED
Start: 2018-10-22

## (undated) RX ORDER — PROPOFOL 10 MG/ML
INJECTION, EMULSION INTRAVENOUS
Status: DISPENSED
Start: 2018-10-04

## (undated) RX ORDER — ALBUMIN, HUMAN INJ 5% 5 %
SOLUTION INTRAVENOUS
Status: DISPENSED
Start: 2018-10-22

## (undated) RX ORDER — BUPIVACAINE HYDROCHLORIDE AND EPINEPHRINE 5; 5 MG/ML; UG/ML
INJECTION, SOLUTION EPIDURAL; INTRACAUDAL; PERINEURAL
Status: DISPENSED
Start: 2018-10-22

## (undated) RX ORDER — KETOROLAC TROMETHAMINE 30 MG/ML
INJECTION, SOLUTION INTRAMUSCULAR; INTRAVENOUS
Status: DISPENSED
Start: 2018-08-26

## (undated) RX ORDER — LIDOCAINE HYDROCHLORIDE 20 MG/ML
INJECTION, SOLUTION EPIDURAL; INFILTRATION; INTRACAUDAL; PERINEURAL
Status: DISPENSED
Start: 2018-08-26

## (undated) RX ORDER — BUPIVACAINE HYDROCHLORIDE 2.5 MG/ML
INJECTION, SOLUTION EPIDURAL; INFILTRATION; INTRACAUDAL
Status: DISPENSED
Start: 2018-10-22

## (undated) RX ORDER — ALBUMIN, HUMAN INJ 5% 5 %
SOLUTION INTRAVENOUS
Status: DISPENSED
Start: 2018-10-04

## (undated) RX ORDER — CEFAZOLIN SODIUM 2 G/100ML
INJECTION, SOLUTION INTRAVENOUS
Status: DISPENSED
Start: 2018-08-26

## (undated) RX ORDER — FENTANYL CITRATE 50 UG/ML
INJECTION, SOLUTION INTRAMUSCULAR; INTRAVENOUS
Status: DISPENSED
Start: 2018-08-26

## (undated) RX ORDER — FENTANYL CITRATE 50 UG/ML
INJECTION, SOLUTION INTRAMUSCULAR; INTRAVENOUS
Status: DISPENSED
Start: 2018-10-22

## (undated) RX ORDER — PROPOFOL 10 MG/ML
INJECTION, EMULSION INTRAVENOUS
Status: DISPENSED
Start: 2018-08-26

## (undated) RX ORDER — FENTANYL CITRATE 50 UG/ML
INJECTION, SOLUTION INTRAMUSCULAR; INTRAVENOUS
Status: DISPENSED
Start: 2018-10-04

## (undated) RX ORDER — CEFAZOLIN SODIUM 2 G/100ML
INJECTION, SOLUTION INTRAVENOUS
Status: DISPENSED
Start: 2018-10-22

## (undated) RX ORDER — LIDOCAINE HYDROCHLORIDE 20 MG/ML
INJECTION, SOLUTION EPIDURAL; INFILTRATION; INTRACAUDAL; PERINEURAL
Status: DISPENSED
Start: 2018-10-04

## (undated) RX ORDER — ROPIVACAINE HYDROCHLORIDE 5 MG/ML
INJECTION, SOLUTION EPIDURAL; INFILTRATION; PERINEURAL
Status: DISPENSED
Start: 2018-08-26

## (undated) RX ORDER — CEFAZOLIN SODIUM 2 G/100ML
INJECTION, SOLUTION INTRAVENOUS
Status: DISPENSED
Start: 2018-10-04

## (undated) RX ORDER — ONDANSETRON 2 MG/ML
INJECTION INTRAMUSCULAR; INTRAVENOUS
Status: DISPENSED
Start: 2018-10-04

## (undated) RX ORDER — PROPOFOL 10 MG/ML
INJECTION, EMULSION INTRAVENOUS
Status: DISPENSED
Start: 2018-10-14